# Patient Record
Sex: MALE | Race: WHITE | NOT HISPANIC OR LATINO
[De-identification: names, ages, dates, MRNs, and addresses within clinical notes are randomized per-mention and may not be internally consistent; named-entity substitution may affect disease eponyms.]

---

## 2016-12-16 RX ORDER — ISOSORBIDE DINITRATE 5 MG/1
2 TABLET ORAL
Qty: 90 | Refills: 0 | COMMUNITY
Start: 2016-12-16 | End: 2017-01-15

## 2017-01-09 ENCOUNTER — APPOINTMENT (OUTPATIENT)
Dept: CARDIOLOGY | Facility: CLINIC | Age: 82
End: 2017-01-09

## 2017-01-09 VITALS
BODY MASS INDEX: 25.22 KG/M2 | WEIGHT: 161 LBS | SYSTOLIC BLOOD PRESSURE: 126 MMHG | OXYGEN SATURATION: 100 % | DIASTOLIC BLOOD PRESSURE: 79 MMHG | HEART RATE: 72 BPM

## 2017-01-09 VITALS
BODY MASS INDEX: 25.22 KG/M2 | SYSTOLIC BLOOD PRESSURE: 121 MMHG | OXYGEN SATURATION: 100 % | DIASTOLIC BLOOD PRESSURE: 72 MMHG | WEIGHT: 161 LBS | HEART RATE: 79 BPM

## 2017-01-11 LAB
ANION GAP SERPL CALC-SCNC: 12 MMOL/L
BUN SERPL-MCNC: 81 MG/DL
CALCIUM SERPL-MCNC: 9.6 MG/DL
CHLORIDE SERPL-SCNC: 99 MMOL/L
CO2 SERPL-SCNC: 27 MMOL/L
CREAT SERPL-MCNC: 2.58 MG/DL
DIGOXIN SERPL-MCNC: 1 NG/ML
GLUCOSE SERPL-MCNC: 141 MG/DL
NT-PROBNP SERPL-MCNC: 2555 PG/ML
POTASSIUM SERPL-SCNC: 4.4 MMOL/L
SODIUM SERPL-SCNC: 138 MMOL/L

## 2017-01-18 ENCOUNTER — APPOINTMENT (OUTPATIENT)
Dept: PULMONOLOGY | Facility: CLINIC | Age: 82
End: 2017-01-18

## 2017-01-18 ENCOUNTER — MEDICATION RENEWAL (OUTPATIENT)
Age: 82
End: 2017-01-18

## 2017-01-18 VITALS
OXYGEN SATURATION: 98 % | SYSTOLIC BLOOD PRESSURE: 120 MMHG | HEART RATE: 78 BPM | BODY MASS INDEX: 23.19 KG/M2 | HEIGHT: 68 IN | DIASTOLIC BLOOD PRESSURE: 71 MMHG | WEIGHT: 153 LBS

## 2017-01-19 ENCOUNTER — APPOINTMENT (OUTPATIENT)
Dept: GERIATRICS | Facility: CLINIC | Age: 82
End: 2017-01-19

## 2017-01-19 ENCOUNTER — LABORATORY RESULT (OUTPATIENT)
Age: 82
End: 2017-01-19

## 2017-01-19 VITALS
BODY MASS INDEX: 24.76 KG/M2 | SYSTOLIC BLOOD PRESSURE: 110 MMHG | DIASTOLIC BLOOD PRESSURE: 66 MMHG | HEIGHT: 68 IN | TEMPERATURE: 97.9 F | WEIGHT: 163.38 LBS | HEART RATE: 62 BPM | OXYGEN SATURATION: 98 % | RESPIRATION RATE: 16 BRPM

## 2017-01-19 DIAGNOSIS — Z23 ENCOUNTER FOR IMMUNIZATION: ICD-10-CM

## 2017-01-22 ENCOUNTER — FORM ENCOUNTER (OUTPATIENT)
Age: 82
End: 2017-01-22

## 2017-01-23 ENCOUNTER — OUTPATIENT (OUTPATIENT)
Dept: OUTPATIENT SERVICES | Facility: HOSPITAL | Age: 82
LOS: 1 days | End: 2017-01-23
Payer: MEDICARE

## 2017-01-23 ENCOUNTER — APPOINTMENT (OUTPATIENT)
Dept: CT IMAGING | Facility: CLINIC | Age: 82
End: 2017-01-23

## 2017-01-23 ENCOUNTER — APPOINTMENT (OUTPATIENT)
Dept: NEPHROLOGY | Facility: CLINIC | Age: 82
End: 2017-01-23

## 2017-01-23 VITALS
SYSTOLIC BLOOD PRESSURE: 122 MMHG | HEIGHT: 68 IN | WEIGHT: 158 LBS | DIASTOLIC BLOOD PRESSURE: 65 MMHG | OXYGEN SATURATION: 95 % | HEART RATE: 77 BPM | BODY MASS INDEX: 23.95 KG/M2

## 2017-01-23 DIAGNOSIS — R93.8 ABNORMAL FINDINGS ON DIAGNOSTIC IMAGING OF OTHER SPECIFIED BODY STRUCTURES: ICD-10-CM

## 2017-01-23 DIAGNOSIS — Z95.810 PRESENCE OF AUTOMATIC (IMPLANTABLE) CARDIAC DEFIBRILLATOR: Chronic | ICD-10-CM

## 2017-01-23 DIAGNOSIS — Z95.1 PRESENCE OF AORTOCORONARY BYPASS GRAFT: Chronic | ICD-10-CM

## 2017-01-23 DIAGNOSIS — Z98.89 OTHER SPECIFIED POSTPROCEDURAL STATES: Chronic | ICD-10-CM

## 2017-01-23 DIAGNOSIS — Z98.49 CATARACT EXTRACTION STATUS, UNSPECIFIED EYE: Chronic | ICD-10-CM

## 2017-01-23 DIAGNOSIS — Z95.4 PRESENCE OF OTHER HEART-VALVE REPLACEMENT: Chronic | ICD-10-CM

## 2017-01-23 PROCEDURE — 71250 CT THORAX DX C-: CPT

## 2017-01-25 ENCOUNTER — RESULT REVIEW (OUTPATIENT)
Age: 82
End: 2017-01-25

## 2017-01-26 ENCOUNTER — OTHER (OUTPATIENT)
Age: 82
End: 2017-01-26

## 2017-02-01 LAB
25(OH)D3 SERPL-MCNC: 27.1 NG/ML
ALBUMIN SERPL ELPH-MCNC: 3.9 G/DL
ALP BLD-CCNC: 162 U/L
ALT SERPL-CCNC: 40 U/L
ANION GAP SERPL CALC-SCNC: 15 MMOL/L
AST SERPL-CCNC: 31 U/L
BASOPHILS # BLD AUTO: 0.03 K/UL
BASOPHILS NFR BLD AUTO: 0.4 %
BILIRUB SERPL-MCNC: 0.4 MG/DL
BUN SERPL-MCNC: 90 MG/DL
CALCIUM SERPL-MCNC: 8.8 MG/DL
CHLORIDE SERPL-SCNC: 94 MMOL/L
CHOLEST SERPL-MCNC: 113 MG/DL
CHOLEST/HDLC SERPL: 2.4 RATIO
CO2 SERPL-SCNC: 26 MMOL/L
CREAT SERPL-MCNC: 3.05 MG/DL
DIGOXIN SERPL-MCNC: 0.5 NG/ML
EOSINOPHIL # BLD AUTO: 0.57 K/UL
EOSINOPHIL NFR BLD AUTO: 7.6 %
FOLATE SERPL-MCNC: 16 NG/ML
GLUCOSE SERPL-MCNC: 230 MG/DL
HBA1C MFR BLD HPLC: 10.5 %
HCT VFR BLD CALC: 33.6 %
HDLC SERPL-MCNC: 47 MG/DL
HGB BLD-MCNC: 10 G/DL
IMM GRANULOCYTES NFR BLD AUTO: 0.3 %
INR PPP: 1.51 RATIO
INR PPP: 1.54 RATIO
LDLC SERPL CALC-MCNC: 39 MG/DL
LYMPHOCYTES # BLD AUTO: 1.52 K/UL
LYMPHOCYTES NFR BLD AUTO: 20.4 %
MAN DIFF?: NORMAL
MCHC RBC-ENTMCNC: 24.8 PG
MCHC RBC-ENTMCNC: 29.8 GM/DL
MCV RBC AUTO: 83.4 FL
MONOCYTES # BLD AUTO: 0.79 K/UL
MONOCYTES NFR BLD AUTO: 10.6 %
NEUTROPHILS # BLD AUTO: 4.53 K/UL
NEUTROPHILS NFR BLD AUTO: 60.7 %
PLATELET # BLD AUTO: NORMAL
POTASSIUM SERPL-SCNC: 4.6 MMOL/L
PROT SERPL-MCNC: 7.1 G/DL
PT BLD: 17.1 SEC
PT BLD: 17.5 SEC
RBC # BLD: 4.03 M/UL
RBC # FLD: 15.9 %
SODIUM SERPL-SCNC: 135 MMOL/L
TRIGL SERPL-MCNC: 133 MG/DL
TSH SERPL-ACNC: 1.68 UIU/ML
VIT B12 SERPL-MCNC: 1218 PG/ML
WBC # FLD AUTO: 7.46 K/UL

## 2017-02-06 ENCOUNTER — APPOINTMENT (OUTPATIENT)
Dept: CARDIOLOGY | Facility: CLINIC | Age: 82
End: 2017-02-06

## 2017-02-06 VITALS
HEART RATE: 68 BPM | OXYGEN SATURATION: 98 % | DIASTOLIC BLOOD PRESSURE: 66 MMHG | BODY MASS INDEX: 24.48 KG/M2 | WEIGHT: 161 LBS | SYSTOLIC BLOOD PRESSURE: 125 MMHG

## 2017-02-06 VITALS
HEIGHT: 68 IN | SYSTOLIC BLOOD PRESSURE: 143 MMHG | HEART RATE: 81 BPM | RESPIRATION RATE: 16 BRPM | DIASTOLIC BLOOD PRESSURE: 77 MMHG

## 2017-02-07 LAB
ALBUMIN SERPL ELPH-MCNC: 3.8 G/DL
ALP BLD-CCNC: 268 U/L
ALT SERPL-CCNC: 40 U/L
ANION GAP SERPL CALC-SCNC: 17 MMOL/L
AST SERPL-CCNC: 30 U/L
BASOPHILS # BLD AUTO: 0.02 K/UL
BASOPHILS NFR BLD AUTO: 0.2 %
BILIRUB SERPL-MCNC: 0.3 MG/DL
BUN SERPL-MCNC: 93 MG/DL
CALCIUM SERPL-MCNC: 9.7 MG/DL
CHLORIDE SERPL-SCNC: 95 MMOL/L
CO2 SERPL-SCNC: 26 MMOL/L
CREAT SERPL-MCNC: 3.38 MG/DL
DIGOXIN SERPL-MCNC: 0.6 NG/ML
EOSINOPHIL # BLD AUTO: 0.17 K/UL
EOSINOPHIL NFR BLD AUTO: 2.1 %
GLUCOSE SERPL-MCNC: 118 MG/DL
HCT VFR BLD CALC: 36.4 %
HGB BLD-MCNC: 11 G/DL
IMM GRANULOCYTES NFR BLD AUTO: 0 %
INR PPP: 1.72 RATIO
LYMPHOCYTES # BLD AUTO: 1.73 K/UL
LYMPHOCYTES NFR BLD AUTO: 21 %
MAN DIFF?: NORMAL
MCHC RBC-ENTMCNC: 25.1 PG
MCHC RBC-ENTMCNC: 30.2 GM/DL
MCV RBC AUTO: 83.1 FL
MONOCYTES # BLD AUTO: 0.81 K/UL
MONOCYTES NFR BLD AUTO: 9.9 %
NEUTROPHILS # BLD AUTO: 5.49 K/UL
NEUTROPHILS NFR BLD AUTO: 66.8 %
PLATELET # BLD AUTO: 151 K/UL
POTASSIUM SERPL-SCNC: 4.8 MMOL/L
PROT SERPL-MCNC: 7.7 G/DL
PT BLD: 19.6 SEC
RBC # BLD: 4.38 M/UL
RBC # FLD: 16.9 %
SODIUM SERPL-SCNC: 138 MMOL/L
WBC # FLD AUTO: 8.22 K/UL

## 2017-02-13 ENCOUNTER — LABORATORY RESULT (OUTPATIENT)
Age: 82
End: 2017-02-13

## 2017-02-21 ENCOUNTER — LABORATORY RESULT (OUTPATIENT)
Age: 82
End: 2017-02-21

## 2017-02-27 ENCOUNTER — LABORATORY RESULT (OUTPATIENT)
Age: 82
End: 2017-02-27

## 2017-02-27 ENCOUNTER — APPOINTMENT (OUTPATIENT)
Dept: ENDOCRINOLOGY | Facility: CLINIC | Age: 82
End: 2017-02-27

## 2017-02-27 VITALS
BODY MASS INDEX: 25.01 KG/M2 | WEIGHT: 165 LBS | SYSTOLIC BLOOD PRESSURE: 120 MMHG | HEART RATE: 91 BPM | OXYGEN SATURATION: 96 % | DIASTOLIC BLOOD PRESSURE: 60 MMHG | HEIGHT: 68 IN

## 2017-03-07 ENCOUNTER — LABORATORY RESULT (OUTPATIENT)
Age: 82
End: 2017-03-07

## 2017-03-13 ENCOUNTER — APPOINTMENT (OUTPATIENT)
Dept: PULMONOLOGY | Facility: CLINIC | Age: 82
End: 2017-03-13

## 2017-03-13 ENCOUNTER — LABORATORY RESULT (OUTPATIENT)
Age: 82
End: 2017-03-13

## 2017-03-13 ENCOUNTER — APPOINTMENT (OUTPATIENT)
Dept: CARDIOLOGY | Facility: CLINIC | Age: 82
End: 2017-03-13

## 2017-03-13 VITALS — DIASTOLIC BLOOD PRESSURE: 75 MMHG | SYSTOLIC BLOOD PRESSURE: 124 MMHG | HEART RATE: 60 BPM | OXYGEN SATURATION: 99 %

## 2017-03-21 ENCOUNTER — APPOINTMENT (OUTPATIENT)
Dept: GERIATRICS | Facility: CLINIC | Age: 82
End: 2017-03-21

## 2017-03-21 VITALS
SYSTOLIC BLOOD PRESSURE: 130 MMHG | WEIGHT: 160 LBS | HEART RATE: 80 BPM | RESPIRATION RATE: 16 BRPM | BODY MASS INDEX: 24.25 KG/M2 | TEMPERATURE: 97.4 F | HEIGHT: 68 IN | DIASTOLIC BLOOD PRESSURE: 70 MMHG | OXYGEN SATURATION: 95 %

## 2017-03-21 DIAGNOSIS — Z87.898 PERSONAL HISTORY OF OTHER SPECIFIED CONDITIONS: ICD-10-CM

## 2017-03-21 DIAGNOSIS — J31.0 CHRONIC RHINITIS: ICD-10-CM

## 2017-03-21 DIAGNOSIS — R93.8 ABNORMAL FINDINGS ON DIAGNOSTIC IMAGING OF OTHER SPECIFIED BODY STRUCTURES: ICD-10-CM

## 2017-03-21 DIAGNOSIS — J44.9 CHRONIC OBSTRUCTIVE PULMONARY DISEASE, UNSPECIFIED: ICD-10-CM

## 2017-03-21 DIAGNOSIS — R06.83 SNORING: ICD-10-CM

## 2017-03-21 DIAGNOSIS — R06.89 OTHER ABNORMALITIES OF BREATHING: ICD-10-CM

## 2017-03-21 DIAGNOSIS — I25.5 ISCHEMIC CARDIOMYOPATHY: ICD-10-CM

## 2017-03-21 DIAGNOSIS — Z86.39 PERSONAL HISTORY OF OTHER ENDOCRINE, NUTRITIONAL AND METABOLIC DISEASE: ICD-10-CM

## 2017-03-21 DIAGNOSIS — I48.0 PAROXYSMAL ATRIAL FIBRILLATION: ICD-10-CM

## 2017-03-21 DIAGNOSIS — Z87.09 PERSONAL HISTORY OF OTHER DISEASES OF THE RESPIRATORY SYSTEM: ICD-10-CM

## 2017-03-21 DIAGNOSIS — D50.9 IRON DEFICIENCY ANEMIA, UNSPECIFIED: ICD-10-CM

## 2017-03-21 RX ORDER — IPRATROPIUM BROMIDE 21 UG/1
0.03 SPRAY NASAL
Qty: 1 | Refills: 2 | Status: DISCONTINUED | COMMUNITY
Start: 2017-01-18 | End: 2017-03-21

## 2017-03-27 ENCOUNTER — OTHER (OUTPATIENT)
Age: 82
End: 2017-03-27

## 2017-03-28 ENCOUNTER — TRANSCRIPTION ENCOUNTER (OUTPATIENT)
Age: 82
End: 2017-03-28

## 2017-04-03 ENCOUNTER — LABORATORY RESULT (OUTPATIENT)
Age: 82
End: 2017-04-03

## 2017-04-10 ENCOUNTER — APPOINTMENT (OUTPATIENT)
Dept: CARDIOLOGY | Facility: CLINIC | Age: 82
End: 2017-04-10

## 2017-04-10 VITALS
BODY MASS INDEX: 25.01 KG/M2 | OXYGEN SATURATION: 100 % | DIASTOLIC BLOOD PRESSURE: 74 MMHG | HEIGHT: 68 IN | HEART RATE: 68 BPM | RESPIRATION RATE: 16 BRPM | SYSTOLIC BLOOD PRESSURE: 154 MMHG | WEIGHT: 165 LBS

## 2017-04-10 LAB
25(OH)D3 SERPL-MCNC: 35.6 NG/ML
ALBUMIN SERPL ELPH-MCNC: 3.7 G/DL
ALP BLD-CCNC: 168 U/L
ALT SERPL-CCNC: 20 U/L
ANION GAP SERPL CALC-SCNC: 16 MMOL/L
AST SERPL-CCNC: 19 U/L
BASOPHILS # BLD AUTO: 0.02 K/UL
BASOPHILS NFR BLD AUTO: 0.2 %
BILIRUB SERPL-MCNC: 0.4 MG/DL
BUN SERPL-MCNC: 97 MG/DL
CALCIUM SERPL-MCNC: 8.9 MG/DL
CHLORIDE SERPL-SCNC: 98 MMOL/L
CO2 SERPL-SCNC: 25 MMOL/L
CREAT SERPL-MCNC: 3.36 MG/DL
DIGOXIN SERPL-MCNC: 0.4 NG/ML
EOSINOPHIL # BLD AUTO: 0.47 K/UL
EOSINOPHIL NFR BLD AUTO: 5.6 %
FERRITIN SERPL-MCNC: 37.4 NG/ML
FOLATE SERPL-MCNC: 12 NG/ML
GLUCOSE SERPL-MCNC: 111 MG/DL
HBA1C MFR BLD HPLC: 9.3 %
HCT VFR BLD CALC: 32.1 %
HGB BLD-MCNC: 9.4 G/DL
IMM GRANULOCYTES NFR BLD AUTO: 0.2 %
INR PPP: 3.02 RATIO
IRON SERPL-MCNC: 33 UG/DL
LYMPHOCYTES # BLD AUTO: 1.46 K/UL
LYMPHOCYTES NFR BLD AUTO: 17.4 %
MAGNESIUM SERPL-MCNC: 2.4 MG/DL
MAN DIFF?: NORMAL
MCHC RBC-ENTMCNC: 24.3 PG
MCHC RBC-ENTMCNC: 29.3 GM/DL
MCV RBC AUTO: 82.9 FL
MONOCYTES # BLD AUTO: 1.13 K/UL
MONOCYTES NFR BLD AUTO: 13.4 %
NEUTROPHILS # BLD AUTO: 5.31 K/UL
NEUTROPHILS NFR BLD AUTO: 63.2 %
PLATELET # BLD AUTO: 245 K/UL
POTASSIUM SERPL-SCNC: 4.4 MMOL/L
PROT SERPL-MCNC: 7.1 G/DL
PT BLD: 34.9 SEC
RBC # BLD: 3.87 M/UL
RBC # BLD: 3.87 M/UL
RBC # FLD: 18.1 %
RETICS # AUTO: 2.2 %
RETICS AGGREG/RBC NFR: 83.6 K/UL
SODIUM SERPL-SCNC: 139 MMOL/L
T4 FREE SERPL-MCNC: 1 NG/DL
TRANSFERRIN SERPL-MCNC: 249 MG/DL
TSH SERPL-ACNC: 2.32 UIU/ML
VIT B12 SERPL-MCNC: 1178 PG/ML
WBC # FLD AUTO: 8.41 K/UL

## 2017-04-16 ENCOUNTER — EMERGENCY (EMERGENCY)
Facility: HOSPITAL | Age: 82
LOS: 1 days | Discharge: HOME | End: 2017-04-16
Attending: EMERGENCY MEDICINE | Admitting: EMERGENCY MEDICINE
Payer: MEDICARE

## 2017-04-16 VITALS
TEMPERATURE: 97 F | HEART RATE: 84 BPM | SYSTOLIC BLOOD PRESSURE: 131 MMHG | RESPIRATION RATE: 18 BRPM | DIASTOLIC BLOOD PRESSURE: 72 MMHG | OXYGEN SATURATION: 97 %

## 2017-04-16 VITALS
TEMPERATURE: 98 F | HEART RATE: 59 BPM | RESPIRATION RATE: 18 BRPM | DIASTOLIC BLOOD PRESSURE: 70 MMHG | SYSTOLIC BLOOD PRESSURE: 148 MMHG | OXYGEN SATURATION: 99 %

## 2017-04-16 DIAGNOSIS — Z95.1 PRESENCE OF AORTOCORONARY BYPASS GRAFT: Chronic | ICD-10-CM

## 2017-04-16 DIAGNOSIS — Z98.89 OTHER SPECIFIED POSTPROCEDURAL STATES: Chronic | ICD-10-CM

## 2017-04-16 DIAGNOSIS — Z95.5 PRESENCE OF CORONARY ANGIOPLASTY IMPLANT AND GRAFT: ICD-10-CM

## 2017-04-16 DIAGNOSIS — R10.9 UNSPECIFIED ABDOMINAL PAIN: ICD-10-CM

## 2017-04-16 DIAGNOSIS — Z79.82 LONG TERM (CURRENT) USE OF ASPIRIN: ICD-10-CM

## 2017-04-16 DIAGNOSIS — N17.9 ACUTE KIDNEY FAILURE, UNSPECIFIED: ICD-10-CM

## 2017-04-16 DIAGNOSIS — I50.9 HEART FAILURE, UNSPECIFIED: ICD-10-CM

## 2017-04-16 DIAGNOSIS — E11.9 TYPE 2 DIABETES MELLITUS WITHOUT COMPLICATIONS: ICD-10-CM

## 2017-04-16 DIAGNOSIS — N12 TUBULO-INTERSTITIAL NEPHRITIS, NOT SPECIFIED AS ACUTE OR CHRONIC: ICD-10-CM

## 2017-04-16 DIAGNOSIS — Z79.01 LONG TERM (CURRENT) USE OF ANTICOAGULANTS: ICD-10-CM

## 2017-04-16 DIAGNOSIS — Z95.810 PRESENCE OF AUTOMATIC (IMPLANTABLE) CARDIAC DEFIBRILLATOR: Chronic | ICD-10-CM

## 2017-04-16 DIAGNOSIS — Z98.49 CATARACT EXTRACTION STATUS, UNSPECIFIED EYE: Chronic | ICD-10-CM

## 2017-04-16 DIAGNOSIS — Z95.0 PRESENCE OF CARDIAC PACEMAKER: ICD-10-CM

## 2017-04-16 DIAGNOSIS — N18.4 CHRONIC KIDNEY DISEASE, STAGE 4 (SEVERE): ICD-10-CM

## 2017-04-16 DIAGNOSIS — I25.10 ATHEROSCLEROTIC HEART DISEASE OF NATIVE CORONARY ARTERY WITHOUT ANGINA PECTORIS: ICD-10-CM

## 2017-04-16 DIAGNOSIS — I13.0 HYPERTENSIVE HEART AND CHRONIC KIDNEY DISEASE WITH HEART FAILURE AND STAGE 1 THROUGH STAGE 4 CHRONIC KIDNEY DISEASE, OR UNSPECIFIED CHRONIC KIDNEY DISEASE: ICD-10-CM

## 2017-04-16 DIAGNOSIS — Z95.4 PRESENCE OF OTHER HEART-VALVE REPLACEMENT: Chronic | ICD-10-CM

## 2017-04-16 DIAGNOSIS — Z98.49 CATARACT EXTRACTION STATUS, UNSPECIFIED EYE: ICD-10-CM

## 2017-04-16 LAB
ALBUMIN SERPL ELPH-MCNC: 3.8 G/DL — SIGNIFICANT CHANGE UP (ref 3.3–5)
ALBUMIN SERPL ELPH-MCNC: 4 G/DL — SIGNIFICANT CHANGE UP (ref 3.3–5)
ALP SERPL-CCNC: 146 U/L — HIGH (ref 40–120)
ALP SERPL-CCNC: 146 U/L — HIGH (ref 40–120)
ALT FLD-CCNC: 29 U/L RC — SIGNIFICANT CHANGE UP (ref 10–45)
ALT FLD-CCNC: 29 U/L RC — SIGNIFICANT CHANGE UP (ref 10–45)
ANION GAP SERPL CALC-SCNC: 15 MMOL/L — SIGNIFICANT CHANGE UP (ref 5–17)
ANION GAP SERPL CALC-SCNC: 9 MMOL/L — SIGNIFICANT CHANGE UP (ref 5–17)
APPEARANCE UR: ABNORMAL
APTT BLD: 31.7 SEC — SIGNIFICANT CHANGE UP (ref 27.5–37.4)
AST SERPL-CCNC: 30 U/L — SIGNIFICANT CHANGE UP (ref 10–40)
AST SERPL-CCNC: 33 U/L — SIGNIFICANT CHANGE UP (ref 10–40)
BASOPHILS # BLD AUTO: 0 K/UL — SIGNIFICANT CHANGE UP (ref 0–0.2)
BASOPHILS NFR BLD AUTO: 0.1 % — SIGNIFICANT CHANGE UP (ref 0–2)
BILIRUB SERPL-MCNC: 0.3 MG/DL — SIGNIFICANT CHANGE UP (ref 0.2–1.2)
BILIRUB SERPL-MCNC: 0.3 MG/DL — SIGNIFICANT CHANGE UP (ref 0.2–1.2)
BILIRUB UR-MCNC: NEGATIVE — SIGNIFICANT CHANGE UP
BUN SERPL-MCNC: 85 MG/DL — HIGH (ref 7–23)
BUN SERPL-MCNC: 88 MG/DL — HIGH (ref 7–23)
CALCIUM SERPL-MCNC: 9.3 MG/DL — SIGNIFICANT CHANGE UP (ref 8.4–10.5)
CALCIUM SERPL-MCNC: 9.5 MG/DL — SIGNIFICANT CHANGE UP (ref 8.4–10.5)
CHLORIDE SERPL-SCNC: 100 MMOL/L — SIGNIFICANT CHANGE UP (ref 96–108)
CHLORIDE SERPL-SCNC: 100 MMOL/L — SIGNIFICANT CHANGE UP (ref 96–108)
CO2 SERPL-SCNC: 25 MMOL/L — SIGNIFICANT CHANGE UP (ref 22–31)
CO2 SERPL-SCNC: 32 MMOL/L — HIGH (ref 22–31)
COLOR SPEC: SIGNIFICANT CHANGE UP
CREAT SERPL-MCNC: 3.36 MG/DL — HIGH (ref 0.5–1.3)
CREAT SERPL-MCNC: 3.55 MG/DL — HIGH (ref 0.5–1.3)
DIFF PNL FLD: ABNORMAL
EOSINOPHIL # BLD AUTO: 0.2 K/UL — SIGNIFICANT CHANGE UP (ref 0–0.5)
EOSINOPHIL NFR BLD AUTO: 3 % — SIGNIFICANT CHANGE UP (ref 0–6)
GAS PNL BLDV: SIGNIFICANT CHANGE UP
GLUCOSE SERPL-MCNC: 110 MG/DL — HIGH (ref 70–99)
GLUCOSE SERPL-MCNC: 131 MG/DL — HIGH (ref 70–99)
GLUCOSE UR QL: NEGATIVE — SIGNIFICANT CHANGE UP
HCT VFR BLD CALC: 29.8 % — LOW (ref 39–50)
HGB BLD-MCNC: 9.6 G/DL — LOW (ref 13–17)
INR BLD: 1.75 RATIO — HIGH (ref 0.88–1.16)
KETONES UR-MCNC: NEGATIVE — SIGNIFICANT CHANGE UP
LEUKOCYTE ESTERASE UR-ACNC: ABNORMAL
LYMPHOCYTES # BLD AUTO: 1.4 K/UL — SIGNIFICANT CHANGE UP (ref 1–3.3)
LYMPHOCYTES # BLD AUTO: 20 % — SIGNIFICANT CHANGE UP (ref 13–44)
MCHC RBC-ENTMCNC: 26.3 PG — LOW (ref 27–34)
MCHC RBC-ENTMCNC: 32.4 GM/DL — SIGNIFICANT CHANGE UP (ref 32–36)
MCV RBC AUTO: 81.2 FL — SIGNIFICANT CHANGE UP (ref 80–100)
MONOCYTES # BLD AUTO: 1 K/UL — HIGH (ref 0–0.9)
MONOCYTES NFR BLD AUTO: 11 % — SIGNIFICANT CHANGE UP (ref 2–14)
NEUTROPHILS # BLD AUTO: 4.6 K/UL — SIGNIFICANT CHANGE UP (ref 1.8–7.4)
NEUTROPHILS NFR BLD AUTO: 66 % — SIGNIFICANT CHANGE UP (ref 43–77)
NITRITE UR-MCNC: NEGATIVE — SIGNIFICANT CHANGE UP
PH UR: 6 — SIGNIFICANT CHANGE UP (ref 4.8–8)
PLATELET # BLD AUTO: 212 K/UL — SIGNIFICANT CHANGE UP (ref 150–400)
POTASSIUM SERPL-MCNC: 4.4 MMOL/L — SIGNIFICANT CHANGE UP (ref 3.5–5.3)
POTASSIUM SERPL-MCNC: 4.4 MMOL/L — SIGNIFICANT CHANGE UP (ref 3.5–5.3)
POTASSIUM SERPL-SCNC: 4.4 MMOL/L — SIGNIFICANT CHANGE UP (ref 3.5–5.3)
POTASSIUM SERPL-SCNC: 4.4 MMOL/L — SIGNIFICANT CHANGE UP (ref 3.5–5.3)
PROT SERPL-MCNC: 7.3 G/DL — SIGNIFICANT CHANGE UP (ref 6–8.3)
PROT SERPL-MCNC: 7.5 G/DL — SIGNIFICANT CHANGE UP (ref 6–8.3)
PROT UR-MCNC: 100 MG/DL
PROTHROM AB SERPL-ACNC: 19.3 SEC — HIGH (ref 9.8–12.7)
RBC # BLD: 3.66 M/UL — LOW (ref 4.2–5.8)
RBC # FLD: 15.7 % — HIGH (ref 10.3–14.5)
SODIUM SERPL-SCNC: 140 MMOL/L — SIGNIFICANT CHANGE UP (ref 135–145)
SODIUM SERPL-SCNC: 141 MMOL/L — SIGNIFICANT CHANGE UP (ref 135–145)
SP GR SPEC: 1.01 — SIGNIFICANT CHANGE UP (ref 1.01–1.02)
UROBILINOGEN FLD QL: NEGATIVE — SIGNIFICANT CHANGE UP
WBC # BLD: 7.3 K/UL — SIGNIFICANT CHANGE UP (ref 3.8–10.5)
WBC # FLD AUTO: 7.3 K/UL — SIGNIFICANT CHANGE UP (ref 3.8–10.5)

## 2017-04-16 PROCEDURE — 74176 CT ABD & PELVIS W/O CONTRAST: CPT | Mod: 26

## 2017-04-16 PROCEDURE — 93010 ELECTROCARDIOGRAM REPORT: CPT

## 2017-04-16 PROCEDURE — 99218: CPT | Mod: 25

## 2017-04-16 RX ORDER — ASPIRIN/CALCIUM CARB/MAGNESIUM 324 MG
81 TABLET ORAL DAILY
Qty: 0 | Refills: 0 | Status: ACTIVE | OUTPATIENT
Start: 2017-04-16 | End: 2018-03-15

## 2017-04-16 RX ORDER — METOPROLOL TARTRATE 50 MG
100 TABLET ORAL
Qty: 0 | Refills: 0 | Status: ACTIVE | OUTPATIENT
Start: 2017-04-16 | End: 2018-03-15

## 2017-04-16 RX ORDER — DONEPEZIL HYDROCHLORIDE 10 MG/1
10 TABLET, FILM COATED ORAL AT BEDTIME
Qty: 0 | Refills: 0 | Status: ACTIVE | OUTPATIENT
Start: 2017-04-16 | End: 2018-03-15

## 2017-04-16 RX ORDER — DEXTROSE 50 % IN WATER 50 %
25 SYRINGE (ML) INTRAVENOUS ONCE
Qty: 0 | Refills: 0 | Status: ACTIVE | OUTPATIENT
Start: 2017-04-16

## 2017-04-16 RX ORDER — ISOSORBIDE DINITRATE 5 MG/1
10 TABLET ORAL
Qty: 0 | Refills: 0 | Status: ACTIVE | OUTPATIENT
Start: 2017-04-16 | End: 2018-03-15

## 2017-04-16 RX ORDER — ATORVASTATIN CALCIUM 80 MG/1
80 TABLET, FILM COATED ORAL AT BEDTIME
Qty: 0 | Refills: 0 | Status: ACTIVE | OUTPATIENT
Start: 2017-04-16 | End: 2018-03-15

## 2017-04-16 RX ORDER — HYDRALAZINE HCL 50 MG
20 TABLET ORAL
Qty: 0 | Refills: 0 | Status: ACTIVE | OUTPATIENT
Start: 2017-04-16 | End: 2018-03-15

## 2017-04-16 RX ORDER — DIGOXIN 250 MCG
0.12 TABLET ORAL DAILY
Qty: 0 | Refills: 0 | Status: ACTIVE | OUTPATIENT
Start: 2017-04-16 | End: 2018-03-15

## 2017-04-16 RX ORDER — GLUCAGON INJECTION, SOLUTION 0.5 MG/.1ML
1 INJECTION, SOLUTION SUBCUTANEOUS ONCE
Qty: 0 | Refills: 0 | Status: ACTIVE | OUTPATIENT
Start: 2017-04-16 | End: 2018-03-15

## 2017-04-16 RX ORDER — INSULIN GLARGINE 100 [IU]/ML
27 INJECTION, SOLUTION SUBCUTANEOUS AT BEDTIME
Qty: 0 | Refills: 0 | Status: ACTIVE | OUTPATIENT
Start: 2017-04-16 | End: 2018-03-15

## 2017-04-16 RX ORDER — DEXTROSE 50 % IN WATER 50 %
12.5 SYRINGE (ML) INTRAVENOUS ONCE
Qty: 0 | Refills: 0 | Status: ACTIVE | OUTPATIENT
Start: 2017-04-16

## 2017-04-16 RX ORDER — TAMSULOSIN HYDROCHLORIDE 0.4 MG/1
0.4 CAPSULE ORAL AT BEDTIME
Qty: 0 | Refills: 0 | Status: ACTIVE | OUTPATIENT
Start: 2017-04-16 | End: 2018-03-15

## 2017-04-16 RX ORDER — DEXTROSE 50 % IN WATER 50 %
1 SYRINGE (ML) INTRAVENOUS ONCE
Qty: 0 | Refills: 0 | Status: ACTIVE | OUTPATIENT
Start: 2017-04-16 | End: 2018-03-15

## 2017-04-16 RX ORDER — SODIUM CHLORIDE 9 MG/ML
500 INJECTION INTRAMUSCULAR; INTRAVENOUS; SUBCUTANEOUS ONCE
Qty: 0 | Refills: 0 | Status: DISCONTINUED | OUTPATIENT
Start: 2017-04-16 | End: 2017-04-16

## 2017-04-16 RX ORDER — CEFTRIAXONE 500 MG/1
1 INJECTION, POWDER, FOR SOLUTION INTRAMUSCULAR; INTRAVENOUS ONCE
Qty: 0 | Refills: 0 | Status: COMPLETED | OUTPATIENT
Start: 2017-04-16 | End: 2017-04-16

## 2017-04-16 RX ORDER — SODIUM CHLORIDE 9 MG/ML
1000 INJECTION, SOLUTION INTRAVENOUS
Qty: 0 | Refills: 0 | Status: ACTIVE | OUTPATIENT
Start: 2017-04-16 | End: 2018-03-15

## 2017-04-16 RX ORDER — INSULIN LISPRO 100/ML
6 VIAL (ML) SUBCUTANEOUS
Qty: 0 | Refills: 0 | Status: DISCONTINUED | OUTPATIENT
Start: 2017-04-16 | End: 2017-04-20

## 2017-04-16 RX ORDER — SODIUM CHLORIDE 9 MG/ML
1000 INJECTION INTRAMUSCULAR; INTRAVENOUS; SUBCUTANEOUS ONCE
Qty: 0 | Refills: 0 | Status: COMPLETED | OUTPATIENT
Start: 2017-04-16 | End: 2017-04-16

## 2017-04-16 RX ORDER — WARFARIN SODIUM 2.5 MG/1
5 TABLET ORAL DAILY
Qty: 0 | Refills: 0 | Status: ACTIVE | OUTPATIENT
Start: 2017-04-16 | End: 2017-04-16

## 2017-04-16 RX ORDER — CEFUROXIME AXETIL 250 MG
1 TABLET ORAL
Qty: 20 | Refills: 0 | OUTPATIENT
Start: 2017-04-16 | End: 2017-04-26

## 2017-04-16 RX ORDER — SODIUM CHLORIDE 9 MG/ML
1000 INJECTION INTRAMUSCULAR; INTRAVENOUS; SUBCUTANEOUS
Qty: 0 | Refills: 0 | Status: ACTIVE | OUTPATIENT
Start: 2017-04-16 | End: 2018-03-15

## 2017-04-16 RX ORDER — MEMANTINE HYDROCHLORIDE 10 MG/1
10 TABLET ORAL
Qty: 0 | Refills: 0 | Status: ACTIVE | OUTPATIENT
Start: 2017-04-16 | End: 2018-03-15

## 2017-04-16 RX ORDER — HYDRALAZINE HCL 50 MG
10 TABLET ORAL
Qty: 0 | Refills: 0 | Status: DISCONTINUED | OUTPATIENT
Start: 2017-04-16 | End: 2017-04-16

## 2017-04-16 RX ORDER — MORPHINE SULFATE 50 MG/1
2 CAPSULE, EXTENDED RELEASE ORAL ONCE
Qty: 0 | Refills: 0 | Status: DISCONTINUED | OUTPATIENT
Start: 2017-04-16 | End: 2017-04-16

## 2017-04-16 RX ADMIN — MORPHINE SULFATE 2 MILLIGRAM(S): 50 CAPSULE, EXTENDED RELEASE ORAL at 14:07

## 2017-04-16 RX ADMIN — Medication 6 UNIT(S): at 18:35

## 2017-04-16 RX ADMIN — SODIUM CHLORIDE 500 MILLILITER(S): 9 INJECTION INTRAMUSCULAR; INTRAVENOUS; SUBCUTANEOUS at 16:00

## 2017-04-16 RX ADMIN — CEFTRIAXONE 100 GRAM(S): 500 INJECTION, POWDER, FOR SOLUTION INTRAMUSCULAR; INTRAVENOUS at 16:41

## 2017-04-16 RX ADMIN — MORPHINE SULFATE 2 MILLIGRAM(S): 50 CAPSULE, EXTENDED RELEASE ORAL at 14:37

## 2017-04-16 RX ADMIN — SODIUM CHLORIDE 75 MILLILITER(S): 9 INJECTION INTRAMUSCULAR; INTRAVENOUS; SUBCUTANEOUS at 18:21

## 2017-04-16 NOTE — ED PROVIDER NOTE - ATTENDING CONTRIBUTION TO CARE
ATTENDING MD:  Chris FOSTER, personally have seen and examined this patient.  I have discussed all aspects of care with the resident physician. Resident note reviewed and agree on plan of care and except where noted.  See HPI, PE, and MDM for details.     GEN: nontoxic, well appearing, AOx4; NCAT, EOMI, MM dry, neck veins flat; LUNGS CTAB, CV:RRR, ABD: soft, nondistended, R-flank and R-CVAT, mild suprapubic tenderness, no rebound or gaurding, EXT w/out edema    MDM: Pyelo vs. stone, well appearing normal vital signs, will get labs, urine, CTU

## 2017-04-16 NOTE — ED PROVIDER NOTE - GASTROINTESTINAL NEGATIVE STATEMENT, MLM
no abdominal pain, no bloating, no constipation, no diarrhea, no nausea and no vomiting. +abdominal pain, no bloating, no constipation, no diarrhea, no nausea and no vomiting.

## 2017-04-16 NOTE — ED CDU PROVIDER NOTE - ATTENDING CONTRIBUTION TO CARE
ATTENDING MD: I have personally performed a face to face diagnostic evaluation on this patient.  I have reviewed the ACP note and agree with the history, exam, and plan of care, except as noted here and in the corresponding sections of the HPI, PE, MDM, Progress notes, and ED provider note.

## 2017-04-16 NOTE — ED ADULT NURSE NOTE - PSH
H/O mitral valve replacement  bovine  History of cataract surgery    History of colon surgery    ICD (implantable cardioverter-defibrillator) in place    S/P CABG x 1

## 2017-04-16 NOTE — ED ADULT NURSE REASSESSMENT NOTE - NS ED NURSE REASSESS COMMENT FT1
Pt received from DEMARCUS Reina. Plan of care was discussed. Pt complains of R flank tenderness on palpitation. No other urinary symptoms noted. Safety & comfort measures maintained. Call bell in reach. Will continue to monitor.

## 2017-04-16 NOTE — ED CDU PROVIDER NOTE - PROGRESS NOTE DETAILS
Patient resting in chair comfortably with family at bedside. NAD. Reports R flank pain on palpation. Denies fever/chills, CP, SOB, abd pain, N/V. VSS. + R sided CVA tenderness. Repeat BMP at 2000. - Unique Hanson PA-C ------------ATTENDING NOTE------------   pt w/ family c/o improved R flank pain and dysuria, treating as pyelonephritis, complicated by CKD, nml VS at d/c, afebrile, tolerating PO, has outpt f/u, UCx in progress, pt is stable/appropriate for d/c, in depth d/w all about ddx, tx, trevizo, fu, continue to agree w/ PAs documentation and MDM.  - Noah West MD   -------------------------------------------------------------- Pt resting in bed comfortably. Reports pain is improving. Tolerating PO. NAD. Denies fever/chills, abd pain, n/v. VSS. Repeat creatinine slightly improved from 3.5 to 3.3. Pt and family wish to go home. Will d/c on abx. Paged pt's PMD Dr. Airam Miller, and discussed with patient and family the need for follow up of pt's kidney function and urine culture. D/W Dr. West. - CAMILO WuC Patient resting in chair comfortably with family at bedside. NAD. Reports mild R flank pain on palpation. Denies fever/chills, CP, SOB, abd pain, N/V. VSS. + R sided CVA tenderness. Repeat BMP at 2000. - Unique Hanson PA-C Spoke with covering MD Dr. Gutierrez. Will follow up with patient in clinic this week. - Unique Hanson PA-C

## 2017-04-16 NOTE — ED PROVIDER NOTE - PROGRESS NOTE DETAILS
Pt with no stone. Radiographic cystitis, but CVAT on exam. Likely uncomplicated pyelo. Will start ABx, palce in CDU for hydration, monitoring creatinine. ATTENDING MD Doris: Pt with no stone. Radiographic cystitis, but CVAT on exam. Likely uncomplicated pyelo. Has RIC, likely from home fluid restriction, Will start ABx, palce in CDU for hydration, monitoring creatinine. If improving, pt likely ok for DC home on PO ABx w/close PCP fu.

## 2017-04-16 NOTE — ED ADULT NURSE NOTE - OBJECTIVE STATEMENT
Pt with c/o rt flank pain. Pt denies chest pain or sob no nvd, no fever chills headache  or dizziness. Pt denies pain or burning on urination, denies blood in urine.

## 2017-04-16 NOTE — ED PROVIDER NOTE - OBJECTIVE STATEMENT
87 y/o man with h/o kidney stones, CAD s/p CABG with ICD, CHF, HLD, HTN, CKD stage IV, DMII, and CVA without deficits presents complaining of R flank pain.  Patient complained of R back pain 3 days ago which resolved with tylenol last night.  Today, patient has severe, 9/10 R flank pain.  Patient denies fevers, chills, nausea, vomiting, CP, SOB, dysuria, and changes in bowel habits.  Patient took Tylenol 1000 2 hours ago without relief.

## 2017-04-16 NOTE — ED ADULT NURSE NOTE - PMH
CAD (coronary artery disease)    Cardiomyopathy, ischemic    Cerebrovascular accident (CVA), unspecified mechanism    Congestive heart failure    Dyslipidemia    Endocarditis    Hypertension    Pacemaker    Paroxysmal atrial fibrillation    Type 2 diabetes mellitus    VT (ventricular tachycardia)

## 2017-04-16 NOTE — ED CDU PROVIDER NOTE - GASTROINTESTINAL NEGATIVE STATEMENT, MLM
no abdominal pain, no bloating, no constipation, no diarrhea, no nausea and no vomiting. Flank pain, +abdominal pain no bloating, no constipation, no diarrhea, no nausea and no vomiting.

## 2017-04-16 NOTE — ED CDU PROVIDER NOTE - PLAN OF CARE
STAY HYDRATED. Follow up with your Primary Care Physician within the next 2-3 days. Bring a copy of your test results with you to your appointment - you will need to follow up on your kidney function. Take Antibiotics as prescribed.   Continue your current medication regim en. Return to the Emergency Room if you experience new or worsening symptoms . STAY HYDRATED. Follow up with your Primary Care Physician Dr. Miller within the next 1-2 days. Bring a copy of your test results with you to your appointment - you will need to follow up on your kidney function and urine culture results. Take Antibiotics as prescribed.   Continue your current medication regimen. Return to the Emergency Room if you experience new or worsening symptoms.

## 2017-04-16 NOTE — ED CDU PROVIDER NOTE - MEDICAL DECISION MAKING DETAILS
ATTENDING MD Doris: Please see original ED provider note MDM and progress notes for full medical decision making leading to CDU stay.

## 2017-04-16 NOTE — ED CDU PROVIDER NOTE - DETAILS
Renal Colic  1. Frequent reevaluations  2. IV hydration  3. Pain management  4. IV antibiotics  Plan d/w Dr. Singh Pyelonephritis  1. Frequent reevaluations  2. IV hydration  3. Pain management  4. IV antibiotics  Plan d/w Dr. Singh

## 2017-04-16 NOTE — ED CDU PROVIDER NOTE - OBJECTIVE STATEMENT
85 y/o man with h/o kidney stones, CAD s/p CABG with ICD, CHF, HLD, HTN, CKD stage IV, DMII, and CVA without deficits presents complaining of R flank pain.  Patient complained of R back pain 3 days ago which resolved with tylenol last night.  Today, patient has severe, 9/10 R flank pain.  Patient denies fevers, chills, nausea, vomiting, CP, SOB, dysuria, and changes in bowel habits.  Patient took Tylenol 1000 2 hours ago without relief.

## 2017-04-16 NOTE — ED PROVIDER NOTE - CARE PLAN
Principal Discharge DX:	Pyelonephritis Principal Discharge DX:	Pyelonephritis  Secondary Diagnosis:	RIC (acute kidney injury)

## 2017-04-17 LAB
CULTURE RESULTS: SIGNIFICANT CHANGE UP
SPECIMEN SOURCE: SIGNIFICANT CHANGE UP

## 2017-04-18 ENCOUNTER — INPATIENT (INPATIENT)
Facility: HOSPITAL | Age: 82
LOS: 4 days | Discharge: HOME | DRG: 690 | End: 2017-04-23
Attending: HOSPITALIST | Admitting: INTERNAL MEDICINE
Payer: MEDICARE

## 2017-04-18 VITALS
OXYGEN SATURATION: 99 % | TEMPERATURE: 98 F | RESPIRATION RATE: 20 BRPM | SYSTOLIC BLOOD PRESSURE: 156 MMHG | DIASTOLIC BLOOD PRESSURE: 60 MMHG | HEART RATE: 68 BPM

## 2017-04-18 DIAGNOSIS — Z98.49 CATARACT EXTRACTION STATUS, UNSPECIFIED EYE: Chronic | ICD-10-CM

## 2017-04-18 DIAGNOSIS — R10.9 UNSPECIFIED ABDOMINAL PAIN: ICD-10-CM

## 2017-04-18 DIAGNOSIS — Z95.1 PRESENCE OF AORTOCORONARY BYPASS GRAFT: Chronic | ICD-10-CM

## 2017-04-18 DIAGNOSIS — Z98.89 OTHER SPECIFIED POSTPROCEDURAL STATES: Chronic | ICD-10-CM

## 2017-04-18 DIAGNOSIS — I25.10 ATHEROSCLEROTIC HEART DISEASE OF NATIVE CORONARY ARTERY WITHOUT ANGINA PECTORIS: ICD-10-CM

## 2017-04-18 DIAGNOSIS — N18.4 CHRONIC KIDNEY DISEASE, STAGE 4 (SEVERE): ICD-10-CM

## 2017-04-18 DIAGNOSIS — I48.0 PAROXYSMAL ATRIAL FIBRILLATION: ICD-10-CM

## 2017-04-18 DIAGNOSIS — F03.90 UNSPECIFIED DEMENTIA, UNSPECIFIED SEVERITY, WITHOUT BEHAVIORAL DISTURBANCE, PSYCHOTIC DISTURBANCE, MOOD DISTURBANCE, AND ANXIETY: ICD-10-CM

## 2017-04-18 DIAGNOSIS — R63.8 OTHER SYMPTOMS AND SIGNS CONCERNING FOOD AND FLUID INTAKE: ICD-10-CM

## 2017-04-18 DIAGNOSIS — Z95.810 PRESENCE OF AUTOMATIC (IMPLANTABLE) CARDIAC DEFIBRILLATOR: Chronic | ICD-10-CM

## 2017-04-18 DIAGNOSIS — I50.22 CHRONIC SYSTOLIC (CONGESTIVE) HEART FAILURE: ICD-10-CM

## 2017-04-18 DIAGNOSIS — Z41.8 ENCOUNTER FOR OTHER PROCEDURES FOR PURPOSES OTHER THAN REMEDYING HEALTH STATE: ICD-10-CM

## 2017-04-18 DIAGNOSIS — E11.9 TYPE 2 DIABETES MELLITUS WITHOUT COMPLICATIONS: ICD-10-CM

## 2017-04-18 DIAGNOSIS — Z95.4 PRESENCE OF OTHER HEART-VALVE REPLACEMENT: Chronic | ICD-10-CM

## 2017-04-18 DIAGNOSIS — I10 ESSENTIAL (PRIMARY) HYPERTENSION: ICD-10-CM

## 2017-04-18 LAB
ALBUMIN SERPL ELPH-MCNC: 3.7 G/DL — SIGNIFICANT CHANGE UP (ref 3.3–5)
ALP SERPL-CCNC: 126 U/L — HIGH (ref 40–120)
ALT FLD-CCNC: 21 U/L RC — SIGNIFICANT CHANGE UP (ref 10–45)
ANION GAP SERPL CALC-SCNC: 19 MMOL/L — HIGH (ref 5–17)
APPEARANCE UR: ABNORMAL
APTT BLD: 33.3 SEC — SIGNIFICANT CHANGE UP (ref 27.5–37.4)
AST SERPL-CCNC: 21 U/L — SIGNIFICANT CHANGE UP (ref 10–40)
BASE EXCESS BLDV CALC-SCNC: -2 MMOL/L — SIGNIFICANT CHANGE UP (ref -2–2)
BASOPHILS # BLD AUTO: 0 K/UL — SIGNIFICANT CHANGE UP (ref 0–0.2)
BASOPHILS NFR BLD AUTO: 0.3 % — SIGNIFICANT CHANGE UP (ref 0–2)
BILIRUB SERPL-MCNC: 0.3 MG/DL — SIGNIFICANT CHANGE UP (ref 0.2–1.2)
BILIRUB UR-MCNC: NEGATIVE — SIGNIFICANT CHANGE UP
BUN SERPL-MCNC: 99 MG/DL — HIGH (ref 7–23)
CA-I SERPL-SCNC: 1.21 MMOL/L — SIGNIFICANT CHANGE UP (ref 1.12–1.3)
CALCIUM SERPL-MCNC: 9.1 MG/DL — SIGNIFICANT CHANGE UP (ref 8.4–10.5)
CHLORIDE BLDV-SCNC: 104 MMOL/L — SIGNIFICANT CHANGE UP (ref 96–108)
CHLORIDE SERPL-SCNC: 99 MMOL/L — SIGNIFICANT CHANGE UP (ref 96–108)
CO2 BLDV-SCNC: 25 MMOL/L — SIGNIFICANT CHANGE UP (ref 22–30)
CO2 SERPL-SCNC: 20 MMOL/L — LOW (ref 22–31)
COLOR SPEC: SIGNIFICANT CHANGE UP
CREAT SERPL-MCNC: 3.42 MG/DL — HIGH (ref 0.5–1.3)
DIFF PNL FLD: NEGATIVE — SIGNIFICANT CHANGE UP
EOSINOPHIL # BLD AUTO: 0.1 K/UL — SIGNIFICANT CHANGE UP (ref 0–0.5)
EOSINOPHIL NFR BLD AUTO: 1.5 % — SIGNIFICANT CHANGE UP (ref 0–6)
GAS PNL BLDV: 135 MMOL/L — LOW (ref 136–145)
GAS PNL BLDV: SIGNIFICANT CHANGE UP
GAS PNL BLDV: SIGNIFICANT CHANGE UP
GLUCOSE BLDV-MCNC: 179 MG/DL — HIGH (ref 70–99)
GLUCOSE SERPL-MCNC: 181 MG/DL — HIGH (ref 70–99)
GLUCOSE UR QL: NEGATIVE — SIGNIFICANT CHANGE UP
HCO3 BLDV-SCNC: 23 MMOL/L — SIGNIFICANT CHANGE UP (ref 21–29)
HCT VFR BLD CALC: 30.6 % — LOW (ref 39–50)
HCT VFR BLDA CALC: 29 % — LOW (ref 39–50)
HGB BLD CALC-MCNC: 9.2 G/DL — LOW (ref 13–17)
HGB BLD-MCNC: 9.3 G/DL — LOW (ref 13–17)
INR BLD: 1.7 RATIO — HIGH (ref 0.88–1.16)
KETONES UR-MCNC: NEGATIVE — SIGNIFICANT CHANGE UP
LACTATE BLDV-MCNC: 1.2 MMOL/L — SIGNIFICANT CHANGE UP (ref 0.7–2)
LEUKOCYTE ESTERASE UR-ACNC: ABNORMAL
LYMPHOCYTES # BLD AUTO: 1 K/UL — SIGNIFICANT CHANGE UP (ref 1–3.3)
LYMPHOCYTES # BLD AUTO: 14.1 % — SIGNIFICANT CHANGE UP (ref 13–44)
MCHC RBC-ENTMCNC: 24.5 PG — LOW (ref 27–34)
MCHC RBC-ENTMCNC: 30.4 GM/DL — LOW (ref 32–36)
MCV RBC AUTO: 80.7 FL — SIGNIFICANT CHANGE UP (ref 80–100)
MONOCYTES # BLD AUTO: 0.8 K/UL — SIGNIFICANT CHANGE UP (ref 0–0.9)
MONOCYTES NFR BLD AUTO: 11 % — SIGNIFICANT CHANGE UP (ref 2–14)
NEUTROPHILS # BLD AUTO: 5.1 K/UL — SIGNIFICANT CHANGE UP (ref 1.8–7.4)
NEUTROPHILS NFR BLD AUTO: 73.1 % — SIGNIFICANT CHANGE UP (ref 43–77)
NITRITE UR-MCNC: NEGATIVE — SIGNIFICANT CHANGE UP
PCO2 BLDV: 46 MMHG — SIGNIFICANT CHANGE UP (ref 35–50)
PH BLDV: 7.32 — LOW (ref 7.35–7.45)
PH UR: 6 — SIGNIFICANT CHANGE UP (ref 5–8)
PLATELET # BLD AUTO: 203 K/UL — SIGNIFICANT CHANGE UP (ref 150–400)
PO2 BLDV: 29 MMHG — SIGNIFICANT CHANGE UP (ref 25–45)
POTASSIUM BLDV-SCNC: 4.1 MMOL/L — SIGNIFICANT CHANGE UP (ref 3.5–5)
POTASSIUM SERPL-MCNC: 4.4 MMOL/L — SIGNIFICANT CHANGE UP (ref 3.5–5.3)
POTASSIUM SERPL-SCNC: 4.4 MMOL/L — SIGNIFICANT CHANGE UP (ref 3.5–5.3)
PROT SERPL-MCNC: 7.1 G/DL — SIGNIFICANT CHANGE UP (ref 6–8.3)
PROT UR-MCNC: SIGNIFICANT CHANGE UP
PROTHROM AB SERPL-ACNC: 18.7 SEC — HIGH (ref 9.8–12.7)
RBC # BLD: 3.79 M/UL — LOW (ref 4.2–5.8)
RBC # FLD: 15.9 % — HIGH (ref 10.3–14.5)
SAO2 % BLDV: 41 % — LOW (ref 67–88)
SODIUM SERPL-SCNC: 138 MMOL/L — SIGNIFICANT CHANGE UP (ref 135–145)
SP GR SPEC: 1.01 — SIGNIFICANT CHANGE UP (ref 1.01–1.02)
UROBILINOGEN FLD QL: NEGATIVE — SIGNIFICANT CHANGE UP
WBC # BLD: 6.9 K/UL — SIGNIFICANT CHANGE UP (ref 3.8–10.5)
WBC # FLD AUTO: 6.9 K/UL — SIGNIFICANT CHANGE UP (ref 3.8–10.5)
WBC UR QL: >50 /HPF (ref 0–5)

## 2017-04-18 PROCEDURE — 99223 1ST HOSP IP/OBS HIGH 75: CPT | Mod: AI,GC

## 2017-04-18 PROCEDURE — 99285 EMERGENCY DEPT VISIT HI MDM: CPT

## 2017-04-18 RX ORDER — LIDOCAINE 4 G/100G
1 CREAM TOPICAL DAILY
Qty: 0 | Refills: 0 | Status: DISCONTINUED | OUTPATIENT
Start: 2017-04-18 | End: 2017-04-23

## 2017-04-18 RX ORDER — HYDRALAZINE HCL 50 MG
20 TABLET ORAL
Qty: 0 | Refills: 0 | Status: DISCONTINUED | OUTPATIENT
Start: 2017-04-18 | End: 2017-04-23

## 2017-04-18 RX ORDER — INSULIN LISPRO 100/ML
6 VIAL (ML) SUBCUTANEOUS
Qty: 0 | Refills: 0 | Status: DISCONTINUED | OUTPATIENT
Start: 2017-04-18 | End: 2017-04-22

## 2017-04-18 RX ORDER — ACETAMINOPHEN 500 MG
650 TABLET ORAL EVERY 6 HOURS
Qty: 0 | Refills: 0 | Status: DISCONTINUED | OUTPATIENT
Start: 2017-04-18 | End: 2017-04-23

## 2017-04-18 RX ORDER — WARFARIN SODIUM 2.5 MG/1
5 TABLET ORAL ONCE
Qty: 0 | Refills: 0 | Status: COMPLETED | OUTPATIENT
Start: 2017-04-18 | End: 2017-04-18

## 2017-04-18 RX ORDER — TAMSULOSIN HYDROCHLORIDE 0.4 MG/1
0.4 CAPSULE ORAL AT BEDTIME
Qty: 0 | Refills: 0 | Status: DISCONTINUED | OUTPATIENT
Start: 2017-04-18 | End: 2017-04-23

## 2017-04-18 RX ORDER — DONEPEZIL HYDROCHLORIDE 10 MG/1
10 TABLET, FILM COATED ORAL AT BEDTIME
Qty: 0 | Refills: 0 | Status: DISCONTINUED | OUTPATIENT
Start: 2017-04-18 | End: 2017-04-23

## 2017-04-18 RX ORDER — MORPHINE SULFATE 50 MG/1
4 CAPSULE, EXTENDED RELEASE ORAL ONCE
Qty: 0 | Refills: 0 | Status: DISCONTINUED | OUTPATIENT
Start: 2017-04-18 | End: 2017-04-18

## 2017-04-18 RX ORDER — CIPROFLOXACIN LACTATE 400MG/40ML
VIAL (ML) INTRAVENOUS
Qty: 0 | Refills: 0 | Status: DISCONTINUED | OUTPATIENT
Start: 2017-04-18 | End: 2017-04-19

## 2017-04-18 RX ORDER — INSULIN LISPRO 100/ML
VIAL (ML) SUBCUTANEOUS
Qty: 0 | Refills: 0 | Status: DISCONTINUED | OUTPATIENT
Start: 2017-04-18 | End: 2017-04-23

## 2017-04-18 RX ORDER — INSULIN GLARGINE 100 [IU]/ML
27 INJECTION, SOLUTION SUBCUTANEOUS AT BEDTIME
Qty: 0 | Refills: 0 | Status: DISCONTINUED | OUTPATIENT
Start: 2017-04-18 | End: 2017-04-23

## 2017-04-18 RX ORDER — DEXTROSE 50 % IN WATER 50 %
12.5 SYRINGE (ML) INTRAVENOUS ONCE
Qty: 0 | Refills: 0 | Status: DISCONTINUED | OUTPATIENT
Start: 2017-04-18 | End: 2017-04-23

## 2017-04-18 RX ORDER — DEXTROSE 50 % IN WATER 50 %
25 SYRINGE (ML) INTRAVENOUS ONCE
Qty: 0 | Refills: 0 | Status: DISCONTINUED | OUTPATIENT
Start: 2017-04-18 | End: 2017-04-23

## 2017-04-18 RX ORDER — ATORVASTATIN CALCIUM 80 MG/1
80 TABLET, FILM COATED ORAL AT BEDTIME
Qty: 0 | Refills: 0 | Status: DISCONTINUED | OUTPATIENT
Start: 2017-04-18 | End: 2017-04-23

## 2017-04-18 RX ORDER — DIGOXIN 250 MCG
0.12 TABLET ORAL
Qty: 0 | Refills: 0 | Status: DISCONTINUED | OUTPATIENT
Start: 2017-04-18 | End: 2017-04-23

## 2017-04-18 RX ORDER — GLUCAGON INJECTION, SOLUTION 0.5 MG/.1ML
1 INJECTION, SOLUTION SUBCUTANEOUS ONCE
Qty: 0 | Refills: 0 | Status: DISCONTINUED | OUTPATIENT
Start: 2017-04-18 | End: 2017-04-23

## 2017-04-18 RX ORDER — ISOSORBIDE DINITRATE 5 MG/1
10 TABLET ORAL
Qty: 0 | Refills: 0 | Status: DISCONTINUED | OUTPATIENT
Start: 2017-04-18 | End: 2017-04-23

## 2017-04-18 RX ORDER — SODIUM CHLORIDE 9 MG/ML
3 INJECTION INTRAMUSCULAR; INTRAVENOUS; SUBCUTANEOUS ONCE
Qty: 0 | Refills: 0 | Status: COMPLETED | OUTPATIENT
Start: 2017-04-18 | End: 2017-04-18

## 2017-04-18 RX ORDER — INSULIN LISPRO 100/ML
VIAL (ML) SUBCUTANEOUS AT BEDTIME
Qty: 0 | Refills: 0 | Status: DISCONTINUED | OUTPATIENT
Start: 2017-04-18 | End: 2017-04-23

## 2017-04-18 RX ORDER — DEXTROSE 50 % IN WATER 50 %
1 SYRINGE (ML) INTRAVENOUS ONCE
Qty: 0 | Refills: 0 | Status: DISCONTINUED | OUTPATIENT
Start: 2017-04-18 | End: 2017-04-23

## 2017-04-18 RX ORDER — ASPIRIN/CALCIUM CARB/MAGNESIUM 324 MG
81 TABLET ORAL DAILY
Qty: 0 | Refills: 0 | Status: DISCONTINUED | OUTPATIENT
Start: 2017-04-18 | End: 2017-04-23

## 2017-04-18 RX ORDER — CIPROFLOXACIN LACTATE 400MG/40ML
200 VIAL (ML) INTRAVENOUS ONCE
Qty: 0 | Refills: 0 | Status: COMPLETED | OUTPATIENT
Start: 2017-04-18 | End: 2017-04-18

## 2017-04-18 RX ORDER — CIPROFLOXACIN LACTATE 400MG/40ML
250 VIAL (ML) INTRAVENOUS DAILY
Qty: 0 | Refills: 0 | Status: DISCONTINUED | OUTPATIENT
Start: 2017-04-18 | End: 2017-04-18

## 2017-04-18 RX ORDER — CIPROFLOXACIN LACTATE 400MG/40ML
200 VIAL (ML) INTRAVENOUS EVERY 24 HOURS
Qty: 0 | Refills: 0 | Status: DISCONTINUED | OUTPATIENT
Start: 2017-04-19 | End: 2017-04-19

## 2017-04-18 RX ORDER — SODIUM CHLORIDE 9 MG/ML
1000 INJECTION, SOLUTION INTRAVENOUS
Qty: 0 | Refills: 0 | Status: DISCONTINUED | OUTPATIENT
Start: 2017-04-18 | End: 2017-04-23

## 2017-04-18 RX ORDER — MEMANTINE HYDROCHLORIDE 10 MG/1
10 TABLET ORAL
Qty: 0 | Refills: 0 | Status: DISCONTINUED | OUTPATIENT
Start: 2017-04-18 | End: 2017-04-23

## 2017-04-18 RX ORDER — METOPROLOL TARTRATE 50 MG
100 TABLET ORAL DAILY
Qty: 0 | Refills: 0 | Status: DISCONTINUED | OUTPATIENT
Start: 2017-04-18 | End: 2017-04-23

## 2017-04-18 RX ORDER — CIPROFLOXACIN LACTATE 400MG/40ML
VIAL (ML) INTRAVENOUS
Qty: 0 | Refills: 0 | Status: DISCONTINUED | OUTPATIENT
Start: 2017-04-18 | End: 2017-04-18

## 2017-04-18 RX ADMIN — INSULIN GLARGINE 27 UNIT(S): 100 INJECTION, SOLUTION SUBCUTANEOUS at 23:29

## 2017-04-18 RX ADMIN — WARFARIN SODIUM 5 MILLIGRAM(S): 2.5 TABLET ORAL at 22:23

## 2017-04-18 RX ADMIN — Medication 20 MILLIGRAM(S): at 22:23

## 2017-04-18 RX ADMIN — MORPHINE SULFATE 4 MILLIGRAM(S): 50 CAPSULE, EXTENDED RELEASE ORAL at 15:47

## 2017-04-18 RX ADMIN — TAMSULOSIN HYDROCHLORIDE 0.4 MILLIGRAM(S): 0.4 CAPSULE ORAL at 22:23

## 2017-04-18 RX ADMIN — MORPHINE SULFATE 4 MILLIGRAM(S): 50 CAPSULE, EXTENDED RELEASE ORAL at 16:10

## 2017-04-18 RX ADMIN — Medication 100 MILLIGRAM(S): at 22:23

## 2017-04-18 RX ADMIN — ATORVASTATIN CALCIUM 80 MILLIGRAM(S): 80 TABLET, FILM COATED ORAL at 22:23

## 2017-04-18 RX ADMIN — SODIUM CHLORIDE 3 MILLILITER(S): 9 INJECTION INTRAMUSCULAR; INTRAVENOUS; SUBCUTANEOUS at 15:49

## 2017-04-18 RX ADMIN — DONEPEZIL HYDROCHLORIDE 10 MILLIGRAM(S): 10 TABLET, FILM COATED ORAL at 22:23

## 2017-04-18 NOTE — H&P ADULT. - HISTORY OF PRESENT ILLNESS
87 y/o M hx of CAD s/p CABG, HTN, HLD, T2DM, CHFrEF 20%, s/p MVR, Afib on coumadin, CKD4, early dementia presents with chief complaints of right sided back pain.    Per patient, for the last 4-5 days he has had intermittent right sided back pain without radiation. Patient's pain is rated 10/10 at its worst. No radiation of the pain. No specific exacerbating factors reported. No relief with Tylenol. Alleviated by intravenous morphine. Denies hematuria, dysuria, suprapubic, abdominal pain. Denies history of frequent urinary tract infections.    Denies fevers, chills, or sweats. Patient was hospitalized in 11/2016 for PCI. According to son at bedside, he had Soto catheter during that admission, however it was removed successfully and he has been on Flomax since that time. No complaints of urinary retention, incomplete voiding, oliguria. He urinated 4-5 times per day. No recent changes in medications. He reports remote history of kidney stones 15 years ago. No recent heavy lifting or exertion.    Patient was evaluated in CDU on 4/16 with similar pain and diagnosed with urinary tract infection. He was discharged on cefuroxime and advised to follow up with PMD.    In the ED:  Vitals 98.2, HR 77, /78, RR 18, 98 on RA

## 2017-04-18 NOTE — H&P ADULT. - PROBLEM SELECTOR PLAN 2
No acute complaints of chest pain or palpitations. EKG nondiagnostic.  continue with ASA, BB, high dose statin

## 2017-04-18 NOTE — ED PROVIDER NOTE - ENMT, MLM
Airway patent, Nasal mucosa clear. Mouth with normal mucosa. Throat has no vesicles, no oropharyngeal exudates and uvula is midline. Airway patent, MMM.

## 2017-04-18 NOTE — ED PROVIDER NOTE - NS ED MD SCRIBE ATTENDING SCRIBE SECTIONS
PHYSICAL EXAM/VITAL SIGNS( Pullset)/DISPOSITION/REVIEW OF SYSTEMS/HIV/RESULTS/PAST MEDICAL/SURGICAL/SOCIAL HISTORY/HISTORY OF PRESENT ILLNESS

## 2017-04-18 NOTE — H&P ADULT. - ATTENDING COMMENTS
pt seen and examined by me, discussed with Dr. Eddy and agree with above.  Briefly, 87 y/o M hx of CAD s/p CABG, CHF, T2DM, HTN, HLD, CKDIV, Afib on AC, s/p MVR presents with right sided flank and back pain initially 4/16 and began treatment with PO cefuroxime for UTI.  Pain recurred today and brought to ED.  Meds and history reviewed.  On my exam pt is well appearing with benign exam - nontender throughout spine/paraspinal muscles, no CVAT, full ROM UEs olive, 5/5 UE with intact sensation.  Labs/studies reviewed.  Will treat with IV cipro for possible UTI failing outpt abx given previous exams c/w CVAT.    -+ UA, f/u urine culture, cont IV cipro for now  -monitor for further episodes of pain and avoid morphine with CKD, favor low dose dilaudid for now  -cont home medications, can ck CK given high dose statin though atypical presentation for rhabdo

## 2017-04-18 NOTE — ED ADULT NURSE NOTE - OBJECTIVE STATEMENT
86 year old male c/o worsening right flank pain x4 days. Patient was seen here in ED on Sunday and was found to have a UTI and discharged with antibiotics. Pain continued to worsen since discharge so pt's PMD sent him back here. Pt denies heavy lifting,  fever, nausea, vomiting, diarrhea.  Respiration easy and non labored.  Skin warm and dry.

## 2017-04-18 NOTE — H&P ADULT. - PROBLEM SELECTOR PLAN 5
rate controlled.   continue with home dose metoprolol and digoxin  continue to dose coumadin- no signs or sx of bleeding  H&H near baseline

## 2017-04-18 NOTE — ED PROVIDER NOTE - OBJECTIVE STATEMENT
86 year old male patient with pacemaker and defibrillator and pmhx of stage 4 CKD, kidney stones, CAD s/p CABG, mitral valve replacement, CHF presents to the ED c/o worsening right flank pain x4 days. Patient was here on Sunday and was found to have a UTI and discharged with antibiotics. There was no kidney stone found at that time. Pain continued to worsen since discharge so his PMD sent him back here. Denies heavy lifting. Patient took Tylenol with very limited relief.  Denies fever, nausea, vomiting, diarrhea. 86 year old male patient with pacemaker and defibrillator and pmhx of stage 4 CKD, kidney stones, CAD s/p CABG, mitral valve replacement, CHF presents to the ED c/o worsening right flank pain x4 days. Patient was here on Sunday and was found to have a UTI and discharged with antibiotics. There was no kidney stone found at that time. Pain continued to worsen since discharge so his PMD sent him back here. Denies heavy lifting. Patient took Tylenol with very limited relief.  Compliant with antibiotics (Ceftin).  Denies fever, nausea, vomiting, diarrhea.

## 2017-04-18 NOTE — H&P ADULT. - RADIOLOGY RESULTS AND INTERPRETATION
Imaging personally reviewed by me:  IMPRESSION: Mural thickening and fat stranding of the bladder, correlate   for cystitis. Unable to evaluate for pyelonephritis secondary lack of   intravenous contrast administration. No renal abscess.

## 2017-04-18 NOTE — ED PROVIDER NOTE - PROGRESS NOTE DETAILS
Discussed with patient's PMD. Recommends admission for pain control and urology evaluation. Discussed with patient's PMD - had extensive discussion about inpatient versus outpatient workup with pain control, PMD explicitly requesting admission for pain control and urology evaluation.

## 2017-04-18 NOTE — ED PROVIDER NOTE - SKIN, MLM
Skin normal color for race, warm, dry and intact. No evidence of rash. Skin normal color for race, warm, dry and intact.

## 2017-04-18 NOTE — H&P ADULT. - LAB RESULTS AND INTERPRETATION
Labs personally reviewed by me:  WBC 6.9, H&H 9.3/30.6  INR 1.70  BUN/creatinine 99/3.42  UA: large LE, >50 WBC  UCx: <10 K urogenital destiney

## 2017-04-18 NOTE — H&P ADULT. - PROBLEM SELECTOR PLAN 3
Patient clinically euvolemic  continue with home regimen torsemide 60 once daily   monitor Is/Os, volume status

## 2017-04-18 NOTE — H&P ADULT. - PROBLEM SELECTOR PLAN 1
Patient w/acute right sided flank pain, UA positive, culture negative w/o other infectious symptoms such as dyuria/fevers. Patient non-toxic appearing, significant pain relief after IV morphine. Tenderness to palpation over right latissimus dorsi concern for musculoskeletal etiology of pain, though no preceding history of injury, trauma, or fall. Differential also includes passed renal stone.  -Would favor to continue treatment with oral antibiotic  -Patient CKD precludes better evaluation ie CT w/contrast  -Tylenol PRN mild to moderate pain, low dose oxycodone for severe pain  -consider addition of lidocaine patch for local relief  - Patient w/acute right sided flank pain, UA positive, culture negative w/o other infectious symptoms such as dyuria/fevers. Patient non-toxic appearing, significant pain relief after IV morphine. Tenderness to palpation over right latissimus dorsi concern for musculoskeletal etiology of pain, though no preceding history of injury, trauma, or fall. Differential also includes passed renal stone.  -Would favor to continue treatment with oral antibiotic: typically use flouroquinolone vs Bactrim  -Patient CKD precludes better evaluation ie CT w/contrast  -Tylenol PRN mild to moderate pain, low dose oxycodone for severe pain  -consider addition of lidocaine patch for local relief  -kidney function at baseline

## 2017-04-18 NOTE — ED PROVIDER NOTE - MEDICAL DECISION MAKING DETAILS
Patient presenting with persistent right flank pain. Already on Ceftin for possible cystitis. At outpatient, had labs for possible renal colic. When it first began, it was negative, but patient has continued pain. No new symptoms. No intermittent fevers. Exam other than point tenderness is noncontributory. Will repeat labs and urine analysis. Discuss with PMD regarding disposition. Patient presenting with persistent right flank pain. Already on Ceftin for possible cystitis, no missed doses, no fevers to suggest treatment failure. On prior visit had labs/CT for possible renal colic - CT was negative for stone, ? cystits based off labs/imaging. No new symptoms but having continued pain. Exam other than point tenderness is noncontributory, no evidence of acute surgical abdominal process by history or exam requiring repeat imaging, and further diagnostic imaging (MRI/contrast CT) impossible due to patients medical issues. Will repeat labs and urine analysis. Discuss with PMD regarding disposition.

## 2017-04-18 NOTE — H&P ADULT. - ASSESSMENT
85 y/o M hx of CAD s/p CABG, CHF, T2DM, HTN, HLD, CKDIV, Afib on AC, s/p MVR presents with right sided flank and back pain, concern for underlying UTI vs MSK pain.

## 2017-04-18 NOTE — H&P ADULT. - PROBLEM SELECTOR PLAN 10
early dementia, IADLs, AO to person, place, month, year  continue with home medications namenda and donepezil

## 2017-04-18 NOTE — H&P ADULT. - NEGATIVE GENERAL GENITOURINARY SYMPTOMS
no hematuria/no incontinence/no urine discoloration/no urinary hesitancy/normal urinary frequency/no dysuria

## 2017-04-18 NOTE — ED PROVIDER NOTE - MUSCULOSKELETAL, MLM
Tenderness to palpation at right CVA area along perispinal musculature. Tenderness to palpation at right CVA area along paraspinal musculature.

## 2017-04-18 NOTE — H&P ADULT. - PROBLEM SELECTOR PLAN 4
continue with home regimen Lantus 27 units at bedtime, Humalog 6 units TID before meals with sliding scale insulin for coverage

## 2017-04-19 LAB
ALBUMIN SERPL ELPH-MCNC: 3.4 G/DL — SIGNIFICANT CHANGE UP (ref 3.3–5)
ALP SERPL-CCNC: 108 U/L — SIGNIFICANT CHANGE UP (ref 40–120)
ALT FLD-CCNC: 18 U/L — SIGNIFICANT CHANGE UP (ref 10–45)
ANION GAP SERPL CALC-SCNC: 18 MMOL/L — HIGH (ref 5–17)
APTT BLD: 32.4 SEC — SIGNIFICANT CHANGE UP (ref 27.5–37.4)
AST SERPL-CCNC: 24 U/L — SIGNIFICANT CHANGE UP (ref 10–40)
BASOPHILS # BLD AUTO: 0.02 K/UL — SIGNIFICANT CHANGE UP (ref 0–0.2)
BASOPHILS NFR BLD AUTO: 0.3 % — SIGNIFICANT CHANGE UP (ref 0–2)
BILIRUB SERPL-MCNC: 0.3 MG/DL — SIGNIFICANT CHANGE UP (ref 0.2–1.2)
BUN SERPL-MCNC: 89 MG/DL — HIGH (ref 7–23)
CALCIUM SERPL-MCNC: 8.6 MG/DL — SIGNIFICANT CHANGE UP (ref 8.4–10.5)
CHLORIDE SERPL-SCNC: 101 MMOL/L — SIGNIFICANT CHANGE UP (ref 96–108)
CK SERPL-CCNC: 140 U/L — SIGNIFICANT CHANGE UP (ref 30–200)
CO2 SERPL-SCNC: 19 MMOL/L — LOW (ref 22–31)
CREAT SERPL-MCNC: 3.04 MG/DL — HIGH (ref 0.5–1.3)
EOSINOPHIL # BLD AUTO: 0.25 K/UL — SIGNIFICANT CHANGE UP (ref 0–0.5)
EOSINOPHIL NFR BLD AUTO: 4 % — SIGNIFICANT CHANGE UP (ref 0–6)
GLUCOSE SERPL-MCNC: 138 MG/DL — HIGH (ref 70–99)
HCT VFR BLD CALC: 31.4 % — LOW (ref 39–50)
HGB BLD-MCNC: 9.4 G/DL — LOW (ref 13–17)
IMM GRANULOCYTES NFR BLD AUTO: 0.2 % — SIGNIFICANT CHANGE UP (ref 0–1.5)
INR BLD: 1.67 RATIO — HIGH (ref 0.88–1.16)
LYMPHOCYTES # BLD AUTO: 1.16 K/UL — SIGNIFICANT CHANGE UP (ref 1–3.3)
LYMPHOCYTES # BLD AUTO: 18.7 % — SIGNIFICANT CHANGE UP (ref 13–44)
MCHC RBC-ENTMCNC: 23.9 PG — LOW (ref 27–34)
MCHC RBC-ENTMCNC: 29.9 GM/DL — LOW (ref 32–36)
MCV RBC AUTO: 79.7 FL — LOW (ref 80–100)
MONOCYTES # BLD AUTO: 1.07 K/UL — HIGH (ref 0–0.9)
MONOCYTES NFR BLD AUTO: 17.3 % — HIGH (ref 2–14)
NEUTROPHILS # BLD AUTO: 3.68 K/UL — SIGNIFICANT CHANGE UP (ref 1.8–7.4)
NEUTROPHILS NFR BLD AUTO: 59.5 % — SIGNIFICANT CHANGE UP (ref 43–77)
PLATELET # BLD AUTO: 205 K/UL — SIGNIFICANT CHANGE UP (ref 150–400)
POTASSIUM SERPL-MCNC: 4 MMOL/L — SIGNIFICANT CHANGE UP (ref 3.5–5.3)
POTASSIUM SERPL-SCNC: 4 MMOL/L — SIGNIFICANT CHANGE UP (ref 3.5–5.3)
PROT SERPL-MCNC: 6.7 G/DL — SIGNIFICANT CHANGE UP (ref 6–8.3)
PROTHROM AB SERPL-ACNC: 19.1 SEC — HIGH (ref 10–13.1)
RBC # BLD: 3.94 M/UL — LOW (ref 4.2–5.8)
RBC # FLD: 16.6 % — HIGH (ref 10.3–14.5)
SODIUM SERPL-SCNC: 138 MMOL/L — SIGNIFICANT CHANGE UP (ref 135–145)
WBC # BLD: 6.19 K/UL — SIGNIFICANT CHANGE UP (ref 3.8–10.5)
WBC # FLD AUTO: 6.19 K/UL — SIGNIFICANT CHANGE UP (ref 3.8–10.5)

## 2017-04-19 PROCEDURE — 99233 SBSQ HOSP IP/OBS HIGH 50: CPT

## 2017-04-19 RX ORDER — CEFTRIAXONE 500 MG/1
INJECTION, POWDER, FOR SOLUTION INTRAMUSCULAR; INTRAVENOUS
Qty: 0 | Refills: 0 | Status: DISCONTINUED | OUTPATIENT
Start: 2017-04-19 | End: 2017-04-21

## 2017-04-19 RX ORDER — LACTOBACILLUS ACIDOPHILUS 100MM CELL
1 CAPSULE ORAL
Qty: 0 | Refills: 0 | Status: DISCONTINUED | OUTPATIENT
Start: 2017-04-19 | End: 2017-04-23

## 2017-04-19 RX ORDER — CEFTRIAXONE 500 MG/1
1 INJECTION, POWDER, FOR SOLUTION INTRAMUSCULAR; INTRAVENOUS EVERY 24 HOURS
Qty: 0 | Refills: 0 | Status: DISCONTINUED | OUTPATIENT
Start: 2017-04-20 | End: 2017-04-21

## 2017-04-19 RX ORDER — WARFARIN SODIUM 2.5 MG/1
7.5 TABLET ORAL ONCE
Qty: 0 | Refills: 0 | Status: COMPLETED | OUTPATIENT
Start: 2017-04-19 | End: 2017-04-19

## 2017-04-19 RX ORDER — CEFTRIAXONE 500 MG/1
1 INJECTION, POWDER, FOR SOLUTION INTRAMUSCULAR; INTRAVENOUS ONCE
Qty: 0 | Refills: 0 | Status: COMPLETED | OUTPATIENT
Start: 2017-04-19 | End: 2017-04-19

## 2017-04-19 RX ADMIN — MEMANTINE HYDROCHLORIDE 10 MILLIGRAM(S): 10 TABLET ORAL at 06:31

## 2017-04-19 RX ADMIN — Medication 81 MILLIGRAM(S): at 11:38

## 2017-04-19 RX ADMIN — Medication 1 TABLET(S): at 18:18

## 2017-04-19 RX ADMIN — INSULIN GLARGINE 27 UNIT(S): 100 INJECTION, SOLUTION SUBCUTANEOUS at 22:17

## 2017-04-19 RX ADMIN — ISOSORBIDE DINITRATE 10 MILLIGRAM(S): 5 TABLET ORAL at 06:31

## 2017-04-19 RX ADMIN — ATORVASTATIN CALCIUM 80 MILLIGRAM(S): 80 TABLET, FILM COATED ORAL at 22:17

## 2017-04-19 RX ADMIN — Medication 100 MILLIGRAM(S): at 06:31

## 2017-04-19 RX ADMIN — WARFARIN SODIUM 7.5 MILLIGRAM(S): 2.5 TABLET ORAL at 22:17

## 2017-04-19 RX ADMIN — TAMSULOSIN HYDROCHLORIDE 0.4 MILLIGRAM(S): 0.4 CAPSULE ORAL at 22:17

## 2017-04-19 RX ADMIN — Medication 20 MILLIGRAM(S): at 06:36

## 2017-04-19 RX ADMIN — Medication 20 MILLIGRAM(S): at 17:50

## 2017-04-19 RX ADMIN — Medication 6 UNIT(S): at 17:50

## 2017-04-19 RX ADMIN — Medication 6 UNIT(S): at 08:35

## 2017-04-19 RX ADMIN — Medication 0.12 MILLIGRAM(S): at 06:31

## 2017-04-19 RX ADMIN — Medication 1 TABLET(S): at 11:37

## 2017-04-19 RX ADMIN — Medication 1: at 08:35

## 2017-04-19 RX ADMIN — Medication 1: at 12:43

## 2017-04-19 RX ADMIN — LIDOCAINE 1 PATCH: 4 CREAM TOPICAL at 11:37

## 2017-04-19 RX ADMIN — DONEPEZIL HYDROCHLORIDE 10 MILLIGRAM(S): 10 TABLET, FILM COATED ORAL at 22:17

## 2017-04-19 RX ADMIN — Medication 6 UNIT(S): at 12:43

## 2017-04-19 RX ADMIN — CEFTRIAXONE 100 GRAM(S): 500 INJECTION, POWDER, FOR SOLUTION INTRAMUSCULAR; INTRAVENOUS at 11:28

## 2017-04-19 RX ADMIN — Medication 1: at 17:49

## 2017-04-19 RX ADMIN — MEMANTINE HYDROCHLORIDE 10 MILLIGRAM(S): 10 TABLET ORAL at 17:50

## 2017-04-19 RX ADMIN — LIDOCAINE 1 PATCH: 4 CREAM TOPICAL at 23:00

## 2017-04-19 RX ADMIN — ISOSORBIDE DINITRATE 10 MILLIGRAM(S): 5 TABLET ORAL at 17:50

## 2017-04-19 RX ADMIN — Medication 60 MILLIGRAM(S): at 06:31

## 2017-04-20 LAB
ALBUMIN SERPL ELPH-MCNC: 3.4 G/DL — SIGNIFICANT CHANGE UP (ref 3.3–5)
ALP SERPL-CCNC: 108 U/L — SIGNIFICANT CHANGE UP (ref 40–120)
ALT FLD-CCNC: 17 U/L — SIGNIFICANT CHANGE UP (ref 10–45)
AMYLASE P1 CFR SERPL: 143 U/L — HIGH (ref 25–125)
ANION GAP SERPL CALC-SCNC: 19 MMOL/L — HIGH (ref 5–17)
AST SERPL-CCNC: 27 U/L — SIGNIFICANT CHANGE UP (ref 10–40)
BASOPHILS # BLD AUTO: 0.03 K/UL — SIGNIFICANT CHANGE UP (ref 0–0.2)
BASOPHILS NFR BLD AUTO: 0.5 % — SIGNIFICANT CHANGE UP (ref 0–2)
BILIRUB SERPL-MCNC: 0.2 MG/DL — SIGNIFICANT CHANGE UP (ref 0.2–1.2)
BUN SERPL-MCNC: 94 MG/DL — HIGH (ref 7–23)
CALCIUM SERPL-MCNC: 8.7 MG/DL — SIGNIFICANT CHANGE UP (ref 8.4–10.5)
CHLORIDE SERPL-SCNC: 102 MMOL/L — SIGNIFICANT CHANGE UP (ref 96–108)
CO2 SERPL-SCNC: 18 MMOL/L — LOW (ref 22–31)
CREAT SERPL-MCNC: 3.15 MG/DL — HIGH (ref 0.5–1.3)
CULTURE RESULTS: NO GROWTH — SIGNIFICANT CHANGE UP
EOSINOPHIL # BLD AUTO: 0.27 K/UL — SIGNIFICANT CHANGE UP (ref 0–0.5)
EOSINOPHIL NFR BLD AUTO: 4.1 % — SIGNIFICANT CHANGE UP (ref 0–6)
GLUCOSE SERPL-MCNC: 164 MG/DL — HIGH (ref 70–99)
HBA1C BLD-MCNC: 9.2 % — HIGH (ref 4–5.6)
HCT VFR BLD CALC: 31.4 % — LOW (ref 39–50)
HGB BLD-MCNC: 9.7 G/DL — LOW (ref 13–17)
IMM GRANULOCYTES NFR BLD AUTO: 0.3 % — SIGNIFICANT CHANGE UP (ref 0–1.5)
INR BLD: 1.74 RATIO — HIGH (ref 0.88–1.16)
LIDOCAIN IGE QN: 117 U/L — HIGH (ref 7–60)
LYMPHOCYTES # BLD AUTO: 1.61 K/UL — SIGNIFICANT CHANGE UP (ref 1–3.3)
LYMPHOCYTES # BLD AUTO: 24.2 % — SIGNIFICANT CHANGE UP (ref 13–44)
MAGNESIUM SERPL-MCNC: 2.3 MG/DL — SIGNIFICANT CHANGE UP (ref 1.6–2.6)
MCHC RBC-ENTMCNC: 24.9 PG — LOW (ref 27–34)
MCHC RBC-ENTMCNC: 30.9 GM/DL — LOW (ref 32–36)
MCV RBC AUTO: 80.5 FL — SIGNIFICANT CHANGE UP (ref 80–100)
MONOCYTES # BLD AUTO: 0.92 K/UL — HIGH (ref 0–0.9)
MONOCYTES NFR BLD AUTO: 13.9 % — SIGNIFICANT CHANGE UP (ref 2–14)
NEUTROPHILS # BLD AUTO: 3.79 K/UL — SIGNIFICANT CHANGE UP (ref 1.8–7.4)
NEUTROPHILS NFR BLD AUTO: 57 % — SIGNIFICANT CHANGE UP (ref 43–77)
PHOSPHATE SERPL-MCNC: 5.5 MG/DL — HIGH (ref 2.5–4.5)
PLATELET # BLD AUTO: 218 K/UL — SIGNIFICANT CHANGE UP (ref 150–400)
POTASSIUM SERPL-MCNC: 4.4 MMOL/L — SIGNIFICANT CHANGE UP (ref 3.5–5.3)
POTASSIUM SERPL-SCNC: 4.4 MMOL/L — SIGNIFICANT CHANGE UP (ref 3.5–5.3)
PROT SERPL-MCNC: 6.9 G/DL — SIGNIFICANT CHANGE UP (ref 6–8.3)
PROTHROM AB SERPL-ACNC: 19.9 SEC — HIGH (ref 10–13.1)
RBC # BLD: 3.9 M/UL — LOW (ref 4.2–5.8)
RBC # FLD: 16.7 % — HIGH (ref 10.3–14.5)
SODIUM SERPL-SCNC: 139 MMOL/L — SIGNIFICANT CHANGE UP (ref 135–145)
SPECIMEN SOURCE: SIGNIFICANT CHANGE UP
TSH SERPL-MCNC: 2.03 UIU/ML — SIGNIFICANT CHANGE UP (ref 0.27–4.2)
WBC # BLD: 6.64 K/UL — SIGNIFICANT CHANGE UP (ref 3.8–10.5)
WBC # FLD AUTO: 6.64 K/UL — SIGNIFICANT CHANGE UP (ref 3.8–10.5)

## 2017-04-20 PROCEDURE — 72100 X-RAY EXAM L-S SPINE 2/3 VWS: CPT | Mod: 26

## 2017-04-20 PROCEDURE — 99233 SBSQ HOSP IP/OBS HIGH 50: CPT | Mod: GC

## 2017-04-20 PROCEDURE — 72070 X-RAY EXAM THORAC SPINE 2VWS: CPT | Mod: 26

## 2017-04-20 PROCEDURE — 71010: CPT | Mod: 26

## 2017-04-20 RX ORDER — OXYCODONE HYDROCHLORIDE 5 MG/1
5 TABLET ORAL ONCE
Qty: 0 | Refills: 0 | Status: DISCONTINUED | OUTPATIENT
Start: 2017-04-20 | End: 2017-04-20

## 2017-04-20 RX ORDER — HYDROMORPHONE HYDROCHLORIDE 2 MG/ML
0.5 INJECTION INTRAMUSCULAR; INTRAVENOUS; SUBCUTANEOUS EVERY 4 HOURS
Qty: 0 | Refills: 0 | Status: DISCONTINUED | OUTPATIENT
Start: 2017-04-20 | End: 2017-04-22

## 2017-04-20 RX ORDER — WARFARIN SODIUM 2.5 MG/1
7.5 TABLET ORAL ONCE
Qty: 0 | Refills: 0 | Status: COMPLETED | OUTPATIENT
Start: 2017-04-20 | End: 2017-04-20

## 2017-04-20 RX ADMIN — Medication 3: at 12:38

## 2017-04-20 RX ADMIN — Medication 1 TABLET(S): at 09:17

## 2017-04-20 RX ADMIN — OXYCODONE HYDROCHLORIDE 5 MILLIGRAM(S): 5 TABLET ORAL at 11:51

## 2017-04-20 RX ADMIN — WARFARIN SODIUM 7.5 MILLIGRAM(S): 2.5 TABLET ORAL at 23:00

## 2017-04-20 RX ADMIN — LIDOCAINE 1 PATCH: 4 CREAM TOPICAL at 12:30

## 2017-04-20 RX ADMIN — ISOSORBIDE DINITRATE 10 MILLIGRAM(S): 5 TABLET ORAL at 05:46

## 2017-04-20 RX ADMIN — CEFTRIAXONE 100 GRAM(S): 500 INJECTION, POWDER, FOR SOLUTION INTRAMUSCULAR; INTRAVENOUS at 09:48

## 2017-04-20 RX ADMIN — Medication 100 MILLIGRAM(S): at 05:46

## 2017-04-20 RX ADMIN — Medication 650 MILLIGRAM(S): at 10:18

## 2017-04-20 RX ADMIN — OXYCODONE HYDROCHLORIDE 5 MILLIGRAM(S): 5 TABLET ORAL at 10:51

## 2017-04-20 RX ADMIN — Medication 650 MILLIGRAM(S): at 09:18

## 2017-04-20 RX ADMIN — Medication 6 UNIT(S): at 17:46

## 2017-04-20 RX ADMIN — Medication 60 MILLIGRAM(S): at 05:46

## 2017-04-20 RX ADMIN — TAMSULOSIN HYDROCHLORIDE 0.4 MILLIGRAM(S): 0.4 CAPSULE ORAL at 23:00

## 2017-04-20 RX ADMIN — Medication 1: at 08:50

## 2017-04-20 RX ADMIN — ATORVASTATIN CALCIUM 80 MILLIGRAM(S): 80 TABLET, FILM COATED ORAL at 23:00

## 2017-04-20 RX ADMIN — Medication 20 MILLIGRAM(S): at 05:46

## 2017-04-20 RX ADMIN — Medication 1 TABLET(S): at 17:47

## 2017-04-20 RX ADMIN — MEMANTINE HYDROCHLORIDE 10 MILLIGRAM(S): 10 TABLET ORAL at 17:47

## 2017-04-20 RX ADMIN — DONEPEZIL HYDROCHLORIDE 10 MILLIGRAM(S): 10 TABLET, FILM COATED ORAL at 23:00

## 2017-04-20 RX ADMIN — Medication 20 MILLIGRAM(S): at 17:47

## 2017-04-20 RX ADMIN — Medication 1 TABLET(S): at 12:28

## 2017-04-20 RX ADMIN — MEMANTINE HYDROCHLORIDE 10 MILLIGRAM(S): 10 TABLET ORAL at 05:45

## 2017-04-20 RX ADMIN — Medication 6 UNIT(S): at 12:38

## 2017-04-20 RX ADMIN — Medication 6 UNIT(S): at 08:51

## 2017-04-20 RX ADMIN — ISOSORBIDE DINITRATE 10 MILLIGRAM(S): 5 TABLET ORAL at 17:47

## 2017-04-20 RX ADMIN — Medication 81 MILLIGRAM(S): at 12:28

## 2017-04-20 RX ADMIN — INSULIN GLARGINE 27 UNIT(S): 100 INJECTION, SOLUTION SUBCUTANEOUS at 23:00

## 2017-04-20 NOTE — PROVIDER CONTACT NOTE (OTHER) - SITUATION
RN asking for daily wt order as pt has hx of CHF. RN also asking for activity order.
pt c/o right flank pain 5/10. pt has tylenol 650mg po ,prn ordered.  RN offered tylenol. pt requesting " something stronger', verbalized "tylenol wont help".
pts son and daughter in law at bedside, questioning plan of care and  urology consult.
Pt c/o rt flank pain, not new symptom, tylenol given see emar with not much relief. Pt dx with abd pain/UTI 4/18 on IVABX

## 2017-04-21 ENCOUNTER — TRANSCRIPTION ENCOUNTER (OUTPATIENT)
Age: 82
End: 2017-04-21

## 2017-04-21 LAB
ANION GAP SERPL CALC-SCNC: 18 MMOL/L — HIGH (ref 5–17)
BUN SERPL-MCNC: 101 MG/DL — HIGH (ref 7–23)
CALCIUM SERPL-MCNC: 8.9 MG/DL — SIGNIFICANT CHANGE UP (ref 8.4–10.5)
CHLORIDE SERPL-SCNC: 101 MMOL/L — SIGNIFICANT CHANGE UP (ref 96–108)
CO2 SERPL-SCNC: 19 MMOL/L — LOW (ref 22–31)
CREAT SERPL-MCNC: 3.28 MG/DL — HIGH (ref 0.5–1.3)
CULTURE RESULTS: SIGNIFICANT CHANGE UP
GLUCOSE SERPL-MCNC: 166 MG/DL — HIGH (ref 70–99)
HCT VFR BLD CALC: 32.5 % — LOW (ref 39–50)
HGB BLD-MCNC: 9.8 G/DL — LOW (ref 13–17)
INR BLD: 2.04 RATIO — HIGH (ref 0.88–1.16)
MCHC RBC-ENTMCNC: 24.4 PG — LOW (ref 27–34)
MCHC RBC-ENTMCNC: 30.3 GM/DL — LOW (ref 32–36)
MCV RBC AUTO: 80.8 FL — SIGNIFICANT CHANGE UP (ref 80–100)
PHOSPHATE SERPL-MCNC: 5 MG/DL — HIGH (ref 2.5–4.5)
PLATELET # BLD AUTO: 176 K/UL — SIGNIFICANT CHANGE UP (ref 150–400)
POTASSIUM SERPL-MCNC: 4.2 MMOL/L — SIGNIFICANT CHANGE UP (ref 3.5–5.3)
POTASSIUM SERPL-SCNC: 4.2 MMOL/L — SIGNIFICANT CHANGE UP (ref 3.5–5.3)
PROTHROM AB SERPL-ACNC: 23.4 SEC — HIGH (ref 10–13.1)
RBC # BLD: 4.03 M/UL — LOW (ref 4.2–5.8)
RBC # FLD: 15.8 % — HIGH (ref 10.3–14.5)
SODIUM SERPL-SCNC: 138 MMOL/L — SIGNIFICANT CHANGE UP (ref 135–145)
SPECIMEN SOURCE: SIGNIFICANT CHANGE UP
WBC # BLD: 6.7 K/UL — SIGNIFICANT CHANGE UP (ref 3.8–10.5)
WBC # FLD AUTO: 6.7 K/UL — SIGNIFICANT CHANGE UP (ref 3.8–10.5)

## 2017-04-21 PROCEDURE — 99233 SBSQ HOSP IP/OBS HIGH 50: CPT | Mod: GC

## 2017-04-21 PROCEDURE — 71250 CT THORAX DX C-: CPT | Mod: 26

## 2017-04-21 RX ORDER — SODIUM CHLORIDE 9 MG/ML
1000 INJECTION INTRAMUSCULAR; INTRAVENOUS; SUBCUTANEOUS
Qty: 0 | Refills: 0 | Status: DISCONTINUED | OUTPATIENT
Start: 2017-04-21 | End: 2017-04-23

## 2017-04-21 RX ORDER — CIPROFLOXACIN LACTATE 400MG/40ML
250 VIAL (ML) INTRAVENOUS DAILY
Qty: 0 | Refills: 0 | Status: DISCONTINUED | OUTPATIENT
Start: 2017-04-21 | End: 2017-04-23

## 2017-04-21 RX ORDER — WARFARIN SODIUM 2.5 MG/1
5 TABLET ORAL ONCE
Qty: 0 | Refills: 0 | Status: COMPLETED | OUTPATIENT
Start: 2017-04-21 | End: 2017-04-21

## 2017-04-21 RX ADMIN — Medication 60 MILLIGRAM(S): at 05:57

## 2017-04-21 RX ADMIN — WARFARIN SODIUM 5 MILLIGRAM(S): 2.5 TABLET ORAL at 22:51

## 2017-04-21 RX ADMIN — MEMANTINE HYDROCHLORIDE 10 MILLIGRAM(S): 10 TABLET ORAL at 17:56

## 2017-04-21 RX ADMIN — Medication 6 UNIT(S): at 17:56

## 2017-04-21 RX ADMIN — Medication 20 MILLIGRAM(S): at 17:57

## 2017-04-21 RX ADMIN — INSULIN GLARGINE 27 UNIT(S): 100 INJECTION, SOLUTION SUBCUTANEOUS at 22:53

## 2017-04-21 RX ADMIN — Medication 100 MILLIGRAM(S): at 05:57

## 2017-04-21 RX ADMIN — Medication 6 UNIT(S): at 12:43

## 2017-04-21 RX ADMIN — SODIUM CHLORIDE 65 MILLILITER(S): 9 INJECTION INTRAMUSCULAR; INTRAVENOUS; SUBCUTANEOUS at 10:05

## 2017-04-21 RX ADMIN — LIDOCAINE 1 PATCH: 4 CREAM TOPICAL at 00:00

## 2017-04-21 RX ADMIN — ISOSORBIDE DINITRATE 10 MILLIGRAM(S): 5 TABLET ORAL at 17:56

## 2017-04-21 RX ADMIN — Medication 650 MILLIGRAM(S): at 23:50

## 2017-04-21 RX ADMIN — Medication 0.12 MILLIGRAM(S): at 05:57

## 2017-04-21 RX ADMIN — TAMSULOSIN HYDROCHLORIDE 0.4 MILLIGRAM(S): 0.4 CAPSULE ORAL at 22:52

## 2017-04-21 RX ADMIN — Medication 81 MILLIGRAM(S): at 12:43

## 2017-04-21 RX ADMIN — HYDROMORPHONE HYDROCHLORIDE 0.5 MILLIGRAM(S): 2 INJECTION INTRAMUSCULAR; INTRAVENOUS; SUBCUTANEOUS at 13:33

## 2017-04-21 RX ADMIN — ISOSORBIDE DINITRATE 10 MILLIGRAM(S): 5 TABLET ORAL at 05:57

## 2017-04-21 RX ADMIN — Medication 2: at 12:44

## 2017-04-21 RX ADMIN — ATORVASTATIN CALCIUM 80 MILLIGRAM(S): 80 TABLET, FILM COATED ORAL at 22:52

## 2017-04-21 RX ADMIN — LIDOCAINE 1 PATCH: 4 CREAM TOPICAL at 12:43

## 2017-04-21 RX ADMIN — Medication 250 MILLIGRAM(S): at 13:33

## 2017-04-21 RX ADMIN — Medication 1 TABLET(S): at 17:56

## 2017-04-21 RX ADMIN — Medication 1 TABLET(S): at 08:38

## 2017-04-21 RX ADMIN — Medication 2: at 17:56

## 2017-04-21 RX ADMIN — Medication 6 UNIT(S): at 08:38

## 2017-04-21 RX ADMIN — Medication 20 MILLIGRAM(S): at 05:57

## 2017-04-21 RX ADMIN — DONEPEZIL HYDROCHLORIDE 10 MILLIGRAM(S): 10 TABLET, FILM COATED ORAL at 22:52

## 2017-04-21 RX ADMIN — Medication 1: at 08:38

## 2017-04-21 RX ADMIN — HYDROMORPHONE HYDROCHLORIDE 0.5 MILLIGRAM(S): 2 INJECTION INTRAMUSCULAR; INTRAVENOUS; SUBCUTANEOUS at 14:03

## 2017-04-21 RX ADMIN — MEMANTINE HYDROCHLORIDE 10 MILLIGRAM(S): 10 TABLET ORAL at 05:57

## 2017-04-21 RX ADMIN — Medication 650 MILLIGRAM(S): at 22:52

## 2017-04-21 RX ADMIN — Medication 1 TABLET(S): at 12:43

## 2017-04-21 RX ADMIN — Medication 1: at 22:55

## 2017-04-21 NOTE — DISCHARGE NOTE ADULT - MEDICATION SUMMARY - MEDICATIONS TO CHANGE
I will SWITCH the dose or number of times a day I take the medications listed below when I get home from the hospital:    HumaLOG  -- 6 unit(s) subcutaneous 3 times a day (before meals)

## 2017-04-21 NOTE — DIETITIAN INITIAL EVALUATION ADULT. - ENERGY NEEDS
Height:  5' 5"       Weight: 160 lbs     BMI: 26.6 kg/m2    IBW: 136 lbs  (+/- 10%)    % IBW:  118 %          Edema:   none        Skin:  no pressure ulcers    BM:  last BM 4/19/17    Other pertinent information: Patient admitted with flank, back pain.  Admitted with flank pain, CAD, systolic CHF.  Noted PMH includes CAD, CABG, HTN, HLD, T2DM, CHF (ef 20%), AFib, CKD IV

## 2017-04-21 NOTE — DIETITIAN INITIAL EVALUATION ADULT. - ORAL INTAKE PTA
good/Reported patient has a good appetite and maintains good meal intakes at home.  Typically consumes 3 meals daily that are prepared by his wife.

## 2017-04-21 NOTE — DISCHARGE NOTE ADULT - MEDICATION SUMMARY - MEDICATIONS TO STOP TAKING
I will STOP taking the medications listed below when I get home from the hospital:    cefuroxime 500 mg oral tablet  -- 1 tab(s) by mouth 2 times a day  -- Finish all this medication unless otherwise directed by prescriber.  Medication should be taken with plenty of water.  Take with food or milk.

## 2017-04-21 NOTE — DIETITIAN INITIAL EVALUATION ADULT. - OTHER INFO
Nutrition consult for elevated HbA1c level received and appreciated.  Patient reports his appetite is good at the present time and he is eating nearly 100% of most meals.  He denies any difficulty chewing or swallowing and has no nausea/vomiting or GI discomfort.  No micronutrient supplementation PTA.  Spouse reports patient was adived by endocrinologist to maintain blood glucose levels between 100 - 200, and he has been able to do so.  Confirmed NKFA.  Patient and his wife were receptive to diet education for T2DM, CKD IV and a brief review of Vitamin K and coumadin interaction.

## 2017-04-21 NOTE — DISCHARGE NOTE ADULT - ADDITIONAL INSTRUCTIONS
Follow-up with your primary care physician, and endocrinologist in 1 to 2 weeks. Follow-up with your primary care physician, and endocrinologist in 1 to 2 weeks.   Skip tonight's dose of coumadin, and take tomorrow.   Please have your INR checked on Tuesday, April 25. Follow your doctor's instructions for further Coumadin dosing.

## 2017-04-21 NOTE — PHYSICAL THERAPY INITIAL EVALUATION ADULT - PRECAUTIONS/LIMITATIONS, REHAB EVAL
+CT chest unchanged nodule lingula, emphysema. +CT abd mural thickening of bladder, correlate for cystitis. Pending xray T/S, L/S, chest. +CT chest unchanged nodule lingula, emphysema. +CT abd mural thickening of bladder, correlate for cystitis. Xray T/S, L/S retrolisthesis L3 on L4, anterolisthesis L4 on L5. Pending cxray.

## 2017-04-21 NOTE — DISCHARGE NOTE ADULT - MEDICATION SUMMARY - MEDICATIONS TO TAKE
I will START or STAY ON the medications listed below when I get home from the hospital:    aspirin 81 mg oral delayed release tablet  -- 1 tab(s) by mouth once a day  -- Indication: For CAD (coronary artery disease)    acetaminophen 325 mg oral tablet  -- 2 tab(s) by mouth every 6 hours, As needed, Mild to Moderate Pain  -- Indication: For Pain    tamsulosin 0.4 mg oral capsule  -- 1 cap(s) by mouth once a day (at bedtime)  -- Indication: For Urine flow     isosorbide dinitrate 10 mg oral tablet  -- 1 tab(s) by mouth 2 times a day  -- Indication: For Hypertension    digoxin 125 mcg (0.125 mg) oral tablet  -- 1 tab(s) by mouth Monday, Wednesday, and Friday  -- Indication: For Paroxysmal atrial fibrillation    Coumadin 5 mg oral tablet  -- 1 tab(s) by mouth once a day starting tomorrow. Skip tonight's dose. Have INR checked Tuesday, April 25. Adjust Coumadin dosing per your physician's  (Dr. Airam Miller) instructions.    -- Indication: For Paroxysmal atrial fibrillation, cerebral vascular accident    insulin lispro 100 units/mL subcutaneous solution  -- 8 unit(s) subcutaneous 3 times a day (before meals)  -- Indication: For Type 2 diabetes mellitus    Lantus 100 units/mL subcutaneous solution  -- 27 unit(s) subcutaneous once a day (at bedtime)  -- Indication: For Type 2 diabetes mellitus    atorvastatin 80 mg oral tablet  -- 1 tab(s) by mouth once a day (at bedtime)  -- Indication: For High cholesterol    metoprolol succinate 100 mg oral tablet, extended release  -- 1 tab(s) by mouth once a day  -- Indication: For Hypertension    donepezil 10 mg oral tablet  -- 1 tab(s) by mouth once a day (at bedtime)  -- Indication: For Dementia    lidocaine 5% topical film  -- Apply on skin to affected area once a day  -- Indication: For muscular pain    torsemide 20 mg oral tablet  -- 3 tab(s) by mouth once a day  -- Indication: For Chronic systolic congestive heart failure    Namenda 10 mg oral tablet  -- 1 tab(s) by mouth 2 times a day  -- Indication: For Dementia    lactobacillus acidophilus oral capsule  -- 1 cap(s) by mouth 3 times a day with meals.  -- Indication: For Probiotic    ciprofloxacin 250 mg oral tablet  -- 1 tab(s) by mouth once a day  -- Indication: For UTI (urinary tract infection)    hydrALAZINE 10 mg oral tablet  -- 2 tab(s) by mouth 2 times a day  -- Indication: For Hypertension I will START or STAY ON the medications listed below when I get home from the hospital:    aspirin 81 mg oral delayed release tablet  -- 1 tab(s) by mouth once a day  -- Indication: For CAD (coronary artery disease)    acetaminophen 325 mg oral tablet  -- 2 tab(s) by mouth every 6 hours, As needed, Mild to Moderate Pain  -- Indication: For Pain    traMADol 50 mg oral tablet  -- 1 tab(s) by mouth every 8 hours as needed for severe pain. MDD:3  -- Caution federal law prohibits the transfer of this drug to any person other  than the person for whom it was prescribed.  May cause drowsiness.  Alcohol may intensify this effect.  Use care when operating dangerous machinery.  Obtain medical advice before taking any non-prescription drugs as some may affect the action of this medication.    -- Indication: For severe pain    tamsulosin 0.4 mg oral capsule  -- 1 cap(s) by mouth once a day (at bedtime)  -- Indication: For Urine flow     isosorbide dinitrate 10 mg oral tablet  -- 1 tab(s) by mouth 2 times a day  -- Indication: For Hypertension    digoxin 125 mcg (0.125 mg) oral tablet  -- 1 tab(s) by mouth Monday, Wednesday, and Friday  -- Indication: For Paroxysmal atrial fibrillation    Coumadin 5 mg oral tablet  -- 1 tab(s) by mouth once a day starting tomorrow. Skip tonight's dose. Have INR checked Tuesday, April 25. Adjust Coumadin dosing per your physician's  (Dr. Airam Miller) instructions.    -- Indication: For Paroxysmal atrial fibrillation, cerebral vascular accident    insulin lispro 100 units/mL subcutaneous solution  -- 8 unit(s) subcutaneous 3 times a day (before meals)  -- Indication: For Type 2 diabetes mellitus    Lantus 100 units/mL subcutaneous solution  -- 27 unit(s) subcutaneous once a day (at bedtime)  -- Indication: For Type 2 diabetes mellitus    atorvastatin 80 mg oral tablet  -- 1 tab(s) by mouth once a day (at bedtime)  -- Indication: For High cholesterol    metoprolol succinate 100 mg oral tablet, extended release  -- 1 tab(s) by mouth once a day  -- Indication: For Hypertension    donepezil 10 mg oral tablet  -- 1 tab(s) by mouth once a day (at bedtime)  -- Indication: For Dementia    lidocaine 5% topical film  -- Apply on skin to affected area once a day MDD:1  -- Indication: For Muscular pain    torsemide 20 mg oral tablet  -- 3 tab(s) by mouth once a day  -- Indication: For Chronic systolic congestive heart failure    Namenda 10 mg oral tablet  -- 1 tab(s) by mouth 2 times a day  -- Indication: For Dementia    lactobacillus acidophilus oral capsule  -- 1 cap(s) by mouth 3 times a day with meals. MDD:3  -- Indication: For Probiotic    ciprofloxacin 250 mg oral tablet  -- 1 tab(s) by mouth once a day thru April 26.MDD:1  -- Indication: For UTI (urinary tract infection)    hydrALAZINE 10 mg oral tablet  -- 2 tab(s) by mouth 2 times a day  -- Indication: For Hypertension

## 2017-04-21 NOTE — DIETITIAN INITIAL EVALUATION ADULT. - PERTINENT MEDS FT
lantus - 27 units hs, humalog - 6 units ac meals and corrective regimen, coumadin, lactobacillus acidophilus, demedex

## 2017-04-21 NOTE — DIETITIAN INITIAL EVALUATION ADULT. - ADHERENCE
fair/No added salt in cooking or at the table and salty, processed foods are avoided.  Patient  avoids sugar and concentrated sweets and tries to limit portions of carbohydrates.   Patient's wife reports he has had high potassium levels in the past, however, potassium-rich foods had not been restricted carefully at home.

## 2017-04-21 NOTE — DISCHARGE NOTE ADULT - HOSPITAL COURSE
85 y/o M hx of CAD s/p CABG, HTN, HLD, T2DM, CHFrEF 20%, s/p MVR, Afib on coumadin, CKD4, early dementia presents with chief complaints of right sided back pain.Per patient, for the last 4-5 days he has had intermittent right sided back pain without radiation. Patient's pain is rated 10/10 at its worst. No radiation of the pain. No specific exacerbating factors reported. No relief with Tylenol. Alleviated by intravenous morphine. Denies hematuria, dysuria, suprapubic, abdominal pain. Denies history of frequent urinary tract infections.Denies fevers, chills, or sweats. Patient was hospitalized in 11/2016 for PCI. According to son at bedside, he had Soto catheter during that admission, however it was removed successfully and he has been on Flomax since that time. No complaints of urinary retention, incomplete voiding, oliguria. He urinated 4-5 times per day. No recent changes in medications. He reports remote history of kidney stones 15 years ago. No recent heavy lifting or exertion.Patient was evaluated in CDU on 4/16 with similar pain and diagnosed with urinary tract infection. He was discharged on cefuroxime and advised to follow up with PMD.    Admitted for UTI, urine culture negative, positive ua CT A/P with inflammatory changes in bladder, to complete course of ciprofloxacin. Found to have severe right sided costochondral pain, limited rom, s/p xray and CT T/L spine with degenerative joint disease, no fractures, mass compression. Pain controlled during hospitalization, evaluated by PT no skilled needs. Pain likely mmusculoskeletal in the setting of recent visit to chiropractor prior to admission. Hx CKD Cr stable through hospitalization, FS controlled for DM2. Stable for d/c home with PMD follow up

## 2017-04-21 NOTE — DISCHARGE NOTE ADULT - PLAN OF CARE
infection resolves Take all antibiotics as ordered.  Call you Health care provider upon arrival home to make a one week follow up appointment.  If you develop fever, chills, malaise, or change in mental status call your Health Care Provider or go to the Emergency Department.  Nutrition is important, eat small frequent meals to help ensure you get adequate calories.  Do not stay in bed all day!  Increase your activity daily as tolerated. Continue Coumadin as prescribed  Please have your INR check by your doctor outpatient Avoid taking (NSAIDs) - (ex: Ibuprofen, Advil, Celebrex, Naprosyn)  Avoid taking any nephrotoxic agents (can harm kidneys) - Intravenous contrast for diagnostic testing, combination cold medications. Continue pain medication  Out patient physical therapy Continue Coumadin as prescribed. Skip tonight's dose of coumadin, and take tomorrow.   Please have your INR checked on Tuesday, April 25. Follow your doctor's instructions for further Coumadin dosing.

## 2017-04-21 NOTE — DISCHARGE NOTE ADULT - CARE PROVIDERS DIRECT ADDRESSES
,[email or web address],DirectAddress_Unknown [email or web address],DirectAddress_Unknown

## 2017-04-21 NOTE — DISCHARGE NOTE ADULT - PATIENT PORTAL LINK FT
“You can access the Follow[facility] Patient Portal, offered by [facility], by registering with the following website: [email or web address]”

## 2017-04-21 NOTE — PHYSICAL THERAPY INITIAL EVALUATION ADULT - PERTINENT HX OF CURRENT PROBLEM, REHAB EVAL
Pt is a 86 y.o male hx of CAD s/p CABG, CHF, T2DM, HTN, HLD, CKDIV, Afib on AC, s/p MVR presents with right sided flank and back pain, concern for underlying UTI vs MSK pain.

## 2017-04-21 NOTE — DISCHARGE NOTE ADULT - CARE PLAN
Principal Discharge DX:	UTI (urinary tract infection)  Goal:	infection resolves  Instructions for follow-up, activity and diet:	Take all antibiotics as ordered.  Call you Health care provider upon arrival home to make a one week follow up appointment.  If you develop fever, chills, malaise, or change in mental status call your Health Care Provider or go to the Emergency Department.  Nutrition is important, eat small frequent meals to help ensure you get adequate calories.  Do not stay in bed all day!  Increase your activity daily as tolerated.  Secondary Diagnosis:	Paroxysmal atrial fibrillation  Instructions for follow-up, activity and diet:	Continue Coumadin as prescribed  Please have your INR check by your doctor outpatient  Secondary Diagnosis:	CKD (chronic kidney disease), stage 4 (severe)  Instructions for follow-up, activity and diet:	Avoid taking (NSAIDs) - (ex: Ibuprofen, Advil, Celebrex, Naprosyn)  Avoid taking any nephrotoxic agents (can harm kidneys) - Intravenous contrast for diagnostic testing, combination cold medications. Principal Discharge DX:	UTI (urinary tract infection)  Goal:	infection resolves  Instructions for follow-up, activity and diet:	Take all antibiotics as ordered.  Call you Health care provider upon arrival home to make a one week follow up appointment.  If you develop fever, chills, malaise, or change in mental status call your Health Care Provider or go to the Emergency Department.  Nutrition is important, eat small frequent meals to help ensure you get adequate calories.  Do not stay in bed all day!  Increase your activity daily as tolerated.  Secondary Diagnosis:	Paroxysmal atrial fibrillation  Instructions for follow-up, activity and diet:	Continue Coumadin as prescribed  Please have your INR check by your doctor outpatient  Secondary Diagnosis:	CKD (chronic kidney disease), stage 4 (severe)  Instructions for follow-up, activity and diet:	Avoid taking (NSAIDs) - (ex: Ibuprofen, Advil, Celebrex, Naprosyn)  Avoid taking any nephrotoxic agents (can harm kidneys) - Intravenous contrast for diagnostic testing, combination cold medications.  Secondary Diagnosis:	Back pain  Instructions for follow-up, activity and diet:	Continue pain medication  Out patient physical therapy Principal Discharge DX:	UTI (urinary tract infection)  Goal:	infection resolves  Instructions for follow-up, activity and diet:	Take all antibiotics as ordered.  Call you Health care provider upon arrival home to make a one week follow up appointment.  If you develop fever, chills, malaise, or change in mental status call your Health Care Provider or go to the Emergency Department.  Nutrition is important, eat small frequent meals to help ensure you get adequate calories.  Do not stay in bed all day!  Increase your activity daily as tolerated.  Secondary Diagnosis:	Paroxysmal atrial fibrillation  Instructions for follow-up, activity and diet:	Continue Coumadin as prescribed. Skip tonight's dose of coumadin, and take tomorrow.   Please have your INR checked on Tuesday, April 25. Follow your doctor's instructions for further Coumadin dosing.  Secondary Diagnosis:	CKD (chronic kidney disease), stage 4 (severe)  Instructions for follow-up, activity and diet:	Avoid taking (NSAIDs) - (ex: Ibuprofen, Advil, Celebrex, Naprosyn)  Avoid taking any nephrotoxic agents (can harm kidneys) - Intravenous contrast for diagnostic testing, combination cold medications.  Secondary Diagnosis:	Back pain  Instructions for follow-up, activity and diet:	Continue pain medication  Out patient physical therapy

## 2017-04-21 NOTE — DIETITIAN INITIAL EVALUATION ADULT. - PERTINENT LABORATORY DATA
4/20/17: K+ - 4.4, phos - 5.5, BUN/Cr - 94/3.15, glucose - 164.   F/S: 4/21/17: 158, 4/20/17: 115-263;  4/19/17: 152-179;  4/18/17:

## 2017-04-21 NOTE — DIETITIAN INITIAL EVALUATION ADULT. - PROBLEM SELECTOR PLAN 1
Patient w/acute right sided flank pain, UA positive, culture negative w/o other infectious symptoms such as dyuria/fevers. Patient non-toxic appearing, significant pain relief after IV morphine. Tenderness to palpation over right latissimus dorsi concern for musculoskeletal etiology of pain, though no preceding history of injury, trauma, or fall. Differential also includes passed renal stone.  -Would favor to continue treatment with oral antibiotic: typically use flouroquinolone vs Bactrim  -Patient CKD precludes better evaluation ie CT w/contrast  -Tylenol PRN mild to moderate pain, low dose oxycodone for severe pain  -consider addition of lidocaine patch for local relief  -kidney function at baseline

## 2017-04-21 NOTE — DIETITIAN INITIAL EVALUATION ADULT. - DIET TYPE
consistent carbohydrate (evening snack)/DASH/TLC (sodium and cholesterol restricted diet)/renal replacement pts:no protein restr,no conc K & phos, low sodium

## 2017-04-21 NOTE — DISCHARGE NOTE ADULT - CARE PROVIDER_API CALL
Airam Miller), Geriatric Medicine; HospicePalliative Medicine; Internal Medicine  [address] [place] [address]  Phone: [phone]  Fax: [phone] Airam Miller), Geriatric Medicine; HospicePalliative Medicine; Internal Medicine  [address] [place] [address]  Phone: [phone]  Fax: [phone]    Jordyn Kruse), EndocrinologyMetabDiabetes  [address] [place] [address]  Phone: [phone]  Fax: [phone]    Serafin Rivas), Neurology  [address] [place] [address]  Phone: [phone]  Fax: [phone]

## 2017-04-21 NOTE — DIETITIAN INITIAL EVALUATION ADULT. - NS AS NUTRI INTERV ED CONTENT
Purpose of the nutrition education/Discussed T2 DM diet education with  emphasis on which foods contain carbohydrates, portion sizes, portion control especially of carbohydrate rich foods, combining protein and carbohydrates at meal and snack times, and need for consistency in carbohydrate intake.  Reviewed alternate menu options and menu ordering procedure guidelines for controlling intakes of potassium and phosphorus in daily diet.   Avoidance of potassium-rich foods and choosing alternated with lower potassium content.  Discussed how to modify previous diet to better control blood glucose levels./Nutrition relationship to health/disease

## 2017-04-21 NOTE — PHYSICAL THERAPY INITIAL EVALUATION ADULT - DISCHARGE DISPOSITION, PT EVAL
home/outpatient PT/for pain management, correct postural dysfunction and impaired breathing mechanics

## 2017-04-21 NOTE — DISCHARGE NOTE ADULT - PROVIDER TOKENS
TOKNICKOLAS:'[id number]' TOKEN:'[id number]',TOKEN:'[id number]',TOKEN:'[id number]'

## 2017-04-22 LAB
ANION GAP SERPL CALC-SCNC: 20 MMOL/L — HIGH (ref 5–17)
BUN SERPL-MCNC: 99 MG/DL — HIGH (ref 7–23)
CALCIUM SERPL-MCNC: 8.9 MG/DL — SIGNIFICANT CHANGE UP (ref 8.4–10.5)
CHLORIDE SERPL-SCNC: 101 MMOL/L — SIGNIFICANT CHANGE UP (ref 96–108)
CO2 SERPL-SCNC: 16 MMOL/L — LOW (ref 22–31)
CREAT SERPL-MCNC: 3.05 MG/DL — HIGH (ref 0.5–1.3)
GLUCOSE SERPL-MCNC: 205 MG/DL — HIGH (ref 70–99)
HCT VFR BLD CALC: 30.7 % — LOW (ref 39–50)
HGB BLD-MCNC: 9.1 G/DL — LOW (ref 13–17)
INR BLD: 2.56 RATIO — HIGH (ref 0.88–1.16)
MCHC RBC-ENTMCNC: 24.1 PG — LOW (ref 27–34)
MCHC RBC-ENTMCNC: 29.6 GM/DL — LOW (ref 32–36)
MCV RBC AUTO: 81.2 FL — SIGNIFICANT CHANGE UP (ref 80–100)
PLATELET # BLD AUTO: 194 K/UL — SIGNIFICANT CHANGE UP (ref 150–400)
POTASSIUM SERPL-MCNC: 4.2 MMOL/L — SIGNIFICANT CHANGE UP (ref 3.5–5.3)
POTASSIUM SERPL-SCNC: 4.2 MMOL/L — SIGNIFICANT CHANGE UP (ref 3.5–5.3)
PROTHROM AB SERPL-ACNC: 29.5 SEC — HIGH (ref 10–13.1)
RBC # BLD: 3.78 M/UL — LOW (ref 4.2–5.8)
RBC # FLD: 16.5 % — HIGH (ref 10.3–14.5)
SODIUM SERPL-SCNC: 137 MMOL/L — SIGNIFICANT CHANGE UP (ref 135–145)
WBC # BLD: 6.12 K/UL — SIGNIFICANT CHANGE UP (ref 3.8–10.5)
WBC # FLD AUTO: 6.12 K/UL — SIGNIFICANT CHANGE UP (ref 3.8–10.5)

## 2017-04-22 PROCEDURE — 72131 CT LUMBAR SPINE W/O DYE: CPT | Mod: 26

## 2017-04-22 PROCEDURE — 72128 CT CHEST SPINE W/O DYE: CPT | Mod: 26

## 2017-04-22 PROCEDURE — 99239 HOSP IP/OBS DSCHRG MGMT >30: CPT

## 2017-04-22 RX ORDER — OXYCODONE HYDROCHLORIDE 5 MG/1
5 TABLET ORAL EVERY 6 HOURS
Qty: 0 | Refills: 0 | Status: DISCONTINUED | OUTPATIENT
Start: 2017-04-22 | End: 2017-04-23

## 2017-04-22 RX ORDER — INSULIN LISPRO 100/ML
8 VIAL (ML) SUBCUTANEOUS
Qty: 0 | Refills: 0 | Status: DISCONTINUED | OUTPATIENT
Start: 2017-04-22 | End: 2017-04-23

## 2017-04-22 RX ORDER — WARFARIN SODIUM 2.5 MG/1
5 TABLET ORAL ONCE
Qty: 0 | Refills: 0 | Status: COMPLETED | OUTPATIENT
Start: 2017-04-22 | End: 2017-04-22

## 2017-04-22 RX ADMIN — Medication 1 TABLET(S): at 12:23

## 2017-04-22 RX ADMIN — WARFARIN SODIUM 5 MILLIGRAM(S): 2.5 TABLET ORAL at 22:21

## 2017-04-22 RX ADMIN — MEMANTINE HYDROCHLORIDE 10 MILLIGRAM(S): 10 TABLET ORAL at 17:55

## 2017-04-22 RX ADMIN — Medication 20 MILLIGRAM(S): at 06:21

## 2017-04-22 RX ADMIN — TAMSULOSIN HYDROCHLORIDE 0.4 MILLIGRAM(S): 0.4 CAPSULE ORAL at 22:21

## 2017-04-22 RX ADMIN — Medication 100 MILLIGRAM(S): at 06:21

## 2017-04-22 RX ADMIN — ATORVASTATIN CALCIUM 80 MILLIGRAM(S): 80 TABLET, FILM COATED ORAL at 22:22

## 2017-04-22 RX ADMIN — Medication 81 MILLIGRAM(S): at 12:23

## 2017-04-22 RX ADMIN — Medication 20 MILLIGRAM(S): at 17:55

## 2017-04-22 RX ADMIN — HYDROMORPHONE HYDROCHLORIDE 0.5 MILLIGRAM(S): 2 INJECTION INTRAMUSCULAR; INTRAVENOUS; SUBCUTANEOUS at 14:17

## 2017-04-22 RX ADMIN — Medication 1 TABLET(S): at 18:18

## 2017-04-22 RX ADMIN — Medication 1: at 08:54

## 2017-04-22 RX ADMIN — Medication 60 MILLIGRAM(S): at 06:21

## 2017-04-22 RX ADMIN — LIDOCAINE 1 PATCH: 4 CREAM TOPICAL at 12:24

## 2017-04-22 RX ADMIN — Medication 250 MILLIGRAM(S): at 12:23

## 2017-04-22 RX ADMIN — DONEPEZIL HYDROCHLORIDE 10 MILLIGRAM(S): 10 TABLET, FILM COATED ORAL at 22:22

## 2017-04-22 RX ADMIN — Medication 8 UNIT(S): at 13:42

## 2017-04-22 RX ADMIN — Medication 1 TABLET(S): at 08:54

## 2017-04-22 RX ADMIN — Medication 2: at 17:54

## 2017-04-22 RX ADMIN — Medication 6 UNIT(S): at 08:54

## 2017-04-22 RX ADMIN — ISOSORBIDE DINITRATE 10 MILLIGRAM(S): 5 TABLET ORAL at 06:21

## 2017-04-22 RX ADMIN — INSULIN GLARGINE 27 UNIT(S): 100 INJECTION, SOLUTION SUBCUTANEOUS at 22:21

## 2017-04-22 RX ADMIN — Medication 2: at 22:21

## 2017-04-22 RX ADMIN — Medication 8 UNIT(S): at 17:54

## 2017-04-22 RX ADMIN — ISOSORBIDE DINITRATE 10 MILLIGRAM(S): 5 TABLET ORAL at 18:23

## 2017-04-22 RX ADMIN — MEMANTINE HYDROCHLORIDE 10 MILLIGRAM(S): 10 TABLET ORAL at 06:21

## 2017-04-22 RX ADMIN — HYDROMORPHONE HYDROCHLORIDE 0.5 MILLIGRAM(S): 2 INJECTION INTRAMUSCULAR; INTRAVENOUS; SUBCUTANEOUS at 14:45

## 2017-04-23 VITALS
RESPIRATION RATE: 18 BRPM | SYSTOLIC BLOOD PRESSURE: 154 MMHG | DIASTOLIC BLOOD PRESSURE: 61 MMHG | OXYGEN SATURATION: 99 % | HEART RATE: 59 BPM | TEMPERATURE: 98 F

## 2017-04-23 LAB
ANION GAP SERPL CALC-SCNC: 20 MMOL/L — HIGH (ref 5–17)
BUN SERPL-MCNC: 102 MG/DL — HIGH (ref 7–23)
CALCIUM SERPL-MCNC: 9.3 MG/DL — SIGNIFICANT CHANGE UP (ref 8.4–10.5)
CHLORIDE SERPL-SCNC: 102 MMOL/L — SIGNIFICANT CHANGE UP (ref 96–108)
CO2 SERPL-SCNC: 16 MMOL/L — LOW (ref 22–31)
CREAT SERPL-MCNC: 2.97 MG/DL — HIGH (ref 0.5–1.3)
GLUCOSE SERPL-MCNC: 252 MG/DL — HIGH (ref 70–99)
INR BLD: 2.91 RATIO — HIGH (ref 0.88–1.16)
POTASSIUM SERPL-MCNC: 4 MMOL/L — SIGNIFICANT CHANGE UP (ref 3.5–5.3)
POTASSIUM SERPL-SCNC: 4 MMOL/L — SIGNIFICANT CHANGE UP (ref 3.5–5.3)
PROTHROM AB SERPL-ACNC: 33.6 SEC — HIGH (ref 10–13.1)
SODIUM SERPL-SCNC: 138 MMOL/L — SIGNIFICANT CHANGE UP (ref 135–145)

## 2017-04-23 PROCEDURE — 82962 GLUCOSE BLOOD TEST: CPT

## 2017-04-23 PROCEDURE — 97162 PT EVAL MOD COMPLEX 30 MIN: CPT

## 2017-04-23 PROCEDURE — 80053 COMPREHEN METABOLIC PANEL: CPT

## 2017-04-23 PROCEDURE — 83735 ASSAY OF MAGNESIUM: CPT

## 2017-04-23 PROCEDURE — 72131 CT LUMBAR SPINE W/O DYE: CPT

## 2017-04-23 PROCEDURE — 99239 HOSP IP/OBS DSCHRG MGMT >30: CPT

## 2017-04-23 PROCEDURE — 85014 HEMATOCRIT: CPT

## 2017-04-23 PROCEDURE — 82550 ASSAY OF CK (CPK): CPT

## 2017-04-23 PROCEDURE — 83605 ASSAY OF LACTIC ACID: CPT

## 2017-04-23 PROCEDURE — G0378: CPT

## 2017-04-23 PROCEDURE — 87086 URINE CULTURE/COLONY COUNT: CPT

## 2017-04-23 PROCEDURE — 84295 ASSAY OF SERUM SODIUM: CPT

## 2017-04-23 PROCEDURE — 71045 X-RAY EXAM CHEST 1 VIEW: CPT

## 2017-04-23 PROCEDURE — 85730 THROMBOPLASTIN TIME PARTIAL: CPT

## 2017-04-23 PROCEDURE — 84443 ASSAY THYROID STIM HORMONE: CPT

## 2017-04-23 PROCEDURE — 87040 BLOOD CULTURE FOR BACTERIA: CPT

## 2017-04-23 PROCEDURE — 74176 CT ABD & PELVIS W/O CONTRAST: CPT

## 2017-04-23 PROCEDURE — 99284 EMERGENCY DEPT VISIT MOD MDM: CPT | Mod: 25

## 2017-04-23 PROCEDURE — 99285 EMERGENCY DEPT VISIT HI MDM: CPT | Mod: 25

## 2017-04-23 PROCEDURE — 85027 COMPLETE CBC AUTOMATED: CPT

## 2017-04-23 PROCEDURE — 71250 CT THORAX DX C-: CPT

## 2017-04-23 PROCEDURE — 72128 CT CHEST SPINE W/O DYE: CPT

## 2017-04-23 PROCEDURE — 72100 X-RAY EXAM L-S SPINE 2/3 VWS: CPT

## 2017-04-23 PROCEDURE — 83036 HEMOGLOBIN GLYCOSYLATED A1C: CPT

## 2017-04-23 PROCEDURE — 85610 PROTHROMBIN TIME: CPT

## 2017-04-23 PROCEDURE — 72070 X-RAY EXAM THORAC SPINE 2VWS: CPT

## 2017-04-23 PROCEDURE — 93005 ELECTROCARDIOGRAM TRACING: CPT

## 2017-04-23 PROCEDURE — 84132 ASSAY OF SERUM POTASSIUM: CPT

## 2017-04-23 PROCEDURE — 82803 BLOOD GASES ANY COMBINATION: CPT

## 2017-04-23 PROCEDURE — 96372 THER/PROPH/DIAG INJ SC/IM: CPT | Mod: XU

## 2017-04-23 PROCEDURE — 83690 ASSAY OF LIPASE: CPT

## 2017-04-23 PROCEDURE — 81001 URINALYSIS AUTO W/SCOPE: CPT

## 2017-04-23 PROCEDURE — 82947 ASSAY GLUCOSE BLOOD QUANT: CPT

## 2017-04-23 PROCEDURE — 84100 ASSAY OF PHOSPHORUS: CPT

## 2017-04-23 PROCEDURE — 82330 ASSAY OF CALCIUM: CPT

## 2017-04-23 PROCEDURE — 80048 BASIC METABOLIC PNL TOTAL CA: CPT

## 2017-04-23 PROCEDURE — 82150 ASSAY OF AMYLASE: CPT

## 2017-04-23 PROCEDURE — 80299 QUANTITATIVE ASSAY DRUG: CPT

## 2017-04-23 PROCEDURE — 96374 THER/PROPH/DIAG INJ IV PUSH: CPT

## 2017-04-23 PROCEDURE — 82435 ASSAY OF BLOOD CHLORIDE: CPT

## 2017-04-23 RX ORDER — INSULIN LISPRO 100/ML
8 VIAL (ML) SUBCUTANEOUS
Qty: 0 | Refills: 0 | COMMUNITY
Start: 2017-04-23

## 2017-04-23 RX ORDER — DIGOXIN 250 MCG
1 TABLET ORAL
Qty: 0 | Refills: 0 | COMMUNITY

## 2017-04-23 RX ORDER — INSULIN LISPRO 100/ML
8 VIAL (ML) SUBCUTANEOUS
Qty: 0 | Refills: 0 | DISCHARGE
Start: 2017-04-23

## 2017-04-23 RX ORDER — ACETAMINOPHEN 500 MG
2 TABLET ORAL
Qty: 0 | Refills: 0 | COMMUNITY
Start: 2017-04-23

## 2017-04-23 RX ORDER — ISOSORBIDE DINITRATE 5 MG/1
1 TABLET ORAL
Qty: 0 | Refills: 0 | DISCHARGE
Start: 2017-04-23

## 2017-04-23 RX ORDER — TRAMADOL HYDROCHLORIDE 50 MG/1
1 TABLET ORAL
Qty: 15 | Refills: 0 | OUTPATIENT
Start: 2017-04-23 | End: 2017-04-28

## 2017-04-23 RX ORDER — LACTOBACILLUS ACIDOPHILUS 100MM CELL
1 CAPSULE ORAL
Qty: 42 | Refills: 0 | OUTPATIENT
Start: 2017-04-23 | End: 2017-05-07

## 2017-04-23 RX ORDER — LACTOBACILLUS ACIDOPHILUS 100MM CELL
1 CAPSULE ORAL
Qty: 0 | Refills: 0 | COMMUNITY
Start: 2017-04-23

## 2017-04-23 RX ORDER — LIDOCAINE 4 G/100G
1 CREAM TOPICAL
Qty: 14 | Refills: 0 | OUTPATIENT
Start: 2017-04-23 | End: 2017-05-07

## 2017-04-23 RX ORDER — CIPROFLOXACIN LACTATE 400MG/40ML
1 VIAL (ML) INTRAVENOUS
Qty: 0 | Refills: 0 | COMMUNITY
Start: 2017-04-23

## 2017-04-23 RX ORDER — LIDOCAINE 4 G/100G
1 CREAM TOPICAL
Qty: 0 | Refills: 0 | COMMUNITY
Start: 2017-04-23

## 2017-04-23 RX ORDER — INSULIN LISPRO 100/ML
6 VIAL (ML) SUBCUTANEOUS
Qty: 0 | Refills: 0 | COMMUNITY

## 2017-04-23 RX ORDER — CIPROFLOXACIN LACTATE 400MG/40ML
1 VIAL (ML) INTRAVENOUS
Qty: 4 | Refills: 0 | OUTPATIENT
Start: 2017-04-23 | End: 2017-04-27

## 2017-04-23 RX ORDER — INSULIN LISPRO 100/ML
5 VIAL (ML) SUBCUTANEOUS
Qty: 0 | Refills: 0 | DISCHARGE
Start: 2017-04-23

## 2017-04-23 RX ADMIN — Medication 2: at 08:59

## 2017-04-23 RX ADMIN — Medication 81 MILLIGRAM(S): at 11:26

## 2017-04-23 RX ADMIN — Medication 60 MILLIGRAM(S): at 06:24

## 2017-04-23 RX ADMIN — ISOSORBIDE DINITRATE 10 MILLIGRAM(S): 5 TABLET ORAL at 06:24

## 2017-04-23 RX ADMIN — Medication 20 MILLIGRAM(S): at 06:24

## 2017-04-23 RX ADMIN — Medication 1 TABLET(S): at 11:24

## 2017-04-23 RX ADMIN — Medication 8 UNIT(S): at 08:59

## 2017-04-23 RX ADMIN — Medication 1 TABLET(S): at 13:02

## 2017-04-23 RX ADMIN — Medication 8 UNIT(S): at 13:02

## 2017-04-23 RX ADMIN — MEMANTINE HYDROCHLORIDE 10 MILLIGRAM(S): 10 TABLET ORAL at 06:24

## 2017-04-23 RX ADMIN — Medication 4: at 13:01

## 2017-04-23 RX ADMIN — Medication 100 MILLIGRAM(S): at 06:24

## 2017-04-23 RX ADMIN — Medication 250 MILLIGRAM(S): at 11:26

## 2017-04-23 NOTE — PROVIDER CONTACT NOTE (OTHER) - ASSESSMENT
pt requires 1:1 constant observation order to be renewed.
Pt resting in bed, abx running, family at bedside
fall risk bracelet and red socks on pt at this time.

## 2017-04-23 NOTE — PROVIDER CONTACT NOTE (OTHER) - BACKGROUND
pt is risk  for falls, bed alarm and chair alarm used for safety. pts son and daughter in law (DEMARCUS Morrow)   refuse bed alarm and chair alarm. fall risk explained-- family continues to refuse .
Assisting nurse as of moment, pt admitted for abd. pain. pt has had CVA in past.

## 2017-04-23 NOTE — PROVIDER CONTACT NOTE (OTHER) - ACTION/TREATMENT ORDERED:
NP Marcellusiah aware of above, to order percocet for pain.
WAGNER Woo aware of above, to place orders as requested.
NP Cristy Leonard aware, Will look into Pt chart and see if something else can be given, will cont to monitor Pt
NP Karley Woo aware of above, to speak with family re: plan of care and fall risk and use of bed/chair alarms for pt safety.
1:1 observation renewed

## 2017-04-25 ENCOUNTER — LABORATORY RESULT (OUTPATIENT)
Age: 82
End: 2017-04-25

## 2017-04-27 ENCOUNTER — APPOINTMENT (OUTPATIENT)
Dept: GERIATRICS | Facility: CLINIC | Age: 82
End: 2017-04-27

## 2017-04-27 ENCOUNTER — APPOINTMENT (OUTPATIENT)
Dept: ENDOCRINOLOGY | Facility: CLINIC | Age: 82
End: 2017-04-27

## 2017-04-27 VITALS
BODY MASS INDEX: 24.63 KG/M2 | OXYGEN SATURATION: 96 % | HEIGHT: 68 IN | SYSTOLIC BLOOD PRESSURE: 100 MMHG | WEIGHT: 162.5 LBS | RESPIRATION RATE: 17 BRPM | TEMPERATURE: 97.4 F | HEART RATE: 87 BPM | DIASTOLIC BLOOD PRESSURE: 60 MMHG

## 2017-04-27 DIAGNOSIS — H92.09 OTALGIA, UNSPECIFIED EAR: ICD-10-CM

## 2017-05-14 ENCOUNTER — INPATIENT (INPATIENT)
Facility: HOSPITAL | Age: 82
LOS: 1 days | Discharge: HOME | DRG: 312 | End: 2017-05-16
Attending: INTERNAL MEDICINE | Admitting: HOSPITALIST
Payer: COMMERCIAL

## 2017-05-14 VITALS
OXYGEN SATURATION: 96 % | HEART RATE: 78 BPM | SYSTOLIC BLOOD PRESSURE: 134 MMHG | RESPIRATION RATE: 18 BRPM | DIASTOLIC BLOOD PRESSURE: 71 MMHG | TEMPERATURE: 98 F

## 2017-05-14 DIAGNOSIS — Z98.49 CATARACT EXTRACTION STATUS, UNSPECIFIED EYE: Chronic | ICD-10-CM

## 2017-05-14 DIAGNOSIS — Z98.89 OTHER SPECIFIED POSTPROCEDURAL STATES: Chronic | ICD-10-CM

## 2017-05-14 DIAGNOSIS — Z95.810 PRESENCE OF AUTOMATIC (IMPLANTABLE) CARDIAC DEFIBRILLATOR: Chronic | ICD-10-CM

## 2017-05-14 DIAGNOSIS — Z95.1 PRESENCE OF AORTOCORONARY BYPASS GRAFT: Chronic | ICD-10-CM

## 2017-05-14 DIAGNOSIS — Z95.4 PRESENCE OF OTHER HEART-VALVE REPLACEMENT: Chronic | ICD-10-CM

## 2017-05-14 LAB
ALBUMIN SERPL ELPH-MCNC: 4.1 G/DL — SIGNIFICANT CHANGE UP (ref 3.3–5)
ALP SERPL-CCNC: 163 U/L — HIGH (ref 40–120)
ALT FLD-CCNC: 40 U/L RC — SIGNIFICANT CHANGE UP (ref 10–45)
ANION GAP SERPL CALC-SCNC: 19 MMOL/L — HIGH (ref 5–17)
APPEARANCE UR: ABNORMAL
APTT BLD: 38.8 SEC — HIGH (ref 27.5–37.4)
AST SERPL-CCNC: 32 U/L — SIGNIFICANT CHANGE UP (ref 10–40)
BACTERIA # UR AUTO: ABNORMAL /HPF
BASOPHILS # BLD AUTO: 0 K/UL — SIGNIFICANT CHANGE UP (ref 0–0.2)
BASOPHILS NFR BLD AUTO: 0.1 % — SIGNIFICANT CHANGE UP (ref 0–2)
BILIRUB SERPL-MCNC: 0.3 MG/DL — SIGNIFICANT CHANGE UP (ref 0.2–1.2)
BILIRUB UR-MCNC: NEGATIVE — SIGNIFICANT CHANGE UP
BUN SERPL-MCNC: 91 MG/DL — HIGH (ref 7–23)
CALCIUM SERPL-MCNC: 9 MG/DL — SIGNIFICANT CHANGE UP (ref 8.4–10.5)
CHLORIDE SERPL-SCNC: 96 MMOL/L — SIGNIFICANT CHANGE UP (ref 96–108)
CK MB BLD-MCNC: 3.9 % — HIGH (ref 0–3.5)
CK MB CFR SERPL CALC: 6.1 NG/ML — SIGNIFICANT CHANGE UP (ref 0–6.7)
CK SERPL-CCNC: 156 U/L — SIGNIFICANT CHANGE UP (ref 30–200)
CO2 SERPL-SCNC: 20 MMOL/L — LOW (ref 22–31)
COLOR SPEC: YELLOW — SIGNIFICANT CHANGE UP
CREAT SERPL-MCNC: 3.08 MG/DL — HIGH (ref 0.5–1.3)
DIFF PNL FLD: ABNORMAL
DIGOXIN SERPL-MCNC: 0.4 NG/ML — LOW (ref 0.8–2)
EOSINOPHIL # BLD AUTO: 0.2 K/UL — SIGNIFICANT CHANGE UP (ref 0–0.5)
EOSINOPHIL NFR BLD AUTO: 2 % — SIGNIFICANT CHANGE UP (ref 0–6)
GAS PNL BLDV: SIGNIFICANT CHANGE UP
GLUCOSE SERPL-MCNC: 398 MG/DL — HIGH (ref 70–99)
GLUCOSE UR QL: 300
HCT VFR BLD CALC: 32.5 % — LOW (ref 39–50)
HGB BLD-MCNC: 10.6 G/DL — LOW (ref 13–17)
INR BLD: 3.83 RATIO — HIGH (ref 0.88–1.16)
KETONES UR-MCNC: NEGATIVE — SIGNIFICANT CHANGE UP
LEUKOCYTE ESTERASE UR-ACNC: ABNORMAL
LYMPHOCYTES # BLD AUTO: 1.7 K/UL — SIGNIFICANT CHANGE UP (ref 1–3.3)
LYMPHOCYTES # BLD AUTO: 21.3 % — SIGNIFICANT CHANGE UP (ref 13–44)
MAGNESIUM SERPL-MCNC: 2.2 MG/DL — SIGNIFICANT CHANGE UP (ref 1.6–2.6)
MCHC RBC-ENTMCNC: 26.3 PG — LOW (ref 27–34)
MCHC RBC-ENTMCNC: 32.5 GM/DL — SIGNIFICANT CHANGE UP (ref 32–36)
MCV RBC AUTO: 80.7 FL — SIGNIFICANT CHANGE UP (ref 80–100)
MONOCYTES # BLD AUTO: 1 K/UL — HIGH (ref 0–0.9)
MONOCYTES NFR BLD AUTO: 12.9 % — SIGNIFICANT CHANGE UP (ref 2–14)
NEUTROPHILS # BLD AUTO: 5.1 K/UL — SIGNIFICANT CHANGE UP (ref 1.8–7.4)
NEUTROPHILS NFR BLD AUTO: 63.7 % — SIGNIFICANT CHANGE UP (ref 43–77)
NITRITE UR-MCNC: NEGATIVE — SIGNIFICANT CHANGE UP
NT-PROBNP SERPL-SCNC: 6913 PG/ML — HIGH (ref 0–300)
PH UR: 6 — SIGNIFICANT CHANGE UP (ref 5–8)
PHOSPHATE SERPL-MCNC: 4.3 MG/DL — SIGNIFICANT CHANGE UP (ref 2.5–4.5)
PLATELET # BLD AUTO: 181 K/UL — SIGNIFICANT CHANGE UP (ref 150–400)
POTASSIUM SERPL-MCNC: 4.4 MMOL/L — SIGNIFICANT CHANGE UP (ref 3.5–5.3)
POTASSIUM SERPL-SCNC: 4.4 MMOL/L — SIGNIFICANT CHANGE UP (ref 3.5–5.3)
PROT SERPL-MCNC: 7.7 G/DL — SIGNIFICANT CHANGE UP (ref 6–8.3)
PROT UR-MCNC: 300 MG/DL
PROTHROM AB SERPL-ACNC: 42.9 SEC — HIGH (ref 9.8–12.7)
RBC # BLD: 4.02 M/UL — LOW (ref 4.2–5.8)
RBC # FLD: 15.3 % — HIGH (ref 10.3–14.5)
SODIUM SERPL-SCNC: 135 MMOL/L — SIGNIFICANT CHANGE UP (ref 135–145)
SP GR SPEC: SIGNIFICANT CHANGE UP (ref 1.01–1.02)
TROPONIN T SERPL-MCNC: 0.03 NG/ML — SIGNIFICANT CHANGE UP (ref 0–0.06)
TROPONIN T SERPL-MCNC: 0.04 NG/ML — SIGNIFICANT CHANGE UP (ref 0–0.06)
UROBILINOGEN FLD QL: NEGATIVE — SIGNIFICANT CHANGE UP
WBC # BLD: 8 K/UL — SIGNIFICANT CHANGE UP (ref 3.8–10.5)
WBC # FLD AUTO: 8 K/UL — SIGNIFICANT CHANGE UP (ref 3.8–10.5)
WBC UR QL: >50 /HPF (ref 0–5)

## 2017-05-14 PROCEDURE — 93010 ELECTROCARDIOGRAM REPORT: CPT

## 2017-05-14 PROCEDURE — 99285 EMERGENCY DEPT VISIT HI MDM: CPT | Mod: 25,GC

## 2017-05-14 PROCEDURE — 71010: CPT | Mod: 26

## 2017-05-14 RX ORDER — CEFTRIAXONE 500 MG/1
1 INJECTION, POWDER, FOR SOLUTION INTRAMUSCULAR; INTRAVENOUS ONCE
Qty: 0 | Refills: 0 | Status: COMPLETED | OUTPATIENT
Start: 2017-05-14 | End: 2017-05-14

## 2017-05-14 RX ORDER — INSULIN GLARGINE 100 [IU]/ML
27 INJECTION, SOLUTION SUBCUTANEOUS ONCE
Qty: 0 | Refills: 0 | Status: COMPLETED | OUTPATIENT
Start: 2017-05-14 | End: 2017-05-14

## 2017-05-14 RX ORDER — CEPHALEXIN 500 MG
1 CAPSULE ORAL
Qty: 40 | Refills: 0 | OUTPATIENT
Start: 2017-05-14 | End: 2017-05-24

## 2017-05-14 RX ADMIN — CEFTRIAXONE 100 GRAM(S): 500 INJECTION, POWDER, FOR SOLUTION INTRAMUSCULAR; INTRAVENOUS at 23:20

## 2017-05-14 RX ADMIN — INSULIN GLARGINE 27 UNIT(S): 100 INJECTION, SOLUTION SUBCUTANEOUS at 23:47

## 2017-05-14 NOTE — ED PROVIDER NOTE - OBJECTIVE STATEMENT
86yM h/o CAD, CHF s/p AICD presents after episode of near syncope this evening. No fall or LOC. Now back to baseline. Denies BRBPR or melena. No chest pain or palpitations. AICD device placed in 2009, device last interrogated 3 months ago. 86yM h/o CAD, CHF s/p AICD presents after episode of near syncope this evening. No fall or LOC. Now back to baseline. Denies BRBPR or melena. No chest pain or palpitations. AICD device placed in 2009, device last interrogated 3 months ago.  PMD Airam Miller (hospitalist)

## 2017-05-14 NOTE — ED PROVIDER NOTE - ATTENDING CONTRIBUTION TO CARE
87 y/o m with pmhx presents for episode of near syncope this evening. did not fall. was with his wife at the time. feels weak and tired. no headache. no weakness no numbness. no chest pain no heart palp.   Gen. no acute distress, Non toxic   HEENT: EOMI, mmm, nc/at  Lungs: CTAB/L no C/ W /R   CVS: S1S2   Abd; Soft non tender, non distended   Ext: no edema   Neuro AAOx3 non focal clear speech  muscle strength 5/5 x 4 ext.

## 2017-05-14 NOTE — ED ADULT NURSE NOTE - OBJECTIVE STATEMENT
87 y/o male BIBA for a near syncopal event this evening. Pts wife states she witnessed the event and pt did not have syncopal event. Pt states he felt weak and tired. Denies loc, chest pain, sob, n/v/d, urinary frequency or burning, fever or chills. Pt presents A&Ox4, vss, paced, placed on cardiac monitor, ekg obtained, maex4, skin warm dry and intact, lungs cta, abd soft nondistended, speaks in full sentences. Will continue to monitor pt.

## 2017-05-14 NOTE — ED ADULT NURSE REASSESSMENT NOTE - NS ED NURSE REASSESS COMMENT FT1
2318: Report received from DEMARCUS HOLLOWAY in CC.  IV ceftriaxone in progress as ordered.  Patient sating 90-91% on RA, placed on O2, now sating 96%.  In no acute distress.  Family at the bedside, safety maintained.  Continuous monitoring in progress.

## 2017-05-14 NOTE — ED PROVIDER NOTE - PROGRESS NOTE DETAILS
AICD interrogated by cardiology, no events on interrogation. UA pending. First troponin negative.  Kiki PGY3

## 2017-05-14 NOTE — ED ADULT NURSE NOTE - CHPI ED SYMPTOMS NEG
no nausea/no syncope/no cough/no vomiting/no fever/no diaphoresis/no back pain/no shortness of breath/no chills/no dizziness

## 2017-05-14 NOTE — ED ADULT NURSE REASSESSMENT NOTE - NS ED NURSE REASSESS COMMENT FT1
2355: Bladder scan performed by RN at the bedside.  Patient tolerated procedure well.  Roughly 20cc of urine, ED MD Gibbons aware of this.  Patient and family aware of this as well.  Safety maintained.

## 2017-05-15 DIAGNOSIS — R07.89 OTHER CHEST PAIN: ICD-10-CM

## 2017-05-15 DIAGNOSIS — E11.9 TYPE 2 DIABETES MELLITUS WITHOUT COMPLICATIONS: ICD-10-CM

## 2017-05-15 DIAGNOSIS — F03.90 UNSPECIFIED DEMENTIA, UNSPECIFIED SEVERITY, WITHOUT BEHAVIORAL DISTURBANCE, PSYCHOTIC DISTURBANCE, MOOD DISTURBANCE, AND ANXIETY: ICD-10-CM

## 2017-05-15 DIAGNOSIS — N39.0 URINARY TRACT INFECTION, SITE NOT SPECIFIED: ICD-10-CM

## 2017-05-15 DIAGNOSIS — N18.9 CHRONIC KIDNEY DISEASE, UNSPECIFIED: ICD-10-CM

## 2017-05-15 DIAGNOSIS — I10 ESSENTIAL (PRIMARY) HYPERTENSION: ICD-10-CM

## 2017-05-15 DIAGNOSIS — I48.0 PAROXYSMAL ATRIAL FIBRILLATION: ICD-10-CM

## 2017-05-15 DIAGNOSIS — I50.9 HEART FAILURE, UNSPECIFIED: ICD-10-CM

## 2017-05-15 DIAGNOSIS — Z29.9 ENCOUNTER FOR PROPHYLACTIC MEASURES, UNSPECIFIED: ICD-10-CM

## 2017-05-15 LAB
ANION GAP SERPL CALC-SCNC: 16 MMOL/L — SIGNIFICANT CHANGE UP (ref 5–17)
BUN SERPL-MCNC: 94 MG/DL — HIGH (ref 7–23)
CALCIUM SERPL-MCNC: 8.9 MG/DL — SIGNIFICANT CHANGE UP (ref 8.4–10.5)
CHLORIDE SERPL-SCNC: 101 MMOL/L — SIGNIFICANT CHANGE UP (ref 96–108)
CK MB BLD-MCNC: 4.3 % — HIGH (ref 0–3.5)
CK MB CFR SERPL CALC: 5 NG/ML — SIGNIFICANT CHANGE UP (ref 0–6.7)
CK SERPL-CCNC: 116 U/L — SIGNIFICANT CHANGE UP (ref 30–200)
CO2 SERPL-SCNC: 18 MMOL/L — LOW (ref 22–31)
CREAT SERPL-MCNC: 2.89 MG/DL — HIGH (ref 0.5–1.3)
GLUCOSE SERPL-MCNC: 386 MG/DL — HIGH (ref 70–99)
HCT VFR BLD CALC: 29.8 % — LOW (ref 39–50)
HGB BLD-MCNC: 9.8 G/DL — LOW (ref 13–17)
INR BLD: 3.51 RATIO — HIGH (ref 0.88–1.16)
MAGNESIUM SERPL-MCNC: 2.2 MG/DL — SIGNIFICANT CHANGE UP (ref 1.6–2.6)
MCHC RBC-ENTMCNC: 26.3 PG — LOW (ref 27–34)
MCHC RBC-ENTMCNC: 32.9 GM/DL — SIGNIFICANT CHANGE UP (ref 32–36)
MCV RBC AUTO: 79.8 FL — LOW (ref 80–100)
PHOSPHATE SERPL-MCNC: 4.2 MG/DL — SIGNIFICANT CHANGE UP (ref 2.5–4.5)
PLATELET # BLD AUTO: 156 K/UL — SIGNIFICANT CHANGE UP (ref 150–400)
POTASSIUM SERPL-MCNC: 4.6 MMOL/L — SIGNIFICANT CHANGE UP (ref 3.5–5.3)
POTASSIUM SERPL-SCNC: 4.6 MMOL/L — SIGNIFICANT CHANGE UP (ref 3.5–5.3)
PROTHROM AB SERPL-ACNC: 39.2 SEC — HIGH (ref 9.8–12.7)
RBC # BLD: 3.74 M/UL — LOW (ref 4.2–5.8)
RBC # FLD: 15.2 % — HIGH (ref 10.3–14.5)
SODIUM SERPL-SCNC: 135 MMOL/L — SIGNIFICANT CHANGE UP (ref 135–145)
TROPONIN T SERPL-MCNC: 0.06 NG/ML — SIGNIFICANT CHANGE UP (ref 0–0.06)
WBC # BLD: 8.4 K/UL — SIGNIFICANT CHANGE UP (ref 3.8–10.5)
WBC # FLD AUTO: 8.4 K/UL — SIGNIFICANT CHANGE UP (ref 3.8–10.5)

## 2017-05-15 PROCEDURE — 99223 1ST HOSP IP/OBS HIGH 75: CPT | Mod: AI,GC

## 2017-05-15 RX ORDER — INSULIN LISPRO 100/ML
VIAL (ML) SUBCUTANEOUS
Qty: 0 | Refills: 0 | Status: DISCONTINUED | OUTPATIENT
Start: 2017-05-15 | End: 2017-05-15

## 2017-05-15 RX ORDER — DIGOXIN 250 MCG
0.12 TABLET ORAL
Qty: 0 | Refills: 0 | Status: DISCONTINUED | OUTPATIENT
Start: 2017-05-15 | End: 2017-05-16

## 2017-05-15 RX ORDER — ASPIRIN/CALCIUM CARB/MAGNESIUM 324 MG
81 TABLET ORAL DAILY
Qty: 0 | Refills: 0 | Status: DISCONTINUED | OUTPATIENT
Start: 2017-05-15 | End: 2017-05-16

## 2017-05-15 RX ORDER — CEFTRIAXONE 500 MG/1
1 INJECTION, POWDER, FOR SOLUTION INTRAMUSCULAR; INTRAVENOUS EVERY 24 HOURS
Qty: 0 | Refills: 0 | Status: DISCONTINUED | OUTPATIENT
Start: 2017-05-15 | End: 2017-05-16

## 2017-05-15 RX ORDER — INSULIN LISPRO 100/ML
VIAL (ML) SUBCUTANEOUS AT BEDTIME
Qty: 0 | Refills: 0 | Status: DISCONTINUED | OUTPATIENT
Start: 2017-05-15 | End: 2017-05-15

## 2017-05-15 RX ORDER — MEMANTINE HYDROCHLORIDE 10 MG/1
10 TABLET ORAL
Qty: 0 | Refills: 0 | Status: DISCONTINUED | OUTPATIENT
Start: 2017-05-15 | End: 2017-05-16

## 2017-05-15 RX ORDER — HEPARIN SODIUM 5000 [USP'U]/ML
5000 INJECTION INTRAVENOUS; SUBCUTANEOUS EVERY 8 HOURS
Qty: 0 | Refills: 0 | Status: DISCONTINUED | OUTPATIENT
Start: 2017-05-15 | End: 2017-05-15

## 2017-05-15 RX ORDER — INSULIN LISPRO 100/ML
VIAL (ML) SUBCUTANEOUS AT BEDTIME
Qty: 0 | Refills: 0 | Status: DISCONTINUED | OUTPATIENT
Start: 2017-05-15 | End: 2017-05-16

## 2017-05-15 RX ORDER — DEXTROSE 50 % IN WATER 50 %
1 SYRINGE (ML) INTRAVENOUS ONCE
Qty: 0 | Refills: 0 | Status: DISCONTINUED | OUTPATIENT
Start: 2017-05-15 | End: 2017-05-16

## 2017-05-15 RX ORDER — INSULIN LISPRO 100/ML
VIAL (ML) SUBCUTANEOUS
Qty: 0 | Refills: 0 | Status: DISCONTINUED | OUTPATIENT
Start: 2017-05-15 | End: 2017-05-16

## 2017-05-15 RX ORDER — SODIUM CHLORIDE 9 MG/ML
1000 INJECTION, SOLUTION INTRAVENOUS
Qty: 0 | Refills: 0 | Status: DISCONTINUED | OUTPATIENT
Start: 2017-05-15 | End: 2017-05-16

## 2017-05-15 RX ORDER — METOPROLOL TARTRATE 50 MG
100 TABLET ORAL DAILY
Qty: 0 | Refills: 0 | Status: DISCONTINUED | OUTPATIENT
Start: 2017-05-15 | End: 2017-05-16

## 2017-05-15 RX ORDER — GLUCAGON INJECTION, SOLUTION 0.5 MG/.1ML
1 INJECTION, SOLUTION SUBCUTANEOUS ONCE
Qty: 0 | Refills: 0 | Status: DISCONTINUED | OUTPATIENT
Start: 2017-05-15 | End: 2017-05-16

## 2017-05-15 RX ORDER — ISOSORBIDE DINITRATE 5 MG/1
10 TABLET ORAL
Qty: 0 | Refills: 0 | Status: DISCONTINUED | OUTPATIENT
Start: 2017-05-15 | End: 2017-05-16

## 2017-05-15 RX ORDER — INSULIN GLARGINE 100 [IU]/ML
27 INJECTION, SOLUTION SUBCUTANEOUS AT BEDTIME
Qty: 0 | Refills: 0 | Status: DISCONTINUED | OUTPATIENT
Start: 2017-05-15 | End: 2017-05-16

## 2017-05-15 RX ORDER — DONEPEZIL HYDROCHLORIDE 10 MG/1
10 TABLET, FILM COATED ORAL AT BEDTIME
Qty: 0 | Refills: 0 | Status: DISCONTINUED | OUTPATIENT
Start: 2017-05-15 | End: 2017-05-16

## 2017-05-15 RX ORDER — DEXTROSE 50 % IN WATER 50 %
12.5 SYRINGE (ML) INTRAVENOUS ONCE
Qty: 0 | Refills: 0 | Status: DISCONTINUED | OUTPATIENT
Start: 2017-05-15 | End: 2017-05-16

## 2017-05-15 RX ORDER — HYDRALAZINE HCL 50 MG
20 TABLET ORAL
Qty: 0 | Refills: 0 | Status: DISCONTINUED | OUTPATIENT
Start: 2017-05-15 | End: 2017-05-16

## 2017-05-15 RX ORDER — TAMSULOSIN HYDROCHLORIDE 0.4 MG/1
0.4 CAPSULE ORAL AT BEDTIME
Qty: 0 | Refills: 0 | Status: DISCONTINUED | OUTPATIENT
Start: 2017-05-15 | End: 2017-05-16

## 2017-05-15 RX ORDER — WARFARIN SODIUM 2.5 MG/1
5 TABLET ORAL ONCE
Qty: 0 | Refills: 0 | Status: DISCONTINUED | OUTPATIENT
Start: 2017-05-15 | End: 2017-05-15

## 2017-05-15 RX ORDER — INSULIN LISPRO 100/ML
8 VIAL (ML) SUBCUTANEOUS
Qty: 0 | Refills: 0 | Status: DISCONTINUED | OUTPATIENT
Start: 2017-05-15 | End: 2017-05-16

## 2017-05-15 RX ORDER — HYDRALAZINE HCL 50 MG
20 TABLET ORAL ONCE
Qty: 0 | Refills: 0 | Status: COMPLETED | OUTPATIENT
Start: 2017-05-15 | End: 2017-05-15

## 2017-05-15 RX ORDER — MEMANTINE HYDROCHLORIDE 10 MG/1
10 TABLET ORAL ONCE
Qty: 0 | Refills: 0 | Status: COMPLETED | OUTPATIENT
Start: 2017-05-15 | End: 2017-05-15

## 2017-05-15 RX ORDER — DEXTROSE 50 % IN WATER 50 %
25 SYRINGE (ML) INTRAVENOUS ONCE
Qty: 0 | Refills: 0 | Status: DISCONTINUED | OUTPATIENT
Start: 2017-05-15 | End: 2017-05-16

## 2017-05-15 RX ORDER — ATORVASTATIN CALCIUM 80 MG/1
80 TABLET, FILM COATED ORAL AT BEDTIME
Qty: 0 | Refills: 0 | Status: DISCONTINUED | OUTPATIENT
Start: 2017-05-15 | End: 2017-05-16

## 2017-05-15 RX ADMIN — Medication 20 MILLIGRAM(S): at 17:25

## 2017-05-15 RX ADMIN — TAMSULOSIN HYDROCHLORIDE 0.4 MILLIGRAM(S): 0.4 CAPSULE ORAL at 22:24

## 2017-05-15 RX ADMIN — ISOSORBIDE DINITRATE 10 MILLIGRAM(S): 5 TABLET ORAL at 08:58

## 2017-05-15 RX ADMIN — Medication 20 MILLIGRAM(S): at 05:03

## 2017-05-15 RX ADMIN — Medication 1: at 22:22

## 2017-05-15 RX ADMIN — Medication 20 MILLIGRAM(S): at 01:19

## 2017-05-15 RX ADMIN — INSULIN GLARGINE 27 UNIT(S): 100 INJECTION, SOLUTION SUBCUTANEOUS at 22:25

## 2017-05-15 RX ADMIN — Medication 100 MILLIGRAM(S): at 05:03

## 2017-05-15 RX ADMIN — MEMANTINE HYDROCHLORIDE 10 MILLIGRAM(S): 10 TABLET ORAL at 01:19

## 2017-05-15 RX ADMIN — MEMANTINE HYDROCHLORIDE 10 MILLIGRAM(S): 10 TABLET ORAL at 05:03

## 2017-05-15 RX ADMIN — Medication 0.12 MILLIGRAM(S): at 08:58

## 2017-05-15 RX ADMIN — Medication 81 MILLIGRAM(S): at 13:24

## 2017-05-15 RX ADMIN — DONEPEZIL HYDROCHLORIDE 10 MILLIGRAM(S): 10 TABLET, FILM COATED ORAL at 22:24

## 2017-05-15 RX ADMIN — Medication 8: at 09:30

## 2017-05-15 RX ADMIN — Medication 60 MILLIGRAM(S): at 05:03

## 2017-05-15 RX ADMIN — Medication 10: at 13:22

## 2017-05-15 RX ADMIN — Medication 8 UNIT(S): at 13:21

## 2017-05-15 RX ADMIN — HEPARIN SODIUM 5000 UNIT(S): 5000 INJECTION INTRAVENOUS; SUBCUTANEOUS at 05:02

## 2017-05-15 RX ADMIN — ISOSORBIDE DINITRATE 10 MILLIGRAM(S): 5 TABLET ORAL at 17:25

## 2017-05-15 RX ADMIN — Medication 8 UNIT(S): at 17:26

## 2017-05-15 RX ADMIN — ATORVASTATIN CALCIUM 80 MILLIGRAM(S): 80 TABLET, FILM COATED ORAL at 22:24

## 2017-05-15 RX ADMIN — MEMANTINE HYDROCHLORIDE 10 MILLIGRAM(S): 10 TABLET ORAL at 17:25

## 2017-05-15 RX ADMIN — Medication 2: at 17:25

## 2017-05-15 NOTE — ED ADULT NURSE REASSESSMENT NOTE - NS ED NURSE REASSESS COMMENT FT1
0119: Report given to DEMARCUS Vila in ED Holding.  Patient is awake and alert, orientedx3.  Denies CP, SOB, lightheadedness, dizziness or HA.  Safety maintained.  Continuous monitoring in progress.

## 2017-05-15 NOTE — H&P ADULT. - PROBLEM SELECTOR PLAN 1
not typical anginal pain will trend CE  PPM interrogated by cards fellow, no arrythmia or events that correlate with discomfort  slight elevation of troponin in setting of ckd  f/u cardiology recs in am  cont asa, lipitor, toprol 100

## 2017-05-15 NOTE — H&P ADULT. - ATTENDING COMMENTS
86M PMHx CAD s/p CABG , s/p MVR 2004, dementia, biV systolic HF (EF 45%) s/p AICD 2009, AF on coumadin, CKD IV, htn, DM2, p/w intermittent chest pain radiating to back similar to prior MI, exam without focal findings, cardiac enzymes negative x 2 , EKG paced similar to prior , CXR without significant findings, AICD s/p interrogation by cardiology no events triggered, patient with significant cardiac history  high risk will continue to monitor on telemetry and discuss further w/u with cardiology.

## 2017-05-15 NOTE — H&P ADULT. - GASTROINTESTINAL DETAILS
no distention/no rebound tenderness/no rigidity/nontender/soft/no bruit/no masses palpable/no guarding

## 2017-05-15 NOTE — H&P ADULT. - PROBLEM SELECTOR PLAN 2
pt with mild dementia, cannot fully ascertain if pt symptomatic therefore would treat empirically with ceftriaxone 1g q24  f/u UCx

## 2017-05-15 NOTE — ED ADULT NURSE REASSESSMENT NOTE - NS ED NURSE REASSESS COMMENT FT1
Per family, patient takes Lantus 27 units at night.  They also state his fingersticks at home have been between 300-400 today.  ED MD Gibbons aware of this.  Fingerstick performed at the bedside and recorded in the flow sheet.  Lantus ordered, administered and co-signed by 2 RNs at the bedside.  Continuous monitoring in progress.  Safety maintained.

## 2017-05-15 NOTE — H&P ADULT. - PROBLEM SELECTOR PLAN 4
euvolemic on exam, NYHA class III, ongoing discussion with HF specialist re: options  toprol  torsemide 60, dig 0.125 qod  imdur 10 bid

## 2017-05-15 NOTE — H&P ADULT. - ASSESSMENT
86M PMHx CAD s/p CABG 2004, s/p MVR 2004, dementia, biV systolic HF (EF 45%) s/p AICD 2009, AF on coumadin, CKD IV, htn, DM2 here with chest discomfort.

## 2017-05-15 NOTE — H&P ADULT. - RS GEN PE MLT RESP DETAILS PC
no intercostal retractions/good air movement/breath sounds equal/diminished breath sounds, R/clear to auscultation bilaterally/no chest wall tenderness/no rhonchi/airway patent/no wheezes

## 2017-05-16 ENCOUNTER — TRANSCRIPTION ENCOUNTER (OUTPATIENT)
Age: 82
End: 2017-05-16

## 2017-05-16 ENCOUNTER — MEDICATION RENEWAL (OUTPATIENT)
Age: 82
End: 2017-05-16

## 2017-05-16 VITALS
DIASTOLIC BLOOD PRESSURE: 69 MMHG | HEART RATE: 70 BPM | TEMPERATURE: 98 F | RESPIRATION RATE: 18 BRPM | OXYGEN SATURATION: 95 % | SYSTOLIC BLOOD PRESSURE: 147 MMHG

## 2017-05-16 LAB
ANION GAP SERPL CALC-SCNC: 17 MMOL/L — SIGNIFICANT CHANGE UP (ref 5–17)
BASOPHILS # BLD AUTO: 0 K/UL — SIGNIFICANT CHANGE UP (ref 0–0.2)
BASOPHILS NFR BLD AUTO: 0.4 % — SIGNIFICANT CHANGE UP (ref 0–2)
BUN SERPL-MCNC: 100 MG/DL — HIGH (ref 7–23)
CALCIUM SERPL-MCNC: 8.8 MG/DL — SIGNIFICANT CHANGE UP (ref 8.4–10.5)
CHLORIDE SERPL-SCNC: 107 MMOL/L — SIGNIFICANT CHANGE UP (ref 96–108)
CK MB BLD-MCNC: 3.6 % — HIGH (ref 0–3.5)
CK MB CFR SERPL CALC: 3.1 NG/ML — SIGNIFICANT CHANGE UP (ref 0–6.7)
CK SERPL-CCNC: 87 U/L — SIGNIFICANT CHANGE UP (ref 30–200)
CO2 SERPL-SCNC: 18 MMOL/L — LOW (ref 22–31)
CREAT SERPL-MCNC: 3.06 MG/DL — HIGH (ref 0.5–1.3)
CULTURE RESULTS: NO GROWTH — SIGNIFICANT CHANGE UP
EOSINOPHIL # BLD AUTO: 0.2 K/UL — SIGNIFICANT CHANGE UP (ref 0–0.5)
EOSINOPHIL NFR BLD AUTO: 3.2 % — SIGNIFICANT CHANGE UP (ref 0–6)
GLUCOSE SERPL-MCNC: 136 MG/DL — HIGH (ref 70–99)
HBA1C BLD-MCNC: 10.1 % — HIGH (ref 4–5.6)
HCT VFR BLD CALC: 29.5 % — LOW (ref 39–50)
HGB BLD-MCNC: 8.9 G/DL — LOW (ref 13–17)
INR BLD: 2.21 RATIO — HIGH (ref 0.88–1.16)
LYMPHOCYTES # BLD AUTO: 1.3 K/UL — SIGNIFICANT CHANGE UP (ref 1–3.3)
LYMPHOCYTES # BLD AUTO: 19.1 % — SIGNIFICANT CHANGE UP (ref 13–44)
MCHC RBC-ENTMCNC: 24 PG — LOW (ref 27–34)
MCHC RBC-ENTMCNC: 30.1 GM/DL — LOW (ref 32–36)
MCV RBC AUTO: 79.8 FL — LOW (ref 80–100)
MONOCYTES # BLD AUTO: 1.1 K/UL — HIGH (ref 0–0.9)
MONOCYTES NFR BLD AUTO: 15.1 % — HIGH (ref 2–14)
NEUTROPHILS # BLD AUTO: 4.4 K/UL — SIGNIFICANT CHANGE UP (ref 1.8–7.4)
NEUTROPHILS NFR BLD AUTO: 62.2 % — SIGNIFICANT CHANGE UP (ref 43–77)
PLATELET # BLD AUTO: 152 K/UL — SIGNIFICANT CHANGE UP (ref 150–400)
POTASSIUM SERPL-MCNC: 4.1 MMOL/L — SIGNIFICANT CHANGE UP (ref 3.5–5.3)
POTASSIUM SERPL-SCNC: 4.1 MMOL/L — SIGNIFICANT CHANGE UP (ref 3.5–5.3)
PROTHROM AB SERPL-ACNC: 24.3 SEC — HIGH (ref 9.8–12.7)
RBC # BLD: 3.69 M/UL — LOW (ref 4.2–5.8)
RBC # FLD: 15.2 % — HIGH (ref 10.3–14.5)
SODIUM SERPL-SCNC: 142 MMOL/L — SIGNIFICANT CHANGE UP (ref 135–145)
SPECIMEN SOURCE: SIGNIFICANT CHANGE UP
TROPONIN T SERPL-MCNC: 0.05 NG/ML — SIGNIFICANT CHANGE UP (ref 0–0.06)
WBC # BLD: 7 K/UL — SIGNIFICANT CHANGE UP (ref 3.8–10.5)
WBC # FLD AUTO: 7 K/UL — SIGNIFICANT CHANGE UP (ref 3.8–10.5)

## 2017-05-16 PROCEDURE — 80053 COMPREHEN METABOLIC PANEL: CPT

## 2017-05-16 PROCEDURE — 82330 ASSAY OF CALCIUM: CPT

## 2017-05-16 PROCEDURE — 82550 ASSAY OF CK (CPK): CPT

## 2017-05-16 PROCEDURE — 83735 ASSAY OF MAGNESIUM: CPT

## 2017-05-16 PROCEDURE — 87086 URINE CULTURE/COLONY COUNT: CPT

## 2017-05-16 PROCEDURE — 84100 ASSAY OF PHOSPHORUS: CPT

## 2017-05-16 PROCEDURE — 96372 THER/PROPH/DIAG INJ SC/IM: CPT | Mod: XU

## 2017-05-16 PROCEDURE — 82947 ASSAY GLUCOSE BLOOD QUANT: CPT

## 2017-05-16 PROCEDURE — 80048 BASIC METABOLIC PNL TOTAL CA: CPT

## 2017-05-16 PROCEDURE — 84484 ASSAY OF TROPONIN QUANT: CPT

## 2017-05-16 PROCEDURE — 85610 PROTHROMBIN TIME: CPT

## 2017-05-16 PROCEDURE — 96374 THER/PROPH/DIAG INJ IV PUSH: CPT

## 2017-05-16 PROCEDURE — 84132 ASSAY OF SERUM POTASSIUM: CPT

## 2017-05-16 PROCEDURE — 85027 COMPLETE CBC AUTOMATED: CPT

## 2017-05-16 PROCEDURE — 99223 1ST HOSP IP/OBS HIGH 75: CPT

## 2017-05-16 PROCEDURE — 99239 HOSP IP/OBS DSCHRG MGMT >30: CPT

## 2017-05-16 PROCEDURE — 85014 HEMATOCRIT: CPT

## 2017-05-16 PROCEDURE — 83880 ASSAY OF NATRIURETIC PEPTIDE: CPT

## 2017-05-16 PROCEDURE — 81001 URINALYSIS AUTO W/SCOPE: CPT

## 2017-05-16 PROCEDURE — 99285 EMERGENCY DEPT VISIT HI MDM: CPT | Mod: 25

## 2017-05-16 PROCEDURE — 82962 GLUCOSE BLOOD TEST: CPT

## 2017-05-16 PROCEDURE — 82553 CREATINE MB FRACTION: CPT

## 2017-05-16 PROCEDURE — 80162 ASSAY OF DIGOXIN TOTAL: CPT

## 2017-05-16 PROCEDURE — 82435 ASSAY OF BLOOD CHLORIDE: CPT

## 2017-05-16 PROCEDURE — 83036 HEMOGLOBIN GLYCOSYLATED A1C: CPT

## 2017-05-16 PROCEDURE — 84295 ASSAY OF SERUM SODIUM: CPT

## 2017-05-16 PROCEDURE — 71045 X-RAY EXAM CHEST 1 VIEW: CPT

## 2017-05-16 PROCEDURE — 93005 ELECTROCARDIOGRAM TRACING: CPT

## 2017-05-16 PROCEDURE — 87040 BLOOD CULTURE FOR BACTERIA: CPT

## 2017-05-16 PROCEDURE — 93010 ELECTROCARDIOGRAM REPORT: CPT

## 2017-05-16 PROCEDURE — 83605 ASSAY OF LACTIC ACID: CPT

## 2017-05-16 PROCEDURE — 82803 BLOOD GASES ANY COMBINATION: CPT

## 2017-05-16 PROCEDURE — 85730 THROMBOPLASTIN TIME PARTIAL: CPT

## 2017-05-16 RX ORDER — MOXIFLOXACIN HYDROCHLORIDE TABLETS, 400 MG 400 MG/1
500 TABLET, FILM COATED ORAL
Qty: 12000 | Refills: 0 | OUTPATIENT
Start: 2017-05-16 | End: 2017-05-28

## 2017-05-16 RX ORDER — MOXIFLOXACIN HYDROCHLORIDE TABLETS, 400 MG 400 MG/1
1 TABLET, FILM COATED ORAL
Qty: 24 | Refills: 0 | OUTPATIENT
Start: 2017-05-16 | End: 2017-05-28

## 2017-05-16 RX ORDER — WARFARIN SODIUM 2.5 MG/1
5 TABLET ORAL ONCE
Qty: 0 | Refills: 0 | Status: COMPLETED | OUTPATIENT
Start: 2017-05-16 | End: 2017-05-16

## 2017-05-16 RX ADMIN — WARFARIN SODIUM 5 MILLIGRAM(S): 2.5 TABLET ORAL at 11:55

## 2017-05-16 RX ADMIN — ISOSORBIDE DINITRATE 10 MILLIGRAM(S): 5 TABLET ORAL at 06:26

## 2017-05-16 RX ADMIN — Medication 81 MILLIGRAM(S): at 11:55

## 2017-05-16 RX ADMIN — Medication 100 MILLIGRAM(S): at 06:27

## 2017-05-16 RX ADMIN — Medication 2: at 12:40

## 2017-05-16 RX ADMIN — Medication 8 UNIT(S): at 08:30

## 2017-05-16 RX ADMIN — CEFTRIAXONE 100 GRAM(S): 500 INJECTION, POWDER, FOR SOLUTION INTRAMUSCULAR; INTRAVENOUS at 00:02

## 2017-05-16 RX ADMIN — Medication 60 MILLIGRAM(S): at 06:27

## 2017-05-16 RX ADMIN — MEMANTINE HYDROCHLORIDE 10 MILLIGRAM(S): 10 TABLET ORAL at 06:27

## 2017-05-16 RX ADMIN — Medication 20 MILLIGRAM(S): at 06:28

## 2017-05-16 NOTE — DISCHARGE NOTE ADULT - HOSPITAL COURSE
86M PMHx CAD s/p CABG 2004, s/p MVR 2004, dementia, biV systolic HF (EF 45%) s/p AICD 2009, AF on coumadin, CKD IV, htn, DM2 here with chest discomfort. States discomfort started one week ago and has been progressing. Has multiple sporadic episodes, not triggered by exertion, position, or inspiration. Episodes last from a few minutes to the longest being one hour. Describes slight chest discomfort but predominately upper back pain. No radiation. States he felt similar symptoms prior to his CABG. He has 4 pack year smoking history. Otherwise denies fever, chills, SOB, diarrhea, dysuria. Lives with his wife, no sick contacts, recent travel.    In the ED vitals were T 97.8, HR 78, Bp 134/71, RR 18, O2 96RA. Given hydralazine 20 po, ceftriaxone 1g. 86 M PMHx CAD s/p CABG 2004, s/p MVR 2004, dementia, biV systolic HF (EF 45%) s/p AICD 2009, AF on coumadin, CKD IV, htn, DM2 here with chest discomfort. Patient was recently admitted for UTI and discharged with PO antibiotics, patient was doing well, until Sunday evening when he was returning from dinner with his family and had a near syncopal episodes which caused him to slump to the floor. Patient denied any chest pain, or previous symptoms. Patients AICD was interrogated in the ED and following day, the AICD did not go off.   Patients family also stated that patients urine was very "turbid" on arrival to the ED, patient was empirically started on Rocephin he completed two days of IV antibiotics, patient has a followup appointment with his urologist in less than a month, however patient's wife states she will move it up, given he has had two recent UTI's. Urine culture both times have been negative .   Patient will be discharged home today with a ten day course of ciprofloxacin and urology followup. Patient to also follow with PCP in 1 week.

## 2017-05-16 NOTE — DISCHARGE NOTE ADULT - CARE PROVIDERS DIRECT ADDRESSES
[email or web address]

## 2017-05-16 NOTE — DISCHARGE NOTE ADULT - PATIENT PORTAL LINK FT
“You can access the FollowHealth Patient Portal, offered by [facility], by registering with the following website: [email or web address]”

## 2017-05-16 NOTE — DISCHARGE NOTE ADULT - CARE PROVIDER_API CALL
Airam Miller), Geriatric Medicine; HospicePalliative Medicine; Internal Medicine  [address] [place] [address]  Phone: [phone]  Fax: [phone]    Prasad Lagos), Cardiovascular Disease; Internal Medicine; Interventional Cardiology  [address] [place] [address]  Phone: [phone]  Fax: [phone]    Jordyn Kruse), EndocrinologyMetabDiabetes  [address] [place] [address]  Phone: [phone]  Fax: [phone]    Jeffery Gilliland), Urology  [address] [place] [address]  Phone: [phone]  Fax: [phone]

## 2017-05-16 NOTE — DISCHARGE NOTE ADULT - PLAN OF CARE
no angina Continue medication and follow up with cardiologist Dr Prasad Lagos  Return to emergency for chest pain , difficulty breathing dizziness no further infection Cipro as ordered  Follow up With Urologist Dr Gilliland, [facility] [phone] no fluid over load Take weight daily if increase by 3 pounds then call Dr Lagos and Heart Failure team within 3 days of discharge Noah Rivera [phone]  Continue meds as ordered  Low salt, low cholesterol, diabetic diet no worsening of renal functions [facility] for follow up appointment  Creat was 3.0  anemia to be followed by renal, Dr Juliana Gan [phone], Keep Appointment HA1C<7 HA1C is pending, have Dr Kruse, endocrinologist follow up  Lantus and Humalog as ordered rate control To continue meds and follow up with cardiologist  INR was 2.21  Coumadin 5 mg at night  Call cardiology and primary care to have INR followed Cipro as ordered  Follow up With Urologist Dr Gilliland, [facility] [phone] on June 9th, 2017 at 8:50 am

## 2017-05-16 NOTE — DISCHARGE NOTE ADULT - SECONDARY DIAGNOSIS.
Acute cystitis without hematuria Cardiomyopathy, ischemic CKD (chronic kidney disease) stage 4, GFR 15-29 ml/min Type 2 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 disease, with long-term current use of insulin Chronic atrial fibrillation

## 2017-05-16 NOTE — PROVIDER CONTACT NOTE (OTHER) - ACTION/TREATMENT ORDERED:
WAGNER Reyes aware and assessed. Ordered EKG, Duane. administer PO tylenol. Will continue to monitor.

## 2017-05-16 NOTE — DISCHARGE NOTE ADULT - MEDICATION SUMMARY - MEDICATIONS TO TAKE
I will START or STAY ON the medications listed below when I get home from the hospital:    aspirin 81 mg oral delayed release tablet  -- 1 tab(s) by mouth once a day  -- Indication: For Hypertension    tamsulosin 0.4 mg oral capsule  -- 1 cap(s) by mouth once a day (at bedtime)  -- Indication: For bph    isosorbide dinitrate 10 mg oral tablet  -- 1 tab(s) by mouth 2 times a day  -- Indication: For Congestive heart failure    digoxin 125 mcg (0.125 mg) oral tablet  -- 1 tab(s) by mouth Monday, Wednesday, and Friday  -- Indication: For Congestive heart failure    Coumadin 5 mg oral tablet  -- 1 tab(s) by mouth once a day    -- Indication: For Paroxysmal atrial fibrillation    insulin lispro 100 units/mL subcutaneous solution  -- 8 unit(s) subcutaneous 3 times a day (before meals)  -- Indication: For Type 2 diabetes mellitus    Lantus 100 units/mL subcutaneous solution  -- 27 unit(s) subcutaneous once a day (at bedtime)  -- Indication: For Type 2 diabetes mellitus    atorvastatin 80 mg oral tablet  -- 1 tab(s) by mouth once a day (at bedtime)  -- Indication: For Congestive heart failure    metoprolol succinate 100 mg oral tablet, extended release  -- 1 tab(s) by mouth once a day  -- Indication: For Paroxysmal atrial fibrillation    donepezil 10 mg oral tablet  -- 1 tab(s) by mouth once a day (at bedtime)  -- Indication: For Dementia    torsemide 20 mg oral tablet  -- 3 tab(s) by mouth once a day  -- Indication: For Congestive heart failure    Namenda 10 mg oral tablet  -- 1 tab(s) by mouth 2 times a day  -- Indication: For Dementia    hydrALAZINE 10 mg oral tablet  -- 2 tab(s) by mouth 2 times a day  -- Indication: For Hypertension I will START or STAY ON the medications listed below when I get home from the hospital:    aspirin 81 mg oral delayed release tablet  -- 1 tab(s) by mouth once a day  -- Indication: For Hypertension    tamsulosin 0.4 mg oral capsule  -- 1 cap(s) by mouth once a day (at bedtime)  -- Indication: For bph    isosorbide dinitrate 10 mg oral tablet  -- 1 tab(s) by mouth 2 times a day  -- Indication: For Congestive heart failure    digoxin 125 mcg (0.125 mg) oral tablet  -- 1 tab(s) by mouth Monday, Wednesday, and Friday  -- Indication: For Congestive heart failure    Coumadin 5 mg oral tablet  -- 1 tab(s) by mouth once a day    -- Indication: For Paroxysmal atrial fibrillation    insulin lispro 100 units/mL subcutaneous solution  -- 8 unit(s) subcutaneous 3 times a day (before meals)  -- Indication: For Type 2 diabetes mellitus    Lantus 100 units/mL subcutaneous solution  -- 27 unit(s) subcutaneous once a day (at bedtime)  -- Indication: For Type 2 diabetes mellitus    atorvastatin 80 mg oral tablet  -- 1 tab(s) by mouth once a day (at bedtime)  -- Indication: For Congestive heart failure    metoprolol succinate 100 mg oral tablet, extended release  -- 1 tab(s) by mouth once a day  -- Indication: For Paroxysmal atrial fibrillation    donepezil 10 mg oral tablet  -- 1 tab(s) by mouth once a day (at bedtime)  -- Indication: For Dementia    torsemide 20 mg oral tablet  -- 3 tab(s) by mouth once a day  -- Indication: For Congestive heart failure    Namenda 10 mg oral tablet  -- 1 tab(s) by mouth 2 times a day  -- Indication: For Dementia    Cipro 500 mg oral tablet  -- 500 tab(s) by mouth every 12 hours  -- Avoid prolonged or excessive exposure to direct and/or artificial sunlight while taking this medication.  Check with your doctor before becoming pregnant.  Do not take dairy products, antacids, or iron preparations within one hour of this medication.  Finish all this medication unless otherwise directed by prescriber.  Medication should be taken with plenty of water.    -- Indication: For UTI (urinary tract infection)    hydrALAZINE 10 mg oral tablet  -- 2 tab(s) by mouth 2 times a day  -- Indication: For Hypertension I will START or STAY ON the medications listed below when I get home from the hospital:    aspirin 81 mg oral delayed release tablet  -- 1 tab(s) by mouth once a day  -- Indication: For Hypertension    tamsulosin 0.4 mg oral capsule  -- 1 cap(s) by mouth once a day (at bedtime)  -- Indication: For bph    isosorbide dinitrate 10 mg oral tablet  -- 1 tab(s) by mouth 2 times a day  -- Indication: For Congestive heart failure    digoxin 125 mcg (0.125 mg) oral tablet  -- 1 tab(s) by mouth Monday, Wednesday, and Friday  -- Indication: For Congestive heart failure    Coumadin 5 mg oral tablet  -- 1 tab(s) by mouth once a day    -- Indication: For Paroxysmal atrial fibrillation    insulin lispro 100 units/mL subcutaneous solution  -- 8 unit(s) subcutaneous 3 times a day (before meals)  -- Indication: For Type 2 diabetes mellitus    Lantus 100 units/mL subcutaneous solution  -- 27 unit(s) subcutaneous once a day (at bedtime)  -- Indication: For Type 2 diabetes mellitus    atorvastatin 80 mg oral tablet  -- 1 tab(s) by mouth once a day (at bedtime)  -- Indication: For Congestive heart failure    metoprolol succinate 100 mg oral tablet, extended release  -- 1 tab(s) by mouth once a day  -- Indication: For Paroxysmal atrial fibrillation    donepezil 10 mg oral tablet  -- 1 tab(s) by mouth once a day (at bedtime)  -- Indication: For Dementia    torsemide 20 mg oral tablet  -- 3 tab(s) by mouth once a day  -- Indication: For Congestive heart failure    Namenda 10 mg oral tablet  -- 1 tab(s) by mouth 2 times a day  -- Indication: For Dementia    Cipro 500 mg oral tablet  -- 1 tab(s) by mouth 2 times a day  -- Avoid prolonged or excessive exposure to direct and/or artificial sunlight while taking this medication.  Check with your doctor before becoming pregnant.  Do not take dairy products, antacids, or iron preparations within one hour of this medication.  Finish all this medication unless otherwise directed by prescriber.  Medication should be taken with plenty of water.    -- Indication: For UTI (urinary tract infection)    hydrALAZINE 10 mg oral tablet  -- 2 tab(s) by mouth 2 times a day  -- Indication: For Hypertension I will START or STAY ON the medications listed below when I get home from the hospital:    aspirin 81 mg oral delayed release tablet  -- 1 tab(s) by mouth once a day  -- Indication: For Hypertension    tamsulosin 0.4 mg oral capsule  -- 1 cap(s) by mouth once a day (at bedtime)  -- Indication: For Prostate hypertrophy    isosorbide dinitrate 10 mg oral tablet  -- 1 tab(s) by mouth 2 times a day  -- Indication: For Congestive heart failure    digoxin 125 mcg (0.125 mg) oral tablet  -- 1 tab(s) by mouth Monday, Wednesday, and Friday  -- Indication: For Congestive heart failure    Coumadin 5 mg oral tablet  -- 1 tab(s) by mouth once a day    -- Indication: For Paroxysmal atrial fibrillation    insulin lispro 100 units/mL subcutaneous solution  -- 8 unit(s) subcutaneous 3 times a day (before meals)  -- Indication: For Type 2 diabetes mellitus    Lantus 100 units/mL subcutaneous solution  -- 27 unit(s) subcutaneous once a day (at bedtime)  -- Indication: For Type 2 diabetes mellitus    atorvastatin 80 mg oral tablet  -- 1 tab(s) by mouth once a day (at bedtime)  -- Indication: For Congestive heart failure    metoprolol succinate 100 mg oral tablet, extended release  -- 1 tab(s) by mouth once a day  -- Indication: For Paroxysmal atrial fibrillation    donepezil 10 mg oral tablet  -- 1 tab(s) by mouth once a day (at bedtime)  -- Indication: For Dementia    torsemide 20 mg oral tablet  -- 3 tab(s) by mouth once a day  -- Indication: For Congestive heart failure    Namenda 10 mg oral tablet  -- 1 tab(s) by mouth 2 times a day  -- Indication: For Dementia    Cipro 500 mg oral tablet  -- 1 tab(s) by mouth 2 times a day  -- Avoid prolonged or excessive exposure to direct and/or artificial sunlight while taking this medication.  Check with your doctor before becoming pregnant.  Do not take dairy products, antacids, or iron preparations within one hour of this medication.  Finish all this medication unless otherwise directed by prescriber.  Medication should be taken with plenty of water.    -- Indication: For UTI (urinary tract infection)    hydrALAZINE 10 mg oral tablet  -- 2 tab(s) by mouth 2 times a day  -- Indication: For Hypertension I will START or STAY ON the medications listed below when I get home from the hospital:    aspirin 81 mg oral delayed release tablet  -- 1 tab(s) by mouth once a day  -- Indication: For Hypertension    tamsulosin 0.4 mg oral capsule  -- 1 cap(s) by mouth once a day (at bedtime)  -- Indication: For bph    isosorbide dinitrate 10 mg oral tablet  -- 1 tab(s) by mouth 2 times a day  -- Indication: For Congestive heart failure    digoxin 125 mcg (0.125 mg) oral tablet  -- 1 tab(s) by mouth Monday, Wednesday, and Friday  -- Indication: For Congestive heart failure    Coumadin 5 mg oral tablet  -- 1 tab(s) by mouth once a day    -- Indication: For Paroxysmal atrial fibrillation    Lantus 100 units/mL subcutaneous solution  -- 27 unit(s) subcutaneous once a day (at bedtime)  -- Indication: For Type 2 diabetes mellitus    insulin lispro 100 units/mL subcutaneous solution  -- 8 unit(s) subcutaneous 3 times a day (before meals) for blood sugar (), 10 units (201-300) 12 units >301     -- Indication: For Type 2dm     atorvastatin 80 mg oral tablet  -- 1 tab(s) by mouth once a day (at bedtime)  -- Indication: For Congestive heart failure    metoprolol succinate 100 mg oral tablet, extended release  -- 1 tab(s) by mouth once a day  -- Indication: For Paroxysmal atrial fibrillation    donepezil 10 mg oral tablet  -- 1 tab(s) by mouth once a day (at bedtime)  -- Indication: For Dementia    torsemide 20 mg oral tablet  -- 3 tab(s) by mouth once a day  -- Indication: For Congestive heart failure    Namenda 10 mg oral tablet  -- 1 tab(s) by mouth 2 times a day  -- Indication: For Dementia    hydrALAZINE 10 mg oral tablet  -- 2 tab(s) by mouth 2 times a day  -- Indication: For Hypertension

## 2017-05-16 NOTE — DISCHARGE NOTE ADULT - CARE PLAN
Principal Discharge DX:	Chest discomfort  Goal:	no angina  Instructions for follow-up, activity and diet:	Continue medication and follow up with cardiologist Dr Prasad Lagos  Return to emergency for chest pain , difficulty breathing dizziness  Secondary Diagnosis:	Acute cystitis without hematuria  Goal:	no further infection  Instructions for follow-up, activity and diet:	Cipro as ordered  Follow up With Urologist [facility] [phone]  Secondary Diagnosis:	Cardiomyopathy, ischemic  Goal:	no fluid over load  Instructions for follow-up, activity and diet:	Take weight daily if increase by 3 pounds then call Dr Lagos and Heart Failure team within 3 days of discharge Dr Varela Noah [phone]  Continue meds as ordered  Low salt, low cholesterol, diabetic diet  Secondary Diagnosis:	CKD (chronic kidney disease) stage 4, GFR 15-29 ml/min  Goal:	no worsening of renal functions  Instructions for follow-up, activity and diet:	[facility] for follow up appointment  Creat was 3.0  anemia to be followed by renal, Dr Juliana Gan [phone], Keep Appointment  Secondary Diagnosis:	Type 2 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 disease, with long-term current use of insulin  Goal:	HA1C<7  Instructions for follow-up, activity and diet:	HA1C is pending, have Dr Kruse, endocrinologist follow up  Lantus and Humalog as ordered  Secondary Diagnosis:	Chronic atrial fibrillation  Goal:	rate control  Instructions for follow-up, activity and diet:	To continue meds and follow up with cardiologist  INR was 2.21  Coumadin 5 mg at night  Call cardiology and primary care to have INR followed Principal Discharge DX:	Chest discomfort  Goal:	no angina  Instructions for follow-up, activity and diet:	Continue medication and follow up with cardiologist Dr Prasad Lagos  Return to emergency for chest pain , difficulty breathing dizziness  Secondary Diagnosis:	Acute cystitis without hematuria  Goal:	no further infection  Instructions for follow-up, activity and diet:	Cipro as ordered  Follow up With Urologist [facility] [phone] on June 9th, 2017 at 8:50 am  Secondary Diagnosis:	Cardiomyopathy, ischemic  Goal:	no fluid over load  Instructions for follow-up, activity and diet:	Take weight daily if increase by 3 pounds then call Dr Lagos and Heart Failure team within 3 days of discharge Noah Rivera [phone]  Continue meds as ordered  Low salt, low cholesterol, diabetic diet  Secondary Diagnosis:	CKD (chronic kidney disease) stage 4, GFR 15-29 ml/min  Goal:	no worsening of renal functions  Instructions for follow-up, activity and diet:	[facility] for follow up appointment  Creat was 3.0  anemia to be followed by renal, Dr Juliana Gan [phone], Keep Appointment  Secondary Diagnosis:	Type 2 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 disease, with long-term current use of insulin  Goal:	HA1C<7  Instructions for follow-up, activity and diet:	HA1C is pending, have Dr Kruse, endocrinologist follow up  Lantus and Humalog as ordered  Secondary Diagnosis:	Chronic atrial fibrillation  Goal:	rate control  Instructions for follow-up, activity and diet:	To continue meds and follow up with cardiologist  INR was 2.21  Coumadin 5 mg at night  Call cardiology and primary care to have INR followed Principal Discharge DX:	Chest discomfort  Goal:	no angina  Instructions for follow-up, activity and diet:	Continue medication and follow up with cardiologist Dr Prasad Lagos  Return to emergency for chest pain , difficulty breathing dizziness  Secondary Diagnosis:	Acute cystitis without hematuria  Goal:	no further infection  Instructions for follow-up, activity and diet:	Cipro as ordered  Follow up With Urologist [facility] [phone] on June 9th, 2017 at 8:50 am  Secondary Diagnosis:	Cardiomyopathy, ischemic  Goal:	no fluid over load  Instructions for follow-up, activity and diet:	Take weight daily if increase by 3 pounds then call Dr Lagos and Heart Failure team within 3 days of discharge Noah Rivera [phone]  Continue meds as ordered  Low salt, low cholesterol, diabetic diet  Secondary Diagnosis:	CKD (chronic kidney disease) stage 4, GFR 15-29 ml/min  Goal:	no worsening of renal functions  Instructions for follow-up, activity and diet:	[facility] for follow up appointment  Creat was 3.0  anemia to be followed by renal, Dr Juliana Gan [phone], Keep Appointment  Secondary Diagnosis:	Type 2 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 disease, with long-term current use of insulin  Goal:	HA1C<7  Instructions for follow-up, activity and diet:	HA1C is pending, have Dr Kruse, endocrinologist follow up  Lantus and Humalog as ordered  Secondary Diagnosis:	Chronic atrial fibrillation  Goal:	rate control  Instructions for follow-up, activity and diet:	To continue meds and follow up with cardiologist  INR was 2.21  Coumadin 5 mg at night  Call cardiology and primary care to have INR followed Principal Discharge DX:	Chest discomfort  Goal:	no angina  Instructions for follow-up, activity and diet:	Continue medication and follow up with cardiologist Dr Prasad Lagos  Return to emergency for chest pain , difficulty breathing dizziness  Secondary Diagnosis:	Acute cystitis without hematuria  Goal:	no further infection  Instructions for follow-up, activity and diet:	Cipro as ordered  Follow up With Urologist [facility] [phone] on June 9th, 2017 at 8:50 am  Secondary Diagnosis:	Cardiomyopathy, ischemic  Goal:	no fluid over load  Instructions for follow-up, activity and diet:	Take weight daily if increase by 3 pounds then call Dr Lagos and Heart Failure team within 3 days of discharge Noah Rivera [phone]  Continue meds as ordered  Low salt, low cholesterol, diabetic diet  Secondary Diagnosis:	CKD (chronic kidney disease) stage 4, GFR 15-29 ml/min  Goal:	no worsening of renal functions  Instructions for follow-up, activity and diet:	[facility] for follow up appointment  Creat was 3.0  anemia to be followed by renal, Dr Juliana Gan [phone], Keep Appointment  Secondary Diagnosis:	Type 2 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 disease, with long-term current use of insulin  Goal:	HA1C<7  Instructions for follow-up, activity and diet:	HA1C is pending, have Dr Kruse, endocrinologist follow up  Lantus and Humalog as ordered  Secondary Diagnosis:	Chronic atrial fibrillation  Goal:	rate control  Instructions for follow-up, activity and diet:	To continue meds and follow up with cardiologist  INR was 2.21  Coumadin 5 mg at night  Call cardiology and primary care to have INR followed Principal Discharge DX:	Chest discomfort  Goal:	no angina  Instructions for follow-up, activity and diet:	Continue medication and follow up with cardiologist Dr Prasad Lagos  Return to emergency for chest pain , difficulty breathing dizziness  Secondary Diagnosis:	Acute cystitis without hematuria  Goal:	no further infection  Instructions for follow-up, activity and diet:	Cipro as ordered  Follow up With Urologist [facility] [phone] on June 9th, 2017 at 8:50 am  Secondary Diagnosis:	Cardiomyopathy, ischemic  Goal:	no fluid over load  Instructions for follow-up, activity and diet:	Take weight daily if increase by 3 pounds then call Dr Lagos and Heart Failure team within 3 days of discharge Noah Rivera [phone]  Continue meds as ordered  Low salt, low cholesterol, diabetic diet  Secondary Diagnosis:	CKD (chronic kidney disease) stage 4, GFR 15-29 ml/min  Goal:	no worsening of renal functions  Instructions for follow-up, activity and diet:	[facility] for follow up appointment  Creat was 3.0  anemia to be followed by renal, Dr Juliana Gan [phone], Keep Appointment  Secondary Diagnosis:	Type 2 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 disease, with long-term current use of insulin  Goal:	HA1C<7  Instructions for follow-up, activity and diet:	HA1C is pending, have Dr Kruse, endocrinologist follow up  Lantus and Humalog as ordered  Secondary Diagnosis:	Chronic atrial fibrillation  Goal:	rate control  Instructions for follow-up, activity and diet:	To continue meds and follow up with cardiologist  INR was 2.21  Coumadin 5 mg at night  Call cardiology and primary care to have INR followed

## 2017-05-18 ENCOUNTER — LABORATORY RESULT (OUTPATIENT)
Age: 82
End: 2017-05-18

## 2017-05-19 LAB
CULTURE RESULTS: SIGNIFICANT CHANGE UP
CULTURE RESULTS: SIGNIFICANT CHANGE UP
SPECIMEN SOURCE: SIGNIFICANT CHANGE UP
SPECIMEN SOURCE: SIGNIFICANT CHANGE UP

## 2017-05-23 ENCOUNTER — NON-APPOINTMENT (OUTPATIENT)
Age: 82
End: 2017-05-23

## 2017-05-23 ENCOUNTER — APPOINTMENT (OUTPATIENT)
Dept: GERIATRICS | Facility: CLINIC | Age: 82
End: 2017-05-23

## 2017-05-23 VITALS
RESPIRATION RATE: 15 BRPM | BODY MASS INDEX: 24.25 KG/M2 | SYSTOLIC BLOOD PRESSURE: 130 MMHG | OXYGEN SATURATION: 98 % | TEMPERATURE: 97.5 F | WEIGHT: 160 LBS | DIASTOLIC BLOOD PRESSURE: 60 MMHG | HEART RATE: 66 BPM | HEIGHT: 68 IN

## 2017-05-23 RX ORDER — FLUTICASONE PROPIONATE 50 UG/1
50 SPRAY, METERED NASAL TWICE DAILY
Qty: 1 | Refills: 0 | Status: COMPLETED | COMMUNITY
Start: 2017-03-21 | End: 2017-05-23

## 2017-05-25 ENCOUNTER — LABORATORY RESULT (OUTPATIENT)
Age: 82
End: 2017-05-25

## 2017-05-25 ENCOUNTER — APPOINTMENT (OUTPATIENT)
Dept: ENDOCRINOLOGY | Facility: CLINIC | Age: 82
End: 2017-05-25

## 2017-05-25 VITALS
WEIGHT: 156 LBS | BODY MASS INDEX: 23.64 KG/M2 | HEART RATE: 68 BPM | HEIGHT: 68 IN | DIASTOLIC BLOOD PRESSURE: 70 MMHG | SYSTOLIC BLOOD PRESSURE: 126 MMHG | OXYGEN SATURATION: 97 %

## 2017-05-31 ENCOUNTER — APPOINTMENT (OUTPATIENT)
Dept: CARDIOLOGY | Facility: CLINIC | Age: 82
End: 2017-05-31

## 2017-05-31 ENCOUNTER — APPOINTMENT (OUTPATIENT)
Dept: ELECTROPHYSIOLOGY | Facility: CLINIC | Age: 82
End: 2017-05-31

## 2017-05-31 ENCOUNTER — LABORATORY RESULT (OUTPATIENT)
Age: 82
End: 2017-05-31

## 2017-05-31 DIAGNOSIS — Z87.440 PERSONAL HISTORY OF URINARY (TRACT) INFECTIONS: ICD-10-CM

## 2017-06-02 ENCOUNTER — OTHER (OUTPATIENT)
Age: 82
End: 2017-06-02

## 2017-06-02 LAB
APPEARANCE: ABNORMAL
BILIRUBIN URINE: NEGATIVE
BLOOD URINE: ABNORMAL
COLOR: YELLOW
GLUCOSE QUALITATIVE U: 250 MG/DL
KETONES URINE: NEGATIVE
LEUKOCYTE ESTERASE URINE: ABNORMAL
NITRITE URINE: NEGATIVE
PH URINE: 6
PROTEIN URINE: 300 MG/DL
SPECIFIC GRAVITY URINE: 1.01
UROBILINOGEN URINE: NORMAL MG/DL

## 2017-06-09 ENCOUNTER — APPOINTMENT (OUTPATIENT)
Dept: UROLOGY | Facility: CLINIC | Age: 82
End: 2017-06-09

## 2017-06-09 DIAGNOSIS — N32.0 BLADDER-NECK OBSTRUCTION: ICD-10-CM

## 2017-06-12 ENCOUNTER — APPOINTMENT (OUTPATIENT)
Dept: NEPHROLOGY | Facility: CLINIC | Age: 82
End: 2017-06-12

## 2017-06-12 ENCOUNTER — APPOINTMENT (OUTPATIENT)
Dept: ELECTROPHYSIOLOGY | Facility: CLINIC | Age: 82
End: 2017-06-12

## 2017-06-12 ENCOUNTER — APPOINTMENT (OUTPATIENT)
Dept: CARDIOLOGY | Facility: CLINIC | Age: 82
End: 2017-06-12

## 2017-06-12 VITALS
OXYGEN SATURATION: 95 % | HEART RATE: 95 BPM | WEIGHT: 169 LBS | HEIGHT: 68 IN | DIASTOLIC BLOOD PRESSURE: 71 MMHG | BODY MASS INDEX: 25.61 KG/M2 | SYSTOLIC BLOOD PRESSURE: 115 MMHG

## 2017-06-12 VITALS
WEIGHT: 169 LBS | DIASTOLIC BLOOD PRESSURE: 66 MMHG | HEART RATE: 66 BPM | OXYGEN SATURATION: 97 % | SYSTOLIC BLOOD PRESSURE: 113 MMHG | BODY MASS INDEX: 25.7 KG/M2

## 2017-06-13 LAB
ALBUMIN SERPL ELPH-MCNC: 3.8 G/DL
ANION GAP SERPL CALC-SCNC: 19 MMOL/L
BASOPHILS # BLD AUTO: 0.02 K/UL
BASOPHILS NFR BLD AUTO: 0.3 %
BUN SERPL-MCNC: 96 MG/DL
CALCIUM SERPL-MCNC: 8.6 MG/DL
CALCIUM SERPL-MCNC: 8.6 MG/DL
CHLORIDE SERPL-SCNC: 101 MMOL/L
CO2 SERPL-SCNC: 19 MMOL/L
CREAT SERPL-MCNC: 3.57 MG/DL
EOSINOPHIL # BLD AUTO: 0.26 K/UL
EOSINOPHIL NFR BLD AUTO: 3.4 %
GLUCOSE SERPL-MCNC: 191 MG/DL
HCT VFR BLD CALC: 30.7 %
HGB BLD-MCNC: 9.1 G/DL
IMM GRANULOCYTES NFR BLD AUTO: 0.4 %
LYMPHOCYTES # BLD AUTO: 1.46 K/UL
LYMPHOCYTES NFR BLD AUTO: 19.1 %
MAN DIFF?: NORMAL
MCHC RBC-ENTMCNC: 23.9 PG
MCHC RBC-ENTMCNC: 29.6 GM/DL
MCV RBC AUTO: 80.8 FL
MONOCYTES # BLD AUTO: 0.68 K/UL
MONOCYTES NFR BLD AUTO: 8.9 %
NEUTROPHILS # BLD AUTO: 5.19 K/UL
NEUTROPHILS NFR BLD AUTO: 67.9 %
PARATHYROID HORMONE INTACT: 262 PG/ML
PHOSPHATE SERPL-MCNC: 5.3 MG/DL
PLATELET # BLD AUTO: 182 K/UL
POTASSIUM SERPL-SCNC: 4.6 MMOL/L
RBC # BLD: 3.8 M/UL
RBC # FLD: 17.3 %
SODIUM SERPL-SCNC: 139 MMOL/L
WBC # FLD AUTO: 7.64 K/UL

## 2017-06-14 LAB
APPEARANCE: ABNORMAL
BACTERIA UR CULT: NORMAL
BACTERIA: NEGATIVE
BILIRUBIN URINE: NEGATIVE
BLOOD URINE: ABNORMAL
COLOR: YELLOW
GLUCOSE QUALITATIVE U: 100 MG/DL
HYALINE CASTS: 3 /LPF
KETONES URINE: NEGATIVE
LEUKOCYTE ESTERASE URINE: ABNORMAL
MICROSCOPIC-UA: NORMAL
NITRITE URINE: NEGATIVE
PH URINE: 6
PROTEIN URINE: 100 MG/DL
RED BLOOD CELLS URINE: 25 /HPF
SPECIFIC GRAVITY URINE: 1.01
SQUAMOUS EPITHELIAL CELLS: 1 /HPF
UROBILINOGEN URINE: NORMAL MG/DL
WHITE BLOOD CELLS URINE: >720 /HPF

## 2017-06-15 ENCOUNTER — MEDICATION RENEWAL (OUTPATIENT)
Age: 82
End: 2017-06-15

## 2017-06-19 ENCOUNTER — MEDICATION RENEWAL (OUTPATIENT)
Age: 82
End: 2017-06-19

## 2017-06-19 ENCOUNTER — APPOINTMENT (OUTPATIENT)
Dept: UROLOGY | Facility: CLINIC | Age: 82
End: 2017-06-19

## 2017-06-19 ENCOUNTER — OUTPATIENT (OUTPATIENT)
Dept: OUTPATIENT SERVICES | Facility: HOSPITAL | Age: 82
LOS: 1 days | End: 2017-06-19
Payer: MEDICARE

## 2017-06-19 VITALS
RESPIRATION RATE: 16 BRPM | SYSTOLIC BLOOD PRESSURE: 144 MMHG | DIASTOLIC BLOOD PRESSURE: 70 MMHG | TEMPERATURE: 97.6 F | HEART RATE: 74 BPM

## 2017-06-19 DIAGNOSIS — Z98.89 OTHER SPECIFIED POSTPROCEDURAL STATES: Chronic | ICD-10-CM

## 2017-06-19 DIAGNOSIS — Z95.810 PRESENCE OF AUTOMATIC (IMPLANTABLE) CARDIAC DEFIBRILLATOR: Chronic | ICD-10-CM

## 2017-06-19 DIAGNOSIS — R35.0 FREQUENCY OF MICTURITION: ICD-10-CM

## 2017-06-19 DIAGNOSIS — Z95.4 PRESENCE OF OTHER HEART-VALVE REPLACEMENT: Chronic | ICD-10-CM

## 2017-06-19 DIAGNOSIS — Z95.1 PRESENCE OF AORTOCORONARY BYPASS GRAFT: Chronic | ICD-10-CM

## 2017-06-19 DIAGNOSIS — Z98.49 CATARACT EXTRACTION STATUS, UNSPECIFIED EYE: Chronic | ICD-10-CM

## 2017-06-19 PROCEDURE — 51797 INTRAABDOMINAL PRESSURE TEST: CPT

## 2017-06-19 PROCEDURE — 51741 ELECTRO-UROFLOWMETRY FIRST: CPT

## 2017-06-19 PROCEDURE — 51784 ANAL/URINARY MUSCLE STUDY: CPT

## 2017-06-19 PROCEDURE — 51728 CYSTOMETROGRAM W/VP: CPT

## 2017-06-20 DIAGNOSIS — N32.0 BLADDER-NECK OBSTRUCTION: ICD-10-CM

## 2017-06-21 LAB
BILIRUB UR QL STRIP: NORMAL
CLARITY UR: CLEAR
COLLECTION METHOD: NORMAL
GLUCOSE UR-MCNC: NORMAL
HCG UR QL: 0.2 EU/DL
HGB UR QL STRIP.AUTO: NORMAL
KETONES UR-MCNC: NORMAL
LEUKOCYTE ESTERASE UR QL STRIP: NORMAL
NITRITE UR QL STRIP: NORMAL
PH UR STRIP: 5.5
PROT UR STRIP-MCNC: 100
SP GR UR STRIP: 1.01

## 2017-06-27 ENCOUNTER — LABORATORY RESULT (OUTPATIENT)
Age: 82
End: 2017-06-27

## 2017-07-06 ENCOUNTER — OTHER (OUTPATIENT)
Age: 82
End: 2017-07-06

## 2017-07-11 ENCOUNTER — LABORATORY RESULT (OUTPATIENT)
Age: 82
End: 2017-07-11

## 2017-07-11 ENCOUNTER — APPOINTMENT (OUTPATIENT)
Dept: CARDIOLOGY | Facility: CLINIC | Age: 82
End: 2017-07-11

## 2017-07-11 ENCOUNTER — OTHER (OUTPATIENT)
Age: 82
End: 2017-07-11

## 2017-07-11 VITALS
WEIGHT: 167 LBS | BODY MASS INDEX: 25.31 KG/M2 | HEART RATE: 81 BPM | DIASTOLIC BLOOD PRESSURE: 79 MMHG | SYSTOLIC BLOOD PRESSURE: 159 MMHG | RESPIRATION RATE: 16 BRPM | OXYGEN SATURATION: 97 % | HEIGHT: 68 IN

## 2017-07-11 DIAGNOSIS — Z87.891 PERSONAL HISTORY OF NICOTINE DEPENDENCE: ICD-10-CM

## 2017-07-11 DIAGNOSIS — T82.7XXA INFECTION AND INFLAMMATORY REACTION DUE TO OTHER CARDIAC AND VASCULAR DEVICES, IMPLANTS AND GRAFTS, INITIAL ENCOUNTER: ICD-10-CM

## 2017-07-11 DIAGNOSIS — J06.9 ACUTE UPPER RESPIRATORY INFECTION, UNSPECIFIED: ICD-10-CM

## 2017-07-11 RX ORDER — SULFAMETHOXAZOLE AND TRIMETHOPRIM 400; 80 MG/1; MG/1
400-80 TABLET ORAL TWICE DAILY
Qty: 14 | Refills: 0 | Status: DISCONTINUED | COMMUNITY
Start: 2017-06-01 | End: 2017-07-11

## 2017-07-11 RX ORDER — NEOMYCIN SULFATE, POLYMYXIN B SULFATE, HYDROCORTISONE 3.5; 10000; 1 MG/ML; [USP'U]/ML; MG/ML
1 SOLUTION/ DROPS AURICULAR (OTIC)
Qty: 1 | Refills: 0 | Status: DISCONTINUED | COMMUNITY
Start: 2017-04-27 | End: 2017-07-11

## 2017-07-18 ENCOUNTER — APPOINTMENT (OUTPATIENT)
Dept: ENDOCRINOLOGY | Facility: CLINIC | Age: 82
End: 2017-07-18

## 2017-07-18 ENCOUNTER — APPOINTMENT (OUTPATIENT)
Dept: GERIATRICS | Facility: CLINIC | Age: 82
End: 2017-07-18

## 2017-07-18 VITALS
WEIGHT: 169 LBS | OXYGEN SATURATION: 98 % | SYSTOLIC BLOOD PRESSURE: 120 MMHG | TEMPERATURE: 97.8 F | RESPIRATION RATE: 15 BRPM | HEART RATE: 72 BPM | DIASTOLIC BLOOD PRESSURE: 70 MMHG | HEIGHT: 68 IN | BODY MASS INDEX: 25.61 KG/M2

## 2017-07-18 DIAGNOSIS — N18.4 TYPE 2 DIABETES MELLITUS WITH DIABETIC CHRONIC KIDNEY DISEASE: ICD-10-CM

## 2017-07-18 DIAGNOSIS — E11.22 TYPE 2 DIABETES MELLITUS WITH DIABETIC CHRONIC KIDNEY DISEASE: ICD-10-CM

## 2017-07-18 DIAGNOSIS — E11.65 TYPE 2 DIABETES MELLITUS WITH DIABETIC CHRONIC KIDNEY DISEASE: ICD-10-CM

## 2017-07-18 DIAGNOSIS — Z79.4 TYPE 2 DIABETES MELLITUS WITH DIABETIC CHRONIC KIDNEY DISEASE: ICD-10-CM

## 2017-07-18 LAB — HBA1C MFR BLD HPLC: 8.3

## 2017-07-19 NOTE — DISCHARGE NOTE ADULT - SIZE THE SAME. CHANGES IN THE AMOUNT YOU EAT CAN AFFECT YOUR PT/INR BLOOD TEST. CONTACT YOUR DOCTOR BEFORE MAKING ANY MAJOR CHANGES IN YOUR DIET. LIMIT YOUR ALCOHOL INTAKE.
7/19/2017  Lita Curtis    DIABETES HOME INSTRUCTIONS    Meal Planning: Eat regularly during the day, every 4-5 hours, first thing in the morning and a bedtime snack. Do not skip meals. Eat carbohydrates and protein at each meal.    Physical Activity: Get at least 30 minutes of exercise a day.     Diabetes Medication: Continue Lantus and Metformin as directed    Blood Sugar Monitoring:  Check 1 time a day, alternating times before meals and two hours after meals. Check if you are not feeling well. Record all blood sugar results and bring to follow-up appointments.    Your Blood Sugar Target is:   before meals; Less than 180 two hours after the start of a meal.    Call Your Doctor Or The Educator If Blood Sugar Is:  Higher than 300 mg/dL or lower than 70 mg/dL twice in a week.    We Suggest Making An Appointment With Your  Doctor's Office (at least every 3-6 months, Eye Doctor (at least yearly), Dentist (at least every 6 months), Foot Doctor (as needed)    Thank you.  Please call me with any questions or concerns.   Che Couch RN, CDE  Diabetes Nurse Educator   
Statement Selected

## 2017-07-25 ENCOUNTER — MEDICATION RENEWAL (OUTPATIENT)
Age: 82
End: 2017-07-25

## 2017-07-27 ENCOUNTER — RX RENEWAL (OUTPATIENT)
Age: 82
End: 2017-07-27

## 2017-07-27 ENCOUNTER — LABORATORY RESULT (OUTPATIENT)
Age: 82
End: 2017-07-27

## 2017-07-27 LAB
ALBUMIN SERPL ELPH-MCNC: 3.8 G/DL
ANION GAP SERPL CALC-SCNC: 17 MMOL/L
BUN SERPL-MCNC: 77 MG/DL
CALCIUM SERPL-MCNC: 9.1 MG/DL
CHLORIDE SERPL-SCNC: 104 MMOL/L
CO2 SERPL-SCNC: 21 MMOL/L
CREAT SERPL-MCNC: 2.87 MG/DL
FERRITIN SERPL-MCNC: 21 NG/ML
FOLATE SERPL-MCNC: 11.9 NG/ML
GLUCOSE SERPL-MCNC: 90 MG/DL
IRON SATN MFR SERPL: 21 %
IRON SERPL-MCNC: 64 UG/DL
PHOSPHATE SERPL-MCNC: 4.5 MG/DL
POTASSIUM SERPL-SCNC: 3.9 MMOL/L
SODIUM SERPL-SCNC: 142 MMOL/L
TIBC SERPL-MCNC: 306 UG/DL
UIBC SERPL-MCNC: 242 UG/DL
VIT B12 SERPL-MCNC: 798 PG/ML

## 2017-07-28 LAB
BASOPHILS # BLD AUTO: 0.02 K/UL
BASOPHILS NFR BLD AUTO: 0.3 %
EOSINOPHIL # BLD AUTO: 0.23 K/UL
EOSINOPHIL NFR BLD AUTO: 3.5 %
HCT VFR BLD CALC: 33.8 %
HGB BLD-MCNC: 9.6 G/DL
IMM GRANULOCYTES NFR BLD AUTO: 0.2 %
LYMPHOCYTES # BLD AUTO: 1.52 K/UL
LYMPHOCYTES NFR BLD AUTO: 22.9 %
MAN DIFF?: NORMAL
MCHC RBC-ENTMCNC: 22.8 PG
MCHC RBC-ENTMCNC: 28.4 GM/DL
MCV RBC AUTO: 80.3 FL
MONOCYTES # BLD AUTO: 0.77 K/UL
MONOCYTES NFR BLD AUTO: 11.6 %
NEUTROPHILS # BLD AUTO: 4.08 K/UL
NEUTROPHILS NFR BLD AUTO: 61.5 %
PLATELET # BLD AUTO: 214 K/UL
RBC # BLD: 4.21 M/UL
RBC # FLD: 18.2 %
WBC # FLD AUTO: 6.63 K/UL

## 2017-08-03 ENCOUNTER — OTHER (OUTPATIENT)
Age: 82
End: 2017-08-03

## 2017-08-07 ENCOUNTER — LABORATORY RESULT (OUTPATIENT)
Age: 82
End: 2017-08-07

## 2017-08-07 ENCOUNTER — APPOINTMENT (OUTPATIENT)
Dept: NEPHROLOGY | Facility: CLINIC | Age: 82
End: 2017-08-07
Payer: MEDICARE

## 2017-08-07 VITALS
OXYGEN SATURATION: 95 % | HEART RATE: 83 BPM | WEIGHT: 162.8 LBS | SYSTOLIC BLOOD PRESSURE: 101 MMHG | HEIGHT: 68 IN | DIASTOLIC BLOOD PRESSURE: 63 MMHG | BODY MASS INDEX: 24.67 KG/M2

## 2017-08-07 PROCEDURE — 99214 OFFICE O/P EST MOD 30 MIN: CPT | Mod: 25

## 2017-08-07 PROCEDURE — 96372 THER/PROPH/DIAG INJ SC/IM: CPT

## 2017-08-07 RX ORDER — DARBEPOETIN ALFA 25 UG/ML
25 SOLUTION INTRAVENOUS; SUBCUTANEOUS
Refills: 0 | Status: COMPLETED | OUTPATIENT
Start: 2017-08-07

## 2017-08-07 RX ADMIN — DARBEPOETIN ALFA 0 MCG/ML: 25 SOLUTION INTRAVENOUS; SUBCUTANEOUS at 00:00

## 2017-08-14 ENCOUNTER — APPOINTMENT (OUTPATIENT)
Dept: CARDIOLOGY | Facility: CLINIC | Age: 82
End: 2017-08-14
Payer: MEDICARE

## 2017-08-14 VITALS
SYSTOLIC BLOOD PRESSURE: 116 MMHG | BODY MASS INDEX: 24.86 KG/M2 | HEIGHT: 68 IN | HEART RATE: 76 BPM | WEIGHT: 164 LBS | OXYGEN SATURATION: 93 % | RESPIRATION RATE: 16 BRPM | DIASTOLIC BLOOD PRESSURE: 67 MMHG

## 2017-08-14 PROCEDURE — 99215 OFFICE O/P EST HI 40 MIN: CPT

## 2017-08-22 ENCOUNTER — APPOINTMENT (OUTPATIENT)
Dept: GERIATRICS | Facility: CLINIC | Age: 82
End: 2017-08-22
Payer: MEDICARE

## 2017-08-22 VITALS
DIASTOLIC BLOOD PRESSURE: 70 MMHG | RESPIRATION RATE: 15 BRPM | HEIGHT: 68 IN | BODY MASS INDEX: 24.72 KG/M2 | SYSTOLIC BLOOD PRESSURE: 120 MMHG | OXYGEN SATURATION: 98 % | TEMPERATURE: 98.7 F | WEIGHT: 163.13 LBS | HEART RATE: 79 BPM

## 2017-08-22 PROCEDURE — 99214 OFFICE O/P EST MOD 30 MIN: CPT | Mod: GC

## 2017-08-24 LAB
INR PPP: 2.73 RATIO
PT BLD: 31.5 SEC

## 2017-08-28 ENCOUNTER — LABORATORY RESULT (OUTPATIENT)
Age: 82
End: 2017-08-28

## 2017-08-31 LAB
ALBUMIN SERPL ELPH-MCNC: 3.9 G/DL
ANION GAP SERPL CALC-SCNC: 15 MMOL/L
BASOPHILS # BLD AUTO: 0.13 K/UL
BASOPHILS NFR BLD AUTO: 1.8 %
BUN SERPL-MCNC: 63 MG/DL
CALCIUM SERPL-MCNC: 8.8 MG/DL
CHLORIDE SERPL-SCNC: 104 MMOL/L
CO2 SERPL-SCNC: 23 MMOL/L
CREAT SERPL-MCNC: 2.59 MG/DL
EOSINOPHIL # BLD AUTO: 0.13 K/UL
EOSINOPHIL NFR BLD AUTO: 1.8 %
GLUCOSE SERPL-MCNC: 59 MG/DL
HCT VFR BLD CALC: 39.5 %
HGB BLD-MCNC: 11.3 G/DL
LYMPHOCYTES # BLD AUTO: 0.89 K/UL
LYMPHOCYTES NFR BLD AUTO: 12.3 %
MAN DIFF?: NORMAL
MCHC RBC-ENTMCNC: 24.7 PG
MCHC RBC-ENTMCNC: 28.6 GM/DL
MCV RBC AUTO: 86.4 FL
MONOCYTES # BLD AUTO: 1.02 K/UL
MONOCYTES NFR BLD AUTO: 14 %
NEUTROPHILS # BLD AUTO: 5.09 K/UL
NEUTROPHILS NFR BLD AUTO: 70.1 %
PHOSPHATE SERPL-MCNC: 3.7 MG/DL
PLATELET # BLD AUTO: 147 K/UL
POTASSIUM SERPL-SCNC: 4.5 MMOL/L
RBC # BLD: 4.57 M/UL
RBC # FLD: 21.5 %
SODIUM SERPL-SCNC: 142 MMOL/L
WBC # FLD AUTO: 7.26 K/UL

## 2017-09-15 ENCOUNTER — APPOINTMENT (OUTPATIENT)
Dept: NEUROLOGY | Facility: CLINIC | Age: 82
End: 2017-09-15
Payer: MEDICARE

## 2017-09-15 VITALS
HEIGHT: 68 IN | HEART RATE: 90 BPM | SYSTOLIC BLOOD PRESSURE: 132 MMHG | DIASTOLIC BLOOD PRESSURE: 76 MMHG | WEIGHT: 163 LBS | BODY MASS INDEX: 24.71 KG/M2

## 2017-09-15 PROCEDURE — 99204 OFFICE O/P NEW MOD 45 MIN: CPT

## 2017-09-15 RX ORDER — CEFUROXIME AXETIL 500 MG/1
500 TABLET ORAL
Qty: 20 | Refills: 0 | Status: DISCONTINUED | COMMUNITY
Start: 2017-04-16

## 2017-09-15 RX ORDER — MEMANTINE HYDROCHLORIDE 10 MG/1
10 TABLET, FILM COATED ORAL
Qty: 180 | Refills: 0 | Status: DISCONTINUED | COMMUNITY
Start: 2017-04-08

## 2017-09-15 RX ORDER — CIPROFLOXACIN HYDROCHLORIDE 250 MG/1
250 TABLET, FILM COATED ORAL
Qty: 4 | Refills: 0 | Status: DISCONTINUED | COMMUNITY
Start: 2017-04-23

## 2017-09-15 RX ORDER — TRAMADOL HYDROCHLORIDE 50 MG/1
50 TABLET, COATED ORAL
Qty: 15 | Refills: 0 | Status: DISCONTINUED | COMMUNITY
Start: 2017-04-23

## 2017-09-15 RX ORDER — CIPROFLOXACIN HYDROCHLORIDE 500 MG/1
500 TABLET, FILM COATED ORAL
Qty: 24 | Refills: 0 | Status: DISCONTINUED | COMMUNITY
Start: 2017-05-16

## 2017-09-18 ENCOUNTER — APPOINTMENT (OUTPATIENT)
Dept: ENDOCRINOLOGY | Facility: CLINIC | Age: 82
End: 2017-09-18
Payer: MEDICARE

## 2017-09-18 VITALS — SYSTOLIC BLOOD PRESSURE: 128 MMHG | OXYGEN SATURATION: 97 % | HEART RATE: 91 BPM | DIASTOLIC BLOOD PRESSURE: 78 MMHG

## 2017-09-18 VITALS — BODY MASS INDEX: 25.46 KG/M2 | WEIGHT: 168 LBS | HEIGHT: 68 IN

## 2017-09-18 PROCEDURE — G0008: CPT

## 2017-09-18 PROCEDURE — 99214 OFFICE O/P EST MOD 30 MIN: CPT | Mod: 25

## 2017-09-18 PROCEDURE — 90686 IIV4 VACC NO PRSV 0.5 ML IM: CPT

## 2017-09-20 LAB — FRUCTOSAMINE SERPL-MCNC: 302 UMOL/L

## 2017-09-25 ENCOUNTER — APPOINTMENT (OUTPATIENT)
Dept: UROLOGY | Facility: CLINIC | Age: 82
End: 2017-09-25
Payer: MEDICARE

## 2017-09-25 PROCEDURE — 99213 OFFICE O/P EST LOW 20 MIN: CPT

## 2017-09-28 ENCOUNTER — CLINICAL ADVICE (OUTPATIENT)
Age: 82
End: 2017-09-28

## 2017-10-03 LAB
INR PPP: 2.49 RATIO
PT BLD: 28.7 SEC

## 2017-10-23 ENCOUNTER — APPOINTMENT (OUTPATIENT)
Dept: ELECTROPHYSIOLOGY | Facility: CLINIC | Age: 82
End: 2017-10-23
Payer: MEDICARE

## 2017-10-23 VITALS
SYSTOLIC BLOOD PRESSURE: 149 MMHG | DIASTOLIC BLOOD PRESSURE: 77 MMHG | OXYGEN SATURATION: 97 % | HEART RATE: 80 BPM | HEIGHT: 68 IN | BODY MASS INDEX: 24.55 KG/M2 | WEIGHT: 162 LBS

## 2017-10-23 DIAGNOSIS — Z79.01 LONG TERM (CURRENT) USE OF ANTICOAGULANTS: ICD-10-CM

## 2017-10-23 PROCEDURE — 93000 ELECTROCARDIOGRAM COMPLETE: CPT

## 2017-10-23 PROCEDURE — 99215 OFFICE O/P EST HI 40 MIN: CPT

## 2017-10-23 RX ORDER — METOPROLOL SUCCINATE 100 MG/1
100 TABLET, EXTENDED RELEASE ORAL
Qty: 90 | Refills: 0 | Status: DISCONTINUED | COMMUNITY
Start: 2017-04-09 | End: 2017-10-23

## 2017-10-24 ENCOUNTER — APPOINTMENT (OUTPATIENT)
Dept: NEPHROLOGY | Facility: CLINIC | Age: 82
End: 2017-10-24
Payer: MEDICARE

## 2017-10-24 ENCOUNTER — APPOINTMENT (OUTPATIENT)
Dept: GERIATRICS | Facility: CLINIC | Age: 82
End: 2017-10-24
Payer: MEDICARE

## 2017-10-24 VITALS
WEIGHT: 168 LBS | OXYGEN SATURATION: 92 % | HEIGHT: 68 IN | DIASTOLIC BLOOD PRESSURE: 68 MMHG | HEART RATE: 89 BPM | BODY MASS INDEX: 25.46 KG/M2 | SYSTOLIC BLOOD PRESSURE: 114 MMHG

## 2017-10-24 VITALS
HEIGHT: 68 IN | TEMPERATURE: 98 F | OXYGEN SATURATION: 98 % | RESPIRATION RATE: 15 BRPM | SYSTOLIC BLOOD PRESSURE: 110 MMHG | DIASTOLIC BLOOD PRESSURE: 60 MMHG | HEART RATE: 80 BPM | BODY MASS INDEX: 25.48 KG/M2 | WEIGHT: 168.13 LBS

## 2017-10-24 DIAGNOSIS — M54.2 CERVICALGIA: ICD-10-CM

## 2017-10-24 LAB
DIGOXIN SERPL-MCNC: 0.4 NG/ML
HBA1C MFR BLD HPLC: 7.7 %

## 2017-10-24 PROCEDURE — 99214 OFFICE O/P EST MOD 30 MIN: CPT

## 2017-10-26 ENCOUNTER — FORM ENCOUNTER (OUTPATIENT)
Age: 82
End: 2017-10-26

## 2017-10-26 LAB
25(OH)D3 SERPL-MCNC: 29.4 NG/ML
ALBUMIN SERPL ELPH-MCNC: 3.8 G/DL
ALP BLD-CCNC: 158 U/L
ALT SERPL-CCNC: 20 U/L
ANION GAP SERPL CALC-SCNC: 18 MMOL/L
AST SERPL-CCNC: 19 U/L
BASOPHILS # BLD AUTO: 0.03 K/UL
BASOPHILS NFR BLD AUTO: 0.4 %
BILIRUB SERPL-MCNC: 0.4 MG/DL
BUN SERPL-MCNC: 76 MG/DL
CALCIUM SERPL-MCNC: 9.2 MG/DL
CALCIUM SERPL-MCNC: 9.2 MG/DL
CHLORIDE SERPL-SCNC: 102 MMOL/L
CHOLEST SERPL-MCNC: 94 MG/DL
CHOLEST/HDLC SERPL: 3 RATIO
CO2 SERPL-SCNC: 20 MMOL/L
CREAT SERPL-MCNC: 3.15 MG/DL
EOSINOPHIL # BLD AUTO: 0.6 K/UL
EOSINOPHIL NFR BLD AUTO: 8.4 %
FERRITIN SERPL-MCNC: 59 NG/ML
GLUCOSE SERPL-MCNC: 271 MG/DL
HCT VFR BLD CALC: 37.6 %
HDLC SERPL-MCNC: 31 MG/DL
HGB BLD-MCNC: 11.5 G/DL
IMM GRANULOCYTES NFR BLD AUTO: 0.1 %
IRON SATN MFR SERPL: 9 %
IRON SERPL-MCNC: 24 UG/DL
LDLC SERPL CALC-MCNC: 17 MG/DL
LYMPHOCYTES # BLD AUTO: 1.16 K/UL
LYMPHOCYTES NFR BLD AUTO: 16.2 %
MAN DIFF?: NORMAL
MCHC RBC-ENTMCNC: 26.9 PG
MCHC RBC-ENTMCNC: 30.6 GM/DL
MCV RBC AUTO: 87.9 FL
MONOCYTES # BLD AUTO: 0.78 K/UL
MONOCYTES NFR BLD AUTO: 10.9 %
NEUTROPHILS # BLD AUTO: 4.6 K/UL
NEUTROPHILS NFR BLD AUTO: 64 %
PARATHYROID HORMONE INTACT: 333 PG/ML
PHOSPHATE SERPL-MCNC: 5.7 MG/DL
PLATELET # BLD AUTO: 214 K/UL
POTASSIUM SERPL-SCNC: 4.5 MMOL/L
PROT SERPL-MCNC: 7.7 G/DL
RBC # BLD: 4.28 M/UL
RBC # FLD: 18.3 %
SODIUM SERPL-SCNC: 140 MMOL/L
TIBC SERPL-MCNC: 269 UG/DL
TRIGL SERPL-MCNC: 230 MG/DL
UIBC SERPL-MCNC: 245 UG/DL
URATE SERPL-MCNC: 9.8 MG/DL
WBC # FLD AUTO: 7.18 K/UL

## 2017-10-27 ENCOUNTER — OUTPATIENT (OUTPATIENT)
Dept: OUTPATIENT SERVICES | Facility: HOSPITAL | Age: 82
LOS: 1 days | End: 2017-10-27
Payer: MEDICARE

## 2017-10-27 ENCOUNTER — APPOINTMENT (OUTPATIENT)
Dept: CT IMAGING | Facility: CLINIC | Age: 82
End: 2017-10-27
Payer: MEDICARE

## 2017-10-27 ENCOUNTER — APPOINTMENT (OUTPATIENT)
Dept: NEUROLOGY | Facility: CLINIC | Age: 82
End: 2017-10-27
Payer: MEDICARE

## 2017-10-27 VITALS
WEIGHT: 168 LBS | HEART RATE: 89 BPM | DIASTOLIC BLOOD PRESSURE: 72 MMHG | BODY MASS INDEX: 25.46 KG/M2 | SYSTOLIC BLOOD PRESSURE: 130 MMHG | HEIGHT: 68 IN

## 2017-10-27 VITALS — BODY MASS INDEX: 24.63 KG/M2 | WEIGHT: 162 LBS

## 2017-10-27 DIAGNOSIS — Z95.4 PRESENCE OF OTHER HEART-VALVE REPLACEMENT: Chronic | ICD-10-CM

## 2017-10-27 DIAGNOSIS — Z95.810 PRESENCE OF AUTOMATIC (IMPLANTABLE) CARDIAC DEFIBRILLATOR: Chronic | ICD-10-CM

## 2017-10-27 DIAGNOSIS — Z98.49 CATARACT EXTRACTION STATUS, UNSPECIFIED EYE: Chronic | ICD-10-CM

## 2017-10-27 DIAGNOSIS — Z98.89 OTHER SPECIFIED POSTPROCEDURAL STATES: Chronic | ICD-10-CM

## 2017-10-27 DIAGNOSIS — R51 HEADACHE: ICD-10-CM

## 2017-10-27 DIAGNOSIS — Z95.1 PRESENCE OF AORTOCORONARY BYPASS GRAFT: Chronic | ICD-10-CM

## 2017-10-27 PROCEDURE — 70450 CT HEAD/BRAIN W/O DYE: CPT | Mod: 26

## 2017-10-27 PROCEDURE — 70450 CT HEAD/BRAIN W/O DYE: CPT

## 2017-10-27 PROCEDURE — 99214 OFFICE O/P EST MOD 30 MIN: CPT

## 2017-11-09 LAB
INR PPP: 6.01 RATIO
PT BLD: 70.5 SEC

## 2017-11-13 ENCOUNTER — LABORATORY RESULT (OUTPATIENT)
Age: 82
End: 2017-11-13

## 2017-11-16 ENCOUNTER — MOBILE ON CALL (OUTPATIENT)
Age: 82
End: 2017-11-16

## 2017-11-16 RX ORDER — SERTRALINE 25 MG/1
25 TABLET, FILM COATED ORAL DAILY
Qty: 30 | Refills: 3 | Status: COMPLETED | COMMUNITY
Start: 2017-07-18 | End: 2017-11-16

## 2017-11-19 PROBLEM — Z79.01 LONG-TERM (CURRENT) USE OF ANTICOAGULANTS, INR GOAL 2.0-3.0: Status: ACTIVE | Noted: 2017-04-12

## 2017-11-24 ENCOUNTER — LABORATORY RESULT (OUTPATIENT)
Age: 82
End: 2017-11-24

## 2017-11-30 ENCOUNTER — LABORATORY RESULT (OUTPATIENT)
Age: 82
End: 2017-11-30

## 2017-12-19 ENCOUNTER — APPOINTMENT (OUTPATIENT)
Dept: ENDOCRINOLOGY | Facility: CLINIC | Age: 82
End: 2017-12-19
Payer: MEDICARE

## 2017-12-19 ENCOUNTER — NON-APPOINTMENT (OUTPATIENT)
Age: 82
End: 2017-12-19

## 2017-12-19 ENCOUNTER — APPOINTMENT (OUTPATIENT)
Dept: CARDIOLOGY | Facility: CLINIC | Age: 82
End: 2017-12-19
Payer: MEDICARE

## 2017-12-19 VITALS
HEART RATE: 82 BPM | BODY MASS INDEX: 24.18 KG/M2 | DIASTOLIC BLOOD PRESSURE: 65 MMHG | WEIGHT: 159 LBS | OXYGEN SATURATION: 96 % | SYSTOLIC BLOOD PRESSURE: 108 MMHG

## 2017-12-19 VITALS
BODY MASS INDEX: 24.71 KG/M2 | OXYGEN SATURATION: 97 % | DIASTOLIC BLOOD PRESSURE: 74 MMHG | WEIGHT: 163 LBS | SYSTOLIC BLOOD PRESSURE: 120 MMHG | HEIGHT: 68 IN | HEART RATE: 88 BPM

## 2017-12-19 PROCEDURE — 99214 OFFICE O/P EST MOD 30 MIN: CPT

## 2017-12-19 PROCEDURE — 99215 OFFICE O/P EST HI 40 MIN: CPT

## 2018-01-02 ENCOUNTER — LABORATORY RESULT (OUTPATIENT)
Age: 83
End: 2018-01-02

## 2018-01-15 ENCOUNTER — OUTPATIENT (OUTPATIENT)
Dept: OUTPATIENT SERVICES | Facility: HOSPITAL | Age: 83
LOS: 1 days | End: 2018-01-15
Payer: MEDICARE

## 2018-01-15 DIAGNOSIS — Z95.1 PRESENCE OF AORTOCORONARY BYPASS GRAFT: Chronic | ICD-10-CM

## 2018-01-15 DIAGNOSIS — Z98.49 CATARACT EXTRACTION STATUS, UNSPECIFIED EYE: Chronic | ICD-10-CM

## 2018-01-15 DIAGNOSIS — Z98.89 OTHER SPECIFIED POSTPROCEDURAL STATES: Chronic | ICD-10-CM

## 2018-01-15 DIAGNOSIS — Z95.810 PRESENCE OF AUTOMATIC (IMPLANTABLE) CARDIAC DEFIBRILLATOR: Chronic | ICD-10-CM

## 2018-01-15 DIAGNOSIS — Z95.4 PRESENCE OF OTHER HEART-VALVE REPLACEMENT: Chronic | ICD-10-CM

## 2018-01-15 PROCEDURE — 84153 ASSAY OF PSA TOTAL: CPT

## 2018-01-23 ENCOUNTER — APPOINTMENT (OUTPATIENT)
Dept: NEPHROLOGY | Facility: CLINIC | Age: 83
End: 2018-01-23

## 2018-02-05 ENCOUNTER — LABORATORY RESULT (OUTPATIENT)
Age: 83
End: 2018-02-05

## 2018-02-06 ENCOUNTER — APPOINTMENT (OUTPATIENT)
Dept: NEPHROLOGY | Facility: CLINIC | Age: 83
End: 2018-02-06
Payer: MEDICARE

## 2018-02-06 ENCOUNTER — APPOINTMENT (OUTPATIENT)
Dept: GERIATRICS | Facility: CLINIC | Age: 83
End: 2018-02-06
Payer: MEDICARE

## 2018-02-06 VITALS
BODY MASS INDEX: 25.01 KG/M2 | HEIGHT: 68 IN | SYSTOLIC BLOOD PRESSURE: 138 MMHG | OXYGEN SATURATION: 98 % | TEMPERATURE: 97.5 F | WEIGHT: 165.04 LBS | HEART RATE: 76 BPM | RESPIRATION RATE: 15 BRPM | DIASTOLIC BLOOD PRESSURE: 66 MMHG

## 2018-02-06 VITALS
SYSTOLIC BLOOD PRESSURE: 114 MMHG | OXYGEN SATURATION: 96 % | WEIGHT: 166 LBS | BODY MASS INDEX: 25.16 KG/M2 | HEIGHT: 68 IN | HEART RATE: 84 BPM | DIASTOLIC BLOOD PRESSURE: 68 MMHG

## 2018-02-06 PROCEDURE — 99215 OFFICE O/P EST HI 40 MIN: CPT | Mod: GC

## 2018-02-06 PROCEDURE — 99214 OFFICE O/P EST MOD 30 MIN: CPT

## 2018-02-06 RX ORDER — SYRINGE AND NEEDLE,INSULIN,1ML 29 G X1/2"
31G X 5/16" SYRINGE, EMPTY DISPOSABLE MISCELLANEOUS
Qty: 100 | Refills: 0 | Status: DISCONTINUED | COMMUNITY
Start: 2018-01-07

## 2018-02-06 RX ORDER — AMOXICILLIN 500 MG/1
500 CAPSULE ORAL
Qty: 28 | Refills: 0 | Status: DISCONTINUED | COMMUNITY
Start: 2018-01-06

## 2018-02-06 RX ORDER — WARFARIN 4 MG/1
4 TABLET ORAL
Qty: 7 | Refills: 0 | Status: DISCONTINUED | COMMUNITY
Start: 2017-11-24

## 2018-02-06 RX ORDER — ATORVASTATIN CALCIUM 80 MG/1
80 TABLET, FILM COATED ORAL
Qty: 90 | Refills: 0 | Status: DISCONTINUED | COMMUNITY
Start: 2017-08-07

## 2018-02-07 RX ORDER — NITROFURANTOIN MACROCRYSTALS 50 MG/1
50 CAPSULE ORAL
Qty: 180 | Refills: 3 | Status: COMPLETED | COMMUNITY
Start: 2017-06-19 | End: 2018-02-06

## 2018-02-08 LAB
25(OH)D3 SERPL-MCNC: 25.1 NG/ML
ALBUMIN SERPL ELPH-MCNC: 4 G/DL
ALP BLD-CCNC: 150 U/L
ALT SERPL-CCNC: 30 U/L
ANION GAP SERPL CALC-SCNC: 19 MMOL/L
AST SERPL-CCNC: 30 U/L
BASOPHILS # BLD AUTO: 0.02 K/UL
BASOPHILS NFR BLD AUTO: 0.3 %
BILIRUB SERPL-MCNC: 0.2 MG/DL
BUN SERPL-MCNC: 94 MG/DL
CALCIUM SERPL-MCNC: 9 MG/DL
CALCIUM SERPL-MCNC: 9 MG/DL
CHLORIDE SERPL-SCNC: 99 MMOL/L
CHOLEST SERPL-MCNC: 117 MG/DL
CHOLEST/HDLC SERPL: 2.7 RATIO
CO2 SERPL-SCNC: 21 MMOL/L
CREAT SERPL-MCNC: 2.95 MG/DL
EOSINOPHIL # BLD AUTO: 0.31 K/UL
EOSINOPHIL NFR BLD AUTO: 3.9 %
FERRITIN SERPL-MCNC: 38 NG/ML
GLUCOSE SERPL-MCNC: 167 MG/DL
HBA1C MFR BLD HPLC: 7.9 %
HCT VFR BLD CALC: 42.2 %
HDLC SERPL-MCNC: 44 MG/DL
HGB BLD-MCNC: 12.6 G/DL
IMM GRANULOCYTES NFR BLD AUTO: 0.3 %
IRON SATN MFR SERPL: 15 %
IRON SERPL-MCNC: 46 UG/DL
LDLC SERPL CALC-MCNC: 28 MG/DL
LYMPHOCYTES # BLD AUTO: 1.44 K/UL
LYMPHOCYTES NFR BLD AUTO: 18.2 %
MAN DIFF?: NORMAL
MCHC RBC-ENTMCNC: 27.8 PG
MCHC RBC-ENTMCNC: 29.9 GM/DL
MCV RBC AUTO: 93.2 FL
MONOCYTES # BLD AUTO: 0.75 K/UL
MONOCYTES NFR BLD AUTO: 9.5 %
NEUTROPHILS # BLD AUTO: 5.36 K/UL
NEUTROPHILS NFR BLD AUTO: 67.8 %
PARATHYROID HORMONE INTACT: 296 PG/ML
PHOSPHATE SERPL-MCNC: 3.9 MG/DL
PLATELET # BLD AUTO: 171 K/UL
POTASSIUM SERPL-SCNC: 3.8 MMOL/L
PROT SERPL-MCNC: 7.5 G/DL
RBC # BLD: 4.53 M/UL
RBC # FLD: 14.4 %
SODIUM SERPL-SCNC: 139 MMOL/L
TIBC SERPL-MCNC: 300 UG/DL
TRIGL SERPL-MCNC: 227 MG/DL
UIBC SERPL-MCNC: 254 UG/DL
URATE SERPL-MCNC: 10 MG/DL
WBC # FLD AUTO: 7.9 K/UL

## 2018-02-26 ENCOUNTER — LABORATORY RESULT (OUTPATIENT)
Age: 83
End: 2018-02-26

## 2018-02-27 ENCOUNTER — LABORATORY RESULT (OUTPATIENT)
Age: 83
End: 2018-02-27

## 2018-03-07 ENCOUNTER — APPOINTMENT (OUTPATIENT)
Dept: ELECTROPHYSIOLOGY | Facility: CLINIC | Age: 83
End: 2018-03-07
Payer: MEDICARE

## 2018-03-13 DIAGNOSIS — N39.0 URINARY TRACT INFECTION, SITE NOT SPECIFIED: ICD-10-CM

## 2018-03-14 ENCOUNTER — APPOINTMENT (OUTPATIENT)
Dept: NEUROLOGY | Facility: CLINIC | Age: 83
End: 2018-03-14

## 2018-03-26 ENCOUNTER — LABORATORY RESULT (OUTPATIENT)
Age: 83
End: 2018-03-26

## 2018-03-30 ENCOUNTER — MOBILE ON CALL (OUTPATIENT)
Age: 83
End: 2018-03-30

## 2018-04-17 ENCOUNTER — APPOINTMENT (OUTPATIENT)
Dept: CARDIOLOGY | Facility: CLINIC | Age: 83
End: 2018-04-17
Payer: MEDICARE

## 2018-04-17 VITALS
HEIGHT: 68 IN | WEIGHT: 162 LBS | DIASTOLIC BLOOD PRESSURE: 61 MMHG | SYSTOLIC BLOOD PRESSURE: 134 MMHG | HEART RATE: 71 BPM | BODY MASS INDEX: 24.55 KG/M2 | OXYGEN SATURATION: 93 %

## 2018-04-17 PROCEDURE — 99215 OFFICE O/P EST HI 40 MIN: CPT

## 2018-04-18 ENCOUNTER — APPOINTMENT (OUTPATIENT)
Dept: ELECTROPHYSIOLOGY | Facility: CLINIC | Age: 83
End: 2018-04-18
Payer: MEDICARE

## 2018-04-18 VITALS
WEIGHT: 160 LBS | HEART RATE: 86 BPM | HEIGHT: 68 IN | SYSTOLIC BLOOD PRESSURE: 137 MMHG | OXYGEN SATURATION: 96 % | BODY MASS INDEX: 24.25 KG/M2 | DIASTOLIC BLOOD PRESSURE: 82 MMHG

## 2018-04-18 PROCEDURE — 93000 ELECTROCARDIOGRAM COMPLETE: CPT

## 2018-04-18 PROCEDURE — 99215 OFFICE O/P EST HI 40 MIN: CPT

## 2018-04-23 LAB
DIGITOXIN REPORTING LIMIT: 5 NG/ML — SIGNIFICANT CHANGE UP
DIGITOXIN SERPL-MCNC: SIGNIFICANT CHANGE UP NG/ML (ref 10–30)

## 2018-05-01 ENCOUNTER — MOBILE ON CALL (OUTPATIENT)
Age: 83
End: 2018-05-01

## 2018-05-05 ENCOUNTER — OTHER (OUTPATIENT)
Age: 83
End: 2018-05-05

## 2018-05-07 ENCOUNTER — LABORATORY RESULT (OUTPATIENT)
Age: 83
End: 2018-05-07

## 2018-05-07 ENCOUNTER — NON-APPOINTMENT (OUTPATIENT)
Age: 83
End: 2018-05-07

## 2018-05-08 ENCOUNTER — APPOINTMENT (OUTPATIENT)
Dept: ENDOCRINOLOGY | Facility: CLINIC | Age: 83
End: 2018-05-08
Payer: MEDICARE

## 2018-05-08 ENCOUNTER — APPOINTMENT (OUTPATIENT)
Dept: NEPHROLOGY | Facility: CLINIC | Age: 83
End: 2018-05-08
Payer: MEDICARE

## 2018-05-08 ENCOUNTER — APPOINTMENT (OUTPATIENT)
Dept: GERIATRICS | Facility: CLINIC | Age: 83
End: 2018-05-08
Payer: MEDICARE

## 2018-05-08 VITALS
DIASTOLIC BLOOD PRESSURE: 66 MMHG | HEART RATE: 89 BPM | OXYGEN SATURATION: 98 % | SYSTOLIC BLOOD PRESSURE: 120 MMHG | HEIGHT: 68 IN | BODY MASS INDEX: 25.31 KG/M2 | WEIGHT: 167 LBS

## 2018-05-08 VITALS
WEIGHT: 167.55 LBS | OXYGEN SATURATION: 95 % | BODY MASS INDEX: 25.39 KG/M2 | DIASTOLIC BLOOD PRESSURE: 64 MMHG | SYSTOLIC BLOOD PRESSURE: 119 MMHG | HEART RATE: 86 BPM | HEIGHT: 68 IN

## 2018-05-08 VITALS — TEMPERATURE: 97.4 F

## 2018-05-08 DIAGNOSIS — Z87.891 PERSONAL HISTORY OF NICOTINE DEPENDENCE: ICD-10-CM

## 2018-05-08 LAB
25(OH)D3 SERPL-MCNC: 27.2 NG/ML
ALBUMIN SERPL ELPH-MCNC: 3.8 G/DL
ALP BLD-CCNC: 110 U/L
ALT SERPL-CCNC: 19 U/L
ANION GAP SERPL CALC-SCNC: 15 MMOL/L
AST SERPL-CCNC: 19 U/L
BASOPHILS # BLD AUTO: 0.03 K/UL
BASOPHILS NFR BLD AUTO: 0.5 %
BILIRUB SERPL-MCNC: 0.3 MG/DL
BUN SERPL-MCNC: 66 MG/DL
CALCIUM SERPL-MCNC: 8.5 MG/DL
CALCIUM SERPL-MCNC: 8.5 MG/DL
CHLORIDE SERPL-SCNC: 107 MMOL/L
CO2 SERPL-SCNC: 24 MMOL/L
CREAT SERPL-MCNC: 2.42 MG/DL
EOSINOPHIL # BLD AUTO: 0.24 K/UL
EOSINOPHIL NFR BLD AUTO: 3.6 %
FERRITIN SERPL-MCNC: 38 NG/ML
GLUCOSE BLDC GLUCOMTR-MCNC: 80
GLUCOSE SERPL-MCNC: 70 MG/DL
HBA1C MFR BLD HPLC: 7.7
HCT VFR BLD CALC: 38.7 %
HGB BLD-MCNC: 11.3 G/DL
IMM GRANULOCYTES NFR BLD AUTO: 0.2 %
IRON SATN MFR SERPL: 40 %
IRON SERPL-MCNC: 108 UG/DL
LYMPHOCYTES # BLD AUTO: 1.11 K/UL
LYMPHOCYTES NFR BLD AUTO: 16.8 %
MAN DIFF?: NORMAL
MCHC RBC-ENTMCNC: 27.2 PG
MCHC RBC-ENTMCNC: 29.2 GM/DL
MCV RBC AUTO: 93 FL
MONOCYTES # BLD AUTO: 0.82 K/UL
MONOCYTES NFR BLD AUTO: 12.4 %
NEUTROPHILS # BLD AUTO: 4.38 K/UL
NEUTROPHILS NFR BLD AUTO: 66.5 %
PARATHYROID HORMONE INTACT: 275 PG/ML
PHOSPHATE SERPL-MCNC: 3.4 MG/DL
PLATELET # BLD AUTO: 172 K/UL
POTASSIUM SERPL-SCNC: 4.2 MMOL/L
PROT SERPL-MCNC: 6.9 G/DL
RBC # BLD: 4.16 M/UL
RBC # FLD: 16.6 %
SODIUM SERPL-SCNC: 146 MMOL/L
TIBC SERPL-MCNC: 270 UG/DL
UIBC SERPL-MCNC: 162 UG/DL
URATE SERPL-MCNC: 8.9 MG/DL
WBC # FLD AUTO: 6.59 K/UL

## 2018-05-08 PROCEDURE — 99214 OFFICE O/P EST MOD 30 MIN: CPT

## 2018-05-08 PROCEDURE — 82962 GLUCOSE BLOOD TEST: CPT

## 2018-05-08 PROCEDURE — 83036 HEMOGLOBIN GLYCOSYLATED A1C: CPT | Mod: QW

## 2018-05-08 PROCEDURE — 99214 OFFICE O/P EST MOD 30 MIN: CPT | Mod: 25

## 2018-05-08 PROCEDURE — 99214 OFFICE O/P EST MOD 30 MIN: CPT | Mod: GC

## 2018-05-11 LAB — GLUCOSE BLDC GLUCOMTR-MCNC: 66

## 2018-06-04 ENCOUNTER — LABORATORY RESULT (OUTPATIENT)
Age: 83
End: 2018-06-04

## 2018-06-06 ENCOUNTER — RX RENEWAL (OUTPATIENT)
Age: 83
End: 2018-06-06

## 2018-06-15 ENCOUNTER — TRANSCRIPTION ENCOUNTER (OUTPATIENT)
Age: 83
End: 2018-06-15

## 2018-06-21 ENCOUNTER — APPOINTMENT (OUTPATIENT)
Dept: NEUROLOGY | Facility: CLINIC | Age: 83
End: 2018-06-21
Payer: MEDICARE

## 2018-06-21 VITALS
DIASTOLIC BLOOD PRESSURE: 68 MMHG | HEIGHT: 68 IN | BODY MASS INDEX: 24.25 KG/M2 | HEART RATE: 90 BPM | WEIGHT: 160 LBS | SYSTOLIC BLOOD PRESSURE: 117 MMHG

## 2018-06-21 DIAGNOSIS — I63.9 CEREBRAL INFARCTION, UNSPECIFIED: ICD-10-CM

## 2018-06-21 PROCEDURE — 96116 NUBHVL XM PHYS/QHP 1ST HR: CPT | Mod: 59

## 2018-06-21 PROCEDURE — 99215 OFFICE O/P EST HI 40 MIN: CPT | Mod: 25

## 2018-06-21 RX ORDER — SERTRALINE HYDROCHLORIDE 50 MG/1
50 TABLET, FILM COATED ORAL DAILY
Qty: 90 | Refills: 1 | Status: DISCONTINUED | COMMUNITY
Start: 2017-11-16 | End: 2018-06-04

## 2018-07-01 ENCOUNTER — APPOINTMENT (OUTPATIENT)
Dept: NUCLEAR MEDICINE | Facility: IMAGING CENTER | Age: 83
End: 2018-07-01
Payer: MEDICARE

## 2018-07-01 ENCOUNTER — OUTPATIENT (OUTPATIENT)
Dept: OUTPATIENT SERVICES | Facility: HOSPITAL | Age: 83
LOS: 1 days | End: 2018-07-01
Payer: MEDICARE

## 2018-07-01 DIAGNOSIS — Z95.1 PRESENCE OF AORTOCORONARY BYPASS GRAFT: Chronic | ICD-10-CM

## 2018-07-01 DIAGNOSIS — Z98.49 CATARACT EXTRACTION STATUS, UNSPECIFIED EYE: Chronic | ICD-10-CM

## 2018-07-01 DIAGNOSIS — Z95.4 PRESENCE OF OTHER HEART-VALVE REPLACEMENT: Chronic | ICD-10-CM

## 2018-07-01 DIAGNOSIS — Z98.89 OTHER SPECIFIED POSTPROCEDURAL STATES: Chronic | ICD-10-CM

## 2018-07-01 DIAGNOSIS — F03.90 UNSPECIFIED DEMENTIA WITHOUT BEHAVIORAL DISTURBANCE: ICD-10-CM

## 2018-07-01 DIAGNOSIS — Z95.810 PRESENCE OF AUTOMATIC (IMPLANTABLE) CARDIAC DEFIBRILLATOR: Chronic | ICD-10-CM

## 2018-07-01 PROCEDURE — 78608 BRAIN IMAGING (PET): CPT | Mod: 26

## 2018-07-01 PROCEDURE — A9552: CPT

## 2018-07-01 PROCEDURE — 78608 BRAIN IMAGING (PET): CPT

## 2018-07-02 ENCOUNTER — LABORATORY RESULT (OUTPATIENT)
Age: 83
End: 2018-07-02

## 2018-07-03 ENCOUNTER — RX RENEWAL (OUTPATIENT)
Age: 83
End: 2018-07-03

## 2018-07-17 ENCOUNTER — RX RENEWAL (OUTPATIENT)
Age: 83
End: 2018-07-17

## 2018-07-19 ENCOUNTER — RX RENEWAL (OUTPATIENT)
Age: 83
End: 2018-07-19

## 2018-07-22 ENCOUNTER — OUTPATIENT (OUTPATIENT)
Dept: OUTPATIENT SERVICES | Facility: HOSPITAL | Age: 83
LOS: 1 days | End: 2018-07-22
Payer: MEDICARE

## 2018-07-22 ENCOUNTER — APPOINTMENT (OUTPATIENT)
Dept: NUCLEAR MEDICINE | Facility: IMAGING CENTER | Age: 83
End: 2018-07-22
Payer: SUBSIDIZED

## 2018-07-22 DIAGNOSIS — Z95.810 PRESENCE OF AUTOMATIC (IMPLANTABLE) CARDIAC DEFIBRILLATOR: Chronic | ICD-10-CM

## 2018-07-22 DIAGNOSIS — Z98.49 CATARACT EXTRACTION STATUS, UNSPECIFIED EYE: Chronic | ICD-10-CM

## 2018-07-22 DIAGNOSIS — F03.90 UNSPECIFIED DEMENTIA WITHOUT BEHAVIORAL DISTURBANCE: ICD-10-CM

## 2018-07-22 DIAGNOSIS — Z98.89 OTHER SPECIFIED POSTPROCEDURAL STATES: Chronic | ICD-10-CM

## 2018-07-22 DIAGNOSIS — Z95.4 PRESENCE OF OTHER HEART-VALVE REPLACEMENT: Chronic | ICD-10-CM

## 2018-07-22 DIAGNOSIS — Z95.1 PRESENCE OF AORTOCORONARY BYPASS GRAFT: Chronic | ICD-10-CM

## 2018-07-22 PROCEDURE — 78608 BRAIN IMAGING (PET): CPT | Mod: 26

## 2018-07-22 PROCEDURE — 78608 BRAIN IMAGING (PET): CPT

## 2018-07-22 PROCEDURE — A9552: CPT

## 2018-07-30 ENCOUNTER — LABORATORY RESULT (OUTPATIENT)
Age: 83
End: 2018-07-30

## 2018-07-30 ENCOUNTER — MEDICATION RENEWAL (OUTPATIENT)
Age: 83
End: 2018-07-30

## 2018-08-01 ENCOUNTER — RX RENEWAL (OUTPATIENT)
Age: 83
End: 2018-08-01

## 2018-08-14 ENCOUNTER — APPOINTMENT (OUTPATIENT)
Dept: CARDIOLOGY | Facility: CLINIC | Age: 83
End: 2018-08-14
Payer: MEDICARE

## 2018-08-14 ENCOUNTER — NON-APPOINTMENT (OUTPATIENT)
Age: 83
End: 2018-08-14

## 2018-08-14 ENCOUNTER — APPOINTMENT (OUTPATIENT)
Dept: ELECTROPHYSIOLOGY | Facility: CLINIC | Age: 83
End: 2018-08-14
Payer: MEDICARE

## 2018-08-14 ENCOUNTER — RX RENEWAL (OUTPATIENT)
Age: 83
End: 2018-08-14

## 2018-08-14 VITALS
BODY MASS INDEX: 24.25 KG/M2 | SYSTOLIC BLOOD PRESSURE: 134 MMHG | WEIGHT: 160 LBS | DIASTOLIC BLOOD PRESSURE: 55 MMHG | HEIGHT: 68 IN | HEART RATE: 64 BPM | OXYGEN SATURATION: 99 %

## 2018-08-14 PROCEDURE — 93283 PRGRMG EVAL IMPLANTABLE DFB: CPT

## 2018-08-14 PROCEDURE — 99215 OFFICE O/P EST HI 40 MIN: CPT

## 2018-08-28 ENCOUNTER — OTHER (OUTPATIENT)
Age: 83
End: 2018-08-28

## 2018-08-29 ENCOUNTER — LABORATORY RESULT (OUTPATIENT)
Age: 83
End: 2018-08-29

## 2018-09-04 ENCOUNTER — APPOINTMENT (OUTPATIENT)
Dept: GERIATRICS | Facility: CLINIC | Age: 83
End: 2018-09-04
Payer: MEDICARE

## 2018-09-04 ENCOUNTER — APPOINTMENT (OUTPATIENT)
Dept: ENDOCRINOLOGY | Facility: CLINIC | Age: 83
End: 2018-09-04
Payer: MEDICARE

## 2018-09-04 ENCOUNTER — APPOINTMENT (OUTPATIENT)
Dept: NEPHROLOGY | Facility: CLINIC | Age: 83
End: 2018-09-04
Payer: MEDICARE

## 2018-09-04 ENCOUNTER — RESULT CHARGE (OUTPATIENT)
Age: 83
End: 2018-09-04

## 2018-09-04 VITALS
WEIGHT: 163.2 LBS | OXYGEN SATURATION: 96 % | SYSTOLIC BLOOD PRESSURE: 122 MMHG | DIASTOLIC BLOOD PRESSURE: 70 MMHG | BODY MASS INDEX: 24.81 KG/M2 | TEMPERATURE: 97.5 F | HEART RATE: 88 BPM

## 2018-09-04 VITALS
WEIGHT: 160 LBS | OXYGEN SATURATION: 86 % | BODY MASS INDEX: 24.25 KG/M2 | HEART RATE: 84 BPM | DIASTOLIC BLOOD PRESSURE: 69 MMHG | HEIGHT: 68 IN | SYSTOLIC BLOOD PRESSURE: 128 MMHG

## 2018-09-04 VITALS
DIASTOLIC BLOOD PRESSURE: 60 MMHG | HEIGHT: 68 IN | HEART RATE: 90 BPM | OXYGEN SATURATION: 98 % | BODY MASS INDEX: 24.25 KG/M2 | WEIGHT: 160 LBS | SYSTOLIC BLOOD PRESSURE: 122 MMHG

## 2018-09-04 DIAGNOSIS — F32.9 MAJOR DEPRESSIVE DISORDER, SINGLE EPISODE, UNSPECIFIED: ICD-10-CM

## 2018-09-04 DIAGNOSIS — N18.4 TYPE 2 DIABETES MELLITUS WITH DIABETIC CHRONIC KIDNEY DISEASE: ICD-10-CM

## 2018-09-04 DIAGNOSIS — E11.22 TYPE 2 DIABETES MELLITUS WITH DIABETIC CHRONIC KIDNEY DISEASE: ICD-10-CM

## 2018-09-04 PROCEDURE — 99215 OFFICE O/P EST HI 40 MIN: CPT

## 2018-09-04 PROCEDURE — 83036 HEMOGLOBIN GLYCOSYLATED A1C: CPT | Mod: QW

## 2018-09-04 PROCEDURE — 82962 GLUCOSE BLOOD TEST: CPT

## 2018-09-04 PROCEDURE — 99214 OFFICE O/P EST MOD 30 MIN: CPT | Mod: 25

## 2018-09-04 PROCEDURE — 36415 COLL VENOUS BLD VENIPUNCTURE: CPT

## 2018-09-05 PROBLEM — F32.9 DEPRESSION: Status: ACTIVE | Noted: 2017-07-18

## 2018-09-05 LAB
25(OH)D3 SERPL-MCNC: 32.2 NG/ML
ALBUMIN SERPL ELPH-MCNC: 4.2 G/DL
ANION GAP SERPL CALC-SCNC: 16 MMOL/L
BUN SERPL-MCNC: 82 MG/DL
CALCIUM SERPL-MCNC: 9.2 MG/DL
CALCIUM SERPL-MCNC: 9.2 MG/DL
CHLORIDE SERPL-SCNC: 103 MMOL/L
CO2 SERPL-SCNC: 21 MMOL/L
CREAT SERPL-MCNC: 2.86 MG/DL
CREAT SPEC-SCNC: 42 MG/DL
FERRITIN SERPL-MCNC: 60 NG/ML
GLUCOSE BLDC GLUCOMTR-MCNC: 109
GLUCOSE SERPL-MCNC: 123 MG/DL
HBA1C MFR BLD HPLC: 7.5
HBA1C MFR BLD HPLC: 7.7 %
HCT VFR BLD CALC: 42.1 %
HGB BLD-MCNC: 13.2 G/DL
IRON SATN MFR SERPL: 22 %
IRON SERPL-MCNC: 66 UG/DL
MICROALBUMIN 24H UR DL<=1MG/L-MCNC: 5.5 MG/DL
MICROALBUMIN/CREAT 24H UR-RTO: 129 MG/G
PARATHYROID HORMONE INTACT: 255 PG/ML
PHOSPHATE SERPL-MCNC: 3.8 MG/DL
POTASSIUM SERPL-SCNC: 4.5 MMOL/L
SODIUM SERPL-SCNC: 140 MMOL/L
TIBC SERPL-MCNC: 295 UG/DL
UIBC SERPL-MCNC: 229 UG/DL
URATE SERPL-MCNC: 9.5 MG/DL

## 2018-09-10 ENCOUNTER — RX RENEWAL (OUTPATIENT)
Age: 83
End: 2018-09-10

## 2018-09-18 ENCOUNTER — APPOINTMENT (OUTPATIENT)
Dept: NEUROLOGY | Facility: CLINIC | Age: 83
End: 2018-09-18
Payer: MEDICARE

## 2018-09-18 VITALS
WEIGHT: 161 LBS | BODY MASS INDEX: 24.4 KG/M2 | HEART RATE: 85 BPM | SYSTOLIC BLOOD PRESSURE: 144 MMHG | HEIGHT: 68 IN | DIASTOLIC BLOOD PRESSURE: 74 MMHG

## 2018-09-18 PROCEDURE — 99214 OFFICE O/P EST MOD 30 MIN: CPT

## 2018-10-22 ENCOUNTER — LABORATORY RESULT (OUTPATIENT)
Age: 83
End: 2018-10-22

## 2018-11-13 ENCOUNTER — APPOINTMENT (OUTPATIENT)
Dept: CARDIOLOGY | Facility: CLINIC | Age: 83
End: 2018-11-13
Payer: MEDICARE

## 2018-11-13 ENCOUNTER — APPOINTMENT (OUTPATIENT)
Dept: ELECTROPHYSIOLOGY | Facility: CLINIC | Age: 83
End: 2018-11-13
Payer: MEDICARE

## 2018-11-13 ENCOUNTER — NON-APPOINTMENT (OUTPATIENT)
Age: 83
End: 2018-11-13

## 2018-11-13 VITALS
OXYGEN SATURATION: 99 % | WEIGHT: 160 LBS | HEIGHT: 68 IN | DIASTOLIC BLOOD PRESSURE: 70 MMHG | BODY MASS INDEX: 24.25 KG/M2 | HEART RATE: 61 BPM | SYSTOLIC BLOOD PRESSURE: 144 MMHG

## 2018-11-13 PROCEDURE — 93284 PRGRMG EVAL IMPLANTABLE DFB: CPT

## 2018-11-13 PROCEDURE — 99215 OFFICE O/P EST HI 40 MIN: CPT

## 2018-11-13 NOTE — PHYSICAL EXAM
[General Appearance - Well Developed] : well developed [Normal Appearance] : normal appearance [Well Groomed] : well groomed [General Appearance - Well Nourished] : well nourished [No Deformities] : no deformities [General Appearance - In No Acute Distress] : no acute distress [Normal Conjunctiva] : the conjunctiva exhibited no abnormalities [Eyelids - No Xanthelasma] : the eyelids demonstrated no xanthelasmas [Normal Oral Mucosa] : normal oral mucosa [No Oral Pallor] : no oral pallor [No Oral Cyanosis] : no oral cyanosis [Normal Jugular Venous A Waves Present] : normal jugular venous A waves present [Normal Jugular Venous V Waves Present] : normal jugular venous V waves present [No Jugular Venous Villanueva A Waves] : no jugular venous villanueva A waves [Respiration, Rhythm And Depth] : normal respiratory rhythm and effort [Exaggerated Use Of Accessory Muscles For Inspiration] : no accessory muscle use [Auscultation Breath Sounds / Voice Sounds] : lungs were clear to auscultation bilaterally [Heart Sounds] : normal S1 and S2 [Murmurs] : no murmurs present [Irregularly Irregular] : the rhythm was irregularly irregular [Abdomen Soft] : soft [Abdomen Tenderness] : non-tender [Abdomen Mass (___ Cm)] : no abdominal mass palpated [Abnormal Walk] : normal gait [Gait - Sufficient For Exercise Testing] : the gait was sufficient for exercise testing [Nail Clubbing] : no clubbing of the fingernails [Cyanosis, Localized] : no localized cyanosis [Petechial Hemorrhages (___cm)] : no petechial hemorrhages [Skin Color & Pigmentation] : normal skin color and pigmentation [] : no rash [No Venous Stasis] : no venous stasis [Skin Lesions] : no skin lesions [No Skin Ulcers] : no skin ulcer [No Xanthoma] : no  xanthoma was observed [Oriented To Time, Place, And Person] : oriented to person, place, and time [Affect] : the affect was normal [Mood] : the mood was normal [No Anxiety] : not feeling anxious

## 2018-11-13 NOTE — REASON FOR VISIT
[Follow-Up - Clinic] : a clinic follow-up of [Cardiomyopathy] : cardiomyopathy [Hypertension] : hypertension

## 2018-11-13 NOTE — DISCUSSION/SUMMARY
[___ Month(s)] : [unfilled] month(s) [FreeTextEntry1] : The patient is an 88-year-old gentleman mild dementia, diabetes mellitus, CKD stage IV, hypertension, hyperlipidemia, afib, CAD, cardiomyopathy, ICD who is asymptomatic. \par #1 CAD - no angina, continue imdur 30mg\par #2 Htn - stable on hydralazine and toprol\par #3 Cardiomyopathy - euvolemic on exam, continue torsemide  2 tab daily except when go out to eat twice a week, daily weights. ICD interrogation no sustained events\par #4 Afib - rate controlled with low dose toprol,no bleeding on warfarin,therapeutic\par #5 DM - under control\par #6 Renal - CKD stage IV f/u Dr. Miller\par #7 Lipids-on lipitor 40mg.\par #8 Neuro- on aricept, encourage ambulation with assistance, increase hydration

## 2018-11-13 NOTE — HISTORY OF PRESENT ILLNESS
[FreeTextEntry1] : Klaus is an 88-year-old gentleman mild dementia, CAD, htn, hyperlipidemia,afib,  systolic heart failure who  has been feeling well with no chest pain, palpitations or shortness of breath. NO walking or exercise.

## 2018-11-19 ENCOUNTER — LABORATORY RESULT (OUTPATIENT)
Age: 83
End: 2018-11-19

## 2018-11-20 ENCOUNTER — APPOINTMENT (OUTPATIENT)
Dept: GERIATRICS | Facility: CLINIC | Age: 83
End: 2018-11-20
Payer: MEDICARE

## 2018-11-20 ENCOUNTER — APPOINTMENT (OUTPATIENT)
Dept: NEUROLOGY | Facility: CLINIC | Age: 83
End: 2018-11-20
Payer: MEDICARE

## 2018-11-20 VITALS
WEIGHT: 166 LBS | SYSTOLIC BLOOD PRESSURE: 160 MMHG | HEART RATE: 90 BPM | DIASTOLIC BLOOD PRESSURE: 69 MMHG | BODY MASS INDEX: 25.16 KG/M2 | HEIGHT: 68 IN

## 2018-11-20 VITALS
WEIGHT: 166 LBS | RESPIRATION RATE: 15 BRPM | SYSTOLIC BLOOD PRESSURE: 120 MMHG | HEIGHT: 68 IN | DIASTOLIC BLOOD PRESSURE: 70 MMHG | BODY MASS INDEX: 25.16 KG/M2 | OXYGEN SATURATION: 98 % | TEMPERATURE: 97.7 F | HEART RATE: 70 BPM

## 2018-11-20 DIAGNOSIS — R35.0 FREQUENCY OF MICTURITION: ICD-10-CM

## 2018-11-20 DIAGNOSIS — R68.89 OTHER GENERAL SYMPTOMS AND SIGNS: ICD-10-CM

## 2018-11-20 PROCEDURE — 99215 OFFICE O/P EST HI 40 MIN: CPT | Mod: 25

## 2018-11-20 PROCEDURE — G0008: CPT

## 2018-11-20 PROCEDURE — 90686 IIV4 VACC NO PRSV 0.5 ML IM: CPT

## 2018-11-20 PROCEDURE — 99215 OFFICE O/P EST HI 40 MIN: CPT

## 2018-11-21 PROBLEM — R68.89 FORGETFULNESS: Status: ACTIVE | Noted: 2018-11-21

## 2018-11-21 PROBLEM — R35.0 URINARY FREQUENCY: Status: ACTIVE | Noted: 2017-09-25

## 2018-12-18 ENCOUNTER — LABORATORY RESULT (OUTPATIENT)
Age: 83
End: 2018-12-18

## 2018-12-31 ENCOUNTER — LABORATORY RESULT (OUTPATIENT)
Age: 83
End: 2018-12-31

## 2019-01-02 ENCOUNTER — RX RENEWAL (OUTPATIENT)
Age: 84
End: 2019-01-02

## 2019-01-29 ENCOUNTER — APPOINTMENT (OUTPATIENT)
Dept: NEPHROLOGY | Facility: CLINIC | Age: 84
End: 2019-01-29

## 2019-01-29 ENCOUNTER — APPOINTMENT (OUTPATIENT)
Dept: GERIATRICS | Facility: CLINIC | Age: 84
End: 2019-01-29
Payer: MEDICARE

## 2019-01-29 ENCOUNTER — LABORATORY RESULT (OUTPATIENT)
Age: 84
End: 2019-01-29

## 2019-01-29 ENCOUNTER — APPOINTMENT (OUTPATIENT)
Dept: ENDOCRINOLOGY | Facility: CLINIC | Age: 84
End: 2019-01-29
Payer: MEDICARE

## 2019-01-29 VITALS
SYSTOLIC BLOOD PRESSURE: 130 MMHG | RESPIRATION RATE: 15 BRPM | HEART RATE: 88 BPM | TEMPERATURE: 96 F | HEIGHT: 68 IN | DIASTOLIC BLOOD PRESSURE: 80 MMHG | OXYGEN SATURATION: 98 %

## 2019-01-29 VITALS
HEART RATE: 84 BPM | OXYGEN SATURATION: 96 % | HEIGHT: 68 IN | BODY MASS INDEX: 25.31 KG/M2 | WEIGHT: 167 LBS | SYSTOLIC BLOOD PRESSURE: 128 MMHG | DIASTOLIC BLOOD PRESSURE: 80 MMHG

## 2019-01-29 VITALS — WEIGHT: 167 LBS | BODY MASS INDEX: 25.39 KG/M2

## 2019-01-29 DIAGNOSIS — Z23 ENCOUNTER FOR IMMUNIZATION: ICD-10-CM

## 2019-01-29 DIAGNOSIS — N13.8 BENIGN PROSTATIC HYPERPLASIA WITH LOWER URINARY TRACT SYMPMS: ICD-10-CM

## 2019-01-29 DIAGNOSIS — Z00.00 ENCOUNTER FOR GENERAL ADULT MEDICAL EXAMINATION W/OUT ABNORMAL FINDINGS: ICD-10-CM

## 2019-01-29 DIAGNOSIS — N40.1 BENIGN PROSTATIC HYPERPLASIA WITH LOWER URINARY TRACT SYMPMS: ICD-10-CM

## 2019-01-29 LAB
GLUCOSE BLDC GLUCOMTR-MCNC: 208
HBA1C MFR BLD HPLC: 7.6

## 2019-01-29 PROCEDURE — 99214 OFFICE O/P EST MOD 30 MIN: CPT | Mod: GC

## 2019-01-29 PROCEDURE — 99214 OFFICE O/P EST MOD 30 MIN: CPT | Mod: 25

## 2019-01-29 PROCEDURE — 83036 HEMOGLOBIN GLYCOSYLATED A1C: CPT | Mod: QW

## 2019-01-29 PROCEDURE — 82962 GLUCOSE BLOOD TEST: CPT

## 2019-01-30 LAB
ANION GAP SERPL CALC-SCNC: 13 MMOL/L
BASOPHILS # BLD AUTO: 0.01 K/UL
BASOPHILS NFR BLD AUTO: 0.2 %
BUN SERPL-MCNC: 90 MG/DL
CALCIUM SERPL-MCNC: 9.2 MG/DL
CHLORIDE SERPL-SCNC: 103 MMOL/L
CHOLEST SERPL-MCNC: 121 MG/DL
CHOLEST/HDLC SERPL: 3 RATIO
CO2 SERPL-SCNC: 23 MMOL/L
CREAT SERPL-MCNC: 2.9 MG/DL
EOSINOPHIL # BLD AUTO: 0.34 K/UL
EOSINOPHIL NFR BLD AUTO: 6.2 %
GLUCOSE SERPL-MCNC: 185 MG/DL
HBA1C MFR BLD HPLC: 7.9 %
HCT VFR BLD CALC: 39.2 %
HDLC SERPL-MCNC: 40 MG/DL
HGB BLD-MCNC: 11.9 G/DL
IMM GRANULOCYTES NFR BLD AUTO: 0.2 %
LDLC SERPL CALC-MCNC: 39 MG/DL
LYMPHOCYTES # BLD AUTO: 1.24 K/UL
LYMPHOCYTES NFR BLD AUTO: 22.5 %
MAN DIFF?: NORMAL
MCHC RBC-ENTMCNC: 27.6 PG
MCHC RBC-ENTMCNC: 30.4 GM/DL
MCV RBC AUTO: 91 FL
MONOCYTES # BLD AUTO: 0.61 K/UL
MONOCYTES NFR BLD AUTO: 11.1 %
NEUTROPHILS # BLD AUTO: 3.3 K/UL
NEUTROPHILS NFR BLD AUTO: 59.8 %
PLATELET # BLD AUTO: 193 K/UL
POTASSIUM SERPL-SCNC: 4.2 MMOL/L
RBC # BLD: 4.31 M/UL
RBC # FLD: 14.7 %
SODIUM SERPL-SCNC: 139 MMOL/L
TRIGL SERPL-MCNC: 208 MG/DL
TSH SERPL-ACNC: 2.07 UIU/ML
WBC # FLD AUTO: 5.51 K/UL

## 2019-01-30 NOTE — PHYSICAL EXAM
[General Appearance - Well Nourished] : well nourished [General Appearance - Well Developed] : well developed [Sclera] : the sclera and conjunctiva were normal [Extraocular Movements] : extraocular movements were intact [Oropharynx] : The oropharynx was normal [] : the neck was supple [Exaggerated Use Of Accessory Muscles For Inspiration] : no accessory muscle use [Auscultation Breath Sounds / Voice Sounds] : lungs were clear to auscultation bilaterally [Heart Sounds] : normal S1 and S2 [Abdomen Soft] : soft [Abdomen Tenderness] : non-tender [Abnormal Walk] : normal gait [Musculoskeletal - Swelling] : no joint swelling seen [Skin Color & Pigmentation] : normal skin color and pigmentation [No Focal Deficits] : no focal deficits [Affect] : the affect was normal [Mood] : the mood was normal [FreeTextEntry1] : (+) 1 b/l LE edema

## 2019-01-30 NOTE — END OF VISIT
[] : Resident [FreeTextEntry3] : Pt seen with resident. Pt stable. No new issues.  Wife with neurodegenerative disorder newly in process of being dx and he is unaware. No info shared as pt does not follow. Issue is wife is his caregiver.  His son is taking on more responsibilities. Pt very stable. Feels well, driving locally and no issues identifed.  Son is becoming stressed with care but handling well. Has supportive wife acc to pt and pt wife who help them.

## 2019-01-30 NOTE — REVIEW OF SYSTEMS
[Nocturia] : nocturia [Joint Pain] : joint pain [Fever] : no fever [Eye Pain] : no eye pain [Sore Throat] : no sore throat [Chest Pain] : no chest pain [Palpitations] : no palpitations [Shortness Of Breath] : no shortness of breath [Cough] : no cough [Abdominal Pain] : no abdominal pain [Dysuria] : no dysuria [Skin Lesions] : no skin lesions [Confused] : no confusion [Depression] : no depression [Muscle Weakness] : no muscle weakness

## 2019-01-30 NOTE — HISTORY OF PRESENT ILLNESS
[FreeTextEntry1] : 87 M with PMH HTN, HLD, DMT2, CKD5, ischemic cardiomyopathy with EF of 20% with AICD, MV replacement, atrial fibrillation on coumadin presents for follow up. Patient follows up closely with cardiology, endocrinology, and nephrology. No recent hospitalizations or falls. \par \par HFrEF: Reports persistent fatigue. However minimal LE swelling. No orthopnea. Ambulation unchanged. \par \par Hypertension: BP stable. Compliant with his medications. \par \par A-fib/ICD: INR normally therapeutic. Needs repeat today. No recent bleeding. \par \par Chronic neck pain: stable.\par \par Diabetes: Well controlled. Managed by endocrine. Compliant with medications. Continues take lantus and humalog. \par \par Nocturia: patient continues to urinate at night 4-5 times and not sleeping well. Pt on tamsulosin. He drinks tea several times a day especially at bedtime. He sleeps with a large water bottle of water or soda by bedside at night and drinks it throughout the night stating he gets thirsty. Advised to limit fluid intake before bedtime and during the night. \par \par Mild Dementia: Short term memory is a problem. Long term memory is okay. . Has been on namenda and donepezil x 3 years. sees neurologist. Asks the same questions again. No wandering, agitation or aggression. STABLE per wife\par

## 2019-01-30 NOTE — SOCIAL HISTORY
[FreeTextEntry1] : son and dtr in law. was wife but she is unable to do care [FreeTextEntry2] : son and dtr in law are good [FreeTextEntry4] : good [No falls in past year] : Patient reported no falls in the past year [Fully functional (bathing, dressing, toileting, transferring, walking, feeding)] : Fully functional (bathing, dressing, toileting, transferring, walking, feeding) [Independent] : using transportation [Some assistance needed] : managing finances [de-identified] : No safety concerns identified. [de-identified] : No safety concerns identified.

## 2019-02-11 ENCOUNTER — LABORATORY RESULT (OUTPATIENT)
Age: 84
End: 2019-02-11

## 2019-02-12 ENCOUNTER — APPOINTMENT (OUTPATIENT)
Dept: CARDIOLOGY | Facility: CLINIC | Age: 84
End: 2019-02-12
Payer: MEDICARE

## 2019-02-12 VITALS
SYSTOLIC BLOOD PRESSURE: 130 MMHG | BODY MASS INDEX: 24.1 KG/M2 | HEIGHT: 68 IN | WEIGHT: 159 LBS | DIASTOLIC BLOOD PRESSURE: 63 MMHG | OXYGEN SATURATION: 96 % | HEART RATE: 80 BPM

## 2019-02-12 PROCEDURE — 99215 OFFICE O/P EST HI 40 MIN: CPT

## 2019-02-12 PROCEDURE — 93000 ELECTROCARDIOGRAM COMPLETE: CPT

## 2019-02-12 NOTE — DISCUSSION/SUMMARY
[___ Month(s)] : [unfilled] month(s) [FreeTextEntry1] : The patient is an 88-year-old gentleman mild dementia, diabetes mellitus, CKD stage IV, hypertension, hyperlipidemia, afib, CAD, cardiomyopathy, ICD who is asymptomatic. \par #1 CAD - no angina, continue imdur 30mg\par #2 Htn - stable on hydralazine and toprol\par #3 Cardiomyopathy - euvolemic on exam, continue torsemide  2 tab daily except when go out to eat twice a week, daily weights. ICD interrogation no sustained events\par #4 Afib - rate controlled with low dose toprol,no bleeding on warfarin,INR 4.5, held today instructions given\par #5 DM - under control\par #6 Renal - CKD stage IV f/u Dr. Miller\par #7 Lipids-on lipitor 40mg.\par #8 Neuro- on aricept, encourage ambulation with assistance, increase hydration

## 2019-02-13 ENCOUNTER — LABORATORY RESULT (OUTPATIENT)
Age: 84
End: 2019-02-13

## 2019-02-15 ENCOUNTER — APPOINTMENT (OUTPATIENT)
Dept: ELECTROPHYSIOLOGY | Facility: CLINIC | Age: 84
End: 2019-02-15
Payer: MEDICARE

## 2019-02-15 ENCOUNTER — NON-APPOINTMENT (OUTPATIENT)
Age: 84
End: 2019-02-15

## 2019-02-15 VITALS
BODY MASS INDEX: 24.25 KG/M2 | OXYGEN SATURATION: 99 % | HEIGHT: 68 IN | SYSTOLIC BLOOD PRESSURE: 163 MMHG | HEART RATE: 81 BPM | WEIGHT: 160 LBS | DIASTOLIC BLOOD PRESSURE: 68 MMHG

## 2019-02-15 PROCEDURE — 99214 OFFICE O/P EST MOD 30 MIN: CPT | Mod: 25

## 2019-02-15 PROCEDURE — 93000 ELECTROCARDIOGRAM COMPLETE: CPT

## 2019-02-15 RX ORDER — WARFARIN 2.5 MG/1
2.5 TABLET ORAL DAILY
Qty: 156 | Refills: 3 | Status: DISCONTINUED | COMMUNITY
Start: 2018-02-28 | End: 2019-02-15

## 2019-02-15 NOTE — PHYSICAL EXAM
[General Appearance - Well Developed] : well developed [No Deformities] : no deformities [General Appearance - In No Acute Distress] : no acute distress [Normal Conjunctiva] : the conjunctiva exhibited no abnormalities [No Oral Pallor] : no oral pallor [Normal Jugular Venous V Waves Present] : normal jugular venous V waves present [Respiration, Rhythm And Depth] : normal respiratory rhythm and effort [Exaggerated Use Of Accessory Muscles For Inspiration] : no accessory muscle use [Auscultation Breath Sounds / Voice Sounds] : lungs were clear to auscultation bilaterally [Abdomen Soft] : soft [Abdomen Tenderness] : non-tender [Abdomen Mass (___ Cm)] : no abdominal mass palpated [Abnormal Walk] : normal gait [Nail Clubbing] : no clubbing of the fingernails [Cyanosis, Localized] : no localized cyanosis [Petechial Hemorrhages (___cm)] : no petechial hemorrhages [] : no ischemic changes [No Venous Stasis] : no venous stasis [Oriented To Time, Place, And Person] : oriented to person, place, and time [Not Palpable] : not palpable [Normal Rate] : normal [Normal S1] : normal S1 [Normal S2] : normal S2 [Prosthetic Mitral Valve] : prosthetic mitral valve heard [II] : a grade 2 [2+] : left 2+ [1+] : left 1+ [No Pitting Edema] : no pitting edema present

## 2019-02-20 ENCOUNTER — LABORATORY RESULT (OUTPATIENT)
Age: 84
End: 2019-02-20

## 2019-03-06 ENCOUNTER — LABORATORY RESULT (OUTPATIENT)
Age: 84
End: 2019-03-06

## 2019-03-09 NOTE — REVIEW OF SYSTEMS
[Feeling Fatigued] : feeling fatigued [Dyspnea on exertion] : dyspnea during exertion [see HPI] : see HPI [Memory Lapses Or Loss] : memory lapses or loss [Negative] : Heme/Lymph [Recent Weight Gain (___ Lbs)] : no recent weight gain [Recent Weight Loss (___ Lbs)] : no recent weight loss [Blurry Vision] : no blurred vision [Seeing Double (Diplopia)] : no diplopia [Eye Pain] : no eye pain [Chest Pain] : no chest pain [Palpitations] : no palpitations [Cough] : no cough [Dizziness] : no dizziness

## 2019-03-09 NOTE — HISTORY OF PRESENT ILLNESS
[FreeTextEntry1] : \par 87 yo man persistent AF, cardiomyopathy, CABG, MVR,  s/p ICD. No recent ICD discharges. \par He has no fever, dizziness, lightheadedness,  cough, chest pain, SOB at rest, palpitations,  abdominal pain His main issues continue to be fatigue and decrease memory.\par \par Device: Medtronic A/RV/LV. Programmed VVIR

## 2019-03-09 NOTE — REASON FOR VISIT
[Follow-Up - From Hospitalization] : follow-up of a recent hospitalization for [AICD Check] : implantable cardioverter-defibrillator [Atrial Fibrillation] : atrial fibrillation [Cardiomyopathy] : cardiomyopathy [Ventricular Tachycardia] : ventricular tachycardia [Spouse] : spouse [FreeTextEntry2] : follow up

## 2019-03-09 NOTE — DISCUSSION/SUMMARY
[FreeTextEntry1] :  ICD was interrogated and there was no  VT episode. Normal Device function with battery longevity 2.0 yrs. Ventricular paced 85%\par  \par 1. Cardiomyopathy: Optivol fluid index  was below threshold .  Well compensated. \par 2. AF: rate controlled. Anticoagulated.  The  AF is  persistent and ventricular rate is controlled. Continue on Beta blocker and Coumadin\par 4. ? Prior endocarditis: No symptoms of endocarditis or device infection off antibiotics\par Follow up ICD evaluation in 4 months

## 2019-03-19 ENCOUNTER — RX RENEWAL (OUTPATIENT)
Age: 84
End: 2019-03-19

## 2019-03-19 ENCOUNTER — APPOINTMENT (OUTPATIENT)
Dept: NEUROLOGY | Facility: CLINIC | Age: 84
End: 2019-03-19
Payer: MEDICARE

## 2019-03-19 VITALS
SYSTOLIC BLOOD PRESSURE: 147 MMHG | HEART RATE: 88 BPM | BODY MASS INDEX: 24.25 KG/M2 | DIASTOLIC BLOOD PRESSURE: 77 MMHG | WEIGHT: 160 LBS | HEIGHT: 68 IN

## 2019-03-19 PROCEDURE — 99215 OFFICE O/P EST HI 40 MIN: CPT

## 2019-03-19 NOTE — ASSESSMENT
[FreeTextEntry1] : The patient is an 88 year old right handed man with progressive cognitive decline since 2010 primarily affecting memory and naming, associated with mild decline in IADLs. On neurobehavioral status testing he had impaired memory that didn’t respond well to cues though conversationally he had excellent recall for current events. He had anomia and made repetition errors, but phonemic and semantic fluency were similar. No visuospatial dysfunction or apraxia.\par \par Counseled on how he has dementia of mild stage. He was given diagnosis of AD since approx 2010 but I would have expected him to have declined more since then. His memory impairment in conversation seems more of a retrieval deficit, but on screening testing it is more encoding deficit, so it isnt fully clear. The language abnormality would fit with AD but would have expected parietal dysfunction that I'm not seeing, or a phonemic/semantic fluency discrepancy that i'm not seeing. FDG PET showed mod-severe L worse than R hypometabolism in frontal, parietal, and temporal lobes as well as moderately increased activity in the right basal ganglia and r thalamus, which radiologist interpreted as being c/w FTD but cannot rule out superimposed vascular process. I disagree with interpretation that this is probably FTD since AD can affect frontal lobes in addition to temporoparietal and his age of onset is atypical for FTD and his symptoms don't fit into usual clinical syndrome. the other findings could be seen in patients with parkinsons disease but he doesn't have that.  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 \par \par \par He is on FDA approved treatments for AD with aricept and namenda. The aspirin and coumadin he is on for atrial fibrillation and secondary stroke prophylaxis in combination with aricept puts him at risk for GIB and I explained how i would not have prescribed aricept in the setting of coumadin, however, he is at risk of worsening if we discontinue so after discussing risks/benefits/expectations they prefer to keep. Namenda is renally dosed.\par \par Counseled on strategies for maintaining cognitive health. \par \par

## 2019-03-19 NOTE — DATA REVIEWED
[de-identified] : hct 10/27/17 vs 5/20/16 age approp volume loss, microvascular ischemic disease\par i personally reviewed and see generalized volume loss, old left cerebellar infart, microvascular ischemic disease similar to 2016\par \par hct 5/30/16 mild microvascular ischemic disease, small chornic left superior cerebellar infarct [de-identified] : \par FDG PET 7/22/18 no change from 7/1/18 (repeated because took insulin prior to the prior pet). 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 asymmetric moderate increase in activity right basal ganglia and mildly increased right thalamus of uncertain significance.\par \par fdg pet 7/1/18 showed asymmetric moderate-severe hypometabolism in L>R parietal, frontal, and temporal lobes c/w neurodegeneration. also asymmetric moderately increased activity R>L basal ganglia and thalamus of uncertain significance. however, he took insulin 2 hrs prior to the test so results are not clearly able to be interpreted, I spoke with radiologist and she said probably can trust that there was hypometabolism, but cannot be sure\par  [de-identified] : eeg 2/8/10 normal [de-identified] : \par Neurobehavioral status testing 6/21/18:\par Cognitive domains:\par 	Attention: within normal limits \par 	Working memory: errors\par 	Executive function: impaired abstraction\par 	Language: anomia more to names than objects; similar phonemic and semantic fluency; \par 	Memory: impaired recall with only mild response to cues; reptition with fluency task; conversationally had good memory for recent events\par 	Visuospatial function: within normal limits \par 	Praxis: within normal limits \par 	Behavior/Mood: appropriate\par 	Other comments: registration was difficult; processing speed seemed appropriate for some tasks, other times felt like i had to repeat an instruction for him to fully grasp\par                moca 18/30

## 2019-03-19 NOTE — HISTORY OF PRESENT ILLNESS
[FreeTextEntry1] : Interval hx:\par 3/19/19 visit: \par they report symptoms are stable. \par he hasn’t been exercising but is willing to try. he wanted to ice skate but he hasn’t in 19 yrs so we discussed doing something else with less of a risk to hurting himself, especially with him being on coumadin. \par no medication changes this visit.\par \par \par \par ----------------------\par Background information (initial visit 6/21/18):\par \par  Patient accompanied by wife Fay and daughter marielena Bush ([facility] nurse in apheresis unit). \par \par The patient is a 87 year old right handed man referred by Dr. Rivas.\par \par Symptoms since approx 2013 they thought but osh records note as early as 2010. Started with forgetfulness. Repeats questions. Progressive symptoms. They say his memory was never very good at baseline. Especially worse symptoms when finger infection occurred a few yrs ago and never got back to how good he was prior. Also transiently worse during prolonged hospital stays in 2016, has been about stable in the last year.\par \par May 2016  had a dysconjugate gaze eyes outward they tell me, imbalance, diagnosed with R ANNIKA. Eye patch for a few weeks for diplopia but it resolved. Dr. Trotter saw him once and didn’t recommend any further f/u. 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  On aspirin 81mg since then presumably for what they thought was new brainstem stroke that couldn’t be seen on the hct.\par \par Sept 2016 hospitalized for endocarditis and CHF, also PNA, CKD worse then, considering HD but didn’t need it.  He was in/out of hospital monthly for a while. Heart failure doctors improved medication regimen that helped.  Cognitive symptoms worse then but did improve afterwards.\par \par First visit with Dr. Rivas was 9/15/17, he had followed with prior neurologists before him and was diagnosed with Alzheimer's disease, treated with aricept and namenda prior to seeing Dr. Rivas.  MMSe 27 (-3 recall). Driving evaluation was recommended but they never pursued. Oct 2017 the patient had headaches, was on coumadin, so HCT done and was neg for SDH. He had MMSe 27 (-3 recall), normal intersecting figure copy. OSH records note that neurotrax was done 2/25/10 and had global score of 100.7- was >1SD below average for verbal function, above avg in memory, exec fxn, attn, proc speed, vsp, motor skills, above avg for global score.\par \par Some days better than others. On a bad day will get grandkids confused. No issues faces. No getting lost. Still driving, 90% of time with his wife in car who isnt worried about his driving skills. No one in family is worried about driving. His wife sets out the medications and prepares pill box with him, but he takes them himself. This started after in/out of hospital. \par \par Has a computer, trouble recalling his password per family but he denies. No trouble with email. No issues with remote cotnrol for tv. Plays piano, cant read music as quickly as before, but still can. No decline in piano playing ability.\par \par sleep: says wakes up in night to urinate. snores. sleep study approx 1 yr ago neg for LUNA. \par \par mood: denies hx anxiety and depression. post hospitalization was depressed for some time,  related to his not being able to drive, put on zoloft, off completely for 2 weeks now since they wanted to taper off since not depressed.\par \par Having right back and shoulder pain, saw orthopedist today, got xrays. \par \par They think he has been more stable since aricept and namenda but those were started around time he was also struggling with the in/out of hospital. No side effects.\par \par 11/20/2018 visit: Patient accompanied by wife Fay and son Reinaldo ([employer] at [place])\par \par fdg pet 7/1/18 showed asymmetric moderate-severe hypometabolism in L>R parietal, frontal, and temporal lobes c/w neurodegeneration. also asymmetric moderately increased activity R>L basal ganglia and thalamus of uncertain significance. however, he took insulin 2 hrs prior to the test so results are not clearly able to be interpreted, I spoke with radiologist and she said probably can trust that there was hypometabolism, but cannot be sure\par \par we repeated pet 7/22/18 off insulin\par \par FDG PET 7/22/18 no change from 7/1/18.  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 asymmetric moderate increase in activity right basal ganglia and mildly increased right thalamus of uncertain significance.\par \par Called daughter marielena Bush in june and re-reviewed symptoms, which are not consistent with bvFTD so we discussed how the results would at least support idea this is neurodegenerative, despite such slow course over last 8 yrs, and they wanted to keep the Aricept so although I wouldn't have started it in context of being on AC there is at least support for idea of keeping it since results could be c/w AD. I disagree with interpretation that this is probably FTD since AD can affect frontal lobes in addition to temporoparietal and his age of onset is atypical for FTD and his symptoms don't fit into usual clinical syndrome. the other findings could be seen in patients with parkinsons disease but he doesn't have that\par \par Saw Dr. Koroma 9/18 for neck pain, thought he had cervical radiculopathy, normal exam, recommended PT and acupuncture and new mattress.\par \par His wife reports 2 years ago infection on his finger and his cognition was worse after that. Has been stable. \par \par Doing well on lower dose of namenda dosed for renal impairment.\par \par today no medication changes.

## 2019-03-19 NOTE — PHYSICAL EXAM
[FreeTextEntry1] : Awake and alert, in no acute distress\par Attends to both sides. Conjugate gaze without directional limitation. No dysarthria. Face symmetric\par Moves extremities symmetrically\par No dysmetria with reaching for objects\par Gait steady\par  \par The patient was alert, well groomed, NAD. He was fluent without paraphasic errors. Anomia in conversation with regard to person from a news story, otherwise none. Comprehension was intact. He was active in the discussion. He appeared euthymic, reactive, made jokes, no inappropriate behavior. He was able to tell me [facility] but he required hints to recall. He recalled news story from this morning (an accident at [employer]) but he referenced it as being the news story he heard about yesterday morning, and his wife was trying to jog his memory about a different story they had discussed but he didn’t recall that one. he recalled information about [employer] scandal but was anomic to the people involved and didn’t recall the colleges involved. he had a tendency to blame his imperfect replies on his disinterest in the subject.\par \par they showed me a recording of him playing the piano from the other week, without sheet music, and it was a song very well played.\par

## 2019-03-26 ENCOUNTER — APPOINTMENT (OUTPATIENT)
Dept: NEPHROLOGY | Facility: CLINIC | Age: 84
End: 2019-03-26
Payer: MEDICARE

## 2019-03-26 VITALS
OXYGEN SATURATION: 96 % | HEART RATE: 84 BPM | HEIGHT: 68 IN | DIASTOLIC BLOOD PRESSURE: 83 MMHG | WEIGHT: 162 LBS | BODY MASS INDEX: 24.55 KG/M2 | SYSTOLIC BLOOD PRESSURE: 151 MMHG

## 2019-03-26 VITALS — SYSTOLIC BLOOD PRESSURE: 128 MMHG | DIASTOLIC BLOOD PRESSURE: 70 MMHG

## 2019-03-26 PROCEDURE — 36415 COLL VENOUS BLD VENIPUNCTURE: CPT

## 2019-03-26 PROCEDURE — 99214 OFFICE O/P EST MOD 30 MIN: CPT | Mod: 25

## 2019-03-26 NOTE — HISTORY OF PRESENT ILLNESS
[FreeTextEntry1] : 88 year old M with history of CKD since 2012, ischemic cardiomyopathy with EF of 20% with AICD, MV replacement, atrial fibrillation who presents for follow up of CKD IV/V. Wife and son accompany patient to today's visit. \par He follows with Dr. Miller from Geriatrics, Dr. Varela/ [facility], Dr. Gilliland urology and Dr. Kruse from endocrine. \par His appetite is very good.  Denies any new complaints.

## 2019-03-26 NOTE — ASSESSMENT
[FreeTextEntry1] : 88 year old M with history of CKD, ischemic cardiomyopathy with decreased EF, AICD, DM for follow up of CKD stage V.\par CKD stage V from longstanding history of DM, HTN, chronic cardiorenal is stable\par No signs of fluid overload\par Patient and family will continue conservative management of his renal disease\par Hypertension: BP stable and controlled\par DM: low carb diet and acceptable hemoglobin A1c\par Anemia: check Cbc today\par Labs today\par Followup in 6 months\par D/w wife and son

## 2019-03-26 NOTE — PHYSICAL EXAM
[General Appearance - Alert] : alert [General Appearance - In No Acute Distress] : in no acute distress [Sclera] : the sclera and conjunctiva were normal [Neck Appearance] : the appearance of the neck was normal [Auscultation Breath Sounds / Voice Sounds] : lungs were clear to auscultation bilaterally [Heart Rate And Rhythm] : heart rate was normal and rhythm regular [Heart Sounds] : normal S1 and S2 [Heart Sounds Gallop] : no gallops [Heart Sounds Pericardial Friction Rub] : no pericardial rub [Edema] : there was no peripheral edema [Bowel Sounds] : normal bowel sounds [Abdomen Soft] : soft [No CVA Tenderness] : no ~M costovertebral angle tenderness [Involuntary Movements] : no involuntary movements were seen [] : no rash [No Focal Deficits] : no focal deficits [Affect] : the affect was normal [Mood] : the mood was normal

## 2019-03-26 NOTE — REASON FOR VISIT
[Follow-Up] : a follow-up visit [Spouse] : spouse [Family Member] : family member [FreeTextEntry1] : CKD stage V

## 2019-03-28 LAB
25(OH)D3 SERPL-MCNC: 29.3 NG/ML
ALBUMIN SERPL ELPH-MCNC: 4.5 G/DL
ALP BLD-CCNC: 106 U/L
ALT SERPL-CCNC: 19 U/L
ANION GAP SERPL CALC-SCNC: 16 MMOL/L
AST SERPL-CCNC: 24 U/L
BASOPHILS # BLD AUTO: 0.05 K/UL
BASOPHILS NFR BLD AUTO: 0.7 %
BILIRUB SERPL-MCNC: 0.3 MG/DL
BUN SERPL-MCNC: 79 MG/DL
CALCIUM SERPL-MCNC: 9.4 MG/DL
CALCIUM SERPL-MCNC: 9.4 MG/DL
CHLORIDE SERPL-SCNC: 105 MMOL/L
CO2 SERPL-SCNC: 22 MMOL/L
CREAT SERPL-MCNC: 2.93 MG/DL
EOSINOPHIL # BLD AUTO: 0.42 K/UL
EOSINOPHIL NFR BLD AUTO: 6.2 %
ESTIMATED AVERAGE GLUCOSE: 177 MG/DL
GLUCOSE SERPL-MCNC: 118 MG/DL
HBA1C MFR BLD HPLC: 7.8 %
HCT VFR BLD CALC: 42.5 %
HGB BLD-MCNC: 12.7 G/DL
IMM GRANULOCYTES NFR BLD AUTO: 0.7 %
INR PPP: 1.85 RATIO
LYMPHOCYTES # BLD AUTO: 1.37 K/UL
LYMPHOCYTES NFR BLD AUTO: 20.3 %
MAGNESIUM SERPL-MCNC: 2.3 MG/DL
MAN DIFF?: NORMAL
MCHC RBC-ENTMCNC: 28.4 PG
MCHC RBC-ENTMCNC: 29.9 GM/DL
MCV RBC AUTO: 95.1 FL
MONOCYTES # BLD AUTO: 0.9 K/UL
MONOCYTES NFR BLD AUTO: 13.4 %
NEUTROPHILS # BLD AUTO: 3.95 K/UL
NEUTROPHILS NFR BLD AUTO: 58.7 %
PARATHYROID HORMONE INTACT: 228 PG/ML
PHOSPHATE SERPL-MCNC: 4.9 MG/DL
PLATELET # BLD AUTO: 176 K/UL
POTASSIUM SERPL-SCNC: 4.7 MMOL/L
PROT SERPL-MCNC: 7.6 G/DL
PT BLD: 21.3 SEC
RBC # BLD: 4.47 M/UL
RBC # FLD: 14.5 %
SODIUM SERPL-SCNC: 143 MMOL/L
URATE SERPL-MCNC: 9.5 MG/DL
WBC # FLD AUTO: 6.74 K/UL

## 2019-04-02 ENCOUNTER — TRANSCRIPTION ENCOUNTER (OUTPATIENT)
Age: 84
End: 2019-04-02

## 2019-04-19 NOTE — ED ADULT NURSE NOTE - NS PRO PASSIVE SMOKE EXP
Patient is a 77y old  Male who presents with a chief complaint of SOB with chest pain..    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 Hx of CABG and chf in past as per family. Pt seen by cardiology earlier.    PAST MEDICAL & SURGICAL HISTORY:  Dialysis patient: Tues, Thurs, Saturday  Chronic anemia  ESRD (end stage renal disease): right Upper arm fistula  CAD (coronary artery disease)  Lung cancer: had  radiation in 2006.  Bipolar disorder  High cholesterol  Hypertension  S/P CABG (coronary artery bypass graft): pt&#x27;s son in [facility] states h/o some stents near neck area ? carotid ? he is not sure.      FAMILY HISTORY:  Family history of essential hypertension  [place]    Social History:Non smoker    MEDICATIONS  (STANDING):  aspirin  chewable 324 milliGRAM(s) Oral daily  buMETAnide Injectable 2 milliGRAM(s) IV Push Once  isosorbide   mononitrate ER Tablet (IMDUR) 120 milliGRAM(s) Oral daily  losartan 50 milliGRAM(s) Oral daily  NIFEdipine XL 60 milliGRAM(s) Oral daily    MEDICATIONS  (PRN):   Meds reviewed    Allergies    No Known Allergies      REVIEW OF SYSTEMS:    CONSTITUTIONAL:  sob, feels weak  EYES: No eye pain, visual disturbances, or discharge  ENMT:  No difficulty hearing, tinnitus, vertigo; No sinus or throat pain  NECK: No pain or stiffness  BREASTS: No pain, masses, or nipple discharge  RESPIRATORY: sob  CARDIOVASCULAR: + chest pain.  GASTROINTESTINAL: No abdominal or epigastric pain. No nausea, vomiting, or hematemesis;   GENITOURINARY: No dysuria, frequency, hematuria, or incontinence  NEUROLOGICAL: No headaches, memory loss, loss of strength, numbness, or tremors  SKIN: Neg  LYMPH NODES: No enlarged glands  ENDOCRINE: No heat or cold intolerance; No hair loss  MUSCULOSKELETAL: neg  PSYCHIATRIC: No depression, anxiety, mood swings, or difficulty sleeping  HEME/LYMPH: No easy bruising, or bleeding gums  ALLERY AND IMMUNOLOGIC: No hives or eczema      Vital Signs Last 24 Hrs  T(C): 36.6 (19 Apr 2019 10:42), Max: 36.6 (19 Apr 2019 10:42)  T(F): 97.8 (19 Apr 2019 10:42), Max: 97.8 (19 Apr 2019 10:42)  HR: 55 (19 Apr 2019 12:50) (52 - 55)  BP: 177/74 (19 Apr 2019 12:50) (158/69 - 177/74)  BP(mean): --  RR: 20 (19 Apr 2019 12:50) (20 - 20)  SpO2: 96% (19 Apr 2019 12:50) (96% - 96%)  Daily Height in cm: 167.64 (19 Apr 2019 10:42)    Daily     PHYSICAL EXAM:    GENERAL: appears chronically ill, Family at bedside  HEAD:  Atraumatic, Normocephalic  EYES: EOMI, PERRLA,  ENMT: No tonsillar erythema, exudates, or enlargement  NECK: Supple, neck  veins full  NERVOUS SYSTEM:  Alert , Good concentration; Motor Strength wnl upper and lower extremities;  CHEST/LUNG: Bilateral rales  HEART: Regular rate and rhythm; No  rubs, Sternal scar noted  ABDOMEN: Soft, Nontender, Nondistended; Bowel sounds present,   EXTREMITIES: Diffuse muscle wasting, left upper arm access with bruit  LYMPH: No lymphadenopathy noted  SKIN: No rashes or lesions, pale      LABS:                        10.5   9.0   )-----------( 242      ( 19 Apr 2019 11:32 )             34.1     04-19    132<L>  |  90<L>  |  14.0  ----------------------------<  101  4.2   |  29.0  |  3.40<H>    Ca    9.0      19 Apr 2019 11:32    TPro  7.5  /  Alb  3.4  /  TBili  0.3<L>  /  DBili  x   /  AST  100<H>  /  ALT  41<H>  /  AlkPhos  55  04-19    PT/INR - ( 19 Apr 2019 11:32 )   PT: 11.0 sec;   INR: 0.96 ratio         PTT - ( 19 Apr 2019 11:32 )  PTT:30.7 sec            RADIOLOGY & ADDITIONAL TESTS: No

## 2019-04-22 ENCOUNTER — LABORATORY RESULT (OUTPATIENT)
Age: 84
End: 2019-04-22

## 2019-04-24 ENCOUNTER — APPOINTMENT (OUTPATIENT)
Dept: GERIATRICS | Facility: CLINIC | Age: 84
End: 2019-04-24
Payer: MEDICARE

## 2019-04-24 VITALS
HEART RATE: 2 BPM | BODY MASS INDEX: 25.73 KG/M2 | RESPIRATION RATE: 6 BRPM | OXYGEN SATURATION: 90 % | SYSTOLIC BLOOD PRESSURE: 130 MMHG | WEIGHT: 169.8 LBS | DIASTOLIC BLOOD PRESSURE: 70 MMHG | HEIGHT: 68 IN

## 2019-04-24 DIAGNOSIS — H54.7 UNSPECIFIED VISUAL LOSS: ICD-10-CM

## 2019-04-24 PROCEDURE — 99213 OFFICE O/P EST LOW 20 MIN: CPT

## 2019-04-27 PROBLEM — H54.7 VISION PROBLEMS: Status: ACTIVE | Noted: 2019-04-27

## 2019-04-27 NOTE — PHYSICAL EXAM
[Sclera] : the sclera and conjunctiva were normal [PERRL With Normal Accommodation] : pupils were equal in size, round, and reactive to light [Extraocular Movements] : extraocular movements were intact [Strabismus] : no strabismus was seen [Optic Disc Abnormality] : the optic disc were normal in size and color [Retina] : no retinal hemorrhages, vessel changes or exudates seen on fundoscopic exam [Outer Ear] : the ears and nose were normal in appearance [Oropharynx] : the oropharynx was normal [Neck Appearance] : the appearance of the neck was normal [Neck Cervical Mass (___cm)] : no neck mass was observed [Jugular Venous Distention Increased] : there was no jugular-venous distention [Thyroid Diffuse Enlargement] : the thyroid was not enlarged [Thyroid Nodule] : there were no palpable thyroid nodules [Auscultation Breath Sounds / Voice Sounds] : lungs were clear to auscultation bilaterally [Heart Rate And Rhythm] : heart rate was normal and rhythm regular [Heart Sounds Gallop] : no gallops [Murmurs] : no murmurs [Heart Sounds] : normal S1 and S2 [Full Pulse] : the pedal pulses are present [Heart Sounds Pericardial Friction Rub] : no pericardial rub [Bowel Sounds] : normal bowel sounds [Abdomen Soft] : soft [Edema] : there was no peripheral edema [Abdomen Tenderness] : non-tender [Abdomen Mass (___ Cm)] : no abdominal mass palpated [Abnormal Walk] : normal gait [Nail Clubbing] : no clubbing  or cyanosis of the fingernails [Musculoskeletal - Swelling] : no joint swelling seen [Motor Tone] : muscle strength and tone were normal [Skin Turgor] : normal skin turgor [Skin Color & Pigmentation] : normal skin color and pigmentation [] : no rash [FreeTextEntry1] : no bruising except around right eye

## 2019-04-27 NOTE — HISTORY OF PRESENT ILLNESS
[FreeTextEntry1] : 87 yo man who woke up two days ago with a significant bruise on right eyelid and moving down to below eye. Pt denies pain.  He says he feels his vision is slighty worse in that eye than the other eye.  No pain.  He did not fall. He did not injure his eye.  He is on coumadin for AFIB. Pt is here with wife and dtrcaleb.\par \par He is not in any distress. No other bruising noted. His INR from 4/22 is 1.93 essentially therapeutic.\par \par Pt takes coumadin for afib.

## 2019-04-27 NOTE — ASSESSMENT
[FreeTextEntry1] : 87 yo a male with CHF, Debrillaotr, Afib on coumadin, dm, and now with new bruise on eye and stating he feels his vision has changed but able to read and see far on my exam. \par \par 1) Eye and vision change - bruising -  hold coumadin\par see ophthalmologist - has family phthalmologist and will be seen asap. I stated to call me/office if unable to be seen in 24 hours. Suspect pt coughed or sneezed that led to bleed.  Vision seems functionally good on my exam but subjectively pt notices a change. So need to get evaluated and stop coumadin for now.\par 2) restart when ok from ophthalmologist perspecitive \par \par Family to call if worse or not seen by ophthalmologist. Son works with [employer] and knows how to contact me if needed.

## 2019-04-27 NOTE — REVIEW OF SYSTEMS
[As Noted in HPI] : as noted in HPI [Eye Pain] : no eye pain [Red Eyes] : eyes not red [Eyesight Problems] : eyesight problems [Discharge From Eyes] : no purulent discharge from the eyes [Dry Eyes] : no dryness of the eyes [Eyes Itch] : no itching of the eyes [Negative] : Heme/Lymph

## 2019-05-14 ENCOUNTER — NON-APPOINTMENT (OUTPATIENT)
Age: 84
End: 2019-05-14

## 2019-05-14 ENCOUNTER — APPOINTMENT (OUTPATIENT)
Dept: CARDIOLOGY | Facility: CLINIC | Age: 84
End: 2019-05-14
Payer: MEDICARE

## 2019-05-14 VITALS
WEIGHT: 161.8 LBS | DIASTOLIC BLOOD PRESSURE: 72 MMHG | OXYGEN SATURATION: 95 % | HEART RATE: 61 BPM | SYSTOLIC BLOOD PRESSURE: 144 MMHG | BODY MASS INDEX: 24.6 KG/M2

## 2019-05-14 PROCEDURE — 93000 ELECTROCARDIOGRAM COMPLETE: CPT

## 2019-05-14 PROCEDURE — 99215 OFFICE O/P EST HI 40 MIN: CPT

## 2019-05-14 NOTE — PHYSICAL EXAM
[Well Groomed] : well groomed [Normal Appearance] : normal appearance [General Appearance - Well Developed] : well developed [General Appearance - In No Acute Distress] : no acute distress [No Deformities] : no deformities [General Appearance - Well Nourished] : well nourished [Normal Conjunctiva] : the conjunctiva exhibited no abnormalities [Eyelids - No Xanthelasma] : the eyelids demonstrated no xanthelasmas [No Oral Pallor] : no oral pallor [No Oral Cyanosis] : no oral cyanosis [Normal Oral Mucosa] : normal oral mucosa [Normal Jugular Venous V Waves Present] : normal jugular venous V waves present [No Jugular Venous Villan[place]va A Waves] : no jugular venous villanueva A waves [Normal Jugular Venous A Waves Present] : normal jugular venous A waves present [Respiration, Rhythm And Depth] : normal respiratory rhythm and effort [Exaggerated Use Of Accessory Muscles For Inspiration] : no accessory muscle use [Auscultation Breath Sounds / Voice Sounds] : lungs were clear to auscultation bilaterally [Murmurs] : no murmurs present [Heart Sounds] : normal S1 and S2 [Irregularly Irregular] : the rhythm was irregularly irregular [Abdomen Soft] : soft [Abdomen Tenderness] : non-tender [Abdomen Mass (___ Cm)] : no abdominal mass palpated [Abnormal Walk] : normal gait [Nail Clubbing] : no clubbing of the fingernails [Gait - Sufficient For Exercise Testing] : the gait was sufficient for exercise testing [Cyanosis, Localized] : no localized cyanosis [Petechial Hemorrhages (___cm)] : no petechial hemorrhages [Skin Color & Pigmentation] : normal skin color and pigmentation [No Venous Stasis] : no venous stasis [] : no rash [No Skin Ulcers] : no skin ulcer [Skin Lesions] : no skin lesions [No Xanthoma] : no  xanthoma was observed [Oriented To Time, Place, And Person] : oriented to person, place, and time [Affect] : the affect was normal [Mood] : the mood was normal [No Anxiety] : not feeling anxious

## 2019-05-14 NOTE — DISCUSSION/SUMMARY
[___ Month(s)] : [unfilled] month(s) [FreeTextEntry1] : The patient is an 88-year-old gentleman mild dementia, diabetes mellitus, CKD stage IV, hypertension, hyperlipidemia, afib, CAD, cardiomyopathy, ICD who is asymptomatic. \par #1 CAD - no angina, continue imdur 30mg\par #2 Htn - stable on hydralazine and toprol\par #3 Cardiomyopathy - euvolemic on exam, continue torsemide  2 tab daily except when go out to eat twice a week, daily weights. ICD interrogation next week, echo next visit\par #4 Afib - rate controlled with low dose toprol,no bleeding on warfarin,INR 4.5, held today instructions given\par #5 DM - under control\par #6 Renal - CKD stage IV f/u Dr. Miller\par #7 Lipids-on lipitor 40mg.\par #8 Neuro- on aricept, encourage ambulation with assistance, increase hydration

## 2019-05-14 NOTE — HISTORY OF PRESENT ILLNESS
[FreeTextEntry1] : Klaus is an 88-year-old gentleman mild dementia, CAD, htn, hyperlipidemia,afib,  systolic heart failure who  has been feeling well with no chest pain, palpitations or shortness of breath. He does go around the block occasionally. One day his weight went up five pounds but came down the following day.

## 2019-05-21 ENCOUNTER — APPOINTMENT (OUTPATIENT)
Dept: ELECTROPHYSIOLOGY | Facility: CLINIC | Age: 84
End: 2019-05-21
Payer: MEDICARE

## 2019-05-21 ENCOUNTER — APPOINTMENT (OUTPATIENT)
Dept: GERIATRICS | Facility: CLINIC | Age: 84
End: 2019-05-21
Payer: MEDICARE

## 2019-05-21 ENCOUNTER — APPOINTMENT (OUTPATIENT)
Dept: ENDOCRINOLOGY | Facility: CLINIC | Age: 84
End: 2019-05-21
Payer: MEDICARE

## 2019-05-21 VITALS
BODY MASS INDEX: 26.18 KG/M2 | WEIGHT: 172.2 LBS | OXYGEN SATURATION: 98 % | DIASTOLIC BLOOD PRESSURE: 62 MMHG | HEART RATE: 77 BPM | TEMPERATURE: 97.6 F | SYSTOLIC BLOOD PRESSURE: 136 MMHG

## 2019-05-21 VITALS
OXYGEN SATURATION: 98 % | DIASTOLIC BLOOD PRESSURE: 68 MMHG | HEIGHT: 68 IN | HEART RATE: 92 BPM | SYSTOLIC BLOOD PRESSURE: 120 MMHG | WEIGHT: 171 LBS | BODY MASS INDEX: 25.91 KG/M2

## 2019-05-21 VITALS
SYSTOLIC BLOOD PRESSURE: 134 MMHG | DIASTOLIC BLOOD PRESSURE: 70 MMHG | HEART RATE: 92 BPM | BODY MASS INDEX: 24.52 KG/M2 | WEIGHT: 161.8 LBS | HEIGHT: 68 IN | OXYGEN SATURATION: 93 %

## 2019-05-21 PROCEDURE — 99214 OFFICE O/P EST MOD 30 MIN: CPT

## 2019-05-21 PROCEDURE — 93284 PRGRMG EVAL IMPLANTABLE DFB: CPT

## 2019-05-21 NOTE — HISTORY OF PRESENT ILLNESS
[FreeTextEntry1] : 87 M with PMH HTN, HLD, DMT2, CKD5, ischemic cardiomyopathy with EF of 20% with AICD, MV replacement, atrial fibrillation on coumadin presents for follow up. Patient follows up closely with cardiology, endocrinology, and nephrology. No recent hospitalizations or falls. INR yesterday = 1.69\par \par No hospitalizations in past 2 years.  Pt complaining of headaches daily all over head. Relived with tylenol but feels it has affected his QOL. Has tried meds, accupuncture, heat. Takes tylenol with some relief.\par \par HFrEF: Reports persistent fatigue. However minimal LE swelling. No orthopnea. Ambulation unchanged. \par \par Hypertension: BP stable. Compliant with his medications. \par \par A-fib/ICD: INR normally therapeutic. Needs repeat today. No recent bleeding. \par \par Chronic neck pain: stable.\par \par Diabetes: Controlled. Managed by endocrine. Compliant with medications. Continues take lantus and humalog. Saw endocrinologist prior to visit with me\par \par Nocturia: No change. patient continues to urinate at night 4-5 times and not sleeping well. Pt on tamsulosin. He drinks tea several times a day especially at bedtime. He sleeps with a large water bottle of water or soda by bedside at night and drinks it throughout the night stating he gets thirsty. Advised to limit fluid intake before bedtime and during the night. Suggeted stoppping tea for headache and urination purpose. Pt refuses.\par \par Mild Dementia: Short term memory is a problem. Long term memory is okay. . Has been on namenda and donepezil x 3 years. sees neurologist. Asks the same questions again. No wandering, agitation or aggression. STABLE per wife\par  [Mild] : Stage: Mild [None] : The patient is currently asymptomatic

## 2019-05-21 NOTE — ASSESSMENT
[FreeTextEntry1] : 89 yo male with multiple medical problems Headache , chf, diabetes, debriillator, afib\par \par 1)CHF - satble\par 2) Afib on coumadin - INR low. Take coumadi 10mg tonight and increase coumadin to 5 mg 6 days aweek and 2.5 mg one day a wek\par 3) DM - controlled check hgb a1c as per endocrinologist\par 4) CKD - labs in Mar ch crn lelvated. No dialysis wanted\par 5) HM - immunizations up to date, Dtr in law HCP in office today.

## 2019-05-21 NOTE — PHYSICAL EXAM
[General Appearance - Well Nourished] : well nourished [General Appearance - Well Developed] : well developed [Sclera] : the sclera and conjunctiva were normal [Extraocular Movements] : extraocular movements were intact [Oropharynx] : The oropharynx was normal [] : the neck was supple [Exaggerated Use Of Accessory Muscles For Inspiration] : no accessory muscle use [Auscultation Breath Sounds / Voice Sounds] : lungs were clear to auscultation bilaterally [Heart Sounds] : normal S1 and S2 [FreeTextEntry1] : (+) 1 b/l LE edema  [Abdomen Soft] : soft [Abdomen Tenderness] : non-tender [Abnormal Walk] : normal gait [Musculoskeletal - Swelling] : no joint swelling seen [Skin Color & Pigmentation] : normal skin color and pigmentation [No Focal Deficits] : no focal deficits [Affect] : the affect was normal [Mood] : the mood was normal

## 2019-05-21 NOTE — REVIEW OF SYSTEMS
[Fever] : no fever [Eye Pain] : no eye pain [Sore Throat] : no sore throat [Chest Pain] : no chest pain [Palpitations] : no palpitations [Shortness Of Breath] : no shortness of breath [Cough] : no cough [Abdominal Pain] : no abdominal pain [Dysuria] : no dysuria [Nocturia] : nocturia [Joint Pain] : joint pain [Skin Lesions] : no skin lesions [Confused] : no confusion [Depression] : no depression [Muscle Weakness] : no muscle weakness

## 2019-05-22 NOTE — REVIEW OF SYSTEMS
[Joint Pain] : joint pain [Headache] : headaches [Chest Pain] : no chest pain [Shortness Of Breath] : no shortness of breath [Nausea] : no nausea

## 2019-05-22 NOTE — ASSESSMENT
[FreeTextEntry1] : 88 year old man with T2DM, with reasonable control, \par  - continue current insulin regimen, no further hypoglycemia pre-lunch\par   - Retinopathy screening up to date\par  -  had flu shot\par \par HTN: Blood pressure at goal, + CKD, followed by nephrology, + microalbumin, conitnue current management\par \par Hyperlipidemia - continue atorvastatin\par \par Vit d def'y - continue vitamin 2000 units daily\par \par f/u in 3 months

## 2019-05-22 NOTE — HISTORY OF PRESENT ILLNESS
[FreeTextEntry1] : cc: diabetes\par \par 88 year old man with T2DM, with CKD, on basal bolus insulin with reasonable control.  He checks glucose 3 times daily and values have been 81 - 221 mg/dl, no hypoglycemia.  \par Taking Lantus 22 units daily and Novolog 8-12 units before meals depending on glucose and carbohydrate intake\par He has cut down on  carbohydrate intake,  minimal exercise\par  Retinopathy screening up to date, last optho visit was last month, no retinopathy\par had flu shot\par \par HTN: blood pressure has been controlled, no recent change in medication\par \par Hyperlipidemia - he takes a statin, reports no side effects\par \par

## 2019-05-22 NOTE — PHYSICAL EXAM
[Alert] : alert [No Acute Distress] : no acute distress [Well Nourished] : well nourished [Well Developed] : well developed [Normal Sclera/Conjunctiva] : normal sclera/conjunctiva [No Proptosis] : no proptosis [No LAD] : no lymphadenopathy [Thyroid Not Enlarged] : the thyroid was not enlarged [No Respiratory Distress] : no respiratory distress [Clear to Auscultation] : lungs were clear to auscultation bilaterally [Normal S1, S2] : normal S1 and S2 [Regular Rhythm] : with a regular rhythm [Pedal Pulses Normal] : the pedal pulses are present [Normal Bowel Sounds] : normal bowel sounds [Not Tender] : non-tender [Soft] : abdomen soft [No Spinal Tenderness] : no spinal tenderness [Normal Strength/Tone] : muscle strength and tone were normal [Right Foot Was Examined] : right foot ~C was examined [Left Foot Was Examined] : left foot ~C was examined [Normal Reflexes] : deep tendon reflexes were 2+ and symmetric [No Tremors] : no tremors [Normal Affect] : the affect was normal [Normal Mood] : the mood was normal [Foot Ulcers] : no foot ulcers [de-identified] : b/l le edema [de-identified] : decreased sensation on monofilament testing b/l

## 2019-07-09 ENCOUNTER — RX RENEWAL (OUTPATIENT)
Age: 84
End: 2019-07-09

## 2019-07-15 ENCOUNTER — APPOINTMENT (OUTPATIENT)
Dept: CV DIAGNOSITCS | Facility: HOSPITAL | Age: 84
End: 2019-07-15

## 2019-07-15 ENCOUNTER — OUTPATIENT (OUTPATIENT)
Dept: OUTPATIENT SERVICES | Facility: HOSPITAL | Age: 84
LOS: 1 days | End: 2019-07-15
Payer: MEDICARE

## 2019-07-15 DIAGNOSIS — I48.91 UNSPECIFIED ATRIAL FIBRILLATION: ICD-10-CM

## 2019-07-15 DIAGNOSIS — Z95.4 PRESENCE OF OTHER HEART-VALVE REPLACEMENT: Chronic | ICD-10-CM

## 2019-07-15 DIAGNOSIS — Z95.810 PRESENCE OF AUTOMATIC (IMPLANTABLE) CARDIAC DEFIBRILLATOR: Chronic | ICD-10-CM

## 2019-07-15 DIAGNOSIS — Z98.89 OTHER SPECIFIED POSTPROCEDURAL STATES: Chronic | ICD-10-CM

## 2019-07-15 DIAGNOSIS — Z98.49 CATARACT EXTRACTION STATUS, UNSPECIFIED EYE: Chronic | ICD-10-CM

## 2019-07-15 DIAGNOSIS — Z95.1 PRESENCE OF AORTOCORONARY BYPASS GRAFT: Chronic | ICD-10-CM

## 2019-07-15 PROCEDURE — 93306 TTE W/DOPPLER COMPLETE: CPT | Mod: 26

## 2019-07-15 PROCEDURE — 93306 TTE W/DOPPLER COMPLETE: CPT

## 2019-07-16 ENCOUNTER — LABORATORY RESULT (OUTPATIENT)
Age: 84
End: 2019-07-16

## 2019-07-16 DIAGNOSIS — I51.9 HEART DISEASE, UNSPECIFIED: ICD-10-CM

## 2019-07-17 NOTE — ED ADULT NURSE NOTE - NS ED NURSE LEVEL OF CONSCIOUSNESS AFFECT
Assistance with medical decision making in a patient with  ? treatment resistant disease/progression Appropriate

## 2019-08-13 ENCOUNTER — APPOINTMENT (OUTPATIENT)
Dept: ENDOCRINOLOGY | Facility: CLINIC | Age: 84
End: 2019-08-13
Payer: MEDICARE

## 2019-08-13 VITALS
DIASTOLIC BLOOD PRESSURE: 70 MMHG | HEART RATE: 95 BPM | HEIGHT: 68 IN | BODY MASS INDEX: 24.4 KG/M2 | SYSTOLIC BLOOD PRESSURE: 110 MMHG | OXYGEN SATURATION: 95 % | WEIGHT: 161.03 LBS

## 2019-08-13 PROCEDURE — 83036 HEMOGLOBIN GLYCOSYLATED A1C: CPT | Mod: QW

## 2019-08-13 PROCEDURE — 82962 GLUCOSE BLOOD TEST: CPT

## 2019-08-13 PROCEDURE — 99214 OFFICE O/P EST MOD 30 MIN: CPT | Mod: 25

## 2019-08-13 NOTE — PHYSICAL EXAM
[Alert] : alert [No Acute Distress] : no acute distress [Well Nourished] : well nourished [Well Developed] : well developed [No LAD] : no lymphadenopathy [No Proptosis] : no proptosis [Normal Sclera/Conjunctiva] : normal sclera/conjunctiva [Thyroid Not Enlarged] : the thyroid was not enlarged [No Respiratory Distress] : no respiratory distress [Normal S1, S2] : normal S1 and S2 [Clear to Auscultation] : lungs were clear to auscultation bilaterally [Regular Rhythm] : with a regular rhythm [Normal Bowel Sounds] : normal bowel sounds [No Edema] : there was no peripheral edema [Not Tender] : non-tender [Soft] : abdomen soft [Normal Strength/Tone] : muscle strength and tone were normal [No Spinal Tenderness] : no spinal tenderness [Normal Reflexes] : deep tendon reflexes were 2+ and symmetric [No Tremors] : no tremors [Normal Affect] : the affect was normal [Normal Mood] : the mood was normal

## 2019-08-14 LAB
GLUCOSE BLDC GLUCOMTR-MCNC: 139
HBA1C MFR BLD HPLC: 7.1

## 2019-08-14 NOTE — ASSESSMENT
[FreeTextEntry1] : 88 year old man with T2DM, well controlled\par  - encouraged pt to continue with diet, increase exercise\par  - continue current insulin regimen, no  recent hypoglycemia \par   - Retinopathy screening up to date\par \par HTN: Blood pressure at goal, + CKD, followed by nephrology, + microalbumin, continue current management\par \par Hyperlipidemia - continue atorvastatin\par \par Vit d def'y - continue vitamin 2000 units daily\par \par f/u wth pmd regarding pain\par \par f/u in 3 months

## 2019-08-14 NOTE — HISTORY OF PRESENT ILLNESS
[FreeTextEntry1] : cc: diabetes\par \par 88 year old man with T2DM, with CKD, on basal bolus insulin with reasonable control.  He checks glucose 3 times daily and values have been 100 - 200 mg/dl, rare values below 100 or over 200, no hypoglycemia.  Forgot log today\par Taking Lantus 22 units daily (sometimes 20) and Novolog 6-12 units before meals depending on glucose and carbohydrate intake\par He been limiting  carbohydrate intake,  reports minimal exercise\par  Retinopathy screening up to date, last optho visit was about 4 months ago, no retinopathy\par \par HTN: blood pressure has been controlled, no recent change in regimen\par \par Hyperlipidemia - he takes a statin, reports no muscle pain\par \par Has had headaches has been taking tylenol\par

## 2019-08-14 NOTE — REVIEW OF SYSTEMS
[Chest Pain] : no chest pain [Shortness Of Breath] : no shortness of breath [FreeTextEntry9] : has had back and neck pain [Nausea] : no nausea

## 2019-08-20 ENCOUNTER — LABORATORY RESULT (OUTPATIENT)
Age: 84
End: 2019-08-20

## 2019-08-22 ENCOUNTER — RX RENEWAL (OUTPATIENT)
Age: 84
End: 2019-08-22

## 2019-08-26 ENCOUNTER — OTHER (OUTPATIENT)
Age: 84
End: 2019-08-26

## 2019-08-28 ENCOUNTER — NON-APPOINTMENT (OUTPATIENT)
Age: 84
End: 2019-08-28

## 2019-08-28 ENCOUNTER — APPOINTMENT (OUTPATIENT)
Dept: ELECTROPHYSIOLOGY | Facility: CLINIC | Age: 84
End: 2019-08-28
Payer: MEDICARE

## 2019-08-28 VITALS
OXYGEN SATURATION: 97 % | HEART RATE: 74 BPM | DIASTOLIC BLOOD PRESSURE: 74 MMHG | HEIGHT: 68 IN | SYSTOLIC BLOOD PRESSURE: 148 MMHG

## 2019-08-28 PROCEDURE — 99214 OFFICE O/P EST MOD 30 MIN: CPT

## 2019-08-28 PROCEDURE — 93000 ELECTROCARDIOGRAM COMPLETE: CPT

## 2019-08-28 NOTE — PHYSICAL EXAM
[General Appearance - Well Developed] : well developed [No Deformities] : no deformities [General Appearance - In No Acute Distress] : no acute distress [Normal Conjunctiva] : the conjunctiva exhibited no abnormalities [No Oral Pallor] : no oral pallor [Normal Jugular Venous V Waves Present] : normal jugular venous V waves present [Respiration, Rhythm And Depth] : normal respiratory rhythm and effort [Auscultation Breath Sounds / Voice Sounds] : lungs were clear to auscultation bilaterally [Exaggerated Use Of Accessory Muscles For Inspiration] : no accessory muscle use [Abdomen Tenderness] : non-tender [Abdomen Soft] : soft [Abdomen Mass (___ Cm)] : no abdominal mass palpated [Abnormal Walk] : normal gait [Nail Clubbing] : no clubbing of the fingernails [Cyanosis, Localized] : no localized cyanosis [Petechial Hemorrhages (___cm)] : no petechial hemorrhages [] : no ischemic changes [No Venous Stasis] : no venous stasis [Oriented To Time, Place, And Person] : oriented to person, place, and time [Not Palpable] : not palpable [Normal Rate] : normal [Normal S2] : normal S2 [Normal S1] : normal S1 [Prosthetic Mitral Valve] : prosthetic mitral valve heard [II] : a grade 2 [2+] : left 2+ [1+] : left 1+ [No Pitting Edema] : no pitting edema present

## 2019-08-30 RX ORDER — WARFARIN SODIUM 5 MG/1
5 TABLET ORAL
Refills: 0 | Status: COMPLETED | COMMUNITY
End: 2019-08-30

## 2019-09-03 NOTE — HISTORY OF PRESENT ILLNESS
[FreeTextEntry1] : \par 89 yo man persistent AF, cardiomyopathy, CABG, MVR,  s/p ICD. No recent ICD discharges. \par He has no fever, dizziness, lightheadedness,  cough, chest pain, SOB at rest, palpitations,  abdominal pain His main issues continue to be fatigue and decrease memory.\par \par Device: Medtronic A/RV/LV. Programmed VVIR

## 2019-09-03 NOTE — REVIEW OF SYSTEMS
[Feeling Fatigued] : feeling fatigued [Dyspnea on exertion] : dyspnea during exertion [Memory Lapses Or Loss] : memory lapses or loss [see HPI] : see HPI [Negative] : Heme/Lymph [Recent Weight Gain (___ Lbs)] : no recent weight gain [Recent Weight Loss (___ Lbs)] : no recent weight loss [Blurry Vision] : no blurred vision [Seeing Double (Diplopia)] : no diplopia [Eye Pain] : no eye pain [Chest Pain] : no chest pain [Palpitations] : no palpitations [Cough] : no cough [Dizziness] : no dizziness

## 2019-09-06 ENCOUNTER — RX RENEWAL (OUTPATIENT)
Age: 84
End: 2019-09-06

## 2019-09-12 RX ORDER — WARFARIN 2.5 MG/1
2.5 TABLET ORAL DAILY
Qty: 180 | Refills: 0 | Status: DISCONTINUED | COMMUNITY
End: 2019-09-12

## 2019-09-17 ENCOUNTER — APPOINTMENT (OUTPATIENT)
Dept: CARDIOLOGY | Facility: CLINIC | Age: 84
End: 2019-09-17
Payer: MEDICARE

## 2019-09-17 ENCOUNTER — APPOINTMENT (OUTPATIENT)
Dept: GERIATRICS | Facility: CLINIC | Age: 84
End: 2019-09-17
Payer: MEDICARE

## 2019-09-17 VITALS
WEIGHT: 166 LBS | HEART RATE: 80 BPM | OXYGEN SATURATION: 98 % | DIASTOLIC BLOOD PRESSURE: 77 MMHG | BODY MASS INDEX: 25.16 KG/M2 | SYSTOLIC BLOOD PRESSURE: 150 MMHG | HEIGHT: 68 IN

## 2019-09-17 VITALS
TEMPERATURE: 97.6 F | HEART RATE: 87 BPM | WEIGHT: 166 LBS | OXYGEN SATURATION: 97 % | RESPIRATION RATE: 16 BRPM | HEIGHT: 78 IN | DIASTOLIC BLOOD PRESSURE: 70 MMHG | SYSTOLIC BLOOD PRESSURE: 136 MMHG | BODY MASS INDEX: 19.21 KG/M2

## 2019-09-17 PROCEDURE — 99215 OFFICE O/P EST HI 40 MIN: CPT

## 2019-09-17 PROCEDURE — 93000 ELECTROCARDIOGRAM COMPLETE: CPT

## 2019-09-17 PROCEDURE — 99214 OFFICE O/P EST MOD 30 MIN: CPT

## 2019-09-17 NOTE — PHYSICAL EXAM
[General Appearance - Well Developed] : well developed [Normal Appearance] : normal appearance [Well Groomed] : well groomed [General Appearance - Well Nourished] : well nourished [No Deformities] : no deformities [General Appearance - In No Acute Distress] : no acute distress [Normal Conjunctiva] : the conjunctiva exhibited no abnormalities [Eyelids - No Xanthelasma] : the eyelids demonstrated no xanthelasmas [Normal Oral Mucosa] : normal oral mucosa [No Oral Pallor] : no oral pallor [No Oral Cyanosis] : no oral cyanosis [Normal Jugular Venous A Waves Present] : normal jugular venous A waves present [Normal Jugular Venous V Waves Present] : normal jugular venous V waves present [No Jugular Venous Villanueva A Waves] : no jugular venous villanueva A waves [Respiration, Rhythm And Depth] : normal respiratory rhythm and effort [Exaggerated Use Of Accessory Muscles For Inspiration] : no accessory muscle use [Auscultation Breath Sounds / Voice Sounds] : lungs were clear to auscultation bilaterally [Murmurs] : no murmurs present [Heart Sounds] : normal S1 and S2 [Abdomen Soft] : soft [Irregularly Irregular] : the rhythm was irregularly irregular [Abdomen Tenderness] : non-tender [Abdomen Mass (___ Cm)] : no abdominal mass palpated [Abnormal Walk] : normal gait [Gait - Sufficient For Exercise Testing] : the gait was sufficient for exercise testing [Nail Clubbing] : no clubbing of the fingernails [Cyanosis, Localized] : no localized cyanosis [Petechial Hemorrhages (___cm)] : no petechial hemorrhages [] : no rash [Skin Color & Pigmentation] : normal skin color and pigmentation [No Venous Stasis] : no venous stasis [Skin Lesions] : no skin lesions [No Skin Ulcers] : no skin ulcer [No Xanthoma] : no  xanthoma was observed [Affect] : the affect was normal [Oriented To Time, Place, And Person] : oriented to person, place, and time [Mood] : the mood was normal [No Anxiety] : not feeling anxious

## 2019-09-18 ENCOUNTER — NON-APPOINTMENT (OUTPATIENT)
Age: 84
End: 2019-09-18

## 2019-09-18 ENCOUNTER — MEDICATION RENEWAL (OUTPATIENT)
Age: 84
End: 2019-09-18

## 2019-09-18 RX ORDER — SYRINGE-NEEDLE,INSULIN,0.5 ML 31 GX5/16"
31G X 5/16" SYRINGE, EMPTY DISPOSABLE MISCELLANEOUS
Qty: 90 | Refills: 3 | Status: ACTIVE | COMMUNITY
Start: 2017-06-19 | End: 1900-01-01

## 2019-09-18 NOTE — DISCUSSION/SUMMARY
[___ Month(s)] : [unfilled] month(s) [FreeTextEntry1] : The patient is an 88-year-old gentleman mild dementia, diabetes mellitus, CKD stage IV, hypertension, hyperlipidemia, afib, CAD, cardiomyopathy, ICD who is asymptomatic. \par #1 CAD - no angina, continue imdur 30mg\par #2 Htn - stable on hydralazine and toprol\par #3 Cardiomyopathy - euvolemic on exam, continue torsemide  2 tab daily except when go out to eat twice a week, daily weights. ICD interrogation negative, echo severe LV dysfunction\par #4 Afib - rate controlled with low dose toprol, no bleeding on warfarin,INR better\par #5 DM - under control\par #6 Renal - CKD stage IV f/u Dr. Miller\par #7 Lipids-on lipitor 40mg.\par #8 Neuro- on aricept, encourage ambulation with assistance, increase hydration

## 2019-09-19 NOTE — ASSESSMENT
[FreeTextEntry1] : 88 M with PMH HTN, HLD, DMT2, CKD5, ischemic cardiomyopathy with EF of 20% with AICD, MV replacement, atrial fibrillation on Coumadin presents for follow up of chronic headaches. \par \par Flu vaccination administered \par \par Case discussed w Dr. Miller\par \par RTC in 2 months \par \par consider gabapentin 100 mg q hs.\par consider medical marijuana\par

## 2019-09-19 NOTE — REVIEW OF SYSTEMS
[Joint Pain] : joint pain [As Noted in HPI] : as noted in HPI [Fever] : no fever [Feeling Poorly] : not feeling poorly [Eye Pain] : no eye pain [Eyesight Problems] : no eyesight problems [Loss Of Hearing] : no hearing loss [Chest Pain] : no chest pain [Shortness Of Breath] : no shortness of breath [Orthopnea] : no orthopnea [Abdominal Pain] : no abdominal pain [Dysuria] : no dysuria [Incontinence] : no incontinence [Suicidal] : not suicidal

## 2019-09-19 NOTE — HISTORY OF PRESENT ILLNESS
[FreeTextEntry1] : 88 M with PMH HTN, HLD, DMT2, CKD5, ischemic cardiomyopathy with EF of 20% with AICD, MV replacement, atrial fibrillation on Coumadin presents for follow up. Patient follows up closely with cardiology, endocrinology, and nephrology.\par \par Pt has complaints of headache that is chronic but has recently worsened with regards to frequency. Pt notes global headache, more prominent in occipital region. Wife and son who are present during the encounter states that he takes approximately 4 g Tylenol throughout the day with some relief of headache. Denies history of migraines. Denies aura or prodrome. No changes in senses or focal neuro deficit. Son notes chronic back and cervical pain previously treated w opiates that have since been discontinued. Pt has appt w pain management in January regarding headaches.

## 2019-09-19 NOTE — PHYSICAL EXAM
[General Appearance - Alert] : alert [General Appearance - Well Nourished] : well nourished [General Appearance - Well-Appearing] : healthy appearing [Sclera] : the sclera and conjunctiva were normal [Extraocular Movements] : extraocular movements were intact [Outer Ear] : the ears and nose were normal in appearance [Both Tympanic Membranes Were Examined] : both tympanic membranes were normal [Neck Appearance] : the appearance of the neck was normal [] : no respiratory distress [Respiration, Rhythm And Depth] : normal respiratory rhythm and effort [Murmurs] : no murmurs [No Spinal Tenderness] : no spinal tenderness [Abdomen Soft] : soft [Cranial Nerves] : cranial nerves 2-12 were intact [Sensation] : the sensory exam was normal to light touch and pinprick [Motor Exam] : the motor exam was normal [No Focal Deficits] : no focal deficits [Oriented To Time, Place, And Person] : oriented to person, place, and time [Impaired Insight] : insight and judgment were intact [Affect] : the affect was normal [FreeTextEntry1] : ecchymosis and excoriations of forearm

## 2019-09-21 ENCOUNTER — INPATIENT (INPATIENT)
Facility: HOSPITAL | Age: 84
LOS: 11 days | Discharge: HOME | DRG: 871 | End: 2019-10-03
Attending: INTERNAL MEDICINE | Admitting: HOSPITALIST
Payer: MEDICARE

## 2019-09-21 VITALS
DIASTOLIC BLOOD PRESSURE: 69 MMHG | HEART RATE: 87 BPM | OXYGEN SATURATION: 92 % | HEIGHT: 68.5 IN | SYSTOLIC BLOOD PRESSURE: 115 MMHG | WEIGHT: 160.06 LBS | RESPIRATION RATE: 20 BRPM | TEMPERATURE: 101 F

## 2019-09-21 DIAGNOSIS — Z98.89 OTHER SPECIFIED POSTPROCEDURAL STATES: Chronic | ICD-10-CM

## 2019-09-21 DIAGNOSIS — Z98.49 CATARACT EXTRACTION STATUS, UNSPECIFIED EYE: Chronic | ICD-10-CM

## 2019-09-21 DIAGNOSIS — Z95.4 PRESENCE OF OTHER HEART-VALVE REPLACEMENT: Chronic | ICD-10-CM

## 2019-09-21 DIAGNOSIS — Z95.1 PRESENCE OF AORTOCORONARY BYPASS GRAFT: Chronic | ICD-10-CM

## 2019-09-21 DIAGNOSIS — Z95.810 PRESENCE OF AUTOMATIC (IMPLANTABLE) CARDIAC DEFIBRILLATOR: Chronic | ICD-10-CM

## 2019-09-21 LAB
ALBUMIN SERPL ELPH-MCNC: 4.3 G/DL — SIGNIFICANT CHANGE UP (ref 3.3–5)
ALP SERPL-CCNC: 99 U/L — SIGNIFICANT CHANGE UP (ref 40–120)
ALT FLD-CCNC: 24 U/L — SIGNIFICANT CHANGE UP (ref 10–45)
ANION GAP SERPL CALC-SCNC: 16 MMOL/L — SIGNIFICANT CHANGE UP (ref 5–17)
APPEARANCE UR: ABNORMAL
APTT BLD: 39.3 SEC — HIGH (ref 27.5–36.3)
AST SERPL-CCNC: 28 U/L — SIGNIFICANT CHANGE UP (ref 10–40)
BACTERIA # UR AUTO: NEGATIVE — SIGNIFICANT CHANGE UP
BASOPHILS # BLD AUTO: 0 K/UL — SIGNIFICANT CHANGE UP (ref 0–0.2)
BILIRUB SERPL-MCNC: 0.5 MG/DL — SIGNIFICANT CHANGE UP (ref 0.2–1.2)
BILIRUB UR-MCNC: NEGATIVE — SIGNIFICANT CHANGE UP
BUN SERPL-MCNC: 83 MG/DL — HIGH (ref 7–23)
CALCIUM SERPL-MCNC: 9.7 MG/DL — SIGNIFICANT CHANGE UP (ref 8.4–10.5)
CHLORIDE SERPL-SCNC: 104 MMOL/L — SIGNIFICANT CHANGE UP (ref 96–108)
CO2 SERPL-SCNC: 23 MMOL/L — SIGNIFICANT CHANGE UP (ref 22–31)
COLOR SPEC: YELLOW — SIGNIFICANT CHANGE UP
CREAT SERPL-MCNC: 3.32 MG/DL — HIGH (ref 0.5–1.3)
DIFF PNL FLD: NEGATIVE — SIGNIFICANT CHANGE UP
DIGOXIN SERPL-MCNC: 0.5 NG/ML — LOW (ref 0.8–2)
EOSINOPHIL # BLD AUTO: 0 K/UL — SIGNIFICANT CHANGE UP (ref 0–0.5)
EPI CELLS # UR: 1 /HPF — SIGNIFICANT CHANGE UP
ERYTHROCYTE [SEDIMENTATION RATE] IN BLOOD: 21 MM/HR — HIGH (ref 0–20)
GLUCOSE SERPL-MCNC: 116 MG/DL — HIGH (ref 70–99)
GLUCOSE UR QL: NEGATIVE — SIGNIFICANT CHANGE UP
HCT VFR BLD CALC: 41.4 % — SIGNIFICANT CHANGE UP (ref 39–50)
HGB BLD-MCNC: 12.9 G/DL — LOW (ref 13–17)
HYALINE CASTS # UR AUTO: 6 /LPF — HIGH (ref 0–2)
INR BLD: 3.58 RATIO — HIGH (ref 0.88–1.16)
KETONES UR-MCNC: NEGATIVE — SIGNIFICANT CHANGE UP
LEUKOCYTE ESTERASE UR-ACNC: ABNORMAL
LYMPHOCYTES # BLD AUTO: 0.4 K/UL — LOW (ref 1–3.3)
LYMPHOCYTES # BLD AUTO: 2 % — LOW (ref 13–44)
MCHC RBC-ENTMCNC: 28.9 PG — SIGNIFICANT CHANGE UP (ref 27–34)
MCHC RBC-ENTMCNC: 31.2 GM/DL — LOW (ref 32–36)
MCV RBC AUTO: 92.7 FL — SIGNIFICANT CHANGE UP (ref 80–100)
METAMYELOCYTES # FLD: 2 % — HIGH (ref 0–0)
MONOCYTES # BLD AUTO: 1.2 K/UL — HIGH (ref 0–0.9)
MONOCYTES NFR BLD AUTO: 8 % — SIGNIFICANT CHANGE UP (ref 2–14)
MYELOCYTES NFR BLD: 1 % — HIGH (ref 0–0)
NEUTROPHILS # BLD AUTO: 19 K/UL — HIGH (ref 1.8–7.4)
NEUTROPHILS NFR BLD AUTO: 78 % — HIGH (ref 43–77)
NEUTS BAND # BLD: 9 % — HIGH (ref 0–8)
NITRITE UR-MCNC: NEGATIVE — SIGNIFICANT CHANGE UP
NT-PROBNP SERPL-SCNC: 4675 PG/ML — HIGH (ref 0–300)
PH UR: 6 — SIGNIFICANT CHANGE UP (ref 5–8)
PLAT MORPH BLD: NORMAL — SIGNIFICANT CHANGE UP
PLATELET # BLD AUTO: 172 K/UL — SIGNIFICANT CHANGE UP (ref 150–400)
POTASSIUM SERPL-MCNC: 4 MMOL/L — SIGNIFICANT CHANGE UP (ref 3.5–5.3)
POTASSIUM SERPL-SCNC: 4 MMOL/L — SIGNIFICANT CHANGE UP (ref 3.5–5.3)
PROCALCITONIN SERPL-MCNC: 19.62 NG/ML — HIGH (ref 0.02–0.1)
PROT SERPL-MCNC: 7.5 G/DL — SIGNIFICANT CHANGE UP (ref 6–8.3)
PROT UR-MCNC: ABNORMAL
PROTHROM AB SERPL-ACNC: 42.4 SEC — HIGH (ref 10–12.9)
RBC # BLD: 4.46 M/UL — SIGNIFICANT CHANGE UP (ref 4.2–5.8)
RBC # FLD: 12.8 % — SIGNIFICANT CHANGE UP (ref 10.3–14.5)
RBC BLD AUTO: SIGNIFICANT CHANGE UP
RBC CASTS # UR COMP ASSIST: 5 /HPF — HIGH (ref 0–4)
SODIUM SERPL-SCNC: 143 MMOL/L — SIGNIFICANT CHANGE UP (ref 135–145)
SP GR SPEC: 1.02 — SIGNIFICANT CHANGE UP (ref 1.01–1.02)
UROBILINOGEN FLD QL: NEGATIVE — SIGNIFICANT CHANGE UP
WBC # BLD: 20.6 K/UL — HIGH (ref 3.8–10.5)
WBC # FLD AUTO: 20.6 K/UL — HIGH (ref 3.8–10.5)
WBC UR QL: 49 /HPF — HIGH (ref 0–5)

## 2019-09-21 PROCEDURE — 99285 EMERGENCY DEPT VISIT HI MDM: CPT | Mod: GC

## 2019-09-21 PROCEDURE — 71045 X-RAY EXAM CHEST 1 VIEW: CPT | Mod: 26

## 2019-09-21 RX ORDER — CEFTRIAXONE 500 MG/1
1000 INJECTION, POWDER, FOR SOLUTION INTRAMUSCULAR; INTRAVENOUS ONCE
Refills: 0 | Status: COMPLETED | OUTPATIENT
Start: 2019-09-21 | End: 2019-09-21

## 2019-09-21 RX ORDER — VANCOMYCIN HCL 1 G
1500 VIAL (EA) INTRAVENOUS ONCE
Refills: 0 | Status: COMPLETED | OUTPATIENT
Start: 2019-09-21 | End: 2019-09-22

## 2019-09-21 RX ADMIN — CEFTRIAXONE 100 MILLIGRAM(S): 500 INJECTION, POWDER, FOR SOLUTION INTRAMUSCULAR; INTRAVENOUS at 22:50

## 2019-09-21 NOTE — ED ADULT NURSE NOTE - NSIMPLEMENTINTERV_GEN_ALL_ED
Implemented All Fall with Harm Risk Interventions:  [place] to call system. Call bell, personal items and telephone within reach. Instruct patient to call for assistance. Room bathroom lighting operational. Non-slip footwear when patient is off stretcher. Physically safe environment: no spills, clutter or unnecessary equipment. Stretcher in lowest position, wheels locked, appropriate side rails in place. Provide visual cue, wrist band, yellow gown, etc. Monitor gait and stability. Monitor for mental status changes and reorient to person, place, and time. Review medications for side effects contributing to fall risk. Reinforce activity limits and safety measures with patient and family. Provide visual clues: red socks.

## 2019-09-21 NOTE — ED PROVIDER NOTE - CLINICAL SUMMARY MEDICAL DECISION MAKING FREE TEXT BOX
Attending Karen Gibbons: 89 y/o male with multiple medical issues including endocarditis, s/p valve repair, CHF presentingn with fevers and back pain. upon arrival pt febrile requiring supplemental oxygen. pt does have a murmur on exam but did have a valve replacement. 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  pt will need admission, ivf slowly as sig chf and BP stable at sthis time. will send 3 sets of cultures, give IV abx, cxr, and admit. will likely need ID eval

## 2019-09-21 NOTE — ED PROVIDER NOTE - PHYSICAL EXAMINATION
Attending Karen Gibbons: Gen: NAD, heent: atrauamtic, eomi, perrla, mmm, op pink, uvula midline, neck; nttp, no nuchal rigidity, chest: nttp, opacemaker site to left chestno crepitus, cv: rrr, +murmur, lungs: decreased at baseas, abd: soft, nontender, nondistended, no peritoneal signs, +BS, no guarding, ext: wwp, neg homans, skin: no rash, neuro: awake and alert, following commands, speech clear, sensation and strength intact, no focal deficits

## 2019-09-21 NOTE — ED ADULT NURSE NOTE - OBJECTIVE STATEMENT
as per pt family, pt "was feeling fine today and went to take a nap and after that wasn't feeling like himself and was lethargic. he took his temperature and he had a fever of 101.7. we called his doctor and told us to bring him to the ED" pt AOx3 but lethargic at present. pt denies any lightheadedness, dizziness, chest pain, SOB, abd. pain, n/v/d, numbness/tingling at present. redness noted to RLE, baseline for months as per pt family.

## 2019-09-21 NOTE — ED PROVIDER NOTE - PROGRESS NOTE DETAILS
Attending Karen Gibbons: labs showed evidence of leukocytosis and elevated procalcitonin. pt does report back pain, which reportedly is chronic however with fevers consider epidural abscess. will obtain ct of spiine. pt with pacemaker. cannot get MRI Attending Karen Gibbons: UA positive for infection. pt given broad spectrum abx. pt with sig cardiac history. given abx. hold off on fluids at this time as BP stable will continue to monitor Dr. Jason Maloney, PGY-2: lactate normal. Pt is not septic.

## 2019-09-21 NOTE — ED PROVIDER NOTE - OBJECTIVE STATEMENT
Attending Karen Gibbons: 87 y/o male with complex pmh including endocarditis with prior mitral valve repair, ckd, htn, h/o frequent UTI, kidney stones in the past presenting with fevers. per famiy pt was fine this am. went for a nap in the late afternoon and when he woke up family noticed he was increasingly more fatigued and had a low temperature of 100. no recent uri. no cough. no abdominal pain. pt has chronic back pain which is unchanged. no reports of rash. no le swelling. h/o heart failure as well on torsemide

## 2019-09-21 NOTE — ED PROVIDER NOTE - ATTENDING CONTRIBUTION TO CARE
Attending MD Karen Gibbons:  I personally have seen and examined this patient.  Resident note reviewed and agree on plan of care and except where noted.  See HPI, PE, and MDM for details.

## 2019-09-22 DIAGNOSIS — N39.0 URINARY TRACT INFECTION, SITE NOT SPECIFIED: ICD-10-CM

## 2019-09-22 DIAGNOSIS — A41.9 SEPSIS, UNSPECIFIED ORGANISM: ICD-10-CM

## 2019-09-22 DIAGNOSIS — Z29.9 ENCOUNTER FOR PROPHYLACTIC MEASURES, UNSPECIFIED: ICD-10-CM

## 2019-09-22 DIAGNOSIS — N40.0 BENIGN PROSTATIC HYPERPLASIA WITHOUT LOWER URINARY TRACT SYMPTOMS: ICD-10-CM

## 2019-09-22 DIAGNOSIS — E11.9 TYPE 2 DIABETES MELLITUS WITHOUT COMPLICATIONS: ICD-10-CM

## 2019-09-22 DIAGNOSIS — I25.10 ATHEROSCLEROTIC HEART DISEASE OF NATIVE CORONARY ARTERY WITHOUT ANGINA PECTORIS: ICD-10-CM

## 2019-09-22 DIAGNOSIS — I48.0 PAROXYSMAL ATRIAL FIBRILLATION: ICD-10-CM

## 2019-09-22 DIAGNOSIS — R79.1 ABNORMAL COAGULATION PROFILE: ICD-10-CM

## 2019-09-22 DIAGNOSIS — N18.4 CHRONIC KIDNEY DISEASE, STAGE 4 (SEVERE): ICD-10-CM

## 2019-09-22 DIAGNOSIS — F03.90 UNSPECIFIED DEMENTIA WITHOUT BEHAVIORAL DISTURBANCE: ICD-10-CM

## 2019-09-22 DIAGNOSIS — I50.9 HEART FAILURE, UNSPECIFIED: ICD-10-CM

## 2019-09-22 LAB
ALBUMIN SERPL ELPH-MCNC: 3.7 G/DL — SIGNIFICANT CHANGE UP (ref 3.3–5)
ALP SERPL-CCNC: 80 U/L — SIGNIFICANT CHANGE UP (ref 40–120)
ALT FLD-CCNC: 21 U/L — SIGNIFICANT CHANGE UP (ref 10–45)
ANION GAP SERPL CALC-SCNC: 16 MMOL/L — SIGNIFICANT CHANGE UP (ref 5–17)
AST SERPL-CCNC: 32 U/L — SIGNIFICANT CHANGE UP (ref 10–40)
BASE EXCESS BLDV CALC-SCNC: -3.4 MMOL/L — LOW (ref -2–2)
BASOPHILS # BLD AUTO: 0 K/UL — SIGNIFICANT CHANGE UP (ref 0–0.2)
BASOPHILS NFR BLD AUTO: 0 % — SIGNIFICANT CHANGE UP (ref 0–2)
BILIRUB SERPL-MCNC: 0.5 MG/DL — SIGNIFICANT CHANGE UP (ref 0.2–1.2)
BUN SERPL-MCNC: 80 MG/DL — HIGH (ref 7–23)
CA-I SERPL-SCNC: 1.17 MMOL/L — SIGNIFICANT CHANGE UP (ref 1.12–1.3)
CALCIUM SERPL-MCNC: 9.2 MG/DL — SIGNIFICANT CHANGE UP (ref 8.4–10.5)
CHLORIDE BLDV-SCNC: 106 MMOL/L — SIGNIFICANT CHANGE UP (ref 96–108)
CHLORIDE SERPL-SCNC: 101 MMOL/L — SIGNIFICANT CHANGE UP (ref 96–108)
CO2 BLDV-SCNC: 24 MMOL/L — SIGNIFICANT CHANGE UP (ref 22–30)
CO2 SERPL-SCNC: 19 MMOL/L — LOW (ref 22–31)
CREAT SERPL-MCNC: 3.4 MG/DL — HIGH (ref 0.5–1.3)
CULTURE RESULTS: SIGNIFICANT CHANGE UP
EOSINOPHIL # BLD AUTO: 0 K/UL — SIGNIFICANT CHANGE UP (ref 0–0.5)
EOSINOPHIL NFR BLD AUTO: 0 % — SIGNIFICANT CHANGE UP (ref 0–6)
GAS PNL BLDV: 137 MMOL/L — SIGNIFICANT CHANGE UP (ref 135–145)
GAS PNL BLDV: SIGNIFICANT CHANGE UP
GAS PNL BLDV: SIGNIFICANT CHANGE UP
GLUCOSE BLDV-MCNC: 174 MG/DL — HIGH (ref 70–99)
GLUCOSE SERPL-MCNC: 193 MG/DL — HIGH (ref 70–99)
GP B STREP DNA BLD POS QL NAA+NON-PROBE: SIGNIFICANT CHANGE UP
GRAM STN FLD: SIGNIFICANT CHANGE UP
GRAM STN FLD: SIGNIFICANT CHANGE UP
HBA1C BLD-MCNC: 7.2 % — HIGH (ref 4–5.6)
HCO3 BLDV-SCNC: 22 MMOL/L — SIGNIFICANT CHANGE UP (ref 21–29)
HCT VFR BLD CALC: 35.4 % — LOW (ref 39–50)
HCT VFR BLDA CALC: 33 % — LOW (ref 39–50)
HGB BLD CALC-MCNC: 10.8 G/DL — LOW (ref 13–17)
HGB BLD-MCNC: 11.1 G/DL — LOW (ref 13–17)
HOROWITZ INDEX BLDV+IHG-RTO: SIGNIFICANT CHANGE UP
INR BLD: 3.6 RATIO — HIGH (ref 0.88–1.16)
LACTATE BLDV-MCNC: 2 MMOL/L — SIGNIFICANT CHANGE UP (ref 0.7–2)
LYMPHOCYTES # BLD AUTO: 0.83 K/UL — LOW (ref 1–3.3)
LYMPHOCYTES # BLD AUTO: 3.3 % — LOW (ref 13–44)
MAGNESIUM SERPL-MCNC: 1.8 MG/DL — SIGNIFICANT CHANGE UP (ref 1.6–2.6)
MCHC RBC-ENTMCNC: 29.4 PG — SIGNIFICANT CHANGE UP (ref 27–34)
MCHC RBC-ENTMCNC: 31.4 GM/DL — LOW (ref 32–36)
MCV RBC AUTO: 93.7 FL — SIGNIFICANT CHANGE UP (ref 80–100)
METHOD TYPE: SIGNIFICANT CHANGE UP
MONOCYTES # BLD AUTO: 1.46 K/UL — HIGH (ref 0–0.9)
MONOCYTES NFR BLD AUTO: 5.8 % — SIGNIFICANT CHANGE UP (ref 2–14)
NEUTROPHILS # BLD AUTO: 22.01 K/UL — HIGH (ref 1.8–7.4)
NEUTROPHILS NFR BLD AUTO: 67.8 % — SIGNIFICANT CHANGE UP (ref 43–77)
PCO2 BLDV: 44 MMHG — SIGNIFICANT CHANGE UP (ref 35–50)
PH BLDV: 7.32 — LOW (ref 7.35–7.45)
PHOSPHATE SERPL-MCNC: 4.7 MG/DL — HIGH (ref 2.5–4.5)
PLATELET # BLD AUTO: 124 K/UL — LOW (ref 150–400)
PO2 BLDV: 32 MMHG — SIGNIFICANT CHANGE UP (ref 25–45)
POTASSIUM BLDV-SCNC: 4.1 MMOL/L — SIGNIFICANT CHANGE UP (ref 3.5–5.3)
POTASSIUM SERPL-MCNC: 4.9 MMOL/L — SIGNIFICANT CHANGE UP (ref 3.5–5.3)
POTASSIUM SERPL-SCNC: 4.9 MMOL/L — SIGNIFICANT CHANGE UP (ref 3.5–5.3)
PROT SERPL-MCNC: 6.9 G/DL — SIGNIFICANT CHANGE UP (ref 6–8.3)
PROTHROM AB SERPL-ACNC: 43.1 SEC — HIGH (ref 10–13.1)
RAPID RVP RESULT: SIGNIFICANT CHANGE UP
RBC # BLD: 3.78 M/UL — LOW (ref 4.2–5.8)
RBC # FLD: 14.5 % — SIGNIFICANT CHANGE UP (ref 10.3–14.5)
SAO2 % BLDV: 53 % — LOW (ref 67–88)
SODIUM SERPL-SCNC: 136 MMOL/L — SIGNIFICANT CHANGE UP (ref 135–145)
SPECIMEN SOURCE: SIGNIFICANT CHANGE UP
WBC # BLD: 25.13 K/UL — HIGH (ref 3.8–10.5)
WBC # FLD AUTO: 25.13 K/UL — HIGH (ref 3.8–10.5)

## 2019-09-22 PROCEDURE — 74176 CT ABD & PELVIS W/O CONTRAST: CPT | Mod: 26

## 2019-09-22 PROCEDURE — 12345: CPT | Mod: NC,GC

## 2019-09-22 PROCEDURE — 99223 1ST HOSP IP/OBS HIGH 75: CPT | Mod: AI,GC

## 2019-09-22 PROCEDURE — 72128 CT CHEST SPINE W/O DYE: CPT | Mod: 26

## 2019-09-22 PROCEDURE — 93308 TTE F-UP OR LMTD: CPT | Mod: 26

## 2019-09-22 PROCEDURE — 72131 CT LUMBAR SPINE W/O DYE: CPT | Mod: 26

## 2019-09-22 RX ORDER — DIGOXIN 250 MCG
0.12 TABLET ORAL
Refills: 0 | Status: DISCONTINUED | OUTPATIENT
Start: 2019-09-22 | End: 2019-10-03

## 2019-09-22 RX ORDER — SODIUM CHLORIDE 9 MG/ML
1000 INJECTION, SOLUTION INTRAVENOUS
Refills: 0 | Status: DISCONTINUED | OUTPATIENT
Start: 2019-09-22 | End: 2019-10-03

## 2019-09-22 RX ORDER — ATORVASTATIN CALCIUM 80 MG/1
40 TABLET, FILM COATED ORAL AT BEDTIME
Refills: 0 | Status: DISCONTINUED | OUTPATIENT
Start: 2019-09-22 | End: 2019-10-03

## 2019-09-22 RX ORDER — DEXTROSE 50 % IN WATER 50 %
25 SYRINGE (ML) INTRAVENOUS ONCE
Refills: 0 | Status: DISCONTINUED | OUTPATIENT
Start: 2019-09-22 | End: 2019-10-03

## 2019-09-22 RX ORDER — ACETAMINOPHEN 500 MG
650 TABLET ORAL ONCE
Refills: 0 | Status: COMPLETED | OUTPATIENT
Start: 2019-09-22 | End: 2019-09-22

## 2019-09-22 RX ORDER — ASPIRIN/CALCIUM CARB/MAGNESIUM 324 MG
81 TABLET ORAL DAILY
Refills: 0 | Status: DISCONTINUED | OUTPATIENT
Start: 2019-09-22 | End: 2019-10-03

## 2019-09-22 RX ORDER — INSULIN LISPRO 100/ML
VIAL (ML) SUBCUTANEOUS AT BEDTIME
Refills: 0 | Status: DISCONTINUED | OUTPATIENT
Start: 2019-09-22 | End: 2019-10-03

## 2019-09-22 RX ORDER — INSULIN GLARGINE 100 [IU]/ML
15 INJECTION, SOLUTION SUBCUTANEOUS AT BEDTIME
Refills: 0 | Status: DISCONTINUED | OUTPATIENT
Start: 2019-09-22 | End: 2019-10-03

## 2019-09-22 RX ORDER — DONEPEZIL HYDROCHLORIDE 10 MG/1
10 TABLET, FILM COATED ORAL AT BEDTIME
Refills: 0 | Status: DISCONTINUED | OUTPATIENT
Start: 2019-09-22 | End: 2019-10-03

## 2019-09-22 RX ORDER — DEXTROSE 50 % IN WATER 50 %
12.5 SYRINGE (ML) INTRAVENOUS ONCE
Refills: 0 | Status: DISCONTINUED | OUTPATIENT
Start: 2019-09-22 | End: 2019-10-03

## 2019-09-22 RX ORDER — MEMANTINE HYDROCHLORIDE 10 MG/1
1 TABLET ORAL
Qty: 0 | Refills: 0 | DISCHARGE

## 2019-09-22 RX ORDER — CEFTRIAXONE 500 MG/1
1000 INJECTION, POWDER, FOR SOLUTION INTRAMUSCULAR; INTRAVENOUS EVERY 24 HOURS
Refills: 0 | Status: DISCONTINUED | OUTPATIENT
Start: 2019-09-22 | End: 2019-09-23

## 2019-09-22 RX ORDER — TAMSULOSIN HYDROCHLORIDE 0.4 MG/1
0.4 CAPSULE ORAL AT BEDTIME
Refills: 0 | Status: DISCONTINUED | OUTPATIENT
Start: 2019-09-22 | End: 2019-10-03

## 2019-09-22 RX ORDER — HYDRALAZINE HCL 50 MG
10 TABLET ORAL
Refills: 0 | Status: DISCONTINUED | OUTPATIENT
Start: 2019-09-22 | End: 2019-10-03

## 2019-09-22 RX ORDER — WARFARIN SODIUM 2.5 MG/1
1 TABLET ORAL
Qty: 0 | Refills: 0 | DISCHARGE

## 2019-09-22 RX ORDER — INSULIN LISPRO 100/ML
VIAL (ML) SUBCUTANEOUS
Refills: 0 | Status: DISCONTINUED | OUTPATIENT
Start: 2019-09-22 | End: 2019-10-03

## 2019-09-22 RX ORDER — HEPARIN SODIUM 5000 [USP'U]/ML
5000 INJECTION INTRAVENOUS; SUBCUTANEOUS EVERY 8 HOURS
Refills: 0 | Status: DISCONTINUED | OUTPATIENT
Start: 2019-09-22 | End: 2019-09-29

## 2019-09-22 RX ORDER — METOPROLOL TARTRATE 50 MG
75 TABLET ORAL DAILY
Refills: 0 | Status: DISCONTINUED | OUTPATIENT
Start: 2019-09-22 | End: 2019-10-03

## 2019-09-22 RX ORDER — ISOSORBIDE DINITRATE 5 MG/1
30 TABLET ORAL DAILY
Refills: 0 | Status: DISCONTINUED | OUTPATIENT
Start: 2019-09-22 | End: 2019-09-23

## 2019-09-22 RX ORDER — INSULIN GLARGINE 100 [IU]/ML
27 INJECTION, SOLUTION SUBCUTANEOUS
Qty: 0 | Refills: 0 | DISCHARGE

## 2019-09-22 RX ORDER — SODIUM CHLORIDE 9 MG/ML
250 INJECTION, SOLUTION INTRAVENOUS ONCE
Refills: 0 | Status: COMPLETED | OUTPATIENT
Start: 2019-09-22 | End: 2019-09-22

## 2019-09-22 RX ORDER — MEMANTINE HYDROCHLORIDE 10 MG/1
5 TABLET ORAL
Refills: 0 | Status: DISCONTINUED | OUTPATIENT
Start: 2019-09-22 | End: 2019-10-03

## 2019-09-22 RX ORDER — DEXTROSE 50 % IN WATER 50 %
15 SYRINGE (ML) INTRAVENOUS ONCE
Refills: 0 | Status: DISCONTINUED | OUTPATIENT
Start: 2019-09-22 | End: 2019-10-03

## 2019-09-22 RX ORDER — GLUCAGON INJECTION, SOLUTION 0.5 MG/.1ML
1 INJECTION, SOLUTION SUBCUTANEOUS ONCE
Refills: 0 | Status: DISCONTINUED | OUTPATIENT
Start: 2019-09-22 | End: 2019-10-03

## 2019-09-22 RX ORDER — INSULIN LISPRO 100/ML
5 VIAL (ML) SUBCUTANEOUS
Refills: 0 | Status: DISCONTINUED | OUTPATIENT
Start: 2019-09-22 | End: 2019-10-03

## 2019-09-22 RX ADMIN — Medication 60 MILLIGRAM(S): at 14:58

## 2019-09-22 RX ADMIN — MEMANTINE HYDROCHLORIDE 5 MILLIGRAM(S): 10 TABLET ORAL at 14:58

## 2019-09-22 RX ADMIN — Medication 5 UNIT(S): at 13:59

## 2019-09-22 RX ADMIN — Medication 2: at 13:59

## 2019-09-22 RX ADMIN — SODIUM CHLORIDE 250 MILLILITER(S): 9 INJECTION, SOLUTION INTRAVENOUS at 03:17

## 2019-09-22 RX ADMIN — SODIUM CHLORIDE 250 MILLILITER(S): 9 INJECTION, SOLUTION INTRAVENOUS at 04:17

## 2019-09-22 RX ADMIN — INSULIN GLARGINE 15 UNIT(S): 100 INJECTION, SOLUTION SUBCUTANEOUS at 22:44

## 2019-09-22 RX ADMIN — Medication 5 UNIT(S): at 09:12

## 2019-09-22 RX ADMIN — MEMANTINE HYDROCHLORIDE 5 MILLIGRAM(S): 10 TABLET ORAL at 22:43

## 2019-09-22 RX ADMIN — Medication 1: at 18:18

## 2019-09-22 RX ADMIN — Medication 75 MILLIGRAM(S): at 09:14

## 2019-09-22 RX ADMIN — CEFTRIAXONE 100 MILLIGRAM(S): 500 INJECTION, POWDER, FOR SOLUTION INTRAMUSCULAR; INTRAVENOUS at 22:43

## 2019-09-22 RX ADMIN — DONEPEZIL HYDROCHLORIDE 10 MILLIGRAM(S): 10 TABLET, FILM COATED ORAL at 22:43

## 2019-09-22 RX ADMIN — Medication 10 MILLIGRAM(S): at 09:13

## 2019-09-22 RX ADMIN — HEPARIN SODIUM 5000 UNIT(S): 5000 INJECTION INTRAVENOUS; SUBCUTANEOUS at 14:58

## 2019-09-22 RX ADMIN — Medication 300 MILLIGRAM(S): at 00:59

## 2019-09-22 RX ADMIN — ISOSORBIDE DINITRATE 30 MILLIGRAM(S): 5 TABLET ORAL at 14:58

## 2019-09-22 RX ADMIN — Medication 81 MILLIGRAM(S): at 14:00

## 2019-09-22 RX ADMIN — HEPARIN SODIUM 5000 UNIT(S): 5000 INJECTION INTRAVENOUS; SUBCUTANEOUS at 22:44

## 2019-09-22 RX ADMIN — ATORVASTATIN CALCIUM 40 MILLIGRAM(S): 80 TABLET, FILM COATED ORAL at 22:43

## 2019-09-22 RX ADMIN — Medication 10 MILLIGRAM(S): at 22:44

## 2019-09-22 RX ADMIN — Medication 1: at 09:13

## 2019-09-22 RX ADMIN — TAMSULOSIN HYDROCHLORIDE 0.4 MILLIGRAM(S): 0.4 CAPSULE ORAL at 22:43

## 2019-09-22 RX ADMIN — Medication 5 UNIT(S): at 18:18

## 2019-09-22 RX ADMIN — Medication 650 MILLIGRAM(S): at 09:15

## 2019-09-22 NOTE — PROGRESS NOTE ADULT - PROBLEM SELECTOR PLAN 4
Pt w/ HFrEF: (TTE on 7/15/19 w/ EF: 25% severe global LV dysfxn)  - C/w hydralazine 10mg BID  - C/w Isosorbide 30mg qd  - C/w metoprolol 75mg qd  - C/w torsemide 60mg qd

## 2019-09-22 NOTE — ED ADULT NURSE REASSESSMENT NOTE - NS ED NURSE REASSESS COMMENT FT1
Received report from Delma TRACY. Patient resting comfortably in stretcher. A&Ox4. Vital signs are stable. Patient in no acute distress. Patient pending inpatient bed assignment. Plan of care discussed. Safety and comfort measures maintained. Will continue to monitor.

## 2019-09-22 NOTE — PROGRESS NOTE ADULT - ASSESSMENT
Pt is a 89 yo M w/ BPH, dementia, HTN, CKD IV, recurrent UTIs, MVR 2/2 endocarditis, HFrEF (TTE on 7/15/19 w/ EF: 25% severe global LV dysfxn), Afib on coumadin, CAD s/p CABG, HLD, IDT2DM p/w fever and lethargy x1 day a/w sepsis 2/2 likely UTI

## 2019-09-22 NOTE — H&P ADULT - PROBLEM SELECTOR PLAN 3
Pt w/ HFrEF: (TTE on 7/15/19 w/ EF: 25% severe global LV dysfxn)  - C/w hydralazine 10mg BID  - C/w Isosorbide 30mg qd  - C/w metoprolol 75mg qd  - C/w torsemide 60mg qd Pt INR found to be elevated in setting of sepsis.   - Hold coumadin for now; re-dose once Pt INR is therapeutic.

## 2019-09-22 NOTE — H&P ADULT - PROBLEM SELECTOR PLAN 5
Pt is an IDT2DM. Pt on Lantus 20U qhs and Humalog 8U TID at home  - A1c in AM  - Lantus 15U  - HUmalog 5U TID  - ISS CHADSVasc: 6; On coumadin  - C/w digoxin 0.125mg MWF  - Metoprolol as above  - Holding coumadin in setting of supratherapeutic INR  - Restart home dose once therapeutic

## 2019-09-22 NOTE — ED ADULT NURSE REASSESSMENT NOTE - NS ED NURSE REASSESS COMMENT FT1
Family reports pt. did not take coumadin dose. MD made aware and primary RN paul informed. Copy of medication list made and placed in pt. paper chart.

## 2019-09-22 NOTE — H&P ADULT - NSICDXPASTSURGICALHX_GEN_ALL_CORE_FT
PAST SURGICAL HISTORY:  H/O mitral valve replacement bovine    History of cataract surgery     History of colon surgery     ICD (implantable cardioverter-defibrillator) in place     S/P CABG x 1

## 2019-09-22 NOTE — H&P ADULT - PROBLEM SELECTOR PLAN 6
- C/w daily ASA and home atorvastatin 40mg qhs Pt is an IDT2DM. Pt on Lantus 20U qhs and Humalog 8U TID at home  - A1c in AM  - Lantus 15U  - HUmalog 5U TID  - ISS Pt CKD IV. Baseline Cr: 3.0-3.2.   - trend Cr  - Avoid nephrotoxins   - Renally dose all meds.

## 2019-09-22 NOTE — PROGRESS NOTE ADULT - PROBLEM SELECTOR PLAN 3
Pt INR found to be elevated in setting of sepsis.   - Hold coumadin for now; re-dose once Pt INR is therapeutic.

## 2019-09-22 NOTE — ED ADULT NURSE REASSESSMENT NOTE - NS ED NURSE REASSESS COMMENT FT1
Received report from paul TRACY. Pt. A&Ox3, resting comfortably at this time. Pt. does not appear to be in any acute distress at this time - nonlabored breathing noted and pt. is speaking in full coherent sentences. Pt. TBA. Safety and comfort provided.

## 2019-09-22 NOTE — H&P ADULT - PROBLEM SELECTOR PLAN 10
DVT: HSQ  Diet: CC diet  Code: Full  Dispo: Likely home Problem 11: Dementia: C/w home Aricept and Namenda     DVT: HSQ  Diet: CC diet  Code: Full  Dispo: Likely home

## 2019-09-22 NOTE — PROGRESS NOTE ADULT - SUBJECTIVE AND OBJECTIVE BOX
PELON DUNCAN  88y  Male      Patient is a 88y old  Male who presents with a chief complaint of fever and lethargy (22 Sep 2019 06:00)      INTERVAL HPI/OVERNIGHT EVENTS:      REVIEW OF SYSTEMS:  CONSTITUTIONAL: No fever, weight loss, or fatigue  EYES: No eye pain, visual disturbances, or discharge  ENMT:  No difficulty hearing, tinnitus, vertigo; No sinus or throat pain  NECK: No pain or stiffness  BREASTS: No pain, masses, or nipple discharge  RESPIRATORY: No cough, wheezing, chills or hemoptysis; No shortness of breath  CARDIOVASCULAR: No chest pain, palpitations, dizziness, or leg swelling  GASTROINTESTINAL: No abdominal or epigastric pain. No nausea, vomiting, or hematemesis; No diarrhea or constipation. No melena or hematochezia.  GENITOURINARY: No dysuria, frequency, hematuria, or incontinence  NEUROLOGICAL: No headaches, memory loss, loss of strength, numbness, or tremors  SKIN: No itching, burning, rashes, or lesions   LYMPH NODES: No enlarged glands  ENDOCRINE: No heat or cold intolerance; No hair loss  MUSCULOSKELETAL: No joint pain or swelling; No muscle, back, or extremity pain  PSYCHIATRIC: No depression, anxiety, mood swings, or difficulty sleeping  HEME/LYMPH: No easy bruising, or bleeding gums  ALLERY AND IMMUNOLOGIC: No hives or eczema  FAMILY HISTORY:  No pertinent family history in first degree relatives    T(C): 36.3 (09-22-19 @ 06:21), Max: 38.2 (09-21-19 @ 21:01)  HR: 70 (09-22-19 @ 06:21) (70 - 87)  BP: 131/62 (09-22-19 @ 06:21) (114/55 - 131/62)  RR: 18 (09-22-19 @ 06:21) (18 - 22)  SpO2: 98% (09-22-19 @ 06:21) (92% - 99%)  Wt(kg): --Vital Signs Last 24 Hrs  T(C): 36.3 (22 Sep 2019 06:21), Max: 38.2 (21 Sep 2019 21:01)  T(F): 97.4 (22 Sep 2019 06:21), Max: 100.7 (21 Sep 2019 21:01)  HR: 70 (22 Sep 2019 06:21) (70 - 87)  BP: 131/62 (22 Sep 2019 06:21) (114/55 - 131/62)  BP(mean): 74 (22 Sep 2019 03:17) (74 - 74)  RR: 18 (22 Sep 2019 06:21) (18 - 22)  SpO2: 98% (22 Sep 2019 06:21) (92% - 99%)  No Known Allergies      PHYSICAL EXAM:  GENERAL: NAD, well-groomed, well-developed  HEAD:  Atraumatic, Normocephalic  EYES: EOMI, PERRLA, conjunctiva and sclera clear  ENMT: No tonsillar erythema, exudates, or enlargement; Moist mucous membranes, Good dentition, No lesions  NECK: Supple, No JVD, Normal thyroid  NERVOUS SYSTEM:  Alert & Oriented X3, Good concentration; Motor Strength 5/5 B/L upper and lower extremities; DTRs 2+ intact and symmetric  CHEST/LUNG: Clear to percussion bilaterally; No rales, rhonchi, wheezing, or rubs  HEART: Regular rate and rhythm; No murmurs, rubs, or gallops  ABDOMEN: Soft, Nontender, Nondistended; Bowel sounds present  EXTREMITIES:  2+ Peripheral Pulses, No clubbing, cyanosis, or edema  LYMPH: No lymphadenopathy noted  SKIN: No rashes or lesions    Consultant(s) Notes Reviewed:  [x ] YES  [ ] NO  Care Discussed with Consultants/Other Providers [ x] YES  [ ] NO    LABS:      RADIOLOGY & ADDITIONAL TESTS:    Imaging Personally Reviewed:  [ ] YES  [ ] NO  aspirin enteric coated 81 milliGRAM(s) Oral daily  atorvastatin 40 milliGRAM(s) Oral at bedtime  cefTRIAXone   IVPB 1000 milliGRAM(s) IV Intermittent every 24 hours  dextrose 40% Gel 15 Gram(s) Oral once PRN  dextrose 5%. 1000 milliLiter(s) IV Continuous <Continuous>  dextrose 50% Injectable 12.5 Gram(s) IV Push once  dextrose 50% Injectable 25 Gram(s) IV Push once  dextrose 50% Injectable 25 Gram(s) IV Push once  digoxin     Tablet 0.125 milliGRAM(s) Oral <User Schedule>  donepezil 10 milliGRAM(s) Oral at bedtime  glucagon  Injectable 1 milliGRAM(s) IntraMuscular once PRN  heparin  Injectable 5000 Unit(s) SubCutaneous every 8 hours  hydrALAZINE 10 milliGRAM(s) Oral two times a day  insulin glargine Injectable (LANTUS) 15 Unit(s) SubCutaneous at bedtime  insulin lispro (HumaLOG) corrective regimen sliding scale   SubCutaneous three times a day before meals  insulin lispro (HumaLOG) corrective regimen sliding scale   SubCutaneous at bedtime  insulin lispro Injectable (HumaLOG) 5 Unit(s) SubCutaneous three times a day before meals  isosorbide   dinitrate Tablet (ISORDIL) 30 milliGRAM(s) Oral daily  memantine 5 milliGRAM(s) Oral two times a day  metoprolol succinate ER 75 milliGRAM(s) Oral daily  tamsulosin 0.4 milliGRAM(s) Oral at bedtime  torsemide 60 milliGRAM(s) Oral daily      HEALTH ISSUES - PROBLEM Dx:  CKD (chronic kidney disease), stage IV: CKD (chronic kidney disease), stage IV  Supratherapeutic INR: Supratherapeutic INR  Dementia: Dementia  Prophylactic measure: Prophylactic measure  BPH (benign prostatic hyperplasia): BPH (benign prostatic hyperplasia)  CAD (coronary artery disease): CAD (coronary artery disease)  Type 2 diabetes mellitus: Type 2 diabetes mellitus  Paroxysmal atrial fibrillation: Paroxysmal atrial fibrillation  Congestive heart failure: Congestive heart failure  Sepsis: Sepsis  UTI (urinary tract infection): UTI (urinary tract infection) PELON DUNCAN  88y  Male      Patient is a 88y old  Male who presents with a chief complaint of fever and lethargy (22 Sep 2019 06:00)      INTERVAL HPI/OVERNIGHT EVENTS: No events overnight. c/o HA      REVIEW OF SYSTEMS:  CONSTITUTIONAL: No fever, weight loss, or fatigue  EYES: No eye pain, visual disturbances, or discharge  ENMT:  No difficulty hearing, tinnitus, vertigo; No sinus or throat pain  NECK: No pain or stiffness  BREASTS: No pain, masses, or nipple discharge  RESPIRATORY: No cough, wheezing, chills or hemoptysis; No shortness of breath  CARDIOVASCULAR: No chest pain, palpitations, dizziness, or leg swelling  GASTROINTESTINAL: No abdominal or epigastric pain. No nausea, vomiting, or hematemesis; No diarrhea or constipation. No melena or hematochezia.  GENITOURINARY: No dysuria, frequency, hematuria, or incontinence  NEUROLOGICAL: + headaches, no memory loss, loss of strength, numbness, or tremors  SKIN: No itching, burning, rashes, or lesions   LYMPH NODES: No enlarged glands  ENDOCRINE: No heat or cold intolerance; No hair loss  MUSCULOSKELETAL: No joint pain or swelling; No muscle, back, or extremity pain    FAMILY HISTORY:  No pertinent family history in first degree relatives    T(C): 36.3 (09-22-19 @ 06:21), Max: 38.2 (09-21-19 @ 21:01)  HR: 70 (09-22-19 @ 06:21) (70 - 87)  BP: 131/62 (09-22-19 @ 06:21) (114/55 - 131/62)  RR: 18 (09-22-19 @ 06:21) (18 - 22)  SpO2: 98% (09-22-19 @ 06:21) (92% - 99%)  Wt(kg): --Vital Signs Last 24 Hrs  T(C): 36.3 (22 Sep 2019 06:21), Max: 38.2 (21 Sep 2019 21:01)  T(F): 97.4 (22 Sep 2019 06:21), Max: 100.7 (21 Sep 2019 21:01)  HR: 70 (22 Sep 2019 06:21) (70 - 87)  BP: 131/62 (22 Sep 2019 06:21) (114/55 - 131/62)  BP(mean): 74 (22 Sep 2019 03:17) (74 - 74)  RR: 18 (22 Sep 2019 06:21) (18 - 22)  SpO2: 98% (22 Sep 2019 06:21) (92% - 99%)  No Known Allergies      PHYSICAL EXAM:  GENERAL: NAD, well-groomed, well-developed  HEAD:  Atraumatic, Normocephalic  EYES: EOMI, PERRLA, conjunctiva and sclera clear  ENMT: No tonsillar erythema, exudates, or enlargement; Moist mucous membranes, Good dentition, No lesions  NECK: Supple, No JVD, Normal thyroid  NERVOUS SYSTEM:  Alert & Oriented X3, Good concentration; Motor Strength 5/5 B/L upper and lower extremities; DTRs 2+ intact and symmetric  CHEST/LUNG: Clear to percussion bilaterally; No rales, rhonchi, wheezing, or rubs  HEART: Regular rate and rhythm; No murmurs, rubs, or gallops  ABDOMEN: Soft, Nontender, Nondistended; Bowel sounds present  EXTREMITIES:  2+ Peripheral Pulses, No clubbing, cyanosis, or edema  LYMPH: No lymphadenopathy noted  SKIN: No rashes or lesions    Consultant(s) Notes Reviewed:  [x ] YES  [ ] NO  Care Discussed with Consultants/Other Providers [ x] YES  [ ] NO    LABS:      RADIOLOGY & ADDITIONAL TESTS:    Imaging Personally Reviewed:  [ ] YES  [ ] NO  aspirin enteric coated 81 milliGRAM(s) Oral daily  atorvastatin 40 milliGRAM(s) Oral at bedtime  cefTRIAXone   IVPB 1000 milliGRAM(s) IV Intermittent every 24 hours  dextrose 40% Gel 15 Gram(s) Oral once PRN  dextrose 5%. 1000 milliLiter(s) IV Continuous <Continuous>  dextrose 50% Injectable 12.5 Gram(s) IV Push once  dextrose 50% Injectable 25 Gram(s) IV Push once  dextrose 50% Injectable 25 Gram(s) IV Push once  digoxin     Tablet 0.125 milliGRAM(s) Oral <User Schedule>  donepezil 10 milliGRAM(s) Oral at bedtime  glucagon  Injectable 1 milliGRAM(s) IntraMuscular once PRN  heparin  Injectable 5000 Unit(s) SubCutaneous every 8 hours  hydrALAZINE 10 milliGRAM(s) Oral two times a day  insulin glargine Injectable (LANTUS) 15 Unit(s) SubCutaneous at bedtime  insulin lispro (HumaLOG) corrective regimen sliding scale   SubCutaneous three times a day before meals  insulin lispro (HumaLOG) corrective regimen sliding scale   SubCutaneous at bedtime  insulin lispro Injectable (HumaLOG) 5 Unit(s) SubCutaneous three times a day before meals  isosorbide   dinitrate Tablet (ISORDIL) 30 milliGRAM(s) Oral daily  memantine 5 milliGRAM(s) Oral two times a day  metoprolol succinate ER 75 milliGRAM(s) Oral daily  tamsulosin 0.4 milliGRAM(s) Oral at bedtime  torsemide 60 milliGRAM(s) Oral daily      HEALTH ISSUES - PROBLEM Dx:  CKD (chronic kidney disease), stage IV: CKD (chronic kidney disease), stage IV  Supratherapeutic INR: Supratherapeutic INR  Dementia: Dementia  Prophylactic measure: Prophylactic measure  BPH (benign prostatic hyperplasia): BPH (benign prostatic hyperplasia)  CAD (coronary artery disease): CAD (coronary artery disease)  Type 2 diabetes mellitus: Type 2 diabetes mellitus  Paroxysmal atrial fibrillation: Paroxysmal atrial fibrillation  Congestive heart failure: Congestive heart failure  Sepsis: Sepsis  UTI (urinary tract infection): UTI (urinary tract infection)

## 2019-09-22 NOTE — H&P ADULT - NSHPPHYSICALEXAM_GEN_ALL_CORE
T(C): 36.8 (09-22-19 @ 03:17), Max: 38.2 (09-21-19 @ 21:01)  HR: 71 (09-22-19 @ 03:17) (70 - 87)  BP: 119/55 (09-22-19 @ 03:17) (114/55 - 119/55)  RR: 20 (09-22-19 @ 03:17) (20 - 22)  SpO2: 99% (09-22-19 @ 03:17) (92% - 99%)    GENERAL APPEARANCE: Well developed, well nourished, alert and cooperative. NAD.   HEENT:  PERRL, EOMI. External auditory canals normal, hearing grossly intact.  NECK: Neck supple, non-tender without lymphadenopathy, masses or thyromegaly.  CARDIAC: 2/6 sys murmur at LUSB. Regular rhythm  LUNGS: Clear to auscultation and percussion without rales, rhonchi, wheezing or diminished breath sounds.  ABDOMEN: Positive bowel sounds. Soft, nondistended, nontender. No guarding or rebound. Lower abdominal/ periumbilical nodularities.   EXTREMITIES: No significant deformity or joint abnormality. 1+ pitting edema over ankles b/l. Peripheral pulses intact.   NEUROLOGICAL: CN II-XII intact. Strength and sensation symmetric and intact throughout.   SKIN: Erythema over RLE. Ecchymotic lesions of b/l LEs  PSYCHIATRIC: AOx2 (name and time).Normal affect and behavior.

## 2019-09-22 NOTE — PROGRESS NOTE ADULT - PROBLEM SELECTOR PLAN 2
Pt p/w fever, tachypnea, and leukocytosis w/ evidence of UTI. S/p Vanc/CTX in the ED. Pt additionally presented w/ back pain concerning for spinal abscess, however CT imaging showed no evidence of spinal abscess.   - C/w abx as above  - F/u blood and urine cxs and narrow abx per sensitivities

## 2019-09-22 NOTE — H&P ADULT - NSHPLABSRESULTS_GEN_ALL_CORE
12.9   20.6  )-----------( 172      ( 21 Sep 2019 22:12 )             41.4     Auto Eosinophil # 0.0   / Auto Eosinophil % x     / Auto Neutrophil # 19.0  / Auto Neutrophil % 78.0  / BANDS % 9            143  |  104  |  83<H>  ----------------------------<  116<H>  4.0   |  23  |  3.32<H>    Ca    9.7      21 Sep 2019 22:12  TPro  7.5  /  Alb  4.3  /  TBili  0.5  /  DBili  x   /  AST  28  /  ALT  24  /  AlkPhos  99      PT/INR - ( 21 Sep 2019 22:12 )   PT: 42.4 sec;   INR: 3.58 ratio         PTT - ( 21 Sep 2019 22:12 )  PTT:39.3 sec      Urinalysis Basic - ( 21 Sep 2019 22:55 )    Color: Yellow / Appearance: Slightly Turbid / S.017 / pH: x  Gluc: x / Ketone: Negative  / Bili: Negative / Urobili: Negative   Blood: x / Protein: 300 mg/dL / Nitrite: Negative   Leuk Esterase: Large / RBC: 5 /hpf / WBC 49 /HPF   Sq Epi: x / Non Sq Epi: 1 /hpf / Bacteria: Negative      < from: CT Lumbar Spine No Cont (19 @ 00:53) >    FINDINGS:      Evaluation for epidural abscess is limited on noncontrast CT examination.    There is preservation of the thoracic kyphosis and lumbar lordosis.   Vertebral body heights are relatively well-maintained. Multilevel   degenerative disc disease identified characterized by disc height loss   and vacuum disc phenomenon. There is mild degenerative type   anterolisthesis of L4 on L5 secondary to facet joint arthrosis.    Status post L3-L4 laminectomies.    Multilevel degenerative changes of the spine contributing to varying   degrees of neuroforaminal and spinal canal stenoses, which are not well   evaluated on this examination.. Mild to moderate bilateral neuroforaminal   narrowing identified at the L3-L4 and L4-L5. Multilevel spinal canal   stenoses are identified, severe and most prominent at the L2-L3 level.    No discrete abnormal soft tissue identified within the spinal canal at   the surgical level, however examination is limited.    No significant paraspinal or paravertebral inflammatory change.    Postsurgical change within the midline soft tissues of the lower lumbar   spine. Bilateral lower multifidus muscular fatty atrophy.    Centrilobular emphysema within the visualized lungs. Atherosclerosis of   the thoracic aorta. Partially imaged cardiac leads and mitral valve   replacement.    Bilateral renal cysts. Atherosclerosis of the thoracic aorta.    IMPRESSION:      Limited evaluation for epidural abscess on a noncontrast CT of the total   spine. No evidence of discrete soft tissue within the spinal canal at the   surgical level.    Status post L3-L4 laminectomies.    Multilevel degenerative changes spine contributing to varying degrees of   neuroforaminal and spinal canal stenosis. Spinal canal stenosis is severe   at the L2-L3 level, relatively stable in appearance since 2017.    < end of copied text > Labs, imaging and EKG tracing personally reviewed       20.6  )-----------( 172      ( 21 Sep 2019 22:12 )             41.4     Auto Eosinophil # 0.0   / Auto Eosinophil % x     / Auto Neutrophil # 19.0  / Auto Neutrophil % 78.0  / BANDS % 9            143  |  104  |  83<H>  ----------------------------<  116<H>  4.0   |  23  |  3.32<H>    Ca    9.7      21 Sep 2019 22:12  TPro  7.5  /  Alb  4.3  /  TBili  0.5  /  DBili  x   /  AST  28  /  ALT  24  /  AlkPhos  99      PT/INR - ( 21 Sep 2019 22:12 )   PT: 42.4 sec;   INR: 3.58 ratio         PTT - ( 21 Sep 2019 22:12 )  PTT:39.3 sec      Urinalysis Basic - ( 21 Sep 2019 22:55 )    Color: Yellow / Appearance: Slightly Turbid / S.017 / pH: x  Gluc: x / Ketone: Negative  / Bili: Negative / Urobili: Negative   Blood: x / Protein: 300 mg/dL / Nitrite: Negative   Leuk Esterase: Large / RBC: 5 /hpf / WBC 49 /HPF   Sq Epi: x / Non Sq Epi: 1 /hpf / Bacteria: Negative      < from: CT Lumbar Spine No Cont (19 @ 00:53) >    FINDINGS:      Evaluation for epidural abscess is limited on noncontrast CT examination.    There is preservation of the thoracic kyphosis and lumbar lordosis.   Vertebral body heights are relatively well-maintained. Multilevel   degenerative disc disease identified characterized by disc height loss   and vacuum disc phenomenon. There is mild degenerative type   anterolisthesis of L4 on L5 secondary to facet joint arthrosis.    Status post L3-L4 laminectomies.    Multilevel degenerative changes of the spine contributing to varying   degrees of neuroforaminal and spinal canal stenoses, which are not well   evaluated on this examination.. Mild to moderate bilateral neuroforaminal   narrowing identified at the L3-L4 and L4-L5. Multilevel spinal canal   stenoses are identified, severe and most prominent at the L2-L3 level.    No discrete abnormal soft tissue identified within the spinal canal at   the surgical level, however examination is limited.    No significant paraspinal or paravertebral inflammatory change.    Postsurgical change within the midline soft tissues of the lower lumbar   spine. Bilateral lower multifidus muscular fatty atrophy.    Centrilobular emphysema within the visualized lungs. Atherosclerosis of   the thoracic aorta. Partially imaged cardiac leads and mitral valve   replacement.    Bilateral renal cysts. Atherosclerosis of the thoracic aorta.    IMPRESSION:      Limited evaluation for epidural abscess on a noncontrast CT of the total   spine. No evidence of discrete soft tissue within the spinal canal at the   surgical level.    Status post L3-L4 laminectomies.    Multilevel degenerative changes spine contributing to varying degrees of   neuroforaminal and spinal canal stenosis. Spinal canal stenosis is severe   at the L2-L3 level, relatively stable in appearance since 2017.    < end of copied text >

## 2019-09-22 NOTE — H&P ADULT - NSICDXPASTMEDICALHX_GEN_ALL_CORE_FT
PAST MEDICAL HISTORY:  CAD (coronary artery disease)     Cardiomyopathy, ischemic     Cerebrovascular accident (CVA), unspecified mechanism     Congestive heart failure     Dyslipidemia     Endocarditis     Hypertension     Pacemaker     Paroxysmal atrial fibrillation     Type 2 diabetes mellitus

## 2019-09-22 NOTE — H&P ADULT - PROBLEM SELECTOR PLAN 8
- C/w home Namenda and Aricept. - C/w home tamsulosin - C/w daily ASA and home atorvastatin 40mg qhs

## 2019-09-22 NOTE — H&P ADULT - PROBLEM SELECTOR PLAN 9
DVT: HSQ  Diet: CC diet  Code: Full  Dispo: Likely home - C/w home Namenda and Aricept. - C/w home tamsulosin

## 2019-09-22 NOTE — H&P ADULT - PROBLEM SELECTOR PLAN 4
CHADSVasc: 6; On coumadin  - C/w digoxin 0.125mg MWF  - Metoprolol as above  - Holding coumadin in setting of supratherapeutic INR  - Restart home dose once therapeutic Pt w/ HFrEF: (TTE on 7/15/19 w/ EF: 25% severe global LV dysfxn)  - C/w hydralazine 10mg BID  - C/w Isosorbide 30mg qd  - C/w metoprolol 75mg qd  - C/w torsemide 60mg qd

## 2019-09-22 NOTE — ED ADULT NURSE REASSESSMENT NOTE - NS ED NURSE REASSESS COMMENT FT1
Patient currently resting comfortably in room. Patient in no acute distress. Vital signs are stable at this time. Plan of care discussed with patient. Patient denies chest pain, SOB, fever/chills, N/V/D. Pt currently awaiting inpatient bed assignment. Safety and comfort maintained. Will continue to monitor.

## 2019-09-22 NOTE — H&P ADULT - HISTORY OF PRESENT ILLNESS
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 Hx obtained from Pt's son over the phone as patient does not remember why he was brought into hospital. Pt's son reported that on the day of admission, Pt took a nap around 2:00pm and was difficult to arouse. Pt;s son reported that the Pt's wife could not wake the patient up. Pt's son reported that the patient's temperature was taken which measure 101.7 (axillary). Pt son reported that the patient was very sleepy and kept requesting to go back to bed. Otherwise, son reported that the patient did not c/o CP, N/V/D, abd pain, HA, dysuria, or hematuria.     In the ED,     ESR: 21 WBC: 20.6 INR: 3.58 Procalcitonin: 19.62 Cr: 3.32 Pro-BNP 4675; UA: Large LE WBC; 49.    CT Lumbar/Thoracic spine: Limited evaluation for epidural abscess on a noncontrast CT of the total spine. No evidence of discrete soft tissue within the spinal canal at the   surgical level. Status post L3-L4 laminectomies. Multilevel degenerative changes spine contributing to varying degrees of neuroforaminal and spinal canal stenosis. Spinal canal stenosis is severe at the L2-L3 level, relatively stable in appearance since 4/22/2017.     Pt was treated w. Vanc/Ceftriazone and 250ml LR bolus x1.

## 2019-09-22 NOTE — PROVIDER CONTACT NOTE (CRITICAL VALUE NOTIFICATION) - NS PROVIDER READ BACK TO LAB
Karly Mcgrath is a 75 y.o. female.      Assessment/Plan   Problem List Items Addressed This Visit        Respiratory    Pneumonia - Primary    Relevant Orders    XR Chest PA & Lateral           Follow-up results of chest x-ray continue present treatment plan of chronic medical problems otherwise as needed or  Return in about 3 months (around 12/9/2019), or if symptoms worsen or fail to improve, for Recheck, Next scheduled follow up.      Chief Complaint   Patient presents with   • [facility] follow up to pneumonia     Social History     Tobacco Use   • Smoking status: Never Smoker   • Smokeless tobacco: Never Used   Substance Use Topics   • Alcohol use: No   • Drug use: No       History of Present Illness   Patient follow-up appointment for acute illness of pneumonia after being seen in the emergency department August 28 she feels she is doing much better breathing is easier less productive sputum she was treated with doxycycline records were reviewed she has no chest pain or shortness of breath pressure is well controlled  The following portions of the patient's history were reviewed and updated as appropriate:PMHroutine: Social history , Allergies, Current Medications, Active Problem List and Health Maintenance    Review of Systems   Constitutional: Negative.    HENT: Negative.    Respiratory: Negative.    Cardiovascular: Negative.        Objective   Vitals:    09/09/19 1108   BP: 110/66   BP Location: Right arm   Patient Position: Sitting   Cuff Size: Adult   Pulse: 63   Temp: 98.1 °F (36.7 °C)   TempSrc: Oral   SpO2: 98%   Weight: 62 kg (136 lb 11.2 oz)     Body mass index is 22.06 kg/m².  Physical Exam   Constitutional: She appears well-developed and well-nourished.   HENT:   Head: Normocephalic and atraumatic.   Cardiovascular: Normal rate and regular rhythm.   Pulmonary/Chest: Effort normal and breath sounds normal.   Psychiatric: She has a normal mood and affect. Her behavior is normal. Judgment and  thought content normal.   Nursing note and vitals reviewed.    Reviewed Data:  Admission on 08/28/2019, Discharged on 08/29/2019   Component Date Value Ref Range Status   • Color, UA 08/28/2019 Yellow  Yellow, Straw Final   • Appearance, UA 08/28/2019 Clear  Clear Final   • pH, UA 08/28/2019 <=5.0  5.0 - 8.0 Final   • Specific Gravity, UA 08/28/2019 1.018  1.005 - 1.030 Final   • Glucose, UA 08/28/2019 Negative  Negative Final   • Ketones, UA 08/28/2019 Negative  Negative Final   • Bilirubin, UA 08/28/2019 Negative  Negative Final   • Blood, UA 08/28/2019 Small (1+)* Negative Final   • Protein, UA 08/28/2019 Negative  Negative Final   • Leuk Esterase, UA 08/28/2019 Trace* Negative Final   • Nitrite, UA 08/28/2019 Negative  Negative Final   • Urobilinogen, UA 08/28/2019 0.2 E.U./dL  0.2 - 1.0 E.U./dL Final   • RBC, UA 08/28/2019 0-2  None Seen, 0-2 /HPF Final   • WBC, UA 08/28/2019 6-12* None Seen, 0-2 /HPF Final   • Bacteria, UA 08/28/2019 None Seen  None Seen /HPF Final   • Squamous Epithelial Cells, UA 08/28/2019 3-6* None Seen, 0-2 /HPF Final   • Hyaline Casts, UA 08/28/2019 0-2  None Seen /LPF Final   • Methodology 08/28/2019 Automated Microscopy   Final   • Glucose 08/28/2019 129* 65 - 99 mg/dL Final   • BUN 08/28/2019 23  8 - 23 mg/dL Final   • Creatinine 08/28/2019 0.84  0.57 - 1.00 mg/dL Final   • Sodium 08/28/2019 130* 136 - 145 mmol/L Final   • Potassium 08/28/2019 4.1  3.5 - 5.2 mmol/L Final   • Chloride 08/28/2019 94* 98 - 107 mmol/L Final   • CO2 08/28/2019 23.1  22.0 - 29.0 mmol/L Final   • Calcium 08/28/2019 9.5  8.6 - 10.5 mg/dL Final   • Total Protein 08/28/2019 6.8  6.0 - 8.5 g/dL Final   • Albumin 08/28/2019 4.10  3.50 - 5.20 g/dL Final   • ALT (SGPT) 08/28/2019 10  1 - 33 U/L Final   • AST (SGOT) 08/28/2019 19  1 - 32 U/L Final   • Alkaline Phosphatase 08/28/2019 72  39 - 117 U/L Final   • Total Bilirubin 08/28/2019 0.6  0.2 - 1.2 mg/dL Final   • eGFR Non  Amer 08/28/2019 66  >60  mL/min/1.73 Final   • Globulin 08/28/2019 2.7  gm/dL Final   • A/G Ratio 08/28/2019 1.5  g/dL Final   • BUN/Creatinine Ratio 08/28/2019 27.4* 7.0 - 25.0 Final   • Anion Gap 08/28/2019 12.9  5.0 - 15.0 mmol/L Final   • Blood Culture 08/28/2019 No growth at 5 days   Final   • Blood Culture 08/28/2019 No growth at 5 days   Final   • Procalcitonin 08/28/2019 0.06* 0.10 - 0.25 ng/mL Final   • Lactate 08/28/2019 0.9  0.5 - 2.0 mmol/L Final   • Lipase 08/28/2019 35  13 - 60 U/L Final   • WBC 08/28/2019 10.29  3.40 - 10.80 10*3/mm3 Final   • RBC 08/28/2019 4.16  3.77 - 5.28 10*6/mm3 Final   • Hemoglobin 08/28/2019 11.3* 12.0 - 15.9 g/dL Final   • Hematocrit 08/28/2019 34.6  34.0 - 46.6 % Final   • MCV 08/28/2019 83.2  79.0 - 97.0 fL Final   • MCH 08/28/2019 27.2  26.6 - 33.0 pg Final   • MCHC 08/28/2019 32.7  31.5 - 35.7 g/dL Final   • RDW 08/28/2019 14.4  12.3 - 15.4 % Final   • RDW-SD 08/28/2019 43.6  37.0 - 54.0 fl Final   • MPV 08/28/2019 12.7* 6.0 - 12.0 fL Final   • Platelets 08/28/2019 119* 140 - 450 10*3/mm3 Final   • Neutrophil % 08/28/2019 80.3* 42.7 - 76.0 % Final   • Lymphocyte % 08/28/2019 8.0* 19.6 - 45.3 % Final   • Monocyte % 08/28/2019 10.7  5.0 - 12.0 % Final   • Eosinophil % 08/28/2019 0.2* 0.3 - 6.2 % Final   • Basophil % 08/28/2019 0.2  0.0 - 1.5 % Final   • Immature Grans % 08/28/2019 0.6* 0.0 - 0.5 % Final   • Neutrophils, Absolute 08/28/2019 8.27* 1.70 - 7.00 10*3/mm3 Final   • Lymphocytes, Absolute 08/28/2019 0.82  0.70 - 3.10 10*3/mm3 Final   • Monocytes, Absolute 08/28/2019 1.10* 0.10 - 0.90 10*3/mm3 Final   • Eosinophils, Absolute 08/28/2019 0.02  0.00 - 0.40 10*3/mm3 Final   • Basophils, Absolute 08/28/2019 0.02  0.00 - 0.20 10*3/mm3 Final   • Immature Grans, Absolute 08/28/2019 0.06* 0.00 - 0.05 10*3/mm3 Final   • nRBC 08/28/2019 0.0  0.0 - 0.2 /100 WBC Final      yes

## 2019-09-22 NOTE — H&P ADULT - NSHPSOCIALHISTORY_GEN_ALL_CORE
Pt lives w/ wife at home; perform all ADLs w/o assistance, and currently drives. No hx of smoking. No current ETOH, or illicit drug use.

## 2019-09-22 NOTE — H&P ADULT - ASSESSMENT
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

## 2019-09-22 NOTE — PROGRESS NOTE ADULT - PROBLEM SELECTOR PLAN 7
Pt is an IDT2DM. Pt on Lantus 20U qhs and Humalog 8U TID at home  - A1c in AM  - Lantus 15U  - HUmalog 5U TID  - ISS

## 2019-09-22 NOTE — H&P ADULT - PROBLEM SELECTOR PLAN 7
- C/w home tamsulosin - C/w daily ASA and home atorvastatin 40mg qhs Pt is an IDT2DM. Pt on Lantus 20U qhs and Humalog 8U TID at home  - A1c in AM  - Lantus 15U  - HUmalog 5U TID  - ISS

## 2019-09-22 NOTE — PROGRESS NOTE ADULT - PROBLEM SELECTOR PLAN 5
CHADSVasc: 6; On coumadin  - C/w digoxin 0.125mg MWF  - Metoprolol as above  - Holding coumadin in setting of supratherapeutic INR  - Restart home dose once therapeutic

## 2019-09-23 LAB
ALBUMIN SERPL ELPH-MCNC: 3.4 G/DL — SIGNIFICANT CHANGE UP (ref 3.3–5)
ALP SERPL-CCNC: 81 U/L — SIGNIFICANT CHANGE UP (ref 40–120)
ALT FLD-CCNC: 21 U/L — SIGNIFICANT CHANGE UP (ref 10–45)
ANION GAP SERPL CALC-SCNC: 17 MMOL/L — SIGNIFICANT CHANGE UP (ref 5–17)
AST SERPL-CCNC: 41 U/L — HIGH (ref 10–40)
BILIRUB SERPL-MCNC: 0.4 MG/DL — SIGNIFICANT CHANGE UP (ref 0.2–1.2)
BUN SERPL-MCNC: 95 MG/DL — HIGH (ref 7–23)
CALCIUM SERPL-MCNC: 9.1 MG/DL — SIGNIFICANT CHANGE UP (ref 8.4–10.5)
CHLORIDE SERPL-SCNC: 101 MMOL/L — SIGNIFICANT CHANGE UP (ref 96–108)
CO2 SERPL-SCNC: 18 MMOL/L — LOW (ref 22–31)
CREAT SERPL-MCNC: 3.36 MG/DL — HIGH (ref 0.5–1.3)
GLUCOSE BLDC GLUCOMTR-MCNC: 133 MG/DL — HIGH (ref 70–99)
GLUCOSE BLDC GLUCOMTR-MCNC: 167 MG/DL — HIGH (ref 70–99)
GLUCOSE BLDC GLUCOMTR-MCNC: 185 MG/DL — HIGH (ref 70–99)
GLUCOSE BLDC GLUCOMTR-MCNC: 197 MG/DL — HIGH (ref 70–99)
GLUCOSE SERPL-MCNC: 143 MG/DL — HIGH (ref 70–99)
HCT VFR BLD CALC: 33.8 % — LOW (ref 39–50)
HGB BLD-MCNC: 10.7 G/DL — LOW (ref 13–17)
INR BLD: 2.4 RATIO — HIGH (ref 0.88–1.16)
MCHC RBC-ENTMCNC: 29.4 PG — SIGNIFICANT CHANGE UP (ref 27–34)
MCHC RBC-ENTMCNC: 31.6 GM/DL — LOW (ref 32–36)
MCV RBC AUTO: 93.1 FL — SIGNIFICANT CHANGE UP (ref 80–100)
PLATELET # BLD AUTO: 114 K/UL — LOW (ref 150–400)
POTASSIUM SERPL-MCNC: 4.2 MMOL/L — SIGNIFICANT CHANGE UP (ref 3.5–5.3)
POTASSIUM SERPL-SCNC: 4.2 MMOL/L — SIGNIFICANT CHANGE UP (ref 3.5–5.3)
PROT SERPL-MCNC: 6.6 G/DL — SIGNIFICANT CHANGE UP (ref 6–8.3)
PROTHROM AB SERPL-ACNC: 28.4 SEC — HIGH (ref 10–12.9)
RBC # BLD: 3.63 M/UL — LOW (ref 4.2–5.8)
RBC # FLD: 13.1 % — SIGNIFICANT CHANGE UP (ref 10.3–14.5)
SODIUM SERPL-SCNC: 136 MMOL/L — SIGNIFICANT CHANGE UP (ref 135–145)
WBC # BLD: 15.1 K/UL — HIGH (ref 3.8–10.5)
WBC # FLD AUTO: 15.1 K/UL — HIGH (ref 3.8–10.5)

## 2019-09-23 PROCEDURE — 99223 1ST HOSP IP/OBS HIGH 75: CPT | Mod: GC

## 2019-09-23 PROCEDURE — 99233 SBSQ HOSP IP/OBS HIGH 50: CPT | Mod: GC

## 2019-09-23 RX ORDER — CEFTRIAXONE 500 MG/1
2000 INJECTION, POWDER, FOR SOLUTION INTRAMUSCULAR; INTRAVENOUS EVERY 24 HOURS
Refills: 0 | Status: DISCONTINUED | OUTPATIENT
Start: 2019-09-23 | End: 2019-09-29

## 2019-09-23 RX ORDER — WARFARIN SODIUM 2.5 MG/1
5 TABLET ORAL AT BEDTIME
Refills: 0 | Status: DISCONTINUED | OUTPATIENT
Start: 2019-09-23 | End: 2019-09-23

## 2019-09-23 RX ORDER — WARFARIN SODIUM 2.5 MG/1
5 TABLET ORAL DAILY
Refills: 0 | Status: DISCONTINUED | OUTPATIENT
Start: 2019-09-23 | End: 2019-09-23

## 2019-09-23 RX ORDER — CEFTRIAXONE 500 MG/1
1000 INJECTION, POWDER, FOR SOLUTION INTRAMUSCULAR; INTRAVENOUS ONCE
Refills: 0 | Status: COMPLETED | OUTPATIENT
Start: 2019-09-23 | End: 2019-09-23

## 2019-09-23 RX ORDER — WARFARIN SODIUM 2.5 MG/1
5 TABLET ORAL AT BEDTIME
Refills: 0 | Status: DISCONTINUED | OUTPATIENT
Start: 2019-09-23 | End: 2019-09-25

## 2019-09-23 RX ORDER — ISOSORBIDE DINITRATE 5 MG/1
10 TABLET ORAL THREE TIMES A DAY
Refills: 0 | Status: DISCONTINUED | OUTPATIENT
Start: 2019-09-23 | End: 2019-10-03

## 2019-09-23 RX ADMIN — ISOSORBIDE DINITRATE 10 MILLIGRAM(S): 5 TABLET ORAL at 22:28

## 2019-09-23 RX ADMIN — CEFTRIAXONE 100 MILLIGRAM(S): 500 INJECTION, POWDER, FOR SOLUTION INTRAMUSCULAR; INTRAVENOUS at 22:29

## 2019-09-23 RX ADMIN — CEFTRIAXONE 100 MILLIGRAM(S): 500 INJECTION, POWDER, FOR SOLUTION INTRAMUSCULAR; INTRAVENOUS at 11:50

## 2019-09-23 RX ADMIN — MEMANTINE HYDROCHLORIDE 5 MILLIGRAM(S): 10 TABLET ORAL at 22:29

## 2019-09-23 RX ADMIN — MEMANTINE HYDROCHLORIDE 5 MILLIGRAM(S): 10 TABLET ORAL at 09:00

## 2019-09-23 RX ADMIN — Medication 5 UNIT(S): at 09:01

## 2019-09-23 RX ADMIN — Medication 1: at 13:40

## 2019-09-23 RX ADMIN — DONEPEZIL HYDROCHLORIDE 10 MILLIGRAM(S): 10 TABLET, FILM COATED ORAL at 22:28

## 2019-09-23 RX ADMIN — Medication 5 UNIT(S): at 13:40

## 2019-09-23 RX ADMIN — WARFARIN SODIUM 5 MILLIGRAM(S): 2.5 TABLET ORAL at 22:28

## 2019-09-23 RX ADMIN — TAMSULOSIN HYDROCHLORIDE 0.4 MILLIGRAM(S): 0.4 CAPSULE ORAL at 22:28

## 2019-09-23 RX ADMIN — HEPARIN SODIUM 5000 UNIT(S): 5000 INJECTION INTRAVENOUS; SUBCUTANEOUS at 13:44

## 2019-09-23 RX ADMIN — Medication 10 MILLIGRAM(S): at 22:28

## 2019-09-23 RX ADMIN — INSULIN GLARGINE 15 UNIT(S): 100 INJECTION, SOLUTION SUBCUTANEOUS at 22:29

## 2019-09-23 RX ADMIN — Medication 75 MILLIGRAM(S): at 05:17

## 2019-09-23 RX ADMIN — Medication 60 MILLIGRAM(S): at 05:17

## 2019-09-23 RX ADMIN — HEPARIN SODIUM 5000 UNIT(S): 5000 INJECTION INTRAVENOUS; SUBCUTANEOUS at 05:17

## 2019-09-23 RX ADMIN — HEPARIN SODIUM 5000 UNIT(S): 5000 INJECTION INTRAVENOUS; SUBCUTANEOUS at 22:29

## 2019-09-23 RX ADMIN — ATORVASTATIN CALCIUM 40 MILLIGRAM(S): 80 TABLET, FILM COATED ORAL at 22:28

## 2019-09-23 RX ADMIN — Medication 10 MILLIGRAM(S): at 08:48

## 2019-09-23 RX ADMIN — Medication 0.12 MILLIGRAM(S): at 08:48

## 2019-09-23 RX ADMIN — Medication 5 UNIT(S): at 17:55

## 2019-09-23 RX ADMIN — Medication 81 MILLIGRAM(S): at 11:53

## 2019-09-23 NOTE — PROGRESS NOTE ADULT - PROBLEM SELECTOR PLAN 2
Pt p/w fever, tachypnea, and leukocytosis w/ evidence of UTI. S/p Vanc/CTX in the ED. Pt additionally presented w/ back pain concerning for spinal abscess, however CT imaging showed no evidence of spinal abscess.   - C/w abx as above  - F/u blood and urine cxs and narrow abx per sensitivities  - ID consult placed Pt p/w fever, tachypnea, and leukocytosis w/ evidence of UTI. S/p Vanc/CTX in the ED. Pt additionally presented w/ back pain concerning for spinal abscess, however CT imaging showed no evidence of spinal abscess.   - C/w abx as above  - F/u blood cx GBS +  - ID consult placed

## 2019-09-23 NOTE — PROGRESS NOTE ADULT - SUBJECTIVE AND OBJECTIVE BOX
PELON DUNCAN  88y  Male      Patient is a 88y old  Male who presents with a chief complaint of fever and lethargy (22 Sep 2019 06:00)      INTERVAL HPI/OVERNIGHT EVENTS: No events overnight.      REVIEW OF SYSTEMS:  CONSTITUTIONAL: No fever, weight loss, or fatigue  EYES: No eye pain, visual disturbances, or discharge  ENMT:  No difficulty hearing, tinnitus, vertigo; No sinus or throat pain  NECK: No pain or stiffness  BREASTS: No pain, masses, or nipple discharge  RESPIRATORY: No cough, wheezing, chills or hemoptysis; No shortness of breath  CARDIOVASCULAR: No chest pain, palpitations, dizziness, or leg swelling  GASTROINTESTINAL: No abdominal or epigastric pain. No nausea, vomiting, or hematemesis; No diarrhea or constipation. No melena or hematochezia.  GENITOURINARY: No dysuria, frequency, hematuria, or incontinence  NEUROLOGICAL: + headaches, no memory loss, loss of strength, numbness, or tremors  SKIN: No itching, burning, rashes, or lesions   LYMPH NODES: No enlarged glands  ENDOCRINE: No heat or cold intolerance; No hair loss  MUSCULOSKELETAL: No joint pain or swelling; No muscle, back, or extremity pain    FAMILY HISTORY:  No pertinent family history in first degree relatives    T(C): 36.3 (09-22-19 @ 06:21), Max: 38.2 (09-21-19 @ 21:01)  HR: 70 (09-22-19 @ 06:21) (70 - 87)  BP: 131/62 (09-22-19 @ 06:21) (114/55 - 131/62)  RR: 18 (09-22-19 @ 06:21) (18 - 22)  SpO2: 98% (09-22-19 @ 06:21) (92% - 99%)  Wt(kg): --Vital Signs Last 24 Hrs  T(C): 36.3 (22 Sep 2019 06:21), Max: 38.2 (21 Sep 2019 21:01)  T(F): 97.4 (22 Sep 2019 06:21), Max: 100.7 (21 Sep 2019 21:01)  HR: 70 (22 Sep 2019 06:21) (70 - 87)  BP: 131/62 (22 Sep 2019 06:21) (114/55 - 131/62)  BP(mean): 74 (22 Sep 2019 03:17) (74 - 74)  RR: 18 (22 Sep 2019 06:21) (18 - 22)  SpO2: 98% (22 Sep 2019 06:21) (92% - 99%)  No Known Allergies      PHYSICAL EXAM:  GENERAL: NAD, well-groomed, well-developed  HEAD:  Atraumatic, Normocephalic  EYES: EOMI, PERRLA, conjunctiva and sclera clear  ENMT: No tonsillar erythema, exudates, or enlargement; Moist mucous membranes, Good dentition, No lesions  NECK: Supple, No JVD, Normal thyroid  NERVOUS SYSTEM:  Alert & Oriented X3, Good concentration; Motor Strength 5/5 B/L upper and lower extremities; DTRs 2+ intact and symmetric  CHEST/LUNG: Clear to percussion bilaterally; No rales, rhonchi, wheezing, or rubs  HEART: Regular rate and rhythm; No murmurs, rubs, or gallops  ABDOMEN: Soft, Nontender, Nondistended; Bowel sounds present  EXTREMITIES:  2+ Peripheral Pulses, No clubbing, cyanosis, or edema  LYMPH: No lymphadenopathy noted  SKIN: No rashes or lesions    Consultant(s) Notes Reviewed:  [x ] YES  [ ] NO  Care Discussed with Consultants/Other Providers [ x] YES  [ ] NO    LABS:      RADIOLOGY & ADDITIONAL TESTS:    Imaging Personally Reviewed:  [ ] YES  [ ] NO  aspirin enteric coated 81 milliGRAM(s) Oral daily  atorvastatin 40 milliGRAM(s) Oral at bedtime  cefTRIAXone   IVPB 1000 milliGRAM(s) IV Intermittent every 24 hours  dextrose 40% Gel 15 Gram(s) Oral once PRN  dextrose 5%. 1000 milliLiter(s) IV Continuous <Continuous>  dextrose 50% Injectable 12.5 Gram(s) IV Push once  dextrose 50% Injectable 25 Gram(s) IV Push once  dextrose 50% Injectable 25 Gram(s) IV Push once  digoxin     Tablet 0.125 milliGRAM(s) Oral <User Schedule>  donepezil 10 milliGRAM(s) Oral at bedtime  glucagon  Injectable 1 milliGRAM(s) IntraMuscular once PRN  heparin  Injectable 5000 Unit(s) SubCutaneous every 8 hours  hydrALAZINE 10 milliGRAM(s) Oral two times a day  insulin glargine Injectable (LANTUS) 15 Unit(s) SubCutaneous at bedtime  insulin lispro (HumaLOG) corrective regimen sliding scale   SubCutaneous three times a day before meals  insulin lispro (HumaLOG) corrective regimen sliding scale   SubCutaneous at bedtime  insulin lispro Injectable (HumaLOG) 5 Unit(s) SubCutaneous three times a day before meals  isosorbide   dinitrate Tablet (ISORDIL) 30 milliGRAM(s) Oral daily  memantine 5 milliGRAM(s) Oral two times a day  metoprolol succinate ER 75 milliGRAM(s) Oral daily  tamsulosin 0.4 milliGRAM(s) Oral at bedtime  torsemide 60 milliGRAM(s) Oral daily      HEALTH ISSUES - PROBLEM Dx:  CKD (chronic kidney disease), stage IV: CKD (chronic kidney disease), stage IV  Supratherapeutic INR: Supratherapeutic INR  Dementia: Dementia  Prophylactic measure: Prophylactic measure  BPH (benign prostatic hyperplasia): BPH (benign prostatic hyperplasia)  CAD (coronary artery disease): CAD (coronary artery disease)  Type 2 diabetes mellitus: Type 2 diabetes mellitus  Paroxysmal atrial fibrillation: Paroxysmal atrial fibrillation  Congestive heart failure: Congestive heart failure  Sepsis: Sepsis  UTI (urinary tract infection): UTI (urinary tract infection) PELON DUNCAN  88y  Male      Patient is a 88y old  Male who presents with a chief complaint of fever and lethargy (22 Sep 2019 06:00)      INTERVAL HPI/OVERNIGHT EVENTS: Desaturated to 88% RA, placed on 2L O2.       REVIEW OF SYSTEMS:  CONSTITUTIONAL: No fever, weight loss, or fatigue  EYES: No eye pain, visual disturbances, or discharge  ENMT:  No difficulty hearing, tinnitus, vertigo; No sinus or throat pain  NECK: No pain or stiffness  BREASTS: No pain, masses, or nipple discharge  RESPIRATORY: No cough, wheezing, chills or hemoptysis; No shortness of breath  CARDIOVASCULAR: No chest pain, palpitations, dizziness, or leg swelling  GASTROINTESTINAL: No abdominal or epigastric pain. No nausea, vomiting, or hematemesis; No diarrhea or constipation. No melena or hematochezia.  GENITOURINARY: No dysuria, frequency, hematuria, or incontinence  NEUROLOGICAL: + headaches, no memory loss, loss of strength, numbness, or tremors  SKIN: No itching, burning, rashes, or lesions   LYMPH NODES: No enlarged glands  ENDOCRINE: No heat or cold intolerance; No hair loss  MUSCULOSKELETAL: No joint pain or swelling; No muscle, back, or extremity pain    FAMILY HISTORY:  No pertinent family history in first degree relatives    T(C): 36.3 (09-22-19 @ 06:21), Max: 38.2 (09-21-19 @ 21:01)  HR: 70 (09-22-19 @ 06:21) (70 - 87)  BP: 131/62 (09-22-19 @ 06:21) (114/55 - 131/62)  RR: 18 (09-22-19 @ 06:21) (18 - 22)  SpO2: 98% (09-22-19 @ 06:21) (92% - 99%)  Wt(kg): --Vital Signs Last 24 Hrs  T(C): 36.3 (22 Sep 2019 06:21), Max: 38.2 (21 Sep 2019 21:01)  T(F): 97.4 (22 Sep 2019 06:21), Max: 100.7 (21 Sep 2019 21:01)  HR: 70 (22 Sep 2019 06:21) (70 - 87)  BP: 131/62 (22 Sep 2019 06:21) (114/55 - 131/62)  BP(mean): 74 (22 Sep 2019 03:17) (74 - 74)  RR: 18 (22 Sep 2019 06:21) (18 - 22)  SpO2: 98% (22 Sep 2019 06:21) (92% - 99%)  No Known Allergies      PHYSICAL EXAM:  GENERAL: NAD, well-groomed, well-developed  HEAD:  Atraumatic, Normocephalic  EYES: EOMI, PERRLA, conjunctiva and sclera clear  ENMT: No tonsillar erythema, exudates, or enlargement; Moist mucous membranes, Good dentition, No lesions  NECK: Supple, No JVD, Normal thyroid  NERVOUS SYSTEM:  Alert & Oriented X3, Good concentration; Motor Strength 5/5 B/L upper and lower extremities; DTRs 2+ intact and symmetric  CHEST/LUNG: Clear to percussion bilaterally; No rales, rhonchi, wheezing, or rubs  HEART: Regular rate and rhythm; No murmurs, rubs, or gallops  ABDOMEN: Soft, Nontender, Nondistended; Bowel sounds present  EXTREMITIES:  2+ Peripheral Pulses, No clubbing, cyanosis, or edema  LYMPH: No lymphadenopathy noted  SKIN: No rashes or lesions    Consultant(s) Notes Reviewed:  [x ] YES  [ ] NO  Care Discussed with Consultants/Other Providers [ x] YES  [ ] NO    LABS:      RADIOLOGY & ADDITIONAL TESTS:    Imaging Personally Reviewed:  [ ] YES  [ ] NO  aspirin enteric coated 81 milliGRAM(s) Oral daily  atorvastatin 40 milliGRAM(s) Oral at bedtime  cefTRIAXone   IVPB 1000 milliGRAM(s) IV Intermittent every 24 hours  dextrose 40% Gel 15 Gram(s) Oral once PRN  dextrose 5%. 1000 milliLiter(s) IV Continuous <Continuous>  dextrose 50% Injectable 12.5 Gram(s) IV Push once  dextrose 50% Injectable 25 Gram(s) IV Push once  dextrose 50% Injectable 25 Gram(s) IV Push once  digoxin     Tablet 0.125 milliGRAM(s) Oral <User Schedule>  donepezil 10 milliGRAM(s) Oral at bedtime  glucagon  Injectable 1 milliGRAM(s) IntraMuscular once PRN  heparin  Injectable 5000 Unit(s) SubCutaneous every 8 hours  hydrALAZINE 10 milliGRAM(s) Oral two times a day  insulin glargine Injectable (LANTUS) 15 Unit(s) SubCutaneous at bedtime  insulin lispro (HumaLOG) corrective regimen sliding scale   SubCutaneous three times a day before meals  insulin lispro (HumaLOG) corrective regimen sliding scale   SubCutaneous at bedtime  insulin lispro Injectable (HumaLOG) 5 Unit(s) SubCutaneous three times a day before meals  isosorbide   dinitrate Tablet (ISORDIL) 30 milliGRAM(s) Oral daily  memantine 5 milliGRAM(s) Oral two times a day  metoprolol succinate ER 75 milliGRAM(s) Oral daily  tamsulosin 0.4 milliGRAM(s) Oral at bedtime  torsemide 60 milliGRAM(s) Oral daily      HEALTH ISSUES - PROBLEM Dx:  CKD (chronic kidney disease), stage IV: CKD (chronic kidney disease), stage IV  Supratherapeutic INR: Supratherapeutic INR  Dementia: Dementia  Prophylactic measure: Prophylactic measure  BPH (benign prostatic hyperplasia): BPH (benign prostatic hyperplasia)  CAD (coronary artery disease): CAD (coronary artery disease)  Type 2 diabetes mellitus: Type 2 diabetes mellitus  Paroxysmal atrial fibrillation: Paroxysmal atrial fibrillation  Congestive heart failure: Congestive heart failure  Sepsis: Sepsis  UTI (urinary tract infection): UTI (urinary tract infection) PELON DUNCAN  88y  Male      Patient is a 88y old  Male who presents with a chief complaint of fever and lethargy (22 Sep 2019 06:00)      INTERVAL HPI/OVERNIGHT EVENTS: Desaturated to 88% RA, placed on 2L O2.       REVIEW OF SYSTEMS:  CONSTITUTIONAL: No fever, weight loss, or fatigue  EYES: No eye pain, visual disturbances, or discharge  ENMT:  No difficulty hearing, tinnitus, vertigo; No sinus or throat pain  NECK: No pain or stiffness  BREASTS: No pain, masses, or nipple discharge  RESPIRATORY: No cough, wheezing, chills or hemoptysis; No shortness of breath  CARDIOVASCULAR: No chest pain, palpitations, dizziness, or leg swelling  GASTROINTESTINAL: No abdominal or epigastric pain. No nausea, vomiting, or hematemesis; No diarrhea or constipation. No melena or hematochezia.  GENITOURINARY: No dysuria, frequency, hematuria, or incontinence  NEUROLOGICAL: + headaches, no memory loss, loss of strength, numbness, or tremors  SKIN: No itching, burning, rashes, or lesions   LYMPH NODES: No enlarged glands  ENDOCRINE: No heat or cold intolerance; No hair loss  MUSCULOSKELETAL: No joint pain or swelling; No muscle, back, or extremity pain    FAMILY HISTORY:  No pertinent family history in first degree relatives    T(C): 36.3 (09-22-19 @ 06:21), Max: 38.2 (09-21-19 @ 21:01)  HR: 70 (09-22-19 @ 06:21) (70 - 87)  BP: 131/62 (09-22-19 @ 06:21) (114/55 - 131/62)  RR: 18 (09-22-19 @ 06:21) (18 - 22)  SpO2: 98% (09-22-19 @ 06:21) (92% - 99%)  Wt(kg): --Vital Signs Last 24 Hrs  T(C): 36.3 (22 Sep 2019 06:21), Max: 38.2 (21 Sep 2019 21:01)  T(F): 97.4 (22 Sep 2019 06:21), Max: 100.7 (21 Sep 2019 21:01)  HR: 70 (22 Sep 2019 06:21) (70 - 87)  BP: 131/62 (22 Sep 2019 06:21) (114/55 - 131/62)  BP(mean): 74 (22 Sep 2019 03:17) (74 - 74)  RR: 18 (22 Sep 2019 06:21) (18 - 22)  SpO2: 98% (22 Sep 2019 06:21) (92% - 99%)  No Known Allergies      PHYSICAL EXAM:  GENERAL: NAD, well-groomed, well-developed  HEAD:  Atraumatic, Normocephalic  EYES: EOMI, PERRLA, conjunctiva and sclera clear  ENMT: No tonsillar erythema, exudates, or enlargement; Moist mucous membranes, Good dentition, No lesions  NECK: Supple, No JVD, Normal thyroid  NERVOUS SYSTEM:  Alert & Oriented X3, Good concentration; Motor Strength 5/5 B/L upper and lower extremities; DTRs 2+ intact and symmetric  CHEST/LUNG: Clear to percussion bilaterally; No rales, rhonchi, wheezing, or rubs  HEART: Regular rate and rhythm; No murmurs, rubs, or gallops  ABDOMEN: Soft, Nontender, Nondistended; Bowel sounds present  EXTREMITIES:  2+ Peripheral Pulses, No clubbing, cyanosis, or edema  LYMPH: No lymphadenopathy noted  SKIN: No rashes or lesions    Consultant(s) Notes Reviewed:  [x ] YES  [ ] NO  Care Discussed with Consultants/Other Providers [ x] YES  [ ] NO    LABS:    RADIOLOGY & ADDITIONAL TESTS:    Imaging Personally Reviewed:  [ ] YES  [ ] NO  aspirin enteric coated 81 milliGRAM(s) Oral daily  atorvastatin 40 milliGRAM(s) Oral at bedtime  cefTRIAXone   IVPB 1000 milliGRAM(s) IV Intermittent every 24 hours  dextrose 40% Gel 15 Gram(s) Oral once PRN  dextrose 5%. 1000 milliLiter(s) IV Continuous <Continuous>  dextrose 50% Injectable 12.5 Gram(s) IV Push once  dextrose 50% Injectable 25 Gram(s) IV Push once  dextrose 50% Injectable 25 Gram(s) IV Push once  digoxin     Tablet 0.125 milliGRAM(s) Oral <User Schedule>  donepezil 10 milliGRAM(s) Oral at bedtime  glucagon  Injectable 1 milliGRAM(s) IntraMuscular once PRN  heparin  Injectable 5000 Unit(s) SubCutaneous every 8 hours  hydrALAZINE 10 milliGRAM(s) Oral two times a day  insulin glargine Injectable (LANTUS) 15 Unit(s) SubCutaneous at bedtime  insulin lispro (HumaLOG) corrective regimen sliding scale   SubCutaneous three times a day before meals  insulin lispro (HumaLOG) corrective regimen sliding scale   SubCutaneous at bedtime  insulin lispro Injectable (HumaLOG) 5 Unit(s) SubCutaneous three times a day before meals  isosorbide   dinitrate Tablet (ISORDIL) 30 milliGRAM(s) Oral daily  memantine 5 milliGRAM(s) Oral two times a day  metoprolol succinate ER 75 milliGRAM(s) Oral daily  tamsulosin 0.4 milliGRAM(s) Oral at bedtime  torsemide 60 milliGRAM(s) Oral daily      HEALTH ISSUES - PROBLEM Dx:  CKD (chronic kidney disease), stage IV: CKD (chronic kidney disease), stage IV  Supratherapeutic INR: Supratherapeutic INR  Dementia: Dementia  Prophylactic measure: Prophylactic measure  BPH (benign prostatic hyperplasia): BPH (benign prostatic hyperplasia)  CAD (coronary artery disease): CAD (coronary artery disease)  Type 2 diabetes mellitus: Type 2 diabetes mellitus  Paroxysmal atrial fibrillation: Paroxysmal atrial fibrillation  Congestive heart failure: Congestive heart failure  Sepsis: Sepsis  UTI (urinary tract infection): UTI (urinary tract infection)

## 2019-09-23 NOTE — PROVIDER CONTACT NOTE (OTHER) - ACTION/TREATMENT ORDERED:
2L NC applied. Patient O2 sat 94%. Patient on continuous pulse ox. Will continue to monitor patient.

## 2019-09-23 NOTE — PROGRESS NOTE ADULT - ASSESSMENT
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

## 2019-09-23 NOTE — PROGRESS NOTE ADULT - PROBLEM SELECTOR PLAN 1
Pt w/ + UA. S/p Vanc/CTX in the ED.  - C/w CTX (Day 1/5)  - F/u w/ urine culture. Pt w/ + UA. S/p Vanc/CTX in the ED.  - C/w CTX (Day 2/5)  - urine culture neg

## 2019-09-23 NOTE — CONSULT NOTE ADULT - SUBJECTIVE AND OBJECTIVE BOX
Patient is a 88y old  Male who presents with a chief complaint of fever and lethargy (23 Sep 2019 06:55)    HPI:  Pt is a 87 yo M w/ BPH, dementia, HTN, CKD IV, recurrent UTIs, MVR 2/2 endocarditis, HFrEF (TTE on 7/15/19 w/ EF: 25% severe global LV dysfxn), Afib on coumadin, CAD s/p CABG, HLD, IDT2DM p/w fever and lethargy x1 day. Pt's son reported that on the day of admission, Pt took a nap around 2:00pm and was difficult to arouse. Pt's son reported that the Pt's wife could not wake the patient up. Pt's son reported that the patient's temperature was taken which measure 101.7 (axillary). Pt son reported that the patient was very sleepy and kept requesting to go back to bed. Otherwise, son reported that the patient did not c/o CP, N/V/D, abd pain, HA, dysuria, or hematuria.     In the ED, pt was afebrile, normal HR and BP however tachypneic to 22, placed on 3L NC. Labs were remarkable for leukocytosis to 20.6. ESR: 21 Procalcitonin: 19.62. INR: 3.58. Cr: 3.32 Pro-BNP 4675. UA positive with Large LE WBC; 49. Pt had complained of back pain and so CT Lumbar/Thoracic spine was ordered which was grossly unremarkable. CXR negative for focal consolidations. CT A/P also grossly unremarkable.      Pt was treated w. Vanc/Ceftriazone and 250ml LR bolus x1. (22 Sep 2019 06:00)     prior hospital charts reviewed [  ]  primary team notes reviewed [  ]  other consultant notes reviewed [  ]  PAST MEDICAL & SURGICAL HISTORY:  Cerebrovascular accident (CVA), unspecified mechanism  Pacemaker  Endocarditis  CAD (coronary artery disease)  Cardiomyopathy, ischemic  Type 2 diabetes mellitus  Hypertension  Congestive heart failure  Paroxysmal atrial fibrillation  Dyslipidemia  S/P CABG x 1  History of cataract surgery  History of colon surgery  ICD (implantable cardioverter-defibrillator) in place  H/O mitral valve replacement: bovine    Allergies  No Known Allergies    ANTIMICROBIALS (past 90 days)  MEDICATIONS  (STANDING):  cefTRIAXone   IVPB   100 mL/Hr IV Intermittent (19 @ 22:50)    cefTRIAXone   IVPB   100 mL/Hr IV Intermittent (19 @ 11:50)    cefTRIAXone   IVPB   100 mL/Hr IV Intermittent (19 @ 22:43)    vancomycin  IVPB   300 mL/Hr IV Intermittent (19 @ 00:59)      ANTIMICROBIALS:    cefTRIAXone   IVPB 2000 every 24 hours    OTHER MEDS: MEDICATIONS  (STANDING):  aspirin enteric coated 81 daily  atorvastatin 40 at bedtime  dextrose 40% Gel 15 once PRN  dextrose 50% Injectable 12.5 once  dextrose 50% Injectable 25 once  dextrose 50% Injectable 25 once  digoxin     Tablet 0.125 <User Schedule>  donepezil 10 at bedtime  glucagon  Injectable 1 once PRN  heparin  Injectable 5000 every 8 hours  hydrALAZINE 10 two times a day  insulin glargine Injectable (LANTUS) 15 at bedtime  insulin lispro (HumaLOG) corrective regimen sliding scale  three times a day before meals  insulin lispro (HumaLOG) corrective regimen sliding scale  at bedtime  insulin lispro Injectable (HumaLOG) 5 three times a day before meals  isosorbide   dinitrate Tablet (ISORDIL) 10 three times a day  memantine 5 two times a day  metoprolol succinate ER 75 daily  tamsulosin 0.4 at bedtime  torsemide 60 daily  warfarin 5 at bedtime    SOCIAL HISTORY:   hx smoking  non-smoker    FAMILY HISTORY:  No pertinent family history in first degree relatives    REVIEW OF SYSTEMS  [  ] ROS unobtainable because:    [  ] All other systems negative except as noted below:	    Constitutional:  [ ] fever [ ] chills  [ ] weight loss  [ ] weakness  Skin:  [ ] rash [ ] phlebitis	  Eyes: [ ] icterus [ ] pain  [ ] discharge	  ENMT: [ ] sore throat  [ ] thrush [ ] ulcers [ ] exudates  Respiratory: [ ] dyspnea [ ] hemoptysis [ ] cough [ ] sputum	  Cardiovascular:  [ ] chest pain [ ] palpitations [ ] edema	  Gastrointestinal:  [ ] nausea [ ] vomiting [ ] diarrhea [ ] constipation [ ] pain	  Genitourinary:  [ ] dysuria [ ] frequency [ ] hematuria [ ] discharge [ ] flank pain  [ ] incontinence  Musculoskeletal:  [ ] myalgias [ ] arthralgias [ ] arthritis  [ ] back pain  Neurological:  [ ] headache [ ] seizures  [ ] confusion/altered mental status  Psychiatric:  [ ] anxiety [ ] depression	  Hematology/Lymphatics:  [ ] lymphadenopathy  Endocrine:  [ ] adrenal [ ] thyroid  Allergic/Immunologic:	 [ ] transplant [ ] seasonal    Vital Signs Last 24 Hrs  T(F): 97.6 (19 @ 12:36), Max: 100.7 (19 @ 21:01)  Vital Signs Last 24 Hrs  HR: 70 (19 @ 12:36) (68 - 73)  BP: 138/72 (19 @ 12:36) (106/62 - 161/64)  RR: 18 (19 @ 12:36)  SpO2: 99% (19 @ 12:36) (88% - 99%)  Wt(kg): --    EXAM:  Constitutional: Not in acute distress  Eyes: pupils bilaterally reactive to light. No icterus.  Oral cavity: Clear, no lesions  Neck: No neck vein distension noted  RS: Chest clear to auscultation bilaterally. No wheeze/rhonchi/crepitations.  CVS: S1, S2 heard. Regular rate and rhythm. No murmurs/rubs/gallops.  Abdomen: Soft. No guarding/rigidity/tenderness.  : No acute abnormalities  Extremities: anterior RLE with purpura and surrounding erythema and swelling. mildly TTP. Not warm to touch.   Neuro: Alert, oriented to time/place/person  Cranial nerves 2-12 grossly normal. No focal abnormalities  Back: no step-off, no paraspinal tenderness                        10.7   15.1  )-----------( 114      ( 23 Sep 2019 07:01 )             33.8         136  |  101  |  95<H>  ----------------------------<  143<H>  4.2   |  18<L>  |  3.36<H>    Ca    9.1      23 Sep 2019 07:01  Phos  4.7       Mg     1.8         TPro  6.6  /  Alb  3.4  /  TBili  0.4  /  DBili  x   /  AST  41<H>  /  ALT  21  /  AlkPhos  81      Urinalysis Basic - ( 21 Sep 2019 22:55 )    Color: Yellow / Appearance: Slightly Turbid / S.017 / pH: x  Gluc: x / Ketone: Negative  / Bili: Negative / Urobili: Negative   Blood: x / Protein: 300 mg/dL / Nitrite: Negative   Leuk Esterase: Large / RBC: 5 /hpf / WBC 49 /HPF   Sq Epi: x / Non Sq Epi: 1 /hpf / Bacteria: Negative    MICROBIOLOGY:  Culture - Blood (collected 22 Sep 2019 02:22)  Source: .Blood  Preliminary Report (23 Sep 2019 03:00):    No growth to date.    Culture - Urine (collected 22 Sep 2019 02:19)  Source: .Urine  Final Report (22 Sep 2019 21:50):    <10,000 CFU/mL Normal Urogenital Arielle    Culture - Blood (collected 22 Sep 2019 01:33)  Source: .Blood  Gram Stain (22 Sep 2019 12:12):    Growth in aerobic and anaerobic bottles: Gram Positive Cocci in Pairs and    Chains  Preliminary Report (23 Sep 2019 09:50):    Growth in aerobic and anaerobic bottles: Streptococcus agalactiae (Group    B) See previous culture [id number]    Culture - Blood (collected 22 Sep 2019 01:33)  Source: .Blood  Gram Stain (22 Sep 2019 12:11):    Growth in aerobic and anaerobic bottles: Gram Positive Cocci in Pairs and    Chains  Preliminary Report (23 Sep 2019 09:47):    Growth in aerobic and anaerobic bottles: Streptococcus agalactiae (Group    B)    "Due to technical problems, Proteus sp. will Not be reported as part of    the BCID panel until further notice"    ***Blood Panel PCR results on this specimen are available    approximately 3 hours after the Gram stain result.***    Gram stain, PCR, and/or culture results may not always    correspond due to difference in methodologies.    ************************************************************    This PCR assay was performed using [email or web address].    The following targets are tested for: Enterococcus,    vancomycin resistant enterococci, Listeria monocytogenes,    coagulase negative staphylococci, S. aureus,    methicillin resistant S. aureus, Streptococcus agalactiae    (Group B), S. pneumoniae, S. pyogenes (Group A),    Acinetobacter baumannii, Enterobacter cloacae, E. coli,    Klebsiella oxytoca, K. pneumoniae, Proteus sp.,    Serratia marcescens, Haemophilus influenzae,    Neisseria meningitidis, Pseudomonas aeruginosa, Candida    albicans, C. glabrata, C krusei, C parapsilosis,    C. tropicalis and the KPC resistance gene.  Organism: Blood Culture PCR (22 Sep 2019 14:17)  Organism: Blood Culture PCR (22 Sep 2019 14:17)      -  Streptococcus agalactiae (Group B): Detec      Method Type: PCR              Rapid RVP Result: NotDetec ( @ 22:12)        RADIOLOGY:  < from: US TTE 2D F/U, Limited w/o Contrast (ED) (19 @ 08:20) >    INTERPRETATION:  A focused transthoracic cardiac ultrasound examination   was performed.   No pericardial effusion was present.  No global wall motion abnormality was identified  Severe global hypokinesis of the left ventricular contractility  No anterior B lines seen      IMPRESSION:   No Pericardial Effusion.  .     < end of copied text >      < from: Xray Chest 1 View AP/PA (19 @ 23:38) >    IMPRESSION:  Left basilar subsegmental atelectasis. No focal opacity. No pleural   effusion or pneumothorax. The heart is enlarged. Left-sided biventricular   AICD. Sternotomy, mitral valve replacement, and CABG. No acute osseous   findings.      < end of copied text >    < from: CT Abdomen and Pelvis No Cont (19 @ 15:33) >  CT of the Abdomen andPelvis was performed without intravenous contrast.   Intravenous contrast: None.  Oral contrast: None.  Sagittal and coronal reformats were performed.    FINDINGS:    LOWER CHEST: Status post median sternotomy. Cardiomegaly. Partially   imaged pacingwires. Status post mitral valve replacement.    LIVER: Within normal limits.  BILE DUCTS: Normal caliber.  GALLBLADDER: Cholelithiasis.  SPLEEN: Within normal limits.  PANCREAS: Within normal limits.  ADRENALS: Within normal limits.  KIDNEYS/URETERS:Bilateral renal cysts. No hydroureteronephrosis or   obstructing renal calculi.     BLADDER: Within normal limits.  REPRODUCTIVE ORGANS: Prostate is enlarged.    BOWEL: Small hiatal hernia. No bowel obstruction. Status post subtotal   colectomy.  PERITONEUM: No ascites.  VESSELS: Calcified aortic atherosclerosis.  RETROPERITONEUM/LYMPH NODES: No lymphadenopathy.    ABDOMINAL WALL: Within normal limits.  BONES: Status post L3-L4 laminectomy. Multilevel degenerative changes.     IMPRESSION:     No acute abnormality.    < end of copied text >    OTHER TESTS: Patient is a 88y old  Male who presents with a chief complaint of fever and lethargy (23 Sep 2019 06:55)    HPI:  Pt is a 89 yo M w/ BPH, dementia, HTN, CKD IV, recurrent UTIs, MVR 2/2 endocarditis, HFrEF (TTE on 7/15/19 w/ EF: 25% severe global LV dysfxn), Afib on coumadin, CAD s/p CABG, HLD, IDT2DM p/w fever and lethargy x1 day. Pt's son reported that on the day of admission, Pt took a nap around 2:00pm and was difficult to arouse. Pt's son reported that the Pt's wife could not wake the patient up. Pt's son reported that the patient's temperature was taken which measure 101.7 (axillary). Pt son reported that the patient was very sleepy and kept requesting to go back to bed. Otherwise, son reported that the patient did not c/o CP, N/V/D, abd pain, HA, dysuria, or hematuria.     In the ED, pt was afebrile, normal HR and BP however tachypneic to 22, placed on 3L NC. Labs were remarkable for leukocytosis to 20.6. ESR: 21 Procalcitonin: 19.62. INR: 3.58. Cr: 3.32 Pro-BNP 4675. UA positive with Large LE WBC; 49. Pt had complained of back pain and so CT Lumbar/Thoracic spine was ordered which was grossly unremarkable. CXR negative for focal consolidations. CT A/P without contrast, showing enlarged prostate, no acute findings.      Pt was treated w. Vanc/Ceftriazone and 250ml LR bolus x1. (22 Sep 2019 06:00)    Pt seen and examined at bedside. No complaints however per wife, wound over RLE started on Saturday and has been progressively spreading over shin. Pt denies trauma to the area.      prior hospital charts reviewed [  ]  primary team notes reviewed [  ]  other consultant notes reviewed [  ]  PAST MEDICAL & SURGICAL HISTORY:  Cerebrovascular accident (CVA), unspecified mechanism  Pacemaker  Endocarditis  CAD (coronary artery disease)  Cardiomyopathy, ischemic  Type 2 diabetes mellitus  Hypertension  Congestive heart failure  Paroxysmal atrial fibrillation  Dyslipidemia  S/P CABG x 1  History of cataract surgery  History of colon surgery  ICD (implantable cardioverter-defibrillator) in place  H/O mitral valve replacement: bovine    Allergies  No Known Allergies    ANTIMICROBIALS (past 90 days)  MEDICATIONS  (STANDING):  cefTRIAXone   IVPB   100 mL/Hr IV Intermittent (19 @ 22:50)    cefTRIAXone   IVPB   100 mL/Hr IV Intermittent (19 @ 11:50)    cefTRIAXone   IVPB   100 mL/Hr IV Intermittent (19 @ 22:43)    vancomycin  IVPB   300 mL/Hr IV Intermittent (19 @ 00:59)      ANTIMICROBIALS:    cefTRIAXone   IVPB 2000 every 24 hours    OTHER MEDS: MEDICATIONS  (STANDING):  aspirin enteric coated 81 daily  atorvastatin 40 at bedtime  dextrose 40% Gel 15 once PRN  dextrose 50% Injectable 12.5 once  dextrose 50% Injectable 25 once  dextrose 50% Injectable 25 once  digoxin     Tablet 0.125 <User Schedule>  donepezil 10 at bedtime  glucagon  Injectable 1 once PRN  heparin  Injectable 5000 every 8 hours  hydrALAZINE 10 two times a day  insulin glargine Injectable (LANTUS) 15 at bedtime  insulin lispro (HumaLOG) corrective regimen sliding scale  three times a day before meals  insulin lispro (HumaLOG) corrective regimen sliding scale  at bedtime  insulin lispro Injectable (HumaLOG) 5 three times a day before meals  isosorbide   dinitrate Tablet (ISORDIL) 10 three times a day  memantine 5 two times a day  metoprolol succinate ER 75 daily  tamsulosin 0.4 at bedtime  torsemide 60 daily  warfarin 5 at bedtime    SOCIAL HISTORY:   hx smoking  non-smoker    FAMILY HISTORY:  No pertinent family history in first degree relatives    REVIEW OF SYSTEMS  [  ] ROS unobtainable because:    [  ] All other systems negative except as noted below:	    Constitutional:  [ ] fever [ ] chills  [ ] weight loss  [ ] weakness  Skin:  [ ] rash [ ] phlebitis	  Eyes: [ ] icterus [ ] pain  [ ] discharge	  ENMT: [ ] sore throat  [ ] thrush [ ] ulcers [ ] exudates  Respiratory: [ ] dyspnea [ ] hemoptysis [ ] cough [ ] sputum	  Cardiovascular:  [ ] chest pain [ ] palpitations [ ] edema	  Gastrointestinal:  [ ] nausea [ ] vomiting [ ] diarrhea [ ] constipation [ ] pain	  Genitourinary:  [ ] dysuria [ ] frequency [ ] hematuria [ ] discharge [ ] flank pain  [ ] incontinence  Musculoskeletal:  [ ] myalgias [ ] arthralgias [ ] arthritis  [ ] back pain  Neurological:  [ ] headache [ ] seizures  [ ] confusion/altered mental status  Psychiatric:  [ ] anxiety [ ] depression	  Hematology/Lymphatics:  [ ] lymphadenopathy  Endocrine:  [ ] adrenal [ ] thyroid  Allergic/Immunologic:	 [ ] transplant [ ] seasonal    Vital Signs Last 24 Hrs  T(F): 97.6 (19 @ 12:36), Max: 100.7 (19 @ 21:01)  Vital Signs Last 24 Hrs  HR: 70 (19 @ 12:36) (68 - 73)  BP: 138/72 (19 @ 12:36) (106/62 - 161/64)  RR: 18 (19 @ 12:36)  SpO2: 99% (19 @ 12:36) (88% - 99%)  Wt(kg): --    EXAM:  Constitutional: Not in acute distress  Eyes: pupils bilaterally reactive to light. No icterus.  Oral cavity: Clear, no lesions  Neck: No neck vein distension noted  RS: Chest clear to auscultation bilaterally. No wheeze/rhonchi/crepitations.  CVS: S1, S2 heard. Regular rate and rhythm. No murmurs/rubs/gallops.  Abdomen: Soft. No guarding/rigidity/tenderness.  : No acute abnormalities  Extremities: anterior RLE with purpura and surrounding erythema and swelling. mildly TTP. warm to touch.   Neuro: Alert, oriented to time/place/person  Cranial nerves 2-12 grossly normal. No focal abnormalities  Back: no step-off, no paraspinal tenderness                        10.7   15.1  )-----------( 114      ( 23 Sep 2019 07:01 )             33.8         136  |  101  |  95<H>  ----------------------------<  143<H>  4.2   |  18<L>  |  3.36<H>    Ca    9.1      23 Sep 2019 07:01  Phos  4.7       Mg     1.8         TPro  6.6  /  Alb  3.4  /  TBili  0.4  /  DBili  x   /  AST  41<H>  /  ALT  21  /  AlkPhos  81      Urinalysis Basic - ( 21 Sep 2019 22:55 )    Color: Yellow / Appearance: Slightly Turbid / S.017 / pH: x  Gluc: x / Ketone: Negative  / Bili: Negative / Urobili: Negative   Blood: x / Protein: 300 mg/dL / Nitrite: Negative   Leuk Esterase: Large / RBC: 5 /hpf / WBC 49 /HPF   Sq Epi: x / Non Sq Epi: 1 /hpf / Bacteria: Negative    MICROBIOLOGY:  Culture - Blood (collected 22 Sep 2019 02:22)  Source: .Blood  Preliminary Report (23 Sep 2019 03:00):    No growth to date.    Culture - Urine (collected 22 Sep 2019 02:19)  Source: .Urine  Final Report (22 Sep 2019 21:50):    <10,000 CFU/mL Normal Urogenital Arielle    Culture - Blood (collected 22 Sep 2019 01:33)  Source: .Blood  Gram Stain (22 Sep 2019 12:12):    Growth in aerobic and anaerobic bottles: Gram Positive Cocci in Pairs and    Chains  Preliminary Report (23 Sep 2019 09:50):    Growth in aerobic and anaerobic bottles: Streptococcus agalactiae (Group    B) See previous culture [id number]    Culture - Blood (collected 22 Sep 2019 01:33)  Source: .Blood  Gram Stain (22 Sep 2019 12:11):    Growth in aerobic and anaerobic bottles: Gram Positive Cocci in Pairs and    Chains  Preliminary Report (23 Sep 2019 09:47):    Growth in aerobic and anaerobic bottles: Streptococcus agalactiae (Group    B)    "Due to technical problems, Proteus sp. will Not be reported as part of    the BCID panel until further notice"    ***Blood Panel PCR results on this specimen are available    approximately 3 hours after the Gram stain result.***    Gram stain, PCR, and/or culture results may not always    correspond due to difference in methodologies.    ************************************************************    This PCR assay was performed using [employer].    The following targets are tested for: Enterococcus,    vancomycin resistant enterococci, Listeria monocytogenes,    coagulase negative staphylococci, S. aureus,    methicillin resistant S. aureus, Streptococcus agalactiae    (Group B), S. pneumoniae, S. pyogenes (Group A),    Acinetobacter baumannii, Enterobacter cloacae, E. coli,    Klebsiella oxytoca, K. pneumoniae, Proteus sp.,    Serratia marcescens, Haemophilus influenzae,    Neisseria meningitidis, Pseudomonas aeruginosa, Candida    albicans, C. glabrata, C krusei, C parapsilosis,    C. tropicalis and the KPC resistance gene.  Organism: Blood Culture PCR (22 Sep 2019 14:17)  Organism: Blood Culture PCR (22 Sep 2019 14:17)      -  Streptococcus agalactiae (Group B): Detec      Method Type: PCR              Rapid RVP Result: NotDetec ( @ 22:12)        RADIOLOGY:  < from: US TTE 2D F/U, Limited w/o Contrast (ED) (19 @ 08:20) >    INTERPRETATION:  A focused transthoracic cardiac ultrasound examination   was performed.   No pericardial effusion was present.  No global wall motion abnormality was identified  Severe global hypokinesis of the left ventricular contractility  No anterior B lines seen      IMPRESSION:   No Pericardial Effusion.  .     < end of copied text >      < from: Xray Chest 1 View AP/PA (19 @ 23:38) >    IMPRESSION:  Left basilar subsegmental atelectasis. No focal opacity. No pleural   effusion or pneumothorax. The heart is enlarged. Left-sided biventricular   AICD. Sternotomy, mitral valve replacement, and CABG. No acute osseous   findings.      < end of copied text >    < from: CT Abdomen and Pelvis No Cont (19 @ 15:33) >  CT of the Abdomen andPelvis was performed without intravenous contrast.   Intravenous contrast: None.  Oral contrast: None.  Sagittal and coronal reformats were performed.    FINDINGS:    LOWER CHEST: Status post median sternotomy. Cardiomegaly. Partially   imaged pacingwires. Status post mitral valve replacement.    LIVER: Within normal limits.  BILE DUCTS: Normal caliber.  GALLBLADDER: Cholelithiasis.  SPLEEN: Within normal limits.  PANCREAS: Within normal limits.  ADRENALS: Within normal limits.  KIDNEYS/URETERS:Bilateral renal cysts. No hydroureteronephrosis or   obstructing renal calculi.     BLADDER: Within normal limits.  REPRODUCTIVE ORGANS: Prostate is enlarged.    BOWEL: Small hiatal hernia. No bowel obstruction. Status post subtotal   colectomy.  PERITONEUM: No ascites.  VESSELS: Calcified aortic atherosclerosis.  RETROPERITONEUM/LYMPH NODES: No lymphadenopathy.    ABDOMINAL WALL: Within normal limits.  BONES: Status post L3-L4 laminectomy. Multilevel degenerative changes.     IMPRESSION:     No acute abnormality.    < end of copied text >    OTHER TESTS:

## 2019-09-23 NOTE — PROVIDER CONTACT NOTE (OTHER) - ASSESSMENT
Patient AOx3-4 forgetful. Patient appears COYNE. Lungs clear to auscultation. Denies any distress. As per patient, he doesn't use O2 at home.

## 2019-09-23 NOTE — CONSULT NOTE ADULT - ASSESSMENT
88M w/ BPH, dementia, HTN, CKD IV, recurrent UTIs, MVR 2/2 endocarditis, HFrEF (TTE on 7/15/19 w/ EF: 25% severe global LV dysfxn), Afib on coumadin, CAD s/p CABG, HLD, IDT2DM p/w fever to 101.7 at home and lethargy x1 day, admitted for sepsis 2/ to suspected urinary source. Since admission pt has been afebrile however blood cultures positive for Strep agalactiae. Pt has bioprosthetic mitral valve, TTE in ED showing severe global  hypokinesis. No complaints of dysuria, UCx showing normal destiney. On 2L NC and  unable to wean, also now with RLE swelling c/f cellulitis.     RECOMMENDATIONS:  - 88M w/ BPH, dementia, HTN, CKD IV, recurrent UTIs, MVR 2/2 endocarditis, HFrEF (TTE on 7/15/19 w/ EF: 25% severe global LV dysfxn), Afib on coumadin, CAD s/p CABG, HLD, IDT2DM p/w fever to 101.7 at home and lethargy x1 day, admitted for sepsis 2/ to suspected urinary source given positive UA. Since admission pt has been afebrile however blood cultures positive for Strep agalactiae. Pt has bioprosthetic mitral valve, PM and ICD. TTE in ED showing severe global hypokinesis. No complaints of dysuria, UCx showing normal destiney. On 2L NC and unable to wean, however CXR not showing any focal opacities. Now with RLE swelling c/f cellulitis, like source of Strep agalactiae bacteremia.    RECOMMENDATIONS:  -2g CTX daily  -formal TTE or MARY on this admission  -will follow

## 2019-09-23 NOTE — PROGRESS NOTE ADULT - PROBLEM SELECTOR PLAN 5
CHADSVasc: 6; On coumadin  - C/w digoxin 0.125mg MWF  - Metoprolol as above  - Holding coumadin in setting of supratherapeutic INR  - Restart home dose once therapeutic CHADSVasc: 6; On coumadin  - C/w digoxin 0.125mg MWF  - Metoprolol as above  - Held coumadin in setting of supratherapeutic INR  - INR 2.4  - will restart coumadin 5 mg everyday except sunday, 2.5

## 2019-09-23 NOTE — PROGRESS NOTE ADULT - PROBLEM SELECTOR PLAN 3
Pt INR found to be elevated in setting of sepsis.   - Hold coumadin for now; re-dose once Pt INR is therapeutic. Pt INR found to be elevated in setting of sepsis.   - Hold coumadin for now; re-dose once Pt INR is therapeutic

## 2019-09-24 ENCOUNTER — TRANSCRIPTION ENCOUNTER (OUTPATIENT)
Age: 84
End: 2019-09-24

## 2019-09-24 ENCOUNTER — APPOINTMENT (OUTPATIENT)
Dept: NEPHROLOGY | Facility: CLINIC | Age: 84
End: 2019-09-24

## 2019-09-24 ENCOUNTER — APPOINTMENT (OUTPATIENT)
Dept: NEUROLOGY | Facility: CLINIC | Age: 84
End: 2019-09-24

## 2019-09-24 DIAGNOSIS — M54.9 DORSALGIA, UNSPECIFIED: ICD-10-CM

## 2019-09-24 DIAGNOSIS — D72.829 ELEVATED WHITE BLOOD CELL COUNT, UNSPECIFIED: ICD-10-CM

## 2019-09-24 DIAGNOSIS — L03.115 CELLULITIS OF RIGHT LOWER LIMB: ICD-10-CM

## 2019-09-24 DIAGNOSIS — R78.81 BACTEREMIA: ICD-10-CM

## 2019-09-24 DIAGNOSIS — R50.81 FEVER PRESENTING WITH CONDITIONS CLASSIFIED ELSEWHERE: ICD-10-CM

## 2019-09-24 LAB
-  CEFTRIAXONE: SIGNIFICANT CHANGE UP
-  CLINDAMYCIN: SIGNIFICANT CHANGE UP
-  LEVOFLOXACIN: SIGNIFICANT CHANGE UP
-  PENICILLIN: SIGNIFICANT CHANGE UP
-  VANCOMYCIN: SIGNIFICANT CHANGE UP
ANION GAP SERPL CALC-SCNC: 17 MMOL/L — SIGNIFICANT CHANGE UP (ref 5–17)
BUN SERPL-MCNC: 95 MG/DL — HIGH (ref 7–23)
CALCIUM SERPL-MCNC: 9 MG/DL — SIGNIFICANT CHANGE UP (ref 8.4–10.5)
CHLORIDE SERPL-SCNC: 102 MMOL/L — SIGNIFICANT CHANGE UP (ref 96–108)
CO2 SERPL-SCNC: 19 MMOL/L — LOW (ref 22–31)
CREAT SERPL-MCNC: 3.13 MG/DL — HIGH (ref 0.5–1.3)
CULTURE RESULTS: SIGNIFICANT CHANGE UP
CULTURE RESULTS: SIGNIFICANT CHANGE UP
GLUCOSE BLDC GLUCOMTR-MCNC: 135 MG/DL — HIGH (ref 70–99)
GLUCOSE BLDC GLUCOMTR-MCNC: 188 MG/DL — HIGH (ref 70–99)
GLUCOSE BLDC GLUCOMTR-MCNC: 189 MG/DL — HIGH (ref 70–99)
GLUCOSE BLDC GLUCOMTR-MCNC: 191 MG/DL — HIGH (ref 70–99)
GLUCOSE SERPL-MCNC: 155 MG/DL — HIGH (ref 70–99)
HCT VFR BLD CALC: 35.9 % — LOW (ref 39–50)
HGB BLD-MCNC: 11.1 G/DL — LOW (ref 13–17)
INR BLD: 1.65 RATIO — HIGH (ref 0.88–1.16)
MCHC RBC-ENTMCNC: 28.5 PG — SIGNIFICANT CHANGE UP (ref 27–34)
MCHC RBC-ENTMCNC: 30.8 GM/DL — LOW (ref 32–36)
MCV RBC AUTO: 92.5 FL — SIGNIFICANT CHANGE UP (ref 80–100)
METHOD TYPE: SIGNIFICANT CHANGE UP
METHOD TYPE: SIGNIFICANT CHANGE UP
ORGANISM # SPEC MICROSCOPIC CNT: SIGNIFICANT CHANGE UP
PLATELET # BLD AUTO: 130 K/UL — LOW (ref 150–400)
POTASSIUM SERPL-MCNC: 3.6 MMOL/L — SIGNIFICANT CHANGE UP (ref 3.5–5.3)
POTASSIUM SERPL-SCNC: 3.6 MMOL/L — SIGNIFICANT CHANGE UP (ref 3.5–5.3)
PROTHROM AB SERPL-ACNC: 19.1 SEC — HIGH (ref 10–12.9)
RBC # BLD: 3.88 M/UL — LOW (ref 4.2–5.8)
RBC # FLD: 12.7 % — SIGNIFICANT CHANGE UP (ref 10.3–14.5)
SODIUM SERPL-SCNC: 138 MMOL/L — SIGNIFICANT CHANGE UP (ref 135–145)
SPECIMEN SOURCE: SIGNIFICANT CHANGE UP
SPECIMEN SOURCE: SIGNIFICANT CHANGE UP
WBC # BLD: 9.9 K/UL — SIGNIFICANT CHANGE UP (ref 3.8–10.5)
WBC # FLD AUTO: 9.9 K/UL — SIGNIFICANT CHANGE UP (ref 3.8–10.5)

## 2019-09-24 PROCEDURE — 99233 SBSQ HOSP IP/OBS HIGH 50: CPT | Mod: GC

## 2019-09-24 PROCEDURE — 93306 TTE W/DOPPLER COMPLETE: CPT | Mod: 26

## 2019-09-24 PROCEDURE — 99233 SBSQ HOSP IP/OBS HIGH 50: CPT

## 2019-09-24 RX ADMIN — Medication 10 MILLIGRAM(S): at 21:22

## 2019-09-24 RX ADMIN — TAMSULOSIN HYDROCHLORIDE 0.4 MILLIGRAM(S): 0.4 CAPSULE ORAL at 21:22

## 2019-09-24 RX ADMIN — MEMANTINE HYDROCHLORIDE 5 MILLIGRAM(S): 10 TABLET ORAL at 12:29

## 2019-09-24 RX ADMIN — Medication 60 MILLIGRAM(S): at 06:20

## 2019-09-24 RX ADMIN — Medication 75 MILLIGRAM(S): at 06:20

## 2019-09-24 RX ADMIN — ISOSORBIDE DINITRATE 10 MILLIGRAM(S): 5 TABLET ORAL at 21:22

## 2019-09-24 RX ADMIN — INSULIN GLARGINE 15 UNIT(S): 100 INJECTION, SOLUTION SUBCUTANEOUS at 21:23

## 2019-09-24 RX ADMIN — MEMANTINE HYDROCHLORIDE 5 MILLIGRAM(S): 10 TABLET ORAL at 21:22

## 2019-09-24 RX ADMIN — Medication 1: at 18:09

## 2019-09-24 RX ADMIN — ATORVASTATIN CALCIUM 40 MILLIGRAM(S): 80 TABLET, FILM COATED ORAL at 21:22

## 2019-09-24 RX ADMIN — CEFTRIAXONE 100 MILLIGRAM(S): 500 INJECTION, POWDER, FOR SOLUTION INTRAMUSCULAR; INTRAVENOUS at 21:27

## 2019-09-24 RX ADMIN — Medication 81 MILLIGRAM(S): at 09:12

## 2019-09-24 RX ADMIN — Medication 10 MILLIGRAM(S): at 09:12

## 2019-09-24 RX ADMIN — Medication 1: at 13:19

## 2019-09-24 RX ADMIN — Medication 5 UNIT(S): at 18:09

## 2019-09-24 RX ADMIN — Medication 5 UNIT(S): at 13:19

## 2019-09-24 RX ADMIN — HEPARIN SODIUM 5000 UNIT(S): 5000 INJECTION INTRAVENOUS; SUBCUTANEOUS at 06:20

## 2019-09-24 RX ADMIN — HEPARIN SODIUM 5000 UNIT(S): 5000 INJECTION INTRAVENOUS; SUBCUTANEOUS at 21:22

## 2019-09-24 RX ADMIN — WARFARIN SODIUM 5 MILLIGRAM(S): 2.5 TABLET ORAL at 21:22

## 2019-09-24 RX ADMIN — HEPARIN SODIUM 5000 UNIT(S): 5000 INJECTION INTRAVENOUS; SUBCUTANEOUS at 13:17

## 2019-09-24 RX ADMIN — ISOSORBIDE DINITRATE 10 MILLIGRAM(S): 5 TABLET ORAL at 06:20

## 2019-09-24 RX ADMIN — ISOSORBIDE DINITRATE 10 MILLIGRAM(S): 5 TABLET ORAL at 13:19

## 2019-09-24 RX ADMIN — Medication 5 UNIT(S): at 09:12

## 2019-09-24 RX ADMIN — DONEPEZIL HYDROCHLORIDE 10 MILLIGRAM(S): 10 TABLET, FILM COATED ORAL at 21:22

## 2019-09-24 NOTE — PHYSICAL THERAPY INITIAL EVALUATION ADULT - ADDITIONAL COMMENTS
Pt lives in a split-level house , 6 steps to enter & 6 steps to negotiate once inside. PTA pt (I) w/ all functional mobility & ADL w/o AD.

## 2019-09-24 NOTE — PROGRESS NOTE ADULT - ASSESSMENT
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 Blood cultures positive for Strep agalactiae, with RLE swelling c/f cellulitis as likely source. On CTX 2 g since 9/23, had been on 1 g 9/21-9/23. WBC downtrending, remains afebrile.      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 Blood cultures positive for Strep agalactiae, with RLE swelling c/f cellulitis as likely source. On CTX 2 g since 9/23, s/p CTX 1 g 9/21-9/23. WBC downtrending, remains afebrile.      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 Blood cultures positive for Strep agalactiae, with RLE swelling c/f cellulitis as likely source. On CTX 2 g since 9/23, s/p CTX 1 g 9/21-9/23. WBC downtrending, remains afebrile.      RECOMMENDATIONS:  -continue with 2g CTX daily (9/23-)  -formal TTE or MARY pending

## 2019-09-24 NOTE — PROGRESS NOTE ADULT - PROBLEM SELECTOR PLAN 2
Pt p/w fever, tachypnea, and leukocytosis w/ evidence of UTI. S/p Vanc/CTX in the ED. Pt additionally presented w/ back pain concerning for spinal abscess, however CT imaging showed no evidence of spinal abscess.   - C/w abx as above  - blood cx GBS +, source likely R leg  - ID recs: cefriaxone 2g, echo pending Pt INR found to be elevated in setting of sepsis.   - INR now 1.96, restart warfarin 5 mg

## 2019-09-24 NOTE — DISCHARGE NOTE PROVIDER - HOSPITAL COURSE
Pt is a 89 yo M w/ BPH, dementia, HTN, CKD IV, recurrent UTIs, MVR 2/2 endocarditis, HFrEF (TTE on 7/15/19 w/ EF: 25% severe global LV dysfxn), Afib on coumadin, CAD s/p CABG, HLD, IDT2DM p/w fever and lethargy x1 day. Hx obtained from Pt's son over the phone as patient does not remember why he was brought into hospital. Pt's son reported that on the day of admission, Pt took a nap around 2:00pm and was difficult to arouse. Pt;s son reported that the Pt's wife could not wake the patient up. Pt's son reported that the patient's temperature was taken which measure 101.7 (axillary). Pt son reported that the patient was very sleepy and kept requesting to go back to bed. Otherwise, son reported that the patient did not c/o CP, N/V/D, abd pain, HA, dysuria, or hematuria.         In the ED,         ESR: 21 WBC: 20.6 INR: 3.58 Procalcitonin: 19.62 Cr: 3.32 Pro-BNP 4675; UA: Large LE WBC; 49.        CT Lumbar/Thoracic spine: Limited evaluation for epidural abscess on a noncontrast CT of the total spine. No evidence of discrete soft tissue within the spinal canal at the     surgical level. Status post L3-L4 laminectomies. Multilevel degenerative changes spine contributing to varying degrees of neuroforaminal and spinal canal stenosis. Spinal canal stenosis is severe at the L2-L3 level, relatively stable in appearance since 4/22/2017.         Pt was treated w. Vanc/Ceftriazone and 250ml LR bolus x1.         Pt admitted for UTI and Group B Strep bacteremia due to RLE cellulitis. ID recommended IV ceftriaxone          echo Pt is a 89 yo M w/ BPH, dementia, HTN, CKD IV, recurrent UTIs, MVR 2/2 endocarditis, HFrEF (TTE on 7/15/19 w/ EF: 25% severe global LV dysfxn), Afib on coumadin, CAD s/p CABG, HLD, IDT2DM p/w fever and lethargy x1 day. Hx obtained from Pt's son over the phone as patient does not remember why he was brought into hospital. Pt's son reported that on the day of admission, Pt took a nap around 2:00pm and was difficult to arouse. Pt;s son reported that the Pt's wife could not wake the patient up. Pt's son reported that the patient's temperature was taken which measure 101.7 (axillary). Pt son reported that the patient was very sleepy and kept requesting to go back to bed. Otherwise, son reported that the patient did not c/o CP, N/V/D, abd pain, HA, dysuria, or hematuria.         In the ED:         ESR: 21 WBC: 20.6 INR: 3.58 Procalcitonin: 19.62 Cr: 3.32 Pro-BNP 4675; UA: Large LE WBC; 49.        CT Lumbar/Thoracic spine: Limited evaluation for epidural abscess on a noncontrast CT of the total spine. No evidence of discrete soft tissue within the spinal canal at the     surgical level. Status post L3-L4 laminectomies. Multilevel degenerative changes spine contributing to varying degrees of neuroforaminal and spinal canal stenosis. Spinal canal stenosis is severe at the L2-L3 level, relatively stable in appearance since 4/22/2017.         Pt was treated w. Vanc/Ceftriazone and 250ml LR bolus x1.         Pt admitted for UTI and Group B Strep bacteremia due to RLE cellulitis. ID recommended IV ceftriaxone. Sensitivity testing came back sensitive for ceftriaxone. Blood cultures repeated on 9/25 negative.         TTE showed:        Conclusions:    1. Bioprosthetic mitral valve replacement. Minimal mitral    regurgitation. Peak mitral valve gradient equals 12 mm Hg,    mean transmitral valve gradient equals 5 mm Hg, which is    probably normal in the setting of a bioprosthetic mitral    valve replacement.    2. Aortic valve not well visualized; appears calcified.    Mild aortic regurgitation.    3. Eccentric left ventricular hypertrophy (dilated left    ventricle with normal relative wall thickness).    4. Moderate global left ventricular systolic dysfunction.    5. Normal right ventricular size with decreased right    ventricular systolic function. A device wire is noted in    the right heart. TV s = 8 cm/sec.    6. Estimated right ventricular systolic pressure equals 57    mm Hg, assuming right atrial pressure equals 8 mm Hg,    consistent with moderate pulmonary hypertension.    7. A small mobile echodensity is seen on the ventricular    side of the mitral valve. Differential includes vegetation    vs torn chord. The aortic, pulmonic, and tricuspid valves    are not well visualized. Can not exclude endocarditis.    Consider MARY if clinically indicated.    *** Compared with echocardiogram of 7/15/2019, left    ventricular function has improved. Pulmonary hypertension    has decreased. A small mobile echodensity is seen on the    ventricular side of the mitral valve.    EF 35-40%        MARY recommended, but patient refused. ID recommended 6 week course of abx, starting from 9/25. PICC line placed. Pt will need repeat echo and blood culture and conclusion of treatment. Pt will also need weekly CBC, BMP to monitor electrolytes and renal function. 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 Hx obtained from Pt's son over the phone as patient does not remember why he was brought into hospital. Pt's son reported that on the day of admission, Pt took a nap around 2:00pm and was difficult to arouse. Pt;s son reported that the Pt's wife could not wake the patient up. Pt's son reported that the patient's temperature was taken which measure 101.7 (axillary). Pt son reported that the patient was very sleepy and kept requesting to go back to bed. Otherwise, son reported that the patient did not c/o CP, N/V/D, abd pain, HA, dysuria, or hematuria.         In the ED:         ESR: 21 WBC: 20.6 INR: 3.58 Procalcitonin: 19.62 Cr: 3.32 Pro-BNP 4675; UA: Large LE WBC; 49.        CT Lumbar/Thoracic spine: Limited evaluation for epidural abscess on a noncontrast CT of the total spine. No evidence of discrete soft tissue within the spinal canal at the     surgical level. Status post L3-L4 laminectomies. Multilevel degenerative changes spine contributing to varying degrees of neuroforaminal and spinal canal stenosis. Spinal canal stenosis is severe at the L2-L3 level, relatively stable in appearance since 4/22/2017.         Pt was treated w. Vanc/Ceftriazone and 250ml LR bolus x1.         Pt admitted for UTI and Group B Strep bacteremia due to RLE cellulitis. ID recommended IV ceftriaxone. Sensitivity testing came back sensitive for ceftriaxone. Blood cultures repeated on 9/25 negative.         TTE showed:        Conclusions:    1. Bioprosthetic mitral valve replacement. Minimal mitral    regurgitation. Peak mitral valve gradient equals 12 mm Hg,    mean transmitral valve gradient equals 5 mm Hg, which is    probably normal in the setting of a bioprosthetic mitral    valve replacement.    2. Aortic valve not well visualized; appears calcified.    Mild aortic regurgitation.    3. Eccentric left ventricular hypertrophy (dilated left    ventricle with normal relative wall thickness).    4. Moderate global left ventricular systolic dysfunction.    5. Normal right ventricular size with decreased right    ventricular systolic function. A device wire is noted in    the right heart. TV s = 8 cm/sec.    6. Estimated right ventricular systolic pressure equals 57    mm Hg, assuming right atrial pressure equals 8 mm Hg,    consistent with moderate pulmonary hypertension.    7. A small mobile echodensity is seen on the ventricular    side of the mitral valve. Differential includes vegetation    vs torn chord. The aortic, pulmonic, and tricuspid valves    are not well visualized. Can not exclude endocarditis.    Consider MARY if clinically indicated.    *** Compared with echocardiogram of 7/15/2019, left    ventricular function has improved. Pulmonary hypertension    has decreased. A small mobile echodensity is seen on the    ventricular side of the mitral valve.    EF 35-40%        MARY recommended, but patient refused. ID recommended 6 week course of abx, starting from 9/25. Guan line placed. Pt will need repeat echo and blood culture and conclusion of treatment. Pt will also need weekly CBC, BMP to monitor electrolytes and renal function.

## 2019-09-24 NOTE — PROGRESS NOTE ADULT - PROBLEM SELECTOR PLAN 6
Pt CKD IV. Baseline Cr: 3.0-3.2.   - trend Cr  - Avoid nephrotoxins   - Renally dose all meds. Pt is an IDT2DM. Pt on Lantus 20U qhs and Humalog 8U TID at home  - A1c 7.2  - Lantus 15U  - HUmalog 5U TID  - ISS

## 2019-09-24 NOTE — PROGRESS NOTE ADULT - PROBLEM SELECTOR PLAN 9
- C/w home tamsulosin Problem 11: Dementia: C/w home Aricept and Namenda     DVT: HSQ  Diet: CC diet  Code: Full  Dispo: Likely home

## 2019-09-24 NOTE — PROGRESS NOTE ADULT - SUBJECTIVE AND OBJECTIVE BOX
PELON DUNCAN 88y [id number]    Patient is a 88y old  Male who presents with a chief complaint of fever and lethargy (24 Sep 2019 07:00)      Follow Up/CC:  ID following for bacteremia    Interval History/ROS: no fever, feels better    Allergies    No Known Allergies    Intolerances        ANTIMICROBIALS:  cefTRIAXone   IVPB 2000 every 24 hours      MEDICATIONS  (STANDING):  aspirin enteric coated 81 milliGRAM(s) Oral daily  atorvastatin 40 milliGRAM(s) Oral at bedtime  cefTRIAXone   IVPB 2000 milliGRAM(s) IV Intermittent every 24 hours  dextrose 5%. 1000 milliLiter(s) (50 mL/Hr) IV Continuous <Continuous>  dextrose 50% Injectable 12.5 Gram(s) IV Push once  dextrose 50% Injectable 25 Gram(s) IV Push once  dextrose 50% Injectable 25 Gram(s) IV Push once  digoxin     Tablet 0.125 milliGRAM(s) Oral <User Schedule>  donepezil 10 milliGRAM(s) Oral at bedtime  heparin  Injectable 5000 Unit(s) SubCutaneous every 8 hours  hydrALAZINE 10 milliGRAM(s) Oral two times a day  insulin glargine Injectable (LANTUS) 15 Unit(s) SubCutaneous at bedtime  insulin lispro (HumaLOG) corrective regimen sliding scale   SubCutaneous three times a day before meals  insulin lispro (HumaLOG) corrective regimen sliding scale   SubCutaneous at bedtime  insulin lispro Injectable (HumaLOG) 5 Unit(s) SubCutaneous three times a day before meals  isosorbide   dinitrate Tablet (ISORDIL) 10 milliGRAM(s) Oral three times a day  memantine 5 milliGRAM(s) Oral two times a day  metoprolol succinate ER 75 milliGRAM(s) Oral daily  tamsulosin 0.4 milliGRAM(s) Oral at bedtime  torsemide 60 milliGRAM(s) Oral daily  warfarin 5 milliGRAM(s) Oral at bedtime    MEDICATIONS  (PRN):  dextrose 40% Gel 15 Gram(s) Oral once PRN Blood Glucose LESS THAN 70 milliGRAM(s)/deciliter  glucagon  Injectable 1 milliGRAM(s) IntraMuscular once PRN Glucose LESS THAN 70 milligrams/deciliter        Vital Signs Last 24 Hrs  T(C): 36.7 (24 Sep 2019 08:10), Max: 36.7 (23 Sep 2019 16:30)  T(F): 98.1 (24 Sep 2019 08:10), Max: 98.1 (24 Sep 2019 08:10)  HR: 70 (24 Sep 2019 08:10) (68 - 70)  BP: 126/58 (24 Sep 2019 08:10) (126/58 - 139/66)  BP(mean): --  RR: 18 (24 Sep 2019 08:10) (18 - 18)  SpO2: 95% (24 Sep 2019 08:10) (95% - 99%)    CBC Full  -  ( 24 Sep 2019 06:24 )  WBC Count : 9.9 K/uL  RBC Count : 3.88 M/uL  Hemoglobin : 11.1 g/dL  Hematocrit : 35.9 %  Platelet Count - Automated : 130 K/uL  Mean Cell Volume : 92.5 fl  Mean Cell Hemoglobin : 28.5 pg  Mean Cell Hemoglobin Concentration : 30.8 gm/dL  Auto Neutrophil # : x  Auto Lymphocyte # : x  Auto Monocyte # : x  Auto Eosinophil # : x  Auto Basophil # : x  Auto Neutrophil % : x  Auto Lymphocyte % : x  Auto Monocyte % : x  Auto Eosinophil % : x  Auto Basophil % : x    09-24    138  |  102  |  95<H>  ----------------------------<  155<H>  3.6   |  19<L>  |  3.13<H>    Ca    9.0      24 Sep 2019 06:24    TPro  6.6  /  Alb  3.4  /  TBili  0.4  /  DBili  x   /  AST  41<H>  /  ALT  21  /  AlkPhos  81  09-23    LIVER FUNCTIONS - ( 23 Sep 2019 07:01 )  Alb: 3.4 g/dL / Pro: 6.6 g/dL / ALK PHOS: 81 U/L / ALT: 21 U/L / AST: 41 U/L / GGT: x               MICROBIOLOGY:  .Blood  09-22-19   No growth to date.  --  --      .Urine  09-22-19   <10,000 CFU/mL Normal Urogenital Arielle  --  --      .Blood  09-22-19   Growth in aerobic and anaerobic bottles: Streptococcus agalactiae (Group  B)  "Due to technical problems, Proteus sp. will Not be reported as part of  the BCID panel until further notice"  ***Blood Panel PCR results on this specimen are available  approximately 3 hours after the Gram stain result.***  Gram stain, PCR, and/or culture results may not always  correspond due to difference in methodologies.  ************************************************************  This PCR assay was performed using [employer].  The following targets are tested for: Enterococcus,  vancomycin resistant enterococci, Listeria monocytogenes,  coagulase negative staphylococci, S. aureus,  methicillin resistant S. aureus, Streptococcus agalactiae  (Group B), S. pneumoniae, S. pyogenes (Group A),  Acinetobacter baumannii, Enterobacter cloacae, E. coli,  Klebsiella oxytoca, K. pneumoniae, Proteus sp.,  Serratia marcescens, Haemophilus influenzae,  Neisseria meningitidis, Pseudomonas aeruginosa, Candida  albicans, C. glabrata, C krusei, C parapsilosis,  C. tropicalis and the KPC resistance gene.  --  Blood Culture PCR        Rapid RVP Result: NotDetec (09-21 @ 22:12)          RADIOLOGY    < from: CT Abdomen and Pelvis No Cont (09.22.19 @ 15:33) >  No acute abnormality.    < end of copied text >  < from: CT Lumbar Spine No Cont (09.22.19 @ 00:53) >  Limited evaluation for epidural abscess on a noncontrast CT of the total   spine. No evidence of discrete soft tissue within the spinal canal at the   surgical level.    Status post L3-L4 laminectomies.    Multilevel degenerative changes spine contributing to varying degrees of   neuroforaminal and spinal canal stenosis. Spinal canal stenosis is severe   at the L2-L3 level, relatively stable in appearance since 4/22/2017.    < end of copied text >

## 2019-09-24 NOTE — DISCHARGE NOTE PROVIDER - NSDCCPCAREPLAN_GEN_ALL_CORE_FT
PRINCIPAL DISCHARGE DIAGNOSIS  Diagnosis: Bacteremia  Assessment and Plan of Treatment: You were diagnosed with bacteremia, which means that bacteria was found growing in your blood. This was likely due to the infection present on your leg. You were given antibiotics to treat this. You will need to continue taking it for 6 weeks. You will also need a repeat ultrasound of your heart, as this may have been another cause of your infection. You will need to get blood tests done weekly in the meantime. Please call your doctor if you develop fever, chills, nasuea, vomiting or otherwise feel sick.      SECONDARY DISCHARGE DIAGNOSES  Diagnosis: Cellulitis  Assessment and Plan of Treatment: Take all of your antibiotics as ordered.  If the affected cellulitic area increases in redness, warmth, pain or swelling call your Health Care Provider.  If you develop fever, chills, and/or malaise, call your Health Care Provider.

## 2019-09-24 NOTE — PROGRESS NOTE ADULT - ASSESSMENT
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 Since admission pt has been afebrile however blood cultures positive for Strep agalactiae. Pt has bioprosthetic mitral valve, PM and ICD. TTE in ED showing severe global hypokinesis. No complaints of dysuria, UCx showing normal destiney. On 2L NC and unable to wean, however CXR not showing any focal opacities. Now with RLE swelling c/f cellulitis, like source of Strep agalactiae bacteremia. 13-Jul-2019 23:22

## 2019-09-24 NOTE — DISCHARGE NOTE PROVIDER - NSDCFUADDINST_GEN_ALL_CORE_FT
Please see your primary doctor in 1-3 days. Hold your torsemide medication when you get home for 1 day. Resume on 10/5/2019.    You will need your INR to be checked by 10/7/2019; continue with your current home regime until INR is checked.   Please see your primary doctor in 1-3 days.

## 2019-09-24 NOTE — PROGRESS NOTE ADULT - PROBLEM SELECTOR PLAN 1
-repeat bcx negative  -cont CTX  -likely from right LE cellulitis   -check TTE  -favor eventual MARY also given PPM, prosthetic valve and h/o PPM endocarditis in past

## 2019-09-24 NOTE — PROGRESS NOTE ADULT - PROBLEM SELECTOR PLAN 4
Pt w/ HFrEF: (TTE on 7/15/19 w/ EF: 25% severe global LV dysfxn)  - C/w hydralazine 10mg BID  - C/w Isosorbide 30mg qd  - C/w metoprolol 75mg qd  - C/w torsemide 60mg qd CHADSVasc: 6; On coumadin  - C/w digoxin 0.125mg MWF  - Metoprolol as above  - will restart coumadin 5 mg

## 2019-09-24 NOTE — PROGRESS NOTE ADULT - ASSESSMENT
Pt is a 89 yo M w/ BPH, dementia, HTN, CKD IV, recurrent UTIs, MVR 2/2 endocarditis, HFrEF (TTE on 7/15/19 w/ EF: 25% severe global LV dysfxn), Afib on coumadin, CAD s/p CABG, HLD, IDT2DM p/w fever and lethargy x1 day a/w sepsis 2/2 likely UTI Pt is a 87 yo M w/ BPH, dementia, HTN, CKD IV, recurrent UTIs, MVR 2/2 endocarditis, HFrEF (TTE on 7/15/19 w/ EF: 25% severe global LV dysfxn), Afib on coumadin, CAD s/p CABG, HLD, IDT2DM p/w fever and lethargy x1 day a/w sepsis and strep bacteremia secondary to RLE cellulitis

## 2019-09-24 NOTE — PHYSICAL THERAPY INITIAL EVALUATION ADULT - PERTINENT HX OF CURRENT PROBLEM, REHAB EVAL
88yoM w/ BPH, dementia, HTN, CKD IV, recurrent UTIs, MVR 2/2 endocarditis, HFrEF (TTE on 7/15/19 w/ EF: 25% severe global LV dysfxn), Afib on coumadin, CAD s/p CABG, HLD, IDT2DM p/w fever & lethargy x1 day a/w acute encephalopathy, sepsis 2/2 likely UTI

## 2019-09-24 NOTE — PROGRESS NOTE ADULT - PROBLEM SELECTOR PLAN 5
CHADSVasc: 6; On coumadin  - C/w digoxin 0.125mg MWF  - Metoprolol as above  - will restart coumadin 5 mg Pt CKD IV. Baseline Cr: 3.0-3.2.   - trend Cr  - Avoid nephrotoxins   - Renally dose all meds.

## 2019-09-24 NOTE — PROGRESS NOTE ADULT - PROBLEM SELECTOR PLAN 7
Pt is an IDT2DM. Pt on Lantus 20U qhs and Humalog 8U TID at home  - A1c 7.2  - Lantus 15U  - HUmalog 5U TID  - ISS - C/w daily ASA and home atorvastatin 40mg qhs

## 2019-09-24 NOTE — DISCHARGE NOTE PROVIDER - NSDCFUSCHEDAPPT_GEN_ALL_CORE_FT
PELON DUNCAN ; 11/26/2019 ; NPP Cardio Electro [address] PELON Pulido ; 11/26/2019 ; NPP Med Endocr [address] [facility] PELON DUNCAN ; 11/26/2019 ; NPP Cardio Electro [address] [facility] ; 11/26/2019 ; NPP Med Endocr [address] [facility] PELON DUNCAN ; 11/26/2019 ; NPP Cardio Electro [address] [facility] ; 11/26/2019 ; NPP Med Endocr [address] [facility] PELON DUNCAN ; 11/26/2019 ; NPP Cardio Electro [address] [facility] ; 11/26/2019 ; NPP Med Endocr 8[address] [facility] PELON DUNCAN ; 11/26/2019 ; NPP Cardio Electro [address] [facility] ; 11/26/2019 ; NPP Med Endocr 8[address] [facility] PELON DUNCAN ; 11/26/2019 ; NPP Cardio Electro [address] [facility] ; 11/26/2019 ; NPP Med Endocr 8[address] [facility] PELON DUNCAN ; 10/22/2019 ; NPP Neuro [address] [facility]  PELON DUNCAN ; 10/29/2019 ; NPP Med Nephro [address] [facility]ido ; 11/26/2019 ; NPP Cardio Electro [address] [facility] ; 11/26/2019 ; NPP Med Endocr 8[address] [facility] PELON DUNCAN ; 10/22/2019 ; NPP Neuro [address] [facility]  PELON DUNCAN ; 10/29/2019 ; NPP Med Nephro [address] [facility]ido ; 11/26/2019 ; NPP Cardio Electro [address] [facility] ; 11/26/2019 ; NPP Med Endocr 8[address] [facility] PELON DUNCAN ; 10/22/2019 ; NPP Neuro [address] [facility]  PELON DUNCAN ; 10/29/2019 ; NPP Med Nephro [address] [facility]ido ; 11/26/2019 ; NPP Cardio Electro [address] [facility]ON Pulido ; 11/26/2019 ; NPP Med Endocr 8[address] [facility] PELON DUNCAN ; 10/22/2019 ; NPP Neuro 611 [facility]  PELON DUNCAN ; 10/29/2019 ; NPP Med Nephro [address] [facility]ido ; 11/26/2019 ; NPP Cardio Electro [address] [facility]ido ; 11/26/2019 ; NPP Med Endocr 8[address] [facility] PELON DUNCAN ; 10/22/2019 ; NPP Neuro [address] [facility]  PELON DUNCAN ; 10/29/2019 ; NPP Med Nephro [address] [facility]ELON Pulido ; 11/26/2019 ; NPP Cardio Electro [address] [facility]ON Pulido ; 11/26/2019 ; NPP Med Endocr 8[address] [facility] PELON DUNCAN ; 10/22/2019 ; NPP Neuro 611 [facility]  PELON DUNCAN ; 10/29/2019 ; NPP Med Nephro [address] [facility]ELON Pulido ; 11/26/2019 ; NPP Cardio Electro [address] [facility]ELON Pulido ; 11/26/2019 ; NPP Med Endocr 865 [facility] PELON DUNCAN ; 10/22/2019 ; NPP Neuro 611 [facility]  PELON DUNCAN ; 10/29/2019 ; NPP Med Nephro [address] [facility]ELON Pulido ; 11/26/2019 ; NPP Cardio Electro [address] [facility]ELON Pulido ; 11/26/2019 ; NPP Med Endocr 8[address] [facility] PELON DUNCAN ; 10/22/2019 ; NPP Neuro 611 [facility]  PELON DUNCAN ; 10/29/2019 ; NPP Med Nephro [address] [facility]ELON Pulido ; 11/26/2019 ; NPP Cardio Electro [address] PELON Pulido ; 11/26/2019 ; NPP Med Endocr 8[address] [facility] PELON DUNCAN ; 10/22/2019 ; NPP Neuro 611 [facility]  PELON DUNCAN ; 10/29/2019 ; NPP Med Nephro [address] PELON Pulido ; 11/26/2019 ; NPP Cardio Electro [address] PELON Pulido ; 11/26/2019 ; NPP Med Endocr 865 [facility]

## 2019-09-24 NOTE — PROGRESS NOTE ADULT - PROBLEM SELECTOR PLAN 10
No-Patient/Caregiver offered and refused free interpretation services.
Problem 11: Dementia: C/w home Aricept and Namenda     DVT: HSQ  Diet: CC diet  Code: Full  Dispo: Likely home

## 2019-09-24 NOTE — PROGRESS NOTE ADULT - PROBLEM SELECTOR PLAN 1
Pt w/ + UA. S/p Vanc/CTX in the ED.  - C/w CTX (Day 3/5)  - urine culture neg Pt p/w fever, tachypnea, and leukocytosis w/ evidence of UTI. S/p Vanc/CTX in the ED. Pt additionally presented w/ back pain concerning for spinal abscess, however CT imaging showed no evidence of spinal abscess.   - C/w abx as above  - blood cx GBS +, source likely R leg  - ID recs: cefriaxone 2g, echo pending

## 2019-09-24 NOTE — PROGRESS NOTE ADULT - PROBLEM SELECTOR PLAN 3
Pt INR found to be elevated in setting of sepsis.   - INR now 1.96, restart warfarin 5 mg Pt w/ HFrEF: (TTE on 7/15/19 w/ EF: 25% severe global LV dysfxn)  - C/w hydralazine 10mg BID  - C/w Isosorbide 30mg qd  - C/w metoprolol 75mg qd  - C/w torsemide 60mg qd

## 2019-09-24 NOTE — DISCHARGE NOTE PROVIDER - CARE PROVIDER_API CALL
Airam Milelr)  Geriatric Medicine; [facility]; Internal Medicine  [address]  [place] [address]  Phone: [phone]  Fax: [phone]  Follow Up Time:

## 2019-09-24 NOTE — PROGRESS NOTE ADULT - SUBJECTIVE AND OBJECTIVE BOX
EPLON DUNCAN  88y  Male      Patient is a 88y old  Male who presents with a chief complaint of fever and lethargy (22 Sep 2019 06:00)      INTERVAL HPI/OVERNIGHT EVENTS: No events overnight.       REVIEW OF SYSTEMS:  CONSTITUTIONAL: No fever, weight loss, or fatigue  EYES: No eye pain, visual disturbances, or discharge  ENMT:  No difficulty hearing, tinnitus, vertigo; No sinus or throat pain  NECK: No pain or stiffness  BREASTS: No pain, masses, or nipple discharge  RESPIRATORY: No cough, wheezing, chills or hemoptysis; No shortness of breath  CARDIOVASCULAR: No chest pain, palpitations, dizziness, or leg swelling  GASTROINTESTINAL: No abdominal or epigastric pain. No nausea, vomiting, or hematemesis; No diarrhea or constipation. No melena or hematochezia.  GENITOURINARY: No dysuria, frequency, hematuria, or incontinence  NEUROLOGICAL: + headaches, no memory loss, loss of strength, numbness, or tremors  SKIN: No itching, burning, rashes, or lesions   LYMPH NODES: No enlarged glands  ENDOCRINE: No heat or cold intolerance; No hair loss  MUSCULOSKELETAL: No joint pain or swelling; + back pain, No muscle, or extremity pain    FAMILY HISTORY:  No pertinent family history in first degree relatives    T(C): 36.3 (09-22-19 @ 06:21), Max: 38.2 (09-21-19 @ 21:01)  HR: 70 (09-22-19 @ 06:21) (70 - 87)  BP: 131/62 (09-22-19 @ 06:21) (114/55 - 131/62)  RR: 18 (09-22-19 @ 06:21) (18 - 22)  SpO2: 98% (09-22-19 @ 06:21) (92% - 99%)  Wt(kg): --Vital Signs Last 24 Hrs  T(C): 36.3 (22 Sep 2019 06:21), Max: 38.2 (21 Sep 2019 21:01)  T(F): 97.4 (22 Sep 2019 06:21), Max: 100.7 (21 Sep 2019 21:01)  HR: 70 (22 Sep 2019 06:21) (70 - 87)  BP: 131/62 (22 Sep 2019 06:21) (114/55 - 131/62)  BP(mean): 74 (22 Sep 2019 03:17) (74 - 74)  RR: 18 (22 Sep 2019 06:21) (18 - 22)  SpO2: 98% (22 Sep 2019 06:21) (92% - 99%)  No Known Allergies      PHYSICAL EXAM:  GENERAL: NAD, well-groomed, well-developed  HEAD:  Atraumatic, Normocephalic  EYES: EOMI, PERRLA, conjunctiva and sclera clear  ENMT: No tonsillar erythema, exudates, or enlargement; Moist mucous membranes, Good dentition, No lesions  NECK: Supple, No JVD, Normal thyroid  NERVOUS SYSTEM:  Alert & Oriented X3, Good concentration; Motor Strength 5/5 B/L upper and lower extremities; DTRs 2+ intact and symmetric  CHEST/LUNG: Decreased breath sounds L base, Clear to percussion bilaterally; No rales, rhonchi, wheezing, or rubs  HEART: Regular rate and rhythm; No murmurs, rubs, or gallops  ABDOMEN: Soft, Nontender, Nondistended; Bowel sounds present  EXTREMITIES:  2+ Peripheral Pulses, No clubbing, cyanosis, or edema. R leg warmer then L, area of erythema present on RLE  LYMPH: No lymphadenopathy noted  SKIN: No rashes or lesions    Consultant(s) Notes Reviewed:  [x ] YES  [ ] NO  Care Discussed with Consultants/Other Providers [ x] YES  [ ] NO    LABS:    RADIOLOGY & ADDITIONAL TESTS:    Imaging Personally Reviewed:  [ ] YES  [ ] NO  aspirin enteric coated 81 milliGRAM(s) Oral daily  atorvastatin 40 milliGRAM(s) Oral at bedtime  cefTRIAXone   IVPB 1000 milliGRAM(s) IV Intermittent every 24 hours  dextrose 40% Gel 15 Gram(s) Oral once PRN  dextrose 5%. 1000 milliLiter(s) IV Continuous <Continuous>  dextrose 50% Injectable 12.5 Gram(s) IV Push once  dextrose 50% Injectable 25 Gram(s) IV Push once  dextrose 50% Injectable 25 Gram(s) IV Push once  digoxin     Tablet 0.125 milliGRAM(s) Oral <User Schedule>  donepezil 10 milliGRAM(s) Oral at bedtime  glucagon  Injectable 1 milliGRAM(s) IntraMuscular once PRN  heparin  Injectable 5000 Unit(s) SubCutaneous every 8 hours  hydrALAZINE 10 milliGRAM(s) Oral two times a day  insulin glargine Injectable (LANTUS) 15 Unit(s) SubCutaneous at bedtime  insulin lispro (HumaLOG) corrective regimen sliding scale   SubCutaneous three times a day before meals  insulin lispro (HumaLOG) corrective regimen sliding scale   SubCutaneous at bedtime  insulin lispro Injectable (HumaLOG) 5 Unit(s) SubCutaneous three times a day before meals  isosorbide   dinitrate Tablet (ISORDIL) 30 milliGRAM(s) Oral daily  memantine 5 milliGRAM(s) Oral two times a day  metoprolol succinate ER 75 milliGRAM(s) Oral daily  tamsulosin 0.4 milliGRAM(s) Oral at bedtime  torsemide 60 milliGRAM(s) Oral daily      HEALTH ISSUES - PROBLEM Dx:  CKD (chronic kidney disease), stage IV: CKD (chronic kidney disease), stage IV  Supratherapeutic INR: Supratherapeutic INR  Dementia: Dementia  Prophylactic measure: Prophylactic measure  BPH (benign prostatic hyperplasia): BPH (benign prostatic hyperplasia)  CAD (coronary artery disease): CAD (coronary artery disease)  Type 2 diabetes mellitus: Type 2 diabetes mellitus  Paroxysmal atrial fibrillation: Paroxysmal atrial fibrillation  Congestive heart failure: Congestive heart failure  Sepsis: Sepsis  UTI (urinary tract infection): UTI (urinary tract infection)

## 2019-09-24 NOTE — PROGRESS NOTE ADULT - SUBJECTIVE AND OBJECTIVE BOX
Patient is a 88y old  Male who presents with a chief complaint of fever and lethargy (23 Sep 2019 06:55)    HPI:  Pt is a 87 yo M w/ BPH, dementia, HTN, CKD IV, recurrent UTIs, MVR 2/2 endocarditis, HFrEF (TTE on 7/15/19 w/ EF: 25% severe global LV dysfxn), Afib on coumadin, CAD s/p CABG, HLD, IDT2DM p/w fever and lethargy x1 day. Pt's son reported that on the day of admission, Pt took a nap around 2:00pm and was difficult to arouse. Pt's son reported that the Pt's wife could not wake the patient up. Pt's son reported that the patient's temperature was taken which measure 101.7 (axillary). Pt son reported that the patient was very sleepy and kept requesting to go back to bed. Otherwise, son reported that the patient did not c/o CP, N/V/D, abd pain, HA, dysuria, or hematuria.     In the ED, pt was afebrile, normal HR and BP however tachypneic to 22, placed on 3L NC. Labs were remarkable for leukocytosis to 20.6. ESR: 21 Procalcitonin: 19.62. INR: 3.58. Cr: 3.32 Pro-BNP 4675. UA positive with Large LE WBC; 49. Pt had complained of back pain and so CT Lumbar/Thoracic spine was ordered which was grossly unremarkable. CXR negative for focal consolidations. CT A/P without contrast, showing enlarged prostate, no acute findings.      Pt was treated w. Vanc/Ceftriazone and 250ml LR bolus x1. (22 Sep 2019 06:00)    Pt seen and examined at bedside. No complaints however per wife, wound over RLE started on Saturday and has been progressively spreading over shin. Pt denies trauma to the area.      prior hospital charts reviewed [  ]  primary team notes reviewed [  ]  other consultant notes reviewed [  ]  PAST MEDICAL & SURGICAL HISTORY:  Cerebrovascular accident (CVA), unspecified mechanism  Pacemaker  Endocarditis  CAD (coronary artery disease)  Cardiomyopathy, ischemic  Type 2 diabetes mellitus  Hypertension  Congestive heart failure  Paroxysmal atrial fibrillation  Dyslipidemia  S/P CABG x 1  History of cataract surgery  History of colon surgery  ICD (implantable cardioverter-defibrillator) in place  H/O mitral valve replacement: bovine    Allergies  No Known Allergies    ANTIMICROBIALS (past 90 days)  MEDICATIONS  (STANDING):  cefTRIAXone   IVPB   100 mL/Hr IV Intermittent (19 @ 22:50)    cefTRIAXone   IVPB   100 mL/Hr IV Intermittent (19 @ 11:50)    cefTRIAXone   IVPB   100 mL/Hr IV Intermittent (19 @ 22:43)    vancomycin  IVPB   300 mL/Hr IV Intermittent (19 @ 00:59)      ANTIMICROBIALS:    cefTRIAXone   IVPB 2000 every 24 hours    OTHER MEDS: MEDICATIONS  (STANDING):  aspirin enteric coated 81 daily  atorvastatin 40 at bedtime  dextrose 40% Gel 15 once PRN  dextrose 50% Injectable 12.5 once  dextrose 50% Injectable 25 once  dextrose 50% Injectable 25 once  digoxin     Tablet 0.125 <User Schedule>  donepezil 10 at bedtime  glucagon  Injectable 1 once PRN  heparin  Injectable 5000 every 8 hours  hydrALAZINE 10 two times a day  insulin glargine Injectable (LANTUS) 15 at bedtime  insulin lispro (HumaLOG) corrective regimen sliding scale  three times a day before meals  insulin lispro (HumaLOG) corrective regimen sliding scale  at bedtime  insulin lispro Injectable (HumaLOG) 5 three times a day before meals  isosorbide   dinitrate Tablet (ISORDIL) 10 three times a day  memantine 5 two times a day  metoprolol succinate ER 75 daily  tamsulosin 0.4 at bedtime  torsemide 60 daily  warfarin 5 at bedtime    SOCIAL HISTORY:   hx smoking  non-smoker    FAMILY HISTORY:  No pertinent family history in first degree relatives    REVIEW OF SYSTEMS  [  ] ROS unobtainable because:    [  ] All other systems negative except as noted below:	    Constitutional:  [ ] fever [ ] chills  [ ] weight loss  [ ] weakness  Skin:  [ ] rash [ ] phlebitis	  Eyes: [ ] icterus [ ] pain  [ ] discharge	  ENMT: [ ] sore throat  [ ] thrush [ ] ulcers [ ] exudates  Respiratory: [ ] dyspnea [ ] hemoptysis [ ] cough [ ] sputum	  Cardiovascular:  [ ] chest pain [ ] palpitations [ ] edema	  Gastrointestinal:  [ ] nausea [ ] vomiting [ ] diarrhea [ ] constipation [ ] pain	  Genitourinary:  [ ] dysuria [ ] frequency [ ] hematuria [ ] discharge [ ] flank pain  [ ] incontinence  Musculoskeletal:  [ ] myalgias [ ] arthralgias [ ] arthritis  [ ] back pain  Neurological:  [ ] headache [ ] seizures  [ ] confusion/altered mental status  Psychiatric:  [ ] anxiety [ ] depression	  Hematology/Lymphatics:  [ ] lymphadenopathy  Endocrine:  [ ] adrenal [ ] thyroid  Allergic/Immunologic:	 [ ] transplant [ ] seasonal    Vital Signs Last 24 Hrs  T(F): 97.6 (19 @ 12:36), Max: 100.7 (19 @ 21:01)  Vital Signs Last 24 Hrs  HR: 70 (19 @ 12:36) (68 - 73)  BP: 138/72 (19 @ 12:36) (106/62 - 161/64)  RR: 18 (19 @ 12:36)  SpO2: 99% (19 @ 12:36) (88% - 99%)  Wt(kg): --    EXAM:  Constitutional: Not in acute distress  Eyes: pupils bilaterally reactive to light. No icterus.  Oral cavity: Clear, no lesions  Neck: No neck vein distension noted  RS: Chest clear to auscultation bilaterally. No wheeze/rhonchi/crepitations.  CVS: S1, S2 heard. Regular rate and rhythm. No murmurs/rubs/gallops.  Abdomen: Soft. No guarding/rigidity/tenderness.  : No acute abnormalities  Extremities: anterior RLE with purpura, surrounding erythema, swelling. remains within original boundaries. remians mildly TTP and warm to touch.   Neuro: Alert, oriented to time/place/person  Cranial nerves 2-12 grossly normal. No focal abnormalities  Back: no step-off, no paraspinal tenderness                        10.7   15.1  )-----------( 114      ( 23 Sep 2019 07:01 )             33.8         136  |  101  |  95<H>  ----------------------------<  143<H>  4.2   |  18<L>  |  3.36<H>    Ca    9.1      23 Sep 2019 07:01  Phos  4.7       Mg     1.8         TPro  6.6  /  Alb  3.4  /  TBili  0.4  /  DBili  x   /  AST  41<H>  /  ALT  21  /  AlkPhos  81      Urinalysis Basic - ( 21 Sep 2019 22:55 )    Color: Yellow / Appearance: Slightly Turbid / S.017 / pH: x  Gluc: x / Ketone: Negative  / Bili: Negative / Urobili: Negative   Blood: x / Protein: 300 mg/dL / Nitrite: Negative   Leuk Esterase: Large / RBC: 5 /hpf / WBC 49 /HPF   Sq Epi: x / Non Sq Epi: 1 /hpf / Bacteria: Negative    MICROBIOLOGY:  Culture - Blood (collected 22 Sep 2019 02:22)  Source: .Blood  Preliminary Report (23 Sep 2019 03:00):    No growth to date.    Culture - Urine (collected 22 Sep 2019 02:19)  Source: .Urine  Final Report (22 Sep 2019 21:50):    <10,000 CFU/mL Normal Urogenital Arielle    Culture - Blood (collected 22 Sep 2019 01:33)  Source: .Blood  Gram Stain (22 Sep 2019 12:12):    Growth in aerobic and anaerobic bottles: Gram Positive Cocci in Pairs and    Chains  Preliminary Report (23 Sep 2019 09:50):    Growth in aerobic and anaerobic bottles: Streptococcus agalactiae (Group    B) See previous culture [id number]    Culture - Blood (collected 22 Sep 2019 01:33)  Source: .Blood  Gram Stain (22 Sep 2019 12:11):    Growth in aerobic and anaerobic bottles: Gram Positive Cocci in Pairs and    Chains  Preliminary Report (23 Sep 2019 09:47):    Growth in aerobic and anaerobic bottles: Streptococcus agalactiae (Group    B)    "Due to technical problems, Proteus sp. will Not be reported as part of    the BCID panel until further notice"    ***Blood Panel PCR results on this specimen are available    approximately 3 hours after the Gram stain result.***    Gram stain, PCR, and/or culture results may not always    correspond due to difference in methodologies.    ************************************************************    This PCR assay was performed using [employer].    The following targets are tested for: Enterococcus,    vancomycin resistant enterococci, Listeria monocytogenes,    coagulase negative staphylococci, S. aureus,    methicillin resistant S. aureus, Streptococcus agalactiae    (Group B), S. pneumoniae, S. pyogenes (Group A),    Acinetobacter baumannii, Enterobacter cloacae, E. coli,    Klebsiella oxytoca, K. pneumoniae, Proteus sp.,    Serratia marcescens, Haemophilus influenzae,    Neisseria meningitidis, Pseudomonas aeruginosa, Candida    albicans, C. glabrata, C krusei, C parapsilosis,    C. tropicalis and the KPC resistance gene.  Organism: Blood Culture PCR (22 Sep 2019 14:17)  Organism: Blood Culture PCR (22 Sep 2019 14:17)      -  Streptococcus agalactiae (Group B): Detec      Method Type: PCR              Rapid RVP Result: NotDetec ( @ 22:12)        RADIOLOGY:  < from: US TTE 2D F/U, Limited w/o Contrast (ED) (19 @ 08:20) >    INTERPRETATION:  A focused transthoracic cardiac ultrasound examination   was performed.   No pericardial effusion was present.  No global wall motion abnormality was identified  Severe global hypokinesis of the left ventricular contractility  No anterior B lines seen      IMPRESSION:   No Pericardial Effusion.  .     < end of copied text >      < from: Xray Chest 1 View AP/PA (19 @ 23:38) >    IMPRESSION:  Left basilar subsegmental atelectasis. No focal opacity. No pleural   effusion or pneumothorax. The heart is enlarged. Left-sided biventricular   AICD. Sternotomy, mitral valve replacement, and CABG. No acute osseous   findings.      < end of copied text >    < from: CT Abdomen and Pelvis No Cont (19 @ 15:33) >  CT of the Abdomen andPelvis was performed without intravenous contrast.   Intravenous contrast: None.  Oral contrast: None.  Sagittal and coronal reformats were performed.    FINDINGS:    LOWER CHEST: Status post median sternotomy. Cardiomegaly. Partially   imaged pacingwires. Status post mitral valve replacement.    LIVER: Within normal limits.  BILE DUCTS: Normal caliber.  GALLBLADDER: Cholelithiasis.  SPLEEN: Within normal limits.  PANCREAS: Within normal limits.  ADRENALS: Within normal limits.  KIDNEYS/URETERS:Bilateral renal cysts. No hydroureteronephrosis or   obstructing renal calculi.     BLADDER: Within normal limits.  REPRODUCTIVE ORGANS: Prostate is enlarged.    BOWEL: Small hiatal hernia. No bowel obstruction. Status post subtotal   colectomy.  PERITONEUM: No ascites.  VESSELS: Calcified aortic atherosclerosis.  RETROPERITONEUM/LYMPH NODES: No lymphadenopathy.    ABDOMINAL WALL: Within normal limits.  BONES: Status post L3-L4 laminectomy. Multilevel degenerative changes.     IMPRESSION:     No acute abnormality.    < end of copied text >    OTHER TESTS: Patient is a 88y old  Male who presents with a chief complaint of fever and lethargy (23 Sep 2019 06:55)    HPI:  Pt is a 87 yo M w/ BPH, dementia, HTN, CKD IV, recurrent UTIs, MVR 2/2 endocarditis, HFrEF (TTE on 7/15/19 w/ EF: 25% severe global LV dysfxn), Afib on coumadin, CAD s/p CABG, HLD, IDT2DM p/w fever and lethargy x1 day. Pt's son reported that on the day of admission, Pt took a nap around 2:00pm and was difficult to arouse. Pt's son reported that the Pt's wife could not wake the patient up. Pt's son reported that the patient's temperature was taken which measure 101.7 (axillary). Pt son reported that the patient was very sleepy and kept requesting to go back to bed. Otherwise, son reported that the patient did not c/o CP, N/V/D, abd pain, HA, dysuria, or hematuria.     In the ED, pt was afebrile, normal HR and BP however tachypneic to 22, placed on 3L NC. Labs were remarkable for leukocytosis to 20.6. ESR: 21 Procalcitonin: 19.62. INR: 3.58. Cr: 3.32 Pro-BNP 4675. UA positive with Large LE WBC; 49. Pt had complained of back pain and so CT Lumbar/Thoracic spine was ordered which was grossly unremarkable. CXR negative for focal consolidations. CT A/P without contrast, showing enlarged prostate, no acute findings.      Pt was treated w. Vanc/Ceftriazone and 250ml LR bolus x1. (22 Sep 2019 06:00)    Pt seen and examined at bedside. Denies fever, chills, nausea, vomiting. States he feels fine, has good appetite. Denies worsening pain in RLE.      prior hospital charts reviewed [  ]  primary team notes reviewed [  ]  other consultant notes reviewed [  ]  PAST MEDICAL & SURGICAL HISTORY:  Cerebrovascular accident (CVA), unspecified mechanism  Pacemaker  Endocarditis  CAD (coronary artery disease)  Cardiomyopathy, ischemic  Type 2 diabetes mellitus  Hypertension  Congestive heart failure  Paroxysmal atrial fibrillation  Dyslipidemia  S/P CABG x 1  History of cataract surgery  History of colon surgery  ICD (implantable cardioverter-defibrillator) in place  H/O mitral valve replacement: bovine    Allergies  No Known Allergies    ANTIMICROBIALS (past 90 days)  MEDICATIONS  (STANDING):  cefTRIAXone   IVPB   100 mL/Hr IV Intermittent (19 @ 22:50)    cefTRIAXone   IVPB   100 mL/Hr IV Intermittent (19 @ 11:50)    cefTRIAXone   IVPB   100 mL/Hr IV Intermittent (19 @ 22:43)    vancomycin  IVPB   300 mL/Hr IV Intermittent (19 @ 00:59)      ANTIMICROBIALS:    cefTRIAXone   IVPB 2000 every 24 hours    OTHER MEDS: MEDICATIONS  (STANDING):  aspirin enteric coated 81 daily  atorvastatin 40 at bedtime  dextrose 40% Gel 15 once PRN  dextrose 50% Injectable 12.5 once  dextrose 50% Injectable 25 once  dextrose 50% Injectable 25 once  digoxin     Tablet 0.125 <User Schedule>  donepezil 10 at bedtime  glucagon  Injectable 1 once PRN  heparin  Injectable 5000 every 8 hours  hydrALAZINE 10 two times a day  insulin glargine Injectable (LANTUS) 15 at bedtime  insulin lispro (HumaLOG) corrective regimen sliding scale  three times a day before meals  insulin lispro (HumaLOG) corrective regimen sliding scale  at bedtime  insulin lispro Injectable (HumaLOG) 5 three times a day before meals  isosorbide   dinitrate Tablet (ISORDIL) 10 three times a day  memantine 5 two times a day  metoprolol succinate ER 75 daily  tamsulosin 0.4 at bedtime  torsemide 60 daily  warfarin 5 at bedtime    SOCIAL HISTORY:   hx smoking  non-smoker    FAMILY HISTORY:  No pertinent family history in first degree relatives    REVIEW OF SYSTEMS  [  ] ROS unobtainable because:    [  ] All other systems negative except as noted below:	    Constitutional:  [ ] fever [ ] chills  [ ] weight loss  [ ] weakness  Skin:  [ ] rash [ ] phlebitis	  Eyes: [ ] icterus [ ] pain  [ ] discharge	  ENMT: [ ] sore throat  [ ] thrush [ ] ulcers [ ] exudates  Respiratory: [ ] dyspnea [ ] hemoptysis [ ] cough [ ] sputum	  Cardiovascular:  [ ] chest pain [ ] palpitations [ ] edema	  Gastrointestinal:  [ ] nausea [ ] vomiting [ ] diarrhea [ ] constipation [ ] pain	  Genitourinary:  [ ] dysuria [ ] frequency [ ] hematuria [ ] discharge [ ] flank pain  [ ] incontinence  Musculoskeletal:  [ ] myalgias [ ] arthralgias [ ] arthritis  [ ] back pain  Neurological:  [ ] headache [ ] seizures  [ ] confusion/altered mental status  Psychiatric:  [ ] anxiety [ ] depression	  Hematology/Lymphatics:  [ ] lymphadenopathy  Endocrine:  [ ] adrenal [ ] thyroid  Allergic/Immunologic:	 [ ] transplant [ ] seasonal    Vital Signs Last 24 Hrs  T(F): 97.6 (19 @ 12:36), Max: 100.7 (19 @ 21:01)  Vital Signs Last 24 Hrs  HR: 70 (19 @ 12:36) (68 - 73)  BP: 138/72 (19 @ 12:36) (106/62 - 161/64)  RR: 18 (19 @ 12:36)  SpO2: 99% (19 @ 12:36) (88% - 99%)  Wt(kg): --    EXAM:  Constitutional: Not in acute distress  Eyes: pupils bilaterally reactive to light. No icterus.  Oral cavity: Clear, no lesions  Neck: No neck vein distension noted  RS: Chest clear to auscultation bilaterally. No wheeze/rhonchi/crepitations.  CVS: S1, S2 heard. Regular rate and rhythm. No murmurs/rubs/gallops.  Abdomen: Soft. No guarding/rigidity/tenderness.  : No acute abnormalities  Extremities: anterior RLE with purpura, surrounding erythema, swelling. remains within original boundaries. remains mildly TTP and warm to touch.   Neuro: Alert, oriented to time/place/person  Cranial nerves 2-12 grossly normal. No focal abnormalities  Back: no step-off, no paraspinal tenderness                        10.7   15.1  )-----------( 114      ( 23 Sep 2019 07:01 )             33.8         136  |  101  |  95<H>  ----------------------------<  143<H>  4.2   |  18<L>  |  3.36<H>    Ca    9.1      23 Sep 2019 07:01  Phos  4.7       Mg     1.8         TPro  6.6  /  Alb  3.4  /  TBili  0.4  /  DBili  x   /  AST  41<H>  /  ALT  21  /  AlkPhos  81      Urinalysis Basic - ( 21 Sep 2019 22:55 )    Color: Yellow / Appearance: Slightly Turbid / S.017 / pH: x  Gluc: x / Ketone: Negative  / Bili: Negative / Urobili: Negative   Blood: x / Protein: 300 mg/dL / Nitrite: Negative   Leuk Esterase: Large / RBC: 5 /hpf / WBC 49 /HPF   Sq Epi: x / Non Sq Epi: 1 /hpf / Bacteria: Negative    MICROBIOLOGY:  Culture - Blood (collected 22 Sep 2019 02:22)  Source: .Blood  Preliminary Report (23 Sep 2019 03:00):    No growth to date.    Culture - Urine (collected 22 Sep 2019 02:19)  Source: .Urine  Final Report (22 Sep 2019 21:50):    <10,000 CFU/mL Normal Urogenital Arielle    Culture - Blood (collected 22 Sep 2019 01:33)  Source: .Blood  Gram Stain (22 Sep 2019 12:12):    Growth in aerobic and anaerobic bottles: Gram Positive Cocci in Pairs and    Chains  Preliminary Report (23 Sep 2019 09:50):    Growth in aerobic and anaerobic bottles: Streptococcus agalactiae (Group    B) See previous culture [id number]    Culture - Blood (collected 22 Sep 2019 01:33)  Source: .Blood  Gram Stain (22 Sep 2019 12:11):    Growth in aerobic and anaerobic bottles: Gram Positive Cocci in Pairs and    Chains  Preliminary Report (23 Sep 2019 09:47):    Growth in aerobic and anaerobic bottles: Streptococcus agalactiae (Group    B)    "Due to technical problems, Proteus sp. will Not be reported as part of    the BCID panel until further notice"    ***Blood Panel PCR results on this specimen are available    approximately 3 hours after the Gram stain result.***    Gram stain, PCR, and/or culture results may not always    correspond due to difference in methodologies.    ************************************************************    This PCR assay was performed using [employer].    The following targets are tested for: Enterococcus,    vancomycin resistant enterococci, Listeria monocytogenes,    coagulase negative staphylococci, S. aureus,    methicillin resistant S. aureus, Streptococcus agalactiae    (Group B), S. pneumoniae, S. pyogenes (Group A),    Acinetobacter baumannii, Enterobacter cloacae, E. coli,    Klebsiella oxytoca, K. pneumoniae, Proteus sp.,    Serratia marcescens, Haemophilus influenzae,    Neisseria meningitidis, Pseudomonas aeruginosa, Candida    albicans, C. glabrata, C krusei, C parapsilosis,    C. tropicalis and the KPC resistance gene.  Organism: Blood Culture PCR (22 Sep 2019 14:17)  Organism: Blood Culture PCR (22 Sep 2019 14:17)      -  Streptococcus agalactiae (Group B): Detec      Method Type: PCR              Rapid RVP Result: NotDetec ( @ 22:12)        RADIOLOGY:  < from: US TTE 2D F/U, Limited w/o Contrast (ED) (19 @ 08:20) >    INTERPRETATION:  A focused transthoracic cardiac ultrasound examination   was performed.   No pericardial effusion was present.  No global wall motion abnormality was identified  Severe global hypokinesis of the left ventricular contractility  No anterior B lines seen      IMPRESSION:   No Pericardial Effusion.  .     < end of copied text >      < from: Xray Chest 1 View AP/PA (19 @ 23:38) >    IMPRESSION:  Left basilar subsegmental atelectasis. No focal opacity. No pleural   effusion or pneumothorax. The heart is enlarged. Left-sided biventricular   AICD. Sternotomy, mitral valve replacement, and CABG. No acute osseous   findings.      < end of copied text >    < from: CT Abdomen and Pelvis No Cont (19 @ 15:33) >  CT of the Abdomen andPelvis was performed without intravenous contrast.   Intravenous contrast: None.  Oral contrast: None.  Sagittal and coronal reformats were performed.    FINDINGS:    LOWER CHEST: Status post median sternotomy. Cardiomegaly. Partially   imaged pacingwires. Status post mitral valve replacement.    LIVER: Within normal limits.  BILE DUCTS: Normal caliber.  GALLBLADDER: Cholelithiasis.  SPLEEN: Within normal limits.  PANCREAS: Within normal limits.  ADRENALS: Within normal limits.  KIDNEYS/URETERS:Bilateral renal cysts. No hydroureteronephrosis or   obstructing renal calculi.     BLADDER: Within normal limits.  REPRODUCTIVE ORGANS: Prostate is enlarged.    BOWEL: Small hiatal hernia. No bowel obstruction. Status post subtotal   colectomy.  PERITONEUM: No ascites.  VESSELS: Calcified aortic atherosclerosis.  RETROPERITONEUM/LYMPH NODES: No lymphadenopathy.    ABDOMINAL WALL: Within normal limits.  BONES: Status post L3-L4 laminectomy. Multilevel degenerative changes.     IMPRESSION:     No acute abnormality.    < end of copied text >    OTHER TESTS: Patient is a 88y old  Male who presents with a chief complaint of fever and lethargy (23 Sep 2019 06:55)    HPI:  Pt is a 89 yo M w/ BPH, dementia, HTN, CKD IV, recurrent UTIs, MVR 2/2 endocarditis, HFrEF (TTE on 7/15/19 w/ EF: 25% severe global LV dysfxn), Afib on coumadin, CAD s/p CABG, HLD, IDT2DM p/w fever and lethargy x1 day. Pt's son reported that on the day of admission, Pt took a nap around 2:00pm and was difficult to arouse. Pt's son reported that the Pt's wife could not wake the patient up. Pt's son reported that the patient's temperature was taken which measure 101.7 (axillary). Pt son reported that the patient was very sleepy and kept requesting to go back to bed. Otherwise, son reported that the patient did not c/o CP, N/V/D, abd pain, HA, dysuria, or hematuria.     In the ED, pt was afebrile, normal HR and BP however tachypneic to 22, placed on 3L NC. Labs were remarkable for leukocytosis to 20.6. ESR: 21 Procalcitonin: 19.62. INR: 3.58. Cr: 3.32 Pro-BNP 4675. UA positive with Large LE WBC; 49. Pt had complained of back pain and so CT Lumbar/Thoracic spine was ordered which was grossly unremarkable. CXR negative for focal consolidations. CT A/P without contrast, showing enlarged prostate, no acute findings.      Pt was treated w. Vanc/Ceftriaxone and 250ml LR bolus x1. (22 Sep 2019 06:00)    Pt seen and examined at bedside. Denies fever, chills, nausea, vomiting. States he feels fine, has good appetite. Denies worsening pain in RLE.      prior hospital charts reviewed [  ]  primary team notes reviewed [  ]  other consultant notes reviewed [  ]  PAST MEDICAL & SURGICAL HISTORY:  Cerebrovascular accident (CVA), unspecified mechanism  Pacemaker  Endocarditis  CAD (coronary artery disease)  Cardiomyopathy, ischemic  Type 2 diabetes mellitus  Hypertension  Congestive heart failure  Paroxysmal atrial fibrillation  Dyslipidemia  S/P CABG x 1  History of cataract surgery  History of colon surgery  ICD (implantable cardioverter-defibrillator) in place  H/O mitral valve replacement: bovine    Allergies  No Known Allergies    ANTIMICROBIALS (past 90 days)  MEDICATIONS  (STANDING):  cefTRIAXone   IVPB   100 mL/Hr IV Intermittent (19 @ 22:50)    cefTRIAXone   IVPB   100 mL/Hr IV Intermittent (19 @ 11:50)    cefTRIAXone   IVPB   100 mL/Hr IV Intermittent (19 @ 22:43)    vancomycin  IVPB   300 mL/Hr IV Intermittent (19 @ 00:59)      ANTIMICROBIALS:    cefTRIAXone   IVPB 2000 every 24 hours    OTHER MEDS: MEDICATIONS  (STANDING):  aspirin enteric coated 81 daily  atorvastatin 40 at bedtime  dextrose 40% Gel 15 once PRN  dextrose 50% Injectable 12.5 once  dextrose 50% Injectable 25 once  dextrose 50% Injectable 25 once  digoxin     Tablet 0.125 <User Schedule>  donepezil 10 at bedtime  glucagon  Injectable 1 once PRN  heparin  Injectable 5000 every 8 hours  hydrALAZINE 10 two times a day  insulin glargine Injectable (LANTUS) 15 at bedtime  insulin lispro (HumaLOG) corrective regimen sliding scale  three times a day before meals  insulin lispro (HumaLOG) corrective regimen sliding scale  at bedtime  insulin lispro Injectable (HumaLOG) 5 three times a day before meals  isosorbide   dinitrate Tablet (ISORDIL) 10 three times a day  memantine 5 two times a day  metoprolol succinate ER 75 daily  tamsulosin 0.4 at bedtime  torsemide 60 daily  warfarin 5 at bedtime    SOCIAL HISTORY:   hx smoking  non-smoker    FAMILY HISTORY:  No pertinent family history in first degree relatives    REVIEW OF SYSTEMS  [  ] ROS unobtainable because:    [  ] All other systems negative except as noted below:	    Constitutional:  [ ] fever [ ] chills  [ ] weight loss  [ ] weakness  Skin:  [ ] rash [ ] phlebitis	  Eyes: [ ] icterus [ ] pain  [ ] discharge	  ENMT: [ ] sore throat  [ ] thrush [ ] ulcers [ ] exudates  Respiratory: [ ] dyspnea [ ] hemoptysis [ ] cough [ ] sputum	  Cardiovascular:  [ ] chest pain [ ] palpitations [ ] edema	  Gastrointestinal:  [ ] nausea [ ] vomiting [ ] diarrhea [ ] constipation [ ] pain	  Genitourinary:  [ ] dysuria [ ] frequency [ ] hematuria [ ] discharge [ ] flank pain  [ ] incontinence  Musculoskeletal:  [ ] myalgias [ ] arthralgias [ ] arthritis  [ ] back pain  Neurological:  [ ] headache [ ] seizures  [ ] confusion/altered mental status  Psychiatric:  [ ] anxiety [ ] depression	  Hematology/Lymphatics:  [ ] lymphadenopathy  Endocrine:  [ ] adrenal [ ] thyroid  Allergic/Immunologic:	 [ ] transplant [ ] seasonal    Vital Signs Last 24 Hrs  T(F): 97.6 (19 @ 12:36), Max: 100.7 (19 @ 21:01)  Vital Signs Last 24 Hrs  HR: 70 (19 @ 12:36) (68 - 73)  BP: 138/72 (19 @ 12:36) (106/62 - 161/64)  RR: 18 (19 @ 12:36)  SpO2: 99% (19 @ 12:36) (88% - 99%)  Wt(kg): --    EXAM:  Constitutional: Not in acute distress  Eyes: pupils bilaterally reactive to light. No icterus.  Oral cavity: Clear, no lesions  Neck: No neck vein distension noted  RS: Chest clear to auscultation bilaterally. No wheeze/rhonchi/crepitations.  CVS: S1, S2 heard. Regular rate and rhythm. No murmurs/rubs/gallops.  Abdomen: Soft. No guarding/rigidity/tenderness.  : No acute abnormalities  Extremities: anterior RLE with purpura, surrounding erythema, swelling. remains within original boundaries. remains mildly TTP and warm to touch.   Neuro: Alert, oriented to time/place/person  Cranial nerves 2-12 grossly normal. No focal abnormalities  Back: no step-off, no paraspinal tenderness                        10.7   15.1  )-----------( 114      ( 23 Sep 2019 07:01 )             33.8         136  |  101  |  95<H>  ----------------------------<  143<H>  4.2   |  18<L>  |  3.36<H>    Ca    9.1      23 Sep 2019 07:01  Phos  4.7       Mg     1.8         TPro  6.6  /  Alb  3.4  /  TBili  0.4  /  DBili  x   /  AST  41<H>  /  ALT  21  /  AlkPhos  81      Urinalysis Basic - ( 21 Sep 2019 22:55 )    Color: Yellow / Appearance: Slightly Turbid / S.017 / pH: x  Gluc: x / Ketone: Negative  / Bili: Negative / Urobili: Negative   Blood: x / Protein: 300 mg/dL / Nitrite: Negative   Leuk Esterase: Large / RBC: 5 /hpf / WBC 49 /HPF   Sq Epi: x / Non Sq Epi: 1 /hpf / Bacteria: Negative    MICROBIOLOGY:  Culture - Blood (collected 22 Sep 2019 02:22)  Source: .Blood  Preliminary Report (23 Sep 2019 03:00):    No growth to date.    Culture - Urine (collected 22 Sep 2019 02:19)  Source: .Urine  Final Report (22 Sep 2019 21:50):    <10,000 CFU/mL Normal Urogenital Arielle    Culture - Blood (collected 22 Sep 2019 01:33)  Source: .Blood  Gram Stain (22 Sep 2019 12:12):    Growth in aerobic and anaerobic bottles: Gram Positive Cocci in Pairs and    Chains  Preliminary Report (23 Sep 2019 09:50):    Growth in aerobic and anaerobic bottles: Streptococcus agalactiae (Group    B) See previous culture [id number]    Culture - Blood (collected 22 Sep 2019 01:33)  Source: .Blood  Gram Stain (22 Sep 2019 12:11):    Growth in aerobic and anaerobic bottles: Gram Positive Cocci in Pairs and    Chains  Preliminary Report (23 Sep 2019 09:47):    Growth in aerobic and anaerobic bottles: Streptococcus agalactiae (Group    B)    "Due to technical problems, Proteus sp. will Not be reported as part of    the BCID panel until further notice"    ***Blood Panel PCR results on this specimen are available    approximately 3 hours after the Gram stain result.***    Gram stain, PCR, and/or culture results may not always    correspond due to difference in methodologies.    ************************************************************    This PCR assay was performed using [employer].    The following targets are tested for: Enterococcus,    vancomycin resistant enterococci, Listeria monocytogenes,    coagulase negative staphylococci, S. aureus,    methicillin resistant S. aureus, Streptococcus agalactiae    (Group B), S. pneumoniae, S. pyogenes (Group A),    Acinetobacter baumannii, Enterobacter cloacae, E. coli,    Klebsiella oxytoca, K. pneumoniae, Proteus sp.,    Serratia marcescens, Haemophilus influenzae,    Neisseria meningitidis, Pseudomonas aeruginosa, Candida    albicans, C. glabrata, C krusei, C parapsilosis,    C. tropicalis and the KPC resistance gene.  Organism: Blood Culture PCR (22 Sep 2019 14:17)  Organism: Blood Culture PCR (22 Sep 2019 14:17)      -  Streptococcus agalactiae (Group B): Detec      Method Type: PCR              Rapid RVP Result: NotDetec ( @ 22:12)        RADIOLOGY:  < from: US TTE 2D F/U, Limited w/o Contrast (ED) (19 @ 08:20) >    INTERPRETATION:  A focused transthoracic cardiac ultrasound examination   was performed.   No pericardial effusion was present.  No global wall motion abnormality was identified  Severe global hypokinesis of the left ventricular contractility  No anterior B lines seen      IMPRESSION:   No Pericardial Effusion.  .     < end of copied text >      < from: Xray Chest 1 View AP/PA (19 @ 23:38) >    IMPRESSION:  Left basilar subsegmental atelectasis. No focal opacity. No pleural   effusion or pneumothorax. The heart is enlarged. Left-sided biventricular   AICD. Sternotomy, mitral valve replacement, and CABG. No acute osseous   findings.      < end of copied text >    < from: CT Abdomen and Pelvis No Cont (19 @ 15:33) >  CT of the Abdomen andPelvis was performed without intravenous contrast.   Intravenous contrast: None.  Oral contrast: None.  Sagittal and coronal reformats were performed.    FINDINGS:    LOWER CHEST: Status post median sternotomy. Cardiomegaly. Partially   imaged pacingwires. Status post mitral valve replacement.    LIVER: Within normal limits.  BILE DUCTS: Normal caliber.  GALLBLADDER: Cholelithiasis.  SPLEEN: Within normal limits.  PANCREAS: Within normal limits.  ADRENALS: Within normal limits.  KIDNEYS/URETERS:Bilateral renal cysts. No hydroureteronephrosis or   obstructing renal calculi.     BLADDER: Within normal limits.  REPRODUCTIVE ORGANS: Prostate is enlarged.    BOWEL: Small hiatal hernia. No bowel obstruction. Status post subtotal   colectomy.  PERITONEUM: No ascites.  VESSELS: Calcified aortic atherosclerosis.  RETROPERITONEUM/LYMPH NODES: No lymphadenopathy.    ABDOMINAL WALL: Within normal limits.  BONES: Status post L3-L4 laminectomy. Multilevel degenerative changes.     IMPRESSION:     No acute abnormality.    < end of copied text >    OTHER TESTS:

## 2019-09-25 ENCOUNTER — RX RENEWAL (OUTPATIENT)
Age: 84
End: 2019-09-25

## 2019-09-25 DIAGNOSIS — I33.0 ACUTE AND SUBACUTE INFECTIVE ENDOCARDITIS: ICD-10-CM

## 2019-09-25 LAB
ANION GAP SERPL CALC-SCNC: 16 MMOL/L — SIGNIFICANT CHANGE UP (ref 5–17)
BUN SERPL-MCNC: 102 MG/DL — HIGH (ref 7–23)
CALCIUM SERPL-MCNC: 8.6 MG/DL — SIGNIFICANT CHANGE UP (ref 8.4–10.5)
CHLORIDE SERPL-SCNC: 103 MMOL/L — SIGNIFICANT CHANGE UP (ref 96–108)
CO2 SERPL-SCNC: 20 MMOL/L — LOW (ref 22–31)
CREAT SERPL-MCNC: 3.09 MG/DL — HIGH (ref 0.5–1.3)
GLUCOSE BLDC GLUCOMTR-MCNC: 126 MG/DL — HIGH (ref 70–99)
GLUCOSE BLDC GLUCOMTR-MCNC: 143 MG/DL — HIGH (ref 70–99)
GLUCOSE BLDC GLUCOMTR-MCNC: 198 MG/DL — HIGH (ref 70–99)
GLUCOSE BLDC GLUCOMTR-MCNC: 383 MG/DL — HIGH (ref 70–99)
GLUCOSE SERPL-MCNC: 153 MG/DL — HIGH (ref 70–99)
HCT VFR BLD CALC: 35.1 % — LOW (ref 39–50)
HGB BLD-MCNC: 11.5 G/DL — LOW (ref 13–17)
INR BLD: 1.69 RATIO — HIGH (ref 0.88–1.16)
MCHC RBC-ENTMCNC: 29.8 PG — SIGNIFICANT CHANGE UP (ref 27–34)
MCHC RBC-ENTMCNC: 32.6 GM/DL — SIGNIFICANT CHANGE UP (ref 32–36)
MCV RBC AUTO: 91.3 FL — SIGNIFICANT CHANGE UP (ref 80–100)
PLATELET # BLD AUTO: 128 K/UL — LOW (ref 150–400)
POTASSIUM SERPL-MCNC: 3.6 MMOL/L — SIGNIFICANT CHANGE UP (ref 3.5–5.3)
POTASSIUM SERPL-SCNC: 3.6 MMOL/L — SIGNIFICANT CHANGE UP (ref 3.5–5.3)
PROTHROM AB SERPL-ACNC: 19.7 SEC — HIGH (ref 10–12.9)
RBC # BLD: 3.85 M/UL — LOW (ref 4.2–5.8)
RBC # FLD: 12.7 % — SIGNIFICANT CHANGE UP (ref 10.3–14.5)
SODIUM SERPL-SCNC: 139 MMOL/L — SIGNIFICANT CHANGE UP (ref 135–145)
WBC # BLD: 8.3 K/UL — SIGNIFICANT CHANGE UP (ref 3.8–10.5)
WBC # FLD AUTO: 8.3 K/UL — SIGNIFICANT CHANGE UP (ref 3.8–10.5)

## 2019-09-25 PROCEDURE — 99232 SBSQ HOSP IP/OBS MODERATE 35: CPT

## 2019-09-25 PROCEDURE — 99233 SBSQ HOSP IP/OBS HIGH 50: CPT | Mod: GC

## 2019-09-25 RX ORDER — WARFARIN SODIUM 2.5 MG/1
6 TABLET ORAL AT BEDTIME
Refills: 0 | Status: DISCONTINUED | OUTPATIENT
Start: 2019-09-25 | End: 2019-09-28

## 2019-09-25 RX ADMIN — ISOSORBIDE DINITRATE 10 MILLIGRAM(S): 5 TABLET ORAL at 06:00

## 2019-09-25 RX ADMIN — DONEPEZIL HYDROCHLORIDE 10 MILLIGRAM(S): 10 TABLET, FILM COATED ORAL at 21:34

## 2019-09-25 RX ADMIN — Medication 0.12 MILLIGRAM(S): at 09:06

## 2019-09-25 RX ADMIN — Medication 5 UNIT(S): at 17:47

## 2019-09-25 RX ADMIN — ISOSORBIDE DINITRATE 10 MILLIGRAM(S): 5 TABLET ORAL at 13:31

## 2019-09-25 RX ADMIN — CEFTRIAXONE 100 MILLIGRAM(S): 500 INJECTION, POWDER, FOR SOLUTION INTRAMUSCULAR; INTRAVENOUS at 21:34

## 2019-09-25 RX ADMIN — ISOSORBIDE DINITRATE 10 MILLIGRAM(S): 5 TABLET ORAL at 21:34

## 2019-09-25 RX ADMIN — Medication 75 MILLIGRAM(S): at 06:00

## 2019-09-25 RX ADMIN — INSULIN GLARGINE 15 UNIT(S): 100 INJECTION, SOLUTION SUBCUTANEOUS at 21:40

## 2019-09-25 RX ADMIN — Medication 3: at 21:41

## 2019-09-25 RX ADMIN — HEPARIN SODIUM 5000 UNIT(S): 5000 INJECTION INTRAVENOUS; SUBCUTANEOUS at 13:32

## 2019-09-25 RX ADMIN — ATORVASTATIN CALCIUM 40 MILLIGRAM(S): 80 TABLET, FILM COATED ORAL at 21:34

## 2019-09-25 RX ADMIN — Medication 10 MILLIGRAM(S): at 21:34

## 2019-09-25 RX ADMIN — Medication 60 MILLIGRAM(S): at 06:01

## 2019-09-25 RX ADMIN — MEMANTINE HYDROCHLORIDE 5 MILLIGRAM(S): 10 TABLET ORAL at 21:34

## 2019-09-25 RX ADMIN — TAMSULOSIN HYDROCHLORIDE 0.4 MILLIGRAM(S): 0.4 CAPSULE ORAL at 21:34

## 2019-09-25 RX ADMIN — Medication 81 MILLIGRAM(S): at 11:40

## 2019-09-25 RX ADMIN — Medication 1: at 17:47

## 2019-09-25 RX ADMIN — WARFARIN SODIUM 6 MILLIGRAM(S): 2.5 TABLET ORAL at 21:34

## 2019-09-25 RX ADMIN — HEPARIN SODIUM 5000 UNIT(S): 5000 INJECTION INTRAVENOUS; SUBCUTANEOUS at 21:35

## 2019-09-25 RX ADMIN — MEMANTINE HYDROCHLORIDE 5 MILLIGRAM(S): 10 TABLET ORAL at 09:06

## 2019-09-25 RX ADMIN — Medication 10 MILLIGRAM(S): at 09:06

## 2019-09-25 RX ADMIN — HEPARIN SODIUM 5000 UNIT(S): 5000 INJECTION INTRAVENOUS; SUBCUTANEOUS at 06:01

## 2019-09-25 NOTE — PROGRESS NOTE ADULT - SUBJECTIVE AND OBJECTIVE BOX
PELON DUNCAN  88y  Male      Patient is a 88y old  Male who presents with a chief complaint of fever and lethargy (22 Sep 2019 06:00)      INTERVAL HPI/OVERNIGHT EVENTS: No events overnight.       REVIEW OF SYSTEMS:  CONSTITUTIONAL: No fever, weight loss, or fatigue  EYES: No eye pain, visual disturbances, or discharge  ENMT:  No difficulty hearing, tinnitus, vertigo; No sinus or throat pain  NECK: No pain or stiffness  BREASTS: No pain, masses, or nipple discharge  RESPIRATORY: No cough, wheezing, chills or hemoptysis; No shortness of breath  CARDIOVASCULAR: No chest pain, palpitations, dizziness, or leg swelling  GASTROINTESTINAL: No abdominal or epigastric pain. No nausea, vomiting, or hematemesis; No diarrhea or constipation. No melena or hematochezia.  GENITOURINARY: No dysuria, frequency, hematuria, or incontinence  NEUROLOGICAL: + headaches, no memory loss, loss of strength, numbness, or tremors  SKIN: No itching, burning, rashes, or lesions   LYMPH NODES: No enlarged glands  ENDOCRINE: No heat or cold intolerance; No hair loss  MUSCULOSKELETAL: No joint pain or swelling; + back pain, No muscle, or extremity pain    FAMILY HISTORY:  No pertinent family history in first degree relatives    T(C): 36.3 (09-22-19 @ 06:21), Max: 38.2 (09-21-19 @ 21:01)  HR: 70 (09-22-19 @ 06:21) (70 - 87)  BP: 131/62 (09-22-19 @ 06:21) (114/55 - 131/62)  RR: 18 (09-22-19 @ 06:21) (18 - 22)  SpO2: 98% (09-22-19 @ 06:21) (92% - 99%)  Wt(kg): --Vital Signs Last 24 Hrs  T(C): 36.3 (22 Sep 2019 06:21), Max: 38.2 (21 Sep 2019 21:01)  T(F): 97.4 (22 Sep 2019 06:21), Max: 100.7 (21 Sep 2019 21:01)  HR: 70 (22 Sep 2019 06:21) (70 - 87)  BP: 131/62 (22 Sep 2019 06:21) (114/55 - 131/62)  BP(mean): 74 (22 Sep 2019 03:17) (74 - 74)  RR: 18 (22 Sep 2019 06:21) (18 - 22)  SpO2: 98% (22 Sep 2019 06:21) (92% - 99%)  No Known Allergies      PHYSICAL EXAM:  GENERAL: NAD, well-groomed, well-developed  HEAD:  Atraumatic, Normocephalic  EYES: EOMI, PERRLA, conjunctiva and sclera clear  ENMT: No tonsillar erythema, exudates, or enlargement; Moist mucous membranes, Good dentition, No lesions  NECK: Supple, No JVD, Normal thyroid  NERVOUS SYSTEM:  Alert & Oriented X3, Good concentration; Motor Strength 5/5 B/L upper and lower extremities; DTRs 2+ intact and symmetric  CHEST/LUNG: Decreased breath sounds L base, Clear to percussion bilaterally; No rales, rhonchi, wheezing, or rubs  HEART: Regular rate and rhythm; No murmurs, rubs, or gallops  ABDOMEN: Soft, Nontender, Nondistended; Bowel sounds present  EXTREMITIES:  2+ Peripheral Pulses, No clubbing, cyanosis, or edema. R leg warmer then L, area of erythema present on RLE, smaller than yesterday (demarcated by pen)  LYMPH: No lymphadenopathy noted  SKIN: No rashes or lesions    Consultant(s) Notes Reviewed:  [x ] YES  [ ] NO  Care Discussed with Consultants/Other Providers [ x] YES  [ ] NO    LABS:    RADIOLOGY & ADDITIONAL TESTS:    Imaging Personally Reviewed:  [ ] YES  [ ] NO  aspirin enteric coated 81 milliGRAM(s) Oral daily  atorvastatin 40 milliGRAM(s) Oral at bedtime  cefTRIAXone   IVPB 1000 milliGRAM(s) IV Intermittent every 24 hours  dextrose 40% Gel 15 Gram(s) Oral once PRN  dextrose 5%. 1000 milliLiter(s) IV Continuous <Continuous>  dextrose 50% Injectable 12.5 Gram(s) IV Push once  dextrose 50% Injectable 25 Gram(s) IV Push once  dextrose 50% Injectable 25 Gram(s) IV Push once  digoxin     Tablet 0.125 milliGRAM(s) Oral <User Schedule>  donepezil 10 milliGRAM(s) Oral at bedtime  glucagon  Injectable 1 milliGRAM(s) IntraMuscular once PRN  heparin  Injectable 5000 Unit(s) SubCutaneous every 8 hours  hydrALAZINE 10 milliGRAM(s) Oral two times a day  insulin glargine Injectable (LANTUS) 15 Unit(s) SubCutaneous at bedtime  insulin lispro (HumaLOG) corrective regimen sliding scale   SubCutaneous three times a day before meals  insulin lispro (HumaLOG) corrective regimen sliding scale   SubCutaneous at bedtime  insulin lispro Injectable (HumaLOG) 5 Unit(s) SubCutaneous three times a day before meals  isosorbide   dinitrate Tablet (ISORDIL) 30 milliGRAM(s) Oral daily  memantine 5 milliGRAM(s) Oral two times a day  metoprolol succinate ER 75 milliGRAM(s) Oral daily  tamsulosin 0.4 milliGRAM(s) Oral at bedtime  torsemide 60 milliGRAM(s) Oral daily      HEALTH ISSUES - PROBLEM Dx:  CKD (chronic kidney disease), stage IV: CKD (chronic kidney disease), stage IV  Supratherapeutic INR: Supratherapeutic INR  Dementia: Dementia  Prophylactic measure: Prophylactic measure  BPH (benign prostatic hyperplasia): BPH (benign prostatic hyperplasia)  CAD (coronary artery disease): CAD (coronary artery disease)  Type 2 diabetes mellitus: Type 2 diabetes mellitus  Paroxysmal atrial fibrillation: Paroxysmal atrial fibrillation  Congestive heart failure: Congestive heart failure  Sepsis: Sepsis  UTI (urinary tract infection): UTI (urinary tract infection)

## 2019-09-25 NOTE — PROGRESS NOTE ADULT - ASSESSMENT
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

## 2019-09-25 NOTE — PROGRESS NOTE ADULT - PROBLEM SELECTOR PLAN 6
Pt is an IDT2DM. Pt on Lantus 20U qhs and Humalog 8U TID at home  - A1c 7.2  - Lantus 15U  - HUmalog 5U TID  - ISS

## 2019-09-25 NOTE — PROGRESS NOTE ADULT - ASSESSMENT
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 Since admission pt has been afebrile however blood cultures positive for Strep agalactiae. Pt has bioprosthetic mitral valve, PM and ICD. TTE in ED showing severe global hypokinesis. No complaints of dysuria, UCx showing normal destiney. On 2L NC and unable to wean, however CXR not showing any focal opacities. Now with RLE swelling c/f cellulitis, like source of Strep agalactiae bacteremia.

## 2019-09-25 NOTE — PROGRESS NOTE ADULT - PROBLEM SELECTOR PLAN 9
Problem 11: Dementia: C/w home Aricept and Namenda     DVT: HSQ  Diet: CC diet  Code: Full  Dispo: Likely home

## 2019-09-25 NOTE — PROGRESS NOTE ADULT - PROBLEM SELECTOR PLAN 2
Pt INR found to be elevated in setting of sepsis.   - INR  - restart warfarin 5 mg Pt INR found to be elevated in setting of sepsis.   - INR 1.69  - restart warfarin 5 mg Pt INR found to be elevated in setting of sepsis.   - INR 1.69  - warfarin 6 mg

## 2019-09-25 NOTE — PROGRESS NOTE ADULT - PROBLEM SELECTOR PLAN 1
-repeat bcx negative  -cont CTX  -likely from right LE cellulitis   -check TTE  -MARY planned for today

## 2019-09-25 NOTE — PROGRESS NOTE ADULT - SUBJECTIVE AND OBJECTIVE BOX
PELON DUNCAN 88y [id number]    Patient is a 88y old  Male who presents with a chief complaint of fever and lethargy (25 Sep 2019 07:23)      Follow Up/CC:  ID following for bacteremia    Interval History/ROS: no fever, feels better    Allergies    No Known Allergies    Intolerances        ANTIMICROBIALS:  cefTRIAXone   IVPB 2000 every 24 hours      MEDICATIONS  (STANDING):  aspirin enteric coated 81 milliGRAM(s) Oral daily  atorvastatin 40 milliGRAM(s) Oral at bedtime  cefTRIAXone   IVPB 2000 milliGRAM(s) IV Intermittent every 24 hours  dextrose 5%. 1000 milliLiter(s) (50 mL/Hr) IV Continuous <Continuous>  dextrose 50% Injectable 12.5 Gram(s) IV Push once  dextrose 50% Injectable 25 Gram(s) IV Push once  dextrose 50% Injectable 25 Gram(s) IV Push once  digoxin     Tablet 0.125 milliGRAM(s) Oral <User Schedule>  donepezil 10 milliGRAM(s) Oral at bedtime  heparin  Injectable 5000 Unit(s) SubCutaneous every 8 hours  hydrALAZINE 10 milliGRAM(s) Oral two times a day  insulin glargine Injectable (LANTUS) 15 Unit(s) SubCutaneous at bedtime  insulin lispro (HumaLOG) corrective regimen sliding scale   SubCutaneous three times a day before meals  insulin lispro (HumaLOG) corrective regimen sliding scale   SubCutaneous at bedtime  insulin lispro Injectable (HumaLOG) 5 Unit(s) SubCutaneous three times a day before meals  isosorbide   dinitrate Tablet (ISORDIL) 10 milliGRAM(s) Oral three times a day  memantine 5 milliGRAM(s) Oral two times a day  metoprolol succinate ER 75 milliGRAM(s) Oral daily  tamsulosin 0.4 milliGRAM(s) Oral at bedtime  torsemide 60 milliGRAM(s) Oral daily  warfarin 6 milliGRAM(s) Oral at bedtime    MEDICATIONS  (PRN):  dextrose 40% Gel 15 Gram(s) Oral once PRN Blood Glucose LESS THAN 70 milliGRAM(s)/deciliter  glucagon  Injectable 1 milliGRAM(s) IntraMuscular once PRN Glucose LESS THAN 70 milligrams/deciliter        Vital Signs Last 24 Hrs  T(C): 36.3 (25 Sep 2019 11:43), Max: 36.8 (24 Sep 2019 21:18)  T(F): 97.4 (25 Sep 2019 11:43), Max: 98.3 (24 Sep 2019 21:18)  HR: 71 (25 Sep 2019 11:43) (69 - 75)  BP: 130/74 (25 Sep 2019 11:43) (130/74 - 149/71)  BP(mean): --  RR: 18 (25 Sep 2019 11:43) (18 - 18)  SpO2: 95% (25 Sep 2019 11:43) (92% - 99%)    CBC Full  -  ( 25 Sep 2019 07:28 )  WBC Count : 8.3 K/uL  RBC Count : 3.85 M/uL  Hemoglobin : 11.5 g/dL  Hematocrit : 35.1 %  Platelet Count - Automated : 128 K/uL  Mean Cell Volume : 91.3 fl  Mean Cell Hemoglobin : 29.8 pg  Mean Cell Hemoglobin Concentration : 32.6 gm/dL  Auto Neutrophil # : x  Auto Lymphocyte # : x  Auto Monocyte # : x  Auto Eosinophil # : x  Auto Basophil # : x  Auto Neutrophil % : x  Auto Lymphocyte % : x  Auto Monocyte % : x  Auto Eosinophil % : x  Auto Basophil % : x    09-25    139  |  103  |  102<H>  ----------------------------<  153<H>  3.6   |  20<L>  |  3.09<H>    Ca    8.6      25 Sep 2019 07:28            MICROBIOLOGY:  .Blood  09-22-19   No growth to date.  --  --      .Urine  09-22-19   <10,000 CFU/mL Normal Urogenital Arielle  --  --      .Blood  09-22-19   Growth in aerobic and anaerobic bottles: Streptococcus agalactiae (Group  B) ***********Note************  This isolate demonstrates inducible  clindamycin resistance.  Clindamycin may still be effective in some patients.  "Due to technical problems, Proteus sp. will Not be reported as part of  the BCID panel until further notice"  ***Blood Panel PCR results on this specimen are available  approximately 3 hours after the Gram stain result.***  Gram stain, PCR, and/or culture results may not always  correspond due to difference in methodologies.  ************************************************************  This PCR assay was performed using [employer].  The following targets are tested for: Enterococcus,  vancomycin resistant enterococci, Listeria monocytogenes,  coagulase negative staphylococci, S. aureus,  methicillin resistant S. aureus, Streptococcus agalactiae  (Group B), S. pneumoniae, S. pyogenes (Group A),  Acinetobacter baumannii, Enterobacter cloacae, E. coli,  Klebsiella oxytoca, K. pneumoniae, Proteus sp.,  Serratia marcescens, Haemophilus influenzae,  Neisseria meningitidis, Pseudomonas aeruginosa, Candida  albicans, C. glabrata, C krusei, C parapsilosis,  C. tropicalis and the KPC resistance gene.  --  Blood Culture PCR  Streptococcus agalactiae (Group B)        Rapid RVP Result: NotDetec (09-21 @ 22:12)      RADIOLOGY    < from: CT Abdomen and Pelvis No Cont (09.22.19 @ 15:33) >  No acute abnormality.    < from: Transthoracic Echocardiogram (09.24.19 @ 19:48) >  1. Bioprosthetic mitral valve replacement. Minimal mitral  regurgitation. Peak mitral valve gradient equals 12 mm Hg,  mean transmitral valve gradient equals 5 mm Hg, which is  probably normal in the setting of a bioprosthetic mitral  valve replacement.  2. Aortic valve not well visualized; appears calcified.  Mild aortic regurgitation.  3. Eccentric left ventricular hypertrophy (dilated left  ventricle with normal relative wall thickness).  4. Moderate global left ventricular systolic dysfunction.  5. Normal right ventricular size with decreased right  ventricular systolic function. A device wire is noted in  the right heart. TV s = 8 cm/sec.  6. Estimated right ventricular systolic pressure equals 57  mm Hg, assuming right atrial pressure equals 8 mm Hg,  consistent with moderate pulmonary hypertension.  7. A small mobile echodensity is seen on the ventricular  side of the mitral valve. Differential includes vegetation  vs torn chord. The aortic, pulmonic, and tricuspid valves  are not well visualized. Can not exclude endocarditis.  Consider MARY if clinically indicated.  *** Compared with echocardiogram of 7/15/2019, left  ventricular function has improved. Pulmonary hypertension  has decreased. A small mobile echodensity is seen on the  ventricular side of the mitral valve.    < end of copied text >

## 2019-09-25 NOTE — PROGRESS NOTE ADULT - PROBLEM SELECTOR PLAN 1
Pt p/w fever, tachypnea, and leukocytosis w/ evidence of UTI. S/p Vanc/CTX in the ED. Pt additionally presented w/ back pain concerning for spinal abscess, however CT imaging showed no evidence of spinal abscess.   - C/w abx as above  - blood cx GBS +, source likely R leg cellulitis  - ID recs: cefriaxone 2g  -TTE showed MV echodensity (vegetation vs torn chord), couldn't r/o vegetation, will order MARY Pt p/w fever, tachypnea, and leukocytosis w/ evidence of UTI. S/p Vanc/CTX in the ED. Pt additionally presented w/ back pain concerning for spinal abscess, however CT imaging showed no evidence of spinal abscess.   - blood cx GBS +, source likely R leg cellulitis  - ID following, cont  ceftriaxone 2g IVSS daily  -TTE showed MV echodensity (vegetation vs torn chord), couldn't r/o vegetation, MARY later today

## 2019-09-25 NOTE — PROGRESS NOTE ADULT - PROBLEM SELECTOR PLAN 4
CHADSVasc: 6; On coumadin  - C/w digoxin 0.125mg MWF  - Metoprolol as above  - will restart coumadin 5 mg CHADSVasc: 6; On coumadin  - C/w digoxin 0.125mg MWF  - Metoprolol as above  - warfarin CHADSVasc: 6; On coumadin  - C/w digoxin 0.125mg MWF  - Metoprolol as above  - warfarin as above

## 2019-09-26 DIAGNOSIS — Z51.81 ENCOUNTER FOR THERAPEUTIC DRUG LEVEL MONITORING: ICD-10-CM

## 2019-09-26 DIAGNOSIS — Z79.2 LONG TERM (CURRENT) USE OF ANTIBIOTICS: ICD-10-CM

## 2019-09-26 LAB
ANION GAP SERPL CALC-SCNC: 17 MMOL/L — SIGNIFICANT CHANGE UP (ref 5–17)
BUN SERPL-MCNC: 101 MG/DL — HIGH (ref 7–23)
CALCIUM SERPL-MCNC: 8.7 MG/DL — SIGNIFICANT CHANGE UP (ref 8.4–10.5)
CHLORIDE SERPL-SCNC: 103 MMOL/L — SIGNIFICANT CHANGE UP (ref 96–108)
CO2 SERPL-SCNC: 17 MMOL/L — LOW (ref 22–31)
CREAT SERPL-MCNC: 3.13 MG/DL — HIGH (ref 0.5–1.3)
GLUCOSE BLDC GLUCOMTR-MCNC: 174 MG/DL — HIGH (ref 70–99)
GLUCOSE BLDC GLUCOMTR-MCNC: 184 MG/DL — HIGH (ref 70–99)
GLUCOSE BLDC GLUCOMTR-MCNC: 190 MG/DL — HIGH (ref 70–99)
GLUCOSE BLDC GLUCOMTR-MCNC: 208 MG/DL — HIGH (ref 70–99)
GLUCOSE SERPL-MCNC: 192 MG/DL — HIGH (ref 70–99)
HCT VFR BLD CALC: 38.5 % — LOW (ref 39–50)
HGB BLD-MCNC: 11.6 G/DL — LOW (ref 13–17)
INR BLD: 1.93 RATIO — HIGH (ref 0.88–1.16)
MCHC RBC-ENTMCNC: 27.4 PG — SIGNIFICANT CHANGE UP (ref 27–34)
MCHC RBC-ENTMCNC: 30.2 GM/DL — LOW (ref 32–36)
MCV RBC AUTO: 90.9 FL — SIGNIFICANT CHANGE UP (ref 80–100)
PLATELET # BLD AUTO: 153 K/UL — SIGNIFICANT CHANGE UP (ref 150–400)
POTASSIUM SERPL-MCNC: 3.7 MMOL/L — SIGNIFICANT CHANGE UP (ref 3.5–5.3)
POTASSIUM SERPL-SCNC: 3.7 MMOL/L — SIGNIFICANT CHANGE UP (ref 3.5–5.3)
PROTHROM AB SERPL-ACNC: 22.7 SEC — HIGH (ref 10–12.9)
RBC # BLD: 4.23 M/UL — SIGNIFICANT CHANGE UP (ref 4.2–5.8)
RBC # FLD: 12.6 % — SIGNIFICANT CHANGE UP (ref 10.3–14.5)
SODIUM SERPL-SCNC: 137 MMOL/L — SIGNIFICANT CHANGE UP (ref 135–145)
WBC # BLD: 7.8 K/UL — SIGNIFICANT CHANGE UP (ref 3.8–10.5)
WBC # FLD AUTO: 7.8 K/UL — SIGNIFICANT CHANGE UP (ref 3.8–10.5)

## 2019-09-26 PROCEDURE — 99233 SBSQ HOSP IP/OBS HIGH 50: CPT | Mod: GC

## 2019-09-26 PROCEDURE — 99233 SBSQ HOSP IP/OBS HIGH 50: CPT

## 2019-09-26 RX ADMIN — CEFTRIAXONE 100 MILLIGRAM(S): 500 INJECTION, POWDER, FOR SOLUTION INTRAMUSCULAR; INTRAVENOUS at 21:41

## 2019-09-26 RX ADMIN — ISOSORBIDE DINITRATE 10 MILLIGRAM(S): 5 TABLET ORAL at 05:46

## 2019-09-26 RX ADMIN — Medication 1: at 09:08

## 2019-09-26 RX ADMIN — MEMANTINE HYDROCHLORIDE 5 MILLIGRAM(S): 10 TABLET ORAL at 09:09

## 2019-09-26 RX ADMIN — Medication 5 UNIT(S): at 18:13

## 2019-09-26 RX ADMIN — HEPARIN SODIUM 5000 UNIT(S): 5000 INJECTION INTRAVENOUS; SUBCUTANEOUS at 05:46

## 2019-09-26 RX ADMIN — Medication 75 MILLIGRAM(S): at 05:46

## 2019-09-26 RX ADMIN — Medication 10 MILLIGRAM(S): at 09:09

## 2019-09-26 RX ADMIN — MEMANTINE HYDROCHLORIDE 5 MILLIGRAM(S): 10 TABLET ORAL at 21:40

## 2019-09-26 RX ADMIN — Medication 5 UNIT(S): at 13:02

## 2019-09-26 RX ADMIN — TAMSULOSIN HYDROCHLORIDE 0.4 MILLIGRAM(S): 0.4 CAPSULE ORAL at 21:40

## 2019-09-26 RX ADMIN — ISOSORBIDE DINITRATE 10 MILLIGRAM(S): 5 TABLET ORAL at 13:04

## 2019-09-26 RX ADMIN — Medication 10 MILLIGRAM(S): at 21:41

## 2019-09-26 RX ADMIN — ISOSORBIDE DINITRATE 10 MILLIGRAM(S): 5 TABLET ORAL at 21:40

## 2019-09-26 RX ADMIN — DONEPEZIL HYDROCHLORIDE 10 MILLIGRAM(S): 10 TABLET, FILM COATED ORAL at 21:40

## 2019-09-26 RX ADMIN — HEPARIN SODIUM 5000 UNIT(S): 5000 INJECTION INTRAVENOUS; SUBCUTANEOUS at 21:40

## 2019-09-26 RX ADMIN — ATORVASTATIN CALCIUM 40 MILLIGRAM(S): 80 TABLET, FILM COATED ORAL at 21:40

## 2019-09-26 RX ADMIN — INSULIN GLARGINE 15 UNIT(S): 100 INJECTION, SOLUTION SUBCUTANEOUS at 21:41

## 2019-09-26 RX ADMIN — WARFARIN SODIUM 6 MILLIGRAM(S): 2.5 TABLET ORAL at 21:40

## 2019-09-26 RX ADMIN — Medication 5 UNIT(S): at 09:08

## 2019-09-26 RX ADMIN — Medication 81 MILLIGRAM(S): at 12:00

## 2019-09-26 RX ADMIN — Medication 1: at 13:03

## 2019-09-26 RX ADMIN — HEPARIN SODIUM 5000 UNIT(S): 5000 INJECTION INTRAVENOUS; SUBCUTANEOUS at 13:03

## 2019-09-26 RX ADMIN — Medication 60 MILLIGRAM(S): at 05:45

## 2019-09-26 RX ADMIN — Medication 1: at 18:13

## 2019-09-26 NOTE — PROGRESS NOTE ADULT - PROBLEM SELECTOR PLAN 1
Pt p/w fever, tachypnea, and leukocytosis w/ evidence of UTI. S/p Vanc/CTX in the ED. Pt additionally presented w/ back pain concerning for spinal abscess, however CT imaging showed no evidence of spinal abscess.   - blood cx GBS +, source likely R leg cellulitis  - ID following, cont ceftriaxone 2g IVSS daily  -TTE showed MV echodensity (vegetation vs torn chord), couldn't r/o vegetation  -MARY refused yesterday  -sensitivities back: resistant to clinda, sensitive to levo, vanco, ceftriaxone, pcn Pt p/w fever, tachypnea, and leukocytosis w/ evidence of UTI. S/p Vanc/CTX in the ED. Pt additionally presented w/ back pain concerning for spinal abscess, however CT imaging showed no evidence of spinal abscess.   - blood cx GBS +, source likely R leg cellulitis  - ID following, cont ceftriaxone 2g IVSS daily  -TTE showed MV echodensity (vegetation vs torn chord), couldn't r/o vegetation  -MARY refused/deferred due to risk with anesthesia  -sensitivities back: resistant to clinda, sensitive to levo, vanco, ceftriaxone, pcn  -await repeat cultures prior to proceeding with PICC line for ling term antibiotics

## 2019-09-26 NOTE — PROGRESS NOTE ADULT - SUBJECTIVE AND OBJECTIVE BOX
PELON DUNCAN 88y [id number]    Patient is a 88y old  Male who presents with a chief complaint of fever and lethargy (26 Sep 2019 09:10)      Follow Up/CC:  ID following for bacteremia    Interval History/ROS: feels well, no fever, refused MARY yesterday     Allergies    No Known Allergies    Intolerances        ANTIMICROBIALS:  cefTRIAXone   IVPB 2000 every 24 hours      MEDICATIONS  (STANDING):  aspirin enteric coated 81 milliGRAM(s) Oral daily  atorvastatin 40 milliGRAM(s) Oral at bedtime  cefTRIAXone   IVPB 2000 milliGRAM(s) IV Intermittent every 24 hours  dextrose 5%. 1000 milliLiter(s) (50 mL/Hr) IV Continuous <Continuous>  dextrose 50% Injectable 12.5 Gram(s) IV Push once  dextrose 50% Injectable 25 Gram(s) IV Push once  dextrose 50% Injectable 25 Gram(s) IV Push once  digoxin     Tablet 0.125 milliGRAM(s) Oral <User Schedule>  donepezil 10 milliGRAM(s) Oral at bedtime  heparin  Injectable 5000 Unit(s) SubCutaneous every 8 hours  hydrALAZINE 10 milliGRAM(s) Oral two times a day  insulin glargine Injectable (LANTUS) 15 Unit(s) SubCutaneous at bedtime  insulin lispro (HumaLOG) corrective regimen sliding scale   SubCutaneous three times a day before meals  insulin lispro (HumaLOG) corrective regimen sliding scale   SubCutaneous at bedtime  insulin lispro Injectable (HumaLOG) 5 Unit(s) SubCutaneous three times a day before meals  isosorbide   dinitrate Tablet (ISORDIL) 10 milliGRAM(s) Oral three times a day  memantine 5 milliGRAM(s) Oral two times a day  metoprolol succinate ER 75 milliGRAM(s) Oral daily  tamsulosin 0.4 milliGRAM(s) Oral at bedtime  torsemide 60 milliGRAM(s) Oral daily  warfarin 6 milliGRAM(s) Oral at bedtime    MEDICATIONS  (PRN):  dextrose 40% Gel 15 Gram(s) Oral once PRN Blood Glucose LESS THAN 70 milliGRAM(s)/deciliter  glucagon  Injectable 1 milliGRAM(s) IntraMuscular once PRN Glucose LESS THAN 70 milligrams/deciliter        Vital Signs Last 24 Hrs  T(C): 36.5 (26 Sep 2019 12:42), Max: 36.7 (26 Sep 2019 04:46)  T(F): 97.7 (26 Sep 2019 12:42), Max: 98 (26 Sep 2019 04:46)  HR: 70 (26 Sep 2019 12:42) (70 - 73)  BP: 135/74 (26 Sep 2019 12:42) (126/64 - 146/69)  BP(mean): --  RR: 17 (26 Sep 2019 12:42) (17 - 18)  SpO2: 97% (26 Sep 2019 12:42) (96% - 97%)    CBC Full  -  ( 26 Sep 2019 06:11 )  WBC Count : 7.8 K/uL  RBC Count : 4.23 M/uL  Hemoglobin : 11.6 g/dL  Hematocrit : 38.5 %  Platelet Count - Automated : 153 K/uL  Mean Cell Volume : 90.9 fl  Mean Cell Hemoglobin : 27.4 pg  Mean Cell Hemoglobin Concentration : 30.2 gm/dL  Auto Neutrophil # : x  Auto Lymphocyte # : x  Auto Monocyte # : x  Auto Eosinophil # : x  Auto Basophil # : x  Auto Neutrophil % : x  Auto Lymphocyte % : x  Auto Monocyte % : x  Auto Eosinophil % : x  Auto Basophil % : x    09-26    137  |  103  |  101<H>  ----------------------------<  192<H>  3.7   |  17<L>  |  3.13<H>    Ca    8.7      26 Sep 2019 06:11            MICROBIOLOGY:  .Blood  09-25-19   No growth to date.  --  --      .Blood  09-22-19   No growth to date.  --  --      .Urine  09-22-19   <10,000 CFU/mL Normal Urogenital Arielle  --  --      .Blood  09-22-19   Growth in aerobic and anaerobic bottles: Streptococcus agalactiae (Group  B) ***********Note************  This isolate demonstrates inducible  clindamycin resistance.  Clindamycin may still be effective in some patients.  "Due to technical problems, Proteus sp. will Not be reported as part of  the BCID panel until further notice"  ***Blood Panel PCR results on this specimen are available  approximately 3 hours after the Gram stain result.***  Gram stain, PCR, and/or culture results may not always  correspond due to difference in methodologies.  ************************************************************  This PCR assay was performed using [employer].  The following targets are tested for: Enterococcus,  vancomycin resistant enterococci, Listeria monocytogenes,  coagulase negative staphylococci, S. aureus,  methicillin resistant S. aureus, Streptococcus agalactiae  (Group B), S. pneumoniae, S. pyogenes (Group A),  Acinetobacter baumannii, Enterobacter cloacae, E. coli,  Klebsiella oxytoca, K. pneumoniae, Proteus sp.,  Serratia marcescens, Haemophilus influenzae,  Neisseria meningitidis, Pseudomonas aeruginosa, Candida  albicans, C. glabrata, C krusei, C parapsilosis,  C. tropicalis and the KPC resistance gene.  --  Blood Culture PCR  Streptococcus agalactiae (Group B)      Rapid RVP Result: NotDetec (09-21 @ 22:12)          RADIOLOGY    < from: CT Abdomen and Pelvis No Cont (09.22.19 @ 15:33) >  No acute abnormality.      < from: Transthoracic Echocardiogram (09.24.19 @ 19:48) >  1. Bioprosthetic mitral valve replacement. Minimal mitral  regurgitation. Peak mitral valve gradient equals 12 mm Hg,  mean transmitral valve gradient equals 5 mm Hg, which is  probably normal in the setting of a bioprosthetic mitral  valve replacement.  2. Aortic valve not well visualized; appears calcified.  Mild aortic regurgitation.  3. Eccentric left ventricular hypertrophy (dilated left  ventricle with normal relative wall thickness).  4. Moderate global left ventricular systolic dysfunction.  5. Normal right ventricular size with decreased right  ventricular systolic function. A device wire is noted in  the right heart. TV s = 8 cm/sec.  6. Estimated right ventricular systolic pressure equals 57  mm Hg, assuming right atrial pressure equals 8 mm Hg,  consistent with moderate pulmonary hypertension.  7. A small mobile echodensity is seen on the ventricular  side of the mitral valve. Differential includes vegetation  vs torn chord. The aortic, pulmonic, and tricuspid valves  are not well visualized. Can not exclude endocarditis.  Consider MARY if clinically indicated.  *** Compared with echocardiogram of 7/15/2019, left  ventricular function has improved. Pulmonary hypertension  has decreased. A small mobile echodensity is seen on the  ventricular side of the mitral valve.    < end of copied text >

## 2019-09-26 NOTE — PROGRESS NOTE ADULT - PROBLEM SELECTOR PLAN 7
-plan 6 weeks of abx  -picc  -potential side effects of PICC explained including bleeding and infection  -potential side effects of abx explained including GI, Cdiff, allergy issues, development of resistance, etc.

## 2019-09-26 NOTE — PROGRESS NOTE ADULT - PROBLEM SELECTOR PLAN 6
Pt is an IDT2DM. Pt on Lantus 20U qhs and Humalog 8U TID at home  - A1c 7.2  - Lantus 15U  - HUmalog 5U TID  - ISS Pt is an IDT2DM. Pt on Lantus 20U qhs and Humalog 8U TID at home  - A1c 7.2  - Lantus 15U  - Humalog 5U TID  - ISS

## 2019-09-27 DIAGNOSIS — N18.4 CHRONIC KIDNEY DISEASE, STAGE 4 (SEVERE): ICD-10-CM

## 2019-09-27 DIAGNOSIS — N25.0 RENAL OSTEODYSTROPHY: ICD-10-CM

## 2019-09-27 LAB
ANION GAP SERPL CALC-SCNC: 14 MMOL/L — SIGNIFICANT CHANGE UP (ref 5–17)
BUN SERPL-MCNC: 104 MG/DL — HIGH (ref 7–23)
CALCIUM SERPL-MCNC: 9 MG/DL — SIGNIFICANT CHANGE UP (ref 8.4–10.5)
CHLORIDE SERPL-SCNC: 104 MMOL/L — SIGNIFICANT CHANGE UP (ref 96–108)
CO2 SERPL-SCNC: 17 MMOL/L — LOW (ref 22–31)
CREAT SERPL-MCNC: 3.41 MG/DL — HIGH (ref 0.5–1.3)
CULTURE RESULTS: SIGNIFICANT CHANGE UP
GLUCOSE BLDC GLUCOMTR-MCNC: 159 MG/DL — HIGH (ref 70–99)
GLUCOSE BLDC GLUCOMTR-MCNC: 179 MG/DL — HIGH (ref 70–99)
GLUCOSE BLDC GLUCOMTR-MCNC: 185 MG/DL — HIGH (ref 70–99)
GLUCOSE BLDC GLUCOMTR-MCNC: 187 MG/DL — HIGH (ref 70–99)
GLUCOSE SERPL-MCNC: 191 MG/DL — HIGH (ref 70–99)
HCT VFR BLD CALC: 37.4 % — LOW (ref 39–50)
HGB BLD-MCNC: 11.6 G/DL — LOW (ref 13–17)
INR BLD: 2.37 RATIO — HIGH (ref 0.88–1.16)
MCHC RBC-ENTMCNC: 28.4 PG — SIGNIFICANT CHANGE UP (ref 27–34)
MCHC RBC-ENTMCNC: 31.1 GM/DL — LOW (ref 32–36)
MCV RBC AUTO: 91.4 FL — SIGNIFICANT CHANGE UP (ref 80–100)
PLATELET # BLD AUTO: 141 K/UL — LOW (ref 150–400)
POTASSIUM SERPL-MCNC: 4.1 MMOL/L — SIGNIFICANT CHANGE UP (ref 3.5–5.3)
POTASSIUM SERPL-SCNC: 4.1 MMOL/L — SIGNIFICANT CHANGE UP (ref 3.5–5.3)
PROTHROM AB SERPL-ACNC: 27.8 SEC — HIGH (ref 10–12.9)
RBC # BLD: 4.1 M/UL — LOW (ref 4.2–5.8)
RBC # FLD: 12.7 % — SIGNIFICANT CHANGE UP (ref 10.3–14.5)
SODIUM SERPL-SCNC: 135 MMOL/L — SIGNIFICANT CHANGE UP (ref 135–145)
SPECIMEN SOURCE: SIGNIFICANT CHANGE UP
WBC # BLD: 8.8 K/UL — SIGNIFICANT CHANGE UP (ref 3.8–10.5)
WBC # FLD AUTO: 8.8 K/UL — SIGNIFICANT CHANGE UP (ref 3.8–10.5)

## 2019-09-27 PROCEDURE — 99233 SBSQ HOSP IP/OBS HIGH 50: CPT | Mod: GC

## 2019-09-27 PROCEDURE — 99222 1ST HOSP IP/OBS MODERATE 55: CPT | Mod: GC

## 2019-09-27 PROCEDURE — 99232 SBSQ HOSP IP/OBS MODERATE 35: CPT

## 2019-09-27 RX ORDER — CEFTRIAXONE 500 MG/1
2 INJECTION, POWDER, FOR SOLUTION INTRAMUSCULAR; INTRAVENOUS
Qty: 84 | Refills: 0
Start: 2019-09-27 | End: 2019-11-07

## 2019-09-27 RX ADMIN — TAMSULOSIN HYDROCHLORIDE 0.4 MILLIGRAM(S): 0.4 CAPSULE ORAL at 21:35

## 2019-09-27 RX ADMIN — HEPARIN SODIUM 5000 UNIT(S): 5000 INJECTION INTRAVENOUS; SUBCUTANEOUS at 21:37

## 2019-09-27 RX ADMIN — Medication 75 MILLIGRAM(S): at 05:46

## 2019-09-27 RX ADMIN — CEFTRIAXONE 100 MILLIGRAM(S): 500 INJECTION, POWDER, FOR SOLUTION INTRAMUSCULAR; INTRAVENOUS at 21:39

## 2019-09-27 RX ADMIN — Medication 5 UNIT(S): at 13:09

## 2019-09-27 RX ADMIN — ATORVASTATIN CALCIUM 40 MILLIGRAM(S): 80 TABLET, FILM COATED ORAL at 21:34

## 2019-09-27 RX ADMIN — HEPARIN SODIUM 5000 UNIT(S): 5000 INJECTION INTRAVENOUS; SUBCUTANEOUS at 15:58

## 2019-09-27 RX ADMIN — Medication 0.12 MILLIGRAM(S): at 09:24

## 2019-09-27 RX ADMIN — ISOSORBIDE DINITRATE 10 MILLIGRAM(S): 5 TABLET ORAL at 15:59

## 2019-09-27 RX ADMIN — Medication 10 MILLIGRAM(S): at 09:24

## 2019-09-27 RX ADMIN — INSULIN GLARGINE 15 UNIT(S): 100 INJECTION, SOLUTION SUBCUTANEOUS at 22:49

## 2019-09-27 RX ADMIN — WARFARIN SODIUM 6 MILLIGRAM(S): 2.5 TABLET ORAL at 21:34

## 2019-09-27 RX ADMIN — Medication 5 UNIT(S): at 18:04

## 2019-09-27 RX ADMIN — Medication 1: at 09:26

## 2019-09-27 RX ADMIN — DONEPEZIL HYDROCHLORIDE 10 MILLIGRAM(S): 10 TABLET, FILM COATED ORAL at 21:34

## 2019-09-27 RX ADMIN — Medication 1: at 18:04

## 2019-09-27 RX ADMIN — HEPARIN SODIUM 5000 UNIT(S): 5000 INJECTION INTRAVENOUS; SUBCUTANEOUS at 05:45

## 2019-09-27 RX ADMIN — MEMANTINE HYDROCHLORIDE 5 MILLIGRAM(S): 10 TABLET ORAL at 21:34

## 2019-09-27 RX ADMIN — Medication 10 MILLIGRAM(S): at 21:34

## 2019-09-27 RX ADMIN — Medication 1: at 13:10

## 2019-09-27 RX ADMIN — Medication 5 UNIT(S): at 09:25

## 2019-09-27 RX ADMIN — ISOSORBIDE DINITRATE 10 MILLIGRAM(S): 5 TABLET ORAL at 05:46

## 2019-09-27 RX ADMIN — Medication 60 MILLIGRAM(S): at 05:45

## 2019-09-27 RX ADMIN — ISOSORBIDE DINITRATE 10 MILLIGRAM(S): 5 TABLET ORAL at 21:34

## 2019-09-27 RX ADMIN — Medication 81 MILLIGRAM(S): at 13:07

## 2019-09-27 RX ADMIN — MEMANTINE HYDROCHLORIDE 5 MILLIGRAM(S): 10 TABLET ORAL at 09:27

## 2019-09-27 NOTE — PROGRESS NOTE ADULT - SUBJECTIVE AND OBJECTIVE BOX
PELON DUNCAN 88y [id number]    Patient is a 88y old  Male who presents with a chief complaint of fever and lethargy (27 Sep 2019 12:25)      Follow Up/CC:  ID following for bacteremia    Interval History/ROS: feels ok, no fever    Allergies    No Known Allergies    Intolerances        ANTIMICROBIALS:  cefTRIAXone   IVPB 2000 every 24 hours      MEDICATIONS  (STANDING):  aspirin enteric coated 81 milliGRAM(s) Oral daily  atorvastatin 40 milliGRAM(s) Oral at bedtime  cefTRIAXone   IVPB 2000 milliGRAM(s) IV Intermittent every 24 hours  dextrose 5%. 1000 milliLiter(s) (50 mL/Hr) IV Continuous <Continuous>  dextrose 50% Injectable 12.5 Gram(s) IV Push once  dextrose 50% Injectable 25 Gram(s) IV Push once  dextrose 50% Injectable 25 Gram(s) IV Push once  digoxin     Tablet 0.125 milliGRAM(s) Oral <User Schedule>  donepezil 10 milliGRAM(s) Oral at bedtime  heparin  Injectable 5000 Unit(s) SubCutaneous every 8 hours  hydrALAZINE 10 milliGRAM(s) Oral two times a day  insulin glargine Injectable (LANTUS) 15 Unit(s) SubCutaneous at bedtime  insulin lispro (HumaLOG) corrective regimen sliding scale   SubCutaneous three times a day before meals  insulin lispro (HumaLOG) corrective regimen sliding scale   SubCutaneous at bedtime  insulin lispro Injectable (HumaLOG) 5 Unit(s) SubCutaneous three times a day before meals  isosorbide   dinitrate Tablet (ISORDIL) 10 milliGRAM(s) Oral three times a day  memantine 5 milliGRAM(s) Oral two times a day  metoprolol succinate ER 75 milliGRAM(s) Oral daily  tamsulosin 0.4 milliGRAM(s) Oral at bedtime  torsemide 60 milliGRAM(s) Oral daily  warfarin 6 milliGRAM(s) Oral at bedtime    MEDICATIONS  (PRN):  dextrose 40% Gel 15 Gram(s) Oral once PRN Blood Glucose LESS THAN 70 milliGRAM(s)/deciliter  glucagon  Injectable 1 milliGRAM(s) IntraMuscular once PRN Glucose LESS THAN 70 milligrams/deciliter        Vital Signs Last 24 Hrs  T(C): 36.9 (27 Sep 2019 11:49), Max: 36.9 (27 Sep 2019 11:49)  T(F): 98.5 (27 Sep 2019 11:49), Max: 98.5 (27 Sep 2019 11:49)  HR: 82 (27 Sep 2019 11:49) (70 - 83)  BP: 139/69 (27 Sep 2019 11:49) (119/54 - 147/73)  BP(mean): --  RR: 18 (27 Sep 2019 11:49) (18 - 18)  SpO2: 97% (27 Sep 2019 11:49) (96% - 97%)    CBC Full  -  ( 27 Sep 2019 06:52 )  WBC Count : 8.8 K/uL  RBC Count : 4.10 M/uL  Hemoglobin : 11.6 g/dL  Hematocrit : 37.4 %  Platelet Count - Automated : 141 K/uL  Mean Cell Volume : 91.4 fl  Mean Cell Hemoglobin : 28.4 pg  Mean Cell Hemoglobin Concentration : 31.1 gm/dL  Auto Neutrophil # : x  Auto Lymphocyte # : x  Auto Monocyte # : x  Auto Eosinophil # : x  Auto Basophil # : x  Auto Neutrophil % : x  Auto Lymphocyte % : x  Auto Monocyte % : x  Auto Eosinophil % : x  Auto Basophil % : x    09-27    135  |  104  |  104<H>  ----------------------------<  191<H>  4.1   |  17<L>  |  3.41<H>    Ca    9.0      27 Sep 2019 06:52            MICROBIOLOGY:  .Blood  09-25-19   No growth to date.  --  --      .Blood  09-22-19   No growth at 5 days.  --  --      .Urine  09-22-19   <10,000 CFU/mL Normal Urogenital Arielle  --  --      .Blood  09-22-19   Growth in aerobic and anaerobic bottles: Streptococcus agalactiae (Group  B) ***********Note************  This isolate demonstrates inducible  clindamycin resistance.  Clindamycin may still be effective in some patients.  "Due to technical problems, Proteus sp. will Not be reported as part of  the BCID panel until further notice"  ***Blood Panel PCR results on this specimen are available  approximately 3 hours after the Gram stain result.***  Gram stain, PCR, and/or culture results may not always  correspond due to difference in methodologies.  ************************************************************  This PCR assay was performed using [employer].  The following targets are tested for: Enterococcus,  vancomycin resistant enterococci, Listeria monocytogenes,  coagulase negative staphylococci, S. aureus,  methicillin resistant S. aureus, Streptococcus agalactiae  (Group B), S. pneumoniae, S. pyogenes (Group A),  Acinetobacter baumannii, Enterobacter cloacae, E. coli,  Klebsiella oxytoca, K. pneumoniae, Proteus sp.,  Serratia marcescens, Haemophilus influenzae,  Neisseria meningitidis, Pseudomonas aeruginosa, Candida  albicans, C. glabrata, C krusei, C parapsilosis,  C. tropicalis and the KPC resistance gene.  --  Blood Culture PCR  Streptococcus agalactiae (Group B)      Rapid RVP Result: NotDetec (09-21 @ 22:12)          RADIOLOGY    < from: CT Abdomen and Pelvis No Cont (09.22.19 @ 15:33) >  No acute abnormality.    < end of copied text >

## 2019-09-27 NOTE — PROGRESS NOTE ADULT - PROBLEM SELECTOR PLAN 1
Pt p/w fever, tachypnea, and leukocytosis w/ evidence of UTI. S/p Vanc/CTX in the ED. Pt additionally presented w/ back pain concerning for spinal abscess, however CT imaging showed no evidence of spinal abscess.   - blood cx GBS +, source likely R leg cellulitis  - ID following, cont ceftriaxone 2g IVSS daily  -TTE showed MV echodensity (vegetation vs torn chord), couldn't r/o vegetation  -MARY refused/deferred due to risk with anesthesia  -sensitivities back: resistant to clinda, sensitive to levo, vanco, ceftriaxone, pcn  -await repeat cultures prior to proceeding with PICC line for ling term antibiotics Pt p/w fever, tachypnea, and leukocytosis w/ evidence of UTI. S/p Vanc/CTX in the ED. Pt additionally presented w/ back pain concerning for spinal abscess, however CT imaging showed no evidence of spinal abscess.   - blood cx GBS +, source likely R leg cellulitis  - ID following, cont ceftriaxone 2g IVSS daily  -TTE showed MV echodensity (vegetation vs torn chord), couldn't r/o vegetation  -MARY refused/deferred due to risk with anesthesia  -sensitivities back: resistant to clinda, sensitive to levo, vanco, ceftriaxone, pcn  -repeat bood cx neg 9/25  -will get picc line placed, will need 6 week course of abs from 9/25, per ID  -will need f/u echo, blood cx at conclusion of treatment with weekly cbc, cmp Pt p/w fever, tachypnea, and leukocytosis w/ evidence of UTI. S/p Vanc/CTX in the ED. Pt additionally presented w/ back pain concerning for spinal abscess, however CT imaging showed no evidence of spinal abscess.   - blood cx GBS +, source likely R leg cellulitis  - ID following, cont ceftriaxone 2g IVSS daily  -TTE showed MV echodensity (vegetation vs torn chord), couldn't r/o vegetation  -MARY refused/deferred due to risk with anesthesia  -sensitivities back: resistant to clinda, sensitive to levo, vanco, ceftriaxone, pcn  -repeat bood cx neg 9/25  -will get picc line placed, will need 6 week course of abs from 9/25, per ID  -will need f/u echo, blood cx at conclusion of treatment Pt p/w fever, tachypnea, and leukocytosis w/ evidence of UTI. S/p Vanc/CTX in the ED. Pt additionally presented w/ back pain concerning for spinal abscess, however CT imaging showed no evidence of spinal abscess.   - blood cx GBS +, source likely R leg cellulitis  - ID following, cont ceftriaxone 2g IVSS daily  -TTE showed MV echodensity (vegetation vs torn chord), couldn't r/o vegetation  -MARY refused/deferred due to risk with anesthesia  -sensitivities back: resistant to clinda, sensitive to levo, vanco, ceftriaxone, pcn  -repeat bood cx neg 9/25  -will get thomson line placed by IR on 9/30, will need 6 week course of abs from 9/25, per ID  -will need f/u echo, blood cx at conclusion of treatment along with weekly cbc, cmp

## 2019-09-27 NOTE — CONSULT NOTE ADULT - ASSESSMENT
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 In the ED, LABS showed ESR: 21 WBC: 20.6 INR: 3.58 Procalcitonin: 19.62 Cr: 3.32 Pro-BNP 4675; UA: Large LE WBC; 49.  Admitted for sepsis 2/ to suspected urinary source given positive UA. Nephrology consulted for CKD and assessment  before picc line placement.

## 2019-09-27 NOTE — PROGRESS NOTE ADULT - PROBLEM SELECTOR PLAN 6
-vegetation noted on TTE  -MARY refused  -cont abx  -surveillance blood cx post iv abx    Case D/W Dr. Hunt from EP

## 2019-09-27 NOTE — CONSULT NOTE ADULT - SUBJECTIVE AND OBJECTIVE BOX
[facility] DIVISION OF KIDNEY DISEASES AND HYPERTENSION -- INITIAL CONSULT NOTE  --------------------------------------------------------------------------------  HPI:        PAST HISTORY  --------------------------------------------------------------------------------  PAST MEDICAL & SURGICAL HISTORY:  Cerebrovascular accident (CVA), unspecified mechanism  Pacemaker  Endocarditis  CAD (coronary artery disease)  Cardiomyopathy, ischemic  Type 2 diabetes mellitus  Hypertension  Congestive heart failure  Paroxysmal atrial fibrillation  Dyslipidemia  S/P CABG x 1  History of cataract surgery  History of colon surgery  ICD (implantable cardioverter-defibrillator) in place  H/O mitral valve replacement: bovine    FAMILY HISTORY:  No pertinent family history in first degree relatives    PAST SOCIAL HISTORY:    ALLERGIES & MEDICATIONS  --------------------------------------------------------------------------------  Allergies    No Known Allergies    Intolerances      Standing Inpatient Medications  aspirin enteric coated 81 milliGRAM(s) Oral daily  atorvastatin 40 milliGRAM(s) Oral at bedtime  cefTRIAXone   IVPB 2000 milliGRAM(s) IV Intermittent every 24 hours  dextrose 5%. 1000 milliLiter(s) IV Continuous <Continuous>  dextrose 50% Injectable 12.5 Gram(s) IV Push once  dextrose 50% Injectable 25 Gram(s) IV Push once  dextrose 50% Injectable 25 Gram(s) IV Push once  digoxin     Tablet 0.125 milliGRAM(s) Oral <User Schedule>  donepezil 10 milliGRAM(s) Oral at bedtime  heparin  Injectable 5000 Unit(s) SubCutaneous every 8 hours  hydrALAZINE 10 milliGRAM(s) Oral two times a day  insulin glargine Injectable (LANTUS) 15 Unit(s) SubCutaneous at bedtime  insulin lispro (HumaLOG) corrective regimen sliding scale   SubCutaneous three times a day before meals  insulin lispro (HumaLOG) corrective regimen sliding scale   SubCutaneous at bedtime  insulin lispro Injectable (HumaLOG) 5 Unit(s) SubCutaneous three times a day before meals  isosorbide   dinitrate Tablet (ISORDIL) 10 milliGRAM(s) Oral three times a day  memantine 5 milliGRAM(s) Oral two times a day  metoprolol succinate ER 75 milliGRAM(s) Oral daily  tamsulosin 0.4 milliGRAM(s) Oral at bedtime  torsemide 60 milliGRAM(s) Oral daily  warfarin 6 milliGRAM(s) Oral at bedtime    PRN Inpatient Medications  dextrose 40% Gel 15 Gram(s) Oral once PRN  glucagon  Injectable 1 milliGRAM(s) IntraMuscular once PRN      REVIEW OF SYSTEMS  --------------------------------------------------------------------------------  Gen: No weight changes, fatigue, fevers/chills, weakness  Skin: No rashes  Head/Eyes/Ears/Mouth: No headache; Normal hearing; Normal vision w/o blurriness; No sinus pain/discomfort, sore throat  Respiratory: No dyspnea, cough, wheezing, hemoptysis  CV: No chest pain, PND, orthopnea  GI: No abdominal pain, diarrhea, constipation, nausea, vomiting, melena, hematochezia  : No increased frequency, dysuria, hematuria, nocturia  MSK: No joint pain/swelling; no back pain; no edema  Neuro: No dizziness/lightheadedness, weakness, seizures, numbness, tingling  Heme: No easy bruising or bleeding  Endo: No heat/cold intolerance  Psych: No significant nervousness, anxiety, stress, depression    All other systems were reviewed and are negative, except as noted.    VITALS/PHYSICAL EXAM  --------------------------------------------------------------------------------  T(C): 36.9 (09-27-19 @ 11:49), Max: 36.9 (09-27-19 @ 11:49)  HR: 82 (09-27-19 @ 11:49) (70 - 83)  BP: 139/69 (09-27-19 @ 11:49) (119/54 - 147/73)  RR: 18 (09-27-19 @ 11:49) (17 - 18)  SpO2: 97% (09-27-19 @ 11:49) (96% - 97%)  Wt(kg): --        09-26-19 @ 07:01  -  09-27-19 @ 07:00  --------------------------------------------------------  IN: 1357 mL / OUT: 0 mL / NET: 1357 mL      Physical Exam:  	Gen: NAD, well-appearing  	HEENT: PERRL, supple neck, clear oropharynx  	Pulm: CTA B/L  	CV: RRR, S1S2; no rub  	Back: No spinal or CVA tenderness; no sacral edema  	Abd: +BS, soft, nontender/nondistended  	: No suprapubic tenderness  	UE: Warm, FROM, no clubbing, intact strength; no edema; no asterixis  	LE: Warm, FROM, no clubbing, intact strength; no edema  	Neuro: No focal deficits, intact gait  	Psych: Normal affect and mood  	Skin: Warm, without rashes  	Vascular access:    LABS/STUDIES  --------------------------------------------------------------------------------              11.6   8.8   >-----------<  141      [09-27-19 @ 06:52]              37.4     135  |  104  |  104  ----------------------------<  191      [09-27-19 @ 06:52]  4.1   |  17  |  3.41        Ca     9.0     [09-27-19 @ 06:52]      PT/INR: PT 27.8 , INR 2.37       [09-27-19 @ 06:52]      Creatinine Trend:  SCr 3.41 [09-27 @ 06:52]  SCr 3.13 [09-26 @ 06:11]  SCr 3.09 [09-25 @ 07:28]  SCr 3.13 [09-24 @ 06:24]  SCr 3.36 [09-23 @ 07:01]    Urinalysis - [09-21-19 @ 22:55]      Color Yellow / Appearance Slightly Turbid / SG 1.017 / pH 6.0      Gluc Negative / Ketone Negative  / Bili Negative / Urobili Negative       Blood Negative / Protein 300 mg/dL / Leuk Est Large / Nitrite Negative      RBC 5 / WBC 49 / Hyaline 6 / Gran  / Sq Epi  / Non Sq Epi 1 / Bacteria Negative      HbA1c 7.2      [09-22-19 @ 08:55]      ROSALIND: titer Negative, pattern --      [09-30-16 @ 07:12]  C3 Complement 127      [09-30-16 @ 07:12]  C4 Complement 35      [09-30-16 @ 02:13] [facility] DIVISION OF KIDNEY DISEASES AND HYPERTENSION -- INITIAL CONSULT NOTE  --------------------------------------------------------------------------------  HPI:    89 yo M w/ BPH, dementia, HTN, CKD IV, recurrent UTIs, MVR 2/2 endocarditis, HFrEF (TTE on 7/15/19 w/ EF: 25% severe global LV dysfxn), Afib on coumadin, CAD s/p CABG, HLD, IDT2DM p/w fever of 101.7 and lethargy x1 day. In the ED, LABS showed ESR: 21 WBC: 20.6 INR: 3.58 Procalcitonin: 19.62 Cr: 3.32 Pro-BNP 4675; UA: Large LE WBC; 49.  Admitted for sepsis 2/ to suspected urinary source given positive UA. CT Lumbar/Thoracic spine: Limited evaluation for epidural abscess on a noncontrast CT of the total spine. No evidence of discrete soft tissue within the spinal canal. Pt was treated w. Vanc/Ceftriazone and 250ml LR bolus x1, during hospitalization  blood cultures positive for Strep agalactiae, and also noted to have RLE cellulitis. Now on ceftriaxone, would need abx for 5 weeks. Nephrology consulted for CKD and assessment before PICC line     On review of chart pt follows with Dr. Miller, CKD attributed to longstanding DM/HTN, pt has had scr at 2 range in 2015, but since 2016 has been within 2.9-3.4, pt has declined HD in the past would like conservative management.     During examination pt was awake, not in distress, eating with good appetite. No fever.       PAST HISTORY  --------------------------------------------------------------------------------  PAST MEDICAL & SURGICAL HISTORY:  Cerebrovascular accident (CVA), unspecified mechanism  Pacemaker  Endocarditis  CAD (coronary artery disease)  Cardiomyopathy, ischemic  Type 2 diabetes mellitus  Hypertension  Congestive heart failure  Paroxysmal atrial fibrillation  Dyslipidemia  S/P CABG x 1  History of cataract surgery  History of colon surgery  ICD (implantable cardioverter-defibrillator) in place  H/O mitral valve replacement: bovine    FAMILY HISTORY:  No pertinent family history in first degree relatives    PAST SOCIAL HISTORY:    ALLERGIES & MEDICATIONS  --------------------------------------------------------------------------------  Allergies    No Known Allergies    Intolerances      Standing Inpatient Medications  aspirin enteric coated 81 milliGRAM(s) Oral daily  atorvastatin 40 milliGRAM(s) Oral at bedtime  cefTRIAXone   IVPB 2000 milliGRAM(s) IV Intermittent every 24 hours  dextrose 5%. 1000 milliLiter(s) IV Continuous <Continuous>  dextrose 50% Injectable 12.5 Gram(s) IV Push once  dextrose 50% Injectable 25 Gram(s) IV Push once  dextrose 50% Injectable 25 Gram(s) IV Push once  digoxin     Tablet 0.125 milliGRAM(s) Oral <User Schedule>  donepezil 10 milliGRAM(s) Oral at bedtime  heparin  Injectable 5000 Unit(s) SubCutaneous every 8 hours  hydrALAZINE 10 milliGRAM(s) Oral two times a day  insulin glargine Injectable (LANTUS) 15 Unit(s) SubCutaneous at bedtime  insulin lispro (HumaLOG) corrective regimen sliding scale   SubCutaneous three times a day before meals  insulin lispro (HumaLOG) corrective regimen sliding scale   SubCutaneous at bedtime  insulin lispro Injectable (HumaLOG) 5 Unit(s) SubCutaneous three times a day before meals  isosorbide   dinitrate Tablet (ISORDIL) 10 milliGRAM(s) Oral three times a day  memantine 5 milliGRAM(s) Oral two times a day  metoprolol succinate ER 75 milliGRAM(s) Oral daily  tamsulosin 0.4 milliGRAM(s) Oral at bedtime  torsemide 60 milliGRAM(s) Oral daily  warfarin 6 milliGRAM(s) Oral at bedtime    PRN Inpatient Medications  dextrose 40% Gel 15 Gram(s) Oral once PRN  glucagon  Injectable 1 milliGRAM(s) IntraMuscular once PRN      REVIEW OF SYSTEMS  --------------------------------------------------------------------------------  Gen: No weight changes, fatigue, fevers/chills, weakness  Respiratory: No dyspnea, cough, wheezing, hemoptysis  CV: No chest pain, PND, orthopnea  GI: No abdominal pain, diarrhea, constipation, nausea, vomiting, melena, hematochezia  MSK: + edema  Neuro: No dizziness/lightheadedness,     All other systems were reviewed and are negative, except as noted.    VITALS/PHYSICAL EXAM  --------------------------------------------------------------------------------  T(C): 36.9 (09-27-19 @ 11:49), Max: 36.9 (09-27-19 @ 11:49)  HR: 82 (09-27-19 @ 11:49) (70 - 83)  BP: 139/69 (09-27-19 @ 11:49) (119/54 - 147/73)  RR: 18 (09-27-19 @ 11:49) (17 - 18)  SpO2: 97% (09-27-19 @ 11:49) (96% - 97%)  Wt(kg): --        09-26-19 @ 07:01  -  09-27-19 @ 07:00  --------------------------------------------------------  IN: 1357 mL / OUT: 0 mL / NET: 1357 mL      Physical Exam:    	Gen: NAD,  	HEENT: supple neck, clear oropharynx  	Pulm: CTA B/L  	CV: ICD noted, RRR, S1S2; no rub + murmur.   	Abd: +BS, soft, nontender/nondistended  	UE: no edema; no asterixis  	LE:no edema  	Neuro: No focal deficits,    LABS/STUDIES  --------------------------------------------------------------------------------              11.6   8.8   >-----------<  141      [09-27-19 @ 06:52]              37.4     135  |  104  |  104  ----------------------------<  191      [09-27-19 @ 06:52]  4.1   |  17  |  3.41        Ca     9.0     [09-27-19 @ 06:52]      PT/INR: PT 27.8 , INR 2.37       [09-27-19 @ 06:52]      Creatinine Trend:  SCr 3.41 [09-27 @ 06:52]  SCr 3.13 [09-26 @ 06:11]  SCr 3.09 [09-25 @ 07:28]  SCr 3.13 [09-24 @ 06:24]  SCr 3.36 [09-23 @ 07:01]    Urinalysis - [09-21-19 @ 22:55]      Color Yellow / Appearance Slightly Turbid / SG 1.017 / pH 6.0      Gluc Negative / Ketone Negative  / Bili Negative / Urobili Negative       Blood Negative / Protein 300 mg/dL / Leuk Est Large / Nitrite Negative      RBC 5 / WBC 49 / Hyaline 6 / Gran  / Sq Epi  / Non Sq Epi 1 / Bacteria Negative      HbA1c 7.2      [09-22-19 @ 08:55]      ROSALIND: titer Negative, pattern --      [09-30-16 @ 07:12]  C3 Complement 127      [09-30-16 @ 07:12]  C4 Complement 35      [09-30-16 @ 02:13]

## 2019-09-27 NOTE — CONSULT NOTE ADULT - PROBLEM SELECTOR RECOMMENDATION 2
If phos >5.5 recommend starting Renvela 800 mg TID with meals. Check intact PTH level. Low phosphorus diet advised. Monitor serum phosphorus

## 2019-09-27 NOTE — PROGRESS NOTE ADULT - ASSESSMENT
Pt is a 89 yo M w/ BPH, dementia, HTN, CKD IV, recurrent UTIs, MVR 2/2 endocarditis, HFrEF (TTE on 7/15/19 w/ EF: 25% severe global LV dysfxn), Afib on coumadin, CAD s/p CABG, HLD, IDT2DM p/w fever and lethargy x1 day a/w sepsis and strep bacteremia secondary to RLE cellulitis

## 2019-09-27 NOTE — PROGRESS NOTE ADULT - PROBLEM SELECTOR PLAN 2
Pt INR found to be elevated in setting of sepsis.   - INR 1.93  - warfarin 6 mg Pt INR found to be elevated in setting of sepsis.   - INR 2.37  - warfarin 6 mg Pt INR found to be elevated in setting of sepsis.   - INR 2.37  - warfarin 6 mg  - INR goal of 2 or less for thomson placement by IR on Monday, will need to hold warfarin sunday night

## 2019-09-27 NOTE — CONSULT NOTE ADULT - ATTENDING COMMENTS
GBS bacteremia, sepsis, Right LE cellulitis, leukocytosis  -source likely right leg  -check TTE and likely MARY given ppm, prosthetic valve  -increase ceftriaxone 2 gm iv q24  -check repeat bcx  -DM control    Willie Swenson  Attending Physician   Division of Infectious Disease  Pager #[phone]  After 5pm/weekend or no response, call #[phone]
Advanced renal failure, bacteremia requiring prolonged antibiotics  1.  CKDIV--not interested in HD at this time or future.  Non oliguric and no HD required.    2.  Bacteremia--if PICC required would place in dominant arm in event changes mind regarding HD

## 2019-09-27 NOTE — CONSULT NOTE ADULT - PROBLEM SELECTOR RECOMMENDATION 9
Pt with hx of CKD since 2012, follows with Dr. Miller, CKD attributed from DM/HTN and CHF. On review of labs in [facility] has had scr at 2 mg/dl range in 2015, but since 2016 has been within 2.9-3.4. On admission 09/21/19 3.3 mg/dl now 3.4 mg/dl, scr at baseline.   Pt declined HD in the past. IR would place Guan catheter, if not would place PICC line on dominant arm, in case pt changes his mind and would go for HD in the future to leave non dominant arm for HD access.   However given cardiac hx pt not good candidate for long term HD.    No dose adjustment for ceftriaxone, however would not increase more than 2 g a day.     On digoxin given CKD monitor levels closely.   Avoid nephrotoxic meds, minimized contrast studies. Daily weight, fluid restriction and renal diet.

## 2019-09-28 LAB
ALBUMIN SERPL ELPH-MCNC: 3.5 G/DL — SIGNIFICANT CHANGE UP (ref 3.3–5)
ALP SERPL-CCNC: 112 U/L — SIGNIFICANT CHANGE UP (ref 40–120)
ALT FLD-CCNC: 34 U/L — SIGNIFICANT CHANGE UP (ref 10–45)
ANION GAP SERPL CALC-SCNC: 13 MMOL/L — SIGNIFICANT CHANGE UP (ref 5–17)
AST SERPL-CCNC: 36 U/L — SIGNIFICANT CHANGE UP (ref 10–40)
BILIRUB SERPL-MCNC: 0.1 MG/DL — LOW (ref 0.2–1.2)
BUN SERPL-MCNC: 101 MG/DL — HIGH (ref 7–23)
CALCIUM SERPL-MCNC: 9.3 MG/DL — SIGNIFICANT CHANGE UP (ref 8.4–10.5)
CHLORIDE SERPL-SCNC: 106 MMOL/L — SIGNIFICANT CHANGE UP (ref 96–108)
CO2 SERPL-SCNC: 17 MMOL/L — LOW (ref 22–31)
CREAT SERPL-MCNC: 3.21 MG/DL — HIGH (ref 0.5–1.3)
GLUCOSE BLDC GLUCOMTR-MCNC: 108 MG/DL — HIGH (ref 70–99)
GLUCOSE BLDC GLUCOMTR-MCNC: 128 MG/DL — HIGH (ref 70–99)
GLUCOSE BLDC GLUCOMTR-MCNC: 136 MG/DL — HIGH (ref 70–99)
GLUCOSE BLDC GLUCOMTR-MCNC: 207 MG/DL — HIGH (ref 70–99)
GLUCOSE SERPL-MCNC: 257 MG/DL — HIGH (ref 70–99)
HCT VFR BLD CALC: 36.8 % — LOW (ref 39–50)
HGB BLD-MCNC: 11.7 G/DL — LOW (ref 13–17)
INR BLD: 2.89 RATIO — HIGH (ref 0.88–1.16)
MCHC RBC-ENTMCNC: 29.1 PG — SIGNIFICANT CHANGE UP (ref 27–34)
MCHC RBC-ENTMCNC: 31.8 GM/DL — LOW (ref 32–36)
MCV RBC AUTO: 91.5 FL — SIGNIFICANT CHANGE UP (ref 80–100)
PLATELET # BLD AUTO: 155 K/UL — SIGNIFICANT CHANGE UP (ref 150–400)
POTASSIUM SERPL-MCNC: 4 MMOL/L — SIGNIFICANT CHANGE UP (ref 3.5–5.3)
POTASSIUM SERPL-SCNC: 4 MMOL/L — SIGNIFICANT CHANGE UP (ref 3.5–5.3)
PROT SERPL-MCNC: 7.3 G/DL — SIGNIFICANT CHANGE UP (ref 6–8.3)
PROTHROM AB SERPL-ACNC: 34 SEC — HIGH (ref 10–12.9)
RBC # BLD: 4.02 M/UL — LOW (ref 4.2–5.8)
RBC # FLD: 12.6 % — SIGNIFICANT CHANGE UP (ref 10.3–14.5)
SODIUM SERPL-SCNC: 136 MMOL/L — SIGNIFICANT CHANGE UP (ref 135–145)
WBC # BLD: 8.8 K/UL — SIGNIFICANT CHANGE UP (ref 3.8–10.5)
WBC # FLD AUTO: 8.8 K/UL — SIGNIFICANT CHANGE UP (ref 3.8–10.5)

## 2019-09-28 PROCEDURE — 99233 SBSQ HOSP IP/OBS HIGH 50: CPT | Mod: GC

## 2019-09-28 RX ORDER — WARFARIN SODIUM 2.5 MG/1
3 TABLET ORAL AT BEDTIME
Refills: 0 | Status: DISCONTINUED | OUTPATIENT
Start: 2019-09-28 | End: 2019-09-28

## 2019-09-28 RX ORDER — WARFARIN SODIUM 2.5 MG/1
3 TABLET ORAL ONCE
Refills: 0 | Status: COMPLETED | OUTPATIENT
Start: 2019-09-28 | End: 2019-09-28

## 2019-09-28 RX ADMIN — Medication 5 UNIT(S): at 17:57

## 2019-09-28 RX ADMIN — HEPARIN SODIUM 5000 UNIT(S): 5000 INJECTION INTRAVENOUS; SUBCUTANEOUS at 17:57

## 2019-09-28 RX ADMIN — DONEPEZIL HYDROCHLORIDE 10 MILLIGRAM(S): 10 TABLET, FILM COATED ORAL at 21:56

## 2019-09-28 RX ADMIN — INSULIN GLARGINE 15 UNIT(S): 100 INJECTION, SOLUTION SUBCUTANEOUS at 22:12

## 2019-09-28 RX ADMIN — Medication 75 MILLIGRAM(S): at 05:35

## 2019-09-28 RX ADMIN — Medication 5 UNIT(S): at 12:46

## 2019-09-28 RX ADMIN — Medication 60 MILLIGRAM(S): at 05:36

## 2019-09-28 RX ADMIN — ISOSORBIDE DINITRATE 10 MILLIGRAM(S): 5 TABLET ORAL at 16:32

## 2019-09-28 RX ADMIN — Medication 5 UNIT(S): at 08:57

## 2019-09-28 RX ADMIN — CEFTRIAXONE 100 MILLIGRAM(S): 500 INJECTION, POWDER, FOR SOLUTION INTRAMUSCULAR; INTRAVENOUS at 21:54

## 2019-09-28 RX ADMIN — Medication 81 MILLIGRAM(S): at 08:59

## 2019-09-28 RX ADMIN — ISOSORBIDE DINITRATE 10 MILLIGRAM(S): 5 TABLET ORAL at 05:36

## 2019-09-28 RX ADMIN — ATORVASTATIN CALCIUM 40 MILLIGRAM(S): 80 TABLET, FILM COATED ORAL at 21:56

## 2019-09-28 RX ADMIN — MEMANTINE HYDROCHLORIDE 5 MILLIGRAM(S): 10 TABLET ORAL at 21:56

## 2019-09-28 RX ADMIN — HEPARIN SODIUM 5000 UNIT(S): 5000 INJECTION INTRAVENOUS; SUBCUTANEOUS at 05:38

## 2019-09-28 RX ADMIN — HEPARIN SODIUM 5000 UNIT(S): 5000 INJECTION INTRAVENOUS; SUBCUTANEOUS at 21:58

## 2019-09-28 RX ADMIN — Medication 10 MILLIGRAM(S): at 08:57

## 2019-09-28 RX ADMIN — TAMSULOSIN HYDROCHLORIDE 0.4 MILLIGRAM(S): 0.4 CAPSULE ORAL at 21:56

## 2019-09-28 RX ADMIN — ISOSORBIDE DINITRATE 10 MILLIGRAM(S): 5 TABLET ORAL at 21:56

## 2019-09-28 RX ADMIN — WARFARIN SODIUM 3 MILLIGRAM(S): 2.5 TABLET ORAL at 21:56

## 2019-09-28 RX ADMIN — Medication 10 MILLIGRAM(S): at 21:57

## 2019-09-28 RX ADMIN — Medication 2: at 08:57

## 2019-09-28 RX ADMIN — MEMANTINE HYDROCHLORIDE 5 MILLIGRAM(S): 10 TABLET ORAL at 08:59

## 2019-09-28 NOTE — PROGRESS NOTE ADULT - PROBLEM SELECTOR PLAN 1
Pt p/w fever, tachypnea, and leukocytosis w/ evidence of UTI. S/p Vanc/CTX in the ED. Pt additionally presented w/ back pain concerning for spinal abscess, however CT imaging showed no evidence of spinal abscess.   - blood cx GBS +, source likely R leg cellulitis  - ID following, cont ceftriaxone 2g IVSS daily  -TTE showed MV echodensity (vegetation vs torn chord), couldn't r/o vegetation  -MARY refused/deferred due to risk with anesthesia  -sensitivities back: resistant to clinda, sensitive to levo, vanco, ceftriaxone, pcn  -repeat bood cx neg 9/25  -will get thomson line placed by IR on 9/30, will need 6 week course of abs from 9/25, per ID  -will need f/u echo, blood cx at conclusion of treatment along with weekly cbc, cmp

## 2019-09-28 NOTE — PROGRESS NOTE ADULT - PROBLEM SELECTOR PLAN 2
Pt INR found to be elevated in setting of sepsis.   - INR 2.37  - warfarin 6 mg  - INR goal of 2 or less for thomson placement by IR on Monday, will need to hold warfarin sunday night Pt INR found to be elevated in setting of sepsis.   - INR 2.89  - warfarin 3mg  - INR goal of 2 or less for thomson placement by IR on Monday, will need to hold warfarin sunday night Pt INR found to be elevated in setting of sepsis.   - INR 2.89  - warfarin 3mg x 1 dose   - INR goal of 2 or less for thomson placement by IR on Monday, will need to hold warfarin sunday night  - obtain daily INR's and dose daily while in hospital

## 2019-09-28 NOTE — PROGRESS NOTE ADULT - SUBJECTIVE AND OBJECTIVE BOX
Regina Guerrero MD  PGY1 | Dept of Internal Medicine  [facility] [phone]        Patient is a 88y old  Male who presents with a chief complaint of fever and lethargy (27 Sep 2019 12:25)      SUBJECTIVE / OVERNIGHT EVENTS:  No acute events overnight.  Pt appears to think that his medical staff is refusing to perform a MARY however I explained that the notes indicate that his wife and him refused and encouraged him to follow up w/ his wife.  Denies CP, SOB, abdominal pain, N/V/D/C, dysuria, hematuria.         Review of Systems:  General: fatigue [ ], fever/chills [ ], weakness [ ], weight loss [ ]  HEENT: headache [ ], hearing changes [ ], vision changes [ ], hoarseness [ ], sore throat [ ], nasal discharge [ ]  Cardiovascular: chest pain [ ], palpitations [ ], dyspnea on exertion [ ], orthopnea [ ], PND [ ]  Respiratory: SOB [ ], cough [ ], wheeze [ ], exercise intolerance [ ]  Gastrointestinal: abdominal pain [ ], unintentional weight loss [ ], heartburn [ ], nausea [ ], vomiting [ ], hematochezia [ ], melena [ ]  Genitourinary: dysuria [ ], frequency [ ], urgency [ ], hematuria [ ], incontinence [ ]  Skin: lesions [ ], rashes [ ]  Neuro: headache [ ], changes in senses [ ], speech problems [ ], balance problems [ ], numbness/tingling [ ], weakness [ ]          Vital Signs Last 24 Hrs  T(C): 37 (28 Sep 2019 04:10), Max: 37 (28 Sep 2019 04:10)  T(F): 98.6 (28 Sep 2019 04:10), Max: 98.6 (28 Sep 2019 04:10)  HR: 69 (28 Sep 2019 09:02) (69 - 82)  BP: 139/75 (28 Sep 2019 09:02) (119/64 - 143/71)  BP(mean): --  RR: 18 (28 Sep 2019 04:10) (18 - 18)  SpO2: 97% (28 Sep 2019 04:10) (95% - 97%)    I&O's Summary    27 Sep 2019 07:01  -  28 Sep 2019 07:00  --------------------------------------------------------  IN: 750 mL / OUT: 0 mL / NET: 750 mL        PHYSICAL EXAM:  GENERAL: NAD, well-developed  HEAD:  Atraumatic, Normocephalic  EYES: EOMI, PERRLA, conjunctiva and sclera clear  NECK: Supple, No JVD  CHEST/LUNG: Clear to auscultation bilaterally; No wheeze  HEART: Regular rate and rhythm; No murmurs, rubs, or gallops  ABDOMEN: Soft, Nontender, Nondistended; Bowel sounds present  EXTREMITIES:  2+ Peripheral Pulses, No clubbing, cyanosis, or edema  PSYCH: slightly confused  NEUROLOGY: non-focal, AAO x 3  SKIN: RLE cellulitis improved; no erythema, drainage       MEDICATIONS  (STANDING):  aspirin enteric coated 81 milliGRAM(s) Oral daily  atorvastatin 40 milliGRAM(s) Oral at bedtime  cefTRIAXone   IVPB 2000 milliGRAM(s) IV Intermittent every 24 hours  dextrose 5%. 1000 milliLiter(s) (50 mL/Hr) IV Continuous <Continuous>  dextrose 50% Injectable 12.5 Gram(s) IV Push once  dextrose 50% Injectable 25 Gram(s) IV Push once  dextrose 50% Injectable 25 Gram(s) IV Push once  digoxin     Tablet 0.125 milliGRAM(s) Oral <User Schedule>  donepezil 10 milliGRAM(s) Oral at bedtime  heparin  Injectable 5000 Unit(s) SubCutaneous every 8 hours  hydrALAZINE 10 milliGRAM(s) Oral two times a day  insulin glargine Injectable (LANTUS) 15 Unit(s) SubCutaneous at bedtime  insulin lispro (HumaLOG) corrective regimen sliding scale   SubCutaneous three times a day before meals  insulin lispro (HumaLOG) corrective regimen sliding scale   SubCutaneous at bedtime  insulin lispro Injectable (HumaLOG) 5 Unit(s) SubCutaneous three times a day before meals  isosorbide   dinitrate Tablet (ISORDIL) 10 milliGRAM(s) Oral three times a day  memantine 5 milliGRAM(s) Oral two times a day  metoprolol succinate ER 75 milliGRAM(s) Oral daily  tamsulosin 0.4 milliGRAM(s) Oral at bedtime  torsemide 60 milliGRAM(s) Oral daily  warfarin 6 milliGRAM(s) Oral at bedtime    MEDICATIONS  (PRN):  dextrose 40% Gel 15 Gram(s) Oral once PRN Blood Glucose LESS THAN 70 milliGRAM(s)/deciliter  glucagon  Injectable 1 milliGRAM(s) IntraMuscular once PRN Glucose LESS THAN 70 milligrams/deciliter      LABS:  CAPILLARY BLOOD GLUCOSE      POCT Blood Glucose.: 207 mg/dL (28 Sep 2019 08:39)  POCT Blood Glucose.: 179 mg/dL (27 Sep 2019 22:32)  POCT Blood Glucose.: 159 mg/dL (27 Sep 2019 17:25)  POCT Blood Glucose.: 187 mg/dL (27 Sep 2019 12:37)                          11.7   8.8   )-----------( 155      ( 28 Sep 2019 06:50 )             36.8     Auto Eosinophil # x     / Auto Eosinophil % x     / Auto Neutrophil # x     / Auto Neutrophil % x     / BANDS % x                            11.6   8.8   )-----------( 141      ( 27 Sep 2019 06:52 )             37.4     Auto Eosinophil # x     / Auto Eosinophil % x     / Auto Neutrophil # x     / Auto Neutrophil % x     / BANDS % x        Hgb Trend: 11.7<--, 11.6<--, 11.6<--, 11.5<--, 11.1<--  09-28    136  |  106  |  101<H>  ----------------------------<  257<H>  4.0   |  17<L>  |  3.21<H>  09-27    135  |  104  |  104<H>  ----------------------------<  191<H>  4.1   |  17<L>  |  3.41<H>    Ca    9.3      28 Sep 2019 06:50  TPro  7.3  /  Alb  3.5  /  TBili  0.1<L>  /  DBili  x   /  AST  36  /  ALT  34  /  AlkPhos  112  09-28    Creatinine Trend: 3.21<--, 3.41<--, 3.13<--, 3.09<--, 3.13<--, 3.36<--  PT/INR - ( 28 Sep 2019 06:50 )   PT: 34.0 sec;   INR: 2.89 ratio                       ABG:   VBG:     MICROBIOLOGY:     Culture - Blood (collected 25 Sep 2019 15:45)  Source: .Blood  Preliminary Report (26 Sep 2019 16:01):    No growth to date.    Culture - Blood (collected 25 Sep 2019 15:45)  Source: .Blood  Preliminary Report (26 Sep 2019 16:01):    No growth to date.

## 2019-09-29 LAB
ALBUMIN SERPL ELPH-MCNC: 3.8 G/DL — SIGNIFICANT CHANGE UP (ref 3.3–5)
ALP SERPL-CCNC: 111 U/L — SIGNIFICANT CHANGE UP (ref 40–120)
ALT FLD-CCNC: 34 U/L — SIGNIFICANT CHANGE UP (ref 10–45)
ANION GAP SERPL CALC-SCNC: 14 MMOL/L — SIGNIFICANT CHANGE UP (ref 5–17)
APTT BLD: 40.9 SEC — HIGH (ref 27.5–36.3)
AST SERPL-CCNC: 24 U/L — SIGNIFICANT CHANGE UP (ref 10–40)
BILIRUB SERPL-MCNC: 0.2 MG/DL — SIGNIFICANT CHANGE UP (ref 0.2–1.2)
BUN SERPL-MCNC: 98 MG/DL — HIGH (ref 7–23)
CALCIUM SERPL-MCNC: 9.3 MG/DL — SIGNIFICANT CHANGE UP (ref 8.4–10.5)
CHLORIDE SERPL-SCNC: 102 MMOL/L — SIGNIFICANT CHANGE UP (ref 96–108)
CO2 SERPL-SCNC: 18 MMOL/L — LOW (ref 22–31)
CREAT SERPL-MCNC: 3.19 MG/DL — HIGH (ref 0.5–1.3)
GLUCOSE BLDC GLUCOMTR-MCNC: 134 MG/DL — HIGH (ref 70–99)
GLUCOSE BLDC GLUCOMTR-MCNC: 205 MG/DL — HIGH (ref 70–99)
GLUCOSE BLDC GLUCOMTR-MCNC: 218 MG/DL — HIGH (ref 70–99)
GLUCOSE BLDC GLUCOMTR-MCNC: 236 MG/DL — HIGH (ref 70–99)
GLUCOSE SERPL-MCNC: 146 MG/DL — HIGH (ref 70–99)
HCT VFR BLD CALC: 35.8 % — LOW (ref 39–50)
HGB BLD-MCNC: 11.8 G/DL — LOW (ref 13–17)
INR BLD: 3.51 RATIO — HIGH (ref 0.88–1.16)
MAGNESIUM SERPL-MCNC: 2.3 MG/DL — SIGNIFICANT CHANGE UP (ref 1.6–2.6)
MCHC RBC-ENTMCNC: 30 PG — SIGNIFICANT CHANGE UP (ref 27–34)
MCHC RBC-ENTMCNC: 33 GM/DL — SIGNIFICANT CHANGE UP (ref 32–36)
MCV RBC AUTO: 91 FL — SIGNIFICANT CHANGE UP (ref 80–100)
PHOSPHATE SERPL-MCNC: 4.2 MG/DL — SIGNIFICANT CHANGE UP (ref 2.5–4.5)
PLATELET # BLD AUTO: 147 K/UL — LOW (ref 150–400)
POTASSIUM SERPL-MCNC: 3.8 MMOL/L — SIGNIFICANT CHANGE UP (ref 3.5–5.3)
POTASSIUM SERPL-SCNC: 3.8 MMOL/L — SIGNIFICANT CHANGE UP (ref 3.5–5.3)
PROT SERPL-MCNC: 7.1 G/DL — SIGNIFICANT CHANGE UP (ref 6–8.3)
PROTHROM AB SERPL-ACNC: 41.6 SEC — HIGH (ref 10–12.9)
RBC # BLD: 3.94 M/UL — LOW (ref 4.2–5.8)
RBC # FLD: 12.9 % — SIGNIFICANT CHANGE UP (ref 10.3–14.5)
SODIUM SERPL-SCNC: 134 MMOL/L — LOW (ref 135–145)
WBC # BLD: 7.5 K/UL — SIGNIFICANT CHANGE UP (ref 3.8–10.5)
WBC # FLD AUTO: 7.5 K/UL — SIGNIFICANT CHANGE UP (ref 3.8–10.5)

## 2019-09-29 PROCEDURE — 99233 SBSQ HOSP IP/OBS HIGH 50: CPT | Mod: GC

## 2019-09-29 RX ORDER — HEPARIN SODIUM 5000 [USP'U]/ML
5000 INJECTION INTRAVENOUS; SUBCUTANEOUS EVERY 8 HOURS
Refills: 0 | Status: DISCONTINUED | OUTPATIENT
Start: 2019-09-29 | End: 2019-10-03

## 2019-09-29 RX ORDER — ACETAMINOPHEN 500 MG
650 TABLET ORAL EVERY 6 HOURS
Refills: 0 | Status: DISCONTINUED | OUTPATIENT
Start: 2019-09-29 | End: 2019-10-03

## 2019-09-29 RX ORDER — CEFTRIAXONE 500 MG/1
2000 INJECTION, POWDER, FOR SOLUTION INTRAMUSCULAR; INTRAVENOUS EVERY 24 HOURS
Refills: 0 | Status: DISCONTINUED | OUTPATIENT
Start: 2019-09-29 | End: 2019-10-03

## 2019-09-29 RX ADMIN — Medication 2: at 13:16

## 2019-09-29 RX ADMIN — INSULIN GLARGINE 15 UNIT(S): 100 INJECTION, SOLUTION SUBCUTANEOUS at 21:48

## 2019-09-29 RX ADMIN — HEPARIN SODIUM 5000 UNIT(S): 5000 INJECTION INTRAVENOUS; SUBCUTANEOUS at 05:32

## 2019-09-29 RX ADMIN — Medication 60 MILLIGRAM(S): at 05:30

## 2019-09-29 RX ADMIN — HEPARIN SODIUM 5000 UNIT(S): 5000 INJECTION INTRAVENOUS; SUBCUTANEOUS at 21:21

## 2019-09-29 RX ADMIN — TAMSULOSIN HYDROCHLORIDE 0.4 MILLIGRAM(S): 0.4 CAPSULE ORAL at 21:20

## 2019-09-29 RX ADMIN — CEFTRIAXONE 100 MILLIGRAM(S): 500 INJECTION, POWDER, FOR SOLUTION INTRAMUSCULAR; INTRAVENOUS at 21:21

## 2019-09-29 RX ADMIN — MEMANTINE HYDROCHLORIDE 5 MILLIGRAM(S): 10 TABLET ORAL at 08:49

## 2019-09-29 RX ADMIN — MEMANTINE HYDROCHLORIDE 5 MILLIGRAM(S): 10 TABLET ORAL at 21:20

## 2019-09-29 RX ADMIN — Medication 650 MILLIGRAM(S): at 09:44

## 2019-09-29 RX ADMIN — ISOSORBIDE DINITRATE 10 MILLIGRAM(S): 5 TABLET ORAL at 13:18

## 2019-09-29 RX ADMIN — Medication 10 MILLIGRAM(S): at 08:49

## 2019-09-29 RX ADMIN — ATORVASTATIN CALCIUM 40 MILLIGRAM(S): 80 TABLET, FILM COATED ORAL at 21:20

## 2019-09-29 RX ADMIN — Medication 5 UNIT(S): at 08:49

## 2019-09-29 RX ADMIN — Medication 10 MILLIGRAM(S): at 21:20

## 2019-09-29 RX ADMIN — Medication 5 UNIT(S): at 13:16

## 2019-09-29 RX ADMIN — ISOSORBIDE DINITRATE 10 MILLIGRAM(S): 5 TABLET ORAL at 21:20

## 2019-09-29 RX ADMIN — Medication 2: at 17:17

## 2019-09-29 RX ADMIN — DONEPEZIL HYDROCHLORIDE 10 MILLIGRAM(S): 10 TABLET, FILM COATED ORAL at 21:20

## 2019-09-29 RX ADMIN — Medication 75 MILLIGRAM(S): at 05:30

## 2019-09-29 RX ADMIN — Medication 5 UNIT(S): at 17:18

## 2019-09-29 RX ADMIN — Medication 81 MILLIGRAM(S): at 08:49

## 2019-09-29 RX ADMIN — Medication 650 MILLIGRAM(S): at 07:59

## 2019-09-29 RX ADMIN — HEPARIN SODIUM 5000 UNIT(S): 5000 INJECTION INTRAVENOUS; SUBCUTANEOUS at 13:18

## 2019-09-29 RX ADMIN — ISOSORBIDE DINITRATE 10 MILLIGRAM(S): 5 TABLET ORAL at 05:29

## 2019-09-29 NOTE — PROGRESS NOTE ADULT - ASSESSMENT
Pt is a 89 yo M w/ BPH, dementia, HTN, CKD IV, recurrent UTIs, MVR 2/2 endocarditis, HFrEF (TTE on 7/15/19 w/ EF: 25% severe global LV dysfxn), Afib on coumadin, CAD s/p CABG, HLD, IDT2DM p/w fever and lethargy x1 day a/w sepsis and strep bacteremia secondary to RLE cellulitis Pt is a 87 yo M w/ BPH, dementia, HTN, CKD IV, recurrent UTIs, MVR 2/2 endocarditis, HFrEF (TTE on 7/15/19 w/ EF: 25% severe global LV dysfxn), Afib on coumadin, CAD s/p CABG, HLD, IDT2DM p/w fever and lethargy x1 day a/w sepsis and strep bacteremia secondary to RLE cellulitis. To get Galvan placed by IR on 9/30.

## 2019-09-29 NOTE — PROGRESS NOTE ADULT - PROBLEM SELECTOR PLAN 1
Pt p/w fever, tachypnea, and leukocytosis w/ evidence of UTI. S/p Vanc/CTX in the ED. Pt additionally presented w/ back pain concerning for spinal abscess, however CT imaging showed no evidence of spinal abscess.   - blood cx GBS +, source likely R leg cellulitis  - ID following, cont ceftriaxone 2g IVSS daily  -TTE showed MV echodensity (vegetation vs torn chord), couldn't r/o vegetation  -MARY refused/deferred due to risk with anesthesia  -sensitivities back: resistant to clinda, sensitive to levo, vanco, ceftriaxone, pcn  -repeat blood cx neg 9/25  -will get thomson line placed by IR on 9/30, will need 6 week course of abx from 9/25, per ID  -will need f/u echo, blood cx at conclusion of treatment along with weekly cbc, cmp

## 2019-09-29 NOTE — PROGRESS NOTE ADULT - SUBJECTIVE AND OBJECTIVE BOX
Regina Guerrero MD  PGY1 | Dept of Internal Medicine  [facility] [phone]        Patient is a 88y old  Male who presents with a chief complaint of fever and lethargy (27 Sep 2019 12:25)      SUBJECTIVE / OVERNIGHT EVENTS:  No acute events overnight.  Pt appears to think that his medical staff is refusing to perform a MARY however I explained that the notes indicate that his wife and him refused and encouraged him to follow up w/ his wife.  Denies CP, SOB, abdominal pain, N/V/D/C, dysuria, hematuria.         Review of Systems:  General: fatigue [ ], fever/chills [ ], weakness [ ], weight loss [ ]  HEENT: headache [ ], hearing changes [ ], vision changes [ ], hoarseness [ ], sore throat [ ], nasal discharge [ ]  Cardiovascular: chest pain [ ], palpitations [ ], dyspnea on exertion [ ], orthopnea [ ], PND [ ]  Respiratory: SOB [ ], cough [ ], wheeze [ ], exercise intolerance [ ]  Gastrointestinal: abdominal pain [ ], unintentional weight loss [ ], heartburn [ ], nausea [ ], vomiting [ ], hematochezia [ ], melena [ ]  Genitourinary: dysuria [ ], frequency [ ], urgency [ ], hematuria [ ], incontinence [ ]  Skin: lesions [ ], rashes [ ]  Neuro: headache [ ], changes in senses [ ], speech problems [ ], balance problems [ ], numbness/tingling [ ], weakness [ ]          Vital Signs Last 24 Hrs  T(C): 37 (28 Sep 2019 04:10), Max: 37 (28 Sep 2019 04:10)  T(F): 98.6 (28 Sep 2019 04:10), Max: 98.6 (28 Sep 2019 04:10)  HR: 69 (28 Sep 2019 09:02) (69 - 82)  BP: 139/75 (28 Sep 2019 09:02) (119/64 - 143/71)  BP(mean): --  RR: 18 (28 Sep 2019 04:10) (18 - 18)  SpO2: 97% (28 Sep 2019 04:10) (95% - 97%)    I&O's Summary    27 Sep 2019 07:01  -  28 Sep 2019 07:00  --------------------------------------------------------  IN: 750 mL / OUT: 0 mL / NET: 750 mL        PHYSICAL EXAM:  GENERAL: NAD, well-developed  HEAD:  Atraumatic, Normocephalic  EYES: EOMI, PERRLA, conjunctiva and sclera clear  NECK: Supple, No JVD  CHEST/LUNG: Clear to auscultation bilaterally; No wheeze  HEART: Regular rate and rhythm; No murmurs, rubs, or gallops  ABDOMEN: Soft, Nontender, Nondistended; Bowel sounds present  EXTREMITIES:  2+ Peripheral Pulses, No clubbing, cyanosis, or edema  PSYCH: slightly confused  NEUROLOGY: non-focal, AAO x 3  SKIN: RLE cellulitis improved; no erythema, drainage       MEDICATIONS  (STANDING):  aspirin enteric coated 81 milliGRAM(s) Oral daily  atorvastatin 40 milliGRAM(s) Oral at bedtime  cefTRIAXone   IVPB 2000 milliGRAM(s) IV Intermittent every 24 hours  dextrose 5%. 1000 milliLiter(s) (50 mL/Hr) IV Continuous <Continuous>  dextrose 50% Injectable 12.5 Gram(s) IV Push once  dextrose 50% Injectable 25 Gram(s) IV Push once  dextrose 50% Injectable 25 Gram(s) IV Push once  digoxin     Tablet 0.125 milliGRAM(s) Oral <User Schedule>  donepezil 10 milliGRAM(s) Oral at bedtime  heparin  Injectable 5000 Unit(s) SubCutaneous every 8 hours  hydrALAZINE 10 milliGRAM(s) Oral two times a day  insulin glargine Injectable (LANTUS) 15 Unit(s) SubCutaneous at bedtime  insulin lispro (HumaLOG) corrective regimen sliding scale   SubCutaneous three times a day before meals  insulin lispro (HumaLOG) corrective regimen sliding scale   SubCutaneous at bedtime  insulin lispro Injectable (HumaLOG) 5 Unit(s) SubCutaneous three times a day before meals  isosorbide   dinitrate Tablet (ISORDIL) 10 milliGRAM(s) Oral three times a day  memantine 5 milliGRAM(s) Oral two times a day  metoprolol succinate ER 75 milliGRAM(s) Oral daily  tamsulosin 0.4 milliGRAM(s) Oral at bedtime  torsemide 60 milliGRAM(s) Oral daily  warfarin 6 milliGRAM(s) Oral at bedtime    MEDICATIONS  (PRN):  dextrose 40% Gel 15 Gram(s) Oral once PRN Blood Glucose LESS THAN 70 milliGRAM(s)/deciliter  glucagon  Injectable 1 milliGRAM(s) IntraMuscular once PRN Glucose LESS THAN 70 milligrams/deciliter      LABS:  CAPILLARY BLOOD GLUCOSE      POCT Blood Glucose.: 207 mg/dL (28 Sep 2019 08:39)  POCT Blood Glucose.: 179 mg/dL (27 Sep 2019 22:32)  POCT Blood Glucose.: 159 mg/dL (27 Sep 2019 17:25)  POCT Blood Glucose.: 187 mg/dL (27 Sep 2019 12:37)                          11.7   8.8   )-----------( 155      ( 28 Sep 2019 06:50 )             36.8     Auto Eosinophil # x     / Auto Eosinophil % x     / Auto Neutrophil # x     / Auto Neutrophil % x     / BANDS % x                            11.6   8.8   )-----------( 141      ( 27 Sep 2019 06:52 )             37.4     Auto Eosinophil # x     / Auto Eosinophil % x     / Auto Neutrophil # x     / Auto Neutrophil % x     / BANDS % x        Hgb Trend: 11.7<--, 11.6<--, 11.6<--, 11.5<--, 11.1<--  09-28    136  |  106  |  101<H>  ----------------------------<  257<H>  4.0   |  17<L>  |  3.21<H>  09-27    135  |  104  |  104<H>  ----------------------------<  191<H>  4.1   |  17<L>  |  3.41<H>    Ca    9.3      28 Sep 2019 06:50  TPro  7.3  /  Alb  3.5  /  TBili  0.1<L>  /  DBili  x   /  AST  36  /  ALT  34  /  AlkPhos  112  09-28    Creatinine Trend: 3.21<--, 3.41<--, 3.13<--, 3.09<--, 3.13<--, 3.36<--  PT/INR - ( 28 Sep 2019 06:50 )   PT: 34.0 sec;   INR: 2.89 ratio                       ABG:   VBG:     MICROBIOLOGY:     Culture - Blood (collected 25 Sep 2019 15:45)  Source: .Blood  Preliminary Report (26 Sep 2019 16:01):    No growth to date.    Culture - Blood (collected 25 Sep 2019 15:45)  Source: .Blood  Preliminary Report (26 Sep 2019 16:01):    No growth to date. Regina Guerrero MD  PGY1 | Dept of Internal Medicine  [facility] [phone]        Patient is a 88y old  Male who presents with a chief complaint of fever and lethargy (27 Sep 2019 12:25)      SUBJECTIVE / OVERNIGHT EVENTS:  No acute events overnight. Pt c/o back pain. Denies CP, SOB, abdominal pain, N/V/D/C, dysuria, hematuria.         REVIEW OF SYSTEMS:    CONSTITUTIONAL: No weakness, fevers or chills  EYES/ENT: No visual changes;  No vertigo or throat pain   NECK: No pain or stiffness  RESPIRATORY: No cough, wheezing, hemoptysis; No shortness of breath  CARDIOVASCULAR: No chest pain or palpitations  GASTROINTESTINAL: No abdominal or epigastric pain. No nausea, vomiting, or hematemesis; No diarrhea or constipation. No melena or hematochezia.  GENITOURINARY: No dysuria, frequency or hematuria  NEUROLOGICAL: No numbness or weakness  MSK: + back pain  All other review of systems is negative unless indicated above.          Vital Signs Last 24 Hrs  T(C): 37 (28 Sep 2019 04:10), Max: 37 (28 Sep 2019 04:10)  T(F): 98.6 (28 Sep 2019 04:10), Max: 98.6 (28 Sep 2019 04:10)  HR: 69 (28 Sep 2019 09:02) (69 - 82)  BP: 139/75 (28 Sep 2019 09:02) (119/64 - 143/71)  BP(mean): --  RR: 18 (28 Sep 2019 04:10) (18 - 18)  SpO2: 97% (28 Sep 2019 04:10) (95% - 97%)    I&O's Summary    27 Sep 2019 07:01  -  28 Sep 2019 07:00  --------------------------------------------------------  IN: 750 mL / OUT: 0 mL / NET: 750 mL        PHYSICAL EXAM:  GENERAL: NAD, well-developed  HEAD:  Atraumatic, Normocephalic  EYES: EOMI, PERRLA, conjunctiva and sclera clear  NECK: Supple, No JVD  CHEST/LUNG: Clear to auscultation bilaterally; No wheeze  HEART: Regular rate and rhythm; No murmurs, rubs, or gallops  ABDOMEN: Soft, Nontender, Nondistended; Bowel sounds present  EXTREMITIES:  2+ Peripheral Pulses, No clubbing, cyanosis, or edema  PSYCH: slightly confused  NEUROLOGY: non-focal, AAO x 3  SKIN: RLE cellulitis improved; no erythema, drainage       MEDICATIONS  (STANDING):  aspirin enteric coated 81 milliGRAM(s) Oral daily  atorvastatin 40 milliGRAM(s) Oral at bedtime  cefTRIAXone   IVPB 2000 milliGRAM(s) IV Intermittent every 24 hours  dextrose 5%. 1000 milliLiter(s) (50 mL/Hr) IV Continuous <Continuous>  dextrose 50% Injectable 12.5 Gram(s) IV Push once  dextrose 50% Injectable 25 Gram(s) IV Push once  dextrose 50% Injectable 25 Gram(s) IV Push once  digoxin     Tablet 0.125 milliGRAM(s) Oral <User Schedule>  donepezil 10 milliGRAM(s) Oral at bedtime  heparin  Injectable 5000 Unit(s) SubCutaneous every 8 hours  hydrALAZINE 10 milliGRAM(s) Oral two times a day  insulin glargine Injectable (LANTUS) 15 Unit(s) SubCutaneous at bedtime  insulin lispro (HumaLOG) corrective regimen sliding scale   SubCutaneous three times a day before meals  insulin lispro (HumaLOG) corrective regimen sliding scale   SubCutaneous at bedtime  insulin lispro Injectable (HumaLOG) 5 Unit(s) SubCutaneous three times a day before meals  isosorbide   dinitrate Tablet (ISORDIL) 10 milliGRAM(s) Oral three times a day  memantine 5 milliGRAM(s) Oral two times a day  metoprolol succinate ER 75 milliGRAM(s) Oral daily  tamsulosin 0.4 milliGRAM(s) Oral at bedtime  torsemide 60 milliGRAM(s) Oral daily  warfarin 6 milliGRAM(s) Oral at bedtime    MEDICATIONS  (PRN):  dextrose 40% Gel 15 Gram(s) Oral once PRN Blood Glucose LESS THAN 70 milliGRAM(s)/deciliter  glucagon  Injectable 1 milliGRAM(s) IntraMuscular once PRN Glucose LESS THAN 70 milligrams/deciliter      LABS:  CAPILLARY BLOOD GLUCOSE      POCT Blood Glucose.: 207 mg/dL (28 Sep 2019 08:39)  POCT Blood Glucose.: 179 mg/dL (27 Sep 2019 22:32)  POCT Blood Glucose.: 159 mg/dL (27 Sep 2019 17:25)  POCT Blood Glucose.: 187 mg/dL (27 Sep 2019 12:37)                          11.7   8.8   )-----------( 155      ( 28 Sep 2019 06:50 )             36.8     Auto Eosinophil # x     / Auto Eosinophil % x     / Auto Neutrophil # x     / Auto Neutrophil % x     / BANDS % x                            11.6   8.8   )-----------( 141      ( 27 Sep 2019 06:52 )             37.4     Auto Eosinophil # x     / Auto Eosinophil % x     / Auto Neutrophil # x     / Auto Neutrophil % x     / BANDS % x        Hgb Trend: 11.7<--, 11.6<--, 11.6<--, 11.5<--, 11.1<--  09-28    136  |  106  |  101<H>  ----------------------------<  257<H>  4.0   |  17<L>  |  3.21<H>  09-27    135  |  104  |  104<H>  ----------------------------<  191<H>  4.1   |  17<L>  |  3.41<H>    Ca    9.3      28 Sep 2019 06:50  TPro  7.3  /  Alb  3.5  /  TBili  0.1<L>  /  DBili  x   /  AST  36  /  ALT  34  /  AlkPhos  112  09-28    Creatinine Trend: 3.21<--, 3.41<--, 3.13<--, 3.09<--, 3.13<--, 3.36<--  PT/INR - ( 28 Sep 2019 06:50 )   PT: 34.0 sec;   INR: 2.89 ratio                       ABG:   VBG:     MICROBIOLOGY:     Culture - Blood (collected 25 Sep 2019 15:45)  Source: .Blood  Preliminary Report (26 Sep 2019 16:01):    No growth to date.    Culture - Blood (collected 25 Sep 2019 15:45)  Source: .Blood  Preliminary Report (26 Sep 2019 16:01):    No growth to date. Patient is a 88y old  Male who presents with a chief complaint of fever and lethargy (27 Sep 2019 12:25)      SUBJECTIVE / OVERNIGHT EVENTS:  No acute events overnight. Pt c/o back pain. Denies CP, SOB, abdominal pain, N/V/D/C, dysuria, hematuria.         REVIEW OF SYSTEMS:    CONSTITUTIONAL: No weakness, fevers or chills  EYES/ENT: No visual changes;  No vertigo or throat pain   NECK: No pain or stiffness  RESPIRATORY: No cough, wheezing, hemoptysis; No shortness of breath  CARDIOVASCULAR: No chest pain or palpitations  GASTROINTESTINAL: No abdominal or epigastric pain. No nausea, vomiting, or hematemesis; No diarrhea or constipation. No melena or hematochezia.  GENITOURINARY: No dysuria, frequency or hematuria  NEUROLOGICAL: No numbness or weakness  MSK: + back pain  All other review of systems is negative unless indicated above.          Vital Signs Last 24 Hrs  T(C): 37 (28 Sep 2019 04:10), Max: 37 (28 Sep 2019 04:10)  T(F): 98.6 (28 Sep 2019 04:10), Max: 98.6 (28 Sep 2019 04:10)  HR: 69 (28 Sep 2019 09:02) (69 - 82)  BP: 139/75 (28 Sep 2019 09:02) (119/64 - 143/71)  BP(mean): --  RR: 18 (28 Sep 2019 04:10) (18 - 18)  SpO2: 97% (28 Sep 2019 04:10) (95% - 97%)    I&O's Summary    27 Sep 2019 07:01  -  28 Sep 2019 07:00  --------------------------------------------------------  IN: 750 mL / OUT: 0 mL / NET: 750 mL        PHYSICAL EXAM:  GENERAL: NAD, well-developed  HEAD:  Atraumatic, Normocephalic  EYES: EOMI, PERRLA, conjunctiva and sclera clear  NECK: Supple, No JVD  CHEST/LUNG: Clear to auscultation bilaterally; No wheeze  HEART: Regular rate and rhythm; No murmurs, rubs, or gallops  ABDOMEN: Soft, Nontender, Nondistended; Bowel sounds present  EXTREMITIES:  2+ Peripheral Pulses, No clubbing, cyanosis, or edema  PSYCH: slightly confused  NEUROLOGY: non-focal, AAO x 3  SKIN: RLE cellulitis improved; no erythema, drainage       MEDICATIONS  (STANDING):  aspirin enteric coated 81 milliGRAM(s) Oral daily  atorvastatin 40 milliGRAM(s) Oral at bedtime  cefTRIAXone   IVPB 2000 milliGRAM(s) IV Intermittent every 24 hours  dextrose 5%. 1000 milliLiter(s) (50 mL/Hr) IV Continuous <Continuous>  dextrose 50% Injectable 12.5 Gram(s) IV Push once  dextrose 50% Injectable 25 Gram(s) IV Push once  dextrose 50% Injectable 25 Gram(s) IV Push once  digoxin     Tablet 0.125 milliGRAM(s) Oral <User Schedule>  donepezil 10 milliGRAM(s) Oral at bedtime  heparin  Injectable 5000 Unit(s) SubCutaneous every 8 hours  hydrALAZINE 10 milliGRAM(s) Oral two times a day  insulin glargine Injectable (LANTUS) 15 Unit(s) SubCutaneous at bedtime  insulin lispro (HumaLOG) corrective regimen sliding scale   SubCutaneous three times a day before meals  insulin lispro (HumaLOG) corrective regimen sliding scale   SubCutaneous at bedtime  insulin lispro Injectable (HumaLOG) 5 Unit(s) SubCutaneous three times a day before meals  isosorbide   dinitrate Tablet (ISORDIL) 10 milliGRAM(s) Oral three times a day  memantine 5 milliGRAM(s) Oral two times a day  metoprolol succinate ER 75 milliGRAM(s) Oral daily  tamsulosin 0.4 milliGRAM(s) Oral at bedtime  torsemide 60 milliGRAM(s) Oral daily  warfarin 6 milliGRAM(s) Oral at bedtime    MEDICATIONS  (PRN):  dextrose 40% Gel 15 Gram(s) Oral once PRN Blood Glucose LESS THAN 70 milliGRAM(s)/deciliter  glucagon  Injectable 1 milliGRAM(s) IntraMuscular once PRN Glucose LESS THAN 70 milligrams/deciliter      LABS:  CAPILLARY BLOOD GLUCOSE      POCT Blood Glucose.: 207 mg/dL (28 Sep 2019 08:39)  POCT Blood Glucose.: 179 mg/dL (27 Sep 2019 22:32)  POCT Blood Glucose.: 159 mg/dL (27 Sep 2019 17:25)  POCT Blood Glucose.: 187 mg/dL (27 Sep 2019 12:37)                          11.7   8.8   )-----------( 155      ( 28 Sep 2019 06:50 )             36.8     Auto Eosinophil # x     / Auto Eosinophil % x     / Auto Neutrophil # x     / Auto Neutrophil % x     / BANDS % x                            11.6   8.8   )-----------( 141      ( 27 Sep 2019 06:52 )             37.4     Auto Eosinophil # x     / Auto Eosinophil % x     / Auto Neutrophil # x     / Auto Neutrophil % x     / BANDS % x        Hgb Trend: 11.7<--, 11.6<--, 11.6<--, 11.5<--, 11.1<--  09-28    136  |  106  |  101<H>  ----------------------------<  257<H>  4.0   |  17<L>  |  3.21<H>  09-27    135  |  104  |  104<H>  ----------------------------<  191<H>  4.1   |  17<L>  |  3.41<H>    Ca    9.3      28 Sep 2019 06:50  TPro  7.3  /  Alb  3.5  /  TBili  0.1<L>  /  DBili  x   /  AST  36  /  ALT  34  /  AlkPhos  112  09-28    Creatinine Trend: 3.21<--, 3.41<--, 3.13<--, 3.09<--, 3.13<--, 3.36<--  PT/INR - ( 28 Sep 2019 06:50 )   PT: 34.0 sec;   INR: 2.89 ratio                       ABG:   VBG:     MICROBIOLOGY:     Culture - Blood (collected 25 Sep 2019 15:45)  Source: .Blood  Preliminary Report (26 Sep 2019 16:01):    No growth to date.    Culture - Blood (collected 25 Sep 2019 15:45)  Source: .Blood  Preliminary Report (26 Sep 2019 16:01):    No growth to date.

## 2019-09-29 NOTE — PROGRESS NOTE ADULT - PROBLEM SELECTOR PLAN 2
Pt INR found to be elevated in setting of sepsis.   - INR 2.89  - warfarin 3mg x 1 dose   - INR goal of 2 or less for thomson placement by IR on Monday, will need to hold warfarin sunday night  - obtain daily INR's and dose daily while in hospital Pt INR found to be elevated in setting of sepsis.   - INR 2.89  - warfarin 3mg x 1 dose   - INR goal of 2 or less for thomson placement by IR on 9/30, will need to hold warfarin 9/29  - obtain daily INR's and dose daily while in hospital Pt INR found to be elevated in setting of sepsis.   - INR 3.51  -hold coumadin tonight   - INR goal of 2 or less for thomson placement by IR on 9/30, will need to hold warfarin 9/29  - obtain daily INR's and dose daily while in hospital

## 2019-09-30 DIAGNOSIS — I50.22 CHRONIC SYSTOLIC (CONGESTIVE) HEART FAILURE: ICD-10-CM

## 2019-09-30 LAB
ALBUMIN SERPL ELPH-MCNC: 3.4 G/DL — SIGNIFICANT CHANGE UP (ref 3.3–5)
ALP SERPL-CCNC: 113 U/L — SIGNIFICANT CHANGE UP (ref 40–120)
ALT FLD-CCNC: 30 U/L — SIGNIFICANT CHANGE UP (ref 10–45)
ANION GAP SERPL CALC-SCNC: 14 MMOL/L — SIGNIFICANT CHANGE UP (ref 5–17)
AST SERPL-CCNC: 22 U/L — SIGNIFICANT CHANGE UP (ref 10–40)
BILIRUB SERPL-MCNC: 0.2 MG/DL — SIGNIFICANT CHANGE UP (ref 0.2–1.2)
BUN SERPL-MCNC: 101 MG/DL — HIGH (ref 7–23)
CALCIUM SERPL-MCNC: 9.2 MG/DL — SIGNIFICANT CHANGE UP (ref 8.4–10.5)
CHLORIDE SERPL-SCNC: 104 MMOL/L — SIGNIFICANT CHANGE UP (ref 96–108)
CO2 SERPL-SCNC: 16 MMOL/L — LOW (ref 22–31)
CREAT SERPL-MCNC: 3.02 MG/DL — HIGH (ref 0.5–1.3)
CULTURE RESULTS: SIGNIFICANT CHANGE UP
CULTURE RESULTS: SIGNIFICANT CHANGE UP
GLUCOSE BLDC GLUCOMTR-MCNC: 138 MG/DL — HIGH (ref 70–99)
GLUCOSE BLDC GLUCOMTR-MCNC: 166 MG/DL — HIGH (ref 70–99)
GLUCOSE BLDC GLUCOMTR-MCNC: 177 MG/DL — HIGH (ref 70–99)
GLUCOSE BLDC GLUCOMTR-MCNC: 187 MG/DL — HIGH (ref 70–99)
GLUCOSE SERPL-MCNC: 181 MG/DL — HIGH (ref 70–99)
HCT VFR BLD CALC: 35.9 % — LOW (ref 39–50)
HGB BLD-MCNC: 11.4 G/DL — LOW (ref 13–17)
INR BLD: 3.61 RATIO — HIGH (ref 0.88–1.16)
MCHC RBC-ENTMCNC: 28.9 PG — SIGNIFICANT CHANGE UP (ref 27–34)
MCHC RBC-ENTMCNC: 31.8 GM/DL — LOW (ref 32–36)
MCV RBC AUTO: 90.8 FL — SIGNIFICANT CHANGE UP (ref 80–100)
PLATELET # BLD AUTO: 169 K/UL — SIGNIFICANT CHANGE UP (ref 150–400)
POTASSIUM SERPL-MCNC: 4.2 MMOL/L — SIGNIFICANT CHANGE UP (ref 3.5–5.3)
POTASSIUM SERPL-SCNC: 4.2 MMOL/L — SIGNIFICANT CHANGE UP (ref 3.5–5.3)
PROT SERPL-MCNC: 7.2 G/DL — SIGNIFICANT CHANGE UP (ref 6–8.3)
PROTHROM AB SERPL-ACNC: 42.8 SEC — HIGH (ref 10–12.9)
RBC # BLD: 3.96 M/UL — LOW (ref 4.2–5.8)
RBC # FLD: 12.6 % — SIGNIFICANT CHANGE UP (ref 10.3–14.5)
SODIUM SERPL-SCNC: 134 MMOL/L — LOW (ref 135–145)
SPECIMEN SOURCE: SIGNIFICANT CHANGE UP
SPECIMEN SOURCE: SIGNIFICANT CHANGE UP
WBC # BLD: 7.8 K/UL — SIGNIFICANT CHANGE UP (ref 3.8–10.5)
WBC # FLD AUTO: 7.8 K/UL — SIGNIFICANT CHANGE UP (ref 3.8–10.5)

## 2019-09-30 PROCEDURE — 99233 SBSQ HOSP IP/OBS HIGH 50: CPT | Mod: GC

## 2019-09-30 PROCEDURE — 99232 SBSQ HOSP IP/OBS MODERATE 35: CPT | Mod: GC

## 2019-09-30 RX ADMIN — MEMANTINE HYDROCHLORIDE 5 MILLIGRAM(S): 10 TABLET ORAL at 21:05

## 2019-09-30 RX ADMIN — Medication 60 MILLIGRAM(S): at 05:46

## 2019-09-30 RX ADMIN — Medication 0.12 MILLIGRAM(S): at 08:38

## 2019-09-30 RX ADMIN — Medication 5 UNIT(S): at 08:39

## 2019-09-30 RX ADMIN — Medication 10 MILLIGRAM(S): at 21:05

## 2019-09-30 RX ADMIN — INSULIN GLARGINE 15 UNIT(S): 100 INJECTION, SOLUTION SUBCUTANEOUS at 22:06

## 2019-09-30 RX ADMIN — ISOSORBIDE DINITRATE 10 MILLIGRAM(S): 5 TABLET ORAL at 13:09

## 2019-09-30 RX ADMIN — MEMANTINE HYDROCHLORIDE 5 MILLIGRAM(S): 10 TABLET ORAL at 10:20

## 2019-09-30 RX ADMIN — Medication 1: at 13:08

## 2019-09-30 RX ADMIN — Medication 10 MILLIGRAM(S): at 08:38

## 2019-09-30 RX ADMIN — HEPARIN SODIUM 5000 UNIT(S): 5000 INJECTION INTRAVENOUS; SUBCUTANEOUS at 13:09

## 2019-09-30 RX ADMIN — CEFTRIAXONE 100 MILLIGRAM(S): 500 INJECTION, POWDER, FOR SOLUTION INTRAMUSCULAR; INTRAVENOUS at 21:45

## 2019-09-30 RX ADMIN — ISOSORBIDE DINITRATE 10 MILLIGRAM(S): 5 TABLET ORAL at 05:45

## 2019-09-30 RX ADMIN — Medication 5 UNIT(S): at 13:08

## 2019-09-30 RX ADMIN — HEPARIN SODIUM 5000 UNIT(S): 5000 INJECTION INTRAVENOUS; SUBCUTANEOUS at 21:05

## 2019-09-30 RX ADMIN — Medication 650 MILLIGRAM(S): at 12:50

## 2019-09-30 RX ADMIN — ISOSORBIDE DINITRATE 10 MILLIGRAM(S): 5 TABLET ORAL at 21:05

## 2019-09-30 RX ADMIN — DONEPEZIL HYDROCHLORIDE 10 MILLIGRAM(S): 10 TABLET, FILM COATED ORAL at 21:05

## 2019-09-30 RX ADMIN — Medication 1: at 08:39

## 2019-09-30 RX ADMIN — Medication 650 MILLIGRAM(S): at 11:44

## 2019-09-30 RX ADMIN — Medication 5 UNIT(S): at 18:19

## 2019-09-30 RX ADMIN — Medication 81 MILLIGRAM(S): at 11:44

## 2019-09-30 RX ADMIN — TAMSULOSIN HYDROCHLORIDE 0.4 MILLIGRAM(S): 0.4 CAPSULE ORAL at 21:05

## 2019-09-30 RX ADMIN — Medication 75 MILLIGRAM(S): at 05:44

## 2019-09-30 RX ADMIN — HEPARIN SODIUM 5000 UNIT(S): 5000 INJECTION INTRAVENOUS; SUBCUTANEOUS at 05:46

## 2019-09-30 RX ADMIN — ATORVASTATIN CALCIUM 40 MILLIGRAM(S): 80 TABLET, FILM COATED ORAL at 21:05

## 2019-09-30 NOTE — PROGRESS NOTE ADULT - PROBLEM SELECTOR PLAN 9
Problem 11: Dementia: C/w home Aricept and Namenda     DVT: HSQ  Diet: CC diet  Code: Full  Dispo: Likely home Problem 11: Dementia: C/w home Aricept and Namenda     DVT: HSQ  Diet: CC diet  Code: Full  Dispo: Home with home PT

## 2019-09-30 NOTE — PROGRESS NOTE ADULT - ASSESSMENT
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 To get Galvan placed by IR on 10/1 as INR is supratherapeutic.

## 2019-09-30 NOTE — PROGRESS NOTE ADULT - PROBLEM SELECTOR PLAN 1
Pt with hx of CKD since 2012, follows with Dr. Miller, CKD attributed from DM/HTN and CHF. On review of labs in [facility] has had scr at 2 mg/dl range in 2015, but since 2016 has been within 2.9-3.4. On admission 09/21/19 3.3 mg/dl now 3.4 mg/dl, scr at baseline.   Pt declined HD in the past. IR would place Guan catheter, if not would place PICC line on dominant arm, in case pt changes his mind and would go for HD in the future to leave non dominant arm for HD access.   However given cardiac hx pt not good candidate for long term HD.    No dose adjustment for ceftriaxone, however would not increase more than 2 g a day.     On digoxin given CKD monitor levels closely.     Avoid nephrotoxic meds, minimized contrast studies. Daily weight, fluid restriction and renal diet.

## 2019-09-30 NOTE — PROGRESS NOTE ADULT - SUBJECTIVE AND OBJECTIVE BOX
Patient is a 88y old  Male who presents with a chief complaint of fever and lethargy (27 Sep 2019 12:25)      SUBJECTIVE / OVERNIGHT EVENTS:  No acute events overnight. Denies CP, SOB, abdominal pain, N/V/D/C, dysuria, hematuria.         REVIEW OF SYSTEMS:    CONSTITUTIONAL: No weakness, fevers or chills  EYES/ENT: No visual changes;  No vertigo or throat pain   NECK: No pain or stiffness  RESPIRATORY: No cough, wheezing, hemoptysis; No shortness of breath  CARDIOVASCULAR: No chest pain or palpitations  GASTROINTESTINAL: No abdominal or epigastric pain. No nausea, vomiting, or hematemesis; No diarrhea or constipation. No melena or hematochezia.  GENITOURINARY: No dysuria, frequency or hematuria  NEUROLOGICAL: No numbness or weakness  MSK: + back pain  All other review of systems is negative unless indicated above.          Vital Signs Last 24 Hrs  T(C): 37 (28 Sep 2019 04:10), Max: 37 (28 Sep 2019 04:10)  T(F): 98.6 (28 Sep 2019 04:10), Max: 98.6 (28 Sep 2019 04:10)  HR: 69 (28 Sep 2019 09:02) (69 - 82)  BP: 139/75 (28 Sep 2019 09:02) (119/64 - 143/71)  BP(mean): --  RR: 18 (28 Sep 2019 04:10) (18 - 18)  SpO2: 97% (28 Sep 2019 04:10) (95% - 97%)    I&O's Summary    27 Sep 2019 07:01  -  28 Sep 2019 07:00  --------------------------------------------------------  IN: 750 mL / OUT: 0 mL / NET: 750 mL        PHYSICAL EXAM:  GENERAL: NAD, well-developed  HEAD:  Atraumatic, Normocephalic  EYES: EOMI, PERRLA, conjunctiva and sclera clear  NECK: Supple, No JVD  CHEST/LUNG: Clear to auscultation bilaterally; No wheeze  HEART: Regular rate and rhythm; No murmurs, rubs, or gallops  ABDOMEN: Soft, Nontender, Nondistended; Bowel sounds present  EXTREMITIES:  2+ Peripheral Pulses, No clubbing, cyanosis, or edema  PSYCH: slightly confused  NEUROLOGY: non-focal, AAO x 3  SKIN: RLE cellulitis improved; no erythema, drainage, RLE warmer than LLE      MEDICATIONS  (STANDING):  aspirin enteric coated 81 milliGRAM(s) Oral daily  atorvastatin 40 milliGRAM(s) Oral at bedtime  cefTRIAXone   IVPB 2000 milliGRAM(s) IV Intermittent every 24 hours  dextrose 5%. 1000 milliLiter(s) (50 mL/Hr) IV Continuous <Continuous>  dextrose 50% Injectable 12.5 Gram(s) IV Push once  dextrose 50% Injectable 25 Gram(s) IV Push once  dextrose 50% Injectable 25 Gram(s) IV Push once  digoxin     Tablet 0.125 milliGRAM(s) Oral <User Schedule>  donepezil 10 milliGRAM(s) Oral at bedtime  heparin  Injectable 5000 Unit(s) SubCutaneous every 8 hours  hydrALAZINE 10 milliGRAM(s) Oral two times a day  insulin glargine Injectable (LANTUS) 15 Unit(s) SubCutaneous at bedtime  insulin lispro (HumaLOG) corrective regimen sliding scale   SubCutaneous three times a day before meals  insulin lispro (HumaLOG) corrective regimen sliding scale   SubCutaneous at bedtime  insulin lispro Injectable (HumaLOG) 5 Unit(s) SubCutaneous three times a day before meals  isosorbide   dinitrate Tablet (ISORDIL) 10 milliGRAM(s) Oral three times a day  memantine 5 milliGRAM(s) Oral two times a day  metoprolol succinate ER 75 milliGRAM(s) Oral daily  tamsulosin 0.4 milliGRAM(s) Oral at bedtime  torsemide 60 milliGRAM(s) Oral daily  warfarin 6 milliGRAM(s) Oral at bedtime    MEDICATIONS  (PRN):  dextrose 40% Gel 15 Gram(s) Oral once PRN Blood Glucose LESS THAN 70 milliGRAM(s)/deciliter  glucagon  Injectable 1 milliGRAM(s) IntraMuscular once PRN Glucose LESS THAN 70 milligrams/deciliter      LABS:  CAPILLARY BLOOD GLUCOSE      POCT Blood Glucose.: 207 mg/dL (28 Sep 2019 08:39)  POCT Blood Glucose.: 179 mg/dL (27 Sep 2019 22:32)  POCT Blood Glucose.: 159 mg/dL (27 Sep 2019 17:25)  POCT Blood Glucose.: 187 mg/dL (27 Sep 2019 12:37)                          11.7   8.8   )-----------( 155      ( 28 Sep 2019 06:50 )             36.8     Auto Eosinophil # x     / Auto Eosinophil % x     / Auto Neutrophil # x     / Auto Neutrophil % x     / BANDS % x                            11.6   8.8   )-----------( 141      ( 27 Sep 2019 06:52 )             37.4     Auto Eosinophil # x     / Auto Eosinophil % x     / Auto Neutrophil # x     / Auto Neutrophil % x     / BANDS % x        Hgb Trend: 11.7<--, 11.6<--, 11.6<--, 11.5<--, 11.1<--  09-28    136  |  106  |  101<H>  ----------------------------<  257<H>  4.0   |  17<L>  |  3.21<H>  09-27    135  |  104  |  104<H>  ----------------------------<  191<H>  4.1   |  17<L>  |  3.41<H>    Ca    9.3      28 Sep 2019 06:50  TPro  7.3  /  Alb  3.5  /  TBili  0.1<L>  /  DBili  x   /  AST  36  /  ALT  34  /  AlkPhos  112  09-28    Creatinine Trend: 3.21<--, 3.41<--, 3.13<--, 3.09<--, 3.13<--, 3.36<--  PT/INR - ( 28 Sep 2019 06:50 )   PT: 34.0 sec;   INR: 2.89 ratio                       ABG:   VBG:     MICROBIOLOGY:     Culture - Blood (collected 25 Sep 2019 15:45)  Source: .Blood  Preliminary Report (26 Sep 2019 16:01):    No growth to date.    Culture - Blood (collected 25 Sep 2019 15:45)  Source: .Blood  Preliminary Report (26 Sep 2019 16:01):    No growth to date.

## 2019-09-30 NOTE — PROGRESS NOTE ADULT - SUBJECTIVE AND OBJECTIVE BOX
[facility] DIVISION OF KIDNEY DISEASES AND HYPERTENSION -- FOLLOW UP NOTE  --------------------------------------------------------------------------------  Chief Complaint:  blood cultures positive    24 hour events/subjective:  pt examined at bed side feeling well VS stable. No events       PAST HISTORY  --------------------------------------------------------------------------------  No significant changes to PMH, PSH, FHx, SHx, unless otherwise noted    ALLERGIES & MEDICATIONS  --------------------------------------------------------------------------------  Allergies    No Known Allergies    Intolerances      Standing Inpatient Medications  aspirin enteric coated 81 milliGRAM(s) Oral daily  atorvastatin 40 milliGRAM(s) Oral at bedtime  cefTRIAXone   IVPB 2000 milliGRAM(s) IV Intermittent every 24 hours  dextrose 5%. 1000 milliLiter(s) IV Continuous <Continuous>  dextrose 50% Injectable 12.5 Gram(s) IV Push once  dextrose 50% Injectable 25 Gram(s) IV Push once  dextrose 50% Injectable 25 Gram(s) IV Push once  digoxin     Tablet 0.125 milliGRAM(s) Oral <User Schedule>  donepezil 10 milliGRAM(s) Oral at bedtime  heparin  Injectable 5000 Unit(s) SubCutaneous every 8 hours  hydrALAZINE 10 milliGRAM(s) Oral two times a day  insulin glargine Injectable (LANTUS) 15 Unit(s) SubCutaneous at bedtime  insulin lispro (HumaLOG) corrective regimen sliding scale   SubCutaneous three times a day before meals  insulin lispro (HumaLOG) corrective regimen sliding scale   SubCutaneous at bedtime  insulin lispro Injectable (HumaLOG) 5 Unit(s) SubCutaneous three times a day before meals  isosorbide   dinitrate Tablet (ISORDIL) 10 milliGRAM(s) Oral three times a day  memantine 5 milliGRAM(s) Oral two times a day  metoprolol succinate ER 75 milliGRAM(s) Oral daily  tamsulosin 0.4 milliGRAM(s) Oral at bedtime  torsemide 60 milliGRAM(s) Oral daily    PRN Inpatient Medications  acetaminophen   Tablet .. 650 milliGRAM(s) Oral every 6 hours PRN  dextrose 40% Gel 15 Gram(s) Oral once PRN  glucagon  Injectable 1 milliGRAM(s) IntraMuscular once PRN      REVIEW OF SYSTEMS  --------------------------------------------------------------------------------  Gen: No weight changes, fatigue, fevers/chills, weakness  Respiratory: No dyspnea, cough, wheezing, hemoptysis  CV: No chest pain, PND, orthopnea  GI: No abdominal pain, diarrhea, constipation, nausea, vomiting, melena, hematochezia  MSK: + edema  Neuro: No dizziness/lightheadedness,     All other systems were reviewed and are negative, except as noted.    VITALS/PHYSICAL EXAM  --------------------------------------------------------------------------------  T(C): 37.2 (09-30-19 @ 11:53), Max: 37.2 (09-30-19 @ 11:53)  HR: 69 (09-30-19 @ 13:07) (69 - 71)  BP: 146/68 (09-30-19 @ 13:07) (133/76 - 146/68)  RR: 18 (09-30-19 @ 11:53) (18 - 18)  SpO2: 97% (09-30-19 @ 11:53) (96% - 97%)  Wt(kg): --        09-29-19 @ 07:01  -  09-30-19 @ 07:00  --------------------------------------------------------  IN: 410 mL / OUT: 0 mL / NET: 410 mL    09-30-19 @ 07:01  -  09-30-19 @ 14:43  --------------------------------------------------------  IN: 480 mL / OUT: 0 mL / NET: 480 mL      Physical Exam:    	Gen: NAD,  	HEENT: supple neck, clear oropharynx  	Pulm: CTA B/L  	CV: ICD noted, RRR, S1S2; no rub + murmur.   	Abd: +BS, soft, nontender/nondistended  	UE: no edema; no asterixis  	LE:no edema  	Neuro: No focal deficits,    LABS/STUDIES  --------------------------------------------------------------------------------              11.4   7.8   >-----------<  169      [09-30-19 @ 06:26]              35.9     134  |  104  |  101  ----------------------------<  181      [09-30-19 @ 06:26]  4.2   |  16  |  3.02        Ca     9.2     [09-30-19 @ 06:26]      Mg     2.3     [09-29-19 @ 06:36]      Phos  4.2     [09-29-19 @ 06:36]    TPro  7.2  /  Alb  3.4  /  TBili  0.2  /  DBili  x   /  AST  22  /  ALT  30  /  AlkPhos  113  [09-30-19 @ 06:26]    PT/INR: PT 42.8 , INR 3.61       [09-30-19 @ 06:26]  PTT: 40.9       [09-29-19 @ 06:36]      Creatinine Trend:  SCr 3.02 [09-30 @ 06:26]  SCr 3.19 [09-29 @ 06:36]  SCr 3.21 [09-28 @ 06:50]  SCr 3.41 [09-27 @ 06:52]  SCr 3.13 [09-26 @ 06:11]    Urinalysis - [09-21-19 @ 22:55]      Color Yellow / Appearance Slightly Turbid / SG 1.017 / pH 6.0      Gluc Negative / Ketone Negative  / Bili Negative / Urobili Negative       Blood Negative / Protein 300 mg/dL / Leuk Est Large / Nitrite Negative      RBC 5 / WBC 49 / Hyaline 6 / Gran  / Sq Epi  / Non Sq Epi 1 / Bacteria Negative      HbA1c 7.2      [09-22-19 @ 08:55]

## 2019-09-30 NOTE — PROGRESS NOTE ADULT - PROBLEM SELECTOR PLAN 2
Pt INR found to be elevated in setting of sepsis.   - INR 3.61  -hold coumadin tonight x2   - INR goal of 2 or less for thomson placement by IR on 9/30, will need to hold warfarin 9/29  - obtain daily INR's and dose daily while in hospital Pt INR found to be elevated in setting of sepsis.   - INR 3.61  -hold coumadin tonight x2   - INR goal of 2 or less for thomson placement by IR, will need to hold warfarin 9/29  - obtain daily INR's and dose daily while in hospital Pt INR found to be elevated in setting of sepsis.   - INR 3.61  -hold coumadin tonight x2   - INR goal of 2 or less for thomson placement by IR, will need to hold warfarin 9/29. Held again 9/30. Will restart after procedure.   - obtain daily INR Pt INR found to be elevated in setting of sepsis.   - INR 3.61  -hold coumadin tonight x2   - INR goal of 2 or less for Guan placement by IR, will need to hold warfarin 9/29. Held again 9/30. Will restart after procedure.   - obtain daily INR

## 2019-09-30 NOTE — PROGRESS NOTE ADULT - PROBLEM SELECTOR PLAN 1
Pt p/w fever, tachypnea, and leukocytosis w/ evidence of UTI. S/p Vanc/CTX in the ED. Pt additionally presented w/ back pain concerning for spinal abscess, however CT imaging showed no evidence of spinal abscess.   - blood cx GBS +, source likely R leg cellulitis  - ID following, cont ceftriaxone 2g IVSS daily  -TTE showed MV echodensity (vegetation vs torn chord), couldn't r/o vegetation  -MARY refused/deferred due to risk with anesthesia  -sensitivities back: resistant to clinda, sensitive to levo, vanco, ceftriaxone, pcn  -repeat blood cx neg 9/25  -will get thomson line placed by IR on 9/30, will need 6 week course of abx from 9/25, per ID  -will need f/u echo, blood cx at conclusion of treatment along with weekly cbc, cmp Pt p/w fever, tachypnea, and leukocytosis w/ evidence of UTI. S/p Vanc/CTX in the ED. Pt additionally presented w/ back pain concerning for spinal abscess, however CT imaging showed no evidence of spinal abscess.   - blood cx GBS +, source likely R leg cellulitis  - ID following, cont ceftriaxone 2g IVSS daily  -TTE showed MV echodensity (vegetation vs torn chord), couldn't r/o vegetation  -MARY refused/deferred due to risk with anesthesia  -sensitivities back: resistant to clinda, sensitive to levo, vanco, ceftriaxone, pcn  -repeat blood cx neg 9/25  -will get thomson line placed by IR, will need 6 week course of abx from 9/25, per ID  -will need f/u echo, blood cx at conclusion of treatment along with weekly cbc, cmp Pt p/w fever, tachypnea, and leukocytosis w/ evidence of UTI. S/p Vanc/CTX in the ED. Pt additionally presented w/ back pain concerning for spinal abscess, however CT imaging showed no evidence of spinal abscess.   - blood cx GBS +, source likely R leg cellulitis  - ID following, cont ceftriaxone 2g IVSS daily  -TTE showed MV echodensity (vegetation vs torn chord), couldn't r/o vegetation  -MARY refused/deferred due to risk with anesthesia  -sensitivities back: resistant to clinda, sensitive to levo, vanco, ceftriaxone, pcn  -repeat blood cx neg 9/25  -will get Guan line placed by IR, will need 6 week course of abx from 9/25, per ID  -will need f/u echo, blood cx at conclusion of treatment along with weekly cbc, cmp

## 2019-09-30 NOTE — PROGRESS NOTE ADULT - PROBLEM SELECTOR PLAN 5
Pt CKD IV. Baseline Cr: 3.0-3.2.   - trend Cr  - Avoid nephrotoxins   - Renally dose all meds. Pt CKD IV. Baseline Cr: 3.0-3.2.   - trend Cr  - Avoid nephrotoxins   - Renally dose all meds

## 2019-09-30 NOTE — PROGRESS NOTE ADULT - PROBLEM SELECTOR PLAN 4
CHADSVasc: 6; On coumadin  - C/w digoxin 0.125mg MWF  - Metoprolol as above  - warfarin as above CHADSVasc: 6; On coumadin  - C/w digoxin 0.125mg MWF  - Metoprolol as above  - Warfarin as above

## 2019-10-01 LAB
ANION GAP SERPL CALC-SCNC: 16 MMOL/L — SIGNIFICANT CHANGE UP (ref 5–17)
BUN SERPL-MCNC: 102 MG/DL — HIGH (ref 7–23)
CALCIUM SERPL-MCNC: 9.1 MG/DL — SIGNIFICANT CHANGE UP (ref 8.4–10.5)
CHLORIDE SERPL-SCNC: 100 MMOL/L — SIGNIFICANT CHANGE UP (ref 96–108)
CO2 SERPL-SCNC: 17 MMOL/L — LOW (ref 22–31)
CREAT SERPL-MCNC: 3.07 MG/DL — HIGH (ref 0.5–1.3)
GLUCOSE BLDC GLUCOMTR-MCNC: 164 MG/DL — HIGH (ref 70–99)
GLUCOSE BLDC GLUCOMTR-MCNC: 169 MG/DL — HIGH (ref 70–99)
GLUCOSE BLDC GLUCOMTR-MCNC: 180 MG/DL — HIGH (ref 70–99)
GLUCOSE BLDC GLUCOMTR-MCNC: 213 MG/DL — HIGH (ref 70–99)
GLUCOSE SERPL-MCNC: 159 MG/DL — HIGH (ref 70–99)
HCT VFR BLD CALC: 34.6 % — LOW (ref 39–50)
HGB BLD-MCNC: 11.3 G/DL — LOW (ref 13–17)
INR BLD: 2.52 RATIO — HIGH (ref 0.88–1.16)
MCHC RBC-ENTMCNC: 29.6 PG — SIGNIFICANT CHANGE UP (ref 27–34)
MCHC RBC-ENTMCNC: 32.7 GM/DL — SIGNIFICANT CHANGE UP (ref 32–36)
MCV RBC AUTO: 90.7 FL — SIGNIFICANT CHANGE UP (ref 80–100)
PLATELET # BLD AUTO: 180 K/UL — SIGNIFICANT CHANGE UP (ref 150–400)
POTASSIUM SERPL-MCNC: 4.1 MMOL/L — SIGNIFICANT CHANGE UP (ref 3.5–5.3)
POTASSIUM SERPL-SCNC: 4.1 MMOL/L — SIGNIFICANT CHANGE UP (ref 3.5–5.3)
PROTHROM AB SERPL-ACNC: 29.8 SEC — HIGH (ref 10–12.9)
RBC # BLD: 3.82 M/UL — LOW (ref 4.2–5.8)
RBC # FLD: 12.6 % — SIGNIFICANT CHANGE UP (ref 10.3–14.5)
SODIUM SERPL-SCNC: 133 MMOL/L — LOW (ref 135–145)
WBC # BLD: 8.3 K/UL — SIGNIFICANT CHANGE UP (ref 3.8–10.5)
WBC # FLD AUTO: 8.3 K/UL — SIGNIFICANT CHANGE UP (ref 3.8–10.5)

## 2019-10-01 PROCEDURE — 99233 SBSQ HOSP IP/OBS HIGH 50: CPT | Mod: GC

## 2019-10-01 PROCEDURE — 99232 SBSQ HOSP IP/OBS MODERATE 35: CPT

## 2019-10-01 RX ORDER — SODIUM BICARBONATE 1 MEQ/ML
325 SYRINGE (ML) INTRAVENOUS DAILY
Refills: 0 | Status: DISCONTINUED | OUTPATIENT
Start: 2019-10-01 | End: 2019-10-03

## 2019-10-01 RX ADMIN — ISOSORBIDE DINITRATE 10 MILLIGRAM(S): 5 TABLET ORAL at 21:42

## 2019-10-01 RX ADMIN — Medication 2: at 17:54

## 2019-10-01 RX ADMIN — INSULIN GLARGINE 15 UNIT(S): 100 INJECTION, SOLUTION SUBCUTANEOUS at 21:58

## 2019-10-01 RX ADMIN — TAMSULOSIN HYDROCHLORIDE 0.4 MILLIGRAM(S): 0.4 CAPSULE ORAL at 21:42

## 2019-10-01 RX ADMIN — Medication 10 MILLIGRAM(S): at 21:42

## 2019-10-01 RX ADMIN — DONEPEZIL HYDROCHLORIDE 10 MILLIGRAM(S): 10 TABLET, FILM COATED ORAL at 21:42

## 2019-10-01 RX ADMIN — HEPARIN SODIUM 5000 UNIT(S): 5000 INJECTION INTRAVENOUS; SUBCUTANEOUS at 21:41

## 2019-10-01 RX ADMIN — MEMANTINE HYDROCHLORIDE 5 MILLIGRAM(S): 10 TABLET ORAL at 09:08

## 2019-10-01 RX ADMIN — Medication 5 UNIT(S): at 08:54

## 2019-10-01 RX ADMIN — Medication 1: at 08:55

## 2019-10-01 RX ADMIN — ISOSORBIDE DINITRATE 10 MILLIGRAM(S): 5 TABLET ORAL at 05:55

## 2019-10-01 RX ADMIN — Medication 75 MILLIGRAM(S): at 05:55

## 2019-10-01 RX ADMIN — Medication 81 MILLIGRAM(S): at 12:51

## 2019-10-01 RX ADMIN — Medication 5 UNIT(S): at 12:52

## 2019-10-01 RX ADMIN — ISOSORBIDE DINITRATE 10 MILLIGRAM(S): 5 TABLET ORAL at 12:52

## 2019-10-01 RX ADMIN — CEFTRIAXONE 100 MILLIGRAM(S): 500 INJECTION, POWDER, FOR SOLUTION INTRAMUSCULAR; INTRAVENOUS at 21:40

## 2019-10-01 RX ADMIN — Medication 1: at 12:53

## 2019-10-01 RX ADMIN — Medication 10 MILLIGRAM(S): at 08:53

## 2019-10-01 RX ADMIN — HEPARIN SODIUM 5000 UNIT(S): 5000 INJECTION INTRAVENOUS; SUBCUTANEOUS at 05:55

## 2019-10-01 RX ADMIN — Medication 325 MILLIGRAM(S): at 18:06

## 2019-10-01 RX ADMIN — Medication 5 UNIT(S): at 17:54

## 2019-10-01 RX ADMIN — Medication 60 MILLIGRAM(S): at 05:55

## 2019-10-01 RX ADMIN — ATORVASTATIN CALCIUM 40 MILLIGRAM(S): 80 TABLET, FILM COATED ORAL at 21:42

## 2019-10-01 RX ADMIN — MEMANTINE HYDROCHLORIDE 5 MILLIGRAM(S): 10 TABLET ORAL at 21:42

## 2019-10-01 RX ADMIN — HEPARIN SODIUM 5000 UNIT(S): 5000 INJECTION INTRAVENOUS; SUBCUTANEOUS at 13:00

## 2019-10-01 NOTE — PROGRESS NOTE ADULT - SUBJECTIVE AND OBJECTIVE BOX
[facility] DIVISION OF KIDNEY DISEASES AND HYPERTENSION -- FOLLOW UP NOTE  --------------------------------------------------------------------------------  Chief Complaint:/subjective: feels well, no complaints, no ob, no change UO, no metallic taste in mouth, no nausea    24 hour events: as above        PAST HISTORY  --------------------------------------------------------------------------------  No significant changes to PMH, PSH, FHx, SHx, unless otherwise noted    ALLERGIES & MEDICATIONS  --------------------------------------------------------------------------------  Allergies    No Known Allergies    Intolerances      Standing Inpatient Medications  aspirin enteric coated 81 milliGRAM(s) Oral daily  atorvastatin 40 milliGRAM(s) Oral at bedtime  cefTRIAXone   IVPB 2000 milliGRAM(s) IV Intermittent every 24 hours  dextrose 5%. 1000 milliLiter(s) IV Continuous <Continuous>  dextrose 50% Injectable 12.5 Gram(s) IV Push once  dextrose 50% Injectable 25 Gram(s) IV Push once  dextrose 50% Injectable 25 Gram(s) IV Push once  digoxin     Tablet 0.125 milliGRAM(s) Oral <User Schedule>  donepezil 10 milliGRAM(s) Oral at bedtime  heparin  Injectable 5000 Unit(s) SubCutaneous every 8 hours  hydrALAZINE 10 milliGRAM(s) Oral two times a day  insulin glargine Injectable (LANTUS) 15 Unit(s) SubCutaneous at bedtime  insulin lispro (HumaLOG) corrective regimen sliding scale   SubCutaneous three times a day before meals  insulin lispro (HumaLOG) corrective regimen sliding scale   SubCutaneous at bedtime  insulin lispro Injectable (HumaLOG) 5 Unit(s) SubCutaneous three times a day before meals  isosorbide   dinitrate Tablet (ISORDIL) 10 milliGRAM(s) Oral three times a day  memantine 5 milliGRAM(s) Oral two times a day  metoprolol succinate ER 75 milliGRAM(s) Oral daily  sodium bicarbonate 325 milliGRAM(s) Oral daily  tamsulosin 0.4 milliGRAM(s) Oral at bedtime  torsemide 60 milliGRAM(s) Oral daily    PRN Inpatient Medications  acetaminophen   Tablet .. 650 milliGRAM(s) Oral every 6 hours PRN  dextrose 40% Gel 15 Gram(s) Oral once PRN  glucagon  Injectable 1 milliGRAM(s) IntraMuscular once PRN      REVIEW OF SYSTEMS  --------------------------------------------------------------------------------  Gen: No weight changes, fatigue, fevers/chills, weakness  Skin: No rashes  Head/Eyes/Ears/Mouth: No headache;   Respiratory: No dyspnea, cough  CV: No chest pain, PND, orthopnea  GI: No abdominal pain, diarrhea, constipation, nausea, vomiting  : No increased frequency, dysuria, hematuria, nocturia  MSK: No joint pain/swelling; no back pain; no edema  Neuro: No dizziness/lightheadedness, weakness  Heme: No easy bruising or bleeding  Psych: No significant nervousness, anxiety, stress, depression    All other systems were reviewed and are negative, except as noted.    VITALS/PHYSICAL EXAM  --------------------------------------------------------------------------------  T(C): 36.4 (10-01-19 @ 12:08), Max: 37.2 (09-30-19 @ 20:43)  HR: 70 (10-01-19 @ 12:08) (69 - 71)  BP: 160/72 (10-01-19 @ 12:08) (117/62 - 160/72)  RR: 18 (10-01-19 @ 12:08) (18 - 18)  SpO2: 99% (10-01-19 @ 12:08) (97% - 99%)  Wt(kg): --  Adult Advanced Hemodynamics Last 24 Hrs  ABP: --  ABP(mean): --  CVP(mm Hg): --  CO: --  CI: --  PA: --  PA(mean): --  PCWP: --  SVR: --  SVRI: --        09-30-19 @ 07:01  -  10-01-19 @ 07:00  --------------------------------------------------------  IN: 1200 mL / OUT: 0 mL / NET: 1200 mL      Physical Exam:  	Gen: NAD,    	HEENT:  no jvp  	Pulm: CTA B/L  	CV: RRR, S1S2; no rub  	Back:  no sacral edema  	Abd: +BS, soft,   	: No suprapubic tenderness  	Ext: no edema, no asterixis  	Neuro: awake  	Psych: alert  	Skin: Warm,    	Vascular access:    LABS/STUDIES  --------------------------------------------------------------------------------              11.3   8.3   >-----------<  180      [10-01-19 @ 06:43]              34.6     Hemoglobin: 11.3 g/dL (10-01-19 @ 06:43)  Hemoglobin: 11.4 g/dL (09-30-19 @ 06:26)    Platelet Count - Automated: 180 K/uL (10-01-19 @ 06:43)  Platelet Count - Automated: 169 K/uL (09-30-19 @ 06:26)    133  |  100  |  102  ----------------------------<  159      [10-01-19 @ 06:43]  4.1   |  17  |  3.07        Ca     9.1     [10-01-19 @ 06:43]    TPro  7.2  /  Alb  3.4  /  TBili  0.2  /  DBili  x   /  AST  22  /  ALT  30  /  AlkPhos  113  [09-30-19 @ 06:26]    PT/INR: PT 29.8 , INR 2.52       [10-01-19 @ 06:43]      Creatinine Trend:  SCr 3.07 [10-01 @ 06:43]  SCr 3.02 [09-30 @ 06:26]  SCr 3.19 [09-29 @ 06:36]  SCr 3.21 [09-28 @ 06:50]  SCr 3.41 [09-27 @ 06:52]    Urinalysis - [09-21-19 @ 22:55]      Color Yellow / Appearance Slightly Turbid / SG 1.017 / pH 6.0      Gluc Negative / Ketone Negative  / Bili Negative / Urobili Negative       Blood Negative / Protein 300 mg/dL / Leuk Est Large / Nitrite Negative      RBC 5 / WBC 49 / Hyaline 6 / Gran  / Sq Epi  / Non Sq Epi 1 / Bacteria Negative      HbA1c 7.2      [09-22-19 @ 08:55]

## 2019-10-01 NOTE — DIETITIAN INITIAL EVALUATION ADULT. - OTHER INFO
Reason for admission : fever and lethargy  Diet PTA : low sodium, tries to follow diabetic. cold cereal and ski, milk for breakfast, usually eats out a lot and orders tuna, egg salad or chicken plate, stuffed fish, hamburger or chicken for dinner. snacks on fruit.   Intake : >75%  Denies nausea/vomit :  Denies difficulty chewing /swallow :  Last BM : this morning  NKFA  IBW +/- 10%=   Ht: 68.5"  Weight PTA: fluctuates between 158-163 pounds  BMI: 23.4  Education Provided : 1500ml Fluid Restriction, Diabetes and Chronic Kidney Disease Stages 1 through 4: Nutrition Guidelines  skin: no pressure injury  edema: +1 right leg

## 2019-10-01 NOTE — PROGRESS NOTE ADULT - PROBLEM SELECTOR PLAN 1
Pt with hx of CKD since 2012, follows with Dr. Miller, CKD attributed from DM/HTN and CHF. On review of labs in [facility] has had scr at 2 mg/dl range in 2015, but since 2016 has been within 2.9-3.4. On admission 09/21/19 3.3 mg/dl now 3.4 mg/dl, scr at baseline.   Pt declined HD in the past. IR would place Guan catheter, if not would place PICC line on dominant arm, in case pt changes his mind and would go for HD in the future to leave non dominant arm for HD access.   However given cardiac hx pt not good candidate for long term HD.    No dose adjustment for ceftriaxone, however would not increase more than 2 g a day.     On digoxin given CKD monitor levels closely.     Avoid nephrotoxic meds, minimized contrast studies. Daily weight, fluid restriction and renal diet.

## 2019-10-01 NOTE — DIETITIAN INITIAL EVALUATION ADULT. - ADD RECOMMEND
change diet to consistent carbohydrate, restricted fluid 1500ml, 60gram protein. monitor need for diet ed reinforcement.

## 2019-10-01 NOTE — PROGRESS NOTE ADULT - PROBLEM SELECTOR PLAN 1
Pt p/w fever, tachypnea, and leukocytosis w/ evidence of UTI. S/p Vanc/CTX in the ED. Pt additionally presented w/ back pain concerning for spinal abscess, however CT imaging showed no evidence of spinal abscess.   - blood cx GBS +, source likely R leg cellulitis  - ID following, cont ceftriaxone 2g IVSS daily  -TTE showed MV echodensity (vegetation vs torn chord), couldn't r/o vegetation  -MARY refused/deferred due to risk with anesthesia  -sensitivities back: resistant to clinda, sensitive to levo, vanco, ceftriaxone, pcn  -repeat blood cx neg 9/25  -will get Guan line placed by IR, will need 6 week course of abx from 9/25, per ID  -will need f/u echo, blood cx at conclusion of treatment along with weekly cbc, cmp Pt p/w fever, tachypnea, and leukocytosis w/ evidence of UTI. S/p Vanc/CTX in the ED. Pt additionally presented w/ back pain concerning for spinal abscess, however CT imaging showed no evidence of spinal abscess.   - blood cx GBS +, source likely R leg cellulitis  - ID following, cont ceftriaxone 2g IVSS daily - will need 6 weeks from neg cx  -TTE showed MV echodensity (vegetation vs torn chord), couldn't r/o vegetation  -MARY refused/deferred due to risk with anesthesia  -sensitivities back: resistant to clinda, sensitive to levo, vanco, ceftriaxone, pcn  -repeat blood cx neg 9/25  -await Guan line placement by IR once INR < 2, will need 6 week course of abx from 9/25, per ID  -will need f/u echo, blood cx at conclusion of treatment along with weekly cbc, cmp

## 2019-10-01 NOTE — DIETITIAN INITIAL EVALUATION ADULT. - PERTINENT MEDS FT
acetaminophen   Tablet .. 650 PRN  aspirin enteric coated 81  atorvastatin 40  cefTRIAXone   IVPB 2000  dextrose 40% Gel 15 PRN  dextrose 5%. 1000  dextrose 50% Injectable 12.5  dextrose 50% Injectable 25  dextrose 50% Injectable 25  digoxin     Tablet 0.125  donepezil 10  glucagon  Injectable 1 PRN  heparin  Injectable 5000  hydrALAZINE 10  insulin glargine Injectable (LANTUS) 15  insulin lispro (HumaLOG) corrective regimen sliding scale   insulin lispro (HumaLOG) corrective regimen sliding scale   insulin lispro Injectable (HumaLOG) 5  isosorbide   dinitrate Tablet (ISORDIL) 10  memantine 5  metoprolol succinate ER 75  tamsulosin 0.4  torsemide 60

## 2019-10-01 NOTE — PROGRESS NOTE ADULT - SUBJECTIVE AND OBJECTIVE BOX
Patient is a 88y old  Male who presents with a chief complaint of fever and lethargy (27 Sep 2019 12:25)      SUBJECTIVE / OVERNIGHT EVENTS:  No acute events overnight. Denies CP, SOB, abdominal pain, N/V/D/C, dysuria, hematuria.         REVIEW OF SYSTEMS:    CONSTITUTIONAL: No weakness, fevers or chills  EYES/ENT: No visual changes;  No vertigo or throat pain   NECK: No pain or stiffness  RESPIRATORY: No cough, wheezing, hemoptysis; No shortness of breath  CARDIOVASCULAR: No chest pain or palpitations  GASTROINTESTINAL: No abdominal or epigastric pain. No nausea, vomiting, or hematemesis; No diarrhea or constipation. No melena or hematochezia.  GENITOURINARY: No dysuria, frequency or hematuria  NEUROLOGICAL: No numbness or weakness  MSK: + back pain  All other review of systems is negative unless indicated above.          Vital Signs Last 24 Hrs  T(C): 37 (28 Sep 2019 04:10), Max: 37 (28 Sep 2019 04:10)  T(F): 98.6 (28 Sep 2019 04:10), Max: 98.6 (28 Sep 2019 04:10)  HR: 69 (28 Sep 2019 09:02) (69 - 82)  BP: 139/75 (28 Sep 2019 09:02) (119/64 - 143/71)  BP(mean): --  RR: 18 (28 Sep 2019 04:10) (18 - 18)  SpO2: 97% (28 Sep 2019 04:10) (95% - 97%)    I&O's Summary    27 Sep 2019 07:01  -  28 Sep 2019 07:00  --------------------------------------------------------  IN: 750 mL / OUT: 0 mL / NET: 750 mL        PHYSICAL EXAM:  GENERAL: NAD, well-developed  HEAD:  Atraumatic, Normocephalic  EYES: EOMI, PERRLA, conjunctiva and sclera clear  NECK: Supple, No JVD  CHEST/LUNG: Clear to auscultation bilaterally; No wheeze  HEART: Regular rate and rhythm; No murmurs, rubs, or gallops  ABDOMEN: Soft, Nontender, Nondistended; Bowel sounds present  EXTREMITIES:  2+ Peripheral Pulses, No clubbing, cyanosis, or edema  NEUROLOGY: non-focal, AAO x 3  SKIN: RLE cellulitis improved; no erythema, drainage, RLE warmer than LLE      MEDICATIONS  (STANDING):  aspirin enteric coated 81 milliGRAM(s) Oral daily  atorvastatin 40 milliGRAM(s) Oral at bedtime  cefTRIAXone   IVPB 2000 milliGRAM(s) IV Intermittent every 24 hours  dextrose 5%. 1000 milliLiter(s) (50 mL/Hr) IV Continuous <Continuous>  dextrose 50% Injectable 12.5 Gram(s) IV Push once  dextrose 50% Injectable 25 Gram(s) IV Push once  dextrose 50% Injectable 25 Gram(s) IV Push once  digoxin     Tablet 0.125 milliGRAM(s) Oral <User Schedule>  donepezil 10 milliGRAM(s) Oral at bedtime  heparin  Injectable 5000 Unit(s) SubCutaneous every 8 hours  hydrALAZINE 10 milliGRAM(s) Oral two times a day  insulin glargine Injectable (LANTUS) 15 Unit(s) SubCutaneous at bedtime  insulin lispro (HumaLOG) corrective regimen sliding scale   SubCutaneous three times a day before meals  insulin lispro (HumaLOG) corrective regimen sliding scale   SubCutaneous at bedtime  insulin lispro Injectable (HumaLOG) 5 Unit(s) SubCutaneous three times a day before meals  isosorbide   dinitrate Tablet (ISORDIL) 10 milliGRAM(s) Oral three times a day  memantine 5 milliGRAM(s) Oral two times a day  metoprolol succinate ER 75 milliGRAM(s) Oral daily  tamsulosin 0.4 milliGRAM(s) Oral at bedtime  torsemide 60 milliGRAM(s) Oral daily  warfarin 6 milliGRAM(s) Oral at bedtime    MEDICATIONS  (PRN):  dextrose 40% Gel 15 Gram(s) Oral once PRN Blood Glucose LESS THAN 70 milliGRAM(s)/deciliter  glucagon  Injectable 1 milliGRAM(s) IntraMuscular once PRN Glucose LESS THAN 70 milligrams/deciliter      LABS:  CAPILLARY BLOOD GLUCOSE      POCT Blood Glucose.: 207 mg/dL (28 Sep 2019 08:39)  POCT Blood Glucose.: 179 mg/dL (27 Sep 2019 22:32)  POCT Blood Glucose.: 159 mg/dL (27 Sep 2019 17:25)  POCT Blood Glucose.: 187 mg/dL (27 Sep 2019 12:37)                          11.7   8.8   )-----------( 155      ( 28 Sep 2019 06:50 )             36.8     Auto Eosinophil # x     / Auto Eosinophil % x     / Auto Neutrophil # x     / Auto Neutrophil % x     / BANDS % x                            11.6   8.8   )-----------( 141      ( 27 Sep 2019 06:52 )             37.4     Auto Eosinophil # x     / Auto Eosinophil % x     / Auto Neutrophil # x     / Auto Neutrophil % x     / BANDS % x        Hgb Trend: 11.7<--, 11.6<--, 11.6<--, 11.5<--, 11.1<--  09-28    136  |  106  |  101<H>  ----------------------------<  257<H>  4.0   |  17<L>  |  3.21<H>  09-27    135  |  104  |  104<H>  ----------------------------<  191<H>  4.1   |  17<L>  |  3.41<H>    Ca    9.3      28 Sep 2019 06:50  TPro  7.3  /  Alb  3.5  /  TBili  0.1<L>  /  DBili  x   /  AST  36  /  ALT  34  /  AlkPhos  112  09-28    Creatinine Trend: 3.21<--, 3.41<--, 3.13<--, 3.09<--, 3.13<--, 3.36<--  PT/INR - ( 28 Sep 2019 06:50 )   PT: 34.0 sec;   INR: 2.89 ratio                       ABG:   VBG:     MICROBIOLOGY:     Culture - Blood (collected 25 Sep 2019 15:45)  Source: .Blood  Preliminary Report (26 Sep 2019 16:01):    No growth to date.    Culture - Blood (collected 25 Sep 2019 15:45)  Source: .Blood  Preliminary Report (26 Sep 2019 16:01):    No growth to date.

## 2019-10-01 NOTE — PROGRESS NOTE ADULT - PROBLEM SELECTOR PLAN 9
Problem 11: Dementia: C/w home Aricept and Namenda     DVT: HSQ  Diet: CC diet  Code: Full  Dispo: Home with home PT Problem 11: Dementia: C/w home Aricept and Namenda     DVT: HSQ   Diet: CC diet  Code: Full  Dispo: Home with home PT

## 2019-10-01 NOTE — PROGRESS NOTE ADULT - ASSESSMENT
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 To get Guan placed by IR on 10/2 as INR is supratherapeutic. 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 To get Guan placed by IR once INR > 2

## 2019-10-01 NOTE — DIETITIAN INITIAL EVALUATION ADULT. - PROBLEM SELECTOR PLAN 2
Pt p/w fever, tachypnea, and leukocytosis w/ evidence of UTI.  - S/p Vanc/CTX in the ED. Pt additionally presented w/ back pain concerning for spinal abscess, however CT imaging showed no evidence of spinal abscess.   - C/w abx as above  - F/u blood and urine cxs and narrow abx per sensitivities

## 2019-10-01 NOTE — PROGRESS NOTE ADULT - PROBLEM SELECTOR PLAN 2
Pt INR found to be elevated in setting of sepsis.   - INR 2.52  - hold coumadin tonight, last dose given 9/28  - INR goal of 2 or less for Guan placement by IR, will restart after procedure.   - obtain daily INR

## 2019-10-01 NOTE — DIETITIAN INITIAL EVALUATION ADULT. - PERTINENT LABORATORY DATA
Na 133, K+ 4.1, , Cr 3.07,   CAPILLARY BLOOD GLUCOSE  POCT Blood Glucose.: 164 mg/dL (01 Oct 2019 08:45)  POCT Blood Glucose.: 187 mg/dL (30 Sep 2019 22:04)  POCT Blood Glucose.: 138 mg/dL (30 Sep 2019 17:31)  POCT Blood Glucose.: 177 mg/dL (30 Sep 2019 12:49)  Hemoglobin A1C, Whole Blood: 7.2 % (09-22 @ 08:55)

## 2019-10-02 LAB
ALBUMIN SERPL ELPH-MCNC: 3.6 G/DL — SIGNIFICANT CHANGE UP (ref 3.3–5)
ALP SERPL-CCNC: 125 U/L — HIGH (ref 40–120)
ALT FLD-CCNC: 23 U/L — SIGNIFICANT CHANGE UP (ref 10–45)
ANION GAP SERPL CALC-SCNC: 16 MMOL/L — SIGNIFICANT CHANGE UP (ref 5–17)
AST SERPL-CCNC: 19 U/L — SIGNIFICANT CHANGE UP (ref 10–40)
BILIRUB SERPL-MCNC: 0.2 MG/DL — SIGNIFICANT CHANGE UP (ref 0.2–1.2)
BUN SERPL-MCNC: 109 MG/DL — HIGH (ref 7–23)
CALCIUM SERPL-MCNC: 9.2 MG/DL — SIGNIFICANT CHANGE UP (ref 8.4–10.5)
CHLORIDE SERPL-SCNC: 102 MMOL/L — SIGNIFICANT CHANGE UP (ref 96–108)
CO2 SERPL-SCNC: 16 MMOL/L — LOW (ref 22–31)
CREAT SERPL-MCNC: 3.25 MG/DL — HIGH (ref 0.5–1.3)
GLUCOSE BLDC GLUCOMTR-MCNC: 154 MG/DL — HIGH (ref 70–99)
GLUCOSE BLDC GLUCOMTR-MCNC: 188 MG/DL — HIGH (ref 70–99)
GLUCOSE BLDC GLUCOMTR-MCNC: 202 MG/DL — HIGH (ref 70–99)
GLUCOSE BLDC GLUCOMTR-MCNC: 273 MG/DL — HIGH (ref 70–99)
GLUCOSE SERPL-MCNC: 163 MG/DL — HIGH (ref 70–99)
HCT VFR BLD CALC: 34.2 % — LOW (ref 39–50)
HGB BLD-MCNC: 11.1 G/DL — LOW (ref 13–17)
INR BLD: 1.85 RATIO — HIGH (ref 0.88–1.16)
MCHC RBC-ENTMCNC: 29.1 PG — SIGNIFICANT CHANGE UP (ref 27–34)
MCHC RBC-ENTMCNC: 32.5 GM/DL — SIGNIFICANT CHANGE UP (ref 32–36)
MCV RBC AUTO: 89.8 FL — SIGNIFICANT CHANGE UP (ref 80–100)
NRBC # BLD: 0 /100 WBCS — SIGNIFICANT CHANGE UP (ref 0–0)
PLATELET # BLD AUTO: 214 K/UL — SIGNIFICANT CHANGE UP (ref 150–400)
POTASSIUM SERPL-MCNC: 4.3 MMOL/L — SIGNIFICANT CHANGE UP (ref 3.5–5.3)
POTASSIUM SERPL-SCNC: 4.3 MMOL/L — SIGNIFICANT CHANGE UP (ref 3.5–5.3)
PROT SERPL-MCNC: 7.2 G/DL — SIGNIFICANT CHANGE UP (ref 6–8.3)
PROTHROM AB SERPL-ACNC: 21.5 SEC — HIGH (ref 10–12.9)
RBC # BLD: 3.81 M/UL — LOW (ref 4.2–5.8)
RBC # FLD: 13.5 % — SIGNIFICANT CHANGE UP (ref 10.3–14.5)
SODIUM SERPL-SCNC: 134 MMOL/L — LOW (ref 135–145)
WBC # BLD: 8.73 K/UL — SIGNIFICANT CHANGE UP (ref 3.8–10.5)
WBC # FLD AUTO: 8.73 K/UL — SIGNIFICANT CHANGE UP (ref 3.8–10.5)

## 2019-10-02 PROCEDURE — 36558 INSERT TUNNELED CV CATH: CPT

## 2019-10-02 PROCEDURE — 99233 SBSQ HOSP IP/OBS HIGH 50: CPT | Mod: GC

## 2019-10-02 PROCEDURE — 77001 FLUOROGUIDE FOR VEIN DEVICE: CPT | Mod: 26

## 2019-10-02 PROCEDURE — 76937 US GUIDE VASCULAR ACCESS: CPT | Mod: 26

## 2019-10-02 RX ORDER — CHOLECALCIFEROL (VITAMIN D3) 125 MCG
1 CAPSULE ORAL
Qty: 30 | Refills: 0
Start: 2019-10-02 | End: 2019-10-31

## 2019-10-02 RX ORDER — WARFARIN SODIUM 2.5 MG/1
3 TABLET ORAL AT BEDTIME
Refills: 0 | Status: DISCONTINUED | OUTPATIENT
Start: 2019-10-02 | End: 2019-10-03

## 2019-10-02 RX ORDER — CHLORHEXIDINE GLUCONATE 213 G/1000ML
1 SOLUTION TOPICAL
Refills: 0 | Status: DISCONTINUED | OUTPATIENT
Start: 2019-10-02 | End: 2019-10-03

## 2019-10-02 RX ORDER — CHOLECALCIFEROL (VITAMIN D3) 125 MCG
1 CAPSULE ORAL
Qty: 0 | Refills: 0 | DISCHARGE

## 2019-10-02 RX ORDER — SODIUM CHLORIDE 9 MG/ML
10 INJECTION INTRAMUSCULAR; INTRAVENOUS; SUBCUTANEOUS
Refills: 0 | Status: DISCONTINUED | OUTPATIENT
Start: 2019-10-02 | End: 2019-10-03

## 2019-10-02 RX ORDER — SODIUM BICARBONATE 1 MEQ/ML
1 SYRINGE (ML) INTRAVENOUS
Qty: 30 | Refills: 0
Start: 2019-10-02 | End: 2019-10-31

## 2019-10-02 RX ADMIN — Medication 650 MILLIGRAM(S): at 13:10

## 2019-10-02 RX ADMIN — Medication 5 UNIT(S): at 18:01

## 2019-10-02 RX ADMIN — MEMANTINE HYDROCHLORIDE 5 MILLIGRAM(S): 10 TABLET ORAL at 08:51

## 2019-10-02 RX ADMIN — HEPARIN SODIUM 5000 UNIT(S): 5000 INJECTION INTRAVENOUS; SUBCUTANEOUS at 21:18

## 2019-10-02 RX ADMIN — Medication 325 MILLIGRAM(S): at 08:50

## 2019-10-02 RX ADMIN — HEPARIN SODIUM 5000 UNIT(S): 5000 INJECTION INTRAVENOUS; SUBCUTANEOUS at 06:11

## 2019-10-02 RX ADMIN — Medication 1: at 18:00

## 2019-10-02 RX ADMIN — Medication 10 MILLIGRAM(S): at 21:16

## 2019-10-02 RX ADMIN — Medication 81 MILLIGRAM(S): at 08:50

## 2019-10-02 RX ADMIN — ISOSORBIDE DINITRATE 10 MILLIGRAM(S): 5 TABLET ORAL at 13:07

## 2019-10-02 RX ADMIN — Medication 75 MILLIGRAM(S): at 06:11

## 2019-10-02 RX ADMIN — TAMSULOSIN HYDROCHLORIDE 0.4 MILLIGRAM(S): 0.4 CAPSULE ORAL at 21:19

## 2019-10-02 RX ADMIN — Medication 1: at 08:41

## 2019-10-02 RX ADMIN — Medication 5 UNIT(S): at 13:07

## 2019-10-02 RX ADMIN — Medication 3: at 13:07

## 2019-10-02 RX ADMIN — CEFTRIAXONE 100 MILLIGRAM(S): 500 INJECTION, POWDER, FOR SOLUTION INTRAMUSCULAR; INTRAVENOUS at 21:17

## 2019-10-02 RX ADMIN — ISOSORBIDE DINITRATE 10 MILLIGRAM(S): 5 TABLET ORAL at 21:18

## 2019-10-02 RX ADMIN — Medication 5 UNIT(S): at 08:41

## 2019-10-02 RX ADMIN — MEMANTINE HYDROCHLORIDE 5 MILLIGRAM(S): 10 TABLET ORAL at 21:19

## 2019-10-02 RX ADMIN — Medication 0.12 MILLIGRAM(S): at 08:50

## 2019-10-02 RX ADMIN — Medication 650 MILLIGRAM(S): at 12:40

## 2019-10-02 RX ADMIN — INSULIN GLARGINE 15 UNIT(S): 100 INJECTION, SOLUTION SUBCUTANEOUS at 21:26

## 2019-10-02 RX ADMIN — DONEPEZIL HYDROCHLORIDE 10 MILLIGRAM(S): 10 TABLET, FILM COATED ORAL at 21:17

## 2019-10-02 RX ADMIN — ATORVASTATIN CALCIUM 40 MILLIGRAM(S): 80 TABLET, FILM COATED ORAL at 21:17

## 2019-10-02 RX ADMIN — Medication 60 MILLIGRAM(S): at 06:11

## 2019-10-02 RX ADMIN — Medication 10 MILLIGRAM(S): at 08:50

## 2019-10-02 RX ADMIN — WARFARIN SODIUM 3 MILLIGRAM(S): 2.5 TABLET ORAL at 21:19

## 2019-10-02 RX ADMIN — ISOSORBIDE DINITRATE 10 MILLIGRAM(S): 5 TABLET ORAL at 06:11

## 2019-10-02 NOTE — PROGRESS NOTE ADULT - SUBJECTIVE AND OBJECTIVE BOX
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     IR consulted to place Guan catheter for long term antibiotic administration.     NPO status: No  Anticoagulation: Heparin Subq q8  Antibiotics: Rocephin IV     Allergies: No Known Allergies    PAST MEDICAL & SURGICAL HISTORY:  Cerebrovascular accident (CVA), unspecified mechanism  Pacemaker  Endocarditis  CAD (coronary artery disease)  Cardiomyopathy, ischemic  Type 2 diabetes mellitus  Hypertension  Congestive heart failure  Paroxysmal atrial fibrillation  Dyslipidemia  S/P CABG x 1  History of cataract surgery  History of colon surgery  ICD (implantable cardioverter-defibrillator) in place  H/O mitral valve replacement: bovine      Pertinent labs:                      11.1   8.73  )-----------( 214      ( 02 Oct 2019 06:58 )             34.2   10-02    134<L>  |  102  |  109<H>  ----------------------------<  163<H>  4.3   |  16<L>  |  3.25<H>    Ca    9.2      02 Oct 2019 06:57    TPro  7.2  /  Alb  3.6  /  TBili  0.2  /  DBili  x   /  AST  19  /  ALT  23  /  AlkPhos  125<H>  10-02  PT/INR - ( 02 Oct 2019 06:58 )   PT: 21.5 sec;   INR: 1.85 ratio      Consent: Procedure/risks/ Benefits explained. Informed consent obtained. Pt verbalizes understanding.

## 2019-10-02 NOTE — PROGRESS NOTE ADULT - PROBLEM SELECTOR PLAN 9
Problem 11: Dementia: C/w home Aricept and Namenda     DVT: HSQ   Diet: CC diet  Code: Full  Dispo: Home with home PT

## 2019-10-02 NOTE — PROGRESS NOTE ADULT - PROBLEM SELECTOR PLAN 2
Pt INR found to be elevated in setting of sepsis.   - INR   - last dose given 9/28  - INR goal of 2 or less for Guan placement by IR, will restart after procedure.   - obtain daily INR

## 2019-10-02 NOTE — PROGRESS NOTE ADULT - PROBLEM SELECTOR PLAN 1
Pt p/w fever, tachypnea, and leukocytosis w/ evidence of UTI. S/p Vanc/CTX in the ED. Pt additionally presented w/ back pain concerning for spinal abscess, however CT imaging showed no evidence of spinal abscess.   - blood cx GBS +, source likely R leg cellulitis  - ID following, cont ceftriaxone 2g IVSS daily - will need 6 weeks from neg cx 9/25  -TTE showed MV echodensity (vegetation vs torn chord), couldn't r/o vegetation  -MARY refused/deferred due to risk with anesthesia  -sensitivities back: resistant to clinda, sensitive to levo, vanco, ceftriaxone, pcn  -repeat blood cx neg 9/25  -await Guan line placement by IR once INR < 2, will need 6 week course of abx from 9/25, per ID  -will need f/u echo, blood cx at conclusion of treatment along with weekly cbc, cmp

## 2019-10-02 NOTE — PROGRESS NOTE ADULT - SUBJECTIVE AND OBJECTIVE BOX
[facility] DIVISION OF KIDNEY DISEASES AND HYPERTENSION -- FOLLOW UP NOTE  --------------------------------------------------------------------------------  Chief Complaint: Positive blood cultures    24 hour events/subjective: Patient evaluated at bedside, in no acute distress. Denies any new complaints. Planned for Guan line today.    PAST HISTORY  --------------------------------------------------------------------------------  No significant changes to PMH, PSH, FHx, SHx, unless otherwise noted    ALLERGIES & MEDICATIONS  --------------------------------------------------------------------------------  Allergies    No Known Allergies    Intolerances      Standing Inpatient Medications  aspirin enteric coated 81 milliGRAM(s) Oral daily  atorvastatin 40 milliGRAM(s) Oral at bedtime  cefTRIAXone   IVPB 2000 milliGRAM(s) IV Intermittent every 24 hours  dextrose 5%. 1000 milliLiter(s) IV Continuous <Continuous>  dextrose 50% Injectable 12.5 Gram(s) IV Push once  dextrose 50% Injectable 25 Gram(s) IV Push once  dextrose 50% Injectable 25 Gram(s) IV Push once  digoxin     Tablet 0.125 milliGRAM(s) Oral <User Schedule>  donepezil 10 milliGRAM(s) Oral at bedtime  heparin  Injectable 5000 Unit(s) SubCutaneous every 8 hours  hydrALAZINE 10 milliGRAM(s) Oral two times a day  insulin glargine Injectable (LANTUS) 15 Unit(s) SubCutaneous at bedtime  insulin lispro (HumaLOG) corrective regimen sliding scale   SubCutaneous three times a day before meals  insulin lispro (HumaLOG) corrective regimen sliding scale   SubCutaneous at bedtime  insulin lispro Injectable (HumaLOG) 5 Unit(s) SubCutaneous three times a day before meals  isosorbide   dinitrate Tablet (ISORDIL) 10 milliGRAM(s) Oral three times a day  memantine 5 milliGRAM(s) Oral two times a day  metoprolol succinate ER 75 milliGRAM(s) Oral daily  sodium bicarbonate 325 milliGRAM(s) Oral daily  tamsulosin 0.4 milliGRAM(s) Oral at bedtime  torsemide 60 milliGRAM(s) Oral daily    REVIEW OF SYSTEMS  --------------------------------------------------------------------------------  Gen: No fevers/chills  Skin: No rashes  Respiratory: No dyspnea, cough  CV: No chest pain  GI: No abdominal pain, diarrhea  : No dysuria, hematuria  MSK: No  edema  Heme: No easy bruising or bleeding    All other systems were reviewed and are negative, except as noted.    VITALS/PHYSICAL EXAM  --------------------------------------------------------------------------------  T(C): 36.7 (10-02-19 @ 05:07), Max: 36.8 (10-01-19 @ 20:54)  HR: 68 (10-02-19 @ 05:07) (68 - 70)  BP: 119/67 (10-02-19 @ 05:07) (113/60 - 160/72)  RR: 18 (10-02-19 @ 05:07) (18 - 18)  SpO2: 96% (10-02-19 @ 05:07) (96% - 99%)  Wt(kg): --    10-01-19 @ 07:01  -  10-02-19 @ 07:00  --------------------------------------------------------  IN: 360 mL / OUT: 0 mL / NET: 360 mL    Physical Exam:  	Gen: NAD,    	HEENT:  no jvp  	Pulm: CTA B/L  	CV: RRR, S1S2; no rub  	Back:  no sacral edema  	Abd: +BS, soft,   	: No suprapubic tenderness  	Ext: no edema, no asterixis  	Neuro: awake  	Psych: alert  	Skin: Warm    LABS/STUDIES  --------------------------------------------------------------------------------              11.1   8.73  >-----------<  214      [10-02-19 @ 06:58]              34.2     134  |  102  |  109  ----------------------------<  163      [10-02-19 @ 06:57]  4.3   |  16  |  3.25        Ca     9.2     [10-02-19 @ 06:57]    TPro  7.2  /  Alb  3.6  /  TBili  0.2  /  DBili  x   /  AST  19  /  ALT  23  /  AlkPhos  125  [10-02-19 @ 06:57]    PT/INR: PT 21.5 , INR 1.85       [10-02-19 @ 06:58]    Creatinine Trend:  SCr 3.25 [10-02 @ 06:57]  SCr 3.07 [10-01 @ 06:43]  SCr 3.02 [09-30 @ 06:26]  SCr 3.19 [09-29 @ 06:36]  SCr 3.21 [09-28 @ 06:50]

## 2019-10-02 NOTE — PROGRESS NOTE ADULT - ASSESSMENT
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 To get Guan placed by IR once INR < 2.

## 2019-10-02 NOTE — PROGRESS NOTE ADULT - PROBLEM SELECTOR PLAN 1
Pt with hx of CKD since 2012, follows with Dr. Miller, CKD attributed from DM/HTN and CHF. On review of labs in [facility] has had scr at 2 mg/dl range in 2015, but since 2016 has been within 2.9-3.4. On admission 09/21/19 3.3 mg/dl now 3.4 mg/dl, scr at baseline.   Pt declined HD in the past. IR would place Guan catheter, if not would place PICC line on dominant arm, in case pt changes his mind and would go for HD in the future to leave non dominant arm for HD access.   However given cardiac hx pt not good candidate for long term HD.    No dose adjustment for ceftriaxone, however would not increase more than 2 g a day.     On digoxin given CKD monitor levels closely.     Avoid nephrotoxic meds, minimized contrast studies. Daily weight, fluid restriction and renal diet.

## 2019-10-02 NOTE — PROGRESS NOTE ADULT - ASSESSMENT
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 In the ED, LABS showed ESR: 21 WBC: 20.6 INR: 3.58 Procalcitonin: 19.62 Cr: 3.32 Pro-BNP 4675; UA: Large LE WBC; 49.  Admitted for sepsis 2/ to suspected urinary source given positive UA. Nephrology consulted for CKD and assessment  before picc line placement.

## 2019-10-02 NOTE — PROGRESS NOTE ADULT - SUBJECTIVE AND OBJECTIVE BOX
Patient is a 88y old  Male who presents with a chief complaint of fever and lethargy (27 Sep 2019 12:25)      SUBJECTIVE / OVERNIGHT EVENTS:  No acute events overnight. Denies CP, SOB, abdominal pain, N/V/D/C, dysuria, hematuria.         REVIEW OF SYSTEMS:    CONSTITUTIONAL: No weakness, fevers or chills  EYES/ENT: No visual changes;  No vertigo or throat pain   NECK: No pain or stiffness  RESPIRATORY: No cough, wheezing, hemoptysis; No shortness of breath  CARDIOVASCULAR: No chest pain or palpitations  GASTROINTESTINAL: No abdominal or epigastric pain. No nausea, vomiting, or hematemesis; No diarrhea or constipation. No melena or hematochezia.  GENITOURINARY: No dysuria, frequency or hematuria  NEUROLOGICAL: No numbness or weakness  MSK: no back pain  All other review of systems is negative unless indicated above.          Vital Signs Last 24 Hrs  T(C): 37 (28 Sep 2019 04:10), Max: 37 (28 Sep 2019 04:10)  T(F): 98.6 (28 Sep 2019 04:10), Max: 98.6 (28 Sep 2019 04:10)  HR: 69 (28 Sep 2019 09:02) (69 - 82)  BP: 139/75 (28 Sep 2019 09:02) (119/64 - 143/71)  BP(mean): --  RR: 18 (28 Sep 2019 04:10) (18 - 18)  SpO2: 97% (28 Sep 2019 04:10) (95% - 97%)    I&O's Summary    27 Sep 2019 07:01  -  28 Sep 2019 07:00  --------------------------------------------------------  IN: 750 mL / OUT: 0 mL / NET: 750 mL        PHYSICAL EXAM:  GENERAL: NAD, well-developed  HEAD:  Atraumatic, Normocephalic  EYES: EOMI, PERRLA, conjunctiva and sclera clear  NECK: Supple, No JVD  CHEST/LUNG: Clear to auscultation bilaterally; No wheeze  HEART: Regular rate and rhythm; No murmurs, rubs, or gallops  ABDOMEN: Soft, Nontender, Nondistended; Bowel sounds present  EXTREMITIES:  2+ Peripheral Pulses, No clubbing, cyanosis, or edema  NEUROLOGY: non-focal, AAO x 3  SKIN: RLE cellulitis improved; no erythema, drainage, RLE no longer warmer than LLE      MEDICATIONS  (STANDING):  aspirin enteric coated 81 milliGRAM(s) Oral daily  atorvastatin 40 milliGRAM(s) Oral at bedtime  cefTRIAXone   IVPB 2000 milliGRAM(s) IV Intermittent every 24 hours  dextrose 5%. 1000 milliLiter(s) (50 mL/Hr) IV Continuous <Continuous>  dextrose 50% Injectable 12.5 Gram(s) IV Push once  dextrose 50% Injectable 25 Gram(s) IV Push once  dextrose 50% Injectable 25 Gram(s) IV Push once  digoxin     Tablet 0.125 milliGRAM(s) Oral <User Schedule>  donepezil 10 milliGRAM(s) Oral at bedtime  heparin  Injectable 5000 Unit(s) SubCutaneous every 8 hours  hydrALAZINE 10 milliGRAM(s) Oral two times a day  insulin glargine Injectable (LANTUS) 15 Unit(s) SubCutaneous at bedtime  insulin lispro (HumaLOG) corrective regimen sliding scale   SubCutaneous three times a day before meals  insulin lispro (HumaLOG) corrective regimen sliding scale   SubCutaneous at bedtime  insulin lispro Injectable (HumaLOG) 5 Unit(s) SubCutaneous three times a day before meals  isosorbide   dinitrate Tablet (ISORDIL) 10 milliGRAM(s) Oral three times a day  memantine 5 milliGRAM(s) Oral two times a day  metoprolol succinate ER 75 milliGRAM(s) Oral daily  tamsulosin 0.4 milliGRAM(s) Oral at bedtime  torsemide 60 milliGRAM(s) Oral daily  warfarin 6 milliGRAM(s) Oral at bedtime    MEDICATIONS  (PRN):  dextrose 40% Gel 15 Gram(s) Oral once PRN Blood Glucose LESS THAN 70 milliGRAM(s)/deciliter  glucagon  Injectable 1 milliGRAM(s) IntraMuscular once PRN Glucose LESS THAN 70 milligrams/deciliter      LABS:  CAPILLARY BLOOD GLUCOSE      POCT Blood Glucose.: 207 mg/dL (28 Sep 2019 08:39)  POCT Blood Glucose.: 179 mg/dL (27 Sep 2019 22:32)  POCT Blood Glucose.: 159 mg/dL (27 Sep 2019 17:25)  POCT Blood Glucose.: 187 mg/dL (27 Sep 2019 12:37)                          11.7   8.8   )-----------( 155      ( 28 Sep 2019 06:50 )             36.8     Auto Eosinophil # x     / Auto Eosinophil % x     / Auto Neutrophil # x     / Auto Neutrophil % x     / BANDS % x                            11.6   8.8   )-----------( 141      ( 27 Sep 2019 06:52 )             37.4     Auto Eosinophil # x     / Auto Eosinophil % x     / Auto Neutrophil # x     / Auto Neutrophil % x     / BANDS % x        Hgb Trend: 11.7<--, 11.6<--, 11.6<--, 11.5<--, 11.1<--  09-28    136  |  106  |  101<H>  ----------------------------<  257<H>  4.0   |  17<L>  |  3.21<H>  09-27    135  |  104  |  104<H>  ----------------------------<  191<H>  4.1   |  17<L>  |  3.41<H>    Ca    9.3      28 Sep 2019 06:50  TPro  7.3  /  Alb  3.5  /  TBili  0.1<L>  /  DBili  x   /  AST  36  /  ALT  34  /  AlkPhos  112  09-28    Creatinine Trend: 3.21<--, 3.41<--, 3.13<--, 3.09<--, 3.13<--, 3.36<--  PT/INR - ( 28 Sep 2019 06:50 )   PT: 34.0 sec;   INR: 2.89 ratio                       ABG:   VBG:     MICROBIOLOGY:     Culture - Blood (collected 25 Sep 2019 15:45)  Source: .Blood  Preliminary Report (26 Sep 2019 16:01):    No growth to date.    Culture - Blood (collected 25 Sep 2019 15:45)  Source: .Blood  Preliminary Report (26 Sep 2019 16:01):    No growth to date.

## 2019-10-02 NOTE — PROGRESS NOTE ADULT - SUBJECTIVE AND OBJECTIVE BOX
Interventional Radiology Procedure Note    Procedure: Right IJ 5french Tunneled central venous catheter placement (Guan)      Indication: Long term antibiotic administration     Operators: Dr. Dimas, Dr. Howard     Anesthesia (type): Subcutaneous lidocaine     Contrast: None     EBL: Minimal     Findings/Follow up Plan of Care: Line may be accessed and used immediately     Specimens Removed: None     Implants: Right IJ 5french Tunneled central venous catheter placement (Guan)     Complications: None     Condition/Disposition: recovery, floors     Please call Interventional Radiology x [phone] with any questions, concerns, or issues.

## 2019-10-03 ENCOUNTER — TRANSCRIPTION ENCOUNTER (OUTPATIENT)
Age: 84
End: 2019-10-03

## 2019-10-03 VITALS
SYSTOLIC BLOOD PRESSURE: 162 MMHG | HEART RATE: 71 BPM | OXYGEN SATURATION: 99 % | RESPIRATION RATE: 18 BRPM | DIASTOLIC BLOOD PRESSURE: 70 MMHG | TEMPERATURE: 98 F

## 2019-10-03 LAB
ANION GAP SERPL CALC-SCNC: 14 MMOL/L — SIGNIFICANT CHANGE UP (ref 5–17)
BUN SERPL-MCNC: 104 MG/DL — HIGH (ref 7–23)
CALCIUM SERPL-MCNC: 9.2 MG/DL — SIGNIFICANT CHANGE UP (ref 8.4–10.5)
CHLORIDE SERPL-SCNC: 103 MMOL/L — SIGNIFICANT CHANGE UP (ref 96–108)
CO2 SERPL-SCNC: 16 MMOL/L — LOW (ref 22–31)
CREAT SERPL-MCNC: 3.4 MG/DL — HIGH (ref 0.5–1.3)
GLUCOSE BLDC GLUCOMTR-MCNC: 179 MG/DL — HIGH (ref 70–99)
GLUCOSE BLDC GLUCOMTR-MCNC: 254 MG/DL — HIGH (ref 70–99)
GLUCOSE BLDC GLUCOMTR-MCNC: 302 MG/DL — HIGH (ref 70–99)
GLUCOSE SERPL-MCNC: 188 MG/DL — HIGH (ref 70–99)
HCT VFR BLD CALC: 35.4 % — LOW (ref 39–50)
HGB BLD-MCNC: 11.6 G/DL — LOW (ref 13–17)
INR BLD: 1.44 RATIO — HIGH (ref 0.88–1.16)
MCHC RBC-ENTMCNC: 29.2 PG — SIGNIFICANT CHANGE UP (ref 27–34)
MCHC RBC-ENTMCNC: 32.8 GM/DL — SIGNIFICANT CHANGE UP (ref 32–36)
MCV RBC AUTO: 89.2 FL — SIGNIFICANT CHANGE UP (ref 80–100)
NRBC # BLD: 0 /100 WBCS — SIGNIFICANT CHANGE UP (ref 0–0)
PLATELET # BLD AUTO: 204 K/UL — SIGNIFICANT CHANGE UP (ref 150–400)
POTASSIUM SERPL-MCNC: 4.4 MMOL/L — SIGNIFICANT CHANGE UP (ref 3.5–5.3)
POTASSIUM SERPL-SCNC: 4.4 MMOL/L — SIGNIFICANT CHANGE UP (ref 3.5–5.3)
PROTHROM AB SERPL-ACNC: 16.7 SEC — HIGH (ref 10–12.9)
RBC # BLD: 3.97 M/UL — LOW (ref 4.2–5.8)
RBC # FLD: 13.5 % — SIGNIFICANT CHANGE UP (ref 10.3–14.5)
SODIUM SERPL-SCNC: 133 MMOL/L — LOW (ref 135–145)
WBC # BLD: 9.14 K/UL — SIGNIFICANT CHANGE UP (ref 3.8–10.5)
WBC # FLD AUTO: 9.14 K/UL — SIGNIFICANT CHANGE UP (ref 3.8–10.5)

## 2019-10-03 PROCEDURE — 85730 THROMBOPLASTIN TIME PARTIAL: CPT

## 2019-10-03 PROCEDURE — 93308 TTE F-UP OR LMTD: CPT

## 2019-10-03 PROCEDURE — 97116 GAIT TRAINING THERAPY: CPT

## 2019-10-03 PROCEDURE — 87040 BLOOD CULTURE FOR BACTERIA: CPT

## 2019-10-03 PROCEDURE — 82947 ASSAY GLUCOSE BLOOD QUANT: CPT

## 2019-10-03 PROCEDURE — 71045 X-RAY EXAM CHEST 1 VIEW: CPT

## 2019-10-03 PROCEDURE — 85652 RBC SED RATE AUTOMATED: CPT

## 2019-10-03 PROCEDURE — 77001 FLUOROGUIDE FOR VEIN DEVICE: CPT

## 2019-10-03 PROCEDURE — 99232 SBSQ HOSP IP/OBS MODERATE 35: CPT

## 2019-10-03 PROCEDURE — 96375 TX/PRO/DX INJ NEW DRUG ADDON: CPT

## 2019-10-03 PROCEDURE — 82330 ASSAY OF CALCIUM: CPT

## 2019-10-03 PROCEDURE — 93306 TTE W/DOPPLER COMPLETE: CPT

## 2019-10-03 PROCEDURE — 97530 THERAPEUTIC ACTIVITIES: CPT

## 2019-10-03 PROCEDURE — 84132 ASSAY OF SERUM POTASSIUM: CPT

## 2019-10-03 PROCEDURE — 87633 RESP VIRUS 12-25 TARGETS: CPT

## 2019-10-03 PROCEDURE — 81001 URINALYSIS AUTO W/SCOPE: CPT

## 2019-10-03 PROCEDURE — 85610 PROTHROMBIN TIME: CPT

## 2019-10-03 PROCEDURE — 82435 ASSAY OF BLOOD CHLORIDE: CPT

## 2019-10-03 PROCEDURE — 36558 INSERT TUNNELED CV CATH: CPT

## 2019-10-03 PROCEDURE — 80053 COMPREHEN METABOLIC PANEL: CPT

## 2019-10-03 PROCEDURE — 82803 BLOOD GASES ANY COMBINATION: CPT

## 2019-10-03 PROCEDURE — 72128 CT CHEST SPINE W/O DYE: CPT

## 2019-10-03 PROCEDURE — 87150 DNA/RNA AMPLIFIED PROBE: CPT

## 2019-10-03 PROCEDURE — 99239 HOSP IP/OBS DSCHRG MGMT >30: CPT

## 2019-10-03 PROCEDURE — 80162 ASSAY OF DIGOXIN TOTAL: CPT

## 2019-10-03 PROCEDURE — 74176 CT ABD & PELVIS W/O CONTRAST: CPT

## 2019-10-03 PROCEDURE — 99233 SBSQ HOSP IP/OBS HIGH 50: CPT

## 2019-10-03 PROCEDURE — 83036 HEMOGLOBIN GLYCOSYLATED A1C: CPT

## 2019-10-03 PROCEDURE — 83880 ASSAY OF NATRIURETIC PEPTIDE: CPT

## 2019-10-03 PROCEDURE — 87184 SC STD DISK METHOD PER PLATE: CPT

## 2019-10-03 PROCEDURE — 84100 ASSAY OF PHOSPHORUS: CPT

## 2019-10-03 PROCEDURE — 84145 PROCALCITONIN (PCT): CPT

## 2019-10-03 PROCEDURE — 96374 THER/PROPH/DIAG INJ IV PUSH: CPT

## 2019-10-03 PROCEDURE — 80048 BASIC METABOLIC PNL TOTAL CA: CPT

## 2019-10-03 PROCEDURE — 85014 HEMATOCRIT: CPT

## 2019-10-03 PROCEDURE — 85027 COMPLETE CBC AUTOMATED: CPT

## 2019-10-03 PROCEDURE — 83605 ASSAY OF LACTIC ACID: CPT

## 2019-10-03 PROCEDURE — 87486 CHLMYD PNEUM DNA AMP PROBE: CPT

## 2019-10-03 PROCEDURE — 76937 US GUIDE VASCULAR ACCESS: CPT

## 2019-10-03 PROCEDURE — 82962 GLUCOSE BLOOD TEST: CPT

## 2019-10-03 PROCEDURE — C1751: CPT

## 2019-10-03 PROCEDURE — 84295 ASSAY OF SERUM SODIUM: CPT

## 2019-10-03 PROCEDURE — 99285 EMERGENCY DEPT VISIT HI MDM: CPT | Mod: 25

## 2019-10-03 PROCEDURE — 87798 DETECT AGENT NOS DNA AMP: CPT

## 2019-10-03 PROCEDURE — 93005 ELECTROCARDIOGRAM TRACING: CPT

## 2019-10-03 PROCEDURE — 87581 M.PNEUMON DNA AMP PROBE: CPT

## 2019-10-03 PROCEDURE — 72131 CT LUMBAR SPINE W/O DYE: CPT

## 2019-10-03 PROCEDURE — 87086 URINE CULTURE/COLONY COUNT: CPT

## 2019-10-03 PROCEDURE — 83735 ASSAY OF MAGNESIUM: CPT

## 2019-10-03 PROCEDURE — 97161 PT EVAL LOW COMPLEX 20 MIN: CPT

## 2019-10-03 RX ORDER — INSULIN GLARGINE 100 [IU]/ML
20 INJECTION, SOLUTION SUBCUTANEOUS
Qty: 0 | Refills: 0 | DISCHARGE

## 2019-10-03 RX ORDER — SODIUM BICARBONATE 1 MEQ/ML
325 SYRINGE (ML) INTRAVENOUS THREE TIMES A DAY
Refills: 0 | Status: DISCONTINUED | OUTPATIENT
Start: 2019-10-03 | End: 2019-10-03

## 2019-10-03 RX ORDER — SODIUM BICARBONATE 1 MEQ/ML
1 SYRINGE (ML) INTRAVENOUS
Qty: 90 | Refills: 0
Start: 2019-10-03 | End: 2019-11-01

## 2019-10-03 RX ORDER — SODIUM BICARBONATE 1 MEQ/ML
650 SYRINGE (ML) INTRAVENOUS THREE TIMES A DAY
Refills: 0 | Status: DISCONTINUED | OUTPATIENT
Start: 2019-10-03 | End: 2019-10-03

## 2019-10-03 RX ORDER — SODIUM BICARBONATE 1 MEQ/ML
2 SYRINGE (ML) INTRAVENOUS
Qty: 0 | Refills: 0 | DISCHARGE
Start: 2019-10-03 | End: 2019-11-01

## 2019-10-03 RX ADMIN — HEPARIN SODIUM 5000 UNIT(S): 5000 INJECTION INTRAVENOUS; SUBCUTANEOUS at 06:38

## 2019-10-03 RX ADMIN — ISOSORBIDE DINITRATE 10 MILLIGRAM(S): 5 TABLET ORAL at 06:38

## 2019-10-03 RX ADMIN — HEPARIN SODIUM 5000 UNIT(S): 5000 INJECTION INTRAVENOUS; SUBCUTANEOUS at 13:05

## 2019-10-03 RX ADMIN — Medication 5 UNIT(S): at 08:59

## 2019-10-03 RX ADMIN — Medication 1: at 08:59

## 2019-10-03 RX ADMIN — Medication 75 MILLIGRAM(S): at 06:38

## 2019-10-03 RX ADMIN — Medication 5 UNIT(S): at 13:05

## 2019-10-03 RX ADMIN — Medication 10 MILLIGRAM(S): at 09:00

## 2019-10-03 RX ADMIN — MEMANTINE HYDROCHLORIDE 5 MILLIGRAM(S): 10 TABLET ORAL at 09:03

## 2019-10-03 RX ADMIN — CHLORHEXIDINE GLUCONATE 1 APPLICATION(S): 213 SOLUTION TOPICAL at 17:58

## 2019-10-03 RX ADMIN — ISOSORBIDE DINITRATE 10 MILLIGRAM(S): 5 TABLET ORAL at 13:06

## 2019-10-03 RX ADMIN — CEFTRIAXONE 100 MILLIGRAM(S): 500 INJECTION, POWDER, FOR SOLUTION INTRAMUSCULAR; INTRAVENOUS at 17:55

## 2019-10-03 RX ADMIN — Medication 3: at 13:05

## 2019-10-03 RX ADMIN — Medication 650 MILLIGRAM(S): at 13:06

## 2019-10-03 RX ADMIN — Medication 5 UNIT(S): at 17:57

## 2019-10-03 RX ADMIN — Medication 4: at 17:57

## 2019-10-03 RX ADMIN — Medication 81 MILLIGRAM(S): at 12:18

## 2019-10-03 RX ADMIN — Medication 60 MILLIGRAM(S): at 06:39

## 2019-10-03 NOTE — PROGRESS NOTE ADULT - NEGATIVE ALLERGY TYPES
no reactions to medicines

## 2019-10-03 NOTE — PROGRESS NOTE ADULT - REASON FOR ADMISSION
fever and lethargy

## 2019-10-03 NOTE — PROGRESS NOTE ADULT - PROBLEM SELECTOR PLAN 6
independent -vegetation noted on TTE  -MARY refused  -cont abx  -surveillance blood cx post iv abx    Case D/W Dr. Hunt from EP

## 2019-10-03 NOTE — PROGRESS NOTE ADULT - PROBLEM SELECTOR PLAN 7
-plan 6 weeks of abx - day 8/42 of abx  -picc  -potential side effects of PICC explained including bleeding and infection  -potential side effects of abx explained including GI, Cdiff, allergy issues, development of resistance, etc.

## 2019-10-03 NOTE — PROGRESS NOTE ADULT - SUBJECTIVE AND OBJECTIVE BOX
[facility] DIVISION OF KIDNEY DISEASES AND HYPERTENSION -- FOLLOW UP NOTE  --------------------------------------------------------------------------------  Chief Complaint:/subjective: feels well, no complaints    24 hour events:as above        PAST HISTORY  --------------------------------------------------------------------------------  No significant changes to PMH, PSH, FHx, SHx, unless otherwise noted    ALLERGIES & MEDICATIONS  --------------------------------------------------------------------------------  Allergies    No Known Allergies    Intolerances      Standing Inpatient Medications  aspirin enteric coated 81 milliGRAM(s) Oral daily  atorvastatin 40 milliGRAM(s) Oral at bedtime  cefTRIAXone   IVPB 2000 milliGRAM(s) IV Intermittent every 24 hours  chlorhexidine 4% Liquid 1 Application(s) Topical <User Schedule>  dextrose 5%. 1000 milliLiter(s) IV Continuous <Continuous>  dextrose 50% Injectable 12.5 Gram(s) IV Push once  dextrose 50% Injectable 25 Gram(s) IV Push once  dextrose 50% Injectable 25 Gram(s) IV Push once  digoxin     Tablet 0.125 milliGRAM(s) Oral <User Schedule>  donepezil 10 milliGRAM(s) Oral at bedtime  heparin  Injectable 5000 Unit(s) SubCutaneous every 8 hours  hydrALAZINE 10 milliGRAM(s) Oral two times a day  insulin glargine Injectable (LANTUS) 15 Unit(s) SubCutaneous at bedtime  insulin lispro (HumaLOG) corrective regimen sliding scale   SubCutaneous three times a day before meals  insulin lispro (HumaLOG) corrective regimen sliding scale   SubCutaneous at bedtime  insulin lispro Injectable (HumaLOG) 5 Unit(s) SubCutaneous three times a day before meals  isosorbide   dinitrate Tablet (ISORDIL) 10 milliGRAM(s) Oral three times a day  memantine 5 milliGRAM(s) Oral two times a day  metoprolol succinate ER 75 milliGRAM(s) Oral daily  sodium bicarbonate 650 milliGRAM(s) Oral three times a day  tamsulosin 0.4 milliGRAM(s) Oral at bedtime  warfarin 3 milliGRAM(s) Oral at bedtime    PRN Inpatient Medications  acetaminophen   Tablet .. 650 milliGRAM(s) Oral every 6 hours PRN  dextrose 40% Gel 15 Gram(s) Oral once PRN  glucagon  Injectable 1 milliGRAM(s) IntraMuscular once PRN  sodium chloride 0.9% lock flush 10 milliLiter(s) IV Push every 1 hour PRN      REVIEW OF SYSTEMS  --------------------------------------------------------------------------------  Gen: No weight changes, fatigue, fevers/chills, weakness  Skin: No rashes  Head/Eyes/Ears/Mouth: No headache;   Respiratory: No dyspnea, cough  CV: No chest pain, PND, orthopnea  GI: No abdominal pain, diarrhea, constipation, nausea, vomiting  : No increased frequency, dysuria, hematuria, nocturia  MSK: No joint pain/swelling; no back pain; no edema  Neuro: No dizziness/lightheadedness, weakness  Heme: No easy bruising or bleeding  Psych: No significant nervousness, anxiety, stress, depression    All other systems were reviewed and are negative, except as noted.    VITALS/PHYSICAL EXAM  --------------------------------------------------------------------------------  T(C): 36.6 (10-03-19 @ 12:15), Max: 36.7 (10-03-19 @ 04:59)  HR: 71 (10-03-19 @ 12:15) (69 - 76)  BP: 162/70 (10-03-19 @ 12:15) (113/53 - 167/67)  RR: 18 (10-03-19 @ 12:15) (17 - 18)  SpO2: 99% (10-03-19 @ 12:15) (96% - 99%)  Wt(kg): --  Adult Advanced Hemodynamics Last 24 Hrs  ABP: --  ABP(mean): --  CVP(mm Hg): --  CO: --  CI: --  PA: --  PA(mean): --  PCWP: --  SVR: --  SVRI: --        10-02-19 @ 07:01  -  10-03-19 @ 07:00  --------------------------------------------------------  IN: 410 mL / OUT: 0 mL / NET: 410 mL    10-03-19 @ 07:01  -  10-03-19 @ 12:30  --------------------------------------------------------  IN: 240 mL / OUT: 350 mL / NET: -110 mL      Physical Exam:  	Gen: NAD,  drymm  	HEENT:   no jvp  	Pulm: CTA B/L  	CV: RRR, S1S2; no rub  	Back:  no sacral edema  	Abd: +BS, soft, nontender/nondistended  	: No suprapubic tenderness  	Ext: no edema  	Neuro:awake  	Psych: alert  	Skin: Warm,   	Vascular access:    LABS/STUDIES  --------------------------------------------------------------------------------              11.6   9.14  >-----------<  204      [10-03-19 @ 06:59]              35.4     Hemoglobin: 11.6 g/dL (10-03-19 @ 06:59)  Hemoglobin: 11.1 g/dL (10-02-19 @ 06:58)    Platelet Count - Automated: 204 K/uL (10-03-19 @ 06:59)  Platelet Count - Automated: 214 K/uL (10-02-19 @ 06:58)    133  |  103  |  104  ----------------------------<  188      [10-03-19 @ 07:05]  4.4   |  16  |  3.40        Ca     9.2     [10-03-19 @ 07:05]    TPro  7.2  /  Alb  3.6  /  TBili  0.2  /  DBili  x   /  AST  19  /  ALT  23  /  AlkPhos  125  [10-02-19 @ 06:57]    PT/INR: PT 16.7 , INR 1.44       [10-03-19 @ 06:59]      Creatinine Trend:  SCr 3.40 [10-03 @ 07:05]  SCr 3.25 [10-02 @ 06:57]  SCr 3.07 [10-01 @ 06:43]  SCr 3.02 [09-30 @ 06:26]  SCr 3.19 [09-29 @ 06:36]    Urinalysis - [09-21-19 @ 22:55]      Color Yellow / Appearance Slightly Turbid / SG 1.017 / pH 6.0      Gluc Negative / Ketone Negative  / Bili Negative / Urobili Negative       Blood Negative / Protein 300 mg/dL / Leuk Est Large / Nitrite Negative      RBC 5 / WBC 49 / Hyaline 6 / Gran  / Sq Epi  / Non Sq Epi 1 / Bacteria Negative      HbA1c 7.2      [09-22-19 @ 08:55]

## 2019-10-03 NOTE — CHART NOTE - NSCHARTNOTEFT_GEN_A_CORE
Nutrition Follow Up    Pt seen as per request of daughter-in-law/son of patient re: review of renal diet education. Pt seen by RD on 10/1. Per note, education provided by RD.     This RD reinforced Diabetes and CKD Stages 1 through 4: Nutrition Guidelines; discussed in further detail appropriate protein sources/serving sizes, importance of monitoring sodium intake and provided brief review on foods high vs. low in potassium and phosphorous. Educated pt and family on fluid restriction as per discretion of medical team. Pt reports very good appetite. RD to remain available as per request of pt/medical team, per department protocol.     Alison Kleiner, RD, [facility] Pager # [phone]

## 2019-10-03 NOTE — PROGRESS NOTE ADULT - PROBLEM SELECTOR PLAN 1
Pt with hx of CKD since 2012, follows with Dr. Miller, CKD attributed from DM/HTN and CHF. On review of labs in [facility] has had scr at 2 mg/dl range in 2015, but since 2016 has been within 2.9-3.4. On admission 09/21/19 3.3 mg/dl now 3.4 mg/dl, scr at baseline.     IR would place Guan catheter, if not would place PICC line on dominant arm, in case pt changes his mind and would go for HD in the future to leave non dominant arm for HD access.   However given cardiac hx pt not good candidate for long term HD.    No dose adjustment for ceftriaxone, however would not increase more than 2 g a day.     On digoxin given CKD monitor levels closely.     Avoid nephrotoxic meds, minimized contrast studies. Daily weight, fluid restriction and renal diet.

## 2019-10-03 NOTE — PROGRESS NOTE ADULT - RS GEN PE MLT RESP DETAILS PC
respirations non-labored/clear to auscultation bilaterally
good air movement/clear to auscultation bilaterally/respirations non-labored
respirations non-labored/clear to auscultation bilaterally
respirations non-labored/clear to auscultation bilaterally
clear to auscultation bilaterally/respirations non-labored

## 2019-10-03 NOTE — PROGRESS NOTE ADULT - GASTROINTESTINAL DETAILS
no distention/soft/no rebound tenderness/no guarding/nontender
soft/nontender/no rebound tenderness/no rigidity/no distention/no guarding
no rebound tenderness/no rigidity/no guarding/no distention/soft/nontender
nontender/no rigidity/soft/no distention/no guarding/no rebound tenderness
no guarding/no rigidity/no rebound tenderness/nontender/no distention/soft

## 2019-10-03 NOTE — PROGRESS NOTE ADULT - NEGATIVE GENERAL GENITOURINARY SYMPTOMS
no dysuria/no hematuria
no dysuria/no hematuria
no hematuria/no dysuria

## 2019-10-03 NOTE — PROGRESS NOTE ADULT - NEGATIVE RESPIRATORY AND THORAX SYMPTOMS
no dyspnea/no hemoptysis/no cough
no hemoptysis/no dyspnea/no cough
no cough/no dyspnea/no hemoptysis
no dyspnea/no cough/no hemoptysis
no dyspnea/no hemoptysis/no cough

## 2019-10-03 NOTE — PROGRESS NOTE ADULT - CONSTITUTIONAL DETAILS
no distress/well-groomed
well-groomed/no distress
well-groomed/no distress
no distress/well-groomed
no distress/well-groomed

## 2019-10-03 NOTE — PROGRESS NOTE ADULT - CARDIOVASCULAR DETAILS
positive S1/positive S2
positive S2/positive S1
positive S1/positive S2
positive S2/positive S1
positive S1/positive S2

## 2019-10-03 NOTE — PROGRESS NOTE ADULT - NEGATIVE OPHTHALMOLOGIC SYMPTOMS
no diplopia/no pain R/no pain L
no pain L/no diplopia/no pain R
no pain L/no pain R/no diplopia
no diplopia/no pain L/no pain R
no diplopia/no pain R/no pain L

## 2019-10-03 NOTE — PROGRESS NOTE ADULT - PROVIDER SPECIALTY LIST ADULT
Infectious Disease
Internal Medicine
Intervent Radiology
Intervent Radiology
Nephrology
Infectious Disease
Internal Medicine
Infectious Disease
Infectious Disease

## 2019-10-03 NOTE — PROGRESS NOTE ADULT - SUBJECTIVE AND OBJECTIVE BOX
PELON DUNCAN 88y [id number]    Patient is a 88y old  Male who presents with a chief complaint of fever and lethargy (03 Oct 2019 07:30)      Follow Up/CC:  ID following for bacteremia    Interval History/ROS: s/p thomson, no fever    Allergies    No Known Allergies    Intolerances        ANTIMICROBIALS:  cefTRIAXone   IVPB 2000 every 24 hours      MEDICATIONS  (STANDING):  aspirin enteric coated 81 milliGRAM(s) Oral daily  atorvastatin 40 milliGRAM(s) Oral at bedtime  cefTRIAXone   IVPB 2000 milliGRAM(s) IV Intermittent every 24 hours  chlorhexidine 4% Liquid 1 Application(s) Topical <User Schedule>  dextrose 5%. 1000 milliLiter(s) (50 mL/Hr) IV Continuous <Continuous>  dextrose 50% Injectable 12.5 Gram(s) IV Push once  dextrose 50% Injectable 25 Gram(s) IV Push once  dextrose 50% Injectable 25 Gram(s) IV Push once  digoxin     Tablet 0.125 milliGRAM(s) Oral <User Schedule>  donepezil 10 milliGRAM(s) Oral at bedtime  heparin  Injectable 5000 Unit(s) SubCutaneous every 8 hours  hydrALAZINE 10 milliGRAM(s) Oral two times a day  insulin glargine Injectable (LANTUS) 15 Unit(s) SubCutaneous at bedtime  insulin lispro (HumaLOG) corrective regimen sliding scale   SubCutaneous three times a day before meals  insulin lispro (HumaLOG) corrective regimen sliding scale   SubCutaneous at bedtime  insulin lispro Injectable (HumaLOG) 5 Unit(s) SubCutaneous three times a day before meals  isosorbide   dinitrate Tablet (ISORDIL) 10 milliGRAM(s) Oral three times a day  memantine 5 milliGRAM(s) Oral two times a day  metoprolol succinate ER 75 milliGRAM(s) Oral daily  sodium bicarbonate 650 milliGRAM(s) Oral three times a day  tamsulosin 0.4 milliGRAM(s) Oral at bedtime  torsemide 60 milliGRAM(s) Oral daily  warfarin 3 milliGRAM(s) Oral at bedtime    MEDICATIONS  (PRN):  acetaminophen   Tablet .. 650 milliGRAM(s) Oral every 6 hours PRN Mild Pain (1 - 3), Moderate Pain (4 - 6), Severe Pain (7 - 10)  dextrose 40% Gel 15 Gram(s) Oral once PRN Blood Glucose LESS THAN 70 milliGRAM(s)/deciliter  glucagon  Injectable 1 milliGRAM(s) IntraMuscular once PRN Glucose LESS THAN 70 milligrams/deciliter  sodium chloride 0.9% lock flush 10 milliLiter(s) IV Push every 1 hour PRN Pre/post blood products, medications, blood draw, and to maintain line patency        Vital Signs Last 24 Hrs  T(C): 36.7 (03 Oct 2019 04:59), Max: 36.7 (03 Oct 2019 04:59)  T(F): 98.1 (03 Oct 2019 04:59), Max: 98.1 (03 Oct 2019 04:59)  HR: 71 (03 Oct 2019 09:07) (69 - 76)  BP: 143/71 (03 Oct 2019 09:07) (113/53 - 167/67)  BP(mean): --  RR: 18 (03 Oct 2019 04:59) (17 - 18)  SpO2: 98% (03 Oct 2019 04:59) (96% - 98%)    CBC Full  -  ( 03 Oct 2019 06:59 )  WBC Count : 9.14 K/uL  RBC Count : 3.97 M/uL  Hemoglobin : 11.6 g/dL  Hematocrit : 35.4 %  Platelet Count - Automated : 204 K/uL  Mean Cell Volume : 89.2 fl  Mean Cell Hemoglobin : 29.2 pg  Mean Cell Hemoglobin Concentration : 32.8 gm/dL  Auto Neutrophil # : x  Auto Lymphocyte # : x  Auto Monocyte # : x  Auto Eosinophil # : x  Auto Basophil # : x  Auto Neutrophil % : x  Auto Lymphocyte % : x  Auto Monocyte % : x  Auto Eosinophil % : x  Auto Basophil % : x    10-03    133<L>  |  103  |  104<H>  ----------------------------<  188<H>  4.4   |  16<L>  |  3.40<H>    Ca    9.2      03 Oct 2019 07:05    TPro  7.2  /  Alb  3.6  /  TBili  0.2  /  DBili  x   /  AST  19  /  ALT  23  /  AlkPhos  125<H>  10-02    LIVER FUNCTIONS - ( 02 Oct 2019 06:57 )  Alb: 3.6 g/dL / Pro: 7.2 g/dL / ALK PHOS: 125 U/L / ALT: 23 U/L / AST: 19 U/L / GGT: x               MICROBIOLOGY:  .Blood  09-25-19   No growth at 5 days.  --  --      .Blood  09-22-19   No growth at 5 days.  --  --      .Urine  09-22-19   <10,000 CFU/mL Normal Urogenital Arielle  --  --      .Blood  09-22-19   Growth in aerobic and anaerobic bottles: Streptococcus agalactiae (Group  B) ***********Note************  This isolate demonstrates inducible  clindamycin resistance.  Clindamycin may still be effective in some patients.  "Due to technical problems, Proteus sp. will Not be reported as part of  the BCID panel until further notice"  ***Blood Panel PCR results on this specimen are available  approximately 3 hours after the Gram stain result.***  Gram stain, PCR, and/or culture results may not always  correspond due to difference in methodologies.  ************************************************************  This PCR assay was performed using [employer]  The following targets are tested for: Enterococcus,  vancomycin resistant enterococci, Listeria monocytogenes,  coagulase negative staphylococci, S. aureus,  methicillin resistant S. aureus, Streptococcus agalactiae  (Group B), S. pneumoniae, S. pyogenes (Group A),  Acinetobacter baumannii, Enterobacter cloacae, E. coli,  Klebsiella oxytoca, K. pneumoniae, Proteus sp.,  Serratia marcescens, Haemophilus influenzae,  Neisseria meningitidis, Pseudomonas aeruginosa, Candida  albicans, C. glabrata, C krusei, C parapsilosis,  C. tropicalis and the KPC resistance gene.  --  Blood Culture PCR  Streptococcus agalactiae (Group B)              v            RADIOLOGY

## 2019-10-03 NOTE — DISCHARGE NOTE NURSING/CASE MANAGEMENT/SOCIAL WORK - PATIENT PORTAL LINK FT
You can access the FollowMyHealth Patient Portal offered by [facility] by registering at the following website: [email or web address]. By joining [email or web address]’s FollowMyHealth portal, you will also be able to view your health information using other applications (apps) compatible with our system.

## 2019-10-03 NOTE — PROGRESS NOTE ADULT - PROBLEM SELECTOR PLAN 1
Pt p/w fever, tachypnea, and leukocytosis w/ evidence of UTI. S/p Vanc/CTX in the ED. Pt additionally presented w/ back pain concerning for spinal abscess, however CT imaging showed no evidence of spinal abscess.   - blood cx GBS +, source likely R leg cellulitis  - ID following, cont ceftriaxone 2g IVSS daily - will need 6 weeks from neg cx 9/25  -TTE showed MV echodensity (vegetation vs torn chord), couldn't r/o vegetation  -MARY refused/deferred due to risk with anesthesia  -sensitivities back: resistant to clinda, sensitive to levo, vanco, ceftriaxone, pcn  -repeat blood cx neg 9/25  -Guan line placement by IR 10/2, will need 6 week course of abx from 9/25, per ID  -will need f/u echo, blood cx at conclusion of treatment along with weekly cbc, cmp

## 2019-10-03 NOTE — PROGRESS NOTE ADULT - NEGATIVE NEUROLOGICAL SYMPTOMS
no confusion/no headache
no headache/no confusion
no headache/no confusion
no confusion/no headache
no headache/no confusion

## 2019-10-03 NOTE — PROGRESS NOTE ADULT - ATTENDING COMMENTS
Willie Swenson  Attending Physician   Division of Infectious Disease  Pager #[phone]  After 5pm/weekend or no response, call #[phone]
#roel on ckd 1v- atn in setting of infection- cr had plateaued but slight uptrending daily for past 72 hours; monitor trend  #acidosis- start sodium bicarbonate tablets but increase to 625 mg  tid; monitor hc03 trend  #vol status- on torsemide; can hold for now  #s/p thomson for antibiotics
#roel on ckd 1v- atn in setting of infection- cr has plateaued; monitor trend  #acidosis- start sodium bicarbonate tablets; monitor hc03 trend  #vol status- on torsemide  #awaiting picc line for antibiotics at home
#roel on ckd 1v- atn in setting of infection- cr had plateaued but slight uptrend today; monitor trend  #acidosis- start sodium bicarbonate tablets but change to tid; monitor hc03 trend  #vol status- on torsemide  #awaiting picc line for antibiotics at home
#roel on ckd 1v- atn in setting of infection- cr has plateaued; monitor trend  #acidosis- start sodium bicarbonate tablets; monitor hc03 trend  #vol status- on torsemide
Willie Swenson  Attending Physician   Division of Infectious Disease  Pager #[phone]  After 5pm/weekend or no response, call #[phone]    Please call the ID service [phone] with questions or concerns over the weekend.
Ceftriaxone 2 g IVSS via Guan catheter daily -> 11/6/19  D/C home with outpatient PT once homecare arranged.
Ceftriaxone 2 g IVSS via Guan catheter daily -> 11/6/19  D/C home with outpatient PT once homecare arranged.   Hold Torsemide today given bump in creatinine   45 minutes spent discharging the patient.
sepsis UTI  consider CT abdomen pelvis no contrast  ID consult  f/up cultures  multi-organ dysfunction.  currently stable hemodynamics.  holding coumadin due to elevated INR  daily labs  monitor closely    patient with multiple co-morbid conditions; high risk for future complications despite optimal medical therapy     Simone Andrade MD, MHA, FACP, [facility]  Pager: [phone]  If no response or off-hours, page [phone]
Group B strep bacteremia with sepsis likely secondary to RLE cellulitis - cont ceftriaxone 2 g IVSS daily  TTE/MARY to r/o vegetation to r/o IE  Hypoxia- cont supp O2, incentive spirometry.
Willie Swenson  Attending Physician   Division of Infectious Disease  Pager #[phone]  After 5pm/weekend or no response, call #[phone]
Willie Swenson  Attending Physician   Division of Infectious Disease  Pager #[phone]  After 5pm/weekend or no response, call #[phone]
Willie Swenson  Attending Physician   Division of Infectious Disease  Pager #[phone]  After 5pm/weekend or no response, call #[phone]

## 2019-10-03 NOTE — PROGRESS NOTE ADULT - NEGATIVE MUSCULOSKELETAL SYMPTOMS
no joint swelling/no back pain
no back pain/no joint swelling
no joint swelling/no back pain

## 2019-10-03 NOTE — PROGRESS NOTE ADULT - NEUROLOGICAL DETAILS
alert and oriented x 3/responds to pain/responds to verbal commands
responds to verbal commands
alert and oriented x 3/responds to verbal commands
responds to verbal commands/alert and oriented x 3
responds to verbal commands/alert and oriented x 3

## 2019-10-03 NOTE — PROGRESS NOTE ADULT - EXTREMITIES COMMENTS
right LE redness resolved
right LE cellulitis - redness better - ecchymosis component present on anterior shin
right LE cellulitis resolved
right LE redness resolved
right LE redness much improved

## 2019-10-03 NOTE — PROGRESS NOTE ADULT - NEGATIVE LYMPHATIC SYMPTOMS
no swelling of extremity

## 2019-10-03 NOTE — PROGRESS NOTE ADULT - PROBLEM SELECTOR PLAN 2
Pt INR found to be elevated in setting of sepsis.   - INR 1.44 today  - last dose given 9/28, but restarted 10/2  - obtain daily INR

## 2019-10-03 NOTE — PROGRESS NOTE ADULT - NEGATIVE GASTROINTESTINAL SYMPTOMS
no vomiting/no diarrhea/no nausea/no abdominal pain
no vomiting/no diarrhea/no nausea/no abdominal pain
no nausea/no abdominal pain/no diarrhea/no vomiting
no vomiting/no diarrhea/no abdominal pain/no nausea
no vomiting/no diarrhea/no abdominal pain/no nausea

## 2019-10-03 NOTE — PROGRESS NOTE ADULT - MS EXT PE MLT D E PC
no pedal edema/no cyanosis
no pedal edema/no cyanosis
no cyanosis
no cyanosis/no pedal edema
no pedal edema/no cyanosis

## 2019-10-03 NOTE — PROGRESS NOTE ADULT - ASSESSMENT
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 Guan placed by IR 10/2. Restarted warfarin.

## 2019-10-03 NOTE — PROGRESS NOTE ADULT - NEGATIVE ENMT SYMPTOMS
no nasal congestion/no ear pain/no throat pain
no nasal congestion/no throat pain/no ear pain
no throat pain/no ear pain/no nasal congestion
no ear pain/no nasal congestion/no throat pain
no ear pain/no nasal congestion/no throat pain

## 2019-10-03 NOTE — PROGRESS NOTE ADULT - PROBLEM SELECTOR PLAN 5
Pt CKD IV. Baseline Cr: 3.0-3.2.   - trend Cr  - Avoid nephrotoxins   - Renally dose all meds Pt CKD IV. Baseline Cr: 3.0-3.2.   - trend Cr  - Avoid nephrotoxins   - Renally dose all meds  - Bicarb tablets tid

## 2019-10-03 NOTE — PROGRESS NOTE ADULT - NEGATIVE CARDIOVASCULAR SYMPTOMS
no palpitations/no chest pain
no chest pain/no palpitations
no palpitations/no chest pain

## 2019-10-08 ENCOUNTER — RX RENEWAL (OUTPATIENT)
Age: 84
End: 2019-10-08

## 2019-10-08 ENCOUNTER — CLINICAL ADVICE (OUTPATIENT)
Age: 84
End: 2019-10-08

## 2019-10-17 NOTE — ED ADULT NURSE NOTE - NS ED NURSE REPORT GIVEN DT
PHYSICIAN ORDERS      DATE & TIME ISSUED: 2019 3:13 PM  PATIENT NAME: Kecia Blue   : 1962     [facility] MR# [if applicable]: [id number]     DIAGNOSIS:  Long Term use of medications  Z79.899 and Lung Transplant  Z94.2    Patient needs following infusions:  10/18: 1L normal saline   10/19: 1L normal saline    Please also draw BMP each time.   Please call patient at [phone] to schedule infusions    Any questions please call: Gaye [phone]    Please fax these results to [phone].      .        18-Apr-2017 17:54

## 2019-10-18 ENCOUNTER — EMERGENCY (EMERGENCY)
Facility: HOSPITAL | Age: 84
LOS: 1 days | Discharge: HOME | End: 2019-10-18
Attending: EMERGENCY MEDICINE
Payer: MEDICARE

## 2019-10-18 VITALS
TEMPERATURE: 98 F | HEART RATE: 69 BPM | SYSTOLIC BLOOD PRESSURE: 137 MMHG | DIASTOLIC BLOOD PRESSURE: 63 MMHG | OXYGEN SATURATION: 99 % | RESPIRATION RATE: 18 BRPM

## 2019-10-18 VITALS
TEMPERATURE: 98 F | OXYGEN SATURATION: 98 % | SYSTOLIC BLOOD PRESSURE: 159 MMHG | HEART RATE: 71 BPM | DIASTOLIC BLOOD PRESSURE: 79 MMHG | WEIGHT: 151.9 LBS | RESPIRATION RATE: 18 BRPM | HEIGHT: 68 IN

## 2019-10-18 DIAGNOSIS — Z98.49 CATARACT EXTRACTION STATUS, UNSPECIFIED EYE: Chronic | ICD-10-CM

## 2019-10-18 DIAGNOSIS — Z95.1 PRESENCE OF AORTOCORONARY BYPASS GRAFT: Chronic | ICD-10-CM

## 2019-10-18 DIAGNOSIS — Z95.810 PRESENCE OF AUTOMATIC (IMPLANTABLE) CARDIAC DEFIBRILLATOR: Chronic | ICD-10-CM

## 2019-10-18 DIAGNOSIS — Z95.4 PRESENCE OF OTHER HEART-VALVE REPLACEMENT: Chronic | ICD-10-CM

## 2019-10-18 DIAGNOSIS — Z98.89 OTHER SPECIFIED POSTPROCEDURAL STATES: Chronic | ICD-10-CM

## 2019-10-18 LAB
ALBUMIN SERPL ELPH-MCNC: 3.8 G/DL — SIGNIFICANT CHANGE UP (ref 3.3–5)
ALP SERPL-CCNC: 140 U/L — HIGH (ref 40–120)
ALT FLD-CCNC: 21 U/L — SIGNIFICANT CHANGE UP (ref 10–45)
ANION GAP SERPL CALC-SCNC: 19 MMOL/L — HIGH (ref 5–17)
APTT BLD: 38.8 SEC — HIGH (ref 27.5–36.3)
AST SERPL-CCNC: 25 U/L — SIGNIFICANT CHANGE UP (ref 10–40)
BASOPHILS # BLD AUTO: 0.02 K/UL — SIGNIFICANT CHANGE UP (ref 0–0.2)
BASOPHILS NFR BLD AUTO: 0.3 % — SIGNIFICANT CHANGE UP (ref 0–2)
BILIRUB SERPL-MCNC: 0.3 MG/DL — SIGNIFICANT CHANGE UP (ref 0.2–1.2)
BUN SERPL-MCNC: 103 MG/DL — HIGH (ref 7–23)
CALCIUM SERPL-MCNC: 9.1 MG/DL — SIGNIFICANT CHANGE UP (ref 8.4–10.5)
CHLORIDE SERPL-SCNC: 102 MMOL/L — SIGNIFICANT CHANGE UP (ref 96–108)
CO2 SERPL-SCNC: 17 MMOL/L — LOW (ref 22–31)
CREAT SERPL-MCNC: 3.33 MG/DL — HIGH (ref 0.5–1.3)
EOSINOPHIL # BLD AUTO: 0.15 K/UL — SIGNIFICANT CHANGE UP (ref 0–0.5)
EOSINOPHIL NFR BLD AUTO: 2.4 % — SIGNIFICANT CHANGE UP (ref 0–6)
GLUCOSE SERPL-MCNC: 132 MG/DL — HIGH (ref 70–99)
HCT VFR BLD CALC: 35 % — LOW (ref 39–50)
HGB BLD-MCNC: 11.2 G/DL — LOW (ref 13–17)
IMM GRANULOCYTES NFR BLD AUTO: 0.5 % — SIGNIFICANT CHANGE UP (ref 0–1.5)
INR BLD: 1.85 RATIO — HIGH (ref 0.88–1.16)
LYMPHOCYTES # BLD AUTO: 0.78 K/UL — LOW (ref 1–3.3)
LYMPHOCYTES # BLD AUTO: 12.5 % — LOW (ref 13–44)
MCHC RBC-ENTMCNC: 28.7 PG — SIGNIFICANT CHANGE UP (ref 27–34)
MCHC RBC-ENTMCNC: 32 GM/DL — SIGNIFICANT CHANGE UP (ref 32–36)
MCV RBC AUTO: 89.7 FL — SIGNIFICANT CHANGE UP (ref 80–100)
MONOCYTES # BLD AUTO: 0.78 K/UL — SIGNIFICANT CHANGE UP (ref 0–0.9)
MONOCYTES NFR BLD AUTO: 12.5 % — SIGNIFICANT CHANGE UP (ref 2–14)
NEUTROPHILS # BLD AUTO: 4.48 K/UL — SIGNIFICANT CHANGE UP (ref 1.8–7.4)
NEUTROPHILS NFR BLD AUTO: 71.8 % — SIGNIFICANT CHANGE UP (ref 43–77)
NRBC # BLD: 0 /100 WBCS — SIGNIFICANT CHANGE UP (ref 0–0)
PLATELET # BLD AUTO: 184 K/UL — SIGNIFICANT CHANGE UP (ref 150–400)
POTASSIUM SERPL-MCNC: 3.8 MMOL/L — SIGNIFICANT CHANGE UP (ref 3.5–5.3)
POTASSIUM SERPL-SCNC: 3.8 MMOL/L — SIGNIFICANT CHANGE UP (ref 3.5–5.3)
PROT SERPL-MCNC: 7.8 G/DL — SIGNIFICANT CHANGE UP (ref 6–8.3)
PROTHROM AB SERPL-ACNC: 21.7 SEC — HIGH (ref 10–12.9)
RBC # BLD: 3.9 M/UL — LOW (ref 4.2–5.8)
RBC # FLD: 13.7 % — SIGNIFICANT CHANGE UP (ref 10.3–14.5)
SODIUM SERPL-SCNC: 138 MMOL/L — SIGNIFICANT CHANGE UP (ref 135–145)
WBC # BLD: 6.24 K/UL — SIGNIFICANT CHANGE UP (ref 3.8–10.5)
WBC # FLD AUTO: 6.24 K/UL — SIGNIFICANT CHANGE UP (ref 3.8–10.5)

## 2019-10-18 PROCEDURE — 99284 EMERGENCY DEPT VISIT MOD MDM: CPT

## 2019-10-18 PROCEDURE — 80053 COMPREHEN METABOLIC PANEL: CPT

## 2019-10-18 PROCEDURE — 99283 EMERGENCY DEPT VISIT LOW MDM: CPT | Mod: 25

## 2019-10-18 PROCEDURE — 85610 PROTHROMBIN TIME: CPT

## 2019-10-18 PROCEDURE — 82962 GLUCOSE BLOOD TEST: CPT

## 2019-10-18 PROCEDURE — 85027 COMPLETE CBC AUTOMATED: CPT

## 2019-10-18 PROCEDURE — 85730 THROMBOPLASTIN TIME PARTIAL: CPT

## 2019-10-18 PROCEDURE — 71045 X-RAY EXAM CHEST 1 VIEW: CPT

## 2019-10-18 PROCEDURE — 71045 X-RAY EXAM CHEST 1 VIEW: CPT | Mod: 26

## 2019-10-18 RX ORDER — DIGOXIN 250 MCG
0.12 TABLET ORAL ONCE
Refills: 0 | Status: COMPLETED | OUTPATIENT
Start: 2019-10-18 | End: 2019-10-18

## 2019-10-18 RX ORDER — METOPROLOL TARTRATE 50 MG
50 TABLET ORAL ONCE
Refills: 0 | Status: COMPLETED | OUTPATIENT
Start: 2019-10-18 | End: 2019-10-18

## 2019-10-18 RX ORDER — HYDRALAZINE HCL 50 MG
10 TABLET ORAL ONCE
Refills: 0 | Status: COMPLETED | OUTPATIENT
Start: 2019-10-18 | End: 2019-10-18

## 2019-10-18 RX ORDER — ISOSORBIDE MONONITRATE 60 MG/1
30 TABLET, EXTENDED RELEASE ORAL ONCE
Refills: 0 | Status: COMPLETED | OUTPATIENT
Start: 2019-10-18 | End: 2019-10-18

## 2019-10-18 RX ORDER — MEMANTINE HYDROCHLORIDE 10 MG/1
5 TABLET ORAL ONCE
Refills: 0 | Status: COMPLETED | OUTPATIENT
Start: 2019-10-18 | End: 2019-10-18

## 2019-10-18 RX ORDER — INSULIN LISPRO 100/ML
8 VIAL (ML) SUBCUTANEOUS ONCE
Refills: 0 | Status: COMPLETED | OUTPATIENT
Start: 2019-10-18 | End: 2019-10-18

## 2019-10-18 RX ADMIN — ISOSORBIDE MONONITRATE 30 MILLIGRAM(S): 60 TABLET, EXTENDED RELEASE ORAL at 14:41

## 2019-10-18 RX ADMIN — Medication 60 MILLIGRAM(S): at 14:40

## 2019-10-18 RX ADMIN — Medication 0.12 MILLIGRAM(S): at 14:42

## 2019-10-18 RX ADMIN — Medication 10 MILLIGRAM(S): at 14:42

## 2019-10-18 RX ADMIN — Medication 50 MILLIGRAM(S): at 14:41

## 2019-10-18 RX ADMIN — Medication 8 UNIT(S): at 14:43

## 2019-10-18 RX ADMIN — MEMANTINE HYDROCHLORIDE 5 MILLIGRAM(S): 10 TABLET ORAL at 14:42

## 2019-10-18 NOTE — ED ADULT NURSE NOTE - OBJECTIVE STATEMENT
88 yr old male to ED with c/o Guan cath bleeding from site. Pt on Coumadin, h/o afib with pacemaker and defibrillator. Pt doesn't remember if he pulled it. Pt awake alert and orientedx3 Resp even and nonlab Denies chest pain or SOB .Denies dizziness or lightheadedness Conjunctiva pink. Family at bedside. Recent admission for endocarditis. Last INR 10/2. On Ceftriaxone 2gms.

## 2019-10-18 NOTE — ED PROVIDER NOTE - PROGRESS NOTE DETAILS
Guan site with no active bleeding for hours. Patient dressing changed. Will monitor for bleeding at site.

## 2019-10-18 NOTE — ED PROVIDER NOTE - PHYSICAL EXAMINATION
Attn - alert, nad, Guan R upper chest with blood around catheter. no hematoma, active bleeding, tenderness or swelling, no pallor, moist mm, lungs-, cor - irreg, irreg, PPM/AICD L upper chest, abdo-, extremities - ankle edema, skin - no ecchymosis. neuro - nonfocal

## 2019-10-18 NOTE — ED ADULT NURSE REASSESSMENT NOTE - NS ED NURSE REASSESS COMMENT FT1
Repeat fingerstick was 158. Pt's son at bedside who is bringing pt home (and is employee here and gets out of work at 3:30pm) requested that pt be brought to main lobby where he can pick pt up after discharge. Pt VSS. Pt and wife verbalize understanding of DC paperwork. IV removed. Pt to be wheeled to main lobby to await transport home.

## 2019-10-18 NOTE — ED PROVIDER NOTE - PATIENT PORTAL LINK FT
You can access the FollowMyHealth Patient Portal offered by [facility] by registering at the following website: [email or web address]. By joining [employer]’s FollowMyHealth portal, you will also be able to view your health information using other applications (apps) compatible with our system.

## 2019-10-18 NOTE — ED PROVIDER NOTE - OBJECTIVE STATEMENT
89 yo M with pmhx with pmhx CABG, afib, chf, hld, htn, endocarditis, pacemaker, dm and sp Right IJ 5french Tunneled central venous catheter placement (Guan)(Dr Dimas and Lee) 89 yo M with pmhx with pmhx CABG, afib, dementia, chf, hld, htn, endocarditis, pacemaker, dm and sp Right IJ 5french Tunneled central venous catheter placement (Guan)(Dr Dimas and Lee) on 10/2 for antibiotics for cellulitis presenting with "bleeding from surgical site". 89 yo M with pmhx with pmhx CABG, afib, dementia, chf, hld, htn, endocarditis, pacemaker, dm and sp Right IJ 5french Tunneled central venous catheter placement (Guan)(Dr Dimas and Lee) on 10/2 for antibiotics for cellulitis/endocarditis presenting with "bleeding from surgical site" x this morning. Patient states he woke up with his shirt covered in blood. Patient states it has slowed down since this AM. As per wife, Patient had his INR checked on tuesday but unsure of how high it was at the time. Patient denies cp, sob, cough, fever, chills, dizziness, weakness, lightheadedness, ha, neck pain, back pain, abd pain, nvd, dysuria, hematuria, brbpr and melena 87 yo M with pmhx with pmhx CABG, afib, dementia, chf, hld, htn, endocarditis, pacemaker, dm and sp Right IJ 5french Tunneled central venous catheter placement (Guan)(Dr Dimas and Lee) on 10/2 for antibiotics for cellulitis/endocarditis presenting with "bleeding from surgical site" x this morning. Patient states he woke up with his shirt covered in blood. Patient states it has slowed down since this AM. As per wife, Patient had his INR checked on tuesday but unsure of how high it was at the time. Patient denies cp, sob, cough, fever, chills, dizziness, weakness, lightheadedness, ha, neck pain, back pain, abd pain, nvd, dysuria, hematuria, brbpr and melena     Attn - pt seen in [facility] - agree with above - pt on same dose of Coumadin for "years" - 5 mg daily, 2.5 mgs on Sundays.  pt without other S&S of coagulopathy.  Getting ceftriaxone for endocarditis.

## 2019-10-18 NOTE — ED ADULT NURSE REASSESSMENT NOTE - NS ED NURSE REASSESS COMMENT FT1
Per SANDRA Wiseman, give 8 units insulin despite finger stick of 121 because "He just ate." Pt wife says that is normally what he takes after eating.

## 2019-10-18 NOTE — ED PROVIDER NOTE - SKIN, MLM
Skin normal color for race, warm, dry and No evidence of rash. right chest with thomson in place. Oozing blood around surgical site which seems to have stopped. Non tender. no erythema no discharge

## 2019-10-18 NOTE — ED PROVIDER NOTE - NSFOLLOWUPINSTRUCTIONS_ED_ALL_ED_FT
Rest and hydration.   keep site clean and dry.  no heavy lifting.   Follow up with your primary doctor to check site and for low INR  Return to the ER immediately for worsening symptoms

## 2019-10-18 NOTE — ED PROVIDER NOTE - CLINICAL SUMMARY MEDICAL DECISION MAKING FREE TEXT BOX
89 yo M with pmhx with pmhx CABG, afib, dementia, chf, hld, htn, endocarditis, pacemaker, dm and sp Right IJ 5french Tunneled central venous catheter placement (Thomson)(Dr Dimas and Lee) on 10/2 for antibiotics for cellulitis/endocarditis presenting with "bleeding from surgical site" x this morning. Slight oozing from right sided thomson. Will obtain labs and cxr. Reassess pending results 87 yo M with pmhx with pmhx CABG, afib, dementia, chf, hld, htn, endocarditis, pacemaker, dm and sp Right IJ 5french Tunneled central venous catheter placement (Thomson)(Dr Dimas and Lee) on 10/2 for antibiotics for cellulitis/endocarditis presenting with "bleeding from surgical site" x this morning. Slight oozing from right sided thomson. Will obtain labs and cxr. Reassess pending results  Attn - bleeding around Thomson catheter - on stable dose of coumadin - just started on ceftriaxone for endocarditis - ck H/H, and INR. no active bleeding around Thomson.

## 2019-10-18 NOTE — ED PROVIDER NOTE - PMH
CAD (coronary artery disease)    Cardiomyopathy, ischemic    Cerebrovascular accident (CVA), unspecified mechanism    Congestive heart failure    Dyslipidemia    Endocarditis    Hypertension    Pacemaker    Paroxysmal atrial fibrillation    Type 2 diabetes mellitus

## 2019-10-22 ENCOUNTER — APPOINTMENT (OUTPATIENT)
Dept: GERIATRICS | Facility: CLINIC | Age: 84
End: 2019-10-22

## 2019-10-22 ENCOUNTER — APPOINTMENT (OUTPATIENT)
Dept: NEUROLOGY | Facility: CLINIC | Age: 84
End: 2019-10-22
Payer: MEDICARE

## 2019-10-22 VITALS
HEART RATE: 86 BPM | HEIGHT: 78 IN | SYSTOLIC BLOOD PRESSURE: 126 MMHG | WEIGHT: 152 LBS | BODY MASS INDEX: 17.59 KG/M2 | DIASTOLIC BLOOD PRESSURE: 68 MMHG

## 2019-10-22 DIAGNOSIS — Z87.898 PERSONAL HISTORY OF OTHER SPECIFIED CONDITIONS: ICD-10-CM

## 2019-10-22 PROCEDURE — 99215 OFFICE O/P EST HI 40 MIN: CPT

## 2019-10-22 RX ORDER — GABAPENTIN 100 MG/1
100 CAPSULE ORAL
Qty: 30 | Refills: 0 | Status: COMPLETED | COMMUNITY
Start: 2019-09-18 | End: 2019-10-22

## 2019-10-22 NOTE — PHYSICAL EXAM
[FreeTextEntry1] : Awake and alert, in no acute distress\par Attends to both sides. Conjugate gaze without directional limitation. No dysarthria. Face symmetric\par hoarse voice\par Moves extremities symmetrically, FFM intact\par No dysmetria with reaching for objects\par Gait steady, good turn, symm arm swing\par  \par The patient was alert, well groomed, NAD. He was fluent without paraphasic errors. anomic in conversation, responded to cues. trouble coming up with daughters name. tylenol got after phonemic cue.  Comprehension was intact. He was active in the discussion. He appeared euthymic, reactive, made jokes, no inappropriate behavior.  can name the president. can mention recent news issues related to trump but only was more specific after cues were given.  couldn’t recall weekend events until given cues and then he did. \par \par clock drawing: intact\par \par rereviewed\par Activities of Daily Living ([place]): independent vs. needs some help vs. unable/needs major assistance.      \par            --responses were the following: except where noted,    indep                                       \par 1. Bathing/showering\par 2. Dressing\par 3. Toileting\par 4. Transferring\par 5. Continence\par 6. Feeding                                                                                                                                                           \par \par Instrumental Activities of Daily Living ([place]): independent vs. needs some help vs. unable/needs major assistance.     \par            --responses were the following: except where noted, some help\par 1. Ability to use telephone- indep\par 2. Shopping- indep\par 3. Food preparatio- never didn\par 4. Housekeeping- never did\par 5. Laundry- never did\par 6. Transportation (public/arranging rides/driving)- local only\par 7. Responsibility for own medications- \par 8. Ability to handle finances - some help but wife always did\par

## 2019-10-22 NOTE — DATA REVIEWED
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 asymmetric moderate increase in activity right basal ganglia and mildly increased right thalamus of uncertain significance.\par \par fdg pet 7/1/18 showed asymmetric moderate-severe hypometabolism in L>R parietal, frontal, and temporal lobes c/w neurodegeneration. also asymmetric moderately increased activity R>L basal ganglia and thalamus of uncertain significance. however, he took insulin 2 hrs prior to the test so results are not clearly able to be interpreted, I spoke with radiologist and she said probably can trust that there was hypometabolism, but cannot be sure\par  [de-identified] : \par Neurobehavioral status testing 6/21/18:\par Cognitive domains:\par 	Attention: within normal limits \par 	Working memory: errors\par 	Executive function: impaired abstraction\par 	Language: anomia more to names than objects; similar phonemic and semantic fluency; \par 	Memory: impaired recall with only mild response to cues; reptition with fluency task; conversationally had good memory for recent events\par 	Visuospatial function: within normal limits \par 	Praxis: within normal limits \par 	Behavior/Mood: appropriate\par 	Other comments: registration was difficult; processing speed seemed appropriate for some tasks, other times felt like i had to repeat an instruction for him to fully grasp\par                moca 18/30

## 2019-10-22 NOTE — HISTORY OF PRESENT ILLNESS
[FreeTextEntry1] : Interval hx:\par 10/212/19 visit:\par Since the last visit he saw Dr. Miller May 2019 and noted headaches, taking a lot of daily tylenol. \par Sept 2019 put on gabapentin renally dosed for presumed cervicogenic component though they never actually started taking it. pending headache appointment with Dr. Angel Jan 2020. has headaches for yrs. \par \par Hospitalized 9/22-10/3/19 - he was difficult to arouse after a nap. had fever. wbc 20.6. had GBS bacteremia from RLE cellulitis and UTI. bcx turned negative 9/25. vegetation was seen on tte, refused devin. has bioprosthetic MV. 6 wks abx recommended til Nov 6. No head imaging was done. 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 PPM leads too adhered in 2nd episode to replace them.\par \par driving is still ok. sometimes he needs to double check which road to take. this visit referred for driving test.\par \par no change in sleep. \par hasn’t been exercising.\par this visit ordered HCT to ensure no stroke related to endocarditis\par \par \par ----------------------\par Background information (initial visit 6/21/18):\par \par  Patient accompanied by wife Fay and daughter marielena Bush ([facility] nurse in apheresis unit). \par \par The patient is a 87 year old right handed man referred by Dr. Rivas.\par \par Symptoms since approx 2013 they thought but osh records note as early as 2010. Started with forgetfulness. Repeats questions. Progressive symptoms. They say his memory was never very good at baseline. Especially worse symptoms when finger infection occurred a few yrs ago and never got back to how good he was prior. Also transiently worse during prolonged hospital stays in 2016, has been about stable in the last year.\par \par May 2016  had a dysconjugate gaze eyes outward they tell me, imbalance, diagnosed with R ANNIKA. Eye patch for a few weeks for diplopia but it resolved. Dr. Trotter saw him once and didn’t recommend any further f/u. 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  On aspirin 81mg since then presumably for what they thought was new brainstem stroke that couldn’t be seen on the hct.\par \par Sept 2016 hospitalized for endocarditis and CHF, also PNA, CKD worse then, considering HD but didn’t need it.  He was in/out of hospital monthly for a while. Heart failure doctors improved medication regimen that helped.  Cognitive symptoms worse then but did improve afterwards.\par \par First visit with Dr. Rivas was 9/15/17, he had followed with prior neurologists before him and was diagnosed with Alzheimer's disease, treated with aricept and namenda prior to seeing Dr. Rivas.  MMSe 27 (-3 recall). Driving evaluation was recommended but they never pursued. Oct 2017 the patient had headaches, was on coumadin, so HCT done and was neg for SDH. He had MMSe 27 (-3 recall), normal intersecting figure copy. OSH records note that neurotrax was done 2/25/10 and had global score of 100.7- was >1SD below average for verbal function, above avg in memory, exec fxn, attn, proc speed, vsp, motor skills, above avg for global score.\par \par Some days better than others. On a bad day will get grandkids confused. No issues faces. No getting lost. Still driving, 90% of time with his wife in car who isnt worried about his driving skills. No one in family is worried about driving. His wife sets out the medications and prepares pill box with him, but he takes them himself. This started after in/out of hospital. \par \[facility] Has a computer, trouble recalling his password per family but he denies. No trouble with email. No issues with remote cotnrol for tv. Plays piano, cant read music as quickly as before, but still can. No decline in piano playing ability.\par \par sleep: says wakes up in night to urinate. snores. sleep study approx 1 yr ago neg for LUNA. \par \par mood: denies hx anxiety and depression. post hospitalization was depressed for some time,  related to his not being able to drive, put on zoloft, off completely for 2 weeks now since they wanted to taper off since not depressed.\par \par Having right back and shoulder pain, saw orthopedist today, got xrays. \par \par They think he has been more stable since aricept and namenda but those were started around time he was also struggling with the in/out of hospital. No side effects.\par \par 11/20/2018 visit: Patient accompanied by wife Fay and son Reinaldo ([facility] [place])\par \par fdg pet 7/1/18 showed asymmetric moderate-severe hypometabolism in L>R parietal, frontal, and temporal lobes c/w neurodegeneration. also asymmetric moderately increased activity R>L basal ganglia and thalamus of uncertain significance. however, he took insulin 2 hrs prior to the test so results are not clearly able to be interpreted, I spoke with radiologist and she said probably can trust that there was hypometabolism, but cannot be sure\par \par we repeated pet 7/22/18 off insulin\par \par FDG PET 7/22/18 no change from 7/1/18.  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 asymmetric moderate increase in activity right basal ganglia and mildly increased right thalamus of uncertain significance.\par \par Called daughter marielena Bush in june and re-reviewed symptoms, which are not consistent with bvFTD so we discussed how the results would at least support idea this is neurodegenerative, despite such slow course over last 8 yrs, and they wanted to keep the Aricept so although I wouldn't have started it in context of being on AC there is at least support for idea of keeping it since results could be c/w AD. I disagree with interpretation that this is probably FTD since AD can affect frontal lobes in addition to temporoparietal and his age of onset is atypical for FTD and his symptoms don't fit into usual clinical syndrome. the other findings could be seen in patients with parkinsons disease but he doesn't have that\par \par Saw Dr. Koroma 9/18 for neck pain, thought he had cervical radiculopathy, normal exam, recommended PT and acupuncture and new mattress.\par \par His wife reports 2 years ago infection on his finger and his cognition was worse after that. Has been stable. \par \par Doing well on lower dose of namenda dosed for renal impairment.\par \par today no medication changes.\par \par 3/19/19 visit: Patient accompanied by wife Fay and son Reinaldo ([facility] [place]). \par  they report symptoms are stable. \par he hasn’t been exercising but is willing to try. he wanted to ice skate but he hasn’t in 19 yrs so we discussed doing something else with less of a risk to hurting himself, especially with him being on coumadin. \par no medication changes this visit.

## 2019-10-22 NOTE — ASSESSMENT
[FreeTextEntry1] : The patient is an 88 year old right handed man with progressive cognitive decline since 2010 primarily affecting memory and naming, associated with mild decline in IADLs. On neurobehavioral status testing he had impaired memory that didn’t respond well to cues though conversationally he had excellent recall for current events. He had anomia and made repetition errors, but phonemic and semantic fluency were similar. No visuospatial dysfunction or apraxia.\par \par Counseled on how he has dementia of mild stage. He was given diagnosis of AD since approx 2010 but I would have expected him to have declined more since then. His memory impairment in conversation seems more of a retrieval deficit, but on screening testing it is more encoding deficit, so it isnt fully clear. The language abnormality would fit with AD but would have expected parietal dysfunction that I'm not seeing, or a phonemic/semantic fluency discrepancy that i'm not seeing. FDG PET showed mod-severe L worse than R hypometabolism in frontal, parietal, and temporal lobes as well as moderately increased activity in the right basal ganglia and r thalamus, which radiologist interpreted as being c/w FTD but cannot rule out superimposed vascular process. I disagree with interpretation that this is probably FTD since AD can affect frontal lobes in addition to temporoparietal and his age of onset is atypical for FTD and his symptoms don't fit into usual clinical syndrome. the other findings could be seen in patients with parkinsons disease but he doesn't have that.  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 The aspirin and coumadin he is on for atrial fibrillation and secondary stroke prophylaxis in combination with aricept puts him at risk for GIB and I explained how i would not have prescribed aricept in the setting of coumadin, however, he is at risk of worsening if we discontinue so after discussing risks/benefits/expectations they prefer to keep. Namenda is renally dosed.\par \par Counseled on strategies for maintaining cognitive health. Counseled on safety concerns. \par \par

## 2019-10-29 ENCOUNTER — APPOINTMENT (OUTPATIENT)
Dept: NEPHROLOGY | Facility: CLINIC | Age: 84
End: 2019-10-29

## 2019-11-05 DIAGNOSIS — Z00.00 ENCOUNTER FOR GENERAL ADULT MEDICAL EXAMINATION W/OUT ABNORMAL FINDINGS: ICD-10-CM

## 2019-11-12 ENCOUNTER — MEDICATION RENEWAL (OUTPATIENT)
Age: 84
End: 2019-11-12

## 2019-11-13 ENCOUNTER — LABORATORY RESULT (OUTPATIENT)
Age: 84
End: 2019-11-13

## 2019-11-17 ENCOUNTER — FORM ENCOUNTER (OUTPATIENT)
Age: 84
End: 2019-11-17

## 2019-11-18 ENCOUNTER — APPOINTMENT (OUTPATIENT)
Dept: CT IMAGING | Facility: CLINIC | Age: 84
End: 2019-11-18
Payer: MEDICARE

## 2019-11-18 ENCOUNTER — OUTPATIENT (OUTPATIENT)
Dept: OUTPATIENT SERVICES | Facility: HOSPITAL | Age: 84
LOS: 1 days | End: 2019-11-18
Payer: MEDICARE

## 2019-11-18 DIAGNOSIS — Z95.4 PRESENCE OF OTHER HEART-VALVE REPLACEMENT: Chronic | ICD-10-CM

## 2019-11-18 DIAGNOSIS — Z95.1 PRESENCE OF AORTOCORONARY BYPASS GRAFT: Chronic | ICD-10-CM

## 2019-11-18 DIAGNOSIS — Z95.810 PRESENCE OF AUTOMATIC (IMPLANTABLE) CARDIAC DEFIBRILLATOR: Chronic | ICD-10-CM

## 2019-11-18 DIAGNOSIS — Z98.89 OTHER SPECIFIED POSTPROCEDURAL STATES: Chronic | ICD-10-CM

## 2019-11-18 DIAGNOSIS — Z00.8 ENCOUNTER FOR OTHER GENERAL EXAMINATION: ICD-10-CM

## 2019-11-18 DIAGNOSIS — Z98.49 CATARACT EXTRACTION STATUS, UNSPECIFIED EYE: Chronic | ICD-10-CM

## 2019-11-18 PROCEDURE — 70450 CT HEAD/BRAIN W/O DYE: CPT

## 2019-11-18 PROCEDURE — 70450 CT HEAD/BRAIN W/O DYE: CPT | Mod: 26

## 2019-11-19 ENCOUNTER — RX RENEWAL (OUTPATIENT)
Age: 84
End: 2019-11-19

## 2019-11-21 ENCOUNTER — LABORATORY RESULT (OUTPATIENT)
Age: 84
End: 2019-11-21

## 2019-11-21 ENCOUNTER — RX RENEWAL (OUTPATIENT)
Age: 84
End: 2019-11-21

## 2019-11-26 ENCOUNTER — APPOINTMENT (OUTPATIENT)
Dept: ENDOCRINOLOGY | Facility: CLINIC | Age: 84
End: 2019-11-26
Payer: MEDICARE

## 2019-11-26 ENCOUNTER — APPOINTMENT (OUTPATIENT)
Dept: ELECTROPHYSIOLOGY | Facility: CLINIC | Age: 84
End: 2019-11-26
Payer: MEDICARE

## 2019-11-26 ENCOUNTER — APPOINTMENT (OUTPATIENT)
Dept: GERIATRICS | Facility: CLINIC | Age: 84
End: 2019-11-26
Payer: MEDICARE

## 2019-11-26 ENCOUNTER — RESULT CHARGE (OUTPATIENT)
Age: 84
End: 2019-11-26

## 2019-11-26 VITALS
HEIGHT: 68 IN | RESPIRATION RATE: 16 BRPM | BODY MASS INDEX: 16.28 KG/M2 | DIASTOLIC BLOOD PRESSURE: 60 MMHG | TEMPERATURE: 97.4 F | SYSTOLIC BLOOD PRESSURE: 120 MMHG | HEART RATE: 89 BPM | OXYGEN SATURATION: 96 % | WEIGHT: 107.4 LBS

## 2019-11-26 VITALS
WEIGHT: 167 LBS | DIASTOLIC BLOOD PRESSURE: 52 MMHG | SYSTOLIC BLOOD PRESSURE: 107 MMHG | BODY MASS INDEX: 25.39 KG/M2 | OXYGEN SATURATION: 98 % | HEART RATE: 84 BPM

## 2019-11-26 VITALS
HEIGHT: 68 IN | WEIGHT: 161 LBS | HEART RATE: 80 BPM | DIASTOLIC BLOOD PRESSURE: 64 MMHG | BODY MASS INDEX: 24.4 KG/M2 | OXYGEN SATURATION: 97 % | SYSTOLIC BLOOD PRESSURE: 124 MMHG

## 2019-11-26 DIAGNOSIS — I38 ENDOCARDITIS, VALVE UNSPECIFIED: ICD-10-CM

## 2019-11-26 LAB — HBA1C MFR BLD HPLC: 7.2

## 2019-11-26 PROCEDURE — 93284 PRGRMG EVAL IMPLANTABLE DFB: CPT

## 2019-11-26 PROCEDURE — 99214 OFFICE O/P EST MOD 30 MIN: CPT | Mod: 25

## 2019-11-26 PROCEDURE — 83036 HEMOGLOBIN GLYCOSYLATED A1C: CPT | Mod: QW

## 2019-11-26 PROCEDURE — 99214 OFFICE O/P EST MOD 30 MIN: CPT

## 2019-11-27 PROBLEM — I38 ENDOCARDITIS, UNSPECIFIED CHRONICITY, UNSPECIFIED ENDOCARDITIS TYPE: Status: ACTIVE | Noted: 2019-10-22

## 2019-11-27 NOTE — HISTORY OF PRESENT ILLNESS
[FreeTextEntry1] : cc: diabetes\par \par 89 year old man with T2DM, with CKD, on basal bolus insulin with reasonable control. He was recently hospitalized for endocarditis, just finished 6 weeks of antibiotics.  He checks glucose 3 times daily and values have been 60 - 252 mg/dl, ( two values less than 60).  No patterns and family does not recall any recent hypoglycemia, thinks date in glucometer may be off\par Taking Lantus  20 units and Novolog 6-12 units before meals depending on glucose and carbohydrate intake (ususally 8 - 10 units)\par He been limiting  carbohydrate intake,  minimal exercise\par  Retinopathy screening up to date, last optho visit was about 11 months ago, no retinopathy\par had flu shot\par \par HTN: blood pressure has been controlled, no recent change in medication\par \par Hyperlipidemia - he takes a statin, reports no side effects\par \par \par

## 2019-11-27 NOTE — REVIEW OF SYSTEMS
[Fever] : no fever [Chills] : no chills [Feeling Poorly] : not feeling poorly [Chest Pain] : no chest pain [Palpitations] : no palpitations [SOB on Exertion] : no shortness of breath during exertion [Constipation] : no constipation [Diarrhea] : no diarrhea [Skin Lesions] : no skin lesions [Skin Wound] : no skin wound [de-identified] : No symptoms of hypoglycemia

## 2019-11-27 NOTE — HISTORY OF PRESENT ILLNESS
[FreeTextEntry1] : 89M PMH HTN, HLD, DM2, CKD5, ICM EF 20% s/p AICD/MV replacement presenting for followup\par \par Since last visit, patient states he has had the port out and blood cultures are negative as of 11/13.  Pt is no longer on antibiotics.  After last visit, patient was hospitalized for bactermia from cellulitis vs UTI.  Pt had become acutely altered per wife which led him to follow up in ED.  Per discharge summary, patient had group B strep bacteremia from RLE cellulitis and UTI with echodenisity seen on bioprostetic mitral valve.  Pt has completed 6 weeks of antibiotics since then.\par \par

## 2019-11-27 NOTE — PHYSICAL EXAM
[General Appearance - Well Nourished] : well nourished [General Appearance - Alert] : alert [General Appearance - In No Acute Distress] : in no acute distress [General Appearance - Well Developed] : well developed [] : no respiratory distress [Auscultation Breath Sounds / Voice Sounds] : lungs were clear to auscultation bilaterally [Heart Rate And Rhythm] : heart rate was normal and rhythm regular [Respiration, Rhythm And Depth] : normal respiratory rhythm and effort [Murmurs] : no murmurs [Heart Sounds] : normal S1 and S2 [Heart Sounds Pericardial Friction Rub] : no pericardial rub [Abdomen Soft] : soft [Bowel Sounds] : normal bowel sounds [Abdomen Mass (___ Cm)] : no abdominal mass palpated [Abdomen Tenderness] : non-tender [No CVA Tenderness] : no ~M costovertebral angle tenderness [FreeTextEntry1] : On RLE, well healed scar noted on medial side of shin.

## 2019-11-27 NOTE — ASSESSMENT
[FreeTextEntry1] : 89 year old man with T2DM, well controlled\par  - Pt with occasional hypoglycemia, no clear pattern, per family not recent\par  - counseled pt and family on risks of hypoglycemia.  Continue current insulin regimen, call with any values less than 70 or frequent values in 70s for adjustment of regimen.\par  - encouraged pt to continue with diet, \par   - Retinopathy screening up to date\par  - had flu shot\par \par HTN: Blood pressure at goal, + CKD, followed by nephrology, + microalbumin, continue current management\par \par Hyperlipidemia - continue atorvastatin\par \par Vit d def'y - continue vitamin 2000 units daily\par \par \par \par f/u in 3 months

## 2019-12-19 ENCOUNTER — APPOINTMENT (OUTPATIENT)
Dept: NEPHROLOGY | Facility: CLINIC | Age: 84
End: 2019-12-19
Payer: MEDICARE

## 2019-12-19 VITALS — SYSTOLIC BLOOD PRESSURE: 132 MMHG | DIASTOLIC BLOOD PRESSURE: 70 MMHG | HEART RATE: 70 BPM

## 2019-12-19 VITALS
HEART RATE: 77 BPM | DIASTOLIC BLOOD PRESSURE: 73 MMHG | SYSTOLIC BLOOD PRESSURE: 138 MMHG | WEIGHT: 165.34 LBS | BODY MASS INDEX: 25.14 KG/M2 | OXYGEN SATURATION: 95 %

## 2019-12-19 PROCEDURE — 99214 OFFICE O/P EST MOD 30 MIN: CPT | Mod: 25

## 2019-12-19 PROCEDURE — 36415 COLL VENOUS BLD VENIPUNCTURE: CPT

## 2019-12-19 NOTE — HISTORY OF PRESENT ILLNESS
[FreeTextEntry1] : 89 year old M with history of CKD since 2012, ischemic cardiomyopathy with EF of 20% with AICD, MV replacement, atrial fibrillation who presents for follow up of CKD IV/V. Wife and son accompany patient to today's visit. \par He was in [facility] for endocarditis Group B strep supposedly from leg cellulitis. He was treated with 6 weeks of abx\par Repeat blood cultures were neg\par Feels well

## 2019-12-19 NOTE — PHYSICAL EXAM
[General Appearance - Alert] : alert [General Appearance - In No Acute Distress] : in no acute distress [Sclera] : the sclera and conjunctiva were normal [Neck Appearance] : the appearance of the neck was normal [Auscultation Breath Sounds / Voice Sounds] : lungs were clear to auscultation bilaterally [Heart Rate And Rhythm] : heart rate was normal and rhythm regular [Heart Sounds] : normal S1 and S2 [Heart Sounds Gallop] : no gallops [Heart Sounds Pericardial Friction Rub] : no pericardial rub [Bowel Sounds] : normal bowel sounds [Edema] : there was no peripheral edema [Abdomen Soft] : soft [] : no rash [No CVA Tenderness] : no ~M costovertebral angle tenderness [Involuntary Movements] : no involuntary movements were seen [Affect] : the affect was normal [No Focal Deficits] : no focal deficits [Mood] : the mood was normal

## 2019-12-19 NOTE — ASSESSMENT
[FreeTextEntry1] : 89 year old M with history of CKD, ischemic cardiomyopathy with decreased EF, AICD, DM for follow up of CKD stage V.\par CKD stage V from longstanding history of DM, HTN, chronic cardiorenal\par Had some RIC in the hospital which was due in the setting of endocarditis.  Is stable\par No signs of fluid overload.  Recent repeat renal panel acceptable.  We will check again today.\par Patient and family will continue conservative management of his renal disease\par Hypertension: BP stable and controlled\par DM: low carb diet and acceptable hemoglobin A1c\par Anemia: check Cbc today with iron studies.\par Labs today\par Followup in 4 months\par D/w wife and son

## 2019-12-20 LAB
25(OH)D3 SERPL-MCNC: 31.5 NG/ML
ALBUMIN SERPL ELPH-MCNC: 4.1 G/DL
ANION GAP SERPL CALC-SCNC: 14 MMOL/L
BASOPHILS # BLD AUTO: 0.05 K/UL
BASOPHILS NFR BLD AUTO: 0.9 %
BUN SERPL-MCNC: 80 MG/DL
CALCIUM SERPL-MCNC: 8.9 MG/DL
CALCIUM SERPL-MCNC: 8.9 MG/DL
CHLORIDE SERPL-SCNC: 104 MMOL/L
CO2 SERPL-SCNC: 23 MMOL/L
CREAT SERPL-MCNC: 2.81 MG/DL
EOSINOPHIL # BLD AUTO: 0.14 K/UL
EOSINOPHIL NFR BLD AUTO: 2.6 %
FERRITIN SERPL-MCNC: 51 NG/ML
GLUCOSE SERPL-MCNC: 131 MG/DL
HCT VFR BLD CALC: 37.5 %
HGB BLD-MCNC: 11.1 G/DL
IMM GRANULOCYTES NFR BLD AUTO: 0.4 %
IRON SATN MFR SERPL: 71 %
IRON SERPL-MCNC: 212 UG/DL
LYMPHOCYTES # BLD AUTO: 1.12 K/UL
LYMPHOCYTES NFR BLD AUTO: 21 %
MAGNESIUM SERPL-MCNC: 2.3 MG/DL
MAN DIFF?: NORMAL
MCHC RBC-ENTMCNC: 28.8 PG
MCHC RBC-ENTMCNC: 29.6 GM/DL
MCV RBC AUTO: 97.4 FL
MONOCYTES # BLD AUTO: 0.73 K/UL
MONOCYTES NFR BLD AUTO: 13.7 %
NEUTROPHILS # BLD AUTO: 3.28 K/UL
NEUTROPHILS NFR BLD AUTO: 61.4 %
PARATHYROID HORMONE INTACT: 254 PG/ML
PHOSPHATE SERPL-MCNC: 4.9 MG/DL
PLATELET # BLD AUTO: 186 K/UL
POTASSIUM SERPL-SCNC: 4.4 MMOL/L
RBC # BLD: 3.85 M/UL
RBC # FLD: 14.6 %
SODIUM SERPL-SCNC: 141 MMOL/L
TIBC SERPL-MCNC: 297 UG/DL
UIBC SERPL-MCNC: 85 UG/DL
URATE SERPL-MCNC: 9.2 MG/DL
WBC # FLD AUTO: 5.34 K/UL

## 2020-01-08 ENCOUNTER — LABORATORY RESULT (OUTPATIENT)
Age: 85
End: 2020-01-08

## 2020-01-21 ENCOUNTER — NON-APPOINTMENT (OUTPATIENT)
Age: 85
End: 2020-01-21

## 2020-01-21 ENCOUNTER — APPOINTMENT (OUTPATIENT)
Dept: GERIATRICS | Facility: CLINIC | Age: 85
End: 2020-01-21
Payer: MEDICARE

## 2020-01-21 ENCOUNTER — APPOINTMENT (OUTPATIENT)
Dept: CARDIOLOGY | Facility: CLINIC | Age: 85
End: 2020-01-21
Payer: MEDICARE

## 2020-01-21 VITALS
OXYGEN SATURATION: 98 % | HEIGHT: 68 IN | DIASTOLIC BLOOD PRESSURE: 70 MMHG | HEART RATE: 79 BPM | SYSTOLIC BLOOD PRESSURE: 154 MMHG | BODY MASS INDEX: 24.71 KG/M2 | WEIGHT: 163 LBS

## 2020-01-21 VITALS
RESPIRATION RATE: 16 BRPM | HEIGHT: 68 IN | BODY MASS INDEX: 25.04 KG/M2 | TEMPERATURE: 97.5 F | WEIGHT: 165.2 LBS | OXYGEN SATURATION: 97 % | DIASTOLIC BLOOD PRESSURE: 60 MMHG | HEART RATE: 85 BPM | SYSTOLIC BLOOD PRESSURE: 142 MMHG

## 2020-01-21 VITALS — SYSTOLIC BLOOD PRESSURE: 126 MMHG | DIASTOLIC BLOOD PRESSURE: 70 MMHG

## 2020-01-21 PROCEDURE — 99214 OFFICE O/P EST MOD 30 MIN: CPT

## 2020-01-21 PROCEDURE — 93000 ELECTROCARDIOGRAM COMPLETE: CPT

## 2020-01-21 RX ORDER — ZOSTER VACCINE RECOMBINANT, ADJUVANTED 50 MCG/0.5
50 KIT INTRAMUSCULAR
Qty: 1 | Refills: 0 | Status: COMPLETED | OUTPATIENT
Start: 2019-01-29 | End: 2020-01-21

## 2020-01-21 NOTE — END OF VISIT
[] : Resident [FreeTextEntry3] : Agree with note and findings. Pt has appointment with Dr. Angel but not dl January for headaches. Headaches worse.

## 2020-01-21 NOTE — PHYSICAL EXAM
[General Appearance - Alert] : alert [General Appearance - Well Nourished] : well nourished [General Appearance - Well-Appearing] : healthy appearing [Sclera] : the sclera and conjunctiva were normal [Extraocular Movements] : extraocular movements were intact [Outer Ear] : the ears and nose were normal in appearance [Both Tympanic Membranes Were Examined] : both tympanic membranes were normal [Neck Appearance] : the appearance of the neck was normal [] : no respiratory distress [Respiration, Rhythm And Depth] : normal respiratory rhythm and effort [Murmurs] : no murmurs [Abdomen Soft] : soft [No Spinal Tenderness] : no spinal tenderness [FreeTextEntry1] : ecchymosis and excoriations of forearm  [Cranial Nerves] : cranial nerves 2-12 were intact [Sensation] : the sensory exam was normal to light touch and pinprick [Motor Exam] : the motor exam was normal [No Focal Deficits] : no focal deficits [Oriented To Time, Place, And Person] : oriented to person, place, and time [Impaired Insight] : insight and judgment were intact [Affect] : the affect was normal

## 2020-01-21 NOTE — REASON FOR VISIT
[Hypertension] : hypertension [Follow-Up - Clinic] : a clinic follow-up of [Cardiomyopathy] : cardiomyopathy

## 2020-01-21 NOTE — ASSESSMENT
[FreeTextEntry1] : 88 M with PMH HTN, HLD, DMT2, CKD5, ischemic cardiomyopathy with EF of 20% with AICD, MV replacement, atrial fibrillation on Coumadin presents for follow up of chronic headaches. \par \par 1) HTN - controlled\par 2) CHF - controlled\par 3) DM - controlled in mArch, check hgba1c.\par 4) CKD - stable crn = 2.81\par 5) Headaches - seeing dr nur next week. No pain meds until his opinion rendered.\par \par \par RTC in 4-6  months \par \par \par consider medical marijuana\par

## 2020-01-21 NOTE — DISCUSSION/SUMMARY
[___ Month(s)] : [unfilled] month(s) [FreeTextEntry1] : The patient is an 89-year-old gentleman mild dementia, diabetes mellitus, CKD stage IV, hypertension, hyperlipidemia, afib, CAD, cardiomyopathy, ICD who is asymptomatic. \par #1 CAD - no angina, continue imdur 30mg\par #2 Htn - stable on hydralazine and toprol\par #3 Cardiomyopathy - euvolemic on exam, continue torsemide  2 tab daily except when go out to eat twice a week, daily weights. ICD interrogation negative, echo severe LV dysfunction\par #4 Afib - rate controlled with low dose toprol, no bleeding on warfarin,INR better\par #5 DM - under control\par #6 Renal - CKD stage IV f/u Dr. Miller\par #7 Lipids-on lipitor 40mg.\par #8 Neuro- on aricept, encourage ambulation with assistance, increase hydration

## 2020-01-21 NOTE — REVIEW OF SYSTEMS
[As noted in HPI] : as noted in HPI [Palpitations] : no palpitations [Arthralgias] : arthralgias [Joint Pain] : joint pain [Joint Swelling] : no joint swelling [Joint Stiffness] : joint stiffness [As Noted in HPI] : as noted in HPI [Confused] : no confusion [Convulsions] : no convulsions [Negative] : Heme/Lymph [FreeTextEntry5] : defib in place [FreeTextEntry9] : mild crepitance bilateral knees, but good range of motion [de-identified] : headaches [Fever] : no fever [Feeling Poorly] : not feeling poorly [Eye Pain] : no eye pain [Eyesight Problems] : no eyesight problems [Loss Of Hearing] : no hearing loss [Chest Pain] : no chest pain [Shortness Of Breath] : no shortness of breath [Orthopnea] : no orthopnea [Abdominal Pain] : no abdominal pain [Dysuria] : no dysuria [Incontinence] : no incontinence [Suicidal] : not suicidal

## 2020-01-21 NOTE — HISTORY OF PRESENT ILLNESS
[FreeTextEntry1] : Klaus is an 89-year-old gentleman mild dementia, CAD, htn, hyperlipidemia,afib,  systolic heart failure who  has been feeling well with no chest pain, palpitations or shortness of breath. He does go around the block occasionally. Log of weights reviewed.

## 2020-01-21 NOTE — PHYSICAL EXAM
[Normal Appearance] : normal appearance [General Appearance - Well Developed] : well developed [Well Groomed] : well groomed [General Appearance - Well Nourished] : well nourished [General Appearance - In No Acute Distress] : no acute distress [No Deformities] : no deformities [Eyelids - No Xanthelasma] : the eyelids demonstrated no xanthelasmas [Normal Conjunctiva] : the conjunctiva exhibited no abnormalities [No Oral Pallor] : no oral pallor [Normal Oral Mucosa] : normal oral mucosa [No Oral Cyanosis] : no oral cyanosis [Normal Jugular Venous A Waves Present] : normal jugular venous A waves present [Normal Jugular Venous V Waves Present] : normal jugular venous V waves present [No Jugular Venous Villanueva A Waves] : no jugular venous villanueva A waves [Respiration, Rhythm And Depth] : normal respiratory rhythm and effort [Auscultation Breath Sounds / Voice Sounds] : lungs were clear to auscultation bilaterally [Exaggerated Use Of Accessory Muscles For Inspiration] : no accessory muscle use [Murmurs] : no murmurs present [Irregularly Irregular] : the rhythm was irregularly irregular [Heart Sounds] : normal S1 and S2 [Abdomen Soft] : soft [Abdomen Tenderness] : non-tender [Abdomen Mass (___ Cm)] : no abdominal mass palpated [Abnormal Walk] : normal gait [Nail Clubbing] : no clubbing of the fingernails [Gait - Sufficient For Exercise Testing] : the gait was sufficient for exercise testing [Petechial Hemorrhages (___cm)] : no petechial hemorrhages [Cyanosis, Localized] : no localized cyanosis [] : no rash [Skin Color & Pigmentation] : normal skin color and pigmentation [No Venous Stasis] : no venous stasis [No Skin Ulcers] : no skin ulcer [Skin Lesions] : no skin lesions [No Xanthoma] : no  xanthoma was observed [Oriented To Time, Place, And Person] : oriented to person, place, and time [Affect] : the affect was normal [No Anxiety] : not feeling anxious [Mood] : the mood was normal

## 2020-01-21 NOTE — HISTORY OF PRESENT ILLNESS
[FreeTextEntry1] : 88 M with PMH HTN, HLD, DMT2, CKD5, ischemic cardiomyopathy with EF of 20% with AICD, MV replacement, atrial fibrillation on Coumadin presents for follow up. Patient follows up closely with cardiology, endocrinology, and nephrology.\par \par Pt continues to have complaints of headache that are chronic Pt notes global headache, more prominent in occipital region. Wife and son who are present during the encounter states that he takes approximately 4 g Tylenol but since last visit he has decreased it to 3 Grams throughout the day with some relief of headache. Denies history of migraines. Denies aura or prodrome. No changes in senses or focal neuro deficit. Son notes chronic back and cervical pain previously treated w opiates that have since been discontinued. Pt has appt w pain management headache specialist next week.  \par \par Pt also went to see orthopedist for OA of knees. Pt wants to get injections to knees but pt able to ambulate without assistance.

## 2020-01-22 ENCOUNTER — APPOINTMENT (OUTPATIENT)
Dept: ELECTROPHYSIOLOGY | Facility: CLINIC | Age: 85
End: 2020-01-22

## 2020-01-27 ENCOUNTER — APPOINTMENT (OUTPATIENT)
Dept: PAIN MANAGEMENT | Facility: CLINIC | Age: 85
End: 2020-01-27
Payer: MEDICARE

## 2020-01-27 VITALS — BODY MASS INDEX: 24.55 KG/M2 | WEIGHT: 162 LBS | HEIGHT: 68 IN

## 2020-01-27 VITALS — DIASTOLIC BLOOD PRESSURE: 63 MMHG | HEART RATE: 74 BPM | SYSTOLIC BLOOD PRESSURE: 124 MMHG

## 2020-01-27 DIAGNOSIS — G44.229 CHRONIC TENSION-TYPE HEADACHE, NOT INTRACTABLE: ICD-10-CM

## 2020-01-27 DIAGNOSIS — M62.838 OTHER MUSCLE SPASM: ICD-10-CM

## 2020-01-27 PROCEDURE — 99214 OFFICE O/P EST MOD 30 MIN: CPT

## 2020-02-01 PROBLEM — G44.229 CHRONIC TENSION-TYPE HEADACHE, NOT INTRACTABLE: Status: ACTIVE | Noted: 2018-05-08

## 2020-02-01 PROBLEM — M62.838 NECK MUSCLE SPASM: Status: ACTIVE | Noted: 2017-10-27

## 2020-02-01 NOTE — REVIEW OF SYSTEMS
[Feeling Poorly] : feeling poorly [Chills] : no chills [Fever] : no fever [Feeling Tired] : feeling tired [Eyesight Problems] : no eyesight problems [Recent Weight Gain (___ Lbs)] : no recent weight gain [Chest Pain] : no chest pain [Shortness Of Breath] : no shortness of breath [Palpitations] : no palpitations [Cough] : no cough [Diarrhea] : no diarrhea [Constipation] : no constipation [Neck Pain] : neck pain [Arthralgias] : arthralgias [Itching] : no itching [Skin Wound] : no skin wound [Skin Lesions] : no skin lesions [Dizziness] : no dizziness [As Noted in HPI] : as noted in HPI [Sleep Disturbances] : sleep disturbances [Muscle Weakness] : no muscle weakness

## 2020-02-01 NOTE — HISTORY OF PRESENT ILLNESS
[FreeTextEntry1] : 89 yo M presents with ROJAS. Has been having headaches constantly for the past year. Constant, 7 days, per week always. Takes tylenol 3 times per day, daily (4-6 pills). No nausea or vomiting. No photophobia or phonophobia. Denies having any triggers (hunger, thirst). Reports having limited ROM in the neck. Denies any diplopia, blurry vision, changes in visual field. No shooting type pain down the arm. Does report occasional neck pain bilaterally. Describes pain as achy. has not tried anything else for headaches. Was unable to do MRI due to AICD/PPM. Pain scale is 8/10, nonfluctuating. Sometimes worse with movement.\par \par Sometimes wakes up in the middle of the night for other reasons, but occasionally has headaches. usually takes aspirin or tylenol before going to sleep. Sleeps 4-5 hours per night, sometimes wakes up 2-3 times per night. Does have occasional snoring. Has had evaluation for sleep apnea, was reportedly normal. \par  [Chronic Headache] : chronic headache [Neck Pain] : neck pain [Scalp Tenderness] : scalp tenderness [> 4 hours] : > 4 hours [Daily] : daily [stayed the same] : stayed the same [4] : a minimum pain level of 4/10 [Worsened] : The patient reports ~his/her~ symptoms since the last visit have worsened [8] : a maximum pain level of 8/10

## 2020-02-01 NOTE — PHYSICAL EXAM
[General Appearance - Alert] : alert [General Appearance - Well Nourished] : well nourished [General Appearance - Well Developed] : well developed [Place] : oriented to place [Time] : oriented to time [Naming Objects] : no difficulty naming common objects [Comprehension] : comprehension intact [Fluency] : fluency intact [Cranial Nerves Trigeminal (V)] : facial sensation intact symmetrically [Cranial Nerves Facial (VII)] : face symmetrical [Reading] : reading intact [Current Events] : adequate knowledge of current events [Cranial Nerves Accessory (XI - Cranial And Spinal)] : head turning and shoulder shrug symmetric [Cranial Nerves Vestibulocochlear (VIII)] : hearing was intact bilaterally [Cranial Nerves Glossopharyngeal (IX)] : tongue and palate midline [Cranial Nerves Hypoglossal (XII)] : there was no tongue deviation with protrusion [No Muscle Atrophy] : normal bulk in all four extremities [Motor Handedness Right-Handed] : the patient is right hand dominant [Motor Strength Upper Extremities Bilaterally] : strength was normal in both upper extremities [Sclera] : the sclera and conjunctiva were normal [Outer Ear] : the ears and nose were normal in appearance [FreeTextEntry1] : severely decreased range of motion of neck in all directions. [Exaggerated Use Of Accessory Muscles For Inspiration] : no accessory muscle use [Edema] : there was no peripheral edema [Abnormal Walk] : normal gait [Involuntary Movements] : no involuntary movements were seen [Nail Clubbing] : no clubbing  or cyanosis of the fingernails [Musculoskeletal - Swelling] : no joint swelling seen [Motor Tone] : muscle strength and tone were normal [Skin Turgor] : normal skin turgor [Skin Color & Pigmentation] : normal skin color and pigmentation [] : no rash

## 2020-02-01 NOTE — ASSESSMENT
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

## 2020-03-03 ENCOUNTER — APPOINTMENT (OUTPATIENT)
Dept: ENDOCRINOLOGY | Facility: CLINIC | Age: 85
End: 2020-03-03

## 2020-03-30 ENCOUNTER — APPOINTMENT (OUTPATIENT)
Dept: ELECTROPHYSIOLOGY | Facility: CLINIC | Age: 85
End: 2020-03-30

## 2020-04-02 DIAGNOSIS — L03.90 CELLULITIS, UNSPECIFIED: ICD-10-CM

## 2020-04-13 ENCOUNTER — RX RENEWAL (OUTPATIENT)
Age: 85
End: 2020-04-13

## 2020-04-14 ENCOUNTER — RX RENEWAL (OUTPATIENT)
Age: 85
End: 2020-04-14

## 2020-04-16 LAB
ALBUMIN SERPL ELPH-MCNC: 4.1 G/DL
ALP BLD-CCNC: 113 U/L
ALT SERPL-CCNC: 20 U/L
ANION GAP SERPL CALC-SCNC: 18 MMOL/L
APTT BLD: 40 SEC
AST SERPL-CCNC: 23 U/L
BASOPHILS # BLD AUTO: 0.03 K/UL
BASOPHILS NFR BLD AUTO: 0.4 %
BILIRUB SERPL-MCNC: 0.9 MG/DL
BUN SERPL-MCNC: 93 MG/DL
CALCIUM SERPL-MCNC: 9.5 MG/DL
CHLORIDE SERPL-SCNC: 101 MMOL/L
CO2 SERPL-SCNC: 22 MMOL/L
CREAT SERPL-MCNC: 3.36 MG/DL
EOSINOPHIL # BLD AUTO: 0.21 K/UL
EOSINOPHIL NFR BLD AUTO: 2.6 %
ESTIMATED AVERAGE GLUCOSE: 177 MG/DL
GLUCOSE SERPL-MCNC: 75 MG/DL
HBA1C MFR BLD HPLC: 7.8 %
HCT VFR BLD CALC: 31.8 %
HGB BLD-MCNC: 9.7 G/DL
IMM GRANULOCYTES NFR BLD AUTO: 0.5 %
INR PPP: 2.93 RATIO
LYMPHOCYTES # BLD AUTO: 1.16 K/UL
LYMPHOCYTES NFR BLD AUTO: 14.1 %
MAN DIFF?: NORMAL
MCHC RBC-ENTMCNC: 28.3 PG
MCHC RBC-ENTMCNC: 30.5 GM/DL
MCV RBC AUTO: 92.7 FL
MONOCYTES # BLD AUTO: 1 K/UL
MONOCYTES NFR BLD AUTO: 12.2 %
NEUTROPHILS # BLD AUTO: 5.79 K/UL
NEUTROPHILS NFR BLD AUTO: 70.2 %
PLATELET # BLD AUTO: 215 K/UL
POTASSIUM SERPL-SCNC: 4.2 MMOL/L
PROT SERPL-MCNC: 7.5 G/DL
PT BLD: 34.5 SEC
RBC # BLD: 3.43 M/UL
RBC # FLD: 14.6 %
SODIUM SERPL-SCNC: 140 MMOL/L
WBC # FLD AUTO: 8.23 K/UL

## 2020-04-20 ENCOUNTER — APPOINTMENT (OUTPATIENT)
Dept: GERIATRICS | Facility: CLINIC | Age: 85
End: 2020-04-20

## 2020-04-20 ENCOUNTER — EMERGENCY (EMERGENCY)
Facility: HOSPITAL | Age: 85
LOS: 1 days | Discharge: HOME | End: 2020-04-20
Attending: EMERGENCY MEDICINE
Payer: MEDICARE

## 2020-04-20 VITALS
OXYGEN SATURATION: 97 % | TEMPERATURE: 98 F | RESPIRATION RATE: 18 BRPM | DIASTOLIC BLOOD PRESSURE: 73 MMHG | HEART RATE: 83 BPM | HEIGHT: 68 IN | WEIGHT: 162.04 LBS | SYSTOLIC BLOOD PRESSURE: 150 MMHG

## 2020-04-20 VITALS
HEART RATE: 80 BPM | RESPIRATION RATE: 18 BRPM | TEMPERATURE: 98 F | DIASTOLIC BLOOD PRESSURE: 91 MMHG | OXYGEN SATURATION: 99 % | SYSTOLIC BLOOD PRESSURE: 136 MMHG

## 2020-04-20 DIAGNOSIS — Z98.49 CATARACT EXTRACTION STATUS, UNSPECIFIED EYE: Chronic | ICD-10-CM

## 2020-04-20 DIAGNOSIS — Z95.1 PRESENCE OF AORTOCORONARY BYPASS GRAFT: Chronic | ICD-10-CM

## 2020-04-20 DIAGNOSIS — Z98.89 OTHER SPECIFIED POSTPROCEDURAL STATES: Chronic | ICD-10-CM

## 2020-04-20 DIAGNOSIS — Z95.810 PRESENCE OF AUTOMATIC (IMPLANTABLE) CARDIAC DEFIBRILLATOR: Chronic | ICD-10-CM

## 2020-04-20 DIAGNOSIS — Z95.4 PRESENCE OF OTHER HEART-VALVE REPLACEMENT: Chronic | ICD-10-CM

## 2020-04-20 LAB
ALBUMIN SERPL ELPH-MCNC: 4.3 G/DL — SIGNIFICANT CHANGE UP (ref 3.3–5)
ALP SERPL-CCNC: 123 U/L — HIGH (ref 40–120)
ALT FLD-CCNC: 18 U/L — SIGNIFICANT CHANGE UP (ref 10–45)
ANION GAP SERPL CALC-SCNC: 17 MMOL/L — SIGNIFICANT CHANGE UP (ref 5–17)
APTT BLD: 33.8 SEC — SIGNIFICANT CHANGE UP (ref 27.5–36.3)
AST SERPL-CCNC: 23 U/L — SIGNIFICANT CHANGE UP (ref 10–40)
BASE EXCESS BLDV CALC-SCNC: -1.7 MMOL/L — SIGNIFICANT CHANGE UP (ref -2–2)
BASOPHILS # BLD AUTO: 0.04 K/UL — SIGNIFICANT CHANGE UP (ref 0–0.2)
BASOPHILS NFR BLD AUTO: 0.5 % — SIGNIFICANT CHANGE UP (ref 0–2)
BILIRUB SERPL-MCNC: 0.9 MG/DL — SIGNIFICANT CHANGE UP (ref 0.2–1.2)
BUN SERPL-MCNC: 90 MG/DL — HIGH (ref 7–23)
CA-I SERPL-SCNC: 1.2 MMOL/L — SIGNIFICANT CHANGE UP (ref 1.12–1.3)
CALCIUM SERPL-MCNC: 9.4 MG/DL — SIGNIFICANT CHANGE UP (ref 8.4–10.5)
CHLORIDE BLDV-SCNC: 104 MMOL/L — SIGNIFICANT CHANGE UP (ref 96–108)
CHLORIDE SERPL-SCNC: 101 MMOL/L — SIGNIFICANT CHANGE UP (ref 96–108)
CO2 BLDV-SCNC: 26 MMOL/L — SIGNIFICANT CHANGE UP (ref 22–30)
CO2 SERPL-SCNC: 22 MMOL/L — SIGNIFICANT CHANGE UP (ref 22–31)
CREAT SERPL-MCNC: 3.02 MG/DL — HIGH (ref 0.5–1.3)
EOSINOPHIL # BLD AUTO: 0.23 K/UL — SIGNIFICANT CHANGE UP (ref 0–0.5)
EOSINOPHIL NFR BLD AUTO: 2.9 % — SIGNIFICANT CHANGE UP (ref 0–6)
GAS PNL BLDV: 139 MMOL/L — SIGNIFICANT CHANGE UP (ref 135–145)
GAS PNL BLDV: SIGNIFICANT CHANGE UP
GLUCOSE BLDV-MCNC: 137 MG/DL — HIGH (ref 70–99)
GLUCOSE SERPL-MCNC: 139 MG/DL — HIGH (ref 70–99)
HCO3 BLDV-SCNC: 24 MMOL/L — SIGNIFICANT CHANGE UP (ref 21–29)
HCT VFR BLD CALC: 33.8 % — LOW (ref 39–50)
HCT VFR BLDA CALC: 34 % — LOW (ref 39–50)
HGB BLD CALC-MCNC: 10.9 G/DL — LOW (ref 13–17)
HGB BLD-MCNC: 10 G/DL — LOW (ref 13–17)
IMM GRANULOCYTES NFR BLD AUTO: 0.4 % — SIGNIFICANT CHANGE UP (ref 0–1.5)
INR BLD: 1.45 RATIO — HIGH (ref 0.88–1.16)
LACTATE BLDV-MCNC: 1.8 MMOL/L — SIGNIFICANT CHANGE UP (ref 0.7–2)
LYMPHOCYTES # BLD AUTO: 1.17 K/UL — SIGNIFICANT CHANGE UP (ref 1–3.3)
LYMPHOCYTES # BLD AUTO: 14.6 % — SIGNIFICANT CHANGE UP (ref 13–44)
MCHC RBC-ENTMCNC: 27.9 PG — SIGNIFICANT CHANGE UP (ref 27–34)
MCHC RBC-ENTMCNC: 29.6 GM/DL — LOW (ref 32–36)
MCV RBC AUTO: 94.4 FL — SIGNIFICANT CHANGE UP (ref 80–100)
MONOCYTES # BLD AUTO: 1.1 K/UL — HIGH (ref 0–0.9)
MONOCYTES NFR BLD AUTO: 13.7 % — SIGNIFICANT CHANGE UP (ref 2–14)
NEUTROPHILS # BLD AUTO: 5.46 K/UL — SIGNIFICANT CHANGE UP (ref 1.8–7.4)
NEUTROPHILS NFR BLD AUTO: 67.9 % — SIGNIFICANT CHANGE UP (ref 43–77)
NRBC # BLD: 0 /100 WBCS — SIGNIFICANT CHANGE UP (ref 0–0)
NT-PROBNP SERPL-SCNC: 4457 PG/ML — HIGH (ref 0–300)
PCO2 BLDV: 51 MMHG — HIGH (ref 35–50)
PH BLDV: 7.3 — LOW (ref 7.35–7.45)
PLATELET # BLD AUTO: 213 K/UL — SIGNIFICANT CHANGE UP (ref 150–400)
PO2 BLDV: <20 MMHG — LOW (ref 25–45)
POTASSIUM BLDV-SCNC: 4.3 MMOL/L — SIGNIFICANT CHANGE UP (ref 3.5–5.3)
POTASSIUM SERPL-MCNC: 4.4 MMOL/L — SIGNIFICANT CHANGE UP (ref 3.5–5.3)
POTASSIUM SERPL-SCNC: 4.4 MMOL/L — SIGNIFICANT CHANGE UP (ref 3.5–5.3)
PROT SERPL-MCNC: 7.9 G/DL — SIGNIFICANT CHANGE UP (ref 6–8.3)
PROTHROM AB SERPL-ACNC: 16.9 SEC — HIGH (ref 10–12.9)
RBC # BLD: 3.58 M/UL — LOW (ref 4.2–5.8)
RBC # FLD: 15.5 % — HIGH (ref 10.3–14.5)
SAO2 % BLDV: 21 % — LOW (ref 67–88)
SODIUM SERPL-SCNC: 140 MMOL/L — SIGNIFICANT CHANGE UP (ref 135–145)
TROPONIN T, HIGH SENSITIVITY RESULT: 43 NG/L — SIGNIFICANT CHANGE UP (ref 0–51)
TROPONIN T, HIGH SENSITIVITY RESULT: 44 NG/L — SIGNIFICANT CHANGE UP (ref 0–51)
WBC # BLD: 8.03 K/UL — SIGNIFICANT CHANGE UP (ref 3.8–10.5)
WBC # FLD AUTO: 8.03 K/UL — SIGNIFICANT CHANGE UP (ref 3.8–10.5)

## 2020-04-20 PROCEDURE — 99284 EMERGENCY DEPT VISIT MOD MDM: CPT | Mod: GC

## 2020-04-20 PROCEDURE — 71045 X-RAY EXAM CHEST 1 VIEW: CPT | Mod: 26

## 2020-04-20 PROCEDURE — 87635 SARS-COV-2 COVID-19 AMP PRB: CPT

## 2020-04-20 PROCEDURE — 82330 ASSAY OF CALCIUM: CPT

## 2020-04-20 PROCEDURE — 71045 X-RAY EXAM CHEST 1 VIEW: CPT

## 2020-04-20 PROCEDURE — 84484 ASSAY OF TROPONIN QUANT: CPT

## 2020-04-20 PROCEDURE — 73630 X-RAY EXAM OF FOOT: CPT

## 2020-04-20 PROCEDURE — 82435 ASSAY OF BLOOD CHLORIDE: CPT

## 2020-04-20 PROCEDURE — 73590 X-RAY EXAM OF LOWER LEG: CPT

## 2020-04-20 PROCEDURE — 71250 CT THORAX DX C-: CPT | Mod: 26

## 2020-04-20 PROCEDURE — 99284 EMERGENCY DEPT VISIT MOD MDM: CPT | Mod: 25

## 2020-04-20 PROCEDURE — 83605 ASSAY OF LACTIC ACID: CPT

## 2020-04-20 PROCEDURE — 93971 EXTREMITY STUDY: CPT | Mod: 26,RT

## 2020-04-20 PROCEDURE — 80053 COMPREHEN METABOLIC PANEL: CPT

## 2020-04-20 PROCEDURE — 99285 EMERGENCY DEPT VISIT HI MDM: CPT | Mod: CS,GC

## 2020-04-20 PROCEDURE — 82947 ASSAY GLUCOSE BLOOD QUANT: CPT

## 2020-04-20 PROCEDURE — 85730 THROMBOPLASTIN TIME PARTIAL: CPT

## 2020-04-20 PROCEDURE — 84132 ASSAY OF SERUM POTASSIUM: CPT

## 2020-04-20 PROCEDURE — 82803 BLOOD GASES ANY COMBINATION: CPT

## 2020-04-20 PROCEDURE — 93926 LOWER EXTREMITY STUDY: CPT

## 2020-04-20 PROCEDURE — 93010 ELECTROCARDIOGRAM REPORT: CPT | Mod: GC

## 2020-04-20 PROCEDURE — 93005 ELECTROCARDIOGRAM TRACING: CPT

## 2020-04-20 PROCEDURE — 93926 LOWER EXTREMITY STUDY: CPT | Mod: 26,RT

## 2020-04-20 PROCEDURE — 85610 PROTHROMBIN TIME: CPT

## 2020-04-20 PROCEDURE — 84295 ASSAY OF SERUM SODIUM: CPT

## 2020-04-20 PROCEDURE — 83880 ASSAY OF NATRIURETIC PEPTIDE: CPT

## 2020-04-20 PROCEDURE — 73590 X-RAY EXAM OF LOWER LEG: CPT | Mod: 26,RT

## 2020-04-20 PROCEDURE — 71250 CT THORAX DX C-: CPT

## 2020-04-20 PROCEDURE — 85027 COMPLETE CBC AUTOMATED: CPT

## 2020-04-20 PROCEDURE — 93971 EXTREMITY STUDY: CPT

## 2020-04-20 PROCEDURE — 73630 X-RAY EXAM OF FOOT: CPT | Mod: 26,RT

## 2020-04-20 PROCEDURE — 85014 HEMATOCRIT: CPT

## 2020-04-20 NOTE — ED ADULT NURSE NOTE - NSIMPLEMENTINTERV_GEN_ALL_ED
Implemented All Fall with Harm Risk Interventions:  [place] to call system. Call bell, personal items and telephone within reach. Instruct patient to call for assistance. Room bathroom lighting operational. Non-slip footwear when patient is off stretcher. Physically safe environment: no spills, clutter or unnecessary equipment. Stretcher in lowest position, wheels locked, appropriate side rails in place. Provide visual cue, wrist band, yellow gown, etc. Monitor gait and stability. Monitor for mental status changes and reorient to person, place, and time. Review medications for side effects contributing to fall risk. Reinforce activity limits and safety measures with patient and family. Provide visual clues: red socks.

## 2020-04-20 NOTE — CONSULT NOTE ADULT - SUBJECTIVE AND OBJECTIVE BOX
VASCULAR SURGERY CONSULT NOTE  --------------------------------------------------------------------------------------------    Patient is an 89 year old male with a PMH of afib on coumadin, AICD/Pacemaker, CABG with right leg GSV, HLD, HTN, CVA, and endocarditis 3x who is presenting to the ED with right leg bruising and right 3rd and 4th toe bruising.  He first noticed the bruising 2 weeks ago that has increased along with swelling.  He stopped taking his coumadin 4 days ago due to the bruising.  He denies having any motor or sensation loss in his left leg.    Patient denies fevers/chills, denies lightheadedness/dizziness, denies SOB/chest pain, denies nausea/vomiting, denies constipation/diarrhea.      ROS: 10-system review is otherwise negative except HPI above.      PAST MEDICAL & SURGICAL HISTORY:  Cerebrovascular accident (CVA), unspecified mechanism  Pacemaker  Endocarditis  CAD (coronary artery disease)  Cardiomyopathy, ischemic  Type 2 diabetes mellitus  Hypertension  Congestive heart failure  Paroxysmal atrial fibrillation  Dyslipidemia  S/P CABG x 1  History of cataract surgery  History of colon surgery  ICD (implantable cardioverter-defibrillator) in place  H/O mitral valve replacement: bovine    FAMILY HISTORY:  No pertinent family history in first degree relatives      ALLERGIES: No Known Allergies      CURRENT MEDICATIONS  MEDICATIONS (STANDING):   MEDICATIONS (PRN):  --------------------------------------------------------------------------------------------    Vitals:   T(C): 36.6 (04-20-20 @ 15:09), Max: 36.6 (04-20-20 @ 15:09)  HR: 80 (04-20-20 @ 15:09) (78 - 87)  BP: 142/78 (04-20-20 @ 15:09) (137/58 - 150/73)  RR: 18 (04-20-20 @ 15:09) (18 - 19)  SpO2: 99% (04-20-20 @ 15:09) (96% - 99%)  CAPILLARY BLOOD GLUCOSE        CAPILLARY BLOOD GLUCOSE          Height (cm): 172.72 (04-20 @ 10:52)  Weight (kg): 73.5 (04-20 @ 10:52)  BMI (kg/m2): 24.6 (04-20 @ 10:52)  BSA (m2): 1.87 (04-20 @ 10:52)    PHYSICAL EXAM:  General: NAD, Lying in bed comfortably  Neuro: A+Ox3  HEENT: NC/AT  Cardio: Regular rate  Resp: Good effort  Vascular: There is a right palpable DP 1+ and left palpable DP 2+.  He has b/l femoral pulses 2+.  There is intermittent ecchymosis over the right leg as well third and fourth toes.  Musculoskeletal: No motor deficit in right foot. His right calf and foot have mild swelling but compartments are all soft.   --------------------------------------------------------------------------------------------    LABS  CBC (04-20 @ 11:58)                              10.0<L>                         8.03    )----------------(  213        67.9  % Neutrophils, 14.6  % Lymphocytes, ANC: 5.46                                33.8<L>    BMP (04-20 @ 11:58)             140     |  101     |  90<H> 		Ca++ --      Ca 9.4                ---------------------------------( 139<H>		Mg --                 4.4     |  22      |  3.02<H>			Ph --        LFTs (04-20 @ 11:58)      TPro 7.9 / Alb 4.3 / TBili 0.9 / DBili -- / AST 23 / ALT 18 / AlkPhos 123<H>    Coags (04-20 @ 11:58)  aPTT 33.8 / INR 1.45<H> / PT 16.9<H>        VBG (04-20 @ 11:56)     7.30<L> / 51<H> / <20<L> / 24 / -1.7 / 21<L>%     Lactate: 1.8    --------------------------------------------------------------------------------------------    MICROBIOLOGY      --------------------------------------------------------------------------------------------    IMAGING    US LE Venous:  IMPRESSION:     No evidence of right lower extremity deep venous thrombosis.      US LE Arterial:  Impression: Calcified atherosclerotic disease is noted at the arteries of the right lower extremity.    Mild stenosis involving the right deep femoral artery.    Moderate stenosis right posterior tibial and peroneal arteries. Severe stenosis involving the mid segment of the right anterior tibial artery. Distally, the right anterior tibial artery is occluded reconstituting at the right ankle.    Patent right dorsalis pedis artery with low velocity flow in a retrograde direction proximally and an anterograde direction distally.      ASSESSMENT: Patient is an 89 year old male with a PMH of afib on coumadin, AICD/Pacemaker, CABG with right leg GSV, HLD, HTN, CVA, and endocarditis 3x who is presenting to the ED with right leg bruising and right 3rd and 4th toe bruising    PLAN:   - No evidence of emboli  - Recommend restarting full anticoagulation per primary team  - Would continue home statin  - Please call with questions  - Plan discussed with Fellow    Alex Mcgowan PGY3  Vascular Surgery

## 2020-04-20 NOTE — ED PROVIDER NOTE - CLINICAL SUMMARY MEDICAL DECISION MAKING FREE TEXT BOX
88yo M with extensive medical Hx presenting with complaints of RLE edema and bruising, now with discoloration of the right 2nd and third digit. PMD uncertain if bruising or embolic event, sent to the ED for dopplers and vascular evaluation. no infectious symptoms at this time. will obtain labs, venous and arterial dopplers, CXR and vascular consult.

## 2020-04-20 NOTE — ED PROVIDER NOTE - OBJECTIVE STATEMENT
88yo M Hx CAD s/p CABG, CVA, CHF, HLD, HTN, PPM/AICD, Endocarditisx3, DM, Afib on coumadin last 5d ago. Mitral Valve Replacement, Anemia, Alzheimer p/w complaints of RLE swelling, bruising and skin changes. Pt presenting with daughter reports that 88yo M Hx CAD s/p CABG, CVA, CHF, HLD, HTN, PPM/AICD, Endocarditisx3, DM, Afib on coumadin last 5d ago. Mitral Valve Replacement, Anemia, Alzheimer p/w complaints of RLE swelling, bruising and skin changes. Pt presenting with family member states that he has been having extensive bruising of RLE, has two wounds on the anterior surface of the calf that haven't been healing 88yo M Hx CAD s/p CABG, CVA, CHF, HLD, HTN, PPM/AICD, Endocarditisx3, DM, Afib on coumadin last 5d ago. Mitral Valve Replacement, Anemia, Alzheimer p/w complaints of RLE swelling, bruising and skin changes. Pt presenting with family member states that he has been having extensive bruising of RLE, has two wounds on the anterior surface of the calf that haven't been healing. worsening redness last week and was given abx for it which has been improving. Had been discussing and sending pictures to Dr. Miller throughout the week sending pictures of his leg and his coumadin was held. last dose 4 days ago. R 2nd and 4rd toes discolored yesterday, sent picture to Dr. Miller who was unsure if it was related.

## 2020-04-20 NOTE — ED PROVIDER NOTE - PHYSICAL EXAMINATION
MSK/skin: edema of the RLE extending to below the knee. bruising present on the anterior thigh, calf and foot. the right 2nd and 3rd digits are dark purple. unable to palpate DP pulses.

## 2020-04-20 NOTE — ED PROVIDER NOTE - NSFOLLOWUPINSTRUCTIONS_ED_ALL_ED_FT
you were seen in the emergency you were seen in the emergency department today for lower extremity swelling and bruising.     you had imaging performed and were evaluated by vascular surgery. no intervention is required at this time. **please resume taking your anticoagulation.     you were tested for covid-19 and will be contacted with results.     Please follow up with Dr. Miller in the next 1-2 days.     Return to the emergency department if you develop any new or worsening symptoms.

## 2020-04-20 NOTE — CONSULT NOTE ADULT - SUBJECTIVE AND OBJECTIVE BOX
Pt. not seen or examined at bedside due to covid rule out    HISTORY OF PRESENT ILLNESS:    88yo M Hx CAD s/p CABG, CVA, CHF, HLD, HTN, PPM/AICD, Endocarditisx3, DM, Afib on coumadin last 5d ago. Mitral Valve Replacement, Anemia, Alzheimer p/w complaints of RLE swelling, bruising and skin changes. Pt. had multiple bruises in the last week that has been improving. Pt. was being prepared for admission and XR performed with concerning CHF vs. COVID. Cardiology reflexively called.     Allergies    No Known Allergies    Intolerances    	    MEDICATIONS:                  PAST MEDICAL & SURGICAL HISTORY:  Cerebrovascular accident (CVA), unspecified mechanism  Pacemaker  Endocarditis  CAD (coronary artery disease)  Cardiomyopathy, ischemic  Type 2 diabetes mellitus  Hypertension  Congestive heart failure  Paroxysmal atrial fibrillation  Dyslipidemia  S/P CABG x 1  History of cataract surgery  History of colon surgery  ICD (implantable cardioverter-defibrillator) in place  H/O mitral valve replacement: bovine      FAMILY HISTORY:  No pertinent family history in first degree relatives      SOCIAL HISTORY:    [ ] Non-smoker  [ ] Smoker  [ ] Alcohol  PHYSICAL EXAM:  T(C): 36.6 (04-20-20 @ 15:09), Max: 36.6 (04-20-20 @ 15:09)  HR: 80 (04-20-20 @ 15:09) (78 - 87)  BP: 142/78 (04-20-20 @ 15:09) (137/58 - 150/73)  RR: 18 (04-20-20 @ 15:09) (18 - 19)  SpO2: 99% (04-20-20 @ 15:09) (96% - 99%)  Wt(kg): --  I&O's Summary      LABS:	 	    CBC Full  -  ( 20 Apr 2020 11:58 )  WBC Count : 8.03 K/uL  Hemoglobin : 10.0 g/dL  Hematocrit : 33.8 %  Platelet Count - Automated : 213 K/uL  Mean Cell Volume : 94.4 fl  Mean Cell Hemoglobin : 27.9 pg  Mean Cell Hemoglobin Concentration : 29.6 gm/dL  Auto Neutrophil # : 5.46 K/uL  Auto Lymphocyte # : 1.17 K/uL  Auto Monocyte # : 1.10 K/uL  Auto Eosinophil # : 0.23 K/uL  Auto Basophil # : 0.04 K/uL  Auto Neutrophil % : 67.9 %  Auto Lymphocyte % : 14.6 %  Auto Monocyte % : 13.7 %  Auto Eosinophil % : 2.9 %  Auto Basophil % : 0.5 %    04-20    140  |  101  |  90<H>  ----------------------------<  139<H>  4.4   |  22  |  3.02<H>    Ca    9.4      20 Apr 2020 11:58    TPro  7.9  /  Alb  4.3  /  TBili  0.9  /  DBili  x   /  AST  23  /  ALT  18  /  AlkPhos  123<H>  04-20      proBNP: Serum Pro-Brain Natriuretic Peptide: 4457 pg/mL (04-20 @ 15:11)    Lipid Profile:   HgA1c:   TSH:       CARDIAC MARKERS:        TELEMETRY: 	    ECG:  	  RADIOLOGY: < from: VA Duplex Low Ext Arterial, Ltd, Right (04.20.20 @ 13:51) >  mpression: Calcified atherosclerotic disease is noted at the arteries of the right lower extremity.    Mild stenosis involving the right deep femoral artery.    Moderate stenosis right posterior tibial and peroneal arteries. Severe stenosis involving the mid segment of the right anterior tibial artery. Distally, the right anterior tibial artery is occluded reconstituting at the right ankle.    Patent right dorsalis pedis artery with low velocity flow in a retrograde direction proximally and an anterograde direction distally.      < end of copied text >    < from: VA Duplex Lower Ext Vein Scan, Right (04.20.20 @ 12:55) >  IMPRESSION:     No evidence of right lower extremity deep venous thrombosis.    < end of copied text >      OTHER: 	    PREVIOUS DIAGNOSTIC TESTING:    [ ] Echocardiogram: < from: Transthoracic Echocardiogram (09.24.19 @ 19:48) >  Conclusions:  1. Bioprosthetic mitral valve replacement. Minimal mitral  regurgitation. Peak mitral valve gradient equals 12 mm Hg,  mean transmitral valve gradient equals 5 mm Hg, which is  probably normal in the setting of a bioprosthetic mitral  valve replacement.  2. Aortic valve not well visualized; appears calcified.  Mild aortic regurgitation.  3. Eccentric left ventricular hypertrophy (dilated left  ventricle with normal relative wall thickness).  4. Moderate global left ventricular systolic dysfunction.  5. Normal right ventricular size with decreased right  ventricular systolic function. A device wire is noted in  the right heart. TV s = 8 cm/sec.  6. Estimated right ventricular systolic pressure equals 57  mm Hg, assuming right atrial pressure equals 8 mm Hg,  consistent with moderate pulmonary hypertension.  7. A small mobile echodensity is seen on the ventricular  side of the mitral valve. Differential includes vegetation  vs torn chord. The aortic, pulmonic, and tricuspid valves  are not well visualized. Can not exclude endocarditis.  Consider MARY if clinically indicated.  *** Compared with echocardiogram of 7/15/2019, left  ventricular function has improved. Pulmonary hypertension  has decreased. A small mobile echodensity is seen on the  ventricular side of the mitral valve.    < end of copied text >    [ ]  Catheterization:    [ ] Stress Test:  	  	  ASSESSMENT/PLAN: 	    88yo M Hx CAD s/p CABG, CVA, CHF, HLD, HTN, PPM/AICD, Endocarditisx3, DM, Afib on coumadin last 5d ago. Mitral Valve Replacement, Anemia, Alzheimer p/w complaints of RLE swelling, bruising and skin changes. Pt. had multiple bruises in the last week that has been improving. Pt. was being prepared for admission and XR performed with concerning CHF vs. COVID. Cardiology reflexively called.     Pt. not currently on oxygen and did not complain of respiratory complaints  Creatinine appears to be at baseline  Would consult renal given elevation in BUN as might be approaching dialysis  Would start lasix 40mg IV BID and trend labs  COVID pending  Please call cardiology with questions or changes in clinical status.         Alex Bingham  Cardiology Fellow  [phone]  All Cardiology service information can be found 24/7 on [email or web address], password: [email or web address] 89yo M Hx CAD s/p CABG, CVA, CHF, HLD, HTN, PPM, endocarditis x 3, DM, Afib on coumadin last 5d ago and hx. of mitral Valve Replacement as well as Alzheimer's dz.  P/W complaints of RLE swelling, bruising and skin changes. Pt. had multiple bruises in the last week that has been improving. Pt. was being prepared for admission and XR performed with concerning CHF vs. COVID. Cardiology reflexively called.       Allergies:  No Known Allergies      Home Medications:  aspirin 81 mg oral delayed release tablet: 1 tab(s) orally once a day (22 Sep 2019 06:00)  atorvastatin 40 mg oral tablet: 1 tab(s) orally once a day (22 Sep 2019 06:00)  Coumadin 5 mg oral tablet: 1 tab(s) orally once a day Monday through Saturday. 0.5 tab(s) on Sunday.    (22 Sep 2019 06:00)  digoxin 125 mcg (0.125 mg) oral tablet: 1 tab(s) orally Monday, Wednesday, and Friday (22 Sep 2019 06:00)  donepezil 10 mg oral tablet: 1 tab(s) orally once a day (at bedtime) (22 Sep 2019 06:00)  hydrALAZINE 10 mg oral tablet: 2 tab(s) orally 2 times a day (22 Sep 2019 06:00)  insulin lispro 100 units/mL subcutaneous solution: 5 unit(s) subcutaneous 3 times a day (before meals)   (03 Oct 2019 11:30)  isosorbide dinitrate 30 mg oral tablet: 1 tab(s) orally once a day (22 Sep 2019 06:00)  Lantus 100 units/mL subcutaneous solution: 15 unit(s) subcutaneous once a day (at bedtime) (03 Oct 2019 11:30)  Metoprolol Succinate ER 25 mg oral tablet, extended release: 3 tab(s) orally once a day (22 Sep 2019 06:00)  Namenda 5 mg oral tablet: 1 tab(s) orally 2 times a day (22 Sep 2019 06:00)  Slow Fe (as elemental iron) 45 mg oral tablet, extended release: 1 tab(s) orally once a day (22 Sep 2019 06:00)  tamsulosin 0.4 mg oral capsule: 1 cap(s) orally once a day (at bedtime) (22 Sep 2019 06:00)  torsemide 20 mg oral tablet: 3 tab(s) orally once a day (22 Sep 2019 06:00)      PAST MEDICAL & SURGICAL HISTORY:  Cerebrovascular accident (CVA), unspecified mechanism  Pacemaker  Endocarditis  CAD (coronary artery disease)  Cardiomyopathy, ischemic  Type 2 diabetes mellitus  Hypertension  Congestive heart failure  Paroxysmal atrial fibrillation  Dyslipidemia  S/P CABG x 1  History of cataract surgery  History of colon surgery  ICD (implantable cardioverter-defibrillator) in place  H/O mitral valve replacement: bovine      FAMILY HISTORY:  No pertinent family history in first degree relatives      SOCIAL HISTORY:    Non-smoker  No alcohol      PHYSICAL EXAM:  T(C): 36.6 (04-20-20 @ 15:09), Max: 36.6 (04-20-20 @ 15:09)  HR: 80 (04-20-20 @ 15:09) (78 - 87)  BP: 142/78 (04-20-20 @ 15:09) (137/58 - 150/73)  RR: 18 (04-20-20 @ 15:09) (18 - 19)  SpO2: 99% (04-20-20 @ 15:09) (96% - 99%)    Limited exam:  	  GEN: Well appearing, awake, alert, oriented to person, place, time/situation and in no apparent distress.	  CARDIAC:  Normal rate, regular rhythm.  Heart sounds S1, S2.  No murmurs, rubs or gallops.	  RESPIRATORY:  Breath sounds clear and equal bilaterally.	  GASTROINTESTINAL:  Abdomen soft, non-tender, no guarding.	  EXT:  Edema of RLE below the knee.  Bruising of anterior thigh, calf and foot.  Right 2nd and 3rd digits are dark purple.  Unable to palpate DP pulses.      Xray Chest 1 View AP/PA (04.20.20 @ 13:58) >  INTERPRETATION:  CLINICAL INFORMATION: Lower extremity swelling. Screening x-ray.  EXAM: AP portable radiograph of the chest.  COMPARISON: Chest x-raydated 10/18/2019..  FINDINGS:  Left chest wall cardiac AICD. Status post median sternotomy and mitral valve replacement.  Subtle hazy and interstitial bibasilar opacities.  No focal consolidation.  No pleural effusion or pneumothorax.  The heart is enlarged.  Intact visualized osseous structures.    IMPRESSION:  Subtle hazy and interstitial bibasilar opacities which may be secondary to pulmonary interstitial edema versus atypical pneumonia/viral infection from atypical agents including COVID-19.     CHACHO KINNEY M.D., RADIOLOGY RESIDENT  This document has been electronically signed.  GERSON LOPEZ M.D., ATTENDING RADIOLOGIST  This document has been electronically signed. Apr 20 2020  2:29PM        LABS:	 	  CBC Full  -  ( 20 Apr 2020 11:58 )  WBC Count : 8.03 K/uL  Hemoglobin : 10.0 g/dL  Hematocrit : 33.8 %  Platelet Count - Automated : 213 K/uL  Mean Cell Volume : 94.4 fl  Mean Cell Hemoglobin : 27.9 pg  Mean Cell Hemoglobin Concentration : 29.6 gm/dL  Auto Neutrophil # : 5.46 K/uL  Auto Lymphocyte # : 1.17 K/uL  Auto Monocyte # : 1.10 K/uL  Auto Eosinophil # : 0.23 K/uL  Auto Basophil # : 0.04 K/uL  Auto Neutrophil % : 67.9 %  Auto Lymphocyte % : 14.6 %  Auto Monocyte % : 13.7 %  Auto Eosinophil % : 2.9 %  Auto Basophil % : 0.5 %      04-20  140  |  101  |  90<H>  ----------------------------<  139<H>  4.4   |  22  |  3.02<H>    Ca    9.4      20 Apr 2020 11:58    TPro  7.9  /  Alb  4.3  /  TBili  0.9  /  DBili  x   /  AST  23  /  ALT  18  /  AlkPhos  123<H>  04-20    proBNP: Serum Pro-Brain Natriuretic Peptide: 4457 pg/mL (04-20 @ 15:11)          VA Duplex Low Ext Arterial, Ltd, Right (04.20.20 @ 13:51) >  Impression:   Calcified atherosclerotic disease is noted at the arteries of the right lower extremity.  Mild stenosis involving the right deep femoral artery.  Moderate stenosis right posterior tibial and peroneal arteries. Severe stenosis involving the mid segment of the right anterior tibial artery. Distally, the right anterior tibial artery is occluded reconstituting at the right ankle.  Patent right dorsalis pedis artery with low velocity flow in a retrograde direction proximally and an anterograde direction distally.      VA Duplex Lower Ext Vein Scan, Right (04.20.20 @ 12:55) >  IMPRESSION:   No evidence of right lower extremity deep venous thrombosis.        Transthoracic Echocardiogram (09.24.19 @ 19:48) >  Conclusions:  1. Bioprosthetic mitral valve replacement. Minimal mitral regurgitation. Peak mitral valve gradient equals 12 mm Hg, mean transmitral valve gradient equals 5 mm Hg, which is probably normal in the setting of a bioprosthetic mitral valve replacement.   2. Aortic valve not well visualized; appears calcified. Mild aortic regurgitation.  3. Eccentric left ventricular hypertrophy (dilated left ventricle with normal relative wall thickness).  4. Moderate global left ventricular systolic dysfunction.   5. Normal right ventricular size with decreased right ventricular systolic function. A device wire is noted in the right heart. TV s = 8 cm/sec.   6. Estimated right ventricular systolic pressure equals 57 mm Hg, assuming right atrial pressure equals 8 mm Hg, consistent with moderate pulmonary hypertension. 7. A small mobile echodensity is seen on the ventricular side of the mitral valve. Differential includes vegetation vs torn chord. The aortic, pulmonic, and tricuspid valves are not well visualized. Can not exclude endocarditis. Consider MARY if clinically indicated.  *** Compared with echocardiogram of 7/15/2019, left ventricular function has improved. Pulmonary hypertension has decreased. A small mobile echodensity is seen on the ventricular side of the mitral valve.

## 2020-04-20 NOTE — ED CLERICAL - NS ED CLERK NOTE PRE-ARRIVAL INFORMATION; ADDITIONAL PRE-ARRIVAL INFORMATION
CC/Reason For referral: patient with complex history that includes afib on coumadin, seen in office last week with bruising to right leg. coumadin held. Today lower extremity appears gangrenous, needs vascular w/u. doppler  Preferred Consultant(if applicable): vasc  Who admits for you (if needed): hospitalist  Do you have documents you would like to fax over? no  Would you still like to speak to an ED attending? please call Dr breaux to discuss plan of care

## 2020-04-20 NOTE — CONSULT NOTE ADULT - ASSESSMENT
89yo M Hx CAD s/p CABG, CVA, CHF, HLD, HTN, PPM/AICD, Endocarditisx3, DM, Afib on coumadin last 5d ago. Mitral Valve Replacement, Anemia, Alzheimer p/w complaints of RLE swelling, bruising and skin changes. Pt. had multiple bruises in the last week that has been improving. Pt. was being prepared for admission and CXR performed with concerning CHF vs. COVID. Cardiology reflexively called.       REC:    Pt. not currently on oxygen and did not complain of respiratory complaints  Creatinine appears to be at baseline  Would consult renal given elevation in BUN as might be approaching dialysis  Would start Lasix 40mg IV BID and trend labs  COVID pending  Please call cardiology with questions or changes in clinical status.       Alex Bingham  Cardiology Fellow  [phone]    Meño Marquez M.D.  Cardiology Attending, Consult Service  Beeper     All Cardiology service information can be found 24/7 on [email or web address], password: [email or web address]

## 2020-04-20 NOTE — ED PROVIDER NOTE - PROGRESS NOTE DETAILS
Spoke with Dr. Miller concerning the patient. states that he is only on coumadin for his atrial fibrillation. She received a phone call and pictures last week of bruising to his entire R extremity including thigh and hip. Held coumadin 4 days ago (thursday) with plan to check in today after the weekend. She received a picture of his foot today and saw that the 2nd and 3rd toe on the R was extremely discolored. Uncertain if it was severe bruising or an embolic event and so she sent him to the ED. Noted ideally she would want arterial and venous dopplers and assessment by the vascular team. Advised that patient is currently at ultrasound getting the above imaging and that vascular had been paged. Noted that daughter-in-law is the healthcare proxy and that she has had numerous conversations with family concerning goals of care. no decision has been made relating to DNR/DNI and family usually defers to the pt but notes that they are open to the above and that the pt has some cognitive impairment. Will call back with results from tests. Cardiology recommending the following: Would consult renal given elevation in BUN as might be approaching dialysis, Would start lasix 40mg IV BID and trend labs, COVID pending Cristiane Major): Spoke with hospitalist who discussed with cardio after CT scan. Reports IV lasix not needed. Will update consult note and call Dr. Miller to discuss results. Likely discharge with follow up with Dr. Miller. Endorsed to KRISTIN Moore MD, Facep Cristiane Major PGY1: disucssed results with Dr. Miller and she agrees that the pt should go home. notes that his family is very attentive and that she will follow up with them via telephone. States that he can start his anticoagulation again and doesn't require a bridge at this time. Spoke with Daughter in law Rachelle about the above and she will come  the patient.

## 2020-04-20 NOTE — ED PROVIDER NOTE - ATTENDING CONTRIBUTION TO CARE
Private Physician Airam Miller PCP/[address]  89y male pmh CAD, CVA, CHF, HLD, HTN, PPM/Aidc, Endocarditis, DM, Afib on coumadin last 5d ago. 3v Cabg,  Mitral Valve Replacement Anemia, Alzheimer. PT comes to ed with daughter, Had stopped coumadin for le bruising, Pt has had lower swelling, and Rt foot 2,3 toes ecchymosis, Had been taking abx for rle infection. PE  WDWN male awake alert normocephalic atraumatic neck supple chest clear anterior & posterior abd soft +bs no mass guarding. CV occasional irregular, rle swollen edematous, with #2 superficial ulcers, mild erythemak Rt foot swollen with ecchymosis to 2.3 toe. unable to palp pulses.   Josué Moran MD, Facep

## 2020-04-20 NOTE — ED PROVIDER NOTE - PATIENT PORTAL LINK FT
You can access the FollowMyHealth Patient Portal offered by [facility] by registering at the following website: [email or web address]. By joining [facility]’s FollowMyHealth portal, you will also be able to view your health information using other applications (apps) compatible with our system.

## 2020-04-21 LAB — SARS-COV-2 RNA SPEC QL NAA+PROBE: SIGNIFICANT CHANGE UP

## 2020-04-24 ENCOUNTER — APPOINTMENT (OUTPATIENT)
Dept: GERIATRICS | Facility: CLINIC | Age: 85
End: 2020-04-24
Payer: MEDICARE

## 2020-04-24 DIAGNOSIS — T14.8XXA OTHER INJURY OF UNSPECIFIED BODY REGION, INITIAL ENCOUNTER: ICD-10-CM

## 2020-04-24 PROCEDURE — 99214 OFFICE O/P EST MOD 30 MIN: CPT | Mod: 95

## 2020-04-29 NOTE — PATIENT PROFILE ADULT. - PRO ANTICIPATED CHANGES
Care Coordination:    Noted pt did not have a specific PCP listed in EPIC.  Discussed with patient, and procured options based on patient preferences.  Established plan:   + Pt has previously seen Dr. Seamus Carroll (now at [facility] [place], [place])  + Also appreciated Sharda Daugherty NP (at [facility])    Main question is if these providers take [employer].    Sharda Amos RN, BSN  Formerly [facility] Care Coordinator   Mobile Phone: [phone]     RIGHT UPPER QUADRANT ABDOMINAL ULTRASOUND:

 

COMPARISON: 

CT abdomen/pelvis 4/29/2020.

 

HISTORY: 

Right upper quadrant abdominal pain for 4 days.

 

TECHNIQUE: 

Multiplanar, gray scale, and color Doppler images were obtained in a right upper quadrant abdominal u
ltrasound.  

 

FINDINGS: 

The liver demonstrates increased echogenicity without focal lesions or intrahepatic ductal dilatation
.  The gallbladder contains a small amount of sludge without shadowing gallstones.  There is no peric
holecystic fluid or gallbladder wall thickening.  The common bile duct is normal measuring 4 mm.

 

There is possibly a small amount of fluid adjacent the pancreatic head.  No focal pancreatic lesions 
are seen.  The right kidney is normal in echogenicity without hydronephrosis or calculus and measures
 10.3 cm in length.

 

IMPRESSION: 

Fatty liver.

 

POS: BALAJIA inability to care for self

## 2020-05-05 ENCOUNTER — APPOINTMENT (OUTPATIENT)
Dept: GERIATRICS | Facility: CLINIC | Age: 85
End: 2020-05-05

## 2020-05-12 LAB
ANION GAP SERPL CALC-SCNC: 14 MMOL/L
BASOPHILS # BLD AUTO: 0.04 K/UL
BASOPHILS NFR BLD AUTO: 0.5 %
BUN SERPL-MCNC: 84 MG/DL
CALCIUM SERPL-MCNC: 9.4 MG/DL
CHLORIDE SERPL-SCNC: 103 MMOL/L
CO2 SERPL-SCNC: 24 MMOL/L
CREAT SERPL-MCNC: 2.95 MG/DL
EOSINOPHIL # BLD AUTO: 0.29 K/UL
EOSINOPHIL NFR BLD AUTO: 3.9 %
GLUCOSE SERPL-MCNC: 83 MG/DL
HCT VFR BLD CALC: 36.5 %
HGB BLD-MCNC: 10.9 G/DL
IMM GRANULOCYTES NFR BLD AUTO: 0.4 %
INR PPP: 1.79 RATIO
LYMPHOCYTES # BLD AUTO: 1.44 K/UL
LYMPHOCYTES NFR BLD AUTO: 19.4 %
MAN DIFF?: NORMAL
MCHC RBC-ENTMCNC: 28.7 PG
MCHC RBC-ENTMCNC: 29.9 GM/DL
MCV RBC AUTO: 96.1 FL
MONOCYTES # BLD AUTO: 0.94 K/UL
MONOCYTES NFR BLD AUTO: 12.7 %
NEUTROPHILS # BLD AUTO: 4.69 K/UL
NEUTROPHILS NFR BLD AUTO: 63.1 %
PLATELET # BLD AUTO: 228 K/UL
POTASSIUM SERPL-SCNC: 5.6 MMOL/L
PT BLD: 20.8 SEC
RBC # BLD: 3.8 M/UL
RBC # FLD: 15.4 %
SODIUM SERPL-SCNC: 141 MMOL/L
WBC # FLD AUTO: 7.43 K/UL

## 2020-05-13 ENCOUNTER — APPOINTMENT (OUTPATIENT)
Dept: VASCULAR SURGERY | Facility: CLINIC | Age: 85
End: 2020-05-13
Payer: MEDICARE

## 2020-05-13 VITALS
BODY MASS INDEX: 24.12 KG/M2 | TEMPERATURE: 97.5 F | SYSTOLIC BLOOD PRESSURE: 170 MMHG | DIASTOLIC BLOOD PRESSURE: 75 MMHG | WEIGHT: 161 LBS | HEART RATE: 96 BPM | HEIGHT: 68.5 IN

## 2020-05-13 DIAGNOSIS — I73.9 PERIPHERAL VASCULAR DISEASE, UNSPECIFIED: ICD-10-CM

## 2020-05-13 PROCEDURE — 99204 OFFICE O/P NEW MOD 45 MIN: CPT

## 2020-05-13 PROCEDURE — 93923 UPR/LXTR ART STDY 3+ LVLS: CPT

## 2020-05-13 PROCEDURE — 93970 EXTREMITY STUDY: CPT

## 2020-05-13 NOTE — HISTORY OF PRESENT ILLNESS
[FreeTextEntry1] : A very pleasant 89 year old man who is referred for right leg pain that has been going on for a while. It is unclear if it related to walking but he says that he has to step off the treadmill because of the pain. Also, he says that his pain is worse when he squeezes the muscle of his calf.

## 2020-05-13 NOTE — PHYSICAL EXAM
[JVD] : no jugular venous distention  [Normal Heart Sounds] : normal heart sounds [Normal Breath Sounds] : Normal breath sounds [0] : right 0 [2+] : left 2+ [FreeTextEntry1] : right calf: point tenderness in the gastrocnemius muscle

## 2020-05-13 NOTE — ASSESSMENT
[FreeTextEntry1] : A an 89 year old man with right calf pain. We discussed the possible relation of CHF with leg pain and functional venous outflow issues and also PAD and claudication.

## 2020-05-18 ENCOUNTER — LABORATORY RESULT (OUTPATIENT)
Age: 85
End: 2020-05-18

## 2020-05-19 LAB
BASOPHILS # BLD AUTO: 0.04 K/UL
BASOPHILS NFR BLD AUTO: 0.7 %
EOSINOPHIL # BLD AUTO: 0.25 K/UL
EOSINOPHIL NFR BLD AUTO: 4.2 %
HCT VFR BLD CALC: 36.4 %
HGB BLD-MCNC: 10.8 G/DL
IMM GRANULOCYTES NFR BLD AUTO: 0.5 %
LYMPHOCYTES # BLD AUTO: 0.87 K/UL
LYMPHOCYTES NFR BLD AUTO: 14.8 %
MAN DIFF?: NORMAL
MCHC RBC-ENTMCNC: 28.4 PG
MCHC RBC-ENTMCNC: 29.7 GM/DL
MCV RBC AUTO: 95.8 FL
MONOCYTES # BLD AUTO: 0.64 K/UL
MONOCYTES NFR BLD AUTO: 10.9 %
NEUTROPHILS # BLD AUTO: 4.06 K/UL
NEUTROPHILS NFR BLD AUTO: 68.9 %
PLATELET # BLD AUTO: 153 K/UL
RBC # BLD: 3.8 M/UL
RBC # FLD: 14.6 %
WBC # FLD AUTO: 5.89 K/UL

## 2020-05-27 PROBLEM — T14.8XXA BRUISE: Status: ACTIVE | Noted: 2019-04-27

## 2020-05-27 LAB
ESTIMATED AVERAGE GLUCOSE: 166 MG/DL
HBA1C MFR BLD HPLC: 7.4 %

## 2020-05-29 NOTE — ED PROVIDER NOTE - PMH
Discharge Instruction for Undelivered Patients      You were seen for: Membrane Assessment  We Consulted: MFM  You had (Test or Medicine):Ultrasounds     Diet:   You may eat meals and snacks.     Activity:  Count fetal kicks everyday (see handout)     Call your provider if you notice:  Swelling in your face or increased swelling in your hands or legs.  Headaches that are not relieved by Tylenol (acetaminophen).  Changes in your vision (blurring: seeing spots or stars.)  Nausea (sick to your stomach) and vomiting (throwing up).   Weight gain of 5 pounds or more per week.  Heartburn that doesn't go away.  Signs of bladder infection: pain when you urinate (use the toilet), need to go more often and more urgently.  Bright red blood in your underwear.  Abdominal (lower belly) or stomach pain.  Second (plus) baby: Contractions (tightening) less than 10 minutes apart and getting stronger.  Increase or change in vaginal discharge (note the color and amount)      Follow-up:  As scheduled in the clinic        
CAD (coronary artery disease)    Cardiomyopathy, ischemic    Cerebrovascular accident (CVA), unspecified mechanism    Congestive heart failure    Dyslipidemia    Endocarditis    Hypertension    Pacemaker    Paroxysmal atrial fibrillation    Type 2 diabetes mellitus

## 2020-06-04 LAB
ANION GAP SERPL CALC-SCNC: 14 MMOL/L
BASOPHILS # BLD AUTO: 0.04 K/UL
BASOPHILS NFR BLD AUTO: 0.8 %
BUN SERPL-MCNC: 68 MG/DL
CALCIUM SERPL-MCNC: 9.4 MG/DL
CHLORIDE SERPL-SCNC: 106 MMOL/L
CO2 SERPL-SCNC: 21 MMOL/L
CREAT SERPL-MCNC: 2.82 MG/DL
EOSINOPHIL # BLD AUTO: 0.32 K/UL
EOSINOPHIL NFR BLD AUTO: 6.8 %
GLUCOSE SERPL-MCNC: 158 MG/DL
HCT VFR BLD CALC: 39.2 %
HGB BLD-MCNC: 11.5 G/DL
IMM GRANULOCYTES NFR BLD AUTO: 0.4 %
INR PPP: 2.49 RATIO
LYMPHOCYTES # BLD AUTO: 0.97 K/UL
LYMPHOCYTES NFR BLD AUTO: 20.5 %
MAN DIFF?: NORMAL
MCHC RBC-ENTMCNC: 28 PG
MCHC RBC-ENTMCNC: 29.3 GM/DL
MCV RBC AUTO: 95.4 FL
MONOCYTES # BLD AUTO: 0.67 K/UL
MONOCYTES NFR BLD AUTO: 14.2 %
NEUTROPHILS # BLD AUTO: 2.71 K/UL
NEUTROPHILS NFR BLD AUTO: 57.3 %
PLATELET # BLD AUTO: 144 K/UL
POTASSIUM SERPL-SCNC: 4.5 MMOL/L
PT BLD: 29 SEC
RBC # BLD: 4.11 M/UL
RBC # FLD: 14.4 %
SODIUM SERPL-SCNC: 142 MMOL/L
WBC # FLD AUTO: 4.73 K/UL

## 2020-06-22 ENCOUNTER — RX RENEWAL (OUTPATIENT)
Age: 85
End: 2020-06-22

## 2020-06-23 ENCOUNTER — RX RENEWAL (OUTPATIENT)
Age: 85
End: 2020-06-23

## 2020-06-23 LAB
INR PPP: 3.5 RATIO
PT BLD: 41.5 SEC

## 2020-06-29 ENCOUNTER — LABORATORY RESULT (OUTPATIENT)
Age: 85
End: 2020-06-29

## 2020-07-07 ENCOUNTER — APPOINTMENT (OUTPATIENT)
Dept: NEUROLOGY | Facility: CLINIC | Age: 85
End: 2020-07-07
Payer: MEDICARE

## 2020-07-07 ENCOUNTER — APPOINTMENT (OUTPATIENT)
Dept: ENDOCRINOLOGY | Facility: CLINIC | Age: 85
End: 2020-07-07
Payer: MEDICARE

## 2020-07-07 ENCOUNTER — APPOINTMENT (OUTPATIENT)
Dept: NEPHROLOGY | Facility: CLINIC | Age: 85
End: 2020-07-07

## 2020-07-07 VITALS — TEMPERATURE: 97.4 F

## 2020-07-07 VITALS
OXYGEN SATURATION: 96 % | DIASTOLIC BLOOD PRESSURE: 70 MMHG | HEART RATE: 93 BPM | WEIGHT: 164 LBS | SYSTOLIC BLOOD PRESSURE: 120 MMHG | BODY MASS INDEX: 24.57 KG/M2 | HEIGHT: 68.5 IN | TEMPERATURE: 97.7 F

## 2020-07-07 VITALS
HEIGHT: 68.5 IN | BODY MASS INDEX: 24.87 KG/M2 | SYSTOLIC BLOOD PRESSURE: 132 MMHG | DIASTOLIC BLOOD PRESSURE: 74 MMHG | WEIGHT: 166 LBS | HEART RATE: 94 BPM

## 2020-07-07 PROCEDURE — 99214 OFFICE O/P EST MOD 30 MIN: CPT

## 2020-07-07 PROCEDURE — 96116 NUBHVL XM PHYS/QHP 1ST HR: CPT | Mod: 59

## 2020-07-07 PROCEDURE — 99215 OFFICE O/P EST HI 40 MIN: CPT | Mod: 25

## 2020-07-07 RX ORDER — LIDOCAINE 5 G/100G
5 OINTMENT TOPICAL
Qty: 20 | Refills: 3 | Status: COMPLETED | COMMUNITY
Start: 2020-01-27 | End: 2020-07-07

## 2020-07-07 RX ORDER — CEPHALEXIN 250 MG/1
250 CAPSULE ORAL
Qty: 10 | Refills: 0 | Status: COMPLETED | COMMUNITY
Start: 2020-04-02 | End: 2020-07-07

## 2020-07-08 NOTE — ASSESSMENT
[FreeTextEntry1] : 89 year old man with T2DM, well controlled\par  - Pt with continued intermittent hypoglycemia, decrease prandial insulin by 2 units, continue lantus dose.\par   - Retinopathy screening up to date\par \par HTN: Blood pressure at goal, + CKD,  + microalbumin, followed by nephrology, continue current management\par \par Hyperlipidemia - continue atorvastatin, check lipids\par \par Vit d def'y - continue vitamin 2000 units daily\par \par \par \par f/u in 3 months

## 2020-07-08 NOTE — PHYSICAL EXAM
[Healthy Appearance] : healthy appearance [Alert] : alert [Normal Sclera/Conjunctiva] : normal sclera/conjunctiva [No Acute Distress] : no acute distress [No Proptosis] : no proptosis [No Neck Mass] : no neck mass was observed [Thyroid Not Enlarged] : the thyroid was not enlarged [No Respiratory Distress] : no respiratory distress [Normal S1, S2] : normal S1 and S2 [Regular Rhythm] : with a regular rhythm [Clear to Auscultation] : lungs were clear to auscultation bilaterally [Normal Bowel Sounds] : normal bowel sounds [Not Tender] : non-tender [No Spinal Tenderness] : no spinal tenderness [Soft] : abdomen soft [No Involuntary Movements] : no involuntary movements were seen [Normal Reflexes] : deep tendon reflexes were 2+ and symmetric [Normal Mood] : the mood was normal [Normal Strength/Tone] : muscle strength and tone were normal

## 2020-07-08 NOTE — HISTORY OF PRESENT ILLNESS
[FreeTextEntry1] : cc: diabetes\par \par 89 year old man with T2DM, with CKD, on basal bolus insulin with reasonable control. He checks glucose 3 times daily and values have been 58 - 180 mg/dl.   \par Taking Lantus  20 units and Novolog 6-12 units before meals depending on glucose and carbohydrate intake (ususally 8 - 10 units)\par He been limiting  carbohydrate intake, especiially now that eating at home due to covid.  Minimal exercise, has treadmill, not using it right now, had ulcers in leg, now bandaged, healing\par  Retinopathy screening up to date, last optho visit was  Dec 2019, no retinopathy\par \par \par HTN: blood pressure has been controlled, no recent change in regimen\par \par Hyperlipidemia - he takes a statin\par \par \par

## 2020-07-13 ENCOUNTER — LABORATORY RESULT (OUTPATIENT)
Age: 85
End: 2020-07-13

## 2020-07-13 LAB
CHOLEST SERPL-MCNC: 126 MG/DL
CHOLEST/HDLC SERPL: 2.8 RATIO
HDLC SERPL-MCNC: 45 MG/DL
LDLC SERPL CALC-MCNC: 51 MG/DL
TRIGL SERPL-MCNC: 151 MG/DL

## 2020-07-28 ENCOUNTER — LABORATORY RESULT (OUTPATIENT)
Age: 85
End: 2020-07-28

## 2020-08-04 ENCOUNTER — NON-APPOINTMENT (OUTPATIENT)
Age: 85
End: 2020-08-04

## 2020-08-04 ENCOUNTER — APPOINTMENT (OUTPATIENT)
Dept: ELECTROPHYSIOLOGY | Facility: CLINIC | Age: 85
End: 2020-08-04
Payer: MEDICARE

## 2020-08-04 ENCOUNTER — APPOINTMENT (OUTPATIENT)
Dept: CARDIOLOGY | Facility: CLINIC | Age: 85
End: 2020-08-04
Payer: MEDICARE

## 2020-08-04 VITALS
HEIGHT: 68.5 IN | BODY MASS INDEX: 23.82 KG/M2 | WEIGHT: 159 LBS | OXYGEN SATURATION: 96 % | SYSTOLIC BLOOD PRESSURE: 134 MMHG | DIASTOLIC BLOOD PRESSURE: 76 MMHG | HEART RATE: 84 BPM

## 2020-08-04 PROCEDURE — 99214 OFFICE O/P EST MOD 30 MIN: CPT

## 2020-08-04 PROCEDURE — 99213 OFFICE O/P EST LOW 20 MIN: CPT

## 2020-08-04 PROCEDURE — 93000 ELECTROCARDIOGRAM COMPLETE: CPT | Mod: 59

## 2020-08-04 PROCEDURE — 93284 PRGRMG EVAL IMPLANTABLE DFB: CPT

## 2020-08-04 NOTE — DISCUSSION/SUMMARY
[FreeTextEntry1] : Normal device function with adequate safety margins. Permanent atrial arrhythmia with good rate control 91% BIV paced. Estimated longevity is 12 months.  Will set up for remote monitoring.  Atrial sensitivity increased to 4 mV.  Optivol is below threshold. Follow up in 6 months.

## 2020-08-04 NOTE — PHYSICAL EXAM
[General Appearance - Well Developed] : well developed [Normal Appearance] : normal appearance [Well Groomed] : well groomed [General Appearance - Well Nourished] : well nourished [No Deformities] : no deformities [General Appearance - In No Acute Distress] : no acute distress [Normal Oral Mucosa] : normal oral mucosa [Normal Conjunctiva] : the conjunctiva exhibited no abnormalities [Eyelids - No Xanthelasma] : the eyelids demonstrated no xanthelasmas [Normal Jugular Venous A Waves Present] : normal jugular venous A waves present [No Oral Cyanosis] : no oral cyanosis [No Oral Pallor] : no oral pallor [Normal Jugular Venous V Waves Present] : normal jugular venous V waves present [No Jugular Venous Villanueva A Waves] : no jugular venous villanueva A waves [Exaggerated Use Of Accessory Muscles For Inspiration] : no accessory muscle use [Respiration, Rhythm And Depth] : normal respiratory rhythm and effort [Heart Sounds] : normal S1 and S2 [Murmurs] : no murmurs present [Auscultation Breath Sounds / Voice Sounds] : lungs were clear to auscultation bilaterally [Abdomen Soft] : soft [Irregularly Irregular] : the rhythm was irregularly irregular [Abdomen Tenderness] : non-tender [Abdomen Mass (___ Cm)] : no abdominal mass palpated [Abnormal Walk] : normal gait [Nail Clubbing] : no clubbing of the fingernails [Gait - Sufficient For Exercise Testing] : the gait was sufficient for exercise testing [Petechial Hemorrhages (___cm)] : no petechial hemorrhages [Cyanosis, Localized] : no localized cyanosis [Skin Color & Pigmentation] : normal skin color and pigmentation [] : no ischemic changes [No Venous Stasis] : no venous stasis [Skin Lesions] : no skin lesions [No Skin Ulcers] : no skin ulcer [No Xanthoma] : no  xanthoma was observed [Mood] : the mood was normal [Affect] : the affect was normal [Oriented To Time, Place, And Person] : oriented to person, place, and time [No Anxiety] : not feeling anxious

## 2020-08-04 NOTE — REVIEW OF SYSTEMS
[Shortness Of Breath] : no shortness of breath [Recent Weight Loss (___ Lbs)] : recent [unfilled] ~Ulb weight loss [Dyspnea on exertion] : not dyspnea during exertion [Chest Pain] : no chest pain [Lower Ext Edema] : no extremity edema [Palpitations] : no palpitations [Negative] : Heme/Lymph

## 2020-08-04 NOTE — HISTORY OF PRESENT ILLNESS
[Palpitations] : no palpitations [SOB] : no dyspnea [Syncope] : no syncope [Dizziness] : no dizziness [Chest Pain] : no chest pain or discomfort [ICD Shocks] : no recent ICD shocks [Shoulder Pain] : no shoulder pain [Pain at Site] : no pain at device site [de-identified] : No complaints.

## 2020-08-04 NOTE — PROCEDURE
[Lead Imp:  ___ohms] : lead impedance was [unfilled] ohms [___ ms] : [unfilled] ms [___V @] : [unfilled] V [Sensing Amplitude ___mv] : sensing amplitude was [unfilled] mv [de-identified] : [employer] [de-identified] : 7/10/2015 [de-identified] : Viva Quad CRT-D [de-identified] : [id number]

## 2020-08-04 NOTE — DISCUSSION/SUMMARY
[___ Month(s)] : [unfilled] month(s) [FreeTextEntry1] : The patient is an 89-year-old gentleman mild dementia, diabetes mellitus, CKD stage IV, hypertension, hyperlipidemia, afib, CAD, cardiomyopathy, ICD who is asymptomatic. \par #1 CAD - no angina, continue imdur 30mg\par #2 Htn - stable on hydralazine and toprol\par #3 Cardiomyopathy - euvolemic on exam, continue torsemide  2 tab daily, daily weights. ICD interrogation negative, echo severe LV dysfunction\par #4 Afib - rate controlled with low dose toprol, no bleeding on warfarin,INR therapeutic\par #5 DM - under control\par #6 Renal - CKD stage IV f/u Dr. Mliler\par #7 Lipids-on lipitor 40mg.\par #8 Neuro- on aricept, encourage ambulation with assistance, increase hydration

## 2020-08-05 LAB
SARS-COV-2 IGG SERPL IA-ACNC: 0.08 INDEX
SARS-COV-2 IGG SERPL QL IA: NEGATIVE

## 2020-08-10 ENCOUNTER — LABORATORY RESULT (OUTPATIENT)
Age: 85
End: 2020-08-10

## 2020-08-24 ENCOUNTER — LABORATORY RESULT (OUTPATIENT)
Age: 85
End: 2020-08-24

## 2020-08-31 ENCOUNTER — NON-APPOINTMENT (OUTPATIENT)
Age: 85
End: 2020-08-31

## 2020-08-31 ENCOUNTER — APPOINTMENT (OUTPATIENT)
Dept: ELECTROPHYSIOLOGY | Facility: CLINIC | Age: 85
End: 2020-08-31
Payer: MEDICARE

## 2020-08-31 PROCEDURE — 93283 PRGRMG EVAL IMPLANTABLE DFB: CPT

## 2020-09-08 ENCOUNTER — LABORATORY RESULT (OUTPATIENT)
Age: 85
End: 2020-09-08

## 2020-09-09 ENCOUNTER — RX RENEWAL (OUTPATIENT)
Age: 85
End: 2020-09-09

## 2020-09-10 ENCOUNTER — RX RENEWAL (OUTPATIENT)
Age: 85
End: 2020-09-10

## 2020-09-17 ENCOUNTER — APPOINTMENT (OUTPATIENT)
Dept: GERIATRICS | Facility: CLINIC | Age: 85
End: 2020-09-17
Payer: MEDICARE

## 2020-09-17 ENCOUNTER — MED ADMIN CHARGE (OUTPATIENT)
Age: 85
End: 2020-09-17

## 2020-09-17 VITALS
DIASTOLIC BLOOD PRESSURE: 70 MMHG | SYSTOLIC BLOOD PRESSURE: 118 MMHG | HEART RATE: 90 BPM | HEIGHT: 68.5 IN | OXYGEN SATURATION: 97 % | RESPIRATION RATE: 14 BRPM | BODY MASS INDEX: 24.8 KG/M2 | TEMPERATURE: 97.2 F | WEIGHT: 165.5 LBS

## 2020-09-17 DIAGNOSIS — Z23 ENCOUNTER FOR IMMUNIZATION: ICD-10-CM

## 2020-09-17 PROCEDURE — 90662 IIV NO PRSV INCREASED AG IM: CPT

## 2020-09-17 PROCEDURE — 99214 OFFICE O/P EST MOD 30 MIN: CPT | Mod: GC

## 2020-09-17 PROCEDURE — G0008: CPT

## 2020-09-17 NOTE — REASON FOR VISIT
[Follow-Up] : a follow-up visit [Pre-Visit Preparation] : pre-visit preparation was done [Spouse] : spouse

## 2020-09-17 NOTE — PHYSICAL EXAM
[Extraocular Movements] : extraocular movements were intact [Normal Oral Mucosa] : normal oral mucosa [Neck Appearance] : the appearance of the neck was normal [Neck Cervical Mass (___cm)] : no neck mass was observed [Jugular Venous Distention Increased] : there was no jugular-venous distention [] : no respiratory distress [Respiration, Rhythm And Depth] : normal respiratory rhythm and effort [Auscultation Breath Sounds / Voice Sounds] : lungs were clear to auscultation bilaterally [Bowel Sounds] : normal bowel sounds [Abdomen Tenderness] : non-tender [Affect] : the affect was normal [Mood] : the mood was normal [Memory Remote] : remote memory was not impaired [FreeTextEntry1] : + venous stasis pigmentation RLE. Scabed on Right shin size of nickel, no erythema or drainage

## 2020-09-17 NOTE — SOCIAL HISTORY
[FreeTextEntry1] : son and dtr in law and wife [FreeTextEntry2] : good [FreeTextEntry4] : poor [No falls in past year] : Patient reported no falls in the past year [Fully functional (bathing, dressing, toileting, transferring, walking, feeding)] : Fully functional (bathing, dressing, toileting, transferring, walking, feeding) [Independent] : feeding [Some assistance needed] : shopping [Full assistance needed] : managing finances

## 2020-09-17 NOTE — HISTORY OF PRESENT ILLNESS
[Mild] : Stage: Mild [0] : 2) Feeling down, depressed, or hopeless: Not at all [FreeTextEntry1] : 88 M with PMH HTN, HLD, DMT2, CKD5, ischemic cardiomyopathy on Coumadin presents today w/ his wife. Pt denies any acute complaints. Pt presents today for the flu shot.\par \par No falls. No more bleeding bruising on leg. Has a sore o shin of right leg which is slowly healing , size of Nickel

## 2020-09-29 ENCOUNTER — LABORATORY RESULT (OUTPATIENT)
Age: 85
End: 2020-09-29

## 2020-10-06 ENCOUNTER — APPOINTMENT (OUTPATIENT)
Dept: SURGERY | Facility: HOSPITAL | Age: 85
End: 2020-10-06
Payer: MEDICARE

## 2020-10-06 ENCOUNTER — OUTPATIENT (OUTPATIENT)
Dept: OUTPATIENT SERVICES | Facility: HOSPITAL | Age: 85
LOS: 1 days | Discharge: HOME | End: 2020-10-06
Payer: MEDICARE

## 2020-10-06 VITALS
RESPIRATION RATE: 20 BRPM | BODY MASS INDEX: 24.72 KG/M2 | HEIGHT: 68.5 IN | OXYGEN SATURATION: 98 % | HEART RATE: 73 BPM | DIASTOLIC BLOOD PRESSURE: 66 MMHG | WEIGHT: 165 LBS | TEMPERATURE: 97.5 F | SYSTOLIC BLOOD PRESSURE: 125 MMHG

## 2020-10-06 DIAGNOSIS — Z98.89 OTHER SPECIFIED POSTPROCEDURAL STATES: Chronic | ICD-10-CM

## 2020-10-06 DIAGNOSIS — E11.42 TYPE 2 DIABETES MELLITUS WITH DIABETIC POLYNEUROPATHY: ICD-10-CM

## 2020-10-06 DIAGNOSIS — Z79.4 LONG TERM (CURRENT) USE OF INSULIN: ICD-10-CM

## 2020-10-06 DIAGNOSIS — N18.5 CHRONIC KIDNEY DISEASE, STAGE 5: ICD-10-CM

## 2020-10-06 DIAGNOSIS — Z87.891 PERSONAL HISTORY OF NICOTINE DEPENDENCE: ICD-10-CM

## 2020-10-06 DIAGNOSIS — I13.2 HYPERTENSIVE HEART AND CHRONIC KIDNEY DISEASE WITH HEART FAILURE AND WITH STAGE 5 CHRONIC KIDNEY DISEASE, OR END STAGE RENAL DISEASE: ICD-10-CM

## 2020-10-06 DIAGNOSIS — D64.9 ANEMIA, UNSPECIFIED: ICD-10-CM

## 2020-10-06 DIAGNOSIS — E78.5 HYPERLIPIDEMIA, UNSPECIFIED: ICD-10-CM

## 2020-10-06 DIAGNOSIS — Z98.49 CATARACT EXTRACTION STATUS, UNSPECIFIED EYE: Chronic | ICD-10-CM

## 2020-10-06 DIAGNOSIS — E11.51 TYPE 2 DIABETES MELLITUS WITH DIABETIC PERIPHERAL ANGIOPATHY WITHOUT GANGRENE: ICD-10-CM

## 2020-10-06 DIAGNOSIS — Z95.4 PRESENCE OF OTHER HEART-VALVE REPLACEMENT: Chronic | ICD-10-CM

## 2020-10-06 DIAGNOSIS — I83.218 VARICOSE VEINS OF RIGHT LOWER EXTREMITY WITH BOTH ULCER OF OTHER PART OF LOWER EXTREMITY AND INFLAMMATION: ICD-10-CM

## 2020-10-06 DIAGNOSIS — L97.812 NON-PRESSURE CHRONIC ULCER OF OTHER PART OF RIGHT LOWER LEG WITH FAT LAYER EXPOSED: ICD-10-CM

## 2020-10-06 DIAGNOSIS — I48.91 UNSPECIFIED ATRIAL FIBRILLATION: ICD-10-CM

## 2020-10-06 DIAGNOSIS — Z86.73 PERSONAL HISTORY OF TRANSIENT ISCHEMIC ATTACK (TIA), AND CEREBRAL INFARCTION WITHOUT RESIDUAL DEFICITS: ICD-10-CM

## 2020-10-06 DIAGNOSIS — E11.22 TYPE 2 DIABETES MELLITUS WITH DIABETIC CHRONIC KIDNEY DISEASE: ICD-10-CM

## 2020-10-06 DIAGNOSIS — Z79.82 LONG TERM (CURRENT) USE OF ASPIRIN: ICD-10-CM

## 2020-10-06 DIAGNOSIS — N13.8 OTHER OBSTRUCTIVE AND REFLUX UROPATHY: ICD-10-CM

## 2020-10-06 DIAGNOSIS — Z80.9 FAMILY HISTORY OF MALIGNANT NEOPLASM, UNSPECIFIED: ICD-10-CM

## 2020-10-06 DIAGNOSIS — Z98.61 CORONARY ANGIOPLASTY STATUS: ICD-10-CM

## 2020-10-06 DIAGNOSIS — Z95.4 PRESENCE OF OTHER HEART-VALVE REPLACEMENT: ICD-10-CM

## 2020-10-06 DIAGNOSIS — Z82.0 FAMILY HISTORY OF EPILEPSY AND OTHER DISEASES OF THE NERVOUS SYSTEM: ICD-10-CM

## 2020-10-06 DIAGNOSIS — N40.1 BENIGN PROSTATIC HYPERPLASIA WITH LOWER URINARY TRACT SYMPTOMS: ICD-10-CM

## 2020-10-06 DIAGNOSIS — Z79.899 OTHER LONG TERM (CURRENT) DRUG THERAPY: ICD-10-CM

## 2020-10-06 DIAGNOSIS — Z98.49 CATARACT EXTRACTION STATUS, UNSPECIFIED EYE: ICD-10-CM

## 2020-10-06 DIAGNOSIS — Z96.1 PRESENCE OF INTRAOCULAR LENS: ICD-10-CM

## 2020-10-06 DIAGNOSIS — I50.22 CHRONIC SYSTOLIC (CONGESTIVE) HEART FAILURE: ICD-10-CM

## 2020-10-06 DIAGNOSIS — Z95.810 PRESENCE OF AUTOMATIC (IMPLANTABLE) CARDIAC DEFIBRILLATOR: ICD-10-CM

## 2020-10-06 DIAGNOSIS — Z95.810 PRESENCE OF AUTOMATIC (IMPLANTABLE) CARDIAC DEFIBRILLATOR: Chronic | ICD-10-CM

## 2020-10-06 DIAGNOSIS — L97.801 NON-PRESSURE CHRONIC ULCER OF OTHER PART OF UNSPECIFIED LOWER LEG LIMITED TO BREAKDOWN OF SKIN: ICD-10-CM

## 2020-10-06 DIAGNOSIS — I83.893 VARICOSE VEINS OF BILATERAL LOWER EXTREMITIES WITH OTHER COMPLICATIONS: ICD-10-CM

## 2020-10-06 DIAGNOSIS — Z87.440 PERSONAL HISTORY OF URINARY (TRACT) INFECTIONS: ICD-10-CM

## 2020-10-06 DIAGNOSIS — Z95.1 PRESENCE OF AORTOCORONARY BYPASS GRAFT: Chronic | ICD-10-CM

## 2020-10-06 DIAGNOSIS — E55.9 VITAMIN D DEFICIENCY, UNSPECIFIED: ICD-10-CM

## 2020-10-06 PROCEDURE — G0463: CPT

## 2020-10-06 PROCEDURE — 99203 OFFICE O/P NEW LOW 30 MIN: CPT

## 2020-10-06 NOTE — REASON FOR VISIT
[Consultation] : a consultation visit [Spouse] : spouse [FreeTextEntry1] : Right anterior leg ulcer.

## 2020-10-06 NOTE — PLAN
[FreeTextEntry1] : Xeroform, dry dressing, ACE wrap, Vascular testing, return to office in two weeks.\par

## 2020-10-06 NOTE — ASSESSMENT
[Verbal] : Verbal [Patient] : Patient [Good - alert, interested, motivated] : Good - alert, interested, motivated [Verbalizes knowledge/Understanding] : Verbalizes knowledge/understanding [Dressing changes] : dressing changes [Skin Care] : skin care [Signs and symptoms of infection] : sign and symptoms of infection [Venous Disease] : venous disease [How and When to Call] : how and when to call [Labs and Tests] : labs and tests [Patient responsibility to plan of care] : patient responsibility to plan of care [Glycemic Control] : glycemic control [] : Yes [Stable] : stable [Home] : Home [Ambulatory] : Ambulatory [Not Applicable - Long Term Care/Home Health Agency] : Long Term Care/Home Health Agency: Not Applicable [FreeTextEntry2] : Promote optimal skin integrity,  infection prevention, edema control\par  [FreeTextEntry4] : Circulation\par \par Dorsalis Pedis:  bilateral palpable \par Posterior Tibialis:   unable to palpate\par Doppler Pulses:  bilateral  present \par Extremity Color:  bilateral pigmented\par Extremity Temperature:  bilateral warm\par Capillary Refill:  bilateral <3 sec\par CAITLYN:  Non-invasive vascular studies ordered - submitted for authorization\par \par f/u 2 weeks\par \par \par  [FreeTextEntry1] : Right leg stasis ulcers, no acute infection\par

## 2020-10-06 NOTE — PHYSICAL EXAM
[Normal Breath Sounds] : Normal breath sounds [Normal Heart Sounds] : normal heart sounds [2+] : left 2+ [1+] : right 1+ [0] : left 0 [Ankle Swelling (On Exam)] : present [Ankle Swelling Bilaterally] : bilaterally  [Varicose Veins Of Lower Extremities] : bilaterally [] : of the right leg [Ankle Swelling On The Left] : moderate [Alert] : alert [Oriented to Person] : oriented to person [Calm] : calm [JVD] : no jugular venous distention  [de-identified] : WD/WN in no acute distress. [de-identified] : WNL [de-identified] : KENYATTAL [de-identified] : WNL [de-identified] : Right leg with three ulcers, bases are red and viable, no weeping, no acute infection, periwound skin is intact with no cellulitis. [FreeTextEntry1] : right anterior leg [FreeTextEntry2] : 1.3 [FreeTextEntry3] : 2.7 [FreeTextEntry4] : 0.1 / black eschar [de-identified] : small sanguineous [de-identified] : mild erythema, distal 2 small dry eschar areas [de-identified] : none [de-identified] : NSC

## 2020-10-06 NOTE — HISTORY OF PRESENT ILLNESS
[FreeTextEntry1] : The wound is located on patient's right lower leg.  Patient does not recall trauma to area and reports that the wound appeared gradually in the spring 2020.  Patient was tx with oral antibiotics by his PCP.  Patient reports leaving the wound "open to air" during the summer and the wound became infected with cellulitis and he was tx at [facility].   Patient's wife reports that her daughter-in-law who is a nurse referred patient to [facility].  Patient is tx the wound with a dry dressing.\par \par Several weeks history of right anterior leg ulcer, no weeping, no C/O

## 2020-10-08 ENCOUNTER — APPOINTMENT (OUTPATIENT)
Dept: PLASTIC SURGERY | Facility: HOSPITAL | Age: 85
End: 2020-10-08

## 2020-10-12 ENCOUNTER — APPOINTMENT (OUTPATIENT)
Dept: PLASTIC SURGERY | Facility: HOSPITAL | Age: 85
End: 2020-10-12

## 2020-10-12 ENCOUNTER — APPOINTMENT (OUTPATIENT)
Dept: OBGYN | Facility: HOSPITAL | Age: 85
End: 2020-10-12

## 2020-10-12 ENCOUNTER — LABORATORY RESULT (OUTPATIENT)
Age: 85
End: 2020-10-12

## 2020-10-20 ENCOUNTER — OUTPATIENT (OUTPATIENT)
Dept: OUTPATIENT SERVICES | Facility: HOSPITAL | Age: 85
LOS: 1 days | Discharge: HOME | End: 2020-10-20
Payer: MEDICARE

## 2020-10-20 ENCOUNTER — APPOINTMENT (OUTPATIENT)
Dept: SURGERY | Facility: HOSPITAL | Age: 85
End: 2020-10-20
Payer: MEDICARE

## 2020-10-20 VITALS
RESPIRATION RATE: 20 BRPM | OXYGEN SATURATION: 98 % | SYSTOLIC BLOOD PRESSURE: 112 MMHG | BODY MASS INDEX: 24.72 KG/M2 | HEIGHT: 68.5 IN | HEART RATE: 78 BPM | WEIGHT: 165 LBS | TEMPERATURE: 97.8 F | DIASTOLIC BLOOD PRESSURE: 60 MMHG

## 2020-10-20 DIAGNOSIS — Z95.4 PRESENCE OF OTHER HEART-VALVE REPLACEMENT: Chronic | ICD-10-CM

## 2020-10-20 DIAGNOSIS — Z98.49 CATARACT EXTRACTION STATUS, UNSPECIFIED EYE: Chronic | ICD-10-CM

## 2020-10-20 DIAGNOSIS — L97.801 NON-PRESSURE CHRONIC ULCER OF OTHER PART OF UNSPECIFIED LOWER LEG LIMITED TO BREAKDOWN OF SKIN: ICD-10-CM

## 2020-10-20 DIAGNOSIS — Z95.1 PRESENCE OF AORTOCORONARY BYPASS GRAFT: Chronic | ICD-10-CM

## 2020-10-20 DIAGNOSIS — Z95.810 PRESENCE OF AUTOMATIC (IMPLANTABLE) CARDIAC DEFIBRILLATOR: Chronic | ICD-10-CM

## 2020-10-20 DIAGNOSIS — Z98.89 OTHER SPECIFIED POSTPROCEDURAL STATES: Chronic | ICD-10-CM

## 2020-10-20 PROCEDURE — G0463: CPT

## 2020-10-20 PROCEDURE — 99213 OFFICE O/P EST LOW 20 MIN: CPT

## 2020-10-20 NOTE — PHYSICAL EXAM
[4 x 4] : 4 x 4  [JVD] : no jugular venous distention  [Normal Breath Sounds] : Normal breath sounds [Normal Heart Sounds] : normal heart sounds [2+] : left 2+ [1+] : right 1+ [0] : left 0 [Ankle Swelling (On Exam)] : present [Ankle Swelling Bilaterally] : bilaterally  [Varicose Veins Of Lower Extremities] : bilaterally [] : of the right leg [Ankle Swelling On The Left] : moderate [Alert] : alert [Oriented to Person] : oriented to person [Calm] : calm [de-identified] : WD/WN in no acute distress. [de-identified] : KENYATTAL [de-identified] : WNL [de-identified] : Right leg ulcers are closed, no drainage, no acute infection.  [FreeTextEntry1] : Right Anterior Leg - Proximal [FreeTextEntry2] : 0.3 [FreeTextEntry3] : 0.3 [FreeTextEntry4] : 0.1 [de-identified] : small sanguineous [de-identified] : 2 [de-identified] : Xeroform [de-identified] : Cleansed with Normal Saline [FreeTextEntry7] : Right Anterior Leg - Distal to wound 1 - 2 small openings within measurement [FreeTextEntry8] : 0.6 [FreeTextEntry9] : 0.4 [de-identified] : 0.1 [de-identified] : Xeroform [de-identified] : Cleansed with Normal Saline [de-identified] : Normal [de-identified] : None [de-identified] : None [de-identified] : 100% [de-identified] : No [de-identified] : Ace wraps [de-identified] : Every other day [de-identified] : Normal [de-identified] : None [de-identified] : None [de-identified] : 100% [de-identified] : No [de-identified] : Ace wraps [de-identified] : Every other day

## 2020-10-20 NOTE — ASSESSMENT
[Patient] : Patient [Verbal] : Verbal [Good - alert, interested, motivated] : Good - alert, interested, motivated [Verbalizes knowledge/Understanding] : Verbalizes knowledge/understanding [Dressing changes] : dressing changes [Skin Care] : skin care [Signs and symptoms of infection] : sign and symptoms of infection [Venous Disease] : venous disease [How and When to Call] : how and when to call [Labs and Tests] : labs and tests [Patient responsibility to plan of care] : patient responsibility to plan of care [Glycemic Control] : glycemic control [] : Yes [Stable] : stable [Home] : Home [Ambulatory] : Ambulatory [Not Applicable - Long Term Care/Home Health Agency] : Long Term Care/Home Health Agency: Not Applicable [FreeTextEntry2] : Restore Skin Integrity\par Infection Control\par Localized wound care\par Maintain acceptable pain levels [FreeTextEntry4] : Photos Taken\par Patient to use ACE wraps until he receives his compression stockings in the mail.\par Patient discharged as per MD [FreeTextEntry1] : Right leg ulcers are closed, no acute infection\par

## 2020-10-21 DIAGNOSIS — Z87.440 PERSONAL HISTORY OF URINARY (TRACT) INFECTIONS: ICD-10-CM

## 2020-10-21 DIAGNOSIS — G31.84 MILD COGNITIVE IMPAIRMENT OF UNCERTAIN OR UNKNOWN ETIOLOGY: ICD-10-CM

## 2020-10-21 DIAGNOSIS — I83.218 VARICOSE VEINS OF RIGHT LOWER EXTREMITY WITH BOTH ULCER OF OTHER PART OF LOWER EXTREMITY AND INFLAMMATION: ICD-10-CM

## 2020-10-21 DIAGNOSIS — Z79.899 OTHER LONG TERM (CURRENT) DRUG THERAPY: ICD-10-CM

## 2020-10-21 DIAGNOSIS — N40.1 BENIGN PROSTATIC HYPERPLASIA WITH LOWER URINARY TRACT SYMPTOMS: ICD-10-CM

## 2020-10-21 DIAGNOSIS — E11.42 TYPE 2 DIABETES MELLITUS WITH DIABETIC POLYNEUROPATHY: ICD-10-CM

## 2020-10-21 DIAGNOSIS — Z98.49 CATARACT EXTRACTION STATUS, UNSPECIFIED EYE: ICD-10-CM

## 2020-10-21 DIAGNOSIS — N13.8 OTHER OBSTRUCTIVE AND REFLUX UROPATHY: ICD-10-CM

## 2020-10-21 DIAGNOSIS — Z95.810 PRESENCE OF AUTOMATIC (IMPLANTABLE) CARDIAC DEFIBRILLATOR: ICD-10-CM

## 2020-10-21 DIAGNOSIS — Z95.4 PRESENCE OF OTHER HEART-VALVE REPLACEMENT: ICD-10-CM

## 2020-10-21 DIAGNOSIS — Z82.0 FAMILY HISTORY OF EPILEPSY AND OTHER DISEASES OF THE NERVOUS SYSTEM: ICD-10-CM

## 2020-10-21 DIAGNOSIS — E78.5 HYPERLIPIDEMIA, UNSPECIFIED: ICD-10-CM

## 2020-10-21 DIAGNOSIS — Z86.73 PERSONAL HISTORY OF TRANSIENT ISCHEMIC ATTACK (TIA), AND CEREBRAL INFARCTION WITHOUT RESIDUAL DEFICITS: ICD-10-CM

## 2020-10-21 DIAGNOSIS — Z79.82 LONG TERM (CURRENT) USE OF ASPIRIN: ICD-10-CM

## 2020-10-21 DIAGNOSIS — Z96.1 PRESENCE OF INTRAOCULAR LENS: ICD-10-CM

## 2020-10-21 DIAGNOSIS — Z87.891 PERSONAL HISTORY OF NICOTINE DEPENDENCE: ICD-10-CM

## 2020-10-21 DIAGNOSIS — E55.9 VITAMIN D DEFICIENCY, UNSPECIFIED: ICD-10-CM

## 2020-10-21 DIAGNOSIS — I83.893 VARICOSE VEINS OF BILATERAL LOWER EXTREMITIES WITH OTHER COMPLICATIONS: ICD-10-CM

## 2020-10-21 DIAGNOSIS — E11.51 TYPE 2 DIABETES MELLITUS WITH DIABETIC PERIPHERAL ANGIOPATHY WITHOUT GANGRENE: ICD-10-CM

## 2020-10-21 DIAGNOSIS — N18.5 CHRONIC KIDNEY DISEASE, STAGE 5: ICD-10-CM

## 2020-10-21 DIAGNOSIS — Z79.4 LONG TERM (CURRENT) USE OF INSULIN: ICD-10-CM

## 2020-10-21 DIAGNOSIS — I13.2 HYPERTENSIVE HEART AND CHRONIC KIDNEY DISEASE WITH HEART FAILURE AND WITH STAGE 5 CHRONIC KIDNEY DISEASE, OR END STAGE RENAL DISEASE: ICD-10-CM

## 2020-10-21 DIAGNOSIS — L97.812 NON-PRESSURE CHRONIC ULCER OF OTHER PART OF RIGHT LOWER LEG WITH FAT LAYER EXPOSED: ICD-10-CM

## 2020-10-21 DIAGNOSIS — Z98.61 CORONARY ANGIOPLASTY STATUS: ICD-10-CM

## 2020-10-21 DIAGNOSIS — I48.91 UNSPECIFIED ATRIAL FIBRILLATION: ICD-10-CM

## 2020-10-21 DIAGNOSIS — Z80.9 FAMILY HISTORY OF MALIGNANT NEOPLASM, UNSPECIFIED: ICD-10-CM

## 2020-10-21 DIAGNOSIS — I50.22 CHRONIC SYSTOLIC (CONGESTIVE) HEART FAILURE: ICD-10-CM

## 2020-10-21 DIAGNOSIS — E11.22 TYPE 2 DIABETES MELLITUS WITH DIABETIC CHRONIC KIDNEY DISEASE: ICD-10-CM

## 2020-10-26 ENCOUNTER — LABORATORY RESULT (OUTPATIENT)
Age: 85
End: 2020-10-26

## 2020-10-26 ENCOUNTER — EMERGENCY (EMERGENCY)
Facility: HOSPITAL | Age: 85
LOS: 1 days | Discharge: HOME | End: 2020-10-26
Attending: EMERGENCY MEDICINE | Admitting: EMERGENCY MEDICINE
Payer: MEDICARE

## 2020-10-26 VITALS
HEART RATE: 68 BPM | RESPIRATION RATE: 16 BRPM | SYSTOLIC BLOOD PRESSURE: 132 MMHG | DIASTOLIC BLOOD PRESSURE: 67 MMHG | OXYGEN SATURATION: 99 % | TEMPERATURE: 98 F

## 2020-10-26 VITALS
TEMPERATURE: 98 F | DIASTOLIC BLOOD PRESSURE: 60 MMHG | SYSTOLIC BLOOD PRESSURE: 128 MMHG | RESPIRATION RATE: 16 BRPM | HEIGHT: 68 IN | HEART RATE: 72 BPM | WEIGHT: 162.04 LBS | OXYGEN SATURATION: 98 %

## 2020-10-26 DIAGNOSIS — Z95.4 PRESENCE OF OTHER HEART-VALVE REPLACEMENT: Chronic | ICD-10-CM

## 2020-10-26 DIAGNOSIS — Z98.49 CATARACT EXTRACTION STATUS, UNSPECIFIED EYE: Chronic | ICD-10-CM

## 2020-10-26 DIAGNOSIS — Z95.810 PRESENCE OF AUTOMATIC (IMPLANTABLE) CARDIAC DEFIBRILLATOR: Chronic | ICD-10-CM

## 2020-10-26 DIAGNOSIS — Z98.89 OTHER SPECIFIED POSTPROCEDURAL STATES: Chronic | ICD-10-CM

## 2020-10-26 DIAGNOSIS — T14.8XXA OTHER INJURY OF UNSPECIFIED BODY REGION, INITIAL ENCOUNTER: ICD-10-CM

## 2020-10-26 DIAGNOSIS — Z95.1 PRESENCE OF AORTOCORONARY BYPASS GRAFT: Chronic | ICD-10-CM

## 2020-10-26 PROCEDURE — 99284 EMERGENCY DEPT VISIT MOD MDM: CPT | Mod: 25

## 2020-10-26 PROCEDURE — 73590 X-RAY EXAM OF LOWER LEG: CPT

## 2020-10-26 PROCEDURE — 73590 X-RAY EXAM OF LOWER LEG: CPT | Mod: 26,RT

## 2020-10-26 NOTE — ED PROVIDER NOTE - PATIENT PORTAL LINK FT
You can access the FollowMyHealth Patient Portal offered by [facility] by registering at the following website: [email or web address]. By joining [facility]’s FollowMyHealth portal, you will also be able to view your health information using other applications (apps) compatible with our system.

## 2020-10-26 NOTE — PROGRESS NOTE ADULT - PROBLEM SELECTOR PLAN 1
- Patient seen and evaluated at bedside in the ED  - Mechanical debridement of bullae with sterile gauze which expressed 2cc of serous fluid  - Area dressed with Xeroform and DSD, secured with ACE bandage   - Recommend daily or every other day dressing with xeroform  - Follow up in Wound Care Clinic/ Hyperbaric Center within 1 week of being discharged      WOUND CARE INSTRUCTIONS   1. Cleanse wound with sterile saline solution and gently pat dry.  2. Dress wound with Xeroform and dry, sterile dressing.  3. Change dressings daily and monitor for any signs of infection.  4. Patient is to follow up in the Hyperbaric/Wound Care Center with Dr. Ayers or Dr. Trinh within 1 week upon discharge. - Patient seen and evaluated at bedside in the ED  - Bullae lanced down to the level of skin and subcutaneous tissue and expressed 2cc of serous fluid  - Area dressed with Xeroform and DSD, secured with ACE bandage   - Recommend daily or every other day dressing with xeroform  - Follow up in Wound Care Clinic/ Hyperbaric Center within 1 week of being discharged      WOUND CARE INSTRUCTIONS   1. Cleanse wound with sterile saline solution and gently pat dry.  2. Dress wound with Xeroform and dry, sterile dressing.  3. Change dressings daily and monitor for any signs of infection.  4. Patient is to follow up in the Hyperbaric/Wound Care Center with Dr. Ayers or Dr. Trinh within 1 week upon discharge.

## 2020-10-26 NOTE — ED ADULT NURSE NOTE - NSIMPLEMENTINTERV_GEN_ALL_ED
Implemented All Fall with Harm Risk Interventions:  [place] to call system. Call bell, personal items and telephone within reach. Instruct patient to call for assistance. Room bathroom lighting operational. Non-slip footwear when patient is off stretcher. Physically safe environment: no spills, clutter or unnecessary equipment. Stretcher in lowest position, wheels locked, appropriate side rails in place. Provide visual cue, wrist band, yellow gown, etc. Monitor gait and stability. Monitor for mental status changes and reorient to person, place, and time. Review medications for side effects contributing to fall risk. Reinforce activity limits and safety measures with patient and family. Provide visual clues: red socks.

## 2020-10-26 NOTE — ED ADULT NURSE NOTE - ED COMFORT CARE
Doing well. Regaining strength. Anxious to have physical therapy. Family informed/Explanation of wait/Patient informed

## 2020-10-26 NOTE — PROGRESS NOTE ADULT - SUBJECTIVE AND OBJECTIVE BOX
HPI:  89y year old Male seen at [facility] ED for bullae along the anterior central aspect of the right lower extremity. The patient is accompanied by his wife who states that the blister appeared this past saturday. His wife is very cautious because he had a wound along the right lower extremity which has healed over time. She was dressing the area with xeroform and DSD and secure with an ACE bandage. Denies any fever, chills, nausea, vomiting, chest pain, shortness of breath, or calf pain at this time.    REVIEW OF SYSTEMS    PAST MEDICAL & SURGICAL HISTORY:  Cerebrovascular accident (CVA), unspecified mechanism    Pacemaker    Endocarditis    CAD (coronary artery disease)    Cardiomyopathy, ischemic    Type 2 diabetes mellitus    Hypertension    Congestive heart failure    Paroxysmal atrial fibrillation    Dyslipidemia    S/P CABG x 1    History of cataract surgery    History of colon surgery    ICD (implantable cardioverter-defibrillator) in place    H/O mitral valve replacement  bovine        Allergies    No Known Allergies    Intolerances        MEDICATIONS  (STANDING):    MEDICATIONS  (PRN):      Social History:      FAMILY HISTORY:  No pertinent family history in first degree relatives        Vital Signs Last 24 Hrs  T(C): 36.4 (26 Oct 2020 10:49), Max: 36.4 (26 Oct 2020 10:49)  T(F): 97.5 (26 Oct 2020 10:49), Max: 97.5 (26 Oct 2020 10:49)  HR: 72 (26 Oct 2020 10:49) (72 - 72)  BP: 128/60 (26 Oct 2020 10:49) (128/60 - 128/60)  BP(mean): --  RR: 16 (26 Oct 2020 10:49) (16 - 16)  SpO2: 98% (26 Oct 2020 10:49) (98% - 98%)    PHYSICAL EXAM:  Vascular: DP & PT faintly palpable bilaterally, Capillary refill 3 seconds, No pedal hair or LE hair present   Neurological: Gross epicritic sensation intact b/l   Musculoskeletal: 5/5 strength in all quadrants bilaterally, No pain upon palpation of the bullae along the right central lower extremity   Dermatological:   1. Bullae- along the anterior central aspect of the right lower extremity- size 5cmx1.5cm- serous fluid-diffuse hyperkeratosis along the lower extremity , mild erythema, no streaking or malodor or signs of infection         ----------CHEM PANEL----------    None performed         Imaging: ----------  Right Tib-Fib:     EXAM:  LEG AP&LAT - RIGHT                            PROCEDURE DATE:  10/26/2020          INTERPRETATION:  Right leg wound    2 views right tibia-fibula.    No fracture dislocation focal bone lysis or unusual periosteal reaction. Extensive arterial calcification. Postoperative clips noted. Diffuse nonspecific soft tissue edema. No soft tissue gas.    Impression: No plain film evidence of active osteomyelitis.

## 2020-10-26 NOTE — ED PROVIDER NOTE - PHYSICAL EXAMINATION
R lower leg with 2 x 4cm blister with slight surr warmth, no surr erythema. Nl dist str/sens equal bl, 2+ pulses. nl cap refill. Nl prox leg

## 2020-10-26 NOTE — PROGRESS NOTE ADULT - ASSESSMENT
Bullae along the anterior central aspect of the right lower extremity secondary to venous insufficiency. Bullae mechanically debrided with sterile guaze and 2cc of serous fluid expressed, no sanguineous drainage. Wound not unroofed dressed with xeroform and DSD and secured with ACE bandage. WCI below Bullae along the anterior central aspect of the right lower extremity secondary to venous insufficiency.

## 2020-10-26 NOTE — ED PROVIDER NOTE - CHPI ED SYMPTOMS NEG
no chills/no decreased eating/drinking/no rash/no abdominal pain/no shortness of breath/no diarrhea/no fever

## 2020-10-26 NOTE — ED ADULT NURSE NOTE - OBJECTIVE STATEMENT
received pt in bed #18b Pt A&O seeing wound care x 3-4 weeks for right lower shin wound States wound healed but noticed a blister where wound was on Saturday Denies fever

## 2020-10-26 NOTE — ED PROVIDER NOTE - OBJECTIVE STATEMENT
89 yo M P/w hx RLE wound which was being treated by wound care center. Now with new blister on R lower leg, over where prev wound was. min discomfort. no discharge. no surr erythema. no cp/sob/palp. no numb/ting/focal weak. No weakness / malaise. no fever/chills. no agg/allev factors. no other inj or co. Pt went to wound care today - sent to ed for eval (was not seen by wound care md)

## 2020-10-26 NOTE — ED PROVIDER NOTE - CARE PROVIDER_API CALL
Sandoval Trinh (DPHARRY)  [place] [address]  Phone: [phone]  Fax: [phone]  Follow Up Time:

## 2020-10-27 PROCEDURE — 99283 EMERGENCY DEPT VISIT LOW MDM: CPT | Mod: 25

## 2020-10-28 ENCOUNTER — NON-APPOINTMENT (OUTPATIENT)
Age: 85
End: 2020-10-28

## 2020-11-02 ENCOUNTER — OUTPATIENT (OUTPATIENT)
Dept: OUTPATIENT SERVICES | Facility: HOSPITAL | Age: 85
LOS: 1 days | Discharge: HOME | End: 2020-11-02
Payer: MEDICARE

## 2020-11-02 ENCOUNTER — APPOINTMENT (OUTPATIENT)
Dept: WOUND CARE | Facility: HOSPITAL | Age: 85
End: 2020-11-02
Payer: MEDICARE

## 2020-11-02 VITALS
RESPIRATION RATE: 20 BRPM | DIASTOLIC BLOOD PRESSURE: 76 MMHG | OXYGEN SATURATION: 96 % | HEIGHT: 68.5 IN | SYSTOLIC BLOOD PRESSURE: 158 MMHG | BODY MASS INDEX: 24.72 KG/M2 | WEIGHT: 165 LBS | HEART RATE: 77 BPM | TEMPERATURE: 97.2 F

## 2020-11-02 DIAGNOSIS — L97.801 NON-PRESSURE CHRONIC ULCER OF OTHER PART OF UNSPECIFIED LOWER LEG LIMITED TO BREAKDOWN OF SKIN: ICD-10-CM

## 2020-11-02 DIAGNOSIS — Z95.4 PRESENCE OF OTHER HEART-VALVE REPLACEMENT: Chronic | ICD-10-CM

## 2020-11-02 DIAGNOSIS — Z98.49 CATARACT EXTRACTION STATUS, UNSPECIFIED EYE: Chronic | ICD-10-CM

## 2020-11-02 DIAGNOSIS — Z95.810 PRESENCE OF AUTOMATIC (IMPLANTABLE) CARDIAC DEFIBRILLATOR: Chronic | ICD-10-CM

## 2020-11-02 DIAGNOSIS — Z98.89 OTHER SPECIFIED POSTPROCEDURAL STATES: Chronic | ICD-10-CM

## 2020-11-02 DIAGNOSIS — Z95.1 PRESENCE OF AORTOCORONARY BYPASS GRAFT: Chronic | ICD-10-CM

## 2020-11-02 PROCEDURE — 99214 OFFICE O/P EST MOD 30 MIN: CPT

## 2020-11-02 PROCEDURE — G0463: CPT

## 2020-11-02 NOTE — ASSESSMENT
[Verbal] : Verbal [Patient] : Patient [Good - alert, interested, motivated] : Good - alert, interested, motivated [Verbalizes knowledge/Understanding] : Verbalizes knowledge/understanding [Dressing changes] : dressing changes [Skin Care] : skin care [Signs and symptoms of infection] : sign and symptoms of infection [Venous Disease] : venous disease [How and When to Call] : how and when to call [Labs and Tests] : labs and tests [Patient responsibility to plan of care] : patient responsibility to plan of care [Glycemic Control] : glycemic control [] : Yes [Stable] : stable [Home] : Home [Ambulatory] : Ambulatory [Not Applicable - Long Term Care/Home Health Agency] : Long Term Care/Home Health Agency: Not Applicable [FreeTextEntry2] : Promote optimal skin integrity,  infection prevention, edema control\par  [FreeTextEntry4] : Circulation\par \par Dorsalis Pedis:  bilateral palpable \par Posterior Tibialis:   bilateral unable to palpate\par Doppler Pulses:  bilateral  present \par Extremity Color:  bilateral pigmented\par Extremity Temperature:  bilateral warm\par Capillary Refill:  bilateral <3 sec\par CAITLYN:  Non-invasive vascular studies (arterial) are scheduled  to be done at [facility] on Thursday\par \par Non-invasive vascular studies (venous) ordered - submitted for authorization\par \par f/u 1 week with Dr. Nunez on 11/9/20 @ 10:30 AM and [facility] assessment @ 12 PM.\par \par \par \par \par \par

## 2020-11-02 NOTE — PHYSICAL EXAM
[4 x 4] : 4 x 4  [0] : right 0 [1+] : left 1+ [Varicose Veins Of Lower Extremities] : bilaterally [] : bilaterally [Ankle Swelling On The Left] : moderate [Skin Ulcer] : ulcer [Calm] : calm [Ankle Swelling (On Exam)] : not present [de-identified] : A&Ox3, NAD [de-identified] : 5/5 strength in all quadrants bilaterally [de-identified] : right lower leg venous stasis ulceration down to skin, subcutaeous tissue, and fat with bilateral venous stasis dermatitis [de-identified] : light touch sensation intact bilaterally [FreeTextEntry1] : right anterior leg [FreeTextEntry2] : 0.7 [FreeTextEntry3] : 2.5 [FreeTextEntry4] : 0.1 [de-identified] : small sanguineous [de-identified] : Intact with small blister superior to wound, and 2 small open areas inferior to wound [de-identified] : none [de-identified] : 100% [de-identified] : Xeroform [de-identified] : NSC [de-identified] : Ace wraps [de-identified] : Every other day

## 2020-11-02 NOTE — PLAN
[FreeTextEntry1] : Patient examined and evaluated at this time.\par Continue local wound care and offloading.\par Will auth for venous vascular studies.\par Referral to Dr. Schmitz for vascular surgery consult.\par Arterial studies prior to application of multilayer compression dressing.\par Patient to follow up in 1 week.

## 2020-11-02 NOTE — HISTORY OF PRESENT ILLNESS
[FreeTextEntry1] : The wound is located on patient's right leg.  Patient was discharged from [facility] on 10/20/20.  Patient reports that a new blister appeared gradually last week and broke causing wound.  Patient is applying Xeroform, dry dressing and an ace bandage.

## 2020-11-03 DIAGNOSIS — I83.218 VARICOSE VEINS OF RIGHT LOWER EXTREMITY WITH BOTH ULCER OF OTHER PART OF LOWER EXTREMITY AND INFLAMMATION: ICD-10-CM

## 2020-11-03 DIAGNOSIS — E11.22 TYPE 2 DIABETES MELLITUS WITH DIABETIC CHRONIC KIDNEY DISEASE: ICD-10-CM

## 2020-11-03 DIAGNOSIS — G31.84 MILD COGNITIVE IMPAIRMENT OF UNCERTAIN OR UNKNOWN ETIOLOGY: ICD-10-CM

## 2020-11-03 DIAGNOSIS — I83.893 VARICOSE VEINS OF BILATERAL LOWER EXTREMITIES WITH OTHER COMPLICATIONS: ICD-10-CM

## 2020-11-03 DIAGNOSIS — L97.812 NON-PRESSURE CHRONIC ULCER OF OTHER PART OF RIGHT LOWER LEG WITH FAT LAYER EXPOSED: ICD-10-CM

## 2020-11-03 DIAGNOSIS — D64.9 ANEMIA, UNSPECIFIED: ICD-10-CM

## 2020-11-03 DIAGNOSIS — E11.51 TYPE 2 DIABETES MELLITUS WITH DIABETIC PERIPHERAL ANGIOPATHY WITHOUT GANGRENE: ICD-10-CM

## 2020-11-03 DIAGNOSIS — E78.5 HYPERLIPIDEMIA, UNSPECIFIED: ICD-10-CM

## 2020-11-03 DIAGNOSIS — Z79.82 LONG TERM (CURRENT) USE OF ASPIRIN: ICD-10-CM

## 2020-11-03 DIAGNOSIS — Z79.899 OTHER LONG TERM (CURRENT) DRUG THERAPY: ICD-10-CM

## 2020-11-03 DIAGNOSIS — Z86.73 PERSONAL HISTORY OF TRANSIENT ISCHEMIC ATTACK (TIA), AND CEREBRAL INFARCTION WITHOUT RESIDUAL DEFICITS: ICD-10-CM

## 2020-11-03 DIAGNOSIS — Z87.440 PERSONAL HISTORY OF URINARY (TRACT) INFECTIONS: ICD-10-CM

## 2020-11-03 DIAGNOSIS — E11.42 TYPE 2 DIABETES MELLITUS WITH DIABETIC POLYNEUROPATHY: ICD-10-CM

## 2020-11-03 DIAGNOSIS — I13.2 HYPERTENSIVE HEART AND CHRONIC KIDNEY DISEASE WITH HEART FAILURE AND WITH STAGE 5 CHRONIC KIDNEY DISEASE, OR END STAGE RENAL DISEASE: ICD-10-CM

## 2020-11-03 DIAGNOSIS — I48.91 UNSPECIFIED ATRIAL FIBRILLATION: ICD-10-CM

## 2020-11-03 DIAGNOSIS — Z98.61 CORONARY ANGIOPLASTY STATUS: ICD-10-CM

## 2020-11-03 DIAGNOSIS — N18.5 CHRONIC KIDNEY DISEASE, STAGE 5: ICD-10-CM

## 2020-11-03 DIAGNOSIS — Z82.0 FAMILY HISTORY OF EPILEPSY AND OTHER DISEASES OF THE NERVOUS SYSTEM: ICD-10-CM

## 2020-11-03 DIAGNOSIS — N40.1 BENIGN PROSTATIC HYPERPLASIA WITH LOWER URINARY TRACT SYMPTOMS: ICD-10-CM

## 2020-11-03 DIAGNOSIS — Z95.810 PRESENCE OF AUTOMATIC (IMPLANTABLE) CARDIAC DEFIBRILLATOR: ICD-10-CM

## 2020-11-03 DIAGNOSIS — Z87.891 PERSONAL HISTORY OF NICOTINE DEPENDENCE: ICD-10-CM

## 2020-11-03 DIAGNOSIS — E55.9 VITAMIN D DEFICIENCY, UNSPECIFIED: ICD-10-CM

## 2020-11-03 DIAGNOSIS — Z95.4 PRESENCE OF OTHER HEART-VALVE REPLACEMENT: ICD-10-CM

## 2020-11-03 DIAGNOSIS — Z98.49 CATARACT EXTRACTION STATUS, UNSPECIFIED EYE: ICD-10-CM

## 2020-11-03 DIAGNOSIS — Z79.4 LONG TERM (CURRENT) USE OF INSULIN: ICD-10-CM

## 2020-11-03 DIAGNOSIS — Z80.9 FAMILY HISTORY OF MALIGNANT NEOPLASM, UNSPECIFIED: ICD-10-CM

## 2020-11-03 DIAGNOSIS — N13.8 OTHER OBSTRUCTIVE AND REFLUX UROPATHY: ICD-10-CM

## 2020-11-03 DIAGNOSIS — I50.22 CHRONIC SYSTOLIC (CONGESTIVE) HEART FAILURE: ICD-10-CM

## 2020-11-03 DIAGNOSIS — Z96.1 PRESENCE OF INTRAOCULAR LENS: ICD-10-CM

## 2020-11-05 ENCOUNTER — RESULT REVIEW (OUTPATIENT)
Age: 85
End: 2020-11-05

## 2020-11-05 ENCOUNTER — NON-APPOINTMENT (OUTPATIENT)
Age: 85
End: 2020-11-05

## 2020-11-05 ENCOUNTER — OUTPATIENT (OUTPATIENT)
Dept: OUTPATIENT SERVICES | Facility: HOSPITAL | Age: 85
LOS: 1 days | End: 2020-11-05
Payer: MEDICARE

## 2020-11-05 DIAGNOSIS — Z95.4 PRESENCE OF OTHER HEART-VALVE REPLACEMENT: Chronic | ICD-10-CM

## 2020-11-05 DIAGNOSIS — Z95.1 PRESENCE OF AORTOCORONARY BYPASS GRAFT: Chronic | ICD-10-CM

## 2020-11-05 DIAGNOSIS — Z98.89 OTHER SPECIFIED POSTPROCEDURAL STATES: Chronic | ICD-10-CM

## 2020-11-05 DIAGNOSIS — Z98.49 CATARACT EXTRACTION STATUS, UNSPECIFIED EYE: Chronic | ICD-10-CM

## 2020-11-05 DIAGNOSIS — Z95.810 PRESENCE OF AUTOMATIC (IMPLANTABLE) CARDIAC DEFIBRILLATOR: Chronic | ICD-10-CM

## 2020-11-05 DIAGNOSIS — I70.221 ATHEROSCLEROSIS OF NATIVE ARTERIES OF EXTREMITIES WITH REST PAIN, RIGHT LEG: ICD-10-CM

## 2020-11-05 PROCEDURE — 93922 UPR/L XTREMITY ART 2 LEVELS: CPT | Mod: 26

## 2020-11-05 PROCEDURE — 93923 UPR/LXTR ART STDY 3+ LVLS: CPT | Mod: 26

## 2020-11-05 PROCEDURE — 93923 UPR/LXTR ART STDY 3+ LVLS: CPT

## 2020-11-05 PROCEDURE — 93970 EXTREMITY STUDY: CPT | Mod: 26

## 2020-11-05 PROCEDURE — 93970 EXTREMITY STUDY: CPT

## 2020-11-09 ENCOUNTER — OUTPATIENT (OUTPATIENT)
Dept: OUTPATIENT SERVICES | Facility: HOSPITAL | Age: 85
LOS: 1 days | Discharge: HOME | End: 2020-11-09
Payer: MEDICARE

## 2020-11-09 ENCOUNTER — APPOINTMENT (OUTPATIENT)
Dept: SURGERY | Facility: HOSPITAL | Age: 85
End: 2020-11-09
Payer: MEDICARE

## 2020-11-09 ENCOUNTER — APPOINTMENT (OUTPATIENT)
Dept: VASCULAR SURGERY | Facility: CLINIC | Age: 85
End: 2020-11-09
Payer: MEDICARE

## 2020-11-09 VITALS
TEMPERATURE: 97 F | HEIGHT: 68.5 IN | BODY MASS INDEX: 24.72 KG/M2 | WEIGHT: 165 LBS | HEART RATE: 81 BPM | OXYGEN SATURATION: 96 % | RESPIRATION RATE: 20 BRPM | SYSTOLIC BLOOD PRESSURE: 129 MMHG | DIASTOLIC BLOOD PRESSURE: 63 MMHG

## 2020-11-09 VITALS
WEIGHT: 165 LBS | OXYGEN SATURATION: 96 % | RESPIRATION RATE: 20 BRPM | SYSTOLIC BLOOD PRESSURE: 129 MMHG | BODY MASS INDEX: 24.72 KG/M2 | DIASTOLIC BLOOD PRESSURE: 63 MMHG | TEMPERATURE: 97 F | HEART RATE: 81 BPM | HEIGHT: 68.5 IN

## 2020-11-09 VITALS
DIASTOLIC BLOOD PRESSURE: 63 MMHG | SYSTOLIC BLOOD PRESSURE: 129 MMHG | BODY MASS INDEX: 24.72 KG/M2 | HEIGHT: 68.5 IN | OXYGEN SATURATION: 96 % | HEART RATE: 81 BPM | RESPIRATION RATE: 20 BRPM | TEMPERATURE: 97 F | WEIGHT: 165 LBS

## 2020-11-09 DIAGNOSIS — Z95.4 PRESENCE OF OTHER HEART-VALVE REPLACEMENT: Chronic | ICD-10-CM

## 2020-11-09 DIAGNOSIS — Z98.49 CATARACT EXTRACTION STATUS, UNSPECIFIED EYE: Chronic | ICD-10-CM

## 2020-11-09 DIAGNOSIS — I87.2 VENOUS INSUFFICIENCY (CHRONIC) (PERIPHERAL): ICD-10-CM

## 2020-11-09 DIAGNOSIS — Z98.89 OTHER SPECIFIED POSTPROCEDURAL STATES: Chronic | ICD-10-CM

## 2020-11-09 DIAGNOSIS — I83.218 VARICOSE VEINS OF RIGHT LOWER EXTREMITY WITH BOTH ULCER OF OTHER PART OF LOWER EXTREMITY AND INFLAMMATION: ICD-10-CM

## 2020-11-09 DIAGNOSIS — Z95.1 PRESENCE OF AORTOCORONARY BYPASS GRAFT: Chronic | ICD-10-CM

## 2020-11-09 DIAGNOSIS — Z95.810 PRESENCE OF AUTOMATIC (IMPLANTABLE) CARDIAC DEFIBRILLATOR: Chronic | ICD-10-CM

## 2020-11-09 PROCEDURE — 99214 OFFICE O/P EST MOD 30 MIN: CPT

## 2020-11-09 PROCEDURE — G0463: CPT

## 2020-11-09 PROCEDURE — 99213 OFFICE O/P EST LOW 20 MIN: CPT

## 2020-11-09 NOTE — PHYSICAL EXAM
[2+] : right 2+ [Ankle Swelling (On Exam)] : not present [Varicose Veins Of Lower Extremities] : bilaterally [] : bilaterally [Ankle Swelling On The Left] : moderate [Skin Ulcer] : ulcer [Alert] : alert [Oriented to Person] : oriented to person [Oriented to Place] : oriented to place [Oriented to Time] : oriented to time [Calm] : calm [de-identified] : B shin hyperpigmentation, R shin 1 cm round wound with subcutaneous exposed, no erythema or discharge, prominent 3-5 mm varicose veins along B shins

## 2020-11-09 NOTE — HISTORY OF PRESENT ILLNESS
[FreeTextEntry1] : 89 yo M with R shin wound that recurred after healing large wound recently. Patient states that they are spontaneous. Has significant cardiac hx. Has worked on his feet since he was 6 years old. Denies severe swelling. Bilateral shins have been dark for many years

## 2020-11-09 NOTE — ASSESSMENT
[Verbal] : Verbal [Patient] : Patient [Good - alert, interested, motivated] : Good - alert, interested, motivated [Verbalizes knowledge/Understanding] : Verbalizes knowledge/understanding [Dressing changes] : dressing changes [Skin Care] : skin care [Signs and symptoms of infection] : sign and symptoms of infection [Venous Disease] : venous disease [How and When to Call] : how and when to call [Labs and Tests] : labs and tests [Patient responsibility to plan of care] : patient responsibility to plan of care [Glycemic Control] : glycemic control [Stable] : stable [Home] : Home [Ambulatory] : Ambulatory [Not Applicable - Long Term Care/Home Health Agency] : Long Term Care/Home Health Agency: Not Applicable [] : No [FreeTextEntry2] : Promote optimal skin integrity,  infection prevention, edema control\par  [FreeTextEntry3] : New open area (see wound #2) [FreeTextEntry4] : Patient was seen by Dr. Nunez.  Vascular studies discussed with patient.  No interventions ordered.  Dr. Nunez.\par \par f/u 2 weeks [FreeTextEntry1] : Right leg stasis ulcers, pinpoint, clean, no acute infection, seen by Dr. Nunez Vascular surgeon, no intervention is needed.\par

## 2020-11-09 NOTE — VITALS
[] : No [de-identified] : Pain scale:  0/10 - Patient reports no c/o pains or discomforts at present.

## 2020-11-09 NOTE — ASSESSMENT
[FreeTextEntry1] : 89 yo M with BLE varicose veins and stasis dermatitis. R shin recurrent wounds. High risk factors for venous disease as well as CHF. No evidence of PVD. Insuf was not found on US but I am clinically highly suspicious for venous disease.

## 2020-11-09 NOTE — PHYSICAL EXAM
[4 x 4] : 4 x 4  [Normal Breath Sounds] : Normal breath sounds [Normal Heart Sounds] : normal heart sounds [2+] : left 2+ [1+] : right 1+ [0] : left 0 [Ankle Swelling (On Exam)] : present [Ankle Swelling Bilaterally] : bilaterally  [Varicose Veins Of Lower Extremities] : bilaterally [] : of the right leg [Ankle Swelling On The Left] : moderate [Alert] : alert [Oriented to Person] : oriented to person [Calm] : calm [JVD] : no jugular venous distention  [de-identified] : WD/WN in no acute distress. [de-identified] : KENYATTAL [de-identified] : WNL [de-identified] : Right leg with few pinpoint ulcers, no drainage, no infection, periwound skin is intact with no cellulitis. [FreeTextEntry1] : right anterior leg - fragile new epithelium [de-identified] : none [de-identified] : none [de-identified] : Xeroform [de-identified] : NSC [FreeTextEntry7] : Right anterior leg - distal - 2 small open areas within measurement (new) [FreeTextEntry8] : 0.7 [FreeTextEntry9] : 0.8 [de-identified] : 0.1 [de-identified] : scant serosanguineous [de-identified] : none [de-identified] : 100% [de-identified] : Xeroform [de-identified] : NSC [de-identified] : Ace wraps [de-identified] : Every other day [de-identified] : Ace wraps [de-identified] : Every other day

## 2020-11-10 ENCOUNTER — RX RENEWAL (OUTPATIENT)
Age: 85
End: 2020-11-10

## 2020-11-10 DIAGNOSIS — E11.51 TYPE 2 DIABETES MELLITUS WITH DIABETIC PERIPHERAL ANGIOPATHY WITHOUT GANGRENE: ICD-10-CM

## 2020-11-10 DIAGNOSIS — Z87.440 PERSONAL HISTORY OF URINARY (TRACT) INFECTIONS: ICD-10-CM

## 2020-11-10 DIAGNOSIS — Z79.899 OTHER LONG TERM (CURRENT) DRUG THERAPY: ICD-10-CM

## 2020-11-10 DIAGNOSIS — Z98.61 CORONARY ANGIOPLASTY STATUS: ICD-10-CM

## 2020-11-10 DIAGNOSIS — D64.9 ANEMIA, UNSPECIFIED: ICD-10-CM

## 2020-11-10 DIAGNOSIS — E11.22 TYPE 2 DIABETES MELLITUS WITH DIABETIC CHRONIC KIDNEY DISEASE: ICD-10-CM

## 2020-11-10 DIAGNOSIS — I83.218 VARICOSE VEINS OF RIGHT LOWER EXTREMITY WITH BOTH ULCER OF OTHER PART OF LOWER EXTREMITY AND INFLAMMATION: ICD-10-CM

## 2020-11-10 DIAGNOSIS — L97.812 NON-PRESSURE CHRONIC ULCER OF OTHER PART OF RIGHT LOWER LEG WITH FAT LAYER EXPOSED: ICD-10-CM

## 2020-11-10 DIAGNOSIS — E11.42 TYPE 2 DIABETES MELLITUS WITH DIABETIC POLYNEUROPATHY: ICD-10-CM

## 2020-11-10 DIAGNOSIS — N18.5 CHRONIC KIDNEY DISEASE, STAGE 5: ICD-10-CM

## 2020-11-10 DIAGNOSIS — G31.84 MILD COGNITIVE IMPAIRMENT OF UNCERTAIN OR UNKNOWN ETIOLOGY: ICD-10-CM

## 2020-11-10 DIAGNOSIS — I48.91 UNSPECIFIED ATRIAL FIBRILLATION: ICD-10-CM

## 2020-11-10 DIAGNOSIS — I83.893 VARICOSE VEINS OF BILATERAL LOWER EXTREMITIES WITH OTHER COMPLICATIONS: ICD-10-CM

## 2020-11-10 DIAGNOSIS — Z96.1 PRESENCE OF INTRAOCULAR LENS: ICD-10-CM

## 2020-11-10 DIAGNOSIS — I50.22 CHRONIC SYSTOLIC (CONGESTIVE) HEART FAILURE: ICD-10-CM

## 2020-11-10 DIAGNOSIS — I13.2 HYPERTENSIVE HEART AND CHRONIC KIDNEY DISEASE WITH HEART FAILURE AND WITH STAGE 5 CHRONIC KIDNEY DISEASE, OR END STAGE RENAL DISEASE: ICD-10-CM

## 2020-11-10 DIAGNOSIS — Z95.4 PRESENCE OF OTHER HEART-VALVE REPLACEMENT: ICD-10-CM

## 2020-11-10 DIAGNOSIS — Z98.49 CATARACT EXTRACTION STATUS, UNSPECIFIED EYE: ICD-10-CM

## 2020-11-10 DIAGNOSIS — E78.5 HYPERLIPIDEMIA, UNSPECIFIED: ICD-10-CM

## 2020-11-10 DIAGNOSIS — Z79.4 LONG TERM (CURRENT) USE OF INSULIN: ICD-10-CM

## 2020-11-10 DIAGNOSIS — N40.1 BENIGN PROSTATIC HYPERPLASIA WITH LOWER URINARY TRACT SYMPTOMS: ICD-10-CM

## 2020-11-10 DIAGNOSIS — Z82.0 FAMILY HISTORY OF EPILEPSY AND OTHER DISEASES OF THE NERVOUS SYSTEM: ICD-10-CM

## 2020-11-10 DIAGNOSIS — Z80.9 FAMILY HISTORY OF MALIGNANT NEOPLASM, UNSPECIFIED: ICD-10-CM

## 2020-11-10 DIAGNOSIS — N13.8 OTHER OBSTRUCTIVE AND REFLUX UROPATHY: ICD-10-CM

## 2020-11-10 DIAGNOSIS — Z95.810 PRESENCE OF AUTOMATIC (IMPLANTABLE) CARDIAC DEFIBRILLATOR: ICD-10-CM

## 2020-11-10 DIAGNOSIS — Z86.73 PERSONAL HISTORY OF TRANSIENT ISCHEMIC ATTACK (TIA), AND CEREBRAL INFARCTION WITHOUT RESIDUAL DEFICITS: ICD-10-CM

## 2020-11-10 DIAGNOSIS — Z79.82 LONG TERM (CURRENT) USE OF ASPIRIN: ICD-10-CM

## 2020-11-10 DIAGNOSIS — E55.9 VITAMIN D DEFICIENCY, UNSPECIFIED: ICD-10-CM

## 2020-11-10 DIAGNOSIS — Z87.891 PERSONAL HISTORY OF NICOTINE DEPENDENCE: ICD-10-CM

## 2020-11-23 ENCOUNTER — OUTPATIENT (OUTPATIENT)
Dept: OUTPATIENT SERVICES | Facility: HOSPITAL | Age: 85
LOS: 1 days | Discharge: HOME | End: 2020-11-23
Payer: MEDICARE

## 2020-11-23 ENCOUNTER — APPOINTMENT (OUTPATIENT)
Dept: SURGERY | Facility: HOSPITAL | Age: 85
End: 2020-11-23
Payer: MEDICARE

## 2020-11-23 ENCOUNTER — LABORATORY RESULT (OUTPATIENT)
Age: 85
End: 2020-11-23

## 2020-11-23 VITALS
HEIGHT: 68.5 IN | TEMPERATURE: 96.69 F | SYSTOLIC BLOOD PRESSURE: 150 MMHG | RESPIRATION RATE: 20 BRPM | HEART RATE: 78 BPM | DIASTOLIC BLOOD PRESSURE: 76 MMHG | BODY MASS INDEX: 24.72 KG/M2 | OXYGEN SATURATION: 96 % | WEIGHT: 165 LBS

## 2020-11-23 DIAGNOSIS — Z95.4 PRESENCE OF OTHER HEART-VALVE REPLACEMENT: Chronic | ICD-10-CM

## 2020-11-23 DIAGNOSIS — Z98.49 CATARACT EXTRACTION STATUS, UNSPECIFIED EYE: Chronic | ICD-10-CM

## 2020-11-23 DIAGNOSIS — I83.218 VARICOSE VEINS OF RIGHT LOWER EXTREMITY WITH BOTH ULCER OF OTHER PART OF LOWER EXTREMITY AND INFLAMMATION: ICD-10-CM

## 2020-11-23 DIAGNOSIS — Z95.1 PRESENCE OF AORTOCORONARY BYPASS GRAFT: Chronic | ICD-10-CM

## 2020-11-23 DIAGNOSIS — Z98.89 OTHER SPECIFIED POSTPROCEDURAL STATES: Chronic | ICD-10-CM

## 2020-11-23 DIAGNOSIS — Z95.810 PRESENCE OF AUTOMATIC (IMPLANTABLE) CARDIAC DEFIBRILLATOR: Chronic | ICD-10-CM

## 2020-11-23 PROCEDURE — G0463: CPT

## 2020-11-23 PROCEDURE — 99213 OFFICE O/P EST LOW 20 MIN: CPT

## 2020-11-23 NOTE — PHYSICAL EXAM
[4 x 4] : 4 x 4  [Normal Breath Sounds] : Normal breath sounds [Normal Heart Sounds] : normal heart sounds [2+] : left 2+ [1+] : right 1+ [0] : left 0 [Ankle Swelling (On Exam)] : present [Ankle Swelling Bilaterally] : bilaterally  [Varicose Veins Of Lower Extremities] : bilaterally [] : of the right leg [Ankle Swelling On The Left] : moderate [Alert] : alert [Oriented to Person] : oriented to person [Calm] : calm [JVD] : no jugular venous distention  [de-identified] : WD/WN in no acute distress. [de-identified] : KENYATTAL [de-identified] : WNL [de-identified] : Right leg ulce is closed in most part, no drainage, no infection, minimal swelling, periwound skin is intact with no cellulitis. [de-identified] : No neurovascular deficit noted  [FreeTextEntry1] : right anterior leg - fragile new epithelium [FreeTextEntry2] : 1.8 [FreeTextEntry3] : 3.5 [FreeTextEntry4] : 0.1 [de-identified] : Scant Serosanguineous  [de-identified] : none [de-identified] : Pt denies any pain or discomfort post ACE application [de-identified] : Xeroform [de-identified] : Cleansed with Normal Saline\par  [de-identified] : No neurovascular deficit noted  [FreeTextEntry7] : Right anterior leg - distal - 2 small open areas within measurement  [FreeTextEntry8] : 0.8 [FreeTextEntry9] : 0.8 [de-identified] : 0.1 [de-identified] : scant serosanguineous [de-identified] : none [de-identified] : 100% [de-identified] : Pt denies any pain or discomfort post ACE application  [de-identified] : Xeroform [de-identified] : Cleansed with Normal Saline  [de-identified] : None [de-identified] : 100% [de-identified] : No [de-identified] : Ace wraps [de-identified] : Every other day [de-identified] : None [de-identified] : No [de-identified] : Ace wraps [de-identified] : Every other day

## 2020-11-23 NOTE — VITALS
[Occasional] : occasional [] : No [de-identified] : Pt reports pain 3/10 [FreeTextEntry3] : Right Lower Leg  [FreeTextEntry1] : Walking or having legs in downward position  [FreeTextEntry2] : Laying down at night  [FreeTextEntry4] : pt put in sitting position

## 2020-11-23 NOTE — ASSESSMENT
[Verbal] : Verbal [Written] : Written [Patient] : Patient [Good - alert, interested, motivated] : Good - alert, interested, motivated [Verbalizes knowledge/Understanding] : Verbalizes knowledge/understanding [Dressing changes] : dressing changes [Skin Care] : skin care [Pressure relief] : pressure relief [Signs and symptoms of infection] : sign and symptoms of infection [How and When to Call] : how and when to call [Compression Therapy] : compression therapy [Patient responsibility to plan of care] : patient responsibility to plan of care [] : Yes [Stable] : stable [Home] : Home [Ambulatory] : Ambulatory [Not Applicable - Long Term Care/Home Health Agency] : Long Term Care/Home Health Agency: Not Applicable [FreeTextEntry2] : Infection Prevention\par Localized Wound Care\par Promote Optimal Skin Integrity\par Achieve acceptable pain level with use of pharmacological and non pharmacological interventions \par Compression Therapy\par \par  [FreeTextEntry4] : F/U to [facility] in 2 weeks  [FreeTextEntry1] : Right leg stasis ulcer is healing well, no acute infection\par

## 2020-11-24 ENCOUNTER — APPOINTMENT (OUTPATIENT)
Dept: GERIATRICS | Facility: CLINIC | Age: 85
End: 2020-11-24
Payer: MEDICARE

## 2020-11-24 VITALS
HEART RATE: 87 BPM | SYSTOLIC BLOOD PRESSURE: 138 MMHG | HEIGHT: 68 IN | TEMPERATURE: 97.2 F | BODY MASS INDEX: 25.31 KG/M2 | OXYGEN SATURATION: 94 % | RESPIRATION RATE: 16 BRPM | DIASTOLIC BLOOD PRESSURE: 62 MMHG | WEIGHT: 167 LBS

## 2020-11-24 DIAGNOSIS — Z79.82 LONG TERM (CURRENT) USE OF ASPIRIN: ICD-10-CM

## 2020-11-24 DIAGNOSIS — I50.22 CHRONIC SYSTOLIC (CONGESTIVE) HEART FAILURE: ICD-10-CM

## 2020-11-24 DIAGNOSIS — E11.22 TYPE 2 DIABETES MELLITUS WITH DIABETIC CHRONIC KIDNEY DISEASE: ICD-10-CM

## 2020-11-24 DIAGNOSIS — Z95.4 PRESENCE OF OTHER HEART-VALVE REPLACEMENT: ICD-10-CM

## 2020-11-24 DIAGNOSIS — Z20.828 CONTACT WITH AND (SUSPECTED) EXPOSURE TO OTHER VIRAL COMMUNICABLE DISEASES: ICD-10-CM

## 2020-11-24 DIAGNOSIS — I13.2 HYPERTENSIVE HEART AND CHRONIC KIDNEY DISEASE WITH HEART FAILURE AND WITH STAGE 5 CHRONIC KIDNEY DISEASE, OR END STAGE RENAL DISEASE: ICD-10-CM

## 2020-11-24 DIAGNOSIS — Z95.810 PRESENCE OF AUTOMATIC (IMPLANTABLE) CARDIAC DEFIBRILLATOR: ICD-10-CM

## 2020-11-24 DIAGNOSIS — E78.5 HYPERLIPIDEMIA, UNSPECIFIED: ICD-10-CM

## 2020-11-24 DIAGNOSIS — Z87.440 PERSONAL HISTORY OF URINARY (TRACT) INFECTIONS: ICD-10-CM

## 2020-11-24 DIAGNOSIS — Z87.891 PERSONAL HISTORY OF NICOTINE DEPENDENCE: ICD-10-CM

## 2020-11-24 DIAGNOSIS — G31.84 MILD COGNITIVE IMPAIRMENT OF UNCERTAIN OR UNKNOWN ETIOLOGY: ICD-10-CM

## 2020-11-24 DIAGNOSIS — L97.812 NON-PRESSURE CHRONIC ULCER OF OTHER PART OF RIGHT LOWER LEG WITH FAT LAYER EXPOSED: ICD-10-CM

## 2020-11-24 DIAGNOSIS — I83.218 VARICOSE VEINS OF RIGHT LOWER EXTREMITY WITH BOTH ULCER OF OTHER PART OF LOWER EXTREMITY AND INFLAMMATION: ICD-10-CM

## 2020-11-24 DIAGNOSIS — I48.91 UNSPECIFIED ATRIAL FIBRILLATION: ICD-10-CM

## 2020-11-24 DIAGNOSIS — N40.1 BENIGN PROSTATIC HYPERPLASIA WITH LOWER URINARY TRACT SYMPTOMS: ICD-10-CM

## 2020-11-24 DIAGNOSIS — Z79.899 OTHER LONG TERM (CURRENT) DRUG THERAPY: ICD-10-CM

## 2020-11-24 DIAGNOSIS — Z79.4 LONG TERM (CURRENT) USE OF INSULIN: ICD-10-CM

## 2020-11-24 DIAGNOSIS — E11.51 TYPE 2 DIABETES MELLITUS WITH DIABETIC PERIPHERAL ANGIOPATHY WITHOUT GANGRENE: ICD-10-CM

## 2020-11-24 DIAGNOSIS — S81.801A UNSPECIFIED OPEN WOUND, RIGHT LOWER LEG, INITIAL ENCOUNTER: ICD-10-CM

## 2020-11-24 DIAGNOSIS — Z80.9 FAMILY HISTORY OF MALIGNANT NEOPLASM, UNSPECIFIED: ICD-10-CM

## 2020-11-24 DIAGNOSIS — I83.893 VARICOSE VEINS OF BILATERAL LOWER EXTREMITIES WITH OTHER COMPLICATIONS: ICD-10-CM

## 2020-11-24 DIAGNOSIS — E11.42 TYPE 2 DIABETES MELLITUS WITH DIABETIC POLYNEUROPATHY: ICD-10-CM

## 2020-11-24 DIAGNOSIS — D64.9 ANEMIA, UNSPECIFIED: ICD-10-CM

## 2020-11-24 DIAGNOSIS — Z96.1 PRESENCE OF INTRAOCULAR LENS: ICD-10-CM

## 2020-11-24 DIAGNOSIS — Z86.73 PERSONAL HISTORY OF TRANSIENT ISCHEMIC ATTACK (TIA), AND CEREBRAL INFARCTION WITHOUT RESIDUAL DEFICITS: ICD-10-CM

## 2020-11-24 DIAGNOSIS — Z82.0 FAMILY HISTORY OF EPILEPSY AND OTHER DISEASES OF THE NERVOUS SYSTEM: ICD-10-CM

## 2020-11-24 DIAGNOSIS — E55.9 VITAMIN D DEFICIENCY, UNSPECIFIED: ICD-10-CM

## 2020-11-24 DIAGNOSIS — N18.5 CHRONIC KIDNEY DISEASE, STAGE 5: ICD-10-CM

## 2020-11-24 DIAGNOSIS — N13.8 OTHER OBSTRUCTIVE AND REFLUX UROPATHY: ICD-10-CM

## 2020-11-24 DIAGNOSIS — Z98.49 CATARACT EXTRACTION STATUS, UNSPECIFIED EYE: ICD-10-CM

## 2020-11-24 DIAGNOSIS — Z98.61 CORONARY ANGIOPLASTY STATUS: ICD-10-CM

## 2020-11-24 PROCEDURE — 99214 OFFICE O/P EST MOD 30 MIN: CPT | Mod: CS

## 2020-11-25 PROBLEM — Z20.828 EXPOSURE TO COVID-19 VIRUS: Status: ACTIVE | Noted: 2020-11-25

## 2020-12-01 NOTE — HISTORY OF PRESENT ILLNESS
[FreeTextEntry1] : 89-yo M w/ PMH of HTN, HLD, T2DM, CKD5, and ischemic cardiomyopathy on Coumadin, presenting for follow-up. C/o R leg wound. \par \par Compression stocking to be prescribed. Measurement to be done at the [facility] next month. Has not been wearing compression stockings. Bothers patient all day, especially in the night. No significant decrease of ambulation.\par \par Never skips medications. FSG ranges 100-200. Follows Endo for DM.\par Sleeps 12AM, gets 6-7 hours of sleep.\par \par No fever, chills, nausea, vomiting, chest pain, dizziness, SOB, or coughs.

## 2020-12-01 NOTE — END OF VISIT
[] : Resident [FreeTextEntry3] : pt seen with resident .  pt stable, well known. Has skin lesion getting better.

## 2020-12-07 ENCOUNTER — APPOINTMENT (OUTPATIENT)
Dept: SURGERY | Facility: HOSPITAL | Age: 85
End: 2020-12-07
Payer: MEDICARE

## 2020-12-07 ENCOUNTER — OUTPATIENT (OUTPATIENT)
Dept: OUTPATIENT SERVICES | Facility: HOSPITAL | Age: 85
LOS: 1 days | Discharge: HOME | End: 2020-12-07
Payer: MEDICARE

## 2020-12-07 VITALS
TEMPERATURE: 97.1 F | BODY MASS INDEX: 25.31 KG/M2 | OXYGEN SATURATION: 96 % | RESPIRATION RATE: 20 BRPM | HEIGHT: 68 IN | HEART RATE: 76 BPM | WEIGHT: 167 LBS | SYSTOLIC BLOOD PRESSURE: 133 MMHG | DIASTOLIC BLOOD PRESSURE: 69 MMHG

## 2020-12-07 DIAGNOSIS — Z95.810 PRESENCE OF AUTOMATIC (IMPLANTABLE) CARDIAC DEFIBRILLATOR: Chronic | ICD-10-CM

## 2020-12-07 DIAGNOSIS — Z95.4 PRESENCE OF OTHER HEART-VALVE REPLACEMENT: Chronic | ICD-10-CM

## 2020-12-07 DIAGNOSIS — Z98.89 OTHER SPECIFIED POSTPROCEDURAL STATES: Chronic | ICD-10-CM

## 2020-12-07 DIAGNOSIS — I83.218 VARICOSE VEINS OF RIGHT LOWER EXTREMITY WITH BOTH ULCER OF OTHER PART OF LOWER EXTREMITY AND INFLAMMATION: ICD-10-CM

## 2020-12-07 DIAGNOSIS — Z95.1 PRESENCE OF AORTOCORONARY BYPASS GRAFT: Chronic | ICD-10-CM

## 2020-12-07 DIAGNOSIS — Z98.49 CATARACT EXTRACTION STATUS, UNSPECIFIED EYE: Chronic | ICD-10-CM

## 2020-12-07 PROCEDURE — G0463: CPT

## 2020-12-07 PROCEDURE — 99213 OFFICE O/P EST LOW 20 MIN: CPT

## 2020-12-07 NOTE — ASSESSMENT
[Verbal] : Verbal [Demo] : Demo [Patient] : Patient [Fair - mild discomfort, physical impairment, low acceptance] : Fair - mild discomfort, physical impairment, low acceptance [Needs reinforcement] : needs reinforcement [Dressing changes] : dressing changes [Skin Care] : skin care [Pressure relief] : pressure relief [Signs and symptoms of infection] : sign and symptoms of infection [Venous Disease] : venous disease [Nutrition] : nutrition [How and When to Call] : how and when to call [Off-loading] : off-loading [Compression Therapy] : compression therapy [Patient responsibility to plan of care] : patient responsibility to plan of care [Glycemic Control] : glycemic control [] : Yes [Stable] : stable [Home] : Home [Ambulatory] : Ambulatory [Not Applicable - Long Term Care/Home Health Agency] : Long Term Care/Home Health Agency: Not Applicable [FreeTextEntry2] : Infection Prevention\par Promote Skin Integrity\par Offloading\par Elevation\par Compression Compliance\par Low Na+ Diet\par Encourage Glycemic Control\par \par  [FreeTextEntry3] : New Wound [FreeTextEntry4] : F/U 1 week for assessment [FreeTextEntry1] : Stasis ulcer right leg stable, no acute infection\par

## 2020-12-07 NOTE — PHYSICAL EXAM
[4 x 4] : 4 x 4  [Abdominal Pad] : Abdominal Pad [Normal Breath Sounds] : Normal breath sounds [Normal Heart Sounds] : normal heart sounds [2+] : left 2+ [1+] : right 1+ [0] : left 0 [Ankle Swelling (On Exam)] : present [Ankle Swelling Bilaterally] : bilaterally  [Varicose Veins Of Lower Extremities] : bilaterally [] : of the right leg [Ankle Swelling On The Left] : moderate [Alert] : alert [Oriented to Person] : oriented to person [Calm] : calm [JVD] : no jugular venous distention  [de-identified] : WD/WN in no acute distress. [de-identified] : KENYATTAL [de-identified] : WNL [de-identified] : Right leg with partially open blister, base is red and viable, no acute infection,.ps [de-identified] : Circ neurovascular function WNL post Ace \par  [FreeTextEntry1] : Anterior Leg [FreeTextEntry2] : 1.8 [FreeTextEntry3] : 1.5 [FreeTextEntry4] : 0.1 [de-identified] : scant serosanguineous  [de-identified] : intact [de-identified] : Xeroform [de-identified] : Cleansed with NS [de-identified] : Circ neurovascular function WNL post ACE\par Blister drained by MD\par  [FreeTextEntry7] : Anterior Leg- Superior-Blister [FreeTextEntry8] : 3.0 [FreeTextEntry9] : 3.0 [de-identified] : 0.1 [de-identified] : serous [de-identified] : intact [de-identified] : Xeroform [de-identified] : cleansed with NS [TWNoteComboBox1] : Right [TWNoteComboBox5] : No [TWNoteComboBox6] : Venous [de-identified] : No [de-identified] : Normal [de-identified] : None [de-identified] : None [de-identified] : 100% [de-identified] : No [de-identified] : Ace wraps [de-identified] : Every other day [de-identified] : Primary Dressing [TWNoteComboBox9] : Right [de-identified] : Moderate [de-identified] : No [de-identified] : Venous [de-identified] : No [de-identified] : Normal [de-identified] : None [de-identified] : None [de-identified] : 100% [de-identified] : No [de-identified] : Ace wraps [de-identified] : Every other day [de-identified] : Primary Dressing

## 2020-12-08 DIAGNOSIS — I83.218 VARICOSE VEINS OF RIGHT LOWER EXTREMITY WITH BOTH ULCER OF OTHER PART OF LOWER EXTREMITY AND INFLAMMATION: ICD-10-CM

## 2020-12-08 DIAGNOSIS — I83.893 VARICOSE VEINS OF BILATERAL LOWER EXTREMITIES WITH OTHER COMPLICATIONS: ICD-10-CM

## 2020-12-08 DIAGNOSIS — Z98.49 CATARACT EXTRACTION STATUS, UNSPECIFIED EYE: ICD-10-CM

## 2020-12-08 DIAGNOSIS — D64.9 ANEMIA, UNSPECIFIED: ICD-10-CM

## 2020-12-08 DIAGNOSIS — Z79.4 LONG TERM (CURRENT) USE OF INSULIN: ICD-10-CM

## 2020-12-08 DIAGNOSIS — E78.5 HYPERLIPIDEMIA, UNSPECIFIED: ICD-10-CM

## 2020-12-08 DIAGNOSIS — Z79.899 OTHER LONG TERM (CURRENT) DRUG THERAPY: ICD-10-CM

## 2020-12-08 DIAGNOSIS — Z95.810 PRESENCE OF AUTOMATIC (IMPLANTABLE) CARDIAC DEFIBRILLATOR: ICD-10-CM

## 2020-12-08 DIAGNOSIS — I48.91 UNSPECIFIED ATRIAL FIBRILLATION: ICD-10-CM

## 2020-12-08 DIAGNOSIS — Z95.4 PRESENCE OF OTHER HEART-VALVE REPLACEMENT: ICD-10-CM

## 2020-12-08 DIAGNOSIS — E55.9 VITAMIN D DEFICIENCY, UNSPECIFIED: ICD-10-CM

## 2020-12-08 DIAGNOSIS — G31.84 MILD COGNITIVE IMPAIRMENT OF UNCERTAIN OR UNKNOWN ETIOLOGY: ICD-10-CM

## 2020-12-08 DIAGNOSIS — N18.5 CHRONIC KIDNEY DISEASE, STAGE 5: ICD-10-CM

## 2020-12-08 DIAGNOSIS — Z87.891 PERSONAL HISTORY OF NICOTINE DEPENDENCE: ICD-10-CM

## 2020-12-08 DIAGNOSIS — Z86.73 PERSONAL HISTORY OF TRANSIENT ISCHEMIC ATTACK (TIA), AND CEREBRAL INFARCTION WITHOUT RESIDUAL DEFICITS: ICD-10-CM

## 2020-12-08 DIAGNOSIS — E11.22 TYPE 2 DIABETES MELLITUS WITH DIABETIC CHRONIC KIDNEY DISEASE: ICD-10-CM

## 2020-12-08 DIAGNOSIS — E11.51 TYPE 2 DIABETES MELLITUS WITH DIABETIC PERIPHERAL ANGIOPATHY WITHOUT GANGRENE: ICD-10-CM

## 2020-12-08 DIAGNOSIS — Z96.1 PRESENCE OF INTRAOCULAR LENS: ICD-10-CM

## 2020-12-08 DIAGNOSIS — Z87.440 PERSONAL HISTORY OF URINARY (TRACT) INFECTIONS: ICD-10-CM

## 2020-12-08 DIAGNOSIS — N40.1 BENIGN PROSTATIC HYPERPLASIA WITH LOWER URINARY TRACT SYMPTOMS: ICD-10-CM

## 2020-12-08 DIAGNOSIS — I50.22 CHRONIC SYSTOLIC (CONGESTIVE) HEART FAILURE: ICD-10-CM

## 2020-12-08 DIAGNOSIS — Z82.0 FAMILY HISTORY OF EPILEPSY AND OTHER DISEASES OF THE NERVOUS SYSTEM: ICD-10-CM

## 2020-12-08 DIAGNOSIS — Z98.61 CORONARY ANGIOPLASTY STATUS: ICD-10-CM

## 2020-12-08 DIAGNOSIS — L97.812 NON-PRESSURE CHRONIC ULCER OF OTHER PART OF RIGHT LOWER LEG WITH FAT LAYER EXPOSED: ICD-10-CM

## 2020-12-08 DIAGNOSIS — Z80.9 FAMILY HISTORY OF MALIGNANT NEOPLASM, UNSPECIFIED: ICD-10-CM

## 2020-12-08 DIAGNOSIS — I13.2 HYPERTENSIVE HEART AND CHRONIC KIDNEY DISEASE WITH HEART FAILURE AND WITH STAGE 5 CHRONIC KIDNEY DISEASE, OR END STAGE RENAL DISEASE: ICD-10-CM

## 2020-12-08 DIAGNOSIS — Z79.82 LONG TERM (CURRENT) USE OF ASPIRIN: ICD-10-CM

## 2020-12-08 DIAGNOSIS — N13.8 OTHER OBSTRUCTIVE AND REFLUX UROPATHY: ICD-10-CM

## 2020-12-08 DIAGNOSIS — E11.42 TYPE 2 DIABETES MELLITUS WITH DIABETIC POLYNEUROPATHY: ICD-10-CM

## 2020-12-10 LAB
ALBUMIN SERPL ELPH-MCNC: 4.1 G/DL
ALP BLD-CCNC: 91 U/L
ALT SERPL-CCNC: 22 U/L
ANION GAP SERPL CALC-SCNC: 13 MMOL/L
AST SERPL-CCNC: 22 U/L
BASOPHILS # BLD AUTO: 0.04 K/UL
BASOPHILS NFR BLD AUTO: 0.7 %
BILIRUB SERPL-MCNC: 0.3 MG/DL
BUN SERPL-MCNC: 73 MG/DL
CALCIUM SERPL-MCNC: 9.3 MG/DL
CHLORIDE SERPL-SCNC: 105 MMOL/L
CHOLEST SERPL-MCNC: 118 MG/DL
CO2 SERPL-SCNC: 24 MMOL/L
CREAT SERPL-MCNC: 3.07 MG/DL
EOSINOPHIL # BLD AUTO: 0.31 K/UL
EOSINOPHIL NFR BLD AUTO: 5.1 %
ESTIMATED AVERAGE GLUCOSE: 186 MG/DL
GLUCOSE SERPL-MCNC: 119 MG/DL
HBA1C MFR BLD HPLC: 8.1 %
HCT VFR BLD CALC: 39.1 %
HDLC SERPL-MCNC: 37 MG/DL
HGB BLD-MCNC: 12.1 G/DL
IMM GRANULOCYTES NFR BLD AUTO: 0.3 %
LDLC SERPL CALC-MCNC: 44 MG/DL
LYMPHOCYTES # BLD AUTO: 1.02 K/UL
LYMPHOCYTES NFR BLD AUTO: 16.7 %
MAN DIFF?: NORMAL
MCHC RBC-ENTMCNC: 29.2 PG
MCHC RBC-ENTMCNC: 30.9 GM/DL
MCV RBC AUTO: 94.2 FL
MONOCYTES # BLD AUTO: 0.69 K/UL
MONOCYTES NFR BLD AUTO: 11.3 %
NEUTROPHILS # BLD AUTO: 4.02 K/UL
NEUTROPHILS NFR BLD AUTO: 65.9 %
NONHDLC SERPL-MCNC: 81 MG/DL
PLATELET # BLD AUTO: 164 K/UL
POTASSIUM SERPL-SCNC: 4.5 MMOL/L
PROT SERPL-MCNC: 6.7 G/DL
RBC # BLD: 4.15 M/UL
RBC # FLD: 13.4 %
SARS-COV-2 IGG SERPL IA-ACNC: 0.1 INDEX
SARS-COV-2 IGG SERPL QL IA: NEGATIVE
SARS-COV-2 N GENE NPH QL NAA+PROBE: NOT DETECTED
SODIUM SERPL-SCNC: 141 MMOL/L
TRIGL SERPL-MCNC: 181 MG/DL
TSH SERPL-ACNC: 2.02 UIU/ML
WBC # FLD AUTO: 6.1 K/UL

## 2020-12-14 ENCOUNTER — APPOINTMENT (OUTPATIENT)
Dept: SURGERY | Facility: HOSPITAL | Age: 85
End: 2020-12-14

## 2020-12-14 ENCOUNTER — APPOINTMENT (OUTPATIENT)
Dept: PLASTIC SURGERY | Facility: HOSPITAL | Age: 85
End: 2020-12-14
Payer: MEDICARE

## 2020-12-14 ENCOUNTER — OUTPATIENT (OUTPATIENT)
Dept: OUTPATIENT SERVICES | Facility: HOSPITAL | Age: 85
LOS: 1 days | Discharge: HOME | End: 2020-12-14
Payer: MEDICARE

## 2020-12-14 VITALS
OXYGEN SATURATION: 96 % | TEMPERATURE: 97.3 F | HEART RATE: 85 BPM | HEIGHT: 68 IN | DIASTOLIC BLOOD PRESSURE: 76 MMHG | RESPIRATION RATE: 16 BRPM | WEIGHT: 167 LBS | BODY MASS INDEX: 25.31 KG/M2 | SYSTOLIC BLOOD PRESSURE: 139 MMHG

## 2020-12-14 DIAGNOSIS — I83.218 VARICOSE VEINS OF RIGHT LOWER EXTREMITY WITH BOTH ULCER OF OTHER PART OF LOWER EXTREMITY AND INFLAMMATION: ICD-10-CM

## 2020-12-14 DIAGNOSIS — Z98.49 CATARACT EXTRACTION STATUS, UNSPECIFIED EYE: Chronic | ICD-10-CM

## 2020-12-14 DIAGNOSIS — Z95.4 PRESENCE OF OTHER HEART-VALVE REPLACEMENT: Chronic | ICD-10-CM

## 2020-12-14 DIAGNOSIS — Z98.89 OTHER SPECIFIED POSTPROCEDURAL STATES: Chronic | ICD-10-CM

## 2020-12-14 DIAGNOSIS — Z95.810 PRESENCE OF AUTOMATIC (IMPLANTABLE) CARDIAC DEFIBRILLATOR: Chronic | ICD-10-CM

## 2020-12-14 DIAGNOSIS — Z95.1 PRESENCE OF AORTOCORONARY BYPASS GRAFT: Chronic | ICD-10-CM

## 2020-12-14 PROCEDURE — 99213 OFFICE O/P EST LOW 20 MIN: CPT

## 2020-12-14 PROCEDURE — G0463: CPT

## 2020-12-14 NOTE — ASSESSMENT
[Verbal] : Verbal [Demo] : Demo [Patient] : Patient [Fair - mild discomfort, physical impairment, low acceptance] : Fair - mild discomfort, physical impairment, low acceptance [Needs reinforcement] : needs reinforcement [Dressing changes] : dressing changes [Foot Care] : foot care [Skin Care] : skin care [Signs and symptoms of infection] : sign and symptoms of infection [Venous Disease] : venous disease [Nutrition] : nutrition [How and When to Call] : how and when to call [Off-loading] : off-loading [Compression Therapy] : compression therapy [Home Health] : home health [Patient responsibility to plan of care] : patient responsibility to plan of care [Glycemic Control] : glycemic control [Stable] : stable [Home] : Home [Ambulatory] : Ambulatory [Not Applicable - Long Term Care/Home Health Agency] : Long Term Care/Home Health Agency: Not Applicable [Written] : Written [Spouse] : Spouse [] : No [FreeTextEntry2] : Infection Prevention\par Promote Skin Integrity\par Offloading\par Elevation\par Compression Compliance\par Low Na+ Diet\par Encourage Glycemic Control\par  [FreeTextEntry3] : W [FreeTextEntry4] : F/U 1 week for assessment

## 2020-12-14 NOTE — PHYSICAL EXAM
[4 x 4] : 4 x 4  [Abdominal Pad] : Abdominal Pad [Normal Breath Sounds] : Normal breath sounds [Normal Heart Sounds] : normal heart sounds [2+] : left 2+ [1+] : right 1+ [0] : left 0 [Ankle Swelling (On Exam)] : present [Ankle Swelling Bilaterally] : bilaterally  [Varicose Veins Of Lower Extremities] : bilaterally [] : of the right leg [Ankle Swelling On The Left] : moderate [Alert] : alert [Oriented to Person] : oriented to person [Calm] : calm [JVD] : no jugular venous distention  [de-identified] : WD/WN in no acute distress. [de-identified] : KENYATTAL [de-identified] : WNL [de-identified] : Right leg with partially open blister, base is red and viable, no acute infection,.ps [de-identified] : Circ neurovascular function WNL post Ace application, Pt expressed comfort.\par  [FreeTextEntry1] : Anterior Leg-Inferior [FreeTextEntry2] : 0.5 [FreeTextEntry3] : 1.2 [FreeTextEntry4] : 0.1 [de-identified] : scant serosanguineous  [de-identified] : intact [de-identified] : Xeroform [de-identified] : Cleansed with NS [de-identified] : Circ neurovascular function WNL post ace application, Pt expressed comfort.\par  [FreeTextEntry7] : Anterior Leg-Superior [FreeTextEntry8] : 2.3 [FreeTextEntry9] : 0.7 [de-identified] : 0.1 [de-identified] : scant serosanguineous  [de-identified] : intact [de-identified] : Xeroform [de-identified] : Cleansed with NS [TWNoteComboBox1] : Right [TWNoteComboBox5] : No [TWNoteComboBox6] : Venous [de-identified] : No [de-identified] : Normal [de-identified] : None [de-identified] : None [de-identified] : 100% [de-identified] : No [de-identified] : Ace wraps [de-identified] : Every other day [de-identified] : Primary Dressing [TWNoteComboBox9] : Right [de-identified] : No [de-identified] : Venous [de-identified] : No [de-identified] : Normal [de-identified] : None [de-identified] : None [de-identified] : 100% [de-identified] : No [de-identified] : Ace wraps [de-identified] : Every other day [de-identified] : Primary Dressing

## 2020-12-14 NOTE — HISTORY OF PRESENT ILLNESS
[FreeTextEntry1] : Right leg with blister, partially open, no c/O\par 12/14/20 right lower leg ulcers almost closed

## 2020-12-15 DIAGNOSIS — I13.2 HYPERTENSIVE HEART AND CHRONIC KIDNEY DISEASE WITH HEART FAILURE AND WITH STAGE 5 CHRONIC KIDNEY DISEASE, OR END STAGE RENAL DISEASE: ICD-10-CM

## 2020-12-15 DIAGNOSIS — Z95.4 PRESENCE OF OTHER HEART-VALVE REPLACEMENT: ICD-10-CM

## 2020-12-15 DIAGNOSIS — Z79.4 LONG TERM (CURRENT) USE OF INSULIN: ICD-10-CM

## 2020-12-15 DIAGNOSIS — N13.8 OTHER OBSTRUCTIVE AND REFLUX UROPATHY: ICD-10-CM

## 2020-12-15 DIAGNOSIS — Z98.49 CATARACT EXTRACTION STATUS, UNSPECIFIED EYE: ICD-10-CM

## 2020-12-15 DIAGNOSIS — N40.1 BENIGN PROSTATIC HYPERPLASIA WITH LOWER URINARY TRACT SYMPTOMS: ICD-10-CM

## 2020-12-15 DIAGNOSIS — Z82.0 FAMILY HISTORY OF EPILEPSY AND OTHER DISEASES OF THE NERVOUS SYSTEM: ICD-10-CM

## 2020-12-15 DIAGNOSIS — L97.812 NON-PRESSURE CHRONIC ULCER OF OTHER PART OF RIGHT LOWER LEG WITH FAT LAYER EXPOSED: ICD-10-CM

## 2020-12-15 DIAGNOSIS — Z79.899 OTHER LONG TERM (CURRENT) DRUG THERAPY: ICD-10-CM

## 2020-12-15 DIAGNOSIS — Z79.82 LONG TERM (CURRENT) USE OF ASPIRIN: ICD-10-CM

## 2020-12-15 DIAGNOSIS — E11.42 TYPE 2 DIABETES MELLITUS WITH DIABETIC POLYNEUROPATHY: ICD-10-CM

## 2020-12-15 DIAGNOSIS — E55.9 VITAMIN D DEFICIENCY, UNSPECIFIED: ICD-10-CM

## 2020-12-15 DIAGNOSIS — Z95.810 PRESENCE OF AUTOMATIC (IMPLANTABLE) CARDIAC DEFIBRILLATOR: ICD-10-CM

## 2020-12-15 DIAGNOSIS — E78.5 HYPERLIPIDEMIA, UNSPECIFIED: ICD-10-CM

## 2020-12-15 DIAGNOSIS — N18.5 CHRONIC KIDNEY DISEASE, STAGE 5: ICD-10-CM

## 2020-12-15 DIAGNOSIS — E11.51 TYPE 2 DIABETES MELLITUS WITH DIABETIC PERIPHERAL ANGIOPATHY WITHOUT GANGRENE: ICD-10-CM

## 2020-12-15 DIAGNOSIS — Z96.1 PRESENCE OF INTRAOCULAR LENS: ICD-10-CM

## 2020-12-15 DIAGNOSIS — I83.218 VARICOSE VEINS OF RIGHT LOWER EXTREMITY WITH BOTH ULCER OF OTHER PART OF LOWER EXTREMITY AND INFLAMMATION: ICD-10-CM

## 2020-12-15 DIAGNOSIS — Z87.440 PERSONAL HISTORY OF URINARY (TRACT) INFECTIONS: ICD-10-CM

## 2020-12-15 DIAGNOSIS — Z86.73 PERSONAL HISTORY OF TRANSIENT ISCHEMIC ATTACK (TIA), AND CEREBRAL INFARCTION WITHOUT RESIDUAL DEFICITS: ICD-10-CM

## 2020-12-15 DIAGNOSIS — Z87.891 PERSONAL HISTORY OF NICOTINE DEPENDENCE: ICD-10-CM

## 2020-12-15 DIAGNOSIS — D64.9 ANEMIA, UNSPECIFIED: ICD-10-CM

## 2020-12-15 DIAGNOSIS — Z80.9 FAMILY HISTORY OF MALIGNANT NEOPLASM, UNSPECIFIED: ICD-10-CM

## 2020-12-15 DIAGNOSIS — Z98.61 CORONARY ANGIOPLASTY STATUS: ICD-10-CM

## 2020-12-15 DIAGNOSIS — I50.22 CHRONIC SYSTOLIC (CONGESTIVE) HEART FAILURE: ICD-10-CM

## 2020-12-15 DIAGNOSIS — I83.893 VARICOSE VEINS OF BILATERAL LOWER EXTREMITIES WITH OTHER COMPLICATIONS: ICD-10-CM

## 2020-12-15 DIAGNOSIS — G31.84 MILD COGNITIVE IMPAIRMENT OF UNCERTAIN OR UNKNOWN ETIOLOGY: ICD-10-CM

## 2020-12-15 DIAGNOSIS — I48.91 UNSPECIFIED ATRIAL FIBRILLATION: ICD-10-CM

## 2020-12-15 DIAGNOSIS — E11.22 TYPE 2 DIABETES MELLITUS WITH DIABETIC CHRONIC KIDNEY DISEASE: ICD-10-CM

## 2020-12-16 ENCOUNTER — RX RENEWAL (OUTPATIENT)
Age: 85
End: 2020-12-16

## 2020-12-21 ENCOUNTER — LABORATORY RESULT (OUTPATIENT)
Age: 85
End: 2020-12-21

## 2020-12-21 ENCOUNTER — APPOINTMENT (OUTPATIENT)
Dept: SURGERY | Facility: HOSPITAL | Age: 85
End: 2020-12-21
Payer: MEDICARE

## 2020-12-21 ENCOUNTER — OUTPATIENT (OUTPATIENT)
Dept: OUTPATIENT SERVICES | Facility: HOSPITAL | Age: 85
LOS: 1 days | Discharge: HOME | End: 2020-12-21
Payer: MEDICARE

## 2020-12-21 VITALS
HEIGHT: 68 IN | TEMPERATURE: 97 F | OXYGEN SATURATION: 97 % | WEIGHT: 167 LBS | SYSTOLIC BLOOD PRESSURE: 133 MMHG | RESPIRATION RATE: 20 BRPM | HEART RATE: 80 BPM | DIASTOLIC BLOOD PRESSURE: 67 MMHG | BODY MASS INDEX: 25.31 KG/M2

## 2020-12-21 DIAGNOSIS — Z95.810 PRESENCE OF AUTOMATIC (IMPLANTABLE) CARDIAC DEFIBRILLATOR: Chronic | ICD-10-CM

## 2020-12-21 DIAGNOSIS — Z95.4 PRESENCE OF OTHER HEART-VALVE REPLACEMENT: Chronic | ICD-10-CM

## 2020-12-21 DIAGNOSIS — Z95.1 PRESENCE OF AORTOCORONARY BYPASS GRAFT: Chronic | ICD-10-CM

## 2020-12-21 DIAGNOSIS — Z98.49 CATARACT EXTRACTION STATUS, UNSPECIFIED EYE: Chronic | ICD-10-CM

## 2020-12-21 DIAGNOSIS — Z98.89 OTHER SPECIFIED POSTPROCEDURAL STATES: Chronic | ICD-10-CM

## 2020-12-21 DIAGNOSIS — I83.218 VARICOSE VEINS OF RIGHT LOWER EXTREMITY WITH BOTH ULCER OF OTHER PART OF LOWER EXTREMITY AND INFLAMMATION: ICD-10-CM

## 2020-12-21 PROCEDURE — 99213 OFFICE O/P EST LOW 20 MIN: CPT

## 2020-12-21 PROCEDURE — G0463: CPT

## 2020-12-21 NOTE — ASSESSMENT
[Verbal] : Verbal [Patient] : Patient [Good - alert, interested, motivated] : Good - alert, interested, motivated [Verbalizes knowledge/Understanding] : Verbalizes knowledge/understanding [Dressing changes] : dressing changes [Skin Care] : skin care [Signs and symptoms of infection] : sign and symptoms of infection [How and When to Call] : how and when to call [Compression Therapy] : compression therapy [Patient responsibility to plan of care] : patient responsibility to plan of care [Stable] : stable [Home] : Home [Ambulatory] : Ambulatory [Not Applicable - Long Term Care/Home Health Agency] : Long Term Care/Home Health Agency: Not Applicable [] : No [FreeTextEntry2] : Restore Skin Integrity\par Infection Control\par Localized wound care\par Maintain acceptable pain levels at satisfactory relief.\par Demonstrates use of both nonpharmacological and pharmacological pain relief strategies [FreeTextEntry4] : Photos Taken\par F/U to [facility] in 2 weeks [FreeTextEntry1] : Right leg stais ulcers are stable, no acute infection\par

## 2020-12-21 NOTE — PHYSICAL EXAM
[4 x 4] : 4 x 4  [Abdominal Pad] : Abdominal Pad [Normal Breath Sounds] : Normal breath sounds [Normal Heart Sounds] : normal heart sounds [2+] : left 2+ [1+] : right 1+ [0] : left 0 [Ankle Swelling (On Exam)] : present [Ankle Swelling Bilaterally] : bilaterally  [Varicose Veins Of Lower Extremities] : bilaterally [] : of the right leg [Ankle Swelling On The Left] : moderate [Alert] : alert [Oriented to Person] : oriented to person [Calm] : calm [JVD] : no jugular venous distention  [de-identified] : WD/WN in no acute distress. [de-identified] : KENYATTAL [de-identified] : WNL [de-identified] : Right leg with few small superficial ulcers, bases are red and viable, no drainage, no acute infection, periwound skin is intact with no cellulitis. [FreeTextEntry1] : Right Anterior Leg- Inferior [FreeTextEntry2] : 1.0 [FreeTextEntry3] : 1.0 [FreeTextEntry4] : 0.2 [de-identified] : Serosanguineous [de-identified] : Intact [de-identified] : Xeroform [de-identified] : Cleansed with Normal Saline\par  [FreeTextEntry7] : Right Anterior Leg- Superior [FreeTextEntry8] : 1.2 [FreeTextEntry9] : 1.8 [de-identified] : 0.1 [de-identified] : Serosanguineous [de-identified] : Intact [de-identified] : Xeroform [de-identified] : Cleansed with Normal Saline\par  [TWNoteComboBox4] : Small [TWNoteComboBox5] : No [de-identified] : No [de-identified] : None [de-identified] : None [de-identified] : 100% [de-identified] : No [de-identified] : Ace wraps [de-identified] : Every other day [de-identified] : Small [de-identified] : No [de-identified] : No [de-identified] : None [de-identified] : None [de-identified] : 100% [de-identified] : No [de-identified] : Ace wraps [de-identified] : Every other day

## 2020-12-22 ENCOUNTER — NON-APPOINTMENT (OUTPATIENT)
Age: 85
End: 2020-12-22

## 2020-12-22 DIAGNOSIS — Z79.82 LONG TERM (CURRENT) USE OF ASPIRIN: ICD-10-CM

## 2020-12-22 DIAGNOSIS — E11.42 TYPE 2 DIABETES MELLITUS WITH DIABETIC POLYNEUROPATHY: ICD-10-CM

## 2020-12-22 DIAGNOSIS — G31.84 MILD COGNITIVE IMPAIRMENT OF UNCERTAIN OR UNKNOWN ETIOLOGY: ICD-10-CM

## 2020-12-22 DIAGNOSIS — N13.8 OTHER OBSTRUCTIVE AND REFLUX UROPATHY: ICD-10-CM

## 2020-12-22 DIAGNOSIS — Z82.0 FAMILY HISTORY OF EPILEPSY AND OTHER DISEASES OF THE NERVOUS SYSTEM: ICD-10-CM

## 2020-12-22 DIAGNOSIS — N40.1 BENIGN PROSTATIC HYPERPLASIA WITH LOWER URINARY TRACT SYMPTOMS: ICD-10-CM

## 2020-12-22 DIAGNOSIS — Z79.4 LONG TERM (CURRENT) USE OF INSULIN: ICD-10-CM

## 2020-12-22 DIAGNOSIS — E11.51 TYPE 2 DIABETES MELLITUS WITH DIABETIC PERIPHERAL ANGIOPATHY WITHOUT GANGRENE: ICD-10-CM

## 2020-12-22 DIAGNOSIS — E78.5 HYPERLIPIDEMIA, UNSPECIFIED: ICD-10-CM

## 2020-12-22 DIAGNOSIS — E11.22 TYPE 2 DIABETES MELLITUS WITH DIABETIC CHRONIC KIDNEY DISEASE: ICD-10-CM

## 2020-12-22 DIAGNOSIS — Z87.891 PERSONAL HISTORY OF NICOTINE DEPENDENCE: ICD-10-CM

## 2020-12-22 DIAGNOSIS — I50.22 CHRONIC SYSTOLIC (CONGESTIVE) HEART FAILURE: ICD-10-CM

## 2020-12-22 DIAGNOSIS — L97.812 NON-PRESSURE CHRONIC ULCER OF OTHER PART OF RIGHT LOWER LEG WITH FAT LAYER EXPOSED: ICD-10-CM

## 2020-12-22 DIAGNOSIS — Z95.4 PRESENCE OF OTHER HEART-VALVE REPLACEMENT: ICD-10-CM

## 2020-12-22 DIAGNOSIS — N18.5 CHRONIC KIDNEY DISEASE, STAGE 5: ICD-10-CM

## 2020-12-22 DIAGNOSIS — I83.893 VARICOSE VEINS OF BILATERAL LOWER EXTREMITIES WITH OTHER COMPLICATIONS: ICD-10-CM

## 2020-12-22 DIAGNOSIS — E55.9 VITAMIN D DEFICIENCY, UNSPECIFIED: ICD-10-CM

## 2020-12-22 DIAGNOSIS — Z98.49 CATARACT EXTRACTION STATUS, UNSPECIFIED EYE: ICD-10-CM

## 2020-12-22 DIAGNOSIS — I13.2 HYPERTENSIVE HEART AND CHRONIC KIDNEY DISEASE WITH HEART FAILURE AND WITH STAGE 5 CHRONIC KIDNEY DISEASE, OR END STAGE RENAL DISEASE: ICD-10-CM

## 2020-12-22 DIAGNOSIS — D64.9 ANEMIA, UNSPECIFIED: ICD-10-CM

## 2020-12-22 DIAGNOSIS — Z86.73 PERSONAL HISTORY OF TRANSIENT ISCHEMIC ATTACK (TIA), AND CEREBRAL INFARCTION WITHOUT RESIDUAL DEFICITS: ICD-10-CM

## 2020-12-22 DIAGNOSIS — I83.218 VARICOSE VEINS OF RIGHT LOWER EXTREMITY WITH BOTH ULCER OF OTHER PART OF LOWER EXTREMITY AND INFLAMMATION: ICD-10-CM

## 2020-12-22 DIAGNOSIS — Z80.9 FAMILY HISTORY OF MALIGNANT NEOPLASM, UNSPECIFIED: ICD-10-CM

## 2020-12-22 DIAGNOSIS — Z95.810 PRESENCE OF AUTOMATIC (IMPLANTABLE) CARDIAC DEFIBRILLATOR: ICD-10-CM

## 2020-12-22 DIAGNOSIS — Z79.899 OTHER LONG TERM (CURRENT) DRUG THERAPY: ICD-10-CM

## 2020-12-22 DIAGNOSIS — Z96.1 PRESENCE OF INTRAOCULAR LENS: ICD-10-CM

## 2020-12-22 DIAGNOSIS — Z87.440 PERSONAL HISTORY OF URINARY (TRACT) INFECTIONS: ICD-10-CM

## 2020-12-22 DIAGNOSIS — I48.91 UNSPECIFIED ATRIAL FIBRILLATION: ICD-10-CM

## 2020-12-22 DIAGNOSIS — Z98.61 CORONARY ANGIOPLASTY STATUS: ICD-10-CM

## 2020-12-28 ENCOUNTER — RX RENEWAL (OUTPATIENT)
Age: 85
End: 2020-12-28

## 2020-12-29 ENCOUNTER — OUTPATIENT (OUTPATIENT)
Dept: OUTPATIENT SERVICES | Facility: HOSPITAL | Age: 85
LOS: 1 days | Discharge: HOME | End: 2020-12-29
Payer: MEDICARE

## 2020-12-29 ENCOUNTER — APPOINTMENT (OUTPATIENT)
Dept: OBGYN | Facility: HOSPITAL | Age: 85
End: 2020-12-29
Payer: MEDICARE

## 2020-12-29 ENCOUNTER — RX RENEWAL (OUTPATIENT)
Age: 85
End: 2020-12-29

## 2020-12-29 VITALS
SYSTOLIC BLOOD PRESSURE: 151 MMHG | DIASTOLIC BLOOD PRESSURE: 71 MMHG | TEMPERATURE: 97.2 F | OXYGEN SATURATION: 99 % | HEART RATE: 81 BPM | RESPIRATION RATE: 18 BRPM

## 2020-12-29 DIAGNOSIS — Z95.4 PRESENCE OF OTHER HEART-VALVE REPLACEMENT: Chronic | ICD-10-CM

## 2020-12-29 DIAGNOSIS — I83.218 VARICOSE VEINS OF RIGHT LOWER EXTREMITY WITH BOTH ULCER OF OTHER PART OF LOWER EXTREMITY AND INFLAMMATION: ICD-10-CM

## 2020-12-29 DIAGNOSIS — Z95.1 PRESENCE OF AORTOCORONARY BYPASS GRAFT: Chronic | ICD-10-CM

## 2020-12-29 DIAGNOSIS — Z98.49 CATARACT EXTRACTION STATUS, UNSPECIFIED EYE: Chronic | ICD-10-CM

## 2020-12-29 DIAGNOSIS — Z95.810 PRESENCE OF AUTOMATIC (IMPLANTABLE) CARDIAC DEFIBRILLATOR: Chronic | ICD-10-CM

## 2020-12-29 DIAGNOSIS — Z98.89 OTHER SPECIFIED POSTPROCEDURAL STATES: Chronic | ICD-10-CM

## 2020-12-29 PROCEDURE — 29581 APPL MULTLAYER CMPRN SYS LEG: CPT | Mod: RT

## 2020-12-29 PROCEDURE — 99213 OFFICE O/P EST LOW 20 MIN: CPT

## 2020-12-29 RX ORDER — BLOOD SUGAR DIAGNOSTIC
STRIP MISCELLANEOUS
Qty: 3 | Refills: 0 | Status: ACTIVE | COMMUNITY
Start: 2020-12-29 | End: 1900-01-01

## 2020-12-29 RX ORDER — LANCETS 33 GAUGE
EACH MISCELLANEOUS
Qty: 3 | Refills: 3 | Status: ACTIVE | COMMUNITY
Start: 2020-12-29 | End: 1900-01-01

## 2020-12-29 RX ORDER — BLOOD-GLUCOSE METER
W/DEVICE EACH MISCELLANEOUS
Qty: 1 | Refills: 0 | Status: ACTIVE | COMMUNITY
Start: 2020-12-29 | End: 1900-01-01

## 2020-12-29 NOTE — VITALS
[Pain related to present condition?] : The patient's  pain is related to present condition. [Throbbing] : throbbing [Tender] : tender [Occasional] : occasional [] : No [de-identified] : 5/10           Acceptable pain tolerance levels deemed to be 3/10. [FreeTextEntry3] : Right leg  [FreeTextEntry1] : Tylenol  [FreeTextEntry2] : Contact  [FreeTextEntry4] : Cobans applied

## 2020-12-29 NOTE — HISTORY OF PRESENT ILLNESS
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 Pt seen by vascular surgeon , Dr. Barnes in Nov. 2020 who advised conservative rx. ABIs from 11/20 revealed 1.33 and 1.27.  Pt c/o pain in the ulcer.

## 2020-12-29 NOTE — PHYSICAL EXAM
[Normal Thyroid] : the thyroid was normal [Normal Breath Sounds] : Normal breath sounds [Normal Heart Sounds] : normal heart sounds [Normal Rate and Rhythm] : normal rate and rhythm [Ankle Swelling (On Exam)] : present [Ankle Swelling On The Right] : of the right ankle [] : of the right leg [Ankle Swelling On The Left] : moderate [Alert] : alert [Oriented to Person] : oriented to person [Oriented to Place] : oriented to place [Oriented to Time] : oriented to time [Calm] : calm [4 x 4] : 4 x 4  [JVD] : no jugular venous distention  [Abdomen Masses] : No abdominal massess [Abdomen Tenderness] : ~T ~M No abdominal tenderness [Tender] : nontender [Enlarged] : not enlarged [de-identified] : elderly WM, NAD, alert, Ox3. [FreeTextEntry1] : Right anterior leg - Inferior  [FreeTextEntry2] : 1.4 [FreeTextEntry3] : 1.1 [FreeTextEntry4] : 0.1 [de-identified] : Serous/sanguinous [de-identified] : Coban. Expressed comfort post Coban application. [de-identified] : Silver alginate [de-identified] : Cleansed with NS\par Kerlix  [FreeTextEntry7] : Right anterior leg - superior  [FreeTextEntry8] : 6.8 [FreeTextEntry9] : 3.2 [de-identified] : 0.1 [de-identified] : Serous/sanguinous [de-identified] : Coban. Expressed comfort post Coban application. [de-identified] : Silver alginate [de-identified] : Cleansed with NS\par Kerlix  [TWNoteComboBox4] : Small [de-identified] : Normal [de-identified] : None [de-identified] : None [de-identified] : 100% [de-identified] : No [de-identified] : Multilayer other compression wrap [de-identified] : Weekly [de-identified] : Primary Dressing [de-identified] : Moderate [de-identified] : Normal [de-identified] : None [de-identified] : None [de-identified] : 100% [de-identified] : No [de-identified] : Multilayer other compression wrap [de-identified] : Weekly [de-identified] : Primary Dressing

## 2020-12-29 NOTE — ASSESSMENT
[Verbal] : Verbal [Written] : Written [Demo] : Demo [Patient] : Patient [Good - alert, interested, motivated] : Good - alert, interested, motivated [Needs reinforcement] : needs reinforcement [Dressing changes] : dressing changes [Skin Care] : skin care [Signs and symptoms of infection] : sign and symptoms of infection [Venous Disease] : venous disease [Nutrition] : nutrition [How and When to Call] : how and when to call [Pain Management] : pain management [Compression Therapy] : compression therapy [Patient responsibility to plan of care] : patient responsibility to plan of care [Stable] : stable [Home] : Home [Ambulatory] : Ambulatory [Not Applicable - Long Term Care/Home Health Agency] : Long Term Care/Home Health Agency: Not Applicable [] : No [FreeTextEntry2] : Infection prevention\par Localized wound care \par Goal of remaining pain free regarding wounds.\par Acceptable pain tolerance levels deemed to be 3/10.  [FreeTextEntry3] : Wounds larger in size  [FreeTextEntry4] : Follow up in 1 week

## 2020-12-30 DIAGNOSIS — E11.42 TYPE 2 DIABETES MELLITUS WITH DIABETIC POLYNEUROPATHY: ICD-10-CM

## 2020-12-30 DIAGNOSIS — Z79.899 OTHER LONG TERM (CURRENT) DRUG THERAPY: ICD-10-CM

## 2020-12-30 DIAGNOSIS — I13.2 HYPERTENSIVE HEART AND CHRONIC KIDNEY DISEASE WITH HEART FAILURE AND WITH STAGE 5 CHRONIC KIDNEY DISEASE, OR END STAGE RENAL DISEASE: ICD-10-CM

## 2020-12-30 DIAGNOSIS — I83.893 VARICOSE VEINS OF BILATERAL LOWER EXTREMITIES WITH OTHER COMPLICATIONS: ICD-10-CM

## 2020-12-30 DIAGNOSIS — Z79.4 LONG TERM (CURRENT) USE OF INSULIN: ICD-10-CM

## 2020-12-30 DIAGNOSIS — Z98.49 CATARACT EXTRACTION STATUS, UNSPECIFIED EYE: ICD-10-CM

## 2020-12-30 DIAGNOSIS — I83.218 VARICOSE VEINS OF RIGHT LOWER EXTREMITY WITH BOTH ULCER OF OTHER PART OF LOWER EXTREMITY AND INFLAMMATION: ICD-10-CM

## 2020-12-30 DIAGNOSIS — N13.8 OTHER OBSTRUCTIVE AND REFLUX UROPATHY: ICD-10-CM

## 2020-12-30 DIAGNOSIS — Z98.61 CORONARY ANGIOPLASTY STATUS: ICD-10-CM

## 2020-12-30 DIAGNOSIS — E11.51 TYPE 2 DIABETES MELLITUS WITH DIABETIC PERIPHERAL ANGIOPATHY WITHOUT GANGRENE: ICD-10-CM

## 2020-12-30 DIAGNOSIS — Z82.0 FAMILY HISTORY OF EPILEPSY AND OTHER DISEASES OF THE NERVOUS SYSTEM: ICD-10-CM

## 2020-12-30 DIAGNOSIS — Z80.9 FAMILY HISTORY OF MALIGNANT NEOPLASM, UNSPECIFIED: ICD-10-CM

## 2020-12-30 DIAGNOSIS — Z79.82 LONG TERM (CURRENT) USE OF ASPIRIN: ICD-10-CM

## 2020-12-30 DIAGNOSIS — Z95.4 PRESENCE OF OTHER HEART-VALVE REPLACEMENT: ICD-10-CM

## 2020-12-30 DIAGNOSIS — E78.5 HYPERLIPIDEMIA, UNSPECIFIED: ICD-10-CM

## 2020-12-30 DIAGNOSIS — E11.22 TYPE 2 DIABETES MELLITUS WITH DIABETIC CHRONIC KIDNEY DISEASE: ICD-10-CM

## 2020-12-30 DIAGNOSIS — N18.5 CHRONIC KIDNEY DISEASE, STAGE 5: ICD-10-CM

## 2020-12-30 DIAGNOSIS — I50.22 CHRONIC SYSTOLIC (CONGESTIVE) HEART FAILURE: ICD-10-CM

## 2020-12-30 DIAGNOSIS — Z96.1 PRESENCE OF INTRAOCULAR LENS: ICD-10-CM

## 2020-12-30 DIAGNOSIS — G31.84 MILD COGNITIVE IMPAIRMENT OF UNCERTAIN OR UNKNOWN ETIOLOGY: ICD-10-CM

## 2020-12-30 DIAGNOSIS — N40.1 BENIGN PROSTATIC HYPERPLASIA WITH LOWER URINARY TRACT SYMPTOMS: ICD-10-CM

## 2020-12-30 DIAGNOSIS — Z87.440 PERSONAL HISTORY OF URINARY (TRACT) INFECTIONS: ICD-10-CM

## 2020-12-30 DIAGNOSIS — Z87.891 PERSONAL HISTORY OF NICOTINE DEPENDENCE: ICD-10-CM

## 2020-12-30 DIAGNOSIS — Z95.810 PRESENCE OF AUTOMATIC (IMPLANTABLE) CARDIAC DEFIBRILLATOR: ICD-10-CM

## 2020-12-30 DIAGNOSIS — Z86.73 PERSONAL HISTORY OF TRANSIENT ISCHEMIC ATTACK (TIA), AND CEREBRAL INFARCTION WITHOUT RESIDUAL DEFICITS: ICD-10-CM

## 2020-12-30 DIAGNOSIS — D64.9 ANEMIA, UNSPECIFIED: ICD-10-CM

## 2020-12-30 DIAGNOSIS — E55.9 VITAMIN D DEFICIENCY, UNSPECIFIED: ICD-10-CM

## 2020-12-30 DIAGNOSIS — L97.812 NON-PRESSURE CHRONIC ULCER OF OTHER PART OF RIGHT LOWER LEG WITH FAT LAYER EXPOSED: ICD-10-CM

## 2020-12-30 DIAGNOSIS — I48.91 UNSPECIFIED ATRIAL FIBRILLATION: ICD-10-CM

## 2021-01-01 ENCOUNTER — LABORATORY RESULT (OUTPATIENT)
Age: 86
End: 2021-01-01

## 2021-01-01 ENCOUNTER — APPOINTMENT (OUTPATIENT)
Dept: WOUND CARE | Facility: HOSPITAL | Age: 86
End: 2021-01-01
Payer: MEDICARE

## 2021-01-01 ENCOUNTER — RX RENEWAL (OUTPATIENT)
Age: 86
End: 2021-01-01

## 2021-01-01 ENCOUNTER — APPOINTMENT (OUTPATIENT)
Dept: PAIN MANAGEMENT | Facility: CLINIC | Age: 86
End: 2021-01-01

## 2021-01-01 ENCOUNTER — APPOINTMENT (OUTPATIENT)
Dept: ELECTROPHYSIOLOGY | Facility: CLINIC | Age: 86
End: 2021-01-01
Payer: MEDICARE

## 2021-01-01 ENCOUNTER — OUTPATIENT (OUTPATIENT)
Dept: OUTPATIENT SERVICES | Facility: HOSPITAL | Age: 86
LOS: 1 days | Discharge: HOME | End: 2021-01-01
Payer: MEDICARE

## 2021-01-01 ENCOUNTER — APPOINTMENT (OUTPATIENT)
Dept: ULTRASOUND IMAGING | Facility: CLINIC | Age: 86
End: 2021-01-01
Payer: MEDICARE

## 2021-01-01 ENCOUNTER — RESULT REVIEW (OUTPATIENT)
Age: 86
End: 2021-01-01

## 2021-01-01 ENCOUNTER — APPOINTMENT (OUTPATIENT)
Dept: CARDIOLOGY | Facility: CLINIC | Age: 86
End: 2021-01-01
Payer: MEDICARE

## 2021-01-01 ENCOUNTER — APPOINTMENT (OUTPATIENT)
Dept: ELECTROPHYSIOLOGY | Facility: CLINIC | Age: 86
End: 2021-01-01

## 2021-01-01 ENCOUNTER — RESULT CHARGE (OUTPATIENT)
Age: 86
End: 2021-01-01

## 2021-01-01 ENCOUNTER — NON-APPOINTMENT (OUTPATIENT)
Age: 86
End: 2021-01-01

## 2021-01-01 ENCOUNTER — APPOINTMENT (OUTPATIENT)
Dept: OBGYN | Facility: HOSPITAL | Age: 86
End: 2021-01-01
Payer: MEDICARE

## 2021-01-01 ENCOUNTER — APPOINTMENT (OUTPATIENT)
Dept: GERIATRICS | Facility: CLINIC | Age: 86
End: 2021-01-01
Payer: MEDICARE

## 2021-01-01 ENCOUNTER — APPOINTMENT (OUTPATIENT)
Dept: CT IMAGING | Facility: CLINIC | Age: 86
End: 2021-01-01
Payer: MEDICARE

## 2021-01-01 ENCOUNTER — APPOINTMENT (OUTPATIENT)
Dept: GERIATRICS | Facility: CLINIC | Age: 86
End: 2021-01-01

## 2021-01-01 ENCOUNTER — OUTPATIENT (OUTPATIENT)
Dept: OUTPATIENT SERVICES | Facility: HOSPITAL | Age: 86
LOS: 1 days | Discharge: OTHER INSTITUTION | End: 2021-01-01
Payer: MEDICARE

## 2021-01-01 ENCOUNTER — APPOINTMENT (OUTPATIENT)
Dept: PAIN MANAGEMENT | Facility: CLINIC | Age: 86
End: 2021-01-01
Payer: MEDICARE

## 2021-01-01 ENCOUNTER — OUTPATIENT (OUTPATIENT)
Dept: OUTPATIENT SERVICES | Facility: HOSPITAL | Age: 86
LOS: 1 days | End: 2021-01-01
Payer: MEDICARE

## 2021-01-01 ENCOUNTER — APPOINTMENT (OUTPATIENT)
Dept: WOUND CARE | Facility: HOSPITAL | Age: 86
End: 2021-01-01

## 2021-01-01 ENCOUNTER — APPOINTMENT (OUTPATIENT)
Dept: NEUROLOGY | Facility: CLINIC | Age: 86
End: 2021-01-01
Payer: MEDICARE

## 2021-01-01 ENCOUNTER — APPOINTMENT (OUTPATIENT)
Dept: ENDOCRINOLOGY | Facility: CLINIC | Age: 86
End: 2021-01-01
Payer: MEDICARE

## 2021-01-01 ENCOUNTER — INPATIENT (INPATIENT)
Facility: HOSPITAL | Age: 86
LOS: 7 days | Discharge: HOME HEALTH | DRG: 951 | End: 2022-01-05
Attending: INTERNAL MEDICINE | Admitting: HOSPITALIST
Payer: MEDICARE

## 2021-01-01 ENCOUNTER — APPOINTMENT (OUTPATIENT)
Dept: HEART FAILURE | Facility: CLINIC | Age: 86
End: 2021-01-01
Payer: MEDICARE

## 2021-01-01 ENCOUNTER — APPOINTMENT (OUTPATIENT)
Dept: NEPHROLOGY | Facility: CLINIC | Age: 86
End: 2021-01-01
Payer: MEDICARE

## 2021-01-01 VITALS
SYSTOLIC BLOOD PRESSURE: 125 MMHG | HEART RATE: 79 BPM | TEMPERATURE: 97 F | BODY MASS INDEX: 25.46 KG/M2 | WEIGHT: 168 LBS | OXYGEN SATURATION: 96 % | HEIGHT: 68 IN | DIASTOLIC BLOOD PRESSURE: 69 MMHG | RESPIRATION RATE: 20 BRPM

## 2021-01-01 VITALS
RESPIRATION RATE: 16 BRPM | HEART RATE: 62 BPM | TEMPERATURE: 96.9 F | SYSTOLIC BLOOD PRESSURE: 141 MMHG | OXYGEN SATURATION: 100 % | DIASTOLIC BLOOD PRESSURE: 63 MMHG

## 2021-01-01 VITALS
HEIGHT: 68 IN | SYSTOLIC BLOOD PRESSURE: 145 MMHG | DIASTOLIC BLOOD PRESSURE: 63 MMHG | OXYGEN SATURATION: 94 % | HEART RATE: 95 BPM

## 2021-01-01 VITALS
HEIGHT: 68 IN | DIASTOLIC BLOOD PRESSURE: 67 MMHG | WEIGHT: 161 LBS | SYSTOLIC BLOOD PRESSURE: 137 MMHG | BODY MASS INDEX: 24.4 KG/M2 | OXYGEN SATURATION: 94 % | RESPIRATION RATE: 18 BRPM | HEART RATE: 86 BPM | TEMPERATURE: 97.2 F

## 2021-01-01 VITALS
BODY MASS INDEX: 24.4 KG/M2 | DIASTOLIC BLOOD PRESSURE: 75 MMHG | SYSTOLIC BLOOD PRESSURE: 139 MMHG | HEIGHT: 68 IN | HEART RATE: 87 BPM | OXYGEN SATURATION: 99 % | WEIGHT: 161 LBS | TEMPERATURE: 97.2 F | RESPIRATION RATE: 18 BRPM

## 2021-01-01 VITALS
HEIGHT: 68 IN | OXYGEN SATURATION: 98 % | DIASTOLIC BLOOD PRESSURE: 61 MMHG | WEIGHT: 166 LBS | SYSTOLIC BLOOD PRESSURE: 121 MMHG | RESPIRATION RATE: 18 BRPM | HEART RATE: 70 BPM | BODY MASS INDEX: 25.16 KG/M2 | TEMPERATURE: 96.9 F

## 2021-01-01 VITALS
WEIGHT: 160 LBS | DIASTOLIC BLOOD PRESSURE: 71 MMHG | RESPIRATION RATE: 18 BRPM | TEMPERATURE: 96.8 F | HEART RATE: 101 BPM | OXYGEN SATURATION: 97 % | BODY MASS INDEX: 24.25 KG/M2 | HEIGHT: 68 IN | SYSTOLIC BLOOD PRESSURE: 137 MMHG

## 2021-01-01 VITALS
DIASTOLIC BLOOD PRESSURE: 63 MMHG | RESPIRATION RATE: 18 BRPM | SYSTOLIC BLOOD PRESSURE: 123 MMHG | HEIGHT: 68 IN | BODY MASS INDEX: 24.25 KG/M2 | HEIGHT: 68 IN | DIASTOLIC BLOOD PRESSURE: 64 MMHG | HEART RATE: 70 BPM | OXYGEN SATURATION: 94 % | HEART RATE: 96 BPM | TEMPERATURE: 97.5 F | RESPIRATION RATE: 20 BRPM | WEIGHT: 166 LBS | TEMPERATURE: 97.6 F | BODY MASS INDEX: 25.16 KG/M2 | SYSTOLIC BLOOD PRESSURE: 141 MMHG | WEIGHT: 160 LBS | OXYGEN SATURATION: 98 %

## 2021-01-01 VITALS
HEIGHT: 68 IN | RESPIRATION RATE: 18 BRPM | BODY MASS INDEX: 25.46 KG/M2 | TEMPERATURE: 97.2 F | HEART RATE: 92 BPM | WEIGHT: 168 LBS | DIASTOLIC BLOOD PRESSURE: 64 MMHG | SYSTOLIC BLOOD PRESSURE: 126 MMHG | OXYGEN SATURATION: 95 %

## 2021-01-01 VITALS
WEIGHT: 165 LBS | TEMPERATURE: 97.6 F | BODY MASS INDEX: 25.01 KG/M2 | HEIGHT: 68 IN | SYSTOLIC BLOOD PRESSURE: 108 MMHG | DIASTOLIC BLOOD PRESSURE: 61 MMHG | RESPIRATION RATE: 20 BRPM | OXYGEN SATURATION: 95 % | HEART RATE: 84 BPM

## 2021-01-01 VITALS
SYSTOLIC BLOOD PRESSURE: 131 MMHG | HEART RATE: 80 BPM | OXYGEN SATURATION: 94 % | RESPIRATION RATE: 16 BRPM | WEIGHT: 165 LBS | TEMPERATURE: 96.9 F | HEIGHT: 68 IN | BODY MASS INDEX: 25.01 KG/M2 | DIASTOLIC BLOOD PRESSURE: 71 MMHG

## 2021-01-01 VITALS
DIASTOLIC BLOOD PRESSURE: 66 MMHG | OXYGEN SATURATION: 98 % | HEIGHT: 68 IN | WEIGHT: 165 LBS | SYSTOLIC BLOOD PRESSURE: 127 MMHG | RESPIRATION RATE: 18 BRPM | HEART RATE: 81 BPM | BODY MASS INDEX: 25.01 KG/M2 | TEMPERATURE: 97 F

## 2021-01-01 VITALS
OXYGEN SATURATION: 97 % | SYSTOLIC BLOOD PRESSURE: 140 MMHG | HEART RATE: 87 BPM | DIASTOLIC BLOOD PRESSURE: 76 MMHG | HEIGHT: 68 IN | TEMPERATURE: 97 F | BODY MASS INDEX: 24.4 KG/M2 | RESPIRATION RATE: 18 BRPM | WEIGHT: 161 LBS

## 2021-01-01 VITALS
HEART RATE: 77 BPM | SYSTOLIC BLOOD PRESSURE: 142 MMHG | RESPIRATION RATE: 16 BRPM | TEMPERATURE: 97.5 F | BODY MASS INDEX: 25.61 KG/M2 | WEIGHT: 169 LBS | DIASTOLIC BLOOD PRESSURE: 60 MMHG | HEIGHT: 68 IN | OXYGEN SATURATION: 95 %

## 2021-01-01 VITALS
OXYGEN SATURATION: 97 % | RESPIRATION RATE: 18 BRPM | HEIGHT: 68 IN | DIASTOLIC BLOOD PRESSURE: 65 MMHG | BODY MASS INDEX: 24.1 KG/M2 | HEART RATE: 83 BPM | TEMPERATURE: 97 F | WEIGHT: 159 LBS | SYSTOLIC BLOOD PRESSURE: 117 MMHG

## 2021-01-01 VITALS
HEART RATE: 78 BPM | WEIGHT: 160 LBS | RESPIRATION RATE: 20 BRPM | HEIGHT: 68 IN | OXYGEN SATURATION: 97 % | TEMPERATURE: 97.6 F | DIASTOLIC BLOOD PRESSURE: 73 MMHG | SYSTOLIC BLOOD PRESSURE: 142 MMHG | BODY MASS INDEX: 24.25 KG/M2

## 2021-01-01 VITALS
BODY MASS INDEX: 25.01 KG/M2 | HEART RATE: 88 BPM | SYSTOLIC BLOOD PRESSURE: 132 MMHG | DIASTOLIC BLOOD PRESSURE: 74 MMHG | OXYGEN SATURATION: 93 % | HEIGHT: 68 IN | WEIGHT: 165 LBS

## 2021-01-01 VITALS
BODY MASS INDEX: 25.01 KG/M2 | OXYGEN SATURATION: 97 % | SYSTOLIC BLOOD PRESSURE: 121 MMHG | WEIGHT: 165 LBS | TEMPERATURE: 97 F | HEIGHT: 68 IN | DIASTOLIC BLOOD PRESSURE: 67 MMHG | HEART RATE: 71 BPM | RESPIRATION RATE: 18 BRPM

## 2021-01-01 VITALS
HEIGHT: 68 IN | HEART RATE: 68 BPM | BODY MASS INDEX: 24.4 KG/M2 | RESPIRATION RATE: 16 BRPM | DIASTOLIC BLOOD PRESSURE: 72 MMHG | SYSTOLIC BLOOD PRESSURE: 151 MMHG | WEIGHT: 161 LBS | TEMPERATURE: 96.8 F | OXYGEN SATURATION: 98 %

## 2021-01-01 VITALS
SYSTOLIC BLOOD PRESSURE: 124 MMHG | BODY MASS INDEX: 24.25 KG/M2 | TEMPERATURE: 96.9 F | DIASTOLIC BLOOD PRESSURE: 72 MMHG | OXYGEN SATURATION: 96 % | HEIGHT: 68 IN | RESPIRATION RATE: 18 BRPM | WEIGHT: 160 LBS | HEART RATE: 93 BPM

## 2021-01-01 VITALS
DIASTOLIC BLOOD PRESSURE: 63 MMHG | WEIGHT: 165 LBS | SYSTOLIC BLOOD PRESSURE: 132 MMHG | OXYGEN SATURATION: 96 % | HEART RATE: 74 BPM | BODY MASS INDEX: 25.01 KG/M2 | RESPIRATION RATE: 18 BRPM | TEMPERATURE: 97.5 F | HEIGHT: 68 IN

## 2021-01-01 VITALS
SYSTOLIC BLOOD PRESSURE: 110 MMHG | HEART RATE: 96 BPM | RESPIRATION RATE: 15 BRPM | OXYGEN SATURATION: 90 % | WEIGHT: 167.5 LBS | DIASTOLIC BLOOD PRESSURE: 70 MMHG | HEIGHT: 68 IN | TEMPERATURE: 95.6 F | BODY MASS INDEX: 25.39 KG/M2

## 2021-01-01 VITALS
DIASTOLIC BLOOD PRESSURE: 72 MMHG | BODY MASS INDEX: 25.46 KG/M2 | WEIGHT: 168 LBS | HEART RATE: 99 BPM | HEIGHT: 68 IN | SYSTOLIC BLOOD PRESSURE: 125 MMHG

## 2021-01-01 VITALS
RESPIRATION RATE: 16 BRPM | OXYGEN SATURATION: 96 % | DIASTOLIC BLOOD PRESSURE: 74 MMHG | WEIGHT: 161 LBS | SYSTOLIC BLOOD PRESSURE: 131 MMHG | BODY MASS INDEX: 24.4 KG/M2 | HEART RATE: 92 BPM | HEIGHT: 68 IN

## 2021-01-01 VITALS
SYSTOLIC BLOOD PRESSURE: 122 MMHG | WEIGHT: 166.38 LBS | HEIGHT: 68 IN | TEMPERATURE: 97.3 F | OXYGEN SATURATION: 93 % | BODY MASS INDEX: 25.22 KG/M2 | DIASTOLIC BLOOD PRESSURE: 62 MMHG | HEART RATE: 95 BPM | RESPIRATION RATE: 16 BRPM

## 2021-01-01 VITALS
WEIGHT: 165 LBS | HEART RATE: 95 BPM | RESPIRATION RATE: 18 BRPM | DIASTOLIC BLOOD PRESSURE: 73 MMHG | OXYGEN SATURATION: 94 % | SYSTOLIC BLOOD PRESSURE: 127 MMHG | HEIGHT: 68 IN | TEMPERATURE: 97.9 F | BODY MASS INDEX: 25.01 KG/M2

## 2021-01-01 VITALS
SYSTOLIC BLOOD PRESSURE: 127 MMHG | WEIGHT: 161 LBS | TEMPERATURE: 97.3 F | HEIGHT: 68 IN | OXYGEN SATURATION: 95 % | RESPIRATION RATE: 18 BRPM | HEIGHT: 68 IN | RESPIRATION RATE: 20 BRPM | DIASTOLIC BLOOD PRESSURE: 67 MMHG | HEART RATE: 79 BPM | DIASTOLIC BLOOD PRESSURE: 66 MMHG | TEMPERATURE: 97.6 F | HEART RATE: 84 BPM | SYSTOLIC BLOOD PRESSURE: 124 MMHG | OXYGEN SATURATION: 96 % | BODY MASS INDEX: 24.25 KG/M2 | BODY MASS INDEX: 24.4 KG/M2 | WEIGHT: 160 LBS

## 2021-01-01 VITALS
SYSTOLIC BLOOD PRESSURE: 127 MMHG | OXYGEN SATURATION: 96 % | HEART RATE: 77 BPM | TEMPERATURE: 97.5 F | DIASTOLIC BLOOD PRESSURE: 64 MMHG | BODY MASS INDEX: 24.4 KG/M2 | WEIGHT: 161 LBS | HEIGHT: 68 IN | RESPIRATION RATE: 18 BRPM

## 2021-01-01 VITALS
TEMPERATURE: 98 F | HEIGHT: 68 IN | RESPIRATION RATE: 18 BRPM | SYSTOLIC BLOOD PRESSURE: 133 MMHG | HEART RATE: 88 BPM | OXYGEN SATURATION: 98 % | DIASTOLIC BLOOD PRESSURE: 62 MMHG | WEIGHT: 162.92 LBS

## 2021-01-01 VITALS
WEIGHT: 165 LBS | OXYGEN SATURATION: 96 % | BODY MASS INDEX: 25.01 KG/M2 | HEIGHT: 68 IN | DIASTOLIC BLOOD PRESSURE: 72 MMHG | HEART RATE: 87 BPM | SYSTOLIC BLOOD PRESSURE: 133 MMHG | RESPIRATION RATE: 14 BRPM

## 2021-01-01 VITALS
HEIGHT: 68 IN | WEIGHT: 168 LBS | RESPIRATION RATE: 18 BRPM | TEMPERATURE: 97 F | OXYGEN SATURATION: 97 % | DIASTOLIC BLOOD PRESSURE: 67 MMHG | HEART RATE: 82 BPM | BODY MASS INDEX: 25.46 KG/M2 | SYSTOLIC BLOOD PRESSURE: 124 MMHG

## 2021-01-01 VITALS
BODY MASS INDEX: 24.25 KG/M2 | HEART RATE: 93 BPM | DIASTOLIC BLOOD PRESSURE: 63 MMHG | OXYGEN SATURATION: 95 % | SYSTOLIC BLOOD PRESSURE: 112 MMHG | WEIGHT: 160 LBS | HEIGHT: 68 IN

## 2021-01-01 VITALS
OXYGEN SATURATION: 95 % | DIASTOLIC BLOOD PRESSURE: 70 MMHG | SYSTOLIC BLOOD PRESSURE: 110 MMHG | TEMPERATURE: 96.8 F | RESPIRATION RATE: 18 BRPM | HEART RATE: 95 BPM

## 2021-01-01 VITALS
SYSTOLIC BLOOD PRESSURE: 130 MMHG | TEMPERATURE: 97.3 F | BODY MASS INDEX: 25.01 KG/M2 | HEART RATE: 84 BPM | OXYGEN SATURATION: 97 % | DIASTOLIC BLOOD PRESSURE: 72 MMHG | HEIGHT: 68 IN | WEIGHT: 165 LBS

## 2021-01-01 VITALS
SYSTOLIC BLOOD PRESSURE: 124 MMHG | BODY MASS INDEX: 24.25 KG/M2 | DIASTOLIC BLOOD PRESSURE: 71 MMHG | OXYGEN SATURATION: 98 % | WEIGHT: 160 LBS | HEIGHT: 68 IN | HEART RATE: 93 BPM

## 2021-01-01 VITALS
DIASTOLIC BLOOD PRESSURE: 62 MMHG | HEIGHT: 68 IN | TEMPERATURE: 98 F | RESPIRATION RATE: 18 BRPM | WEIGHT: 164.91 LBS | SYSTOLIC BLOOD PRESSURE: 132 MMHG | HEART RATE: 90 BPM | OXYGEN SATURATION: 100 %

## 2021-01-01 VITALS
SYSTOLIC BLOOD PRESSURE: 107 MMHG | HEIGHT: 68 IN | WEIGHT: 159 LBS | DIASTOLIC BLOOD PRESSURE: 64 MMHG | HEART RATE: 84 BPM | BODY MASS INDEX: 24.1 KG/M2

## 2021-01-01 VITALS
SYSTOLIC BLOOD PRESSURE: 136 MMHG | DIASTOLIC BLOOD PRESSURE: 73 MMHG | HEART RATE: 81 BPM | RESPIRATION RATE: 20 BRPM | OXYGEN SATURATION: 95 %

## 2021-01-01 DIAGNOSIS — E11.22 TYPE 2 DIABETES MELLITUS WITH DIABETIC CHRONIC KIDNEY DISEASE: ICD-10-CM

## 2021-01-01 DIAGNOSIS — I11.0 HYPERTENSIVE HEART DISEASE WITH HEART FAILURE: ICD-10-CM

## 2021-01-01 DIAGNOSIS — Z95.4 PRESENCE OF OTHER HEART-VALVE REPLACEMENT: Chronic | ICD-10-CM

## 2021-01-01 DIAGNOSIS — L97.801 NON-PRESSURE CHRONIC ULCER OF OTHER PART OF UNSPECIFIED LOWER LEG LIMITED TO BREAKDOWN OF SKIN: ICD-10-CM

## 2021-01-01 DIAGNOSIS — Z95.810 PRESENCE OF AUTOMATIC (IMPLANTABLE) CARDIAC DEFIBRILLATOR: Chronic | ICD-10-CM

## 2021-01-01 DIAGNOSIS — I83.893 VARICOSE VEINS OF BILATERAL LOWER EXTREMITIES WITH OTHER COMPLICATIONS: ICD-10-CM

## 2021-01-01 DIAGNOSIS — Z96.1 PRESENCE OF INTRAOCULAR LENS: ICD-10-CM

## 2021-01-01 DIAGNOSIS — Z98.61 CORONARY ANGIOPLASTY STATUS: ICD-10-CM

## 2021-01-01 DIAGNOSIS — I48.91 UNSPECIFIED ATRIAL FIBRILLATION: ICD-10-CM

## 2021-01-01 DIAGNOSIS — G31.84 MILD COGNITIVE IMPAIRMENT OF UNCERTAIN OR UNKNOWN ETIOLOGY: ICD-10-CM

## 2021-01-01 DIAGNOSIS — N18.5 CHRONIC KIDNEY DISEASE, STAGE 5: ICD-10-CM

## 2021-01-01 DIAGNOSIS — E11.51 TYPE 2 DIABETES MELLITUS WITH DIABETIC PERIPHERAL ANGIOPATHY WITHOUT GANGRENE: ICD-10-CM

## 2021-01-01 DIAGNOSIS — Z95.1 PRESENCE OF AORTOCORONARY BYPASS GRAFT: Chronic | ICD-10-CM

## 2021-01-01 DIAGNOSIS — L97.812 NON-PRESSURE CHRONIC ULCER OF OTHER PART OF RIGHT LOWER LEG WITH FAT LAYER EXPOSED: ICD-10-CM

## 2021-01-01 DIAGNOSIS — D64.9 ANEMIA, UNSPECIFIED: ICD-10-CM

## 2021-01-01 DIAGNOSIS — N13.8 OTHER OBSTRUCTIVE AND REFLUX UROPATHY: ICD-10-CM

## 2021-01-01 DIAGNOSIS — Z79.82 LONG TERM (CURRENT) USE OF ASPIRIN: ICD-10-CM

## 2021-01-01 DIAGNOSIS — L97.201 NON-PRESSURE CHRONIC ULCER OF UNSPECIFIED CALF LIMITED TO BREAKDOWN OF SKIN: ICD-10-CM

## 2021-01-01 DIAGNOSIS — E78.5 HYPERLIPIDEMIA, UNSPECIFIED: ICD-10-CM

## 2021-01-01 DIAGNOSIS — E11.42 TYPE 2 DIABETES MELLITUS WITH DIABETIC POLYNEUROPATHY: ICD-10-CM

## 2021-01-01 DIAGNOSIS — I50.22 CHRONIC SYSTOLIC (CONGESTIVE) HEART FAILURE: ICD-10-CM

## 2021-01-01 DIAGNOSIS — Z95.4 PRESENCE OF OTHER HEART-VALVE REPLACEMENT: ICD-10-CM

## 2021-01-01 DIAGNOSIS — N40.1 BENIGN PROSTATIC HYPERPLASIA WITH LOWER URINARY TRACT SYMPTOMS: ICD-10-CM

## 2021-01-01 DIAGNOSIS — Z98.49 CATARACT EXTRACTION STATUS, UNSPECIFIED EYE: ICD-10-CM

## 2021-01-01 DIAGNOSIS — Z98.89 OTHER SPECIFIED POSTPROCEDURAL STATES: Chronic | ICD-10-CM

## 2021-01-01 DIAGNOSIS — Z82.0 FAMILY HISTORY OF EPILEPSY AND OTHER DISEASES OF THE NERVOUS SYSTEM: ICD-10-CM

## 2021-01-01 DIAGNOSIS — I13.2 HYPERTENSIVE HEART AND CHRONIC KIDNEY DISEASE WITH HEART FAILURE AND WITH STAGE 5 CHRONIC KIDNEY DISEASE, OR END STAGE RENAL DISEASE: ICD-10-CM

## 2021-01-01 DIAGNOSIS — Z95.810 PRESENCE OF AUTOMATIC (IMPLANTABLE) CARDIAC DEFIBRILLATOR: ICD-10-CM

## 2021-01-01 DIAGNOSIS — R25.1 TREMOR, UNSPECIFIED: ICD-10-CM

## 2021-01-01 DIAGNOSIS — Z79.899 OTHER LONG TERM (CURRENT) DRUG THERAPY: ICD-10-CM

## 2021-01-01 DIAGNOSIS — Z98.49 CATARACT EXTRACTION STATUS, UNSPECIFIED EYE: Chronic | ICD-10-CM

## 2021-01-01 DIAGNOSIS — I83.218 VARICOSE VEINS OF RIGHT LOWER EXTREMITY WITH BOTH ULCER OF OTHER PART OF LOWER EXTREMITY AND INFLAMMATION: ICD-10-CM

## 2021-01-01 DIAGNOSIS — E55.9 VITAMIN D DEFICIENCY, UNSPECIFIED: ICD-10-CM

## 2021-01-01 DIAGNOSIS — Z79.4 LONG TERM (CURRENT) USE OF INSULIN: ICD-10-CM

## 2021-01-01 DIAGNOSIS — I48.92 UNSPECIFIED ATRIAL FLUTTER: ICD-10-CM

## 2021-01-01 DIAGNOSIS — Z87.891 PERSONAL HISTORY OF NICOTINE DEPENDENCE: ICD-10-CM

## 2021-01-01 DIAGNOSIS — Z87.440 PERSONAL HISTORY OF URINARY (TRACT) INFECTIONS: ICD-10-CM

## 2021-01-01 DIAGNOSIS — Z86.73 PERSONAL HISTORY OF TRANSIENT ISCHEMIC ATTACK (TIA), AND CEREBRAL INFARCTION WITHOUT RESIDUAL DEFICITS: ICD-10-CM

## 2021-01-01 DIAGNOSIS — I83.213 VARICOSE VEINS OF RIGHT LOWER EXTREMITY WITH BOTH ULCER OF ANKLE AND INFLAMMATION: ICD-10-CM

## 2021-01-01 DIAGNOSIS — Z79.84 LONG TERM (CURRENT) USE OF ORAL HYPOGLYCEMIC DRUGS: ICD-10-CM

## 2021-01-01 DIAGNOSIS — E87.2 ACIDOSIS: ICD-10-CM

## 2021-01-01 DIAGNOSIS — Z80.9 FAMILY HISTORY OF MALIGNANT NEOPLASM, UNSPECIFIED: ICD-10-CM

## 2021-01-01 DIAGNOSIS — L92.9 GRANULOMATOUS DISORDER OF THE SKIN AND SUBCUTANEOUS TISSUE, UNSPECIFIED: ICD-10-CM

## 2021-01-01 DIAGNOSIS — R55 SYNCOPE AND COLLAPSE: ICD-10-CM

## 2021-01-01 DIAGNOSIS — Z79.01 LONG TERM (CURRENT) USE OF ANTICOAGULANTS: ICD-10-CM

## 2021-01-01 DIAGNOSIS — F03.90 UNSPECIFIED DEMENTIA W/OUT BEHAVIORAL DISTURBANCE: ICD-10-CM

## 2021-01-01 DIAGNOSIS — M54.12 RADICULOPATHY, CERVICAL REGION: ICD-10-CM

## 2021-01-01 DIAGNOSIS — Z51.5 ENCOUNTER FOR PALLIATIVE CARE: ICD-10-CM

## 2021-01-01 DIAGNOSIS — I25.10 ATHEROSCLEROTIC HEART DISEASE OF NATIVE CORONARY ARTERY W/OUT ANGINA PECTORIS: ICD-10-CM

## 2021-01-01 DIAGNOSIS — Z71.89 OTHER SPECIFIED COUNSELING: ICD-10-CM

## 2021-01-01 DIAGNOSIS — L97.819 VARICOSE VEINS OF RIGHT LOWER EXTREMITY WITH BOTH ULCER OF OTHER PART OF LOWER EXTREMITY AND INFLAMMATION: ICD-10-CM

## 2021-01-01 DIAGNOSIS — Z45.010 ENCOUNTER FOR CHECKING AND TESTING OF CARDIAC PACEMAKER PULSE GENERATOR [BATTERY]: ICD-10-CM

## 2021-01-01 DIAGNOSIS — I10 ESSENTIAL (PRIMARY) HYPERTENSION: ICD-10-CM

## 2021-01-01 DIAGNOSIS — Z95.0 PRESENCE OF CARDIAC PACEMAKER: ICD-10-CM

## 2021-01-01 DIAGNOSIS — I11.9 HYPERTENSIVE HEART DISEASE WITHOUT HEART FAILURE: ICD-10-CM

## 2021-01-01 DIAGNOSIS — F03.90 UNSPECIFIED DEMENTIA WITHOUT BEHAVIORAL DISTURBANCE: ICD-10-CM

## 2021-01-01 DIAGNOSIS — R51.9 HEADACHE, UNSPECIFIED: ICD-10-CM

## 2021-01-01 DIAGNOSIS — Z95.1 PRESENCE OF AORTOCORONARY BYPASS GRAFT: ICD-10-CM

## 2021-01-01 DIAGNOSIS — I25.10 ATHEROSCLEROTIC HEART DISEASE OF NATIVE CORONARY ARTERY WITHOUT ANGINA PECTORIS: ICD-10-CM

## 2021-01-01 DIAGNOSIS — I67.9 CEREBROVASCULAR DISEASE, UNSPECIFIED: ICD-10-CM

## 2021-01-01 DIAGNOSIS — R53.81 OTHER MALAISE: ICD-10-CM

## 2021-01-01 DIAGNOSIS — I31.2 HEMOPERICARDIUM, NOT ELSEWHERE CLASSIFIED: ICD-10-CM

## 2021-01-01 DIAGNOSIS — N18.4 CHRONIC KIDNEY DISEASE, STAGE 4 (SEVERE): ICD-10-CM

## 2021-01-01 DIAGNOSIS — I83.228 VARICOSE VEINS OF LEFT LOWER EXTREMITY WITH BOTH ULCER OF OTHER PART OF LOWER EXTREMITY AND INFLAMMATION: ICD-10-CM

## 2021-01-01 DIAGNOSIS — I50.9 HEART FAILURE, UNSPECIFIED: ICD-10-CM

## 2021-01-01 DIAGNOSIS — Z29.9 ENCOUNTER FOR PROPHYLACTIC MEASURES, UNSPECIFIED: ICD-10-CM

## 2021-01-01 DIAGNOSIS — E11.9 TYPE 2 DIABETES MELLITUS WITHOUT COMPLICATIONS: ICD-10-CM

## 2021-01-01 DIAGNOSIS — R52 PAIN, UNSPECIFIED: ICD-10-CM

## 2021-01-01 DIAGNOSIS — Z01.818 ENCOUNTER FOR OTHER PREPROCEDURAL EXAMINATION: ICD-10-CM

## 2021-01-01 LAB
25(OH)D3 SERPL-MCNC: 36.8 NG/ML
A1C WITH ESTIMATED AVERAGE GLUCOSE RESULT: 8 % — HIGH (ref 4–5.6)
ALBUMIN SERPL ELPH-MCNC: 3.7 G/DL — SIGNIFICANT CHANGE UP (ref 3.3–5)
ALBUMIN SERPL ELPH-MCNC: 4 G/DL
ALBUMIN SERPL ELPH-MCNC: 4 G/DL — SIGNIFICANT CHANGE UP (ref 3.3–5)
ALBUMIN SERPL ELPH-MCNC: 4.2 G/DL
ALP BLD-CCNC: 116 U/L
ALP BLD-CCNC: 119 U/L
ALP SERPL-CCNC: 139 U/L — HIGH (ref 40–120)
ALP SERPL-CCNC: 146 U/L — HIGH (ref 40–120)
ALT FLD-CCNC: 17 U/L — SIGNIFICANT CHANGE UP (ref 10–45)
ALT FLD-CCNC: 22 U/L — SIGNIFICANT CHANGE UP (ref 10–45)
ALT SERPL-CCNC: 19 U/L
ALT SERPL-CCNC: 27 U/L
ANION GAP SERPL CALC-SCNC: 13 MMOL/L
ANION GAP SERPL CALC-SCNC: 15 MMOL/L
ANION GAP SERPL CALC-SCNC: 16 MMOL/L
ANION GAP SERPL CALC-SCNC: 17 MMOL/L
ANION GAP SERPL CALC-SCNC: 17 MMOL/L — SIGNIFICANT CHANGE UP (ref 5–17)
ANION GAP SERPL CALC-SCNC: 17 MMOL/L — SIGNIFICANT CHANGE UP (ref 5–17)
ANION GAP SERPL CALC-SCNC: 18 MMOL/L — HIGH (ref 5–17)
ANION GAP SERPL CALC-SCNC: 23 MMOL/L
APPEARANCE UR: CLEAR — SIGNIFICANT CHANGE UP
APPEARANCE: CLEAR
APTT BLD: 36.4 SEC — HIGH (ref 27.5–35.5)
APTT BLD: 38.2 SEC — HIGH (ref 27.5–35.5)
APTT BLD: 39.6 SEC — HIGH (ref 27.5–35.5)
AST SERPL-CCNC: 25 U/L
AST SERPL-CCNC: 26 U/L
AST SERPL-CCNC: 29 U/L — SIGNIFICANT CHANGE UP (ref 10–40)
AST SERPL-CCNC: 35 U/L — SIGNIFICANT CHANGE UP (ref 10–40)
BACTERIA # UR AUTO: NEGATIVE — SIGNIFICANT CHANGE UP
BACTERIA: NEGATIVE
BASOPHILS # BLD AUTO: 0.03 K/UL
BASOPHILS # BLD AUTO: 0.03 K/UL — SIGNIFICANT CHANGE UP (ref 0–0.2)
BASOPHILS # BLD AUTO: 0.03 K/UL — SIGNIFICANT CHANGE UP (ref 0–0.2)
BASOPHILS NFR BLD AUTO: 0.5 %
BASOPHILS NFR BLD AUTO: 0.5 % — SIGNIFICANT CHANGE UP (ref 0–2)
BASOPHILS NFR BLD AUTO: 0.5 % — SIGNIFICANT CHANGE UP (ref 0–2)
BILIRUB SERPL-MCNC: 0.3 MG/DL
BILIRUB SERPL-MCNC: 0.4 MG/DL
BILIRUB SERPL-MCNC: 0.5 MG/DL — SIGNIFICANT CHANGE UP (ref 0.2–1.2)
BILIRUB SERPL-MCNC: 0.7 MG/DL — SIGNIFICANT CHANGE UP (ref 0.2–1.2)
BILIRUB UR-MCNC: NEGATIVE — SIGNIFICANT CHANGE UP
BILIRUBIN URINE: NEGATIVE
BLD GP AB SCN SERPL QL: NEGATIVE — SIGNIFICANT CHANGE UP
BLOOD URINE: ABNORMAL
BUN SERPL-MCNC: 105 MG/DL — HIGH (ref 7–23)
BUN SERPL-MCNC: 66 MG/DL
BUN SERPL-MCNC: 71 MG/DL
BUN SERPL-MCNC: 72 MG/DL
BUN SERPL-MCNC: 79 MG/DL
BUN SERPL-MCNC: 84 MG/DL — HIGH (ref 7–23)
BUN SERPL-MCNC: 86 MG/DL
BUN SERPL-MCNC: 92 MG/DL — HIGH (ref 7–23)
CALCIUM SERPL-MCNC: 8.6 MG/DL — SIGNIFICANT CHANGE UP (ref 8.4–10.5)
CALCIUM SERPL-MCNC: 9.1 MG/DL
CALCIUM SERPL-MCNC: 9.1 MG/DL
CALCIUM SERPL-MCNC: 9.2 MG/DL
CALCIUM SERPL-MCNC: 9.2 MG/DL — SIGNIFICANT CHANGE UP (ref 8.4–10.5)
CALCIUM SERPL-MCNC: 9.4 MG/DL
CALCIUM SERPL-MCNC: 9.4 MG/DL
CALCIUM SERPL-MCNC: 9.4 MG/DL — SIGNIFICANT CHANGE UP (ref 8.4–10.5)
CALCIUM SERPL-MCNC: 9.6 MG/DL
CHLORIDE SERPL-SCNC: 101 MMOL/L
CHLORIDE SERPL-SCNC: 103 MMOL/L — SIGNIFICANT CHANGE UP (ref 96–108)
CHLORIDE SERPL-SCNC: 103 MMOL/L — SIGNIFICANT CHANGE UP (ref 96–108)
CHLORIDE SERPL-SCNC: 104 MMOL/L
CHLORIDE SERPL-SCNC: 104 MMOL/L — SIGNIFICANT CHANGE UP (ref 96–108)
CHLORIDE SERPL-SCNC: 105 MMOL/L
CHLORIDE SERPL-SCNC: 106 MMOL/L
CHLORIDE SERPL-SCNC: 107 MMOL/L
CHOLEST SERPL-MCNC: 119 MG/DL
CO2 SERPL-SCNC: 16 MMOL/L
CO2 SERPL-SCNC: 17 MMOL/L — LOW (ref 22–31)
CO2 SERPL-SCNC: 18 MMOL/L — LOW (ref 22–31)
CO2 SERPL-SCNC: 20 MMOL/L
CO2 SERPL-SCNC: 20 MMOL/L
CO2 SERPL-SCNC: 21 MMOL/L — LOW (ref 22–31)
CO2 SERPL-SCNC: 22 MMOL/L
CO2 SERPL-SCNC: 22 MMOL/L
COLOR SPEC: SIGNIFICANT CHANGE UP
COLOR: NORMAL
CREAT SERPL-MCNC: 2.88 MG/DL
CREAT SERPL-MCNC: 2.98 MG/DL
CREAT SERPL-MCNC: 2.99 MG/DL
CREAT SERPL-MCNC: 3.04 MG/DL
CREAT SERPL-MCNC: 3.32 MG/DL
CREAT SERPL-MCNC: 3.51 MG/DL — HIGH (ref 0.5–1.3)
CREAT SERPL-MCNC: 3.52 MG/DL — HIGH (ref 0.5–1.3)
CREAT SERPL-MCNC: 3.73 MG/DL — HIGH (ref 0.5–1.3)
CREAT SPEC-SCNC: 55 MG/DL
CRP SERPL-MCNC: 13 MG/L — HIGH (ref 0–4)
CULTURE RESULTS: SIGNIFICANT CHANGE UP
DIFF PNL FLD: NEGATIVE — SIGNIFICANT CHANGE UP
DIGOXIN SERPL-MCNC: 0.6 NG/ML — LOW (ref 0.8–2)
EOSINOPHIL # BLD AUTO: 0.1 K/UL — SIGNIFICANT CHANGE UP (ref 0–0.5)
EOSINOPHIL # BLD AUTO: 0.1 K/UL — SIGNIFICANT CHANGE UP (ref 0–0.5)
EOSINOPHIL # BLD AUTO: 0.31 K/UL
EOSINOPHIL NFR BLD AUTO: 1.7 % — SIGNIFICANT CHANGE UP (ref 0–6)
EOSINOPHIL NFR BLD AUTO: 1.7 % — SIGNIFICANT CHANGE UP (ref 0–6)
EOSINOPHIL NFR BLD AUTO: 5.1 %
EPI CELLS # UR: 0 /HPF — SIGNIFICANT CHANGE UP
ERYTHROCYTE [SEDIMENTATION RATE] IN BLOOD: 62 MM/HR — HIGH (ref 0–20)
ESTIMATED AVERAGE GLUCOSE: 183 MG/DL
ESTIMATED AVERAGE GLUCOSE: 183 MG/DL — HIGH (ref 68–114)
FERRITIN SERPL-MCNC: 64 NG/ML
FLUAV AG NPH QL: SIGNIFICANT CHANGE UP
FLUBV AG NPH QL: SIGNIFICANT CHANGE UP
GLUCOSE BLDC GLUCOMTR-MCNC: 124 MG/DL — HIGH (ref 70–99)
GLUCOSE BLDC GLUCOMTR-MCNC: 125 MG/DL — HIGH (ref 70–99)
GLUCOSE BLDC GLUCOMTR-MCNC: 146 MG/DL — HIGH (ref 70–99)
GLUCOSE BLDC GLUCOMTR-MCNC: 153 MG/DL — HIGH (ref 70–99)
GLUCOSE BLDC GLUCOMTR-MCNC: 170 MG/DL — HIGH (ref 70–99)
GLUCOSE BLDC GLUCOMTR-MCNC: 180 MG/DL — HIGH (ref 70–99)
GLUCOSE BLDC GLUCOMTR-MCNC: 190 MG/DL — HIGH (ref 70–99)
GLUCOSE BLDC GLUCOMTR-MCNC: 190 MG/DL — HIGH (ref 70–99)
GLUCOSE BLDC GLUCOMTR-MCNC: 199 MG/DL — HIGH (ref 70–99)
GLUCOSE BLDC GLUCOMTR-MCNC: 211 MG/DL — HIGH (ref 70–99)
GLUCOSE BLDC GLUCOMTR-MCNC: 213 MG/DL — HIGH (ref 70–99)
GLUCOSE BLDC GLUCOMTR-MCNC: 233 MG/DL — HIGH (ref 70–99)
GLUCOSE BLDC GLUCOMTR-MCNC: 262 MG/DL — HIGH (ref 70–99)
GLUCOSE BLDC GLUCOMTR-MCNC: 71 MG/DL — SIGNIFICANT CHANGE UP (ref 70–99)
GLUCOSE QUALITATIVE U: NORMAL
GLUCOSE SERPL-MCNC: 105 MG/DL
GLUCOSE SERPL-MCNC: 106 MG/DL — HIGH (ref 70–99)
GLUCOSE SERPL-MCNC: 115 MG/DL
GLUCOSE SERPL-MCNC: 127 MG/DL — HIGH (ref 70–99)
GLUCOSE SERPL-MCNC: 139 MG/DL
GLUCOSE SERPL-MCNC: 173 MG/DL
GLUCOSE SERPL-MCNC: 223 MG/DL
GLUCOSE SERPL-MCNC: 69 MG/DL — LOW (ref 70–99)
GLUCOSE UR QL: NEGATIVE — SIGNIFICANT CHANGE UP
HBA1C MFR BLD HPLC: 7.6
HBA1C MFR BLD HPLC: 8 %
HCT VFR BLD CALC: 34.6 % — LOW (ref 39–50)
HCT VFR BLD CALC: 35.3 % — LOW (ref 39–50)
HCT VFR BLD CALC: 36.1 % — LOW (ref 39–50)
HCT VFR BLD CALC: 39.4 %
HDLC SERPL-MCNC: 44 MG/DL
HGB BLD-MCNC: 10.2 G/DL — LOW (ref 13–17)
HGB BLD-MCNC: 10.5 G/DL — LOW (ref 13–17)
HGB BLD-MCNC: 10.7 G/DL — LOW (ref 13–17)
HGB BLD-MCNC: 11.7 G/DL
HYALINE CASTS # UR AUTO: 2 /LPF — SIGNIFICANT CHANGE UP (ref 0–2)
HYALINE CASTS: 0 /LPF
IMM GRANULOCYTES NFR BLD AUTO: 0.3 % — SIGNIFICANT CHANGE UP (ref 0–1.5)
IMM GRANULOCYTES NFR BLD AUTO: 0.5 %
IMM GRANULOCYTES NFR BLD AUTO: 0.5 % — SIGNIFICANT CHANGE UP (ref 0–1.5)
INR BLD: 1.74 RATIO — HIGH (ref 0.88–1.16)
INR BLD: 2 RATIO — HIGH (ref 0.88–1.16)
INR BLD: 2.35 RATIO — HIGH (ref 0.88–1.16)
INR BLD: 2.95 RATIO — HIGH (ref 0.88–1.16)
INR BLD: 3.34 RATIO — HIGH (ref 0.88–1.16)
INR PPP: 1.91 RATIO
INR PPP: 2.2 RATIO
INR PPP: 2.85 RATIO
INR PPP: 3.22 RATIO
INR PPP: 3.87 RATIO
IRON SATN MFR SERPL: 17 %
IRON SERPL-MCNC: 53 UG/DL
KETONES UR-MCNC: NEGATIVE — SIGNIFICANT CHANGE UP
KETONES URINE: NEGATIVE
LDLC SERPL CALC-MCNC: 53 MG/DL
LEUKOCYTE ESTERASE UR-ACNC: NEGATIVE — SIGNIFICANT CHANGE UP
LEUKOCYTE ESTERASE URINE: NEGATIVE
LYMPHOCYTES # BLD AUTO: 0.8 K/UL — LOW (ref 1–3.3)
LYMPHOCYTES # BLD AUTO: 0.92 K/UL — LOW (ref 1–3.3)
LYMPHOCYTES # BLD AUTO: 1.31 K/UL
LYMPHOCYTES # BLD AUTO: 13.2 % — SIGNIFICANT CHANGE UP (ref 13–44)
LYMPHOCYTES # BLD AUTO: 15.9 % — SIGNIFICANT CHANGE UP (ref 13–44)
LYMPHOCYTES NFR BLD AUTO: 21.7 %
MAGNESIUM SERPL-MCNC: 2.2 MG/DL — SIGNIFICANT CHANGE UP (ref 1.6–2.6)
MAGNESIUM SERPL-MCNC: 2.3 MG/DL
MAGNESIUM SERPL-MCNC: 2.4 MG/DL — SIGNIFICANT CHANGE UP (ref 1.6–2.6)
MAN DIFF?: NORMAL
MCHC RBC-ENTMCNC: 25.6 PG — LOW (ref 27–34)
MCHC RBC-ENTMCNC: 25.8 PG — LOW (ref 27–34)
MCHC RBC-ENTMCNC: 27.4 PG — SIGNIFICANT CHANGE UP (ref 27–34)
MCHC RBC-ENTMCNC: 29 PG
MCHC RBC-ENTMCNC: 29.1 GM/DL — LOW (ref 32–36)
MCHC RBC-ENTMCNC: 29.5 GM/DL — LOW (ref 32–36)
MCHC RBC-ENTMCNC: 29.7 GM/DL
MCHC RBC-ENTMCNC: 30.3 GM/DL — LOW (ref 32–36)
MCV RBC AUTO: 87.4 FL — SIGNIFICANT CHANGE UP (ref 80–100)
MCV RBC AUTO: 88 FL — SIGNIFICANT CHANGE UP (ref 80–100)
MCV RBC AUTO: 90.5 FL — SIGNIFICANT CHANGE UP (ref 80–100)
MCV RBC AUTO: 97.5 FL
MICROALBUMIN 24H UR DL<=1MG/L-MCNC: 12.3 MG/DL
MICROALBUMIN/CREAT 24H UR-RTO: 223 MG/G
MICROSCOPIC-UA: NORMAL
MONOCYTES # BLD AUTO: 0.76 K/UL
MONOCYTES # BLD AUTO: 0.77 K/UL — SIGNIFICANT CHANGE UP (ref 0–0.9)
MONOCYTES # BLD AUTO: 0.95 K/UL — HIGH (ref 0–0.9)
MONOCYTES NFR BLD AUTO: 12.6 %
MONOCYTES NFR BLD AUTO: 12.7 % — SIGNIFICANT CHANGE UP (ref 2–14)
MONOCYTES NFR BLD AUTO: 16.4 % — HIGH (ref 2–14)
NEUTROPHILS # BLD AUTO: 3.61 K/UL
NEUTROPHILS # BLD AUTO: 3.77 K/UL — SIGNIFICANT CHANGE UP (ref 1.8–7.4)
NEUTROPHILS # BLD AUTO: 4.31 K/UL — SIGNIFICANT CHANGE UP (ref 1.8–7.4)
NEUTROPHILS NFR BLD AUTO: 59.6 %
NEUTROPHILS NFR BLD AUTO: 65.2 % — SIGNIFICANT CHANGE UP (ref 43–77)
NEUTROPHILS NFR BLD AUTO: 71.4 % — SIGNIFICANT CHANGE UP (ref 43–77)
NITRITE UR-MCNC: NEGATIVE — SIGNIFICANT CHANGE UP
NITRITE URINE: NEGATIVE
NONHDLC SERPL-MCNC: 75 MG/DL
NRBC # BLD: 0 /100 WBCS — SIGNIFICANT CHANGE UP (ref 0–0)
NT-PROBNP SERPL-MCNC: ABNORMAL PG/ML
PARATHYROID HORMONE INTACT: 294 PG/ML
PH UR: 6 — SIGNIFICANT CHANGE UP (ref 5–8)
PH URINE: 6
PHOSPHATE SERPL-MCNC: 3.7 MG/DL
PHOSPHATE SERPL-MCNC: 5.1 MG/DL — HIGH (ref 2.5–4.5)
PLATELET # BLD AUTO: 169 K/UL — SIGNIFICANT CHANGE UP (ref 150–400)
PLATELET # BLD AUTO: 175 K/UL — SIGNIFICANT CHANGE UP (ref 150–400)
PLATELET # BLD AUTO: 178 K/UL
PLATELET # BLD AUTO: 192 K/UL — SIGNIFICANT CHANGE UP (ref 150–400)
POTASSIUM SERPL-MCNC: 4.7 MMOL/L — SIGNIFICANT CHANGE UP (ref 3.5–5.3)
POTASSIUM SERPL-MCNC: 4.7 MMOL/L — SIGNIFICANT CHANGE UP (ref 3.5–5.3)
POTASSIUM SERPL-MCNC: 4.9 MMOL/L — SIGNIFICANT CHANGE UP (ref 3.5–5.3)
POTASSIUM SERPL-SCNC: 4.6 MMOL/L
POTASSIUM SERPL-SCNC: 4.6 MMOL/L
POTASSIUM SERPL-SCNC: 4.7 MMOL/L
POTASSIUM SERPL-SCNC: 4.7 MMOL/L — SIGNIFICANT CHANGE UP (ref 3.5–5.3)
POTASSIUM SERPL-SCNC: 4.7 MMOL/L — SIGNIFICANT CHANGE UP (ref 3.5–5.3)
POTASSIUM SERPL-SCNC: 4.9 MMOL/L — SIGNIFICANT CHANGE UP (ref 3.5–5.3)
POTASSIUM SERPL-SCNC: 5.1 MMOL/L
POTASSIUM SERPL-SCNC: 5.2 MMOL/L
PROT SERPL-MCNC: 6.9 G/DL — SIGNIFICANT CHANGE UP (ref 6–8.3)
PROT SERPL-MCNC: 7 G/DL
PROT SERPL-MCNC: 7.1 G/DL
PROT SERPL-MCNC: 7.3 G/DL — SIGNIFICANT CHANGE UP (ref 6–8.3)
PROT UR-MCNC: ABNORMAL
PROTEIN URINE: ABNORMAL
PROTHROM AB SERPL-ACNC: 20.3 SEC — HIGH (ref 10.6–13.6)
PROTHROM AB SERPL-ACNC: 23.2 SEC — HIGH (ref 10.6–13.6)
PROTHROM AB SERPL-ACNC: 27.1 SEC — HIGH (ref 10.6–13.6)
PROTHROM AB SERPL-ACNC: 33.6 SEC — HIGH (ref 10.6–13.6)
PROTHROM AB SERPL-ACNC: 37.8 SEC — HIGH (ref 10.6–13.6)
PT BLD: 21.9 SEC
PT BLD: 25 SEC
PT BLD: 32.3 SEC
PT BLD: 35.9 SEC
PT BLD: 43.2 SEC
RBC # BLD: 3.9 M/UL — LOW (ref 4.2–5.8)
RBC # BLD: 3.96 M/UL — LOW (ref 4.2–5.8)
RBC # BLD: 4.04 M/UL
RBC # BLD: 4.1 M/UL — LOW (ref 4.2–5.8)
RBC # FLD: 13.7 %
RBC # FLD: 14.9 % — HIGH (ref 10.3–14.5)
RBC # FLD: 16 % — HIGH (ref 10.3–14.5)
RBC # FLD: 16.2 % — HIGH (ref 10.3–14.5)
RBC CASTS # UR COMP ASSIST: 0 /HPF — SIGNIFICANT CHANGE UP (ref 0–4)
RED BLOOD CELLS URINE: 5 /HPF
RH IG SCN BLD-IMP: POSITIVE — SIGNIFICANT CHANGE UP
RH IG SCN BLD-IMP: POSITIVE — SIGNIFICANT CHANGE UP
RHEUMATOID FACT SERPL-ACNC: <10 IU/ML — SIGNIFICANT CHANGE UP (ref 0–13)
RSV RNA NPH QL NAA+NON-PROBE: SIGNIFICANT CHANGE UP
SARS-COV-2 N GENE NPH QL NAA+PROBE: NOT DETECTED
SARS-COV-2 RNA SPEC QL NAA+PROBE: SIGNIFICANT CHANGE UP
SODIUM SERPL-SCNC: 138 MMOL/L — SIGNIFICANT CHANGE UP (ref 135–145)
SODIUM SERPL-SCNC: 138 MMOL/L — SIGNIFICANT CHANGE UP (ref 135–145)
SODIUM SERPL-SCNC: 140 MMOL/L
SODIUM SERPL-SCNC: 141 MMOL/L
SODIUM SERPL-SCNC: 141 MMOL/L
SODIUM SERPL-SCNC: 142 MMOL/L — SIGNIFICANT CHANGE UP (ref 135–145)
SODIUM SERPL-SCNC: 144 MMOL/L
SODIUM SERPL-SCNC: 144 MMOL/L
SP GR SPEC: 1.01 — SIGNIFICANT CHANGE UP (ref 1.01–1.02)
SPECIFIC GRAVITY URINE: 1.02
SPECIMEN SOURCE: SIGNIFICANT CHANGE UP
SQUAMOUS EPITHELIAL CELLS: 0 /HPF
TIBC SERPL-MCNC: 313 UG/DL
TRIGL SERPL-MCNC: 113 MG/DL
TROPONIN T, HIGH SENSITIVITY RESULT: 58 NG/L — HIGH (ref 0–51)
TSH SERPL-ACNC: 2.65 UIU/ML
UIBC SERPL-MCNC: 260 UG/DL
URATE SERPL-MCNC: 8.2 MG/DL
UROBILINOGEN FLD QL: NEGATIVE — SIGNIFICANT CHANGE UP
UROBILINOGEN URINE: NORMAL
WBC # BLD: 5.79 K/UL — SIGNIFICANT CHANGE UP (ref 3.8–10.5)
WBC # BLD: 6.04 K/UL — SIGNIFICANT CHANGE UP (ref 3.8–10.5)
WBC # BLD: 6.21 K/UL — SIGNIFICANT CHANGE UP (ref 3.8–10.5)
WBC # FLD AUTO: 5.79 K/UL — SIGNIFICANT CHANGE UP (ref 3.8–10.5)
WBC # FLD AUTO: 6.04 K/UL — SIGNIFICANT CHANGE UP (ref 3.8–10.5)
WBC # FLD AUTO: 6.05 K/UL
WBC # FLD AUTO: 6.21 K/UL — SIGNIFICANT CHANGE UP (ref 3.8–10.5)
WBC UR QL: 0 /HPF — SIGNIFICANT CHANGE UP (ref 0–5)
WHITE BLOOD CELLS URINE: 0 /HPF

## 2021-01-01 PROCEDURE — 99213 OFFICE O/P EST LOW 20 MIN: CPT

## 2021-01-01 PROCEDURE — 93000 ELECTROCARDIOGRAM COMPLETE: CPT

## 2021-01-01 PROCEDURE — 86901 BLOOD TYPING SEROLOGIC RH(D): CPT

## 2021-01-01 PROCEDURE — 99233 SBSQ HOSP IP/OBS HIGH 50: CPT | Mod: GC

## 2021-01-01 PROCEDURE — 29581 APPL MULTLAYER CMPRN SYS LEG: CPT | Mod: RT

## 2021-01-01 PROCEDURE — G0463: CPT

## 2021-01-01 PROCEDURE — 15271 SKIN SUB GRAFT TRNK/ARM/LEG: CPT

## 2021-01-01 PROCEDURE — 76770 US EXAM ABDO BACK WALL COMP: CPT | Mod: 26

## 2021-01-01 PROCEDURE — 88305 TISSUE EXAM BY PATHOLOGIST: CPT

## 2021-01-01 PROCEDURE — 72131 CT LUMBAR SPINE W/O DYE: CPT | Mod: ME

## 2021-01-01 PROCEDURE — 99443: CPT | Mod: 95

## 2021-01-01 PROCEDURE — 83036 HEMOGLOBIN GLYCOSYLATED A1C: CPT | Mod: QW

## 2021-01-01 PROCEDURE — 99214 OFFICE O/P EST MOD 30 MIN: CPT

## 2021-01-01 PROCEDURE — 99215 OFFICE O/P EST HI 40 MIN: CPT

## 2021-01-01 PROCEDURE — 93296 REM INTERROG EVL PM/IDS: CPT

## 2021-01-01 PROCEDURE — 85730 THROMBOPLASTIN TIME PARTIAL: CPT

## 2021-01-01 PROCEDURE — 72125 CT NECK SPINE W/O DYE: CPT | Mod: MF

## 2021-01-01 PROCEDURE — G0463: CPT | Mod: 25

## 2021-01-01 PROCEDURE — 33221 INSERT PULSE GEN MULT LEADS: CPT

## 2021-01-01 PROCEDURE — C1889: CPT

## 2021-01-01 PROCEDURE — 93281 PM DEVICE PROGR EVAL MULTI: CPT | Mod: 26

## 2021-01-01 PROCEDURE — 93010 ELECTROCARDIOGRAM REPORT: CPT

## 2021-01-01 PROCEDURE — 93284 PRGRMG EVAL IMPLANTABLE DFB: CPT

## 2021-01-01 PROCEDURE — 72128 CT CHEST SPINE W/O DYE: CPT | Mod: ME

## 2021-01-01 PROCEDURE — 93306 TTE W/DOPPLER COMPLETE: CPT | Mod: 26

## 2021-01-01 PROCEDURE — C2621: CPT

## 2021-01-01 PROCEDURE — 99214 OFFICE O/P EST MOD 30 MIN: CPT | Mod: 25

## 2021-01-01 PROCEDURE — 11104 PUNCH BX SKIN SINGLE LESION: CPT

## 2021-01-01 PROCEDURE — 93295 DEV INTERROG REMOTE 1/2/MLT: CPT

## 2021-01-01 PROCEDURE — G0008: CPT

## 2021-01-01 PROCEDURE — 86850 RBC ANTIBODY SCREEN: CPT

## 2021-01-01 PROCEDURE — 99223 1ST HOSP IP/OBS HIGH 75: CPT | Mod: GC

## 2021-01-01 PROCEDURE — 99497 ADVNCD CARE PLAN 30 MIN: CPT

## 2021-01-01 PROCEDURE — 99024 POSTOP FOLLOW-UP VISIT: CPT

## 2021-01-01 PROCEDURE — 99285 EMERGENCY DEPT VISIT HI MDM: CPT | Mod: GC

## 2021-01-01 PROCEDURE — 99233 SBSQ HOSP IP/OBS HIGH 50: CPT

## 2021-01-01 PROCEDURE — G1004: CPT

## 2021-01-01 PROCEDURE — 93000 ELECTROCARDIOGRAM COMPLETE: CPT | Mod: 59

## 2021-01-01 PROCEDURE — 82962 GLUCOSE BLOOD TEST: CPT

## 2021-01-01 PROCEDURE — 85610 PROTHROMBIN TIME: CPT

## 2021-01-01 PROCEDURE — 88305 TISSUE EXAM BY PATHOLOGIST: CPT | Mod: 26

## 2021-01-01 PROCEDURE — 90662 IIV NO PRSV INCREASED AG IM: CPT

## 2021-01-01 PROCEDURE — 85027 COMPLETE CBC AUTOMATED: CPT

## 2021-01-01 PROCEDURE — 93281 PM DEVICE PROGR EVAL MULTI: CPT

## 2021-01-01 PROCEDURE — 70450 CT HEAD/BRAIN W/O DYE: CPT | Mod: 26

## 2021-01-01 PROCEDURE — 71045 X-RAY EXAM CHEST 1 VIEW: CPT | Mod: 26

## 2021-01-01 PROCEDURE — 99204 OFFICE O/P NEW MOD 45 MIN: CPT

## 2021-01-01 PROCEDURE — 93005 ELECTROCARDIOGRAM TRACING: CPT | Mod: XU

## 2021-01-01 PROCEDURE — 86900 BLOOD TYPING SEROLOGIC ABO: CPT

## 2021-01-01 PROCEDURE — 17250 CHEM CAUT OF GRANLTJ TISSUE: CPT

## 2021-01-01 PROCEDURE — 80048 BASIC METABOLIC PNL TOTAL CA: CPT

## 2021-01-01 PROCEDURE — 93970 EXTREMITY STUDY: CPT

## 2021-01-01 PROCEDURE — 93970 EXTREMITY STUDY: CPT | Mod: 26

## 2021-01-01 PROCEDURE — 99232 SBSQ HOSP IP/OBS MODERATE 35: CPT

## 2021-01-01 RX ORDER — ISOSORBIDE MONONITRATE 60 MG/1
1 TABLET, EXTENDED RELEASE ORAL
Qty: 0 | Refills: 0 | DISCHARGE

## 2021-01-01 RX ORDER — LIDOCAINE 4 G/100G
2 CREAM TOPICAL DAILY
Refills: 0 | Status: DISCONTINUED | OUTPATIENT
Start: 2021-01-01 | End: 2022-01-01

## 2021-01-01 RX ORDER — WARFARIN 2.5 MG/1
2.5 TABLET ORAL
Qty: 180 | Refills: 0 | Status: ACTIVE | COMMUNITY
Start: 2019-09-12 | End: 1900-01-01

## 2021-01-01 RX ORDER — METOPROLOL TARTRATE 50 MG
75 TABLET ORAL DAILY
Refills: 0 | Status: DISCONTINUED | OUTPATIENT
Start: 2021-01-01 | End: 2022-01-01

## 2021-01-01 RX ORDER — MEMANTINE HYDROCHLORIDE 10 MG/1
5 TABLET ORAL
Refills: 0 | Status: DISCONTINUED | OUTPATIENT
Start: 2021-01-01 | End: 2022-01-01

## 2021-01-01 RX ORDER — INSULIN GLARGINE 100 [IU]/ML
15 INJECTION, SOLUTION SUBCUTANEOUS
Qty: 0 | Refills: 0 | DISCHARGE

## 2021-01-01 RX ORDER — ESCITALOPRAM OXALATE 10 MG/1
10 TABLET ORAL
Qty: 90 | Refills: 1 | Status: COMPLETED | COMMUNITY
Start: 2021-01-01 | End: 2021-01-01

## 2021-01-01 RX ORDER — DEXTROSE 50 % IN WATER 50 %
25 SYRINGE (ML) INTRAVENOUS ONCE
Refills: 0 | Status: DISCONTINUED | OUTPATIENT
Start: 2021-01-01 | End: 2022-01-01

## 2021-01-01 RX ORDER — CHOLECALCIFEROL (VITAMIN D3) 125 MCG
2000 CAPSULE ORAL DAILY
Refills: 0 | Status: DISCONTINUED | OUTPATIENT
Start: 2021-01-01 | End: 2022-01-01

## 2021-01-01 RX ORDER — WARFARIN 5 MG/1
5 TABLET ORAL
Refills: 0 | Status: ACTIVE | COMMUNITY
Start: 2021-01-01

## 2021-01-01 RX ORDER — DONEPEZIL HYDROCHLORIDE 10 MG/1
10 TABLET, FILM COATED ORAL AT BEDTIME
Refills: 0 | Status: DISCONTINUED | OUTPATIENT
Start: 2021-01-01 | End: 2022-01-01

## 2021-01-01 RX ORDER — ACETAMINOPHEN 500 MG
1 TABLET ORAL
Qty: 0 | Refills: 0 | DISCHARGE

## 2021-01-01 RX ORDER — ONDANSETRON 8 MG/1
4 TABLET, FILM COATED ORAL EVERY 8 HOURS
Refills: 0 | Status: DISCONTINUED | OUTPATIENT
Start: 2021-01-01 | End: 2022-01-01

## 2021-01-01 RX ORDER — DEXTROSE 50 % IN WATER 50 %
25 SYRINGE (ML) INTRAVENOUS ONCE
Refills: 0 | Status: COMPLETED | OUTPATIENT
Start: 2021-01-01 | End: 2021-01-01

## 2021-01-01 RX ORDER — ACETAMINOPHEN 500 MG
650 TABLET ORAL EVERY 6 HOURS
Refills: 0 | Status: DISCONTINUED | OUTPATIENT
Start: 2021-01-01 | End: 2022-01-01

## 2021-01-01 RX ORDER — OXYCODONE HYDROCHLORIDE 5 MG/1
5 TABLET ORAL EVERY 8 HOURS
Refills: 0 | Status: DISCONTINUED | OUTPATIENT
Start: 2021-01-01 | End: 2021-01-01

## 2021-01-01 RX ORDER — ATORVASTATIN CALCIUM 40 MG/1
40 TABLET, FILM COATED ORAL
Qty: 90 | Refills: 3 | Status: ACTIVE | COMMUNITY
Start: 2021-01-01 | End: 1900-01-01

## 2021-01-01 RX ORDER — HYDRALAZINE HCL 50 MG
1 TABLET ORAL
Qty: 0 | Refills: 0 | DISCHARGE

## 2021-01-01 RX ORDER — ASPIRIN/CALCIUM CARB/MAGNESIUM 324 MG
81 TABLET ORAL DAILY
Refills: 0 | Status: DISCONTINUED | OUTPATIENT
Start: 2021-01-01 | End: 2021-01-01

## 2021-01-01 RX ORDER — LANOLIN ALCOHOL/MO/W.PET/CERES
3 CREAM (GRAM) TOPICAL AT BEDTIME
Refills: 0 | Status: DISCONTINUED | OUTPATIENT
Start: 2021-01-01 | End: 2022-01-01

## 2021-01-01 RX ORDER — SODIUM BICARBONATE 1 MEQ/ML
650 SYRINGE (ML) INTRAVENOUS
Refills: 0 | Status: DISCONTINUED | OUTPATIENT
Start: 2021-01-01 | End: 2022-01-01

## 2021-01-01 RX ORDER — DICLOFENAC SODIUM 1% 10 MG/G
1 GEL TOPICAL DAILY
Qty: 2 | Refills: 1 | Status: ACTIVE | COMMUNITY
Start: 2021-01-01 | End: 1900-01-01

## 2021-01-01 RX ORDER — CEPHALEXIN 500 MG
1 CAPSULE ORAL
Qty: 6 | Refills: 0
Start: 2021-01-01 | End: 2021-01-01

## 2021-01-01 RX ORDER — INSULIN LISPRO 100/ML
VIAL (ML) SUBCUTANEOUS AT BEDTIME
Refills: 0 | Status: DISCONTINUED | OUTPATIENT
Start: 2021-01-01 | End: 2022-01-01

## 2021-01-01 RX ORDER — CHLORHEXIDINE GLUCONATE 4 %
325 (65 FE) LIQUID (ML) TOPICAL
Qty: 60 | Refills: 3 | Status: COMPLETED | COMMUNITY
Start: 2017-07-19 | End: 2021-01-01

## 2021-01-01 RX ORDER — SODIUM BICARBONATE 650 MG/1
650 TABLET ORAL TWICE DAILY
Qty: 180 | Refills: 3 | Status: ACTIVE | COMMUNITY
Start: 2021-01-01 | End: 1900-01-01

## 2021-01-01 RX ORDER — INSULIN LISPRO 100/ML
VIAL (ML) SUBCUTANEOUS
Refills: 0 | Status: DISCONTINUED | OUTPATIENT
Start: 2021-01-01 | End: 2022-01-01

## 2021-01-01 RX ORDER — HYDRALAZINE HCL 50 MG
10 TABLET ORAL
Refills: 0 | Status: DISCONTINUED | OUTPATIENT
Start: 2021-01-01 | End: 2022-01-01

## 2021-01-01 RX ORDER — OXYCODONE HYDROCHLORIDE 5 MG/1
5 TABLET ORAL EVERY 6 HOURS
Refills: 0 | Status: DISCONTINUED | OUTPATIENT
Start: 2021-01-01 | End: 2022-01-01

## 2021-01-01 RX ORDER — FERROUS SULFATE 325(65) MG
1 TABLET ORAL
Qty: 0 | Refills: 0 | DISCHARGE

## 2021-01-01 RX ORDER — ESCITALOPRAM OXALATE 5 MG/1
5 TABLET ORAL DAILY
Refills: 0 | Status: ACTIVE | COMMUNITY
Start: 2021-01-01

## 2021-01-01 RX ORDER — MEMANTINE HYDROCHLORIDE 10 MG/1
1 TABLET ORAL
Qty: 0 | Refills: 0 | DISCHARGE

## 2021-01-01 RX ORDER — GLUCAGON INJECTION, SOLUTION 0.5 MG/.1ML
1 INJECTION, SOLUTION SUBCUTANEOUS ONCE
Refills: 0 | Status: DISCONTINUED | OUTPATIENT
Start: 2021-01-01 | End: 2022-01-01

## 2021-01-01 RX ORDER — DEXTROSE 50 % IN WATER 50 %
12.5 SYRINGE (ML) INTRAVENOUS ONCE
Refills: 0 | Status: DISCONTINUED | OUTPATIENT
Start: 2021-01-01 | End: 2022-01-01

## 2021-01-01 RX ORDER — DEXTROSE 50 % IN WATER 50 %
15 SYRINGE (ML) INTRAVENOUS ONCE
Refills: 0 | Status: DISCONTINUED | OUTPATIENT
Start: 2021-01-01 | End: 2022-01-01

## 2021-01-01 RX ORDER — HYDRALAZINE HCL 50 MG
2 TABLET ORAL
Qty: 0 | Refills: 0 | DISCHARGE

## 2021-01-01 RX ORDER — POTASSIUM CHLORIDE 750 MG/1
10 TABLET, EXTENDED RELEASE ORAL TWICE DAILY
Qty: 180 | Refills: 3 | Status: DISCONTINUED | COMMUNITY
Start: 2021-01-01 | End: 2021-01-01

## 2021-01-01 RX ORDER — ACETAMINOPHEN 500 MG
2 TABLET ORAL
Qty: 0 | Refills: 0 | DISCHARGE

## 2021-01-01 RX ORDER — METHOCARBAMOL 500 MG/1
500 TABLET, FILM COATED ORAL
Qty: 60 | Refills: 3 | Status: ACTIVE | COMMUNITY
Start: 2021-01-01 | End: 1900-01-01

## 2021-01-01 RX ORDER — APIXABAN 2.5 MG/1
2.5 TABLET, FILM COATED ORAL
Qty: 180 | Refills: 2 | Status: DISCONTINUED | COMMUNITY
Start: 2021-01-01 | End: 2021-01-01

## 2021-01-01 RX ORDER — ISOSORBIDE MONONITRATE 30 MG/1
30 TABLET, EXTENDED RELEASE ORAL
Qty: 90 | Refills: 3 | Status: ACTIVE | COMMUNITY
Start: 2017-12-19 | End: 1900-01-01

## 2021-01-01 RX ORDER — ATORVASTATIN CALCIUM 80 MG/1
40 TABLET, FILM COATED ORAL AT BEDTIME
Refills: 0 | Status: DISCONTINUED | OUTPATIENT
Start: 2021-01-01 | End: 2022-01-01

## 2021-01-01 RX ORDER — OXYCODONE 5 MG/1
5 TABLET ORAL
Qty: 90 | Refills: 0 | Status: ACTIVE | COMMUNITY
Start: 2021-01-01 | End: 1900-01-01

## 2021-01-01 RX ORDER — ACETAMINOPHEN 500 MG/1
500 TABLET, COATED ORAL
Qty: 60 | Refills: 0 | Status: ACTIVE | COMMUNITY
Start: 2021-01-01

## 2021-01-01 RX ORDER — ATORVASTATIN CALCIUM 80 MG/1
1 TABLET, FILM COATED ORAL
Qty: 0 | Refills: 0 | DISCHARGE

## 2021-01-01 RX ORDER — WARFARIN SODIUM 2.5 MG/1
2.5 TABLET ORAL ONCE
Refills: 0 | Status: DISCONTINUED | OUTPATIENT
Start: 2021-01-01 | End: 2021-01-01

## 2021-01-01 RX ORDER — ACETAMINOPHEN 500 MG
1000 TABLET ORAL ONCE
Refills: 0 | Status: COMPLETED | OUTPATIENT
Start: 2021-01-01 | End: 2021-01-01

## 2021-01-01 RX ORDER — INSULIN ASPART 100 [IU]/ML
100 INJECTION, SOLUTION INTRAVENOUS; SUBCUTANEOUS
Qty: 45 | Refills: 3 | Status: ACTIVE | COMMUNITY
Start: 2017-04-21 | End: 1900-01-01

## 2021-01-01 RX ORDER — ISOSORBIDE DINITRATE 5 MG/1
1 TABLET ORAL
Qty: 0 | Refills: 0 | DISCHARGE

## 2021-01-01 RX ORDER — METOPROLOL TARTRATE 50 MG
3 TABLET ORAL
Qty: 0 | Refills: 0 | DISCHARGE

## 2021-01-01 RX ORDER — TAMSULOSIN HYDROCHLORIDE 0.4 MG/1
0.4 CAPSULE ORAL AT BEDTIME
Refills: 0 | Status: DISCONTINUED | OUTPATIENT
Start: 2021-01-01 | End: 2022-01-01

## 2021-01-01 RX ORDER — METOPROLOL SUCCINATE 25 MG/1
25 TABLET, EXTENDED RELEASE ORAL
Qty: 90 | Refills: 0 | Status: ACTIVE | COMMUNITY
Start: 2017-07-11 | End: 1900-01-01

## 2021-01-01 RX ORDER — ISOSORBIDE MONONITRATE 60 MG/1
30 TABLET, EXTENDED RELEASE ORAL DAILY
Refills: 0 | Status: DISCONTINUED | OUTPATIENT
Start: 2021-01-01 | End: 2022-01-01

## 2021-01-01 RX ADMIN — Medication 10 MILLIGRAM(S): at 06:22

## 2021-01-01 RX ADMIN — OXYCODONE HYDROCHLORIDE 5 MILLIGRAM(S): 5 TABLET ORAL at 10:55

## 2021-01-01 RX ADMIN — OXYCODONE HYDROCHLORIDE 5 MILLIGRAM(S): 5 TABLET ORAL at 11:25

## 2021-01-01 RX ADMIN — Medication 10 MILLIGRAM(S): at 17:59

## 2021-01-01 RX ADMIN — TAMSULOSIN HYDROCHLORIDE 0.4 MILLIGRAM(S): 0.4 CAPSULE ORAL at 21:34

## 2021-01-01 RX ADMIN — Medication 2000 UNIT(S): at 12:58

## 2021-01-01 RX ADMIN — Medication 81 MILLIGRAM(S): at 12:58

## 2021-01-01 RX ADMIN — Medication 10 MILLIGRAM(S): at 05:47

## 2021-01-01 RX ADMIN — OXYCODONE HYDROCHLORIDE 5 MILLIGRAM(S): 5 TABLET ORAL at 06:30

## 2021-01-01 RX ADMIN — Medication 81 MILLIGRAM(S): at 12:02

## 2021-01-01 RX ADMIN — LIDOCAINE 2 PATCH: 4 CREAM TOPICAL at 08:37

## 2021-01-01 RX ADMIN — Medication 3 MILLIGRAM(S): at 22:04

## 2021-01-01 RX ADMIN — Medication 2000 UNIT(S): at 11:29

## 2021-01-01 RX ADMIN — Medication 75 MILLIGRAM(S): at 05:47

## 2021-01-01 RX ADMIN — ISOSORBIDE MONONITRATE 30 MILLIGRAM(S): 60 TABLET, EXTENDED RELEASE ORAL at 12:58

## 2021-01-01 RX ADMIN — DONEPEZIL HYDROCHLORIDE 10 MILLIGRAM(S): 10 TABLET, FILM COATED ORAL at 22:05

## 2021-01-01 RX ADMIN — Medication 1000 MILLIGRAM(S): at 09:24

## 2021-01-01 RX ADMIN — OXYCODONE HYDROCHLORIDE 5 MILLIGRAM(S): 5 TABLET ORAL at 17:28

## 2021-01-01 RX ADMIN — Medication 1: at 07:53

## 2021-01-01 RX ADMIN — Medication 1: at 08:50

## 2021-01-01 RX ADMIN — Medication 10 MILLIGRAM(S): at 18:22

## 2021-01-01 RX ADMIN — MEMANTINE HYDROCHLORIDE 5 MILLIGRAM(S): 10 TABLET ORAL at 05:58

## 2021-01-01 RX ADMIN — Medication 25 MILLILITER(S): at 10:40

## 2021-01-01 RX ADMIN — Medication 10 MILLIGRAM(S): at 05:58

## 2021-01-01 RX ADMIN — Medication 650 MILLIGRAM(S): at 06:22

## 2021-01-01 RX ADMIN — MEMANTINE HYDROCHLORIDE 5 MILLIGRAM(S): 10 TABLET ORAL at 17:58

## 2021-01-01 RX ADMIN — MEMANTINE HYDROCHLORIDE 5 MILLIGRAM(S): 10 TABLET ORAL at 05:47

## 2021-01-01 RX ADMIN — Medication 3: at 17:29

## 2021-01-01 RX ADMIN — Medication 650 MILLIGRAM(S): at 12:45

## 2021-01-01 RX ADMIN — OXYCODONE HYDROCHLORIDE 5 MILLIGRAM(S): 5 TABLET ORAL at 11:30

## 2021-01-01 RX ADMIN — Medication 400 MILLIGRAM(S): at 08:54

## 2021-01-01 RX ADMIN — LIDOCAINE 2 PATCH: 4 CREAM TOPICAL at 20:17

## 2021-01-01 RX ADMIN — Medication 1: at 16:59

## 2021-01-01 RX ADMIN — OXYCODONE HYDROCHLORIDE 5 MILLIGRAM(S): 5 TABLET ORAL at 01:38

## 2021-01-01 RX ADMIN — Medication 1: at 12:00

## 2021-01-01 RX ADMIN — Medication 75 MILLIGRAM(S): at 05:57

## 2021-01-01 RX ADMIN — Medication 650 MILLIGRAM(S): at 17:58

## 2021-01-01 RX ADMIN — LIDOCAINE 2 PATCH: 4 CREAM TOPICAL at 19:17

## 2021-01-01 RX ADMIN — Medication 2000 UNIT(S): at 12:01

## 2021-01-01 RX ADMIN — Medication 2: at 12:07

## 2021-01-01 RX ADMIN — ATORVASTATIN CALCIUM 40 MILLIGRAM(S): 80 TABLET, FILM COATED ORAL at 22:05

## 2021-01-01 RX ADMIN — OXYCODONE HYDROCHLORIDE 5 MILLIGRAM(S): 5 TABLET ORAL at 15:38

## 2021-01-01 RX ADMIN — Medication 650 MILLIGRAM(S): at 20:27

## 2021-01-01 RX ADMIN — OXYCODONE HYDROCHLORIDE 5 MILLIGRAM(S): 5 TABLET ORAL at 16:08

## 2021-01-01 RX ADMIN — Medication 650 MILLIGRAM(S): at 13:15

## 2021-01-01 RX ADMIN — Medication 1: at 17:40

## 2021-01-01 RX ADMIN — ISOSORBIDE MONONITRATE 30 MILLIGRAM(S): 60 TABLET, EXTENDED RELEASE ORAL at 11:29

## 2021-01-01 RX ADMIN — MEMANTINE HYDROCHLORIDE 5 MILLIGRAM(S): 10 TABLET ORAL at 17:29

## 2021-01-01 RX ADMIN — OXYCODONE HYDROCHLORIDE 5 MILLIGRAM(S): 5 TABLET ORAL at 12:00

## 2021-01-01 RX ADMIN — OXYCODONE HYDROCHLORIDE 5 MILLIGRAM(S): 5 TABLET ORAL at 02:23

## 2021-01-01 RX ADMIN — TAMSULOSIN HYDROCHLORIDE 0.4 MILLIGRAM(S): 0.4 CAPSULE ORAL at 22:05

## 2021-01-01 RX ADMIN — Medication 650 MILLIGRAM(S): at 21:07

## 2021-01-01 RX ADMIN — ATORVASTATIN CALCIUM 40 MILLIGRAM(S): 80 TABLET, FILM COATED ORAL at 21:35

## 2021-01-01 RX ADMIN — Medication 650 MILLIGRAM(S): at 05:47

## 2021-01-01 RX ADMIN — ISOSORBIDE MONONITRATE 30 MILLIGRAM(S): 60 TABLET, EXTENDED RELEASE ORAL at 12:02

## 2021-01-01 RX ADMIN — Medication 650 MILLIGRAM(S): at 17:42

## 2021-01-01 RX ADMIN — OXYCODONE HYDROCHLORIDE 5 MILLIGRAM(S): 5 TABLET ORAL at 17:58

## 2021-01-01 RX ADMIN — TAMSULOSIN HYDROCHLORIDE 0.4 MILLIGRAM(S): 0.4 CAPSULE ORAL at 21:39

## 2021-01-01 RX ADMIN — OXYCODONE HYDROCHLORIDE 5 MILLIGRAM(S): 5 TABLET ORAL at 05:54

## 2021-01-01 RX ADMIN — ATORVASTATIN CALCIUM 40 MILLIGRAM(S): 80 TABLET, FILM COATED ORAL at 21:39

## 2021-01-01 RX ADMIN — MEMANTINE HYDROCHLORIDE 5 MILLIGRAM(S): 10 TABLET ORAL at 17:41

## 2021-01-01 RX ADMIN — Medication 10 MILLIGRAM(S): at 17:26

## 2021-01-01 RX ADMIN — Medication 650 MILLIGRAM(S): at 17:28

## 2021-01-01 RX ADMIN — DONEPEZIL HYDROCHLORIDE 10 MILLIGRAM(S): 10 TABLET, FILM COATED ORAL at 21:35

## 2021-01-01 RX ADMIN — DONEPEZIL HYDROCHLORIDE 10 MILLIGRAM(S): 10 TABLET, FILM COATED ORAL at 21:39

## 2021-01-01 RX ADMIN — MEMANTINE HYDROCHLORIDE 5 MILLIGRAM(S): 10 TABLET ORAL at 06:23

## 2021-01-01 RX ADMIN — Medication 650 MILLIGRAM(S): at 05:59

## 2021-01-01 RX ADMIN — Medication 75 MILLIGRAM(S): at 06:22

## 2021-01-01 NOTE — ED ADULT NURSE NOTE - CARDIO ASSESSMENT
VSS in RA. Consolidated feeds to over 2 hours and 30 minutes. Seems to be tolerating well. Bottled 26, 26, 28 and 31ml, although more tired today and taking longer to finish feeds. Voiding and stooling. Mother present, active, and competent with cares throughout shift. Father present and active with cares as well. Continue to monitor parameters and notify care team with variations or other concerns.      WDL

## 2021-01-04 ENCOUNTER — APPOINTMENT (OUTPATIENT)
Dept: SURGERY | Facility: HOSPITAL | Age: 86
End: 2021-01-04
Payer: MEDICARE

## 2021-01-04 ENCOUNTER — OUTPATIENT (OUTPATIENT)
Dept: OUTPATIENT SERVICES | Facility: HOSPITAL | Age: 86
LOS: 1 days | Discharge: HOME | End: 2021-01-04
Payer: MEDICARE

## 2021-01-04 VITALS
HEART RATE: 75 BPM | BODY MASS INDEX: 25.31 KG/M2 | WEIGHT: 167 LBS | HEIGHT: 68 IN | TEMPERATURE: 97.8 F | OXYGEN SATURATION: 98 % | DIASTOLIC BLOOD PRESSURE: 76 MMHG | RESPIRATION RATE: 20 BRPM | SYSTOLIC BLOOD PRESSURE: 157 MMHG

## 2021-01-04 DIAGNOSIS — Z98.49 CATARACT EXTRACTION STATUS, UNSPECIFIED EYE: Chronic | ICD-10-CM

## 2021-01-04 DIAGNOSIS — Z95.4 PRESENCE OF OTHER HEART-VALVE REPLACEMENT: Chronic | ICD-10-CM

## 2021-01-04 DIAGNOSIS — Z95.1 PRESENCE OF AORTOCORONARY BYPASS GRAFT: Chronic | ICD-10-CM

## 2021-01-04 DIAGNOSIS — Z95.810 PRESENCE OF AUTOMATIC (IMPLANTABLE) CARDIAC DEFIBRILLATOR: Chronic | ICD-10-CM

## 2021-01-04 DIAGNOSIS — I83.228 VARICOSE VEINS OF LEFT LOWER EXTREMITY WITH BOTH ULCER OF OTHER PART OF LOWER EXTREMITY AND INFLAMMATION: ICD-10-CM

## 2021-01-04 DIAGNOSIS — Z98.89 OTHER SPECIFIED POSTPROCEDURAL STATES: Chronic | ICD-10-CM

## 2021-01-04 LAB
INR PPP: 2.42 RATIO
PT BLD: 27.4 SEC

## 2021-01-04 PROCEDURE — 29581 APPL MULTLAYER CMPRN SYS LEG: CPT | Mod: RT

## 2021-01-04 PROCEDURE — 99213 OFFICE O/P EST LOW 20 MIN: CPT

## 2021-01-04 NOTE — PHYSICAL EXAM
[4 x 4] : 4 x 4  [Normal Breath Sounds] : Normal breath sounds [Normal Heart Sounds] : normal heart sounds [2+] : left 2+ [1+] : right 1+ [0] : left 0 [Ankle Swelling (On Exam)] : present [Ankle Swelling Bilaterally] : bilaterally  [Varicose Veins Of Lower Extremities] : bilaterally [] : of the right leg [Ankle Swelling On The Left] : moderate [Alert] : alert [Oriented to Person] : oriented to person [Calm] : calm [JVD] : no jugular venous distention  [de-identified] : WD/WN in no acute distress. [de-identified] : KENYATTAL [de-identified] : WNL [de-identified] : Right leg with few small superficial ulcers, bases are red and viable, no drainage, no acute infection, periwound skin is intact with no cellulitis. [FreeTextEntry1] :  Anterior Leg - Inferior  [FreeTextEntry2] : 1.4 [FreeTextEntry3] : 1.1 [FreeTextEntry4] : 0.1/eschar [de-identified] : Serous/sanguinous [de-identified] : Coban. Expressed comfort post Coban application. [de-identified] : Adaptic, Silver alginate [de-identified] : Cleansed with Normal Saline\par  [FreeTextEntry7] : Anterior Leg - superior  [FreeTextEntry8] : 8.1 [FreeTextEntry9] : 4.7 [de-identified] : 0.1 [de-identified] : Serous/sanguinous [de-identified] : Coban. Expressed comfort post Coban application. [de-identified] : Adaptic, Silver alginate [de-identified] : Cleansed with Normal Saline\par  [TWNoteComboBox1] : Right [TWNoteComboBox4] : Small [de-identified] : Normal [de-identified] : None [de-identified] : None [de-identified] : 100% [de-identified] : No [de-identified] : Multilayer other compression wrap [de-identified] : Weekly [de-identified] : Primary Dressing [TWNoteComboBox9] : Right [de-identified] : Moderate [de-identified] : Normal [de-identified] : None [de-identified] : None [de-identified] : 100% [de-identified] : No [de-identified] : Multilayer other compression wrap [de-identified] : Weekly [de-identified] : Primary Dressing

## 2021-01-04 NOTE — VITALS
[Pain related to present condition?] : The patient's  pain is related to present condition. [] : No [de-identified] : 3/10 [FreeTextEntry3] : Right leg  [FreeTextEntry1] : Elevation  [FreeTextEntry2] : Keeping leg dependent [FreeTextEntry4] : None

## 2021-01-04 NOTE — PLAN
[FreeTextEntry1] : Silver Alginate, dry dressing, Coband wrap, return to office in one week.\par 25 minutes spent for patient care and medical decision making.\par \par \par

## 2021-01-04 NOTE — ASSESSMENT
[Verbal] : Verbal [Written] : Written [Demo] : Demo [Patient] : Patient [Good - alert, interested, motivated] : Good - alert, interested, motivated [Needs reinforcement] : needs reinforcement [Dressing changes] : dressing changes [Skin Care] : skin care [Signs and symptoms of infection] : sign and symptoms of infection [Venous Disease] : venous disease [Nutrition] : nutrition [How and When to Call] : how and when to call [Pain Management] : pain management [Compression Therapy] : compression therapy [Patient responsibility to plan of care] : patient responsibility to plan of care [Stable] : stable [Home] : Home [Ambulatory] : Ambulatory [Not Applicable - Long Term Care/Home Health Agency] : Long Term Care/Home Health Agency: Not Applicable [] : No [FreeTextEntry2] : Infection prevention\par Localized wound care \par Goal of remaining pain free regarding wounds.\par Acceptable pain tolerance levels deemed to be 3/10. \par F/U  [FreeTextEntry3] : Wound measures larger in size  [FreeTextEntry4] : F/U 1 week  [FreeTextEntry1] : Right leg stasis ulcers are stable, no acute infection\par

## 2021-01-05 DIAGNOSIS — I83.893 VARICOSE VEINS OF BILATERAL LOWER EXTREMITIES WITH OTHER COMPLICATIONS: ICD-10-CM

## 2021-01-05 DIAGNOSIS — N40.1 BENIGN PROSTATIC HYPERPLASIA WITH LOWER URINARY TRACT SYMPTOMS: ICD-10-CM

## 2021-01-05 DIAGNOSIS — Z79.899 OTHER LONG TERM (CURRENT) DRUG THERAPY: ICD-10-CM

## 2021-01-05 DIAGNOSIS — E11.51 TYPE 2 DIABETES MELLITUS WITH DIABETIC PERIPHERAL ANGIOPATHY WITHOUT GANGRENE: ICD-10-CM

## 2021-01-05 DIAGNOSIS — Z95.4 PRESENCE OF OTHER HEART-VALVE REPLACEMENT: ICD-10-CM

## 2021-01-05 DIAGNOSIS — I83.218 VARICOSE VEINS OF RIGHT LOWER EXTREMITY WITH BOTH ULCER OF OTHER PART OF LOWER EXTREMITY AND INFLAMMATION: ICD-10-CM

## 2021-01-05 DIAGNOSIS — N13.8 OTHER OBSTRUCTIVE AND REFLUX UROPATHY: ICD-10-CM

## 2021-01-05 DIAGNOSIS — N18.5 CHRONIC KIDNEY DISEASE, STAGE 5: ICD-10-CM

## 2021-01-05 DIAGNOSIS — Z79.4 LONG TERM (CURRENT) USE OF INSULIN: ICD-10-CM

## 2021-01-05 DIAGNOSIS — Z86.73 PERSONAL HISTORY OF TRANSIENT ISCHEMIC ATTACK (TIA), AND CEREBRAL INFARCTION WITHOUT RESIDUAL DEFICITS: ICD-10-CM

## 2021-01-05 DIAGNOSIS — Z96.1 PRESENCE OF INTRAOCULAR LENS: ICD-10-CM

## 2021-01-05 DIAGNOSIS — E55.9 VITAMIN D DEFICIENCY, UNSPECIFIED: ICD-10-CM

## 2021-01-05 DIAGNOSIS — E11.22 TYPE 2 DIABETES MELLITUS WITH DIABETIC CHRONIC KIDNEY DISEASE: ICD-10-CM

## 2021-01-05 DIAGNOSIS — G31.84 MILD COGNITIVE IMPAIRMENT OF UNCERTAIN OR UNKNOWN ETIOLOGY: ICD-10-CM

## 2021-01-05 DIAGNOSIS — D64.9 ANEMIA, UNSPECIFIED: ICD-10-CM

## 2021-01-05 DIAGNOSIS — Z82.0 FAMILY HISTORY OF EPILEPSY AND OTHER DISEASES OF THE NERVOUS SYSTEM: ICD-10-CM

## 2021-01-05 DIAGNOSIS — L97.812 NON-PRESSURE CHRONIC ULCER OF OTHER PART OF RIGHT LOWER LEG WITH FAT LAYER EXPOSED: ICD-10-CM

## 2021-01-05 DIAGNOSIS — Z87.440 PERSONAL HISTORY OF URINARY (TRACT) INFECTIONS: ICD-10-CM

## 2021-01-05 DIAGNOSIS — Z80.9 FAMILY HISTORY OF MALIGNANT NEOPLASM, UNSPECIFIED: ICD-10-CM

## 2021-01-05 DIAGNOSIS — Z98.61 CORONARY ANGIOPLASTY STATUS: ICD-10-CM

## 2021-01-05 DIAGNOSIS — Z87.891 PERSONAL HISTORY OF NICOTINE DEPENDENCE: ICD-10-CM

## 2021-01-05 DIAGNOSIS — E78.5 HYPERLIPIDEMIA, UNSPECIFIED: ICD-10-CM

## 2021-01-05 DIAGNOSIS — Z79.82 LONG TERM (CURRENT) USE OF ASPIRIN: ICD-10-CM

## 2021-01-05 DIAGNOSIS — I50.22 CHRONIC SYSTOLIC (CONGESTIVE) HEART FAILURE: ICD-10-CM

## 2021-01-05 DIAGNOSIS — Z95.810 PRESENCE OF AUTOMATIC (IMPLANTABLE) CARDIAC DEFIBRILLATOR: ICD-10-CM

## 2021-01-05 DIAGNOSIS — Z98.49 CATARACT EXTRACTION STATUS, UNSPECIFIED EYE: ICD-10-CM

## 2021-01-05 DIAGNOSIS — I13.2 HYPERTENSIVE HEART AND CHRONIC KIDNEY DISEASE WITH HEART FAILURE AND WITH STAGE 5 CHRONIC KIDNEY DISEASE, OR END STAGE RENAL DISEASE: ICD-10-CM

## 2021-01-05 DIAGNOSIS — E11.42 TYPE 2 DIABETES MELLITUS WITH DIABETIC POLYNEUROPATHY: ICD-10-CM

## 2021-01-05 DIAGNOSIS — I48.91 UNSPECIFIED ATRIAL FIBRILLATION: ICD-10-CM

## 2021-01-11 ENCOUNTER — APPOINTMENT (OUTPATIENT)
Dept: NEUROLOGY | Facility: CLINIC | Age: 86
End: 2021-01-11

## 2021-01-11 ENCOUNTER — OUTPATIENT (OUTPATIENT)
Dept: OUTPATIENT SERVICES | Facility: HOSPITAL | Age: 86
LOS: 1 days | Discharge: HOME | End: 2021-01-11
Payer: MEDICARE

## 2021-01-11 ENCOUNTER — APPOINTMENT (OUTPATIENT)
Dept: SURGERY | Facility: HOSPITAL | Age: 86
End: 2021-01-11
Payer: MEDICARE

## 2021-01-11 VITALS
WEIGHT: 167 LBS | TEMPERATURE: 97.6 F | DIASTOLIC BLOOD PRESSURE: 72 MMHG | SYSTOLIC BLOOD PRESSURE: 140 MMHG | HEART RATE: 77 BPM | OXYGEN SATURATION: 97 % | HEIGHT: 68 IN | BODY MASS INDEX: 25.31 KG/M2 | RESPIRATION RATE: 20 BRPM

## 2021-01-11 DIAGNOSIS — Z98.89 OTHER SPECIFIED POSTPROCEDURAL STATES: Chronic | ICD-10-CM

## 2021-01-11 DIAGNOSIS — Z95.810 PRESENCE OF AUTOMATIC (IMPLANTABLE) CARDIAC DEFIBRILLATOR: Chronic | ICD-10-CM

## 2021-01-11 DIAGNOSIS — Z95.4 PRESENCE OF OTHER HEART-VALVE REPLACEMENT: Chronic | ICD-10-CM

## 2021-01-11 DIAGNOSIS — Z95.1 PRESENCE OF AORTOCORONARY BYPASS GRAFT: Chronic | ICD-10-CM

## 2021-01-11 DIAGNOSIS — Z98.49 CATARACT EXTRACTION STATUS, UNSPECIFIED EYE: Chronic | ICD-10-CM

## 2021-01-11 DIAGNOSIS — I83.228 VARICOSE VEINS OF LEFT LOWER EXTREMITY WITH BOTH ULCER OF OTHER PART OF LOWER EXTREMITY AND INFLAMMATION: ICD-10-CM

## 2021-01-11 PROCEDURE — 99213 OFFICE O/P EST LOW 20 MIN: CPT

## 2021-01-11 PROCEDURE — 29581 APPL MULTLAYER CMPRN SYS LEG: CPT | Mod: RT

## 2021-01-11 NOTE — ASSESSMENT
[Verbal] : Verbal [Demo] : Demo [Patient] : Patient [Needs reinforcement] : needs reinforcement [Dressing changes] : dressing changes [Skin Care] : skin care [Signs and symptoms of infection] : sign and symptoms of infection [Venous Disease] : venous disease [Nutrition] : nutrition [How and When to Call] : how and when to call [Pain Management] : pain management [Compression Therapy] : compression therapy [Patient responsibility to plan of care] : patient responsibility to plan of care [Stable] : stable [Home] : Home [Ambulatory] : Ambulatory [Not Applicable - Long Term Care/Home Health Agency] : Long Term Care/Home Health Agency: Not Applicable [Fair - mild discomfort, physical impairment, low acceptance] : Fair - mild discomfort, physical impairment, low acceptance [Off-loading] : off-loading [Glycemic Control] : glycemic control [] : Yes [FreeTextEntry2] : Infection Prevention\par Promote Skin Integrity\par Offloading\par Elevation\par Compression Compliance\par Low Na+ Diet\par Maintain acceptable levels of pain\par Demonstrates use of both pharmacological and nonpharmacological pain management interventions\par Encourage glycemic control\par  [FreeTextEntry4] : F/U 1 week for assessment [FreeTextEntry1] : Right leg stasis ulcers are stable, no acute infection\par

## 2021-01-11 NOTE — PHYSICAL EXAM
[4 x 4] : 4 x 4  [Normal Breath Sounds] : Normal breath sounds [Normal Heart Sounds] : normal heart sounds [2+] : left 2+ [1+] : right 1+ [0] : left 0 [Ankle Swelling (On Exam)] : present [Ankle Swelling Bilaterally] : bilaterally  [Varicose Veins Of Lower Extremities] : bilaterally [] : of the right leg [Alert] : alert [Ankle Swelling On The Left] : moderate [Oriented to Person] : oriented to person [Calm] : calm [JVD] : no jugular venous distention  [de-identified] : WD/WN in no acute distress. [de-identified] : KENYATTAL [de-identified] : WNL [de-identified] : Right leg with few small superficial ulcers, bases are red and viable, no drainage, no acute infection, periwound skin is intact with no cellulitis. [de-identified] : Circ neurovascular function WNL post coban, pt expressed comfort.\par  [FreeTextEntry1] :  Anterior Leg - Inferior- scattered eschar within measurement [FreeTextEntry2] : 1.2 [FreeTextEntry3] : 1.0 [FreeTextEntry4] : 0.1 [de-identified] : sanguinous  [de-identified] : Coban [de-identified] : Adaptic, Silver alginate [de-identified] : Cleansed with Normal Saline\par  [de-identified] : Circ neurovascular function WNL post coban, pt expressed comfort.\par  [FreeTextEntry7] : Anterior Leg - superior- areas of fragile epithelium [FreeTextEntry8] : 8.0 [FreeTextEntry9] : 4.1 [de-identified] : 0.1 [de-identified] : sanguinous  [de-identified] : Adaptic, Silver alginate [de-identified] : Coban [de-identified] : Cleansed with Normal Saline\par  [TWNoteComboBox1] : Right [de-identified] : Normal [TWNoteComboBox4] : Small [de-identified] : None [de-identified] : None [de-identified] : 100% [de-identified] : No [de-identified] : Multilayer other compression wrap [de-identified] : Weekly [de-identified] : Primary Dressing [TWNoteComboBox9] : Right [de-identified] : Moderate [de-identified] : No [de-identified] : No [de-identified] : Normal [de-identified] : None [de-identified] : None [de-identified] : 100% [de-identified] : No [de-identified] : Multilayer other compression wrap [de-identified] : Weekly [de-identified] : Primary Dressing

## 2021-01-12 ENCOUNTER — APPOINTMENT (OUTPATIENT)
Dept: CARDIOLOGY | Facility: CLINIC | Age: 86
End: 2021-01-12
Payer: MEDICARE

## 2021-01-12 ENCOUNTER — NON-APPOINTMENT (OUTPATIENT)
Age: 86
End: 2021-01-12

## 2021-01-12 ENCOUNTER — APPOINTMENT (OUTPATIENT)
Dept: ENDOCRINOLOGY | Facility: CLINIC | Age: 86
End: 2021-01-12
Payer: MEDICARE

## 2021-01-12 ENCOUNTER — APPOINTMENT (OUTPATIENT)
Dept: ELECTROPHYSIOLOGY | Facility: CLINIC | Age: 86
End: 2021-01-12
Payer: MEDICARE

## 2021-01-12 VITALS
BODY MASS INDEX: 25.31 KG/M2 | HEART RATE: 72 BPM | SYSTOLIC BLOOD PRESSURE: 117 MMHG | HEIGHT: 68 IN | OXYGEN SATURATION: 99 % | WEIGHT: 167 LBS | DIASTOLIC BLOOD PRESSURE: 67 MMHG

## 2021-01-12 VITALS
HEART RATE: 88 BPM | WEIGHT: 160 LBS | SYSTOLIC BLOOD PRESSURE: 110 MMHG | BODY MASS INDEX: 24.25 KG/M2 | OXYGEN SATURATION: 92 % | TEMPERATURE: 97.8 F | DIASTOLIC BLOOD PRESSURE: 60 MMHG | HEIGHT: 68 IN

## 2021-01-12 DIAGNOSIS — N13.8 OTHER OBSTRUCTIVE AND REFLUX UROPATHY: ICD-10-CM

## 2021-01-12 DIAGNOSIS — Z98.49 CATARACT EXTRACTION STATUS, UNSPECIFIED EYE: ICD-10-CM

## 2021-01-12 DIAGNOSIS — E78.5 HYPERLIPIDEMIA, UNSPECIFIED: ICD-10-CM

## 2021-01-12 DIAGNOSIS — Z96.1 PRESENCE OF INTRAOCULAR LENS: ICD-10-CM

## 2021-01-12 DIAGNOSIS — I83.218 VARICOSE VEINS OF RIGHT LOWER EXTREMITY WITH BOTH ULCER OF OTHER PART OF LOWER EXTREMITY AND INFLAMMATION: ICD-10-CM

## 2021-01-12 DIAGNOSIS — I83.893 VARICOSE VEINS OF BILATERAL LOWER EXTREMITIES WITH OTHER COMPLICATIONS: ICD-10-CM

## 2021-01-12 DIAGNOSIS — Z95.4 PRESENCE OF OTHER HEART-VALVE REPLACEMENT: ICD-10-CM

## 2021-01-12 DIAGNOSIS — D64.9 ANEMIA, UNSPECIFIED: ICD-10-CM

## 2021-01-12 DIAGNOSIS — Z87.891 PERSONAL HISTORY OF NICOTINE DEPENDENCE: ICD-10-CM

## 2021-01-12 DIAGNOSIS — Z79.82 LONG TERM (CURRENT) USE OF ASPIRIN: ICD-10-CM

## 2021-01-12 DIAGNOSIS — Z79.899 OTHER LONG TERM (CURRENT) DRUG THERAPY: ICD-10-CM

## 2021-01-12 DIAGNOSIS — I50.22 CHRONIC SYSTOLIC (CONGESTIVE) HEART FAILURE: ICD-10-CM

## 2021-01-12 DIAGNOSIS — N40.1 BENIGN PROSTATIC HYPERPLASIA WITH LOWER URINARY TRACT SYMPTOMS: ICD-10-CM

## 2021-01-12 DIAGNOSIS — Z87.440 PERSONAL HISTORY OF URINARY (TRACT) INFECTIONS: ICD-10-CM

## 2021-01-12 DIAGNOSIS — G31.84 MILD COGNITIVE IMPAIRMENT OF UNCERTAIN OR UNKNOWN ETIOLOGY: ICD-10-CM

## 2021-01-12 DIAGNOSIS — I13.2 HYPERTENSIVE HEART AND CHRONIC KIDNEY DISEASE WITH HEART FAILURE AND WITH STAGE 5 CHRONIC KIDNEY DISEASE, OR END STAGE RENAL DISEASE: ICD-10-CM

## 2021-01-12 DIAGNOSIS — Z80.9 FAMILY HISTORY OF MALIGNANT NEOPLASM, UNSPECIFIED: ICD-10-CM

## 2021-01-12 DIAGNOSIS — Z82.0 FAMILY HISTORY OF EPILEPSY AND OTHER DISEASES OF THE NERVOUS SYSTEM: ICD-10-CM

## 2021-01-12 DIAGNOSIS — E11.51 TYPE 2 DIABETES MELLITUS WITH DIABETIC PERIPHERAL ANGIOPATHY WITHOUT GANGRENE: ICD-10-CM

## 2021-01-12 DIAGNOSIS — E11.42 TYPE 2 DIABETES MELLITUS WITH DIABETIC POLYNEUROPATHY: ICD-10-CM

## 2021-01-12 DIAGNOSIS — I48.91 UNSPECIFIED ATRIAL FIBRILLATION: ICD-10-CM

## 2021-01-12 DIAGNOSIS — N18.5 CHRONIC KIDNEY DISEASE, STAGE 5: ICD-10-CM

## 2021-01-12 DIAGNOSIS — E11.22 TYPE 2 DIABETES MELLITUS WITH DIABETIC CHRONIC KIDNEY DISEASE: ICD-10-CM

## 2021-01-12 DIAGNOSIS — Z79.4 LONG TERM (CURRENT) USE OF INSULIN: ICD-10-CM

## 2021-01-12 DIAGNOSIS — Z95.810 PRESENCE OF AUTOMATIC (IMPLANTABLE) CARDIAC DEFIBRILLATOR: ICD-10-CM

## 2021-01-12 DIAGNOSIS — Z86.73 PERSONAL HISTORY OF TRANSIENT ISCHEMIC ATTACK (TIA), AND CEREBRAL INFARCTION WITHOUT RESIDUAL DEFICITS: ICD-10-CM

## 2021-01-12 DIAGNOSIS — L97.812 NON-PRESSURE CHRONIC ULCER OF OTHER PART OF RIGHT LOWER LEG WITH FAT LAYER EXPOSED: ICD-10-CM

## 2021-01-12 DIAGNOSIS — Z98.61 CORONARY ANGIOPLASTY STATUS: ICD-10-CM

## 2021-01-12 DIAGNOSIS — E55.9 VITAMIN D DEFICIENCY, UNSPECIFIED: ICD-10-CM

## 2021-01-12 PROCEDURE — 99213 OFFICE O/P EST LOW 20 MIN: CPT

## 2021-01-12 PROCEDURE — 93284 PRGRMG EVAL IMPLANTABLE DFB: CPT

## 2021-01-12 PROCEDURE — 99214 OFFICE O/P EST MOD 30 MIN: CPT

## 2021-01-12 RX ORDER — BLOOD SUGAR DIAGNOSTIC
STRIP MISCELLANEOUS
Qty: 3 | Refills: 3 | Status: ACTIVE | COMMUNITY
Start: 2021-01-12 | End: 1900-01-01

## 2021-01-12 NOTE — HISTORY OF PRESENT ILLNESS
[FreeTextEntry1] : Klaus is a 89-year-old gentleman mild dementia, CAD, htn, hyperlipidemia,afib,  systolic heart failure who  has been feeling well with no chest pain, palpitations or shortness of breath. He does not move around much and rarely goes out. Dementia is progressing.

## 2021-01-12 NOTE — PHYSICAL EXAM
[General Appearance - Well Developed] : well developed [Normal Appearance] : normal appearance [Well Groomed] : well groomed [General Appearance - Well Nourished] : well nourished [No Deformities] : no deformities [General Appearance - In No Acute Distress] : no acute distress [Normal Conjunctiva] : the conjunctiva exhibited no abnormalities [Eyelids - No Xanthelasma] : the eyelids demonstrated no xanthelasmas [Normal Oral Mucosa] : normal oral mucosa [No Oral Pallor] : no oral pallor [No Oral Cyanosis] : no oral cyanosis [Normal Jugular Venous A Waves Present] : normal jugular venous A waves present [Normal Jugular Venous V Waves Present] : normal jugular venous V waves present [No Jugular Venous Villanueva A Waves] : no jugular venous villanueva A waves [Respiration, Rhythm And Depth] : normal respiratory rhythm and effort [Exaggerated Use Of Accessory Muscles For Inspiration] : no accessory muscle use [Auscultation Breath Sounds / Voice Sounds] : lungs were clear to auscultation bilaterally [Heart Sounds] : normal S1 and S2 [Murmurs] : no murmurs present [Irregularly Irregular] : the rhythm was irregularly irregular [Abdomen Soft] : soft [Abdomen Tenderness] : non-tender [Abdomen Mass (___ Cm)] : no abdominal mass palpated [Abnormal Walk] : normal gait [Gait - Sufficient For Exercise Testing] : the gait was sufficient for exercise testing [Nail Clubbing] : no clubbing of the fingernails [Petechial Hemorrhages (___cm)] : no petechial hemorrhages [Cyanosis, Localized] : no localized cyanosis [Skin Color & Pigmentation] : normal skin color and pigmentation [] : no rash [No Venous Stasis] : no venous stasis [Skin Lesions] : no skin lesions [No Skin Ulcers] : no skin ulcer [No Xanthoma] : no  xanthoma was observed [Oriented To Time, Place, And Person] : oriented to person, place, and time [Affect] : the affect was normal [Mood] : the mood was normal [No Anxiety] : not feeling anxious

## 2021-01-12 NOTE — DISCUSSION/SUMMARY
[___ Month(s)] : [unfilled] month(s) [FreeTextEntry1] : The patient is a 89-year-old gentleman mild dementia, diabetes mellitus, CKD stage IV, hypertension, hyperlipidemia, afib, CAD, cardiomyopathy, ICD whose mental status continues to deteriorate. \par #1 CAD - no angina, continue imdur 30mg\par #2 Htn - stable on hydralazine and toprol\par #3 Cardiomyopathy - euvolemic on exam, continue torsemide  2 tab daily, daily weights. ICD interrogation negative, echo severe LV dysfunction\par #4 Afib - rate controlled with low dose toprol, no bleeding on warfarin,INR therapeutic\par #5 DM - under control\par #6 Renal - CKD stage IV f/u Dr. Miller\par #7 Lipids-on lipitor 40mg.\par #8 Neuro- on aricept, encourage ambulation with assistance, increase hydration

## 2021-01-13 NOTE — HISTORY OF PRESENT ILLNESS
[FreeTextEntry1] : cc: diabetes\par \par 89 year old man with T2DM, with CKD, on basal bolus insulin with reasonable control. He checks glucose 1-3 times daily and values have been 82 - 240 mg/dl and had one episode of hypoglycemia 62 mg/dl.\par Taking Lantus  20 units and Novolog 6-10 units before meals depending on glucose and carbohydrate intake.  Misses doses about once per week (maybe more)\par Has been limiting  carbohydrate intake.  Minimal exercise,\par Has ulcer on leg, seeing wound care specialist\par  Retinopathy screening up to date, last optho visit was  Nov 2020, no retinopathy\par Had flu shot\par \par \par HTN: blood pressure has been controlled, no recent change in regimen\par \par Hyperlipidemia - he takes a statin\par \par \par

## 2021-01-13 NOTE — ASSESSMENT
[FreeTextEntry1] : 89 year old man with T2DM, with reasonable control\par - continue current regimen\par  - No hypoglycemia in past few weeks, does not remember circumstances surrounding prior episode  Recommend writing on glucose log any changes in diet, exercise or insulin dose in future if hypoglycemia occurs, and call MD.\par   - Retinopathy screening up to date\par  - had flu shot\par \par HTN: Blood pressure at goal, + CKD,  + microalbumin, followed by nephrology, continue current management\par \par Hyperlipidemia - continue atorvastatin,\par \par Vit d def'y - continue vitamin 2000 units daily\par \par \par \par f/u in 3 months ( if seeing Dr Miller in 3 months, can f/u in 6 months)

## 2021-01-13 NOTE — HISTORY OF PRESENT ILLNESS
[Palpitations] : no palpitations [SOB] : no dyspnea [Syncope] : no syncope [Dizziness] : no dizziness [Chest Pain] : no chest pain or discomfort [ICD Shocks] : no recent ICD shocks [Shoulder Pain] : no shoulder pain [Pain at Site] : no pain at device site [de-identified] : No complaints.

## 2021-01-13 NOTE — PROCEDURE
[Sensing Amplitude ___mv] : sensing amplitude was [unfilled] mv [___V @] : [unfilled] V [___ ms] : [unfilled] ms [Lead Imp:  ___ohms] : lead impedance was [unfilled] ohms [de-identified] : Viva Quad CRT-D [de-identified] : [employer] [de-identified] : [id number] [de-identified] : 7/10/2015

## 2021-01-13 NOTE — PHYSICAL EXAM
[Alert] : alert [Healthy Appearance] : healthy appearance [No Acute Distress] : no acute distress [Normal Sclera/Conjunctiva] : normal sclera/conjunctiva [No Proptosis] : no proptosis [No LAD] : no lymphadenopathy [Thyroid Not Enlarged] : the thyroid was not enlarged [No Respiratory Distress] : no respiratory distress [Clear to Auscultation] : lungs were clear to auscultation bilaterally [Normal S1, S2] : normal S1 and S2 [Regular Rhythm] : with a regular rhythm [Normal Bowel Sounds] : normal bowel sounds [Not Tender] : non-tender [Soft] : abdomen soft [No Spinal Tenderness] : no spinal tenderness [No Involuntary Movements] : no involuntary movements were seen [Normal Reflexes] : deep tendon reflexes were 2+ and symmetric [No Tremors] : no tremors [Normal Affect] : the affect was normal [Normal Mood] : the mood was normal [de-identified] : right le edema [de-identified] : Dressing on right leg

## 2021-01-13 NOTE — DISCUSSION/SUMMARY
[FreeTextEntry1] : Normal device function with adequate safety margins. Permanent atrial arrhythmia with good rate control 95.4% BIV paced.  He is approaching NUVIA (10 months).  We discussed the recommendation of "downgrading" to CRT-P.  He has no history of appropriate ICD therapy. \par \par He is set up for remote monitoring and tones for NUVIA were demonstrated. \par \par Follow up in 6 months.

## 2021-01-19 NOTE — PLAN
[FreeTextEntry1] : ag alginate, DD, coban\par leg elevation\par f/u 1 wk.\par \par time spent- 20 mins.

## 2021-01-19 NOTE — PHYSICAL EXAM
[4 x 4] : 4 x 4  [Normal Thyroid] : the thyroid was normal [Normal Breath Sounds] : Normal breath sounds [Normal Heart Sounds] : normal heart sounds [Normal Rate and Rhythm] : normal rate and rhythm [Ankle Swelling (On Exam)] : present [Ankle Swelling On The Left] : moderate [] : of the right leg [Ankle Swelling On The Right] : mild [Alert] : alert [Oriented to Person] : oriented to person [Oriented to Place] : oriented to place [Oriented to Time] : oriented to time [Calm] : calm [JVD] : no jugular venous distention  [Abdomen Masses] : No abdominal massess [Abdomen Tenderness] : ~T ~M No abdominal tenderness [Tender] : nontender [Enlarged] : not enlarged [de-identified] : elderly WM, NAD, alert, Ox 3. [de-identified] : No neurovascular deficit noted \par  [FreeTextEntry1] :  Anterior Leg - Inferior- scattered eschar within measurement [FreeTextEntry2] : 2.0 [FreeTextEntry3] : 2.5 [FreeTextEntry4] : 0.1 [de-identified] : Scant Serosanguineous  [de-identified] : 1-10% [de-identified] : 90% [de-identified] : Pt denies any pain or discomfort post Coban application  [de-identified] : Adaptic, Silver alginate [de-identified] : Cleansed with Normal Saline\par Kerlix \par  [de-identified] : No neurovascular deficit noted \par  [FreeTextEntry7] : Anterior Leg - superior- Scattered areas of fragile epithelium [FreeTextEntry8] : 9.5 [FreeTextEntry9] : 7.0 [de-identified] : 0.1 [de-identified] : Sanguineous  [de-identified] : 50% [de-identified] : Pt denies any pain or discomfort post Coban application  [de-identified] : Adaptic, Silver alginate [de-identified] : Cleansed with Normal Saline\par Kerlix \par  [TWNoteComboBox1] : Right [TWNoteComboBox4] : Small [de-identified] : Normal [de-identified] : None [de-identified] : None [de-identified] : <20% [de-identified] : Yes [de-identified] : Multilayer other compression wrap [de-identified] : Weekly [de-identified] : Primary Dressing [TWNoteComboBox9] : Right [de-identified] : No [de-identified] : Moderate [de-identified] : No [de-identified] : Normal [de-identified] : None [de-identified] : None [de-identified] : 50% [de-identified] : Yes [de-identified] : Multilayer other compression wrap [de-identified] : Weekly [de-identified] : Primary Dressing

## 2021-01-19 NOTE — ASSESSMENT
[Verbal] : Verbal [Demo] : Demo [Patient] : Patient [Fair - mild discomfort, physical impairment, low acceptance] : Fair - mild discomfort, physical impairment, low acceptance [Verbalizes knowledge/Understanding] : Verbalizes knowledge/understanding [Dressing changes] : dressing changes [Skin Care] : skin care [Signs and symptoms of infection] : sign and symptoms of infection [Pressure relief] : pressure relief [How and When to Call] : how and when to call [Pain Management] : pain management [Compression Therapy] : compression therapy [Patient responsibility to plan of care] : patient responsibility to plan of care [Stable] : stable [Home] : Home [Ambulatory] : Ambulatory [Not Applicable - Long Term Care/Home Health Agency] : Long Term Care/Home Health Agency: Not Applicable [] : No [FreeTextEntry2] : Infection Prevention\par Localized Wound Care\par Promote Optimal Skin Integrity\par Compression Therapy - Coban Compression\par Compression, Elevation and Low Na+ Diet \par Maintain acceptable pain level with use of pharmacological and nonpharmacological interventions\par Pressure Relief   [FreeTextEntry3] : Area of scattered fragile epithelium has increased in size  [FreeTextEntry4] : F/U to [facility] in One Week for assessment

## 2021-01-19 NOTE — VITALS
[Throbbing] : throbbing [] : No [de-identified] : Pt reports pain 4/10 [FreeTextEntry3] : Right Lower Leg  [FreeTextEntry1] : Rest [FreeTextEntry2] : First thing in the morning  [FreeTextEntry4] : Elevation

## 2021-01-19 NOTE — HISTORY OF PRESENT ILLNESS
[FreeTextEntry1] : 89 yo WM, here for f/u of chronic RLE VSU. Using coban wrap. New epithel forming.

## 2021-01-25 NOTE — PLAN
[FreeTextEntry1] : Patient examined and evaluated at this time.\par Continue local wound care and offloading.\par Pt to obtain compression socks.\par Patient to follow up in 1 week.\par Spent 20 minutes for patient care and medical decision making.\par

## 2021-01-25 NOTE — PHYSICAL EXAM
[0] : right 0 [1+] : left 1+ [Ankle Swelling (On Exam)] : not present [Varicose Veins Of Lower Extremities] : bilaterally [] : bilaterally [Ankle Swelling On The Left] : moderate [Skin Ulcer] : ulcer [Calm] : calm [de-identified] : A&Ox3, NAD [de-identified] : 5/5 strength in all quadrants bilaterally [de-identified] : right lower leg venous stasis ulceration down to skin, subcutaneous tissue, and fat, healed. Bilateral venous stasis dermatitis [de-identified] : light touch sensation intact bilaterally [FreeTextEntry1] : Right Anterior Leg- Inferior- Closed [de-identified] : Ace Bandages here today- pt's own compression stockings at home [de-identified] : No Dressing [de-identified] : Cleansed with Normal saline\par  [FreeTextEntry7] : Right Anterior Leg- Superior- Closed [de-identified] : Ace Bandages here today- pt's own compression stockings at home [de-identified] : No Dressing [de-identified] : Cleansed with Normal saline\par

## 2021-01-25 NOTE — REVIEW OF SYSTEMS
[Fever] : no fever [Eye Pain] : no eye pain [Earache] : no earache [Chest Pain] : no chest pain [Shortness Of Breath] : no shortness of breath [Cough] : no cough [Abdominal Pain] : no abdominal pain [Skin Wound] : skin wound [de-identified] : right lower leg venous stasis ulceration down to skin, subcutaneous tissue, and fat, healed. Bilateral venous stasis dermatitis

## 2021-01-25 NOTE — REVIEW OF SYSTEMS
[Fever] : no fever [Eye Pain] : no eye pain [Earache] : no earache [Chest Pain] : no chest pain [Shortness Of Breath] : no shortness of breath [Cough] : no cough [Abdominal Pain] : no abdominal pain [Skin Wound] : skin wound [de-identified] : right lower leg venous stasis ulceration down to skin, subcutaneous tissue, and fat, healed. Bilateral venous stasis dermatitis

## 2021-01-25 NOTE — ASSESSMENT
[Verbal] : Verbal [Demo] : Demo [Patient] : Patient [Good - alert, interested, motivated] : Good - alert, interested, motivated [Verbalizes knowledge/Understanding] : Verbalizes knowledge/understanding [Dressing changes] : dressing changes [Skin Care] : skin care [Pressure relief] : pressure relief [Signs and symptoms of infection] : sign and symptoms of infection [How and When to Call] : how and when to call [Off-loading] : off-loading [Compression Therapy] : compression therapy [Patient responsibility to plan of care] : patient responsibility to plan of care [Stable] : stable [Home] : Home [Ambulatory] : Ambulatory [] : No [FreeTextEntry2] : Maintain optimal skin integrity\par  [FreeTextEntry4] : Dr Trinh\par Wounds closed\par Importance of Compression Therapy reviewed with pt by DPM- [facility] but will [facility] a pair of OTC compression stockings for now- Dr Trinh aware\par F/U to [facility] in 2 weeks

## 2021-01-25 NOTE — HISTORY OF PRESENT ILLNESS
[FreeTextEntry1] : Patient seen for right lower leg venous stasis ulceration down to skin, subcutaneous tissue, and fat, healed. Bilateral venous stasis dermatitis. Denies any other complaints at this time.

## 2021-01-25 NOTE — ASSESSMENT
[Verbal] : Verbal [Demo] : Demo [Patient] : Patient [Good - alert, interested, motivated] : Good - alert, interested, motivated [Verbalizes knowledge/Understanding] : Verbalizes knowledge/understanding [Dressing changes] : dressing changes [Skin Care] : skin care [Pressure relief] : pressure relief [Signs and symptoms of infection] : sign and symptoms of infection [How and When to Call] : how and when to call [Off-loading] : off-loading [Compression Therapy] : compression therapy [Patient responsibility to plan of care] : patient responsibility to plan of care [Stable] : stable [Home] : Home [Ambulatory] : Ambulatory [] : No [FreeTextEntry2] : Maintain optimal skin integrity\par  [FreeTextEntry4] : Dr Trinh\par Wounds closed\par Importance of Compression Therapy reviewed with pt by DPM- Pt states [facility] but will purchase a pair of OTC compression stockings for now- Dr Trinh aware\par F/U to [facility] in 2 weeks

## 2021-01-25 NOTE — PHYSICAL EXAM
[0] : right 0 [1+] : left 1+ [Ankle Swelling (On Exam)] : not present [Varicose Veins Of Lower Extremities] : bilaterally [] : bilaterally [Ankle Swelling On The Left] : moderate [Skin Ulcer] : ulcer [Calm] : calm [de-identified] : A&Ox3, NAD [de-identified] : 5/5 strength in all quadrants bilaterally [de-identified] : right lower leg venous stasis ulceration down to skin, subcutaneous tissue, and fat, healed. Bilateral venous stasis dermatitis [de-identified] : light touch sensation intact bilaterally [FreeTextEntry1] : Right Anterior Leg- Inferior- Closed [de-identified] : Ace Bandages here today- pt's own compression stockings at home [de-identified] : No Dressing [de-identified] : Cleansed with Normal saline\par  [FreeTextEntry7] : Right Anterior Leg- Superior- Closed [de-identified] : Ace Bandages here today- pt's own compression stockings at home [de-identified] : No Dressing [de-identified] : Cleansed with Normal saline\par

## 2021-02-15 NOTE — PHYSICAL EXAM
[1+] : left 1+ [0] : right 0 [Ankle Swelling (On Exam)] : not present [Varicose Veins Of Lower Extremities] : bilaterally [] : bilaterally [Ankle Swelling On The Left] : moderate [Skin Ulcer] : ulcer [Calm] : calm [de-identified] : A&Ox3, NAD [de-identified] : 5/5 strength in all quadrants bilaterally [de-identified] : right lower leg venous stasis ulceration down to skin, subcutaneous tissue, and fat, healed. Bilateral venous stasis dermatitis [de-identified] : light touch sensation intact bilaterally [FreeTextEntry1] : Right Anterior Leg- Inferior- Closed [de-identified] : Ace Bandages here today- pt's own compression stockings at home [de-identified] : No Dressing [de-identified] : Cleansed with Normal saline\par  [FreeTextEntry7] : Right Anterior Leg- Superior- Closed [de-identified] : Ace Bandages here today- pt's own compression stockings at home [de-identified] : No Dressing [de-identified] : Cleansed with Normal saline\par

## 2021-02-15 NOTE — REVIEW OF SYSTEMS
[Fever] : no fever [Eye Pain] : no eye pain [Earache] : no earache [Chest Pain] : no chest pain [Shortness Of Breath] : no shortness of breath [Cough] : no cough [Abdominal Pain] : no abdominal pain [Skin Wound] : skin wound [de-identified] : right lower leg venous stasis ulceration down to skin, subcutaneous tissue, and fat, healed. Bilateral venous stasis dermatitis

## 2021-02-15 NOTE — ASSESSMENT
[Verbal] : Verbal [Patient] : Patient [Demo] : Demo [Good - alert, interested, motivated] : Good - alert, interested, motivated [Verbalizes knowledge/Understanding] : Verbalizes knowledge/understanding [Dressing changes] : dressing changes [Pressure relief] : pressure relief [Skin Care] : skin care [Signs and symptoms of infection] : sign and symptoms of infection [How and When to Call] : how and when to call [Off-loading] : off-loading [Compression Therapy] : compression therapy [Patient responsibility to plan of care] : patient responsibility to plan of care [Stable] : stable [Home] : Home [Ambulatory] : Ambulatory [] : No [FreeTextEntry2] : Maintain optimal skin integrity\par  [FreeTextEntry3] : Wounds are closed [FreeTextEntry4] : Patient discharged as per DPM

## 2021-02-15 NOTE — PLAN
[FreeTextEntry1] : Patient happy with outcome of treatment.\par Patient to be discharged at this time.\par All questions answered to satisfaction and patient verbalized understanding.\par Patient to continue use of compression socks.\par Spent 20 minutes for patient care and medical decision making.\par

## 2021-02-16 NOTE — SOCIAL HISTORY
[FreeTextEntry1] : wife [FreeTextEntry2] : good [FreeTextEntry4] : good for some decisions [No falls in past year] : Patient reported no falls in the past year [Independent] : feeding [Some assistance needed] : managing finances [de-identified] : none [de-identified] : No safety concerns identified. [de-identified] : No safety concerns identified.

## 2021-02-16 NOTE — PHYSICAL EXAM
[General Appearance - Alert] : alert [General Appearance - In No Acute Distress] : in no acute distress [General Appearance - Well Nourished] : well nourished [General Appearance - Well-Appearing] : healthy appearing [General Appearance - Well Developed] : well developed [] : normal voice and communication [PERRL With Normal Accommodation] : pupils were equal in size, round, and reactive to light [Sclera] : the sclera and conjunctiva were normal [Optic Disc Abnormality] : the optic disc were normal in size and color [Outer Ear] : the ears and nose were normal in appearance [Hearing Threshold Finger Rub Not Dale] : hearing was normal [Examination Of The Oral Cavity] : the lips and gums were normal [Nasal Cavity] : the nasal mucosa and septum were normal [Oropharynx] : The oropharynx was normal [Neck Appearance] : the appearance of the neck was normal [Neck Cervical Mass (___cm)] : no neck mass was observed [Jugular Venous Distention Increased] : there was no jugular-venous distention [Thyroid Diffuse Enlargement] : the thyroid was not enlarged [Thyroid Nodule] : there were no palpable thyroid nodules [Respiration, Rhythm And Depth] : normal respiratory rhythm and effort [Auscultation Breath Sounds / Voice Sounds] : lungs were clear to auscultation bilaterally [Chest Palpation] : palpation of the chest revealed no abnormalities [Heart Rate And Rhythm] : heart rate was normal and rhythm regular [Heart Sounds] : normal S1 and S2 [Heart Sounds Gallop] : no gallops [Murmurs] : no murmurs [Full Pulse] : the pedal pulses are present [Edema] : there was no peripheral edema [Abdomen Tenderness] : non-tender [Penis Abnormality] : the penis was normal [Scrotum] : the scrotum was normal [Testes Swelling] : the testicles were not swollen [Cervical Lymph Nodes Enlarged Anterior Bilaterally] : anterior cervical [Testes Mass (___cm)] : there were no testicular masses [Femoral Lymph Nodes Enlarged Bilaterally] : femoral [No CVA Tenderness] : no ~M costovertebral angle tenderness [Nail Clubbing] : no clubbing  or cyanosis of the fingernails [Musculoskeletal - Swelling] : no joint swelling seen [Motor Tone] : muscle strength and tone were normal [Cranial Nerves] : cranial nerves 2-12 were intact [FreeTextEntry1] : R medial shin with non-oozing stasis ulcer, mildly tender [Sensation] : the sensory exam was normal to light touch and pinprick [Oriented To Time, Place, And Person] : oriented to person, place, and time [Impaired Insight] : insight and judgment were intact [Affect] : the affect was normal [Mood] : the mood was normal

## 2021-02-16 NOTE — HISTORY OF PRESENT ILLNESS
[FreeTextEntry1] : 89 yo male with CAD,  CHF, Defib, DM, BPH who has not gone to hospital or ED in past year. Pt had wound on right lower leg which seen by wound specialist. Pt doing well. No fever. No sob, palpitations, cp.\par \par No falls. No ED visits. \par \par Pt complaining of headache, and LBP for years.  Takes tylenol. Pt able to walk without assistance and get up from chair without assistance [0] : 2) Feeling down, depressed, or hopeless: Not at all

## 2021-02-16 NOTE — ASSESSMENT
[FreeTextEntry1] : 89 yo male with CAD , CHF, Defib.  No change in meds\par PT for neck pain and headaches and LBP - discussed fully. withpt. will discuss with wifed.

## 2021-03-15 PROBLEM — E87.2 ACIDOSIS: Status: ACTIVE | Noted: 2017-06-12

## 2021-03-15 NOTE — ASSESSMENT
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  Following with endocrinology.\par Anemia: Hemoglobin stable.\par Follow-up in 4 to 6 months.\par D/w wife

## 2021-03-15 NOTE — HISTORY OF PRESENT ILLNESS
[Home] : at home, [unfilled] , at the time of the visit. [Medical Office: ([facility])___] : at the medical office located in  [Spouse] : spouse [Verbal consent obtained from patient] : the patient, [unfilled] [FreeTextEntry1] : 89 year old M with history of CKD since 2012, ischemic cardiomyopathy with EF of 20% with AICD, MV replacement, atrial fibrillation who presents for follow up of CKD IV/V. Wife is on telephone visit with patient.\par He has been doing very well.  He has been dealing with a leg wound that has been followed by wound care.\par He got his Covid vaccine.  Him and his wife have been doing well.\par Denies any shortness of breath or chest pain.  Appetite is good.  Eating a lot of fruits

## 2021-06-17 PROBLEM — Z71.89 ADVANCE CARE PLANNING: Status: ACTIVE | Noted: 2017-03-21

## 2021-06-17 PROBLEM — N18.5 STAGE 5 CHRONIC KIDNEY DISEASE: Status: ACTIVE | Noted: 2017-06-12

## 2021-06-17 PROBLEM — D64.9 ANEMIA: Status: ACTIVE | Noted: 2017-06-12

## 2021-06-17 NOTE — ASSESSMENT
[FreeTextEntry1] : 89 yo male with multiple medical problems\par \par Start lexapro 5 mg a day - many somatic complaints, continue with tylenol\par \par MOLST form completed. discussed with HCP, Rachelle and son. Met with HCP via phone with son in person to discuss pts status and possiblity of next phases. Goals of care discussion held and MOLST orders reviewed. THirty min discussion held. Pt is DNR/DNI.MOLST form in chart.\par \par \par CHF and CKD stable.\par \par

## 2021-06-17 NOTE — PHYSICAL EXAM
[General Appearance - Alert] : alert [General Appearance - In No Acute Distress] : in no acute distress [General Appearance - Well Nourished] : well nourished [General Appearance - Well Developed] : well developed [General Appearance - Well-Appearing] : healthy appearing [] : normal voice and communication [Sclera] : the sclera and conjunctiva were normal [PERRL With Normal Accommodation] : pupils were equal in size, round, and reactive to light [Optic Disc Abnormality] : the optic disc were normal in size and color [Outer Ear] : the ears and nose were normal in appearance [Hearing Threshold Finger Rub Not [place]] : hearing was normal [Examination Of The Oral Cavity] : the lips and gums were normal [Nasal Cavity] : the nasal mucosa and septum were normal [Oropharynx] : The oropharynx was normal [Neck Appearance] : the appearance of the neck was normal [Neck Cervical Mass (___cm)] : no neck mass was observed [Jugular Venous Distention Increased] : there was no jugular-venous distention [Thyroid Diffuse Enlargement] : the thyroid was not enlarged [Thyroid Nodule] : there were no palpable thyroid nodules [Auscultation Breath Sounds / Voice Sounds] : lungs were clear to auscultation bilaterally [Respiration, Rhythm And Depth] : normal respiratory rhythm and effort [Chest Palpation] : palpation of the chest revealed no abnormalities [Heart Rate And Rhythm] : heart rate was normal and rhythm regular [Heart Sounds] : normal S1 and S2 [Heart Sounds Gallop] : no gallops [Murmurs] : no murmurs [Full Pulse] : the pedal pulses are present [Edema] : there was no peripheral edema [Abdomen Tenderness] : non-tender [Penis Abnormality] : the penis was normal [Scrotum] : the scrotum was normal [Testes Swelling] : the testicles were not swollen [Testes Mass (___cm)] : there were no testicular masses [Cervical Lymph Nodes Enlarged Anterior Bilaterally] : anterior cervical [Femoral Lymph Nodes Enlarged Bilaterally] : femoral [No CVA Tenderness] : no ~M costovertebral angle tenderness [Nail Clubbing] : no clubbing  or cyanosis of the fingernails [Musculoskeletal - Swelling] : no joint swelling seen [Motor Tone] : muscle strength and tone were normal [FreeTextEntry1] : R medial shin with non-oozing stasis ulcer, mildly tender [Cranial Nerves] : cranial nerves 2-12 were intact [Sensation] : the sensory exam was normal to light touch and pinprick [Oriented To Time, Place, And Person] : oriented to person, place, and time [Impaired Insight] : insight and judgment were intact [Affect] : the affect was normal [Mood] : the mood was normal

## 2021-06-17 NOTE — HISTORY OF PRESENT ILLNESS
[FreeTextEntry1] : 89 yo male with same complaints - headaches, pain in shoulders, both sides across the top, Pain lower back\par Pain in both knees - but able to walk. Family says he has not changed.  He also complaining of constipation. No new evetns. Pt asking for meds.  His function is good, walks with no assistance. He has chf, ckd stage 5.\par \par He has sexpressed conservative management for kidneys.  He defers questions about care to son and dtr in law. Dtr in law is HCP.\par \par Pt getting his pacemenaker and defib replaced. He is 89 and defib has never gone off. therefore family decided to replace pm only even though defib battery due. Pt defers to son and dtr in law. Decision to just change to pacemaker seems reasonable.  spoke with son (in person) and dtr in law (by phone) - all in agreement. MOLST form completed in light of discussion and decisions. Wife supportive and aware. Pt defers to son and dtr in law repeatedly\par \par

## 2021-06-22 NOTE — ED PROVIDER NOTE - NSFOLLOWUPINSTRUCTIONS_ED_ALL_ED_FT
normal (ped)... 1) Follow-up with your Primary Medical Doctor. Call today / next business day for prompt follow-up.  2) Return to Emergency room for any worsening or persistent pain, weakness, fever, redness or swelling, or any other concerning symptoms.  3) See attached instruction sheets for additional information, including information regarding signs and symptoms to look out for, reasons to seek immediate care and other important instructions.  4) Follow-up with the wound care center. Call today to make an appointment for prompt follow-up.  [facility] : [phone]

## 2021-06-22 NOTE — ED ADULT NURSE NOTE - NS ED NOTE  TALK SOMEONE YN
Darwin Santo,  I saw your patient, Williams Meraz, and discharged him from active care after treatment today. He responded very well to his treatment plan.  I appreciate the opportunity to have participated in his care.  Lamonte no

## 2021-07-13 NOTE — PHYSICAL EXAM
[Alert] : alert [No Acute Distress] : no acute distress [Normal Sclera/Conjunctiva] : normal sclera/conjunctiva [No Proptosis] : no proptosis [No LAD] : no lymphadenopathy [Thyroid Not Enlarged] : the thyroid was not enlarged [No Respiratory Distress] : no respiratory distress [Clear to Auscultation] : lungs were clear to auscultation bilaterally [Normal S1, S2] : normal S1 and S2 [Regular Rhythm] : with a regular rhythm [Normal Bowel Sounds] : normal bowel sounds [Soft] : abdomen soft [No Spinal Tenderness] : no spinal tenderness [No Involuntary Movements] : no involuntary movements were seen [Normal Strength/Tone] : muscle strength and tone were normal [Normal Reflexes] : deep tendon reflexes were 2+ and symmetric [No Tremors] : no tremors [Normal Affect] : the affect was normal [Normal Mood] : the mood was normal

## 2021-07-14 PROBLEM — Z95.810 ICD (IMPLANTABLE CARDIOVERTER-DEFIBRILLATOR), DUAL, IN SITU: Status: ACTIVE | Noted: 2017-11-19

## 2021-07-14 PROBLEM — E55.9 VITAMIN D INSUFFICIENCY: Status: ACTIVE | Noted: 2018-05-09

## 2021-07-14 NOTE — REVIEW OF SYSTEMS
[Skin Wound] : skin wound [Fever] : no fever [Eye Pain] : no eye pain [Earache] : no earache [Chest Pain] : no chest pain [Shortness Of Breath] : no shortness of breath [Cough] : no cough [Abdominal Pain] : no abdominal pain [de-identified] : right lower leg venous stasis ulceration down to skin, subcutaneous tissue, and fat. Bilateral venous stasis dermatitis

## 2021-07-14 NOTE — HISTORY OF PRESENT ILLNESS
[FreeTextEntry1] : Patient seen for newly open right lower leg venous stasis ulcer down to skin, subcutaneous tissue, fat with bilateral stasis dermatitis, edema, and varicose veins. Patient's wife relates that her  has not been wearing the compression socks recently. Patient relates that he has not been wearing them as it is too warm.

## 2021-07-14 NOTE — PHYSICAL EXAM
[4 x 4] : 4 x 4  [0] : right 0 [1+] : left 1+ [Varicose Veins Of Lower Extremities] : bilaterally [] : bilaterally [Ankle Swelling On The Left] : moderate [Skin Ulcer] : ulcer [Calm] : calm [Ankle Swelling (On Exam)] : not present [de-identified] : A&Ox3, NAD [de-identified] : 5/5 strength in all quadrants bilaterally [de-identified] : right lower leg venous stasis ulceration down to skin, subcutaneous tissue, and fat. Bilateral venous stasis dermatitis [de-identified] : light touch sensation intact bilaterally [FreeTextEntry1] : lower leg [FreeTextEntry2] : 5.1 [FreeTextEntry3] : 5.5 [FreeTextEntry4] : 0.1 [de-identified] : Sanguinous  [de-identified] : Coban [de-identified] : Adaptic Touch and Alginate [de-identified] : Cleansed with Normal Saline\par  [de-identified] : Circulation:\par \par Dorsalis Pedis:   Bilateral palpable\par Posterior Tibialis:  Bilateral unable to palpable\par Doppler Pulses:  \par Extremity Color:  bilateral pink  \par Extremity Temperature:  bilateral warm\par Capillary Refill:  bilateral <3 sec  \par CAITLYN:  Non-invasive vascular studies not ordered as per DPM [TWNoteComboBox1] : Right [TWNoteComboBox4] : Small [de-identified] : Normal [de-identified] : None [de-identified] : 100% [de-identified] : No [de-identified] : Multilayer other compression wrap [de-identified] : Weekly

## 2021-07-14 NOTE — PLAN
[FreeTextEntry1] : Patient examined and evaluated at this time.\par Continue local wound care and offloading.\par Coban multilayer compression.\par Patient to follow up in 1 week.\par Spent 30 minutes for patient care and medical decision making.\par

## 2021-07-14 NOTE — PROCEDURE
[Sensing Amplitude ___mv] : sensing amplitude was [unfilled] mv [Lead Imp:  ___ohms] : lead impedance was [unfilled] ohms [___V @] : [unfilled] V [___ ms] : [unfilled] ms [de-identified] : [employer] [de-identified] : Viva Quad CRT-D [de-identified] : [id number] [de-identified] : 7/10/2015

## 2021-07-14 NOTE — VITALS
[Dull] : dull [Tender] : tender [] : No [de-identified] : Patient reports pain is 5/10 [FreeTextEntry3] : Bilateral legs [FreeTextEntry1] : Tylenol [FreeTextEntry2] : Patient reports nothing makes the pain worse [FreeTextEntry4] : Tylenol at home

## 2021-07-14 NOTE — HISTORY OF PRESENT ILLNESS
[FreeTextEntry1] : cc: diabetes\par \par 89  year old man with T2DM, with CKD, on basal bolus insulin with reasonable control. He checks glucose 1-3 times daily and values have been 77 - 190 mg/dl , usually 100 - 180\par Taking Lantus  20 units and Novolog 4-12 units before meals depending on glucose and carbohydrate intake.  \par Has been limiting  carbohydrate intake.  Minimal exercise,\par Has recurrence of ulcer on leg, seeing wound care specialist\par  Retinopathy screening up to date, last optho visit was  Nov 2020, no retinopathy\par Had covid vaccine, J&J\par \par \par HTN: blood pressure has been controlled, no recent change in medication\par \par Hyperlipidemia - he takes a statin\par \par \par

## 2021-07-14 NOTE — DISCUSSION/SUMMARY
[FreeTextEntry1] : Normal device function with adequate safety margins. Permanent atrial arrhythmia with good rate control 95.4% BIV paced.  \par \par 4 months to NUVIA, but PM dependent.  We will plan for generator replacement at which time we will "downgrade" to CRT-P, as previously discussed.

## 2021-07-14 NOTE — ASSESSMENT
[Verbal] : Verbal [Patient] : Patient [Good - alert, interested, motivated] : Good - alert, interested, motivated [Verbalizes knowledge/Understanding] : Verbalizes knowledge/understanding [Signs and symptoms of infection] : sign and symptoms of infection [How and When to Call] : how and when to call [Patient responsibility to plan of care] : patient responsibility to plan of care [] : Yes [Dressing changes] : dressing changes [Stable] : stable [Home] : Home [Ambulatory] : Ambulatory [Not Applicable - Long Term Care/Home Health Agency] : Long Term Care/Home Health Agency: Not Applicable [Venous Disease] : venous disease [Compression Therapy] : compression therapy [FreeTextEntry2] : Restore Skin Integrity\par Infection Control\par Localized wound care\par Compression Therapy\par Edema Prevention [FreeTextEntry3] : Initial visit  [FreeTextEntry4] : Patient to return to [facility] in

## 2021-07-14 NOTE — HISTORY OF PRESENT ILLNESS
[Palpitations] : no palpitations [SOB] : no dyspnea [Syncope] : no syncope [Dizziness] : no dizziness [Chest Pain] : no chest pain or discomfort [ICD Shocks] : no recent ICD shocks [Shoulder Pain] : no shoulder pain [Pain at Site] : no pain at device site [de-identified] : No complaints.

## 2021-07-14 NOTE — ASSESSMENT
[FreeTextEntry1] : 89 year old man with T2DM, with reasonable control\par - continue current regimen\par   - Retinopathy screening up to date\par  - had covid vaccine\par  - check cmp\par  - foot exam next visit (currently wrapped by wound care, has f/u tomorrow)\par \par HTN: Blood pressure at goal, + CKD,  + microalbumin, followed by nephrology, continue current management\par \par Hyperlipidemia - continue atorvastatin,\par \par Vit d def'y - continue vitamin 2000 units daily\par \par \par \par f/u  in 6 months and with PMD in 3 months

## 2021-07-18 NOTE — REASON FOR VISIT
[Arrhythmia/ECG Abnorrmalities] : arrhythmia/ECG abnormalities [Spouse] : spouse [Family Member] : family member

## 2021-07-18 NOTE — HISTORY OF PRESENT ILLNESS
[FreeTextEntry1] : Klaus offers no complaints. He continues to get more forgetful. Here with family.

## 2021-07-18 NOTE — PHYSICAL EXAM
[Well Developed] : well developed [Well Nourished] : well nourished [No Acute Distress] : no acute distress [Normal Conjunctiva] : normal conjunctiva [Normal Venous Pressure] : normal venous pressure [No Carotid Bruit] : no carotid bruit [Normal S1, S2] : normal S1, S2 [No Murmur] : no murmur [No Rub] : no rub [No Gallop] : no gallop [Clear Lung Fields] : clear lung fields [Good Air Entry] : good air entry [No Respiratory Distress] : no respiratory distress  [Soft] : abdomen soft [Non Tender] : non-tender [No Masses/organomegaly] : no masses/organomegaly [Normal Bowel Sounds] : normal bowel sounds [No Edema] : no edema [No Cyanosis] : no cyanosis [No Clubbing] : no clubbing [No Varicosities] : no varicosities [No Rash] : no rash [No Skin Lesions] : no skin lesions [Moves all extremities] : moves all extremities [No Focal Deficits] : no focal deficits [Normal Speech] : normal speech [Alert and Oriented] : alert and oriented [Normal memory] : normal memory

## 2021-07-18 NOTE — DISCUSSION/SUMMARY
[FreeTextEntry1] : The patient is a 89-year-old gentleman mild dementia, diabetes mellitus, CKD stage IV, hypertension, hyperlipidemia, afib, CAD, cardiomyopathy, ICD whose mental status continues to deteriorate. \par #1 CAD - no angina, continue imdur 30mg\par #2 Htn - stable, continue hydralazine and toprol\par #3 Cardiomyopathy - euvolemic on exam, continue torsemide  2 tab daily, daily weights. ICD interrogation negative, echo severe LV dysfunction\par #4 Afib - rate controlled with low dose toprol, no bleeding on warfarin,INR therapeutic\par #5 DM - under control\par #6 Renal - CKD stage IV f/u Dr. Miller\par #7 Lipids-continue lipitor 40mg.\par #8 Neuro- on aricept, encourage ambulation with assistance, increase hydration, f/u wound care\par Received COVID vaccines

## 2021-07-20 NOTE — ASSESSMENT
[Verbal] : Verbal [Patient] : Patient [Good - alert, interested, motivated] : Good - alert, interested, motivated [Verbalizes knowledge/Understanding] : Verbalizes knowledge/understanding [Dressing changes] : dressing changes [Signs and symptoms of infection] : sign and symptoms of infection [Venous Disease] : venous disease [How and When to Call] : how and when to call [Compression Therapy] : compression therapy [Patient responsibility to plan of care] : patient responsibility to plan of care [Stable] : stable [Home] : Home [Ambulatory] : Ambulatory [Not Applicable - Long Term Care/Home Health Agency] : Long Term Care/Home Health Agency: Not Applicable [Skin Care] : skin care [Nutrition] : nutrition [Off-loading] : off-loading [Home Health] : home health [] : No [FreeTextEntry2] : Infection Prevention\par Promote Skin Integrity\par Offloading\par Elevation\par Compression Compliance\par Low Na+ Diet\par Maintain acceptable levels of pain\par Demonstrates use of both pharmacological and nonpharmacological pain management interventions\par  [FreeTextEntry3] : New wound [FreeTextEntry4] : F/U 1 week for assessment\par Pt fitted for B/L CircAid compression wraps\par Blood work ordered as per DPM, pt to F/U with PCP with results.

## 2021-07-20 NOTE — HISTORY OF PRESENT ILLNESS
[FreeTextEntry1] : Patient seen for right lower leg venous stasis ulcer down to skin, subcutaneous tissue, fat with bilateral stasis dermatitis, edema, and varicose veins. Denies any other complaints at this time.

## 2021-07-20 NOTE — PHYSICAL EXAM
[0] : right 0 [1+] : left 1+ [Varicose Veins Of Lower Extremities] : bilaterally [] : bilaterally [Ankle Swelling On The Left] : moderate [Skin Ulcer] : ulcer [Calm] : calm [2 x 2] : 2 x 2  [4 x 4] : 4 x 4  [Ankle Swelling (On Exam)] : not present [de-identified] : A&Ox3, NAD [de-identified] : 5/5 strength in all quadrants bilaterally [de-identified] : right lower leg venous stasis ulceration down to skin, subcutaneous tissue, and fat. Bilateral venous stasis dermatitis [de-identified] : light touch sensation intact bilaterally [de-identified] : Circ neurovascular function WNL post coban compression, pt expressed comfort.\par  [FreeTextEntry1] : lower leg- small scattered scabs/open area w/ new epithelium within measurement [FreeTextEntry2] : 1.3 [FreeTextEntry3] : 1.3 [FreeTextEntry4] : 0.1 [de-identified] : serosanguineous [de-identified] : Coban [de-identified] : Adaptic Touch and Alginate [de-identified] : Cleansed with Normal Saline\par  [FreeTextEntry7] : Right Knee  [FreeTextEntry8] : 4.0 [FreeTextEntry9] : 2.5 [de-identified] : 0.1 [de-identified] : mild ecchymosis  [de-identified] : Adaptic touch [de-identified] : Cleansed with NS\par Tegaderm [de-identified] : Circ neurovascular function WNL post ace compression, pt expressed comfort.\par  [de-identified] : Left Anterior Leg-(new) [de-identified] : 0.3 [de-identified] : 0.4 [de-identified] : 0.1 [de-identified] : scant serosanguineous [de-identified] : pt to wear own compression when home. [de-identified] : Adaptic touch and alginate [de-identified] : Cleansed with NS\par  [TWNoteComboBox1] : Right [TWNoteComboBox4] : Small [TWNoteComboBox5] : No [TWNoteComboBox6] : Venous [de-identified] : No [de-identified] : Normal [de-identified] : None [de-identified] : None [de-identified] : 100% [de-identified] : No [de-identified] : Multilayer other compression wrap [de-identified] : Weekly [de-identified] : Primary Dressing [de-identified] : None [de-identified] : No [de-identified] : Traumatic [de-identified] : No [de-identified] : other [de-identified] : None [de-identified] : None [de-identified] : None [de-identified] : No [de-identified] : 3x Weekly [de-identified] : Primary Dressing [de-identified] : No [de-identified] : Venous [de-identified] : No [de-identified] : Normal [de-identified] : None [de-identified] : None [de-identified] : 100% [de-identified] : No [de-identified] : Ace wraps [de-identified] : 3x Weekly [de-identified] : Primary Dressing

## 2021-07-20 NOTE — REVIEW OF SYSTEMS
[Skin Wound] : skin wound [Fever] : no fever [Eye Pain] : no eye pain [Earache] : no earache [Chest Pain] : no chest pain [Shortness Of Breath] : no shortness of breath [Cough] : no cough [Abdominal Pain] : no abdominal pain [de-identified] : right lower leg venous stasis ulceration down to skin, subcutaneous tissue, and fat. Bilateral venous stasis dermatitis

## 2021-07-20 NOTE — PLAN
[FreeTextEntry1] : Patient examined and evaluated at this time.\par Continue local wound care and offloading.\par Coban multilayer compression. Patient will benefit from circaid compressions bilaterally.\par Patient to follow up in 1 week.\par Spent 20 minutes for patient care and medical decision making.\par

## 2021-08-02 NOTE — REVIEW OF SYSTEMS
[Skin Wound] : skin wound [Fever] : no fever [Eye Pain] : no eye pain [Earache] : no earache [Chest Pain] : no chest pain [Shortness Of Breath] : no shortness of breath [Cough] : no cough [Abdominal Pain] : no abdominal pain [de-identified] : right lower leg venous stasis ulceration down to skin, subcutaneous tissue, and fat. Bilateral venous stasis dermatitis

## 2021-08-02 NOTE — PHYSICAL EXAM
[2 x 2] : 2 x 2  [4 x 4] : 4 x 4  [0] : right 0 [1+] : left 1+ [Varicose Veins Of Lower Extremities] : bilaterally [] : bilaterally [Ankle Swelling On The Left] : moderate [Skin Ulcer] : ulcer [Calm] : calm [Ankle Swelling (On Exam)] : not present [de-identified] : A&Ox3, NAD [de-identified] : 5/5 strength in all quadrants bilaterally [de-identified] : right lower leg venous stasis ulceration down to skin, subcutaneous tissue, and fat. Bilateral venous stasis dermatitis [de-identified] : light touch sensation intact bilaterally [FreeTextEntry7] : Right Knee-Abrasion [FreeTextEntry8] : 0.9 [FreeTextEntry9] : 1.7 [de-identified] : <0.1 [de-identified] : scant serosanguineous  [de-identified] : Adaptic touch [de-identified] : Cleansed with NS\par Tegaderm [de-identified] : Circ neurovascular function WNL post ace compression, pt expressed comfort.\par  [de-identified] : Left Anterior Leg-CLOSED [de-identified] : NONE [de-identified] : ACE WRAPS [de-identified] : NONE [de-identified] : Cleansed with NS\par  [de-identified] : Circ neurovascular function WNL post coban compression, pt expressed comfort.\par  [FreeTextEntry1] : Right Anterior lower leg- Inferior to Wound 1- small open areas/fragile epithelium within measurement [FreeTextEntry2] : 2.5 [FreeTextEntry3] : 2.1 [FreeTextEntry4] : <0.1 [de-identified] : serosanguineous  [de-identified] : COBAN [de-identified] : asim and adaptic touch [de-identified] : Cleansed with NS\par  [TWNoteComboBox1] : Right [de-identified] : No [de-identified] : None [de-identified] : 100% [de-identified] : Weekly [de-identified] : Primary Dressing [de-identified] : None [de-identified] : No [de-identified] : Traumatic [de-identified] : No [de-identified] : Normal [de-identified] : None [de-identified] : None [de-identified] : None [de-identified] : 3x Weekly [de-identified] : No [de-identified] : Primary Dressing [de-identified] : No [de-identified] : Venous [de-identified] : No [de-identified] : Normal [de-identified] : None [de-identified] : None [de-identified] : None [de-identified] : No [de-identified] : Ace wraps [de-identified] : Daily [de-identified] : Compression [TWNoteComboBox4] : Small [TWNoteComboBox5] : No [TWNoteComboBox6] : Venous [de-identified] : No [de-identified] : Normal [de-identified] : None [de-identified] : None [de-identified] : 100% [de-identified] : No [de-identified] : Multilayer other compression wrap [de-identified] : Weekly [de-identified] : Primary Dressing

## 2021-08-02 NOTE — PLAN
[FreeTextEntry1] : Patient examined and evaluated at this time.\par Continue local wound care and offloading.\par Coban multilayer compression. Patient will benefit from circaid compressions bilaterally.\par Will auth for [facility]\par Patient to follow up in 1 week.\par Spent 20 minutes for patient care and medical decision making.\par

## 2021-08-02 NOTE — ASSESSMENT
[Verbal] : Verbal [Patient] : Patient [Good - alert, interested, motivated] : Good - alert, interested, motivated [Verbalizes knowledge/Understanding] : Verbalizes knowledge/understanding [Dressing changes] : dressing changes [Skin Care] : skin care [Signs and symptoms of infection] : sign and symptoms of infection [Venous Disease] : venous disease [Nutrition] : nutrition [How and When to Call] : how and when to call [Off-loading] : off-loading [Compression Therapy] : compression therapy [Home Health] : home health [Patient responsibility to plan of care] : patient responsibility to plan of care [Stable] : stable [Home] : Home [Ambulatory] : Ambulatory [Not Applicable - Long Term Care/Home Health Agency] : Long Term Care/Home Health Agency: Not Applicable [Written] : Written [Demo] : Demo [Spouse] : Spouse [] : Yes [FreeTextEntry2] : Infection Prevention\par Promote Skin Integrity\par Offloading\par Elevation\par Compression Compliance\par Low Na+ Diet\par Maintain acceptable levels of pain\par Demonstrates use of both pharmacological and nonpharmacological pain management interventions\par  [FreeTextEntry4] : F/U 1 week for assessment\par Preauthorization for sharp debridement and Nu shield #1 submitted for next assessment\par Pt awaiting delivery of B/L circaid wraps\par Blood work obtained and reviewed by pt PCP\par 1- 2x3 nushield ordered for next assessment

## 2021-08-03 PROBLEM — F03.90: Status: ACTIVE | Noted: 2017-09-15

## 2021-08-03 PROBLEM — I67.9 CEREBROVASCULAR DISEASE: Status: ACTIVE | Noted: 2018-06-21

## 2021-08-04 NOTE — ASSESSMENT
[Verbal] : Verbal [Written] : Written [Demo] : Demo [Patient] : Patient [Spouse] : Spouse [Family member] : Family member [Good - alert, interested, motivated] : Good - alert, interested, motivated [Verbalizes knowledge/Understanding] : Verbalizes knowledge/understanding [Dressing changes] : dressing changes [Skin Care] : skin care [Signs and symptoms of infection] : sign and symptoms of infection [Venous Disease] : venous disease [How and When to Call] : how and when to call [Pain Management] : pain management [Compression Therapy] : compression therapy [Patient responsibility to plan of care] : patient responsibility to plan of care [Stable] : stable [Home] : Home [Ambulatory] : Ambulatory [Not Applicable - Long Term Care/Home Health Agency] : Long Term Care/Home Health Agency: Not Applicable [] : No [FreeTextEntry2] : Infection prevention\par Localized wound care \par Goal of remaining pain free regarding wounds.\par Compression therapy  [FreeTextEntry4] : Nushield not necessary per DPM. Ordering technician made aware  \par Circaids brought in by patient. Pt educated on usage and expressed understanding. \par Follow up in 2 weeks \par

## 2021-08-04 NOTE — PLAN
[FreeTextEntry1] : Patient examined and evaluated at this time.\par Continue local wound care and offloading.\par Patient will benefit from circaid compressions bilaterally.\par Patient to follow up in 1 week.\par Spent 20 minutes for patient care and medical decision making.\par

## 2021-08-04 NOTE — REVIEW OF SYSTEMS
[Fever] : no fever [Eye Pain] : no eye pain [Earache] : no earache [Chest Pain] : no chest pain [Shortness Of Breath] : no shortness of breath [Cough] : no cough [Abdominal Pain] : no abdominal pain [Skin Wound] : skin wound [de-identified] : right lower leg venous stasis ulceration down to skin, subcutaneous tissue, and fat, epithelialized. Bilateral venous stasis dermatitis

## 2021-08-04 NOTE — PHYSICAL EXAM
[0] : right 0 [1+] : left 1+ [Ankle Swelling (On Exam)] : not present [Varicose Veins Of Lower Extremities] : bilaterally [] : bilaterally [Ankle Swelling On The Left] : moderate [Skin Ulcer] : ulcer [Calm] : calm [de-identified] : A&Ox3, NAD [de-identified] : 5/5 strength in all quadrants bilaterally [de-identified] : right lower leg venous stasis ulceration down to skin, subcutaneous tissue, and fat, epithelialized. Bilateral venous stasis dermatitis [de-identified] : light touch sensation intact bilaterally [FreeTextEntry7] : Right knee abrasion - Closed  [FreeTextEntry1] : Right anterior lower leg - Inferior to wound #1 - Dry scab  [de-identified] : Scant Serous/sanguinous [de-identified] : Onesimo jovel  [de-identified] : Cleansed with NS\par  [de-identified] : None [de-identified] : 3x Weekly [de-identified] : Secondary Dressing [de-identified] : Normal [de-identified] : None [de-identified] : None [de-identified] : No [de-identified] : Circaid wrap [de-identified] : 3x Weekly [de-identified] : Secondary Dressing

## 2021-08-18 NOTE — VITALS
[Throbbing] : throbbing [Continuous] : continuous [] : No [de-identified] : Patient reports pain is 5/10 [FreeTextEntry3] : Right lower leg [FreeTextEntry1] : Aspirin [FreeTextEntry2] : Patient reports nothing makes the pain worse [FreeTextEntry4] : Aspirin at home

## 2021-08-18 NOTE — REVIEW OF SYSTEMS
[Skin Wound] : skin wound [Fever] : no fever [Eye Pain] : no eye pain [Earache] : no earache [Chest Pain] : no chest pain [Shortness Of Breath] : no shortness of breath [Cough] : no cough [Abdominal Pain] : no abdominal pain [de-identified] : right lower leg venous stasis ulceration down to skin, subcutaneous tissue, and fat. Bilateral venous stasis dermatitis

## 2021-08-18 NOTE — ED ADULT NURSE NOTE - TEMPLATE
Lazara Dooley is a 76 year old female presenting for consult:  discuss peritoneal dialysis catheter port placement  Medications verified, no changes.    Denies known Latex allergy or symptoms of Latex sensitivity.    Social History     Tobacco Use   Smoking Status Never Smoker   Smokeless Tobacco Never Used             
Orders for peritoneal dialysis catheter placement and COVID pre-op swab placed per verbal order, Dr. Kyaw Palma.    1 bottle of Chorahexadine Gluconate solution given to patient, along with instructions.      
Cardiac

## 2021-08-18 NOTE — ASSESSMENT
[Verbal] : Verbal [Written] : Written [Demo] : Demo [Patient] : Patient [Spouse] : Spouse [Family member] : Family member [Good - alert, interested, motivated] : Good - alert, interested, motivated [Verbalizes knowledge/Understanding] : Verbalizes knowledge/understanding [Dressing changes] : dressing changes [Skin Care] : skin care [Signs and symptoms of infection] : sign and symptoms of infection [Venous Disease] : venous disease [How and When to Call] : how and when to call [Pain Management] : pain management [Compression Therapy] : compression therapy [Patient responsibility to plan of care] : patient responsibility to plan of care [] : Yes [Stable] : stable [Home] : Home [Ambulatory] : Ambulatory [Not Applicable - Long Term Care/Home Health Agency] : Long Term Care/Home Health Agency: Not Applicable [FreeTextEntry2] : Infection prevention\par Localized wound care \par Goal of remaining pain free regarding wounds.\par Compression therapy  [FreeTextEntry4] : Submitted pre auth for Nu Shield as per DPM\par pt to f/u in 1 week\par  1- 2x3 Nushield and 1- 4x4 Nushield ordered for next assessment

## 2021-08-18 NOTE — PHYSICAL EXAM
[0] : right 0 [1+] : left 1+ [Varicose Veins Of Lower Extremities] : bilaterally [] : present [Ankle Swelling On The Left] : moderate [Skin Ulcer] : ulcer [Calm] : calm [Ankle Swelling (On Exam)] : not present [de-identified] : A&Ox3, NAD [de-identified] : 5/5 strength in all quadrants bilaterally [de-identified] : right lower leg venous stasis ulceration down to skin, subcutaneous tissue, and fat. Bilateral venous stasis dermatitis [de-identified] : light touch sensation intact bilaterally [FreeTextEntry1] : Right lower leg - Scattered open areas within measurement [FreeTextEntry2] : 14.4 [FreeTextEntry3] : 6.5 [FreeTextEntry4] : 0.1 [de-identified] : Bloody/Sanguinous [de-identified] : Coban [de-identified] : Jessica then Adaptic Touch [de-identified] : Cleansed with Normal Saline\par \par  [FreeTextEntry7] : Right knee abrasion - Closed  [TWNoteComboBox4] : Moderate [de-identified] : None [de-identified] : Weekly [de-identified] : Secondary Dressing [de-identified] : Normal [de-identified] : None [de-identified] : None [de-identified] : No [de-identified] : Circaid wrap [de-identified] : Daily [de-identified] : Secondary Dressing

## 2021-08-18 NOTE — HISTORY OF PRESENT ILLNESS
[FreeTextEntry1] : Patient seen for newly open right lower leg venous stasis ulcer down to skin, subcutaneous tissue, fat with bilateral stasis dermatitis, edema, and varicose veins. Denies any other complaints at this time. [FreeTextEntry1] : CT scans reviewed\par Distant LNodes\par Lung nodules\par c/w stage IV pan ca\par \par Repeat CT 8/9/20 showed stable disease\par

## 2021-08-23 PROBLEM — Z01.818 PREOP TESTING: Status: ACTIVE | Noted: 2021-01-01

## 2021-08-26 NOTE — ASU DISCHARGE PLAN (ADULT/PEDIATRIC) - CARE PROVIDER_API CALL
Sina Odell)  Cardiac Electrophysiology; Cardiology  [address] [place] [address]  Phone: [phone]  Fax: [phone]  Scheduled Appointment: 09/08/2021 01:40 PM

## 2021-08-26 NOTE — ASU DISCHARGE PLAN (ADULT/PEDIATRIC) - PROVIDER TOKENS
Immunization chart prep completed.  Immunization records verified.  Reji Edwards due for Pediarix (Dtap, IPV, Hep B), Pedvaxhib (hib), Prevnar 13 (pneumococcal) and Rotateq   
There are no preventive care reminders to display for this patient.    Patient is up to date, no discussion needed.            
This office note has been dictated.  
PROVIDER:[TOKEN:[[id number]],SCHEDULEDAPPT:[09/08/2021],SCHEDULEDAPPTTIME:[01:40 PM]]

## 2021-08-26 NOTE — H&P CARDIOLOGY - HISTORY OF PRESENT ILLNESS
89-year-old  gentleman Mild dementia, CAD/CABG, HTN, HLD, Afib, DMT2, Systolic heart failure, CKD Stage IV, Cardiomyopathy, ICD who has been feeling well with no chest pain, palpitations or shortness of breath.    On interrogation-Normal device function with adequate safety margins. Permanent atrial arrhythmia with good rate control 95.4% BIV paced. 4 months to NUVIA, but PM dependent. Pt presents today for generator replacement at which time it will also be "downgrade" to CRT-P.  89-year-old  gentleman Mild dementia, CAD/CABG 7 yrs ago, HTN, HLD, Afib-on Coumadin last dose 8/23/21, DMT2 (controlled, managed by Endo Dr Uriah Cobb), Systolic heart failure, CKD Stage IV, Cardiomyopathy, ICD who has been feeling well with no chest pain, palpitations or shortness of breath. On interrogation-Normal device function with adequate safety margins. Permanent atrial arrhythmia with good rate control 95.4% BIV paced. 4 months to NUVIA, but PM dependent. Pt presents today for generator replacement at which time it will also be "downgrade" to CRT-P.  89-year-old  gentleman Mild dementia, CAD/CABG 7 yrs ago, HTN, HLD, Afib-on Coumadin last dose 8/23/21, DMT2 (controlled, managed by Endo Dr Uriah Cobb, Hgba1c 7.6 7/21), Systolic heart failure, CKD Stage IV, Cardiomyopathy, ICD who has been feeling well with no chest pain, palpitations or shortness of breath. On interrogation-Normal device function with adequate safety margins. Permanent atrial arrhythmia with good rate control 95.4% BIV paced. 4 months to NUVIA, but PM dependent. Pt presents today for generator replacement at which time it will also be "downgrade" to CRT-P.

## 2021-08-26 NOTE — ASU PATIENT PROFILE, ADULT - AS SC BRADEN ACTIVITY
The patient was contacted with her home sleep study results. She was not found to have numerically significant sleep apnea but there were strong suggestions that she has the disorder. I recommended that she have a an attended polysomnogram to help exclude a false-negative home study result and she was in agreement with this. An order was placed.   (3) walks occasionally

## 2021-08-26 NOTE — ASU DISCHARGE PLAN (ADULT/PEDIATRIC) - ASU DC SPECIAL INSTRUCTIONSFT
WOUND CARE:  Do NOT scrub, rub, or pick at your incision site  AFTER 3 DAYS you may SHOWER  - use mild soap and gentle warm, water stream, pat dry  DO NOT apply lotions, creams, ointments, powder, perfumes to your incision site  DO NOT SOAK your site for 4-6 weeks ( no baths, no pools, no tubs, etc...)  wear loose clothing around site for 1-2 weeks  IF surgical tape was used DO NOT remove the strips, they will fall off after 7days, if glue was used, it will naturally fall off within 3 weeks  if staples were used, they will be removed in 7-10 days by your doctor    ACTIVITY:  for 2 weeks AFTER  your procedure  - DO NOT RAISE your arm above shoulder level ( on the same side of your incision)  for 4 weeks AFTER your procedure   - DO NOT LIFT anything 10 lbs or heavier ( on the side of your implant)   - certain activities may be limited longer, those that involve swinging your arm, and will be discussed with your EP doctor  DO NOT DRIVE until your EP Doctor or nurse practitioner/ physician assistant states it is safe to do so  A follow up appointment in 7-14 days will be arranged before your discharge    ID CARD:   you will receive an ID CARD and device company booklet   - please carry that card with you at all times    ***CALL YOUR DOCTOR ***  IF you have fever, chills, body aches, or severe pain, swelling, redness, heat, yellow drainage from your incision site  IF bleeding  or significant new swelling from your puncture site  IF your experience lightheadedness, dizziness, or fainting spell.  IF unable to ge tin contact with your doctor, you may call the Cardiology Office at [facility] at [phone]

## 2021-08-27 NOTE — PLAN
[FreeTextEntry1] : Patient examined and evaluated at this time.\par Continue local wound care and offloading.\par Will auth for [facility].\par family did not want graft today because of procedure scheduled for tomorrow\par asim,Adaptic touch and coban dressing weekly\par Patient to follow up in 1 week.\par Spent 20 minutes for patient care and medical decision making.\par

## 2021-08-27 NOTE — REVIEW OF SYSTEMS
[Fever] : no fever [Eye Pain] : no eye pain [Earache] : no earache [Chest Pain] : no chest pain [Shortness Of Breath] : no shortness of breath [Cough] : no cough [Abdominal Pain] : no abdominal pain [de-identified] : right lower leg venous stasis ulceration down to skin, subcutaneous tissue, and fat. Bilateral venous stasis dermatitis

## 2021-08-27 NOTE — HISTORY OF PRESENT ILLNESS
[FreeTextEntry1] : Patient seen for newly open right lower leg venous stasis ulcer down to skin, subcutaneous tissue, fat with bilateral stasis dermatitis, edema, and varicose veins. Denies any other complaints at this time.\par \par 8/25/21 wound smaller

## 2021-08-27 NOTE — ASSESSMENT
[] : No [FreeTextEntry2] : Infection prevention\par Localized wound care \par Goal of remaining pain free regarding wounds.\par Compression therapy  [FreeTextEntry4] : Auth submitted for vascular studies \par Patient and patients wife refuse to have NuShield applied due to him having procedure tomorrow to replace batteries in his pace maker. They were afraid it would interfere in some way. Explained to pt and pt's wife that the dressing is remotely the same as the one prescribed currently and wouldn't effect procedure. Pt and pt's wife showed understanding but still want to hold off until next assessment.  \par Follow up in 1 week

## 2021-09-01 NOTE — HISTORY OF PRESENT ILLNESS
[FreeTextEntry1] : Patient seen for newly open right lower leg venous stasis ulcer down to skin, subcutaneous tissue, fat with bilateral stasis dermatitis, edema, and varicose veins. Denies any other complaints at this time.\par \par 8/25/21 wound smaller\par 9/1/21 wound smaller,  with epithelial budding

## 2021-09-01 NOTE — REVIEW OF SYSTEMS
[Skin Wound] : skin wound [Fever] : no fever [Eye Pain] : no eye pain [Earache] : no earache [Chest Pain] : no chest pain [Shortness Of Breath] : no shortness of breath [Cough] : no cough [Abdominal Pain] : no abdominal pain [de-identified] : right lower leg venous stasis ulceration down to skin, subcutaneous tissue, and fat. Bilateral venous stasis dermatitis

## 2021-09-01 NOTE — PHYSICAL EXAM
[0] : right 0 [1+] : left 1+ [Varicose Veins Of Lower Extremities] : bilaterally [] : present [Ankle Swelling On The Left] : moderate [Skin Ulcer] : ulcer [Calm] : calm [4 x 4] : 4 x 4  [Ankle Swelling (On Exam)] : not present [de-identified] : A&Ox3, NAD [de-identified] : 5/5 strength in all quadrants bilaterally [de-identified] : right lower leg venous stasis ulceration down to skin, subcutaneous tissue, and fat. Bilateral venous stasis dermatitis [de-identified] : light touch sensation intact bilaterally [FreeTextEntry1] : Right leg superior (Was scattered open areas now 2 wounds)  [FreeTextEntry2] : 2.5 [FreeTextEntry3] : 2.8 [FreeTextEntry4] : 0.1 [de-identified] : Serous/sanguinous [de-identified] : Expressed comfort post Coban application. [de-identified] : Adaptic touch, Silver alginate [de-identified] : Cleansed with Chlorhexidine then NS\par Kerlix  [FreeTextEntry7] : Right leg - Inferior  (Was scattered open areas now 2 wounds)  [FreeTextEntry8] : 0.8 [FreeTextEntry9] : 1.8 [de-identified] : 0.1 [de-identified] : Serous/sanguinous [de-identified] : Expressed comfort post Coban application. [de-identified] : Adaptic touch, Silver alginate [de-identified] : Cleansed with Chlorhexidine then NS\par Kerlix  [TWNoteComboBox4] : Small [de-identified] : Normal [de-identified] : None [de-identified] : None [de-identified] : 100% [de-identified] : No [de-identified] : Multilayer other compression wrap [de-identified] : Weekly [de-identified] : Primary Dressing [de-identified] : Small [de-identified] : Normal [de-identified] : None [de-identified] : None [de-identified] : 100% [de-identified] : No [de-identified] : Multilayer other compression wrap [de-identified] : Weekly [de-identified] : Primary Dressing

## 2021-09-01 NOTE — PLAN
[FreeTextEntry1] : Patient examined and evaluated at this time.\par Continue local wound care and offloading.\par AgAlginate,Adaptic touch and coban dressing weekly\par Patient to follow up in 1 week.\par Spent 20 minutes for patient care and medical decision making.\par

## 2021-09-01 NOTE — ASSESSMENT
[Verbal] : Verbal [Written] : Written [Demo] : Demo [Patient] : Patient [Spouse] : Spouse [Good - alert, interested, motivated] : Good - alert, interested, motivated [Verbalizes knowledge/Understanding] : Verbalizes knowledge/understanding [Dressing changes] : dressing changes [Skin Care] : skin care [Signs and symptoms of infection] : sign and symptoms of infection [Venous Disease] : venous disease [How and When to Call] : how and when to call [Pain Management] : pain management [Compression Therapy] : compression therapy [Patient responsibility to plan of care] : patient responsibility to plan of care [Stable] : stable [Home] : Home [Ambulatory] : Ambulatory [Not Applicable - Long Term Care/Home Health Agency] : Long Term Care/Home Health Agency: Not Applicable [] : No [FreeTextEntry2] : Infection prevention\par Localized wound care \par Goal of remaining pain free regarding wounds.\par Compression therapy  [FreeTextEntry4] : No Nushield necessary per MD due to improvement. \par Follow up in 1 week

## 2021-09-10 NOTE — ASSESSMENT
[Verbal] : Verbal [Written] : Written [Demo] : Demo [Patient] : Patient [Spouse] : Spouse [Good - alert, interested, motivated] : Good - alert, interested, motivated [Verbalizes knowledge/Understanding] : Verbalizes knowledge/understanding [Dressing changes] : dressing changes [Skin Care] : skin care [Signs and symptoms of infection] : sign and symptoms of infection [How and When to Call] : how and when to call [Venous Disease] : venous disease [Pain Management] : pain management [Compression Therapy] : compression therapy [Patient responsibility to plan of care] : patient responsibility to plan of care [Stable] : stable [Home] : Home [Ambulatory] : Ambulatory [Not Applicable - Long Term Care/Home Health Agency] : Long Term Care/Home Health Agency: Not Applicable [Family member] : Family member [Nutrition] : nutrition [Glycemic Control] : glycemic control [] : No [FreeTextEntry2] : Infection Prevention\par Promote Skin Integrity\par Offloading\par Elevation\par Compression Compliance\par Low Na+ Diet\par Maintain acceptable levels of pain\par Demonstrates use of both pharmacological and nonpharmacological pain management interventions\par encourage glycemic control\par  [FreeTextEntry4] : F/U 1 week for assessment

## 2021-09-10 NOTE — HISTORY OF PRESENT ILLNESS
[FreeTextEntry1] : Patient seen for newly open right lower leg venous stasis ulcer down to skin, subcutaneous tissue, fat with bilateral stasis dermatitis, edema, and varicose veins. Denies any other complaints at this time.\par \par 8/25/21 wound smaller\par 9/1/21 wound smaller,  with epithelial budding\par 9/10/21 wounds continue to improve, left lower leg closed

## 2021-09-10 NOTE — REVIEW OF SYSTEMS
[Skin Wound] : skin wound [Fever] : no fever [Eye Pain] : no eye pain [Earache] : no earache [Chest Pain] : no chest pain [Shortness Of Breath] : no shortness of breath [Cough] : no cough [Abdominal Pain] : no abdominal pain [de-identified] : right lower leg venous stasis ulceration down to skin, subcutaneous tissue, and fat. Bilateral venous stasis dermatitis

## 2021-09-13 PROBLEM — M54.12 CERVICAL RADICULOPATHY: Status: ACTIVE | Noted: 2018-09-18

## 2021-09-13 PROBLEM — R51.9 CERVICOGENIC HEADACHE: Status: ACTIVE | Noted: 2020-02-01

## 2021-09-19 NOTE — REVIEW OF SYSTEMS
[Fever] : no fever [Chills] : no chills [Feeling Poorly] : feeling poorly [Feeling Tired] : feeling tired [Eyesight Problems] : no eyesight problems [Nasal Discharge] : no nasal discharge [Chest Pain] : no chest pain [Palpitations] : no palpitations [Cough] : no cough [Arthralgias] : arthralgias [Neck Pain] : neck pain [Back Pain] : ~T no back pain [Skin Lesions] : no skin lesions [Itching] : no itching [Convulsions] : no convulsions [As Noted in HPI] : as noted in HPI [Fainting] : no fainting [Sleep Disturbances] : sleep disturbances [Muscle Weakness] : no muscle weakness [Swollen Glands] : no swollen glands

## 2021-09-19 NOTE — ASSESSMENT
[FreeTextEntry1] : diclofenac gel\par trial of low dose robaxin\par physical therapy with trial of TENS unit.

## 2021-09-19 NOTE — HISTORY OF PRESENT ILLNESS
[FreeTextEntry1] : Pt remains with neck pain with reduced range of motion in all direction but moreso in trapezius to left.  Does have cap like tension type headache to top of head.\par Does take tylenol 2-3 */ day for this continuous pain.\par Had been in for initial visit in 1/2020 but had not seen benefit from PT, had coverage issue with obtaining lidocaine gel.\par We discussed options re: other medicaiton and physical manipulation to neck. [Chronic Headache] : chronic headache [Dizziness] : no dizziness [Neck Pain] : neck pain [Scalp Tenderness] : scalp tenderness [Daily] : daily [> 4 hours] : > 4 hours [stayed the same] : stayed the same [5] : an average pain level of 5/10 [Stable] : Overall, patient's activity level is stable [Worsened] : The patient reports ~his/her~ symptoms since the last visit have worsened

## 2021-09-20 NOTE — PLAN
[FreeTextEntry1] : Patient examined and evaluated at this time.\par Continue local wound care and offloading.\par AgNO3 1 stick to hypertrophic granulation\par asim and coban dressing weekly\par epifix for next week\par Patient to follow up in 1 week.\par Spent 20 minutes for patient care and medical decision making.\par

## 2021-09-20 NOTE — HISTORY OF PRESENT ILLNESS
[FreeTextEntry1] : Patient seen for newly open right lower leg venous stasis ulcer down to skin, subcutaneous tissue, fat with bilateral stasis dermatitis, edema, and varicose veins. Denies any other complaints at this time.\par \par 8/25/21 wound smaller\par 9/1/21 wound smaller,  with epithelial budding\par 9/10/21 wounds continue to improve, left lower leg closed\par 9/20/21 left leg remains closed. Right lower leg wounds about the same and hypertrophic granulation

## 2021-09-20 NOTE — ASSESSMENT
[Verbal] : Verbal [Written] : Written [Demo] : Demo [Patient] : Patient [Spouse] : Spouse [Family member] : Family member [Good - alert, interested, motivated] : Good - alert, interested, motivated [Verbalizes knowledge/Understanding] : Verbalizes knowledge/understanding [Dressing changes] : dressing changes [Skin Care] : skin care [Signs and symptoms of infection] : sign and symptoms of infection [Venous Disease] : venous disease [Nutrition] : nutrition [How and When to Call] : how and when to call [Pain Management] : pain management [Compression Therapy] : compression therapy [Patient responsibility to plan of care] : patient responsibility to plan of care [Glycemic Control] : glycemic control [Stable] : stable [Home] : Home [Ambulatory] : Ambulatory [Not Applicable - Long Term Care/Home Health Agency] : Long Term Care/Home Health Agency: Not Applicable [Off-loading] : off-loading [] : No [FreeTextEntry2] : Infection Prevention\par Promote Skin Integrity\par Offloading\par Elevation\par Compression Compliance\par Low Na+ Diet\par Maintain acceptable levels of pain\par Demonstrates use of both pharmacological and nonpharmacological pain management interventions\par encourage glycemic control\par  [FreeTextEntry3] : Wound is larger and hypergranular [FreeTextEntry4] : F/U 1 week for assessment\par Preauthorization for Epifix submitted for next assessment

## 2021-09-20 NOTE — REVIEW OF SYSTEMS
[Skin Wound] : skin wound [Fever] : no fever [Eye Pain] : no eye pain [Earache] : no earache [Chest Pain] : no chest pain [Shortness Of Breath] : no shortness of breath [Cough] : no cough [Abdominal Pain] : no abdominal pain [de-identified] : right lower leg venous stasis ulceration down to skin, subcutaneous tissue, and fat. Bilateral venous stasis dermatitis

## 2021-09-20 NOTE — PHYSICAL EXAM
[4 x 4] : 4 x 4  [0] : right 0 [1+] : left 1+ [Varicose Veins Of Lower Extremities] : bilaterally [] : bilaterally [Ankle Swelling On The Left] : moderate [Skin Ulcer] : ulcer [Calm] : calm [Ankle Swelling (On Exam)] : not present [de-identified] : A&Ox3, NAD [de-identified] : 5/5 strength in all quadrants bilaterally [de-identified] : right lower leg venous stasis ulceration down to skin, subcutaneous tissue, and fat. Bilateral venous stasis dermatitis [de-identified] : light touch sensation intact bilaterally [de-identified] : Circ neurovascular function WNL post coban compression, pt expressed comfort.\par AgNo3\par  [FreeTextEntry7] : Right Anterior Leg- two wounds with bridge of epithelium in measurement [FreeTextEntry8] : 0.9 [FreeTextEntry9] : 2.2 [de-identified] : hypergranular [de-identified] : Scant Serosanguineous [de-identified] : COBAN [de-identified] : Jessica and Adaptic Touch [de-identified] : cleansed with  NS\par Kerlix  [de-identified] : Normal [de-identified] : None [de-identified] : None [de-identified] : None [de-identified] : No [de-identified] : Multilayer other compression wrap [de-identified] : Weekly [de-identified] : Primary Dressing [de-identified] : Small [de-identified] : No [de-identified] : Venous [de-identified] : Yes [de-identified] : Normal [de-identified] : None [de-identified] : None [de-identified] : 100% [de-identified] : No [de-identified] : Multilayer other compression wrap [de-identified] : Weekly [de-identified] : Primary Dressing

## 2021-09-27 PROBLEM — R51.9 CHRONIC DAILY HEADACHE: Status: ACTIVE | Noted: 2017-10-27

## 2021-09-27 PROBLEM — F03.90 DEMENTIA: Status: ACTIVE | Noted: 2017-03-21

## 2021-09-27 NOTE — HISTORY OF PRESENT ILLNESS
[FreeTextEntry1] : 89 yo male with CAD,  CHF, Defib, DM, BPH who has not gone to hospital or ED in past year. Pt had wound on right lower leg which seen by wound specialist. Pt doing well. No fever. No sob, palpitations, cp.\par \par No falls. No ED visits. \par \par Pt complaining of headache, and LBP for years.  Takes tylenol. Pt able to walk without assistance and get up from chair without assistance\par \par Pt with chronic headaches who saw neurologist for headaches and no new findings. Trial of muscle relaxant prescribed. Pt headaches are behind neck and on top of head.  NO changes in presentation. Pt also complaining of swelling of defibriallator area.\par \par Son visiting from [place] and here with parents.\par \par  Pt INR = 3.25 (2-3 goal) [0] : 2) Feeling down, depressed, or hopeless: Not at all (0) [DNR] : DNR [DNI] : DNI [FreeTextEntry4] : MOLST done at last visit

## 2021-09-27 NOTE — PHYSICAL EXAM
[General Appearance - Alert] : alert [General Appearance - In No Acute Distress] : in no acute distress [General Appearance - Well Nourished] : well nourished [General Appearance - Well Developed] : well developed [General Appearance - Well-Appearing] : healthy appearing [] : normal voice and communication [Sclera] : the sclera and conjunctiva were normal [PERRL With Normal Accommodation] : pupils were equal in size, round, and reactive to light [Optic Disc Abnormality] : the optic disc were normal in size and color [Outer Ear] : the ears and nose were normal in appearance [Hearing Threshold Finger Rub Not [place]] : hearing was normal [Examination Of The Oral Cavity] : the lips and gums were normal [Nasal Cavity] : the nasal mucosa and septum were normal [Oropharynx] : The oropharynx was normal [Neck Appearance] : the appearance of the neck was normal [Neck Cervical Mass (___cm)] : no neck mass was observed [Jugular Venous Distention Increased] : there was no jugular-venous distention [Thyroid Diffuse Enlargement] : the thyroid was not enlarged [Thyroid Nodule] : there were no palpable thyroid nodules [Respiration, Rhythm And Depth] : normal respiratory rhythm and effort [Auscultation Breath Sounds / Voice Sounds] : lungs were clear to auscultation bilaterally [Chest Palpation] : palpation of the chest revealed no abnormalities [Heart Rate And Rhythm] : heart rate was normal and rhythm regular [Heart Sounds] : normal S1 and S2 [Heart Sounds Gallop] : no gallops [Murmurs] : no murmurs [Full Pulse] : the pedal pulses are present [Edema] : there was no peripheral edema [Abdomen Tenderness] : non-tender [Penis Abnormality] : the penis was normal [Scrotum] : the scrotum was normal [Testes Swelling] : the testicles were not swollen [Testes Mass (___cm)] : there were no testicular masses [Cervical Lymph Nodes Enlarged Anterior Bilaterally] : anterior cervical [Femoral Lymph Nodes Enlarged Bilaterally] : femoral [No CVA Tenderness] : no ~M costovertebral angle tenderness [Nail Clubbing] : no clubbing  or cyanosis of the fingernails [Musculoskeletal - Swelling] : no joint swelling seen [Motor Tone] : muscle strength and tone were normal [FreeTextEntry1] : R medial shin with non-oozing stasis ulcer, mildly tender [Cranial Nerves] : cranial nerves 2-12 were intact [Sensation] : the sensory exam was normal to light touch and pinprick [Oriented To Time, Place, And Person] : oriented to person, place, and time [Impaired Insight] : insight and judgment were intact [Affect] : the affect was normal [Mood] : the mood was normal

## 2021-09-27 NOTE — ASSESSMENT
[FreeTextEntry1] : 89 yo male with CAD , CHF, Defib.  No change in meds\par PT for neck pain and headaches and LBP - discussed fully. withpt. \par \par Son and wife in agreement with MOLST and current situation. Will reach out to Dr. Sina Tomlinson re swelling of defib area. Photo sent to him.\par \par Flu shot and covid shot due. Pt and wife aware.

## 2021-09-27 NOTE — ASSESSMENT
[Verbal] : Verbal [Written] : Written [Demo] : Demo [Patient] : Patient [Spouse] : Spouse [Family member] : Family member [Good - alert, interested, motivated] : Good - alert, interested, motivated [Verbalizes knowledge/Understanding] : Verbalizes knowledge/understanding [Dressing changes] : dressing changes [Skin Care] : skin care [Signs and symptoms of infection] : sign and symptoms of infection [Venous Disease] : venous disease [Nutrition] : nutrition [How and When to Call] : how and when to call [Pain Management] : pain management [Off-loading] : off-loading [Compression Therapy] : compression therapy [Patient responsibility to plan of care] : patient responsibility to plan of care [Glycemic Control] : glycemic control [Stable] : stable [Home] : Home [Ambulatory] : Ambulatory [Not Applicable - Long Term Care/Home Health Agency] : Long Term Care/Home Health Agency: Not Applicable [] : No [FreeTextEntry2] : Infection Prevention\par Promote Skin Integrity\par Offloading\par Elevation\par Compression Compliance\par Low Na+ Diet\par Maintain acceptable levels of pain\par Demonstrates use of both pharmacological and nonpharmacological pain management interventions\par encourage glycemic control\par  [FreeTextEntry4] : Submitted for 2nd epifix application to be applied at next assessment as per MD\par F/U 1 week for assessment\par

## 2021-09-27 NOTE — PROCEDURE
[Other: ___] : [unfilled] [Sharp curette] : sharp curette [Subcutaneous Tissue] : subcutaneous tissue [Hydrated with saline] : hydrated with saline [Epifix] : epifix [____ % was used] : and [unfilled] % was used [FreeTextEntry1] : asim,adaptic touch,DD,Coban [] : No [FreeTextEntry9] : 10:50 [de-identified] : VSU right lower leg [de-identified] : moises fox [de-identified] : none [FreeTextEntry6] : CARAU  [FreeTextEntry7] : same [de-identified] : topical lidocaine [de-identified] : 3ml, AgNO3 needed to stop bleeding [de-identified] : Yes, not sent

## 2021-09-27 NOTE — PHYSICAL EXAM
[4 x 4] : 4 x 4  [0] : right 0 [1+] : left 1+ [Varicose Veins Of Lower Extremities] : bilaterally [] : bilaterally [Ankle Swelling On The Left] : moderate [Skin Ulcer] : ulcer [Calm] : calm [Ankle Swelling (On Exam)] : not present [de-identified] : A&Ox3, NAD [de-identified] : 5/5 strength in all quadrants bilaterally [de-identified] : right lower leg venous stasis ulceration down to skin, subcutaneous tissue, and fat. Bilateral venous stasis dermatitis [de-identified] : light touch sensation intact bilaterally [de-identified] : Circ neurovascular function WNL post coban compression, pt expressed comfort.\par MD cauterized wound with AgN03 [FreeTextEntry7] : Right Anterior Leg [FreeTextEntry8] : 0.9 [FreeTextEntry9] : 2.1 [de-identified] : 0.1 [de-identified] : Scant Serosanguineous [FreeTextEntry6] : Post debridement measurements: 1.0 x 2.2 x 0.1 [de-identified] : COBAN [de-identified] : Epifix, Jessica and Adaptic Touch [de-identified] : Cleansed with Normal Saline\par Kerlix \par \par 1 Unit of Epifix 18mm disc, application #1\par ITM - [id number]\par Expiration- 06/01/2026\par \par Reconstituted with Normal Saline \par Lot#- [id number]\par Expiration Date- 8/1/2023\par \par 100% applied, 0% discarded\par  [de-identified] : No [de-identified] : Venous [de-identified] : Normal [de-identified] : None [de-identified] : None [de-identified] : 100% [de-identified] : No [de-identified] : 2.5% Lidocaine Topical [TWNoteComboBox8] : Aisha [de-identified] : Multilayer other compression wrap [de-identified] : Weekly [de-identified] : Primary Dressing

## 2021-09-27 NOTE — REVIEW OF SYSTEMS
[Skin Wound] : skin wound [FreeTextEntry1] : 10:25 [Fever] : no fever [Eye Pain] : no eye pain [Earache] : no earache [Chest Pain] : no chest pain [Shortness Of Breath] : no shortness of breath [Cough] : no cough [Abdominal Pain] : no abdominal pain [de-identified] : right lower leg venous stasis ulceration down to skin, subcutaneous tissue, and fat. Bilateral venous stasis dermatitis

## 2021-10-04 NOTE — ASSESSMENT
[Verbal] : Verbal [Written] : Written [Demo] : Demo [Patient] : Patient [Spouse] : Spouse [Family member] : Family member [Good - alert, interested, motivated] : Good - alert, interested, motivated [Verbalizes knowledge/Understanding] : Verbalizes knowledge/understanding [Dressing changes] : dressing changes [Skin Care] : skin care [Signs and symptoms of infection] : sign and symptoms of infection [Venous Disease] : venous disease [Nutrition] : nutrition [How and When to Call] : how and when to call [Pain Management] : pain management [Off-loading] : off-loading [Compression Therapy] : compression therapy [Patient responsibility to plan of care] : patient responsibility to plan of care [Glycemic Control] : glycemic control [] : Yes [Stable] : stable [Home] : Home [Ambulatory] : Ambulatory [Not Applicable - Long Term Care/Home Health Agency] : Long Term Care/Home Health Agency: Not Applicable [FreeTextEntry2] : Infection Prevention\par Promote Skin Integrity\par Offloading\par Elevation\par Compression Compliance\par Low Na+ Diet\par Maintain acceptable levels of pain\par Demonstrates use of both pharmacological and nonpharmacological pain management interventions\par encourage glycemic control\par  [FreeTextEntry4] : Submitted pre auth for 3rd epifix application to be applied at next assessment as per DPM\par F/U 1 week for assessment\par

## 2021-10-04 NOTE — PHYSICAL EXAM
[4 x 4] : 4 x 4  [1+] : left 1+ [0] : right 0 [Ankle Swelling (On Exam)] : not present [Varicose Veins Of Lower Extremities] : bilaterally [] : bilaterally [Ankle Swelling On The Left] : moderate [Skin Ulcer] : ulcer [Calm] : calm [de-identified] : A&Ox3, NAD [de-identified] : 5/5 strength in all quadrants bilaterally [de-identified] : right lower leg venous stasis ulceration down to skin, subcutaneous tissue, and fat. Bilateral venous stasis dermatitis [de-identified] : light touch sensation intact bilaterally [de-identified] : \par  [FreeTextEntry7] : Right Anterior Leg [FreeTextEntry8] : 0.9 [FreeTextEntry9] : 2.1 [de-identified] : 0.1 [de-identified] : Scant Serosanguineous [FreeTextEntry6] : Post debridement measurements: 1.0 x 2.2 x 0.1 [de-identified] : COBAN [de-identified] : Epifix, Jessica and Adaptic Touch [de-identified] : Cleansed with Normal Saline\par Kerlix \par \par 1 Unit of Epifix 18mm disc, application #2\par ITM - [id number]\par Expiration- 06/01/2026\par \par Reconstituted with Normal Saline \par Lot#- [id number]\par Expiration Date- 8/1/2023\par \par 75% applied, 25% discarded\par  [de-identified] : No [de-identified] : Venous [de-identified] : Normal [de-identified] : None [de-identified] : None [de-identified] : 100% [de-identified] : No [de-identified] : 2.5% Lidocaine Topical [TWNoteComboBox8] : Aisha [de-identified] : Multilayer other compression wrap [de-identified] : Weekly [de-identified] : Primary Dressing

## 2021-10-04 NOTE — PROCEDURE
[Other: ___] : [unfilled] [Sharp curette] : sharp curette [Subcutaneous Tissue] : subcutaneous tissue [Hydrated with saline] : hydrated with saline [Epifix] : epifix [____ % was used] : and [unfilled] % was used [FreeTextEntry1] : asim,adaptic touch,DD,Coban [] : Yes [FreeTextEntry9] : 11:35 [de-identified] : VSU right lower leg [de-identified] : dale [de-identified] : hegarty [FreeTextEntry6] : CARAU  [FreeTextEntry7] : same [de-identified] : topical lidocaine [de-identified] : 1mL

## 2021-10-04 NOTE — REVIEW OF SYSTEMS
[FreeTextEntry1] : 11:10 [Fever] : no fever [Eye Pain] : no eye pain [Earache] : no earache [Chest Pain] : no chest pain [Shortness Of Breath] : no shortness of breath [Cough] : no cough [Abdominal Pain] : no abdominal pain [Skin Wound] : skin wound [de-identified] : right lower leg venous stasis ulceration down to skin, subcutaneous tissue, and fat. Bilateral venous stasis dermatitis

## 2021-10-11 NOTE — PROCEDURE
[Other: ___] : [unfilled] [Sharp curette] : sharp curette [Subcutaneous Tissue] : subcutaneous tissue [Hydrated with saline] : hydrated with saline [Epifix] : epifix [____ % was used] : and [unfilled] % was used [____ % was discarded] : and [unfilled] % was discarded [FreeTextEntry1] : asim,adaptic touch,DD,Coban [] : Yes [FreeTextEntry9] : 1030 [de-identified] : VSU right lower leg [de-identified] : dale [de-identified] : priti lindquist [FreeTextEntry7] : same [FreeTextEntry6] : CARAU  [de-identified] : topical lidocaine [de-identified] : 1mL

## 2021-10-11 NOTE — PHYSICAL EXAM
[4 x 4] : 4 x 4  [Abdominal Pad] : Abdominal Pad [0] : right 0 [1+] : left 1+ [Ankle Swelling (On Exam)] : not present [Varicose Veins Of Lower Extremities] : present [] : bilaterally [Ankle Swelling On The Left] : moderate [Skin Ulcer] : ulcer [Calm] : calm [de-identified] : A&Ox3, NAD [de-identified] : 5/5 strength in all quadrants bilaterally [de-identified] : right lower leg venous stasis ulceration down to skin, subcutaneous tissue, and fat. Bilateral venous stasis dermatitis [de-identified] : light touch sensation intact bilaterally [de-identified] : Small amount of Blood Loss, No Pain During or Post Procedure, Pt tolerated Well.\par  [FreeTextEntry1] : Right Anterior Leg [FreeTextEntry2] : 1.6 [FreeTextEntry3] : 0.4 [FreeTextEntry4] : 0.1 [de-identified] : Serosanguineous [de-identified] : Post Debridement Measurements 1.7 x 0.5 x 0.2 [de-identified] : Coban Compression [de-identified] : Epifix, Jessica, Adaptic Touch [FreeTextEntry7] : Right Proximal Leg- New Excoriated Areas [de-identified] : Cleansed with Normal Saline\par \par Epifix 18.0mm\par Lot: [id number]\par Item: [id number]\par Exp: 07/01/2026\par Reconstituted with NS,\par Lot: [id number]\par Exp:12/01/2023\par Applied to: Right Anterior Leg\par 50%Used 50%Discarded\par  [de-identified] : Serosanguineous [de-identified] : Mild Erythema [de-identified] : Coban Compression [de-identified] : Jessica, Adaptic Touch [de-identified] : Cleansed with Normal Saline\par  [TWNoteComboBox4] : Small [TWNoteComboBox5] : No [de-identified] : No [de-identified] : Normal [de-identified] : None [de-identified] : None [de-identified] : 100% [de-identified] : No [TWNoteComboBox7] : Aisha [de-identified] : Debridement performed of all devitalized tissue to bleeding viable tissue [de-identified] : Multilayer other compression wrap [de-identified] : Weekly [de-identified] : Small [de-identified] : No [de-identified] : No [de-identified] : None [de-identified] : None [de-identified] : 100% [de-identified] : No [de-identified] : Multilayer other compression wrap [de-identified] : Weekly

## 2021-10-11 NOTE — REVIEW OF SYSTEMS
[FreeTextEntry1] : 2773 [Fever] : no fever [Eye Pain] : no eye pain [Earache] : no earache [Chest Pain] : no chest pain [Shortness Of Breath] : no shortness of breath [Cough] : no cough [Abdominal Pain] : no abdominal pain [Skin Wound] : skin wound [de-identified] : right lower leg venous stasis ulceration down to skin, subcutaneous tissue, and fat. Bilateral venous stasis dermatitis

## 2021-10-11 NOTE — ASSESSMENT
[Verbal] : Verbal [Demo] : Demo [Patient] : Patient [Spouse] : Spouse [Good - alert, interested, motivated] : Good - alert, interested, motivated [Verbalizes knowledge/Understanding] : Verbalizes knowledge/understanding [Dressing changes] : dressing changes [Skin Care] : skin care [Signs and symptoms of infection] : sign and symptoms of infection [How and When to Call] : how and when to call [Compression Therapy] : compression therapy [Patient responsibility to plan of care] : patient responsibility to plan of care [Stable] : stable [Home] : Home [Ambulatory] : Ambulatory [Not Applicable - Long Term Care/Home Health Agency] : Long Term Care/Home Health Agency: Not Applicable [] : No [FreeTextEntry2] : Restore Skin Integrity\par Infection Control\par Localized wound care\par Maintain acceptable pain levels at satisfactory relief.\par Demonstrates use of both nonpharmacological and pharmacological pain relief strategies [FreeTextEntry4] : F/U to [facility] in 1 week\par DPM Recommend Patient Have additional Epifix, Auth Submitted independent

## 2021-10-13 NOTE — HISTORY OF PRESENT ILLNESS
[FreeTextEntry1] : Klaus is here with his family. He has gained weight and more short of breath. There were two episodes that he felt like he was going to fall to the ground. Thinks he feels worse since generator change. No CP, palpitations, lightheadedness, or dizziness.

## 2021-10-13 NOTE — REVIEW OF SYSTEMS
[Dyspnea on exertion] : dyspnea during exertion [Negative] : Heme/Lymph [SOB] : no shortness of breath [Chest Discomfort] : no chest discomfort [Lower Ext Edema] : no extremity edema [Leg Claudication] : no intermittent leg claudication [Palpitations] : no palpitations [Syncope] : no syncope

## 2021-10-13 NOTE — DISCUSSION/SUMMARY
[___ Month(s)] : in [unfilled] month(s) [FreeTextEntry1] : The patient is a 89-year-old gentleman mild dementia, diabetes mellitus, CKD stage IV, hypertension, hyperlipidemia, afib, CAD, cardiomyopathy, ICD s/p generator change who is fluid overloaded.\par #1 CAD - no angina, continue imdur 30mg\par #2 Htn - stable, continue hydralazine and toprol\par #3 HFrEF - increase torsemide 3 tab 2x daily, daily weights. ICD interrogation negative, echo severe LV dysfunction\par #4 Afib - rate controlled with low dose toprol, no bleeding on warfarin,INR therapeutic\par #5 DM - under control\par #6 Renal - CKD stage IV f/u Dr. Miller\par #7 Lipids-continue lipitor 40mg.\par #8 Neuro- on aricept, encourage ambulation with assistance, increase hydration, f/u wound care\par Received COVID vaccines

## 2021-10-14 NOTE — DISCHARGE NOTE NURSING/CASE MANAGEMENT/SOCIAL WORK - NSDCPNINST_GEN_ALL_CORE
PPD Reading Note  PPD read and results entered in [facility]. Result: 00 mm induration.   Interpretation: Negative  If test not read within 48-72 hours of initial placement, patient advised to repeat in other arm 1-3 weeks after this test.  Allergic reaction: no Follow up with MD. Take all medication as prescribed by MD. IV antibiotics at home.

## 2021-10-18 NOTE — REVIEW OF SYSTEMS
[Fever] : no fever [Eye Pain] : no eye pain [Earache] : no earache [Chest Pain] : no chest pain [Shortness Of Breath] : no shortness of breath [Cough] : no cough [Abdominal Pain] : no abdominal pain [Skin Wound] : skin wound [de-identified] : right lower leg venous stasis ulceration down to skin, subcutaneous tissue, and fat, healed. Bilateral venous stasis dermatitis

## 2021-10-18 NOTE — HISTORY OF PRESENT ILLNESS
[FreeTextEntry1] : Patient seen for right lower leg venous stasis ulcer down to skin, subcutaneous tissue, fat, healed. bilateral stasis dermatitis, edema, and varicose veins. Denies any other complaints at this time.

## 2021-10-18 NOTE — VITALS
[Pain related to present condition?] : The patient's  pain is not related to present condition. [] : No [de-identified] : 0

## 2021-10-18 NOTE — PLAN
[FreeTextEntry1] : Patient examined and evaluated at this time.\par Discussed the importance of compression and elevation.\par Continue local wound care and offloading.\par Patient to follow up in 1 week.\par Spent 20 minutes for patient care and medical decision making.\par

## 2021-10-18 NOTE — PHYSICAL EXAM
[4 x 4] : 4 x 4  [0] : right 0 [1+] : left 1+ [Ankle Swelling (On Exam)] : not present [Varicose Veins Of Lower Extremities] : bilaterally [] : bilaterally [Ankle Swelling On The Left] : moderate [Skin Ulcer] : ulcer [Calm] : calm [de-identified] : A&Ox3, NAD [de-identified] : 5/5 strength in all quadrants bilaterally [de-identified] : right lower leg venous stasis ulceration down to skin, subcutaneous tissue, and fat, healed. Bilateral venous stasis dermatitis [de-identified] : light touch sensation intact bilaterally [de-identified] : No neurovascular deficits noted. Patient verbalized comfort post compression application  [FreeTextEntry1] : Anterior Leg- Fragile epithelium [de-identified] : scant Serosanguineous [de-identified] : Coban Compression [de-identified] : Calcium Alginate [de-identified] : Cleansed with Normal Saline\par \par  [de-identified] : No neurovascular deficits noted. Patient verbalized comfort post compression application  [FreeTextEntry7] : Right Proximal Leg- CLOSED  [de-identified] : Coban Compression [de-identified] : No treatment required  [de-identified] : Cleansed with Normal Saline\par  [TWNoteComboBox1] : Right [TWNoteComboBox5] : No [de-identified] : No [de-identified] : Normal [de-identified] : None [de-identified] : None [de-identified] : 100% [de-identified] : No [de-identified] : False [TWNoteComboBox7] : False [de-identified] : Multilayer other compression wrap [de-identified] : Weekly [de-identified] : Primary Dressing [de-identified] : None [de-identified] : No [de-identified] : 100% [de-identified] : Multilayer other compression wrap

## 2021-10-18 NOTE — PHYSICAL EXAM
[4 x 4] : 4 x 4  [0] : right 0 [1+] : left 1+ [Ankle Swelling (On Exam)] : not present [Varicose Veins Of Lower Extremities] : bilaterally [] : bilaterally [Ankle Swelling On The Left] : moderate [Skin Ulcer] : ulcer [Calm] : calm [de-identified] : A&Ox3, NAD [de-identified] : 5/5 strength in all quadrants bilaterally [de-identified] : right lower leg venous stasis ulceration down to skin, subcutaneous tissue, and fat, healed. Bilateral venous stasis dermatitis [de-identified] : light touch sensation intact bilaterally [de-identified] : No neurovascular deficits noted. Patient verbalized comfort post compression application  [FreeTextEntry1] : Anterior Leg- Fragile epithelium [de-identified] : scant Serosanguineous [de-identified] : Coban Compression [de-identified] : Calcium Alginate [de-identified] : Cleansed with Normal Saline\par \par  [de-identified] : No neurovascular deficits noted. Patient verbalized comfort post compression application  [FreeTextEntry7] : Right Proximal Leg- CLOSED  [de-identified] : Coban Compression [de-identified] : No treatment required  [de-identified] : Cleansed with Normal Saline\par  [TWNoteComboBox1] : Right [TWNoteComboBox5] : No [de-identified] : No [de-identified] : Normal [de-identified] : None [de-identified] : None [de-identified] : 100% [de-identified] : No [TWNoteComboBox7] : False [de-identified] : False [de-identified] : Multilayer other compression wrap [de-identified] : Weekly [de-identified] : Primary Dressing [de-identified] : None [de-identified] : No [de-identified] : 100% [de-identified] : Multilayer other compression wrap

## 2021-10-18 NOTE — ASSESSMENT
[Verbal] : Verbal [Demo] : Demo [Patient] : Patient [Spouse] : Spouse [Good - alert, interested, motivated] : Good - alert, interested, motivated [Verbalizes knowledge/Understanding] : Verbalizes knowledge/understanding [Dressing changes] : dressing changes [Skin Care] : skin care [Signs and symptoms of infection] : sign and symptoms of infection [How and When to Call] : how and when to call [Compression Therapy] : compression therapy [Patient responsibility to plan of care] : patient responsibility to plan of care [Stable] : stable [Home] : Home [Ambulatory] : Ambulatory [Not Applicable - Long Term Care/Home Health Agency] : Long Term Care/Home Health Agency: Not Applicable [] : No [FreeTextEntry2] : Restore Skin Integrity\par Infection Control\par Localized wound care\par Maintain acceptable pain levels at satisfactory relief.\par Demonstrates use of both nonpharmacological and pharmacological pain relief strategies\par Vascular consult\par F/U 1 week  [FreeTextEntry4] : Today's fraft held due to progression of the wound, no further grafts recommended.\par DPM recommended vascular consult. patient and wife stated that they did not want to follow up. \par F/U 1 week\par

## 2021-10-18 NOTE — REVIEW OF SYSTEMS
[Fever] : no fever [Eye Pain] : no eye pain [Earache] : no earache [Chest Pain] : no chest pain [Shortness Of Breath] : no shortness of breath [Cough] : no cough [Abdominal Pain] : no abdominal pain [Skin Wound] : skin wound [de-identified] : right lower leg venous stasis ulceration down to skin, subcutaneous tissue, and fat, healed. Bilateral venous stasis dermatitis

## 2021-10-25 NOTE — HISTORY OF PRESENT ILLNESS
[FreeTextEntry1] : Patient seen for right lower leg venous stasis ulcer down to skin, subcutaneous tissue, fat, healed. bilateral stasis dermatitis, edema, and varicose veins. Denies any other complaints at this time.\par \par \par today for wound eval RLE. Compliant with compression therapy

## 2021-10-25 NOTE — ASSESSMENT
[Verbal] : Verbal [Demo] : Demo [Patient] : Patient [Spouse] : Spouse [Good - alert, interested, motivated] : Good - alert, interested, motivated [Verbalizes knowledge/Understanding] : Verbalizes knowledge/understanding [Dressing changes] : dressing changes [Skin Care] : skin care [Signs and symptoms of infection] : sign and symptoms of infection [How and When to Call] : how and when to call [Compression Therapy] : compression therapy [Patient responsibility to plan of care] : patient responsibility to plan of care [] : Yes [Stable] : stable [Home] : Home [Ambulatory] : Ambulatory [Not Applicable - Long Term Care/Home Health Agency] : Long Term Care/Home Health Agency: Not Applicable [FreeTextEntry2] : Restore Skin Integrity\par Infection Control\par Localized wound care\par Maintain acceptable pain levels at satisfactory relief.\par Demonstrates use of both nonpharmacological and pharmacological pain relief strategies\par Vascular consult [FreeTextEntry4] : Submitted for Epifix to right anterior leg (proximal)\par Patient to f/u in 1 week\par  [FreeTextEntry1] : 89 yo M with venous stasis changes. RLE VV and dark hyperpigmented shin. One stasis ulcer healed but another one opened up at the upper shin. Likely to be related to dressing.

## 2021-10-25 NOTE — PLAN
[FreeTextEntry1] : Continue compression therapy. Will apply for epifix to apply to upper shin wound. A total of 20 min spent with them today.

## 2021-10-25 NOTE — REVIEW OF SYSTEMS
[Skin Wound] : skin wound [Negative] : Heme/Lymph [Fever] : no fever [Eye Pain] : no eye pain [Earache] : no earache [Chest Pain] : no chest pain [Shortness Of Breath] : no shortness of breath [Cough] : no cough [Abdominal Pain] : no abdominal pain [de-identified] : right lower leg venous stasis ulceration down to skin, subcutaneous tissue, and fat, healed. Bilateral venous stasis dermatitis

## 2021-10-25 NOTE — PHYSICAL EXAM
[4 x 4] : 4 x 4  [0] : right 0 [1+] : left 1+ [Varicose Veins Of Lower Extremities] : bilaterally [] : bilaterally [Ankle Swelling On The Left] : moderate [Skin Ulcer] : ulcer [Calm] : calm [Ankle Swelling (On Exam)] : not present [de-identified] : A&Ox3, NAD [de-identified] : 5/5 strength in all quadrants bilaterally [de-identified] : right lower leg venous stasis dermatitis, anterior shin wound scabbed over, upper lateral shin full thickness wound [de-identified] : light touch sensation intact bilaterally [FreeTextEntry1] : Anterior Leg - Proximal [FreeTextEntry2] : 1.6 [FreeTextEntry3] : 0.8 [FreeTextEntry4] : 0.1 [de-identified] : Serosanguineous [de-identified] : Coban Compression [de-identified] : Jessica [de-identified] : Cleansed with Normal Saline\par \par  [FreeTextEntry7] : Right Anterior Leg- Distal - CLOSED  [de-identified] : Coban Compression [de-identified] : Jessica [de-identified] : Cleansed with Normal Saline\par  [TWNoteComboBox1] : Right [TWNoteComboBox4] : Small [TWNoteComboBox5] : No [de-identified] : No [de-identified] : Normal [de-identified] : None [de-identified] : None [de-identified] : 100% [de-identified] : No [de-identified] : Multilayer other compression wrap [de-identified] : Weekly [de-identified] : Primary Dressing [de-identified] : None [de-identified] : No [de-identified] : 100% [de-identified] : Multilayer other compression wrap [de-identified] : Weekly

## 2021-11-01 NOTE — REVIEW OF SYSTEMS
[Skin Wound] : skin wound [FreeTextEntry1] : 1026hr  [Fever] : no fever [Eye Pain] : no eye pain [Earache] : no earache [Chest Pain] : no chest pain [Shortness Of Breath] : no shortness of breath [Cough] : no cough [Abdominal Pain] : no abdominal pain [de-identified] : right lower leg venous stasis ulceration down to skin, subcutaneous tissue, and fat, healed. Bilateral venous stasis dermatitis

## 2021-11-01 NOTE — ASSESSMENT
[Verbal] : Verbal [Written] : Written [Demo] : Demo [Patient] : Patient [Spouse] : Spouse [Family member] : Family member [Good - alert, interested, motivated] : Good - alert, interested, motivated [Needs reinforcement] : needs reinforcement [Dressing changes] : dressing changes [Skin Care] : skin care [Signs and symptoms of infection] : sign and symptoms of infection [Venous Disease] : venous disease [How and When to Call] : how and when to call [Pain Management] : pain management [Compression Therapy] : compression therapy [Patient responsibility to plan of care] : patient responsibility to plan of care [Stable] : stable [Home] : Home [Ambulatory] : Ambulatory [Not Applicable - Long Term Care/Home Health Agency] : Long Term Care/Home Health Agency: Not Applicable [] : No [FreeTextEntry2] : Infection prevention\par Localized wound care \par Goal of remaining pain free regarding wounds.\par Compression therapy \par Skin substitute therapy  [FreeTextEntry4] : auth submitted for Epifix #5\par Follow up in 1 week

## 2021-11-01 NOTE — PROCEDURE
[Saline] : saline [Hydrated with saline] : hydrated with saline [Epifix] : epifix [____ % was used] : and [unfilled] % was used [____ % was discarded] : and [unfilled] % was discarded [FreeTextEntry1] : asim, adaptic, dry dressing, coban multilayer compression [FreeTextEntry9] : 1045hr [de-identified] : right lower leg venous stasis ulceration down to skin, subcutaneous tissue, and fat. Bilateral venous stasis dermatitis [de-identified] : dale [de-identified] : ramo [FreeTextEntry6] : right lower leg venous stasis ulceration down to skin, subcutaneous tissue, and fat. Bilateral venous stasis dermatitis [FreeTextEntry7] : right lower leg venous stasis ulceration down to skin, subcutaneous tissue, and fat. Bilateral venous stasis dermatitis

## 2021-11-05 PROBLEM — I50.22 CHRONIC SYSTOLIC CONGESTIVE HEART FAILURE: Status: ACTIVE | Noted: 2017-03-21

## 2021-11-08 NOTE — PROGRESS NOTE ADULT - PROBLEM SELECTOR PLAN 3
Pt w/ HFrEF: (TTE on 7/15/19 w/ EF: 25% severe global LV dysfxn)  - C/w hydralazine 10mg BID  - C/w Isosorbide 30mg qd  - C/w metoprolol 75mg qd  - C/w torsemide 60mg qd no

## 2021-11-08 NOTE — REVIEW OF SYSTEMS
[Fever] : no fever [Eye Pain] : no eye pain [Earache] : no earache [Chest Pain] : no chest pain [Shortness Of Breath] : no shortness of breath [Cough] : no cough [Abdominal Pain] : no abdominal pain [Skin Wound] : skin wound [Negative] : Heme/Lymph [de-identified] : right lower leg venous stasis ulceration down to skin, subcutaneous tissue, and fat, healed. Bilateral venous stasis dermatitis

## 2021-11-08 NOTE — REASON FOR VISIT
[Follow-Up: _____] : a [unfilled] follow-up visit [Spouse] : spouse [Family Member] : family member [FreeTextEntry1] : R shin wounds

## 2021-11-08 NOTE — ASSESSMENT
[Verbal] : Verbal [Demo] : Demo [Patient] : Patient [Spouse] : Spouse [Family member] : Family member [Good - alert, interested, motivated] : Good - alert, interested, motivated [Needs reinforcement] : needs reinforcement [Dressing changes] : dressing changes [Skin Care] : skin care [Signs and symptoms of infection] : sign and symptoms of infection [Venous Disease] : venous disease [How and When to Call] : how and when to call [Compression Therapy] : compression therapy [Patient responsibility to plan of care] : patient responsibility to plan of care [Stable] : stable [Home] : Home [Ambulatory] : Ambulatory [Not Applicable - Long Term Care/Home Health Agency] : Long Term Care/Home Health Agency: Not Applicable [] : No [FreeTextEntry2] : Localized Wound Care \par Compression therapy \par Infection Prevention  [FreeTextEntry4] : Epifix is no longer needed\par Pt to F/U to [facility] in 2 Weeks  [FreeTextEntry1] : 89 yo M with venous stasis changes. RLE VV and dark hyperpigmented shin. One stasis ulcer healed but another one opened up at the upper shin. Likely to be related to dressing. \par \par Today only one small wound present. Progressing well with compression therapy

## 2021-11-08 NOTE — PHYSICAL EXAM
[4 x 4] : 4 x 4  [0] : right 0 [1+] : left 1+ [Ankle Swelling (On Exam)] : not present [Varicose Veins Of Lower Extremities] : bilaterally [] : bilaterally [Ankle Swelling On The Left] : moderate [Skin Ulcer] : ulcer [Calm] : calm [de-identified] : A&Ox3, NAD [de-identified] : 5/5 strength in all quadrants bilaterally [de-identified] : R shin 4 mm wound-flat, pink base, oozing  [de-identified] : light touch sensation intact bilaterally [FreeTextEntry1] : Right Anterior Leg - Proximal Resolved [de-identified] : Expressed comfort post Circaids application. [de-identified] : No Treatment  [de-identified] : Cleansed with Normal Saline.  [FreeTextEntry7] : Right Anterior Leg  [FreeTextEntry8] : 0.5 [FreeTextEntry9] : 0.3 [de-identified] : 0.1  [de-identified] : Scant serous [de-identified] : Expressed comfort post Circaid application. [de-identified] : Jessica  [de-identified] : Cleansed with chlorhexidine then NS\par Kerlix  [TWNoteComboBox4] : None [de-identified] : Normal [de-identified] : None [de-identified] : False [de-identified] : False [de-identified] : False [TWNoteComboBox7] : False [de-identified] : False [de-identified] : Circaid wrap [de-identified] : False [de-identified] : False [de-identified] : False [de-identified] : Normal [de-identified] : None [de-identified] : None [de-identified] : 100% [de-identified] : No [de-identified] : Circaid wrap [de-identified] : Weekly [de-identified] : Primary Dressing

## 2021-11-08 NOTE — HISTORY OF PRESENT ILLNESS
Warm
[FreeTextEntry1] : Patient seen for right lower leg venous stasis ulcer down to skin, subcutaneous tissue, fat, healed. bilateral stasis dermatitis, edema, and varicose veins. Denies any other complaints at this time.\par \par 11/8/2021\par Klaus has been wearing Cobans without any problem. Wounds decreased in size significantly.

## 2021-11-12 NOTE — HISTORY OF PRESENT ILLNESS
[FreeTextEntry1] : Mr. Klaus Morrow is a 89 year old man with an ICM/HFrEF (LVIDd 5.4 cm, EF 35-40%), s/p CABG and MVR in 2004. He is s/p BIV-ICD with recent generator change and downgrade to BIV-PPM on 8/26/21. His other comorbidities include persistent atrial fibrillation, chronic kidney disease, and diabetes mellitus. He presents today for routine follow-up of his HF, previously followed by Dr. Varela (last seen in 2017) and is also followed by Dr. Genesis Morales.\par \par He has had no recent HF admissions, and was recently seen by Dr. Morales on 10/12, and at that visit was volume overloaded in which his diuretics were escalated to Torsemide 60 mg BID from 30 mg QD. He was instructed to follow up with his HF specialist for his increasing SOB and recent weight gain. \par \par He presents today in clinic accompanied by his wife and son, he endorses that he can walk up 1 flight of stairs before becoming SOB and is able to walk a total of 1 block before before needing to stop and rest. He denies Orthopnea/PND. He states his appetite is good and generally maintains sodium restrictions in his diet. He states that he has a right lower extremity wound which is managed by his wound care MD, and he wears compression stockings on his left leg. His dry weights at home have ranged ~159 lbs. He Denies CP/palp, no LH/Dizz, No abd distention.

## 2021-11-12 NOTE — DISCUSSION/SUMMARY
[FreeTextEntry1] : #Chronic Systolic Congestive Heart Failure\par - c/w Metoprolol 75 mg QD\par - c/w Isosorbide Mononitrate 30 mg; \par - c/w Hydralazine 10 mg BID;\par - no RAASi given his CKD\par - Will continue Torsemide 60 mg in AM and 40 mg in PM; with instructions to take additional 20 mg in evening as needed, if there is  +2lbs/day and/or +3lbs/day weight gain.  \par - Will stop K+ supplement daily and have him take K+ 10 MEQ  in evening only when additional diuretic is needed.\par \par #Afib\par - c/w Digoxin 125 mcg TIW and BB as above\par - Continue AC w/ Coumadin\par - Follow Dr. Morales who manges INR \par \par Follow up with Dr. Martínez in 6 months or sooner if needed.

## 2021-11-12 NOTE — REVIEW OF SYSTEMS
[Negative] : Constitutional [Orthopnea] : no orthopnea [PND] : no PND [Syncope] : no syncope [Cough] : no cough

## 2021-11-12 NOTE — ASSESSMENT
[FreeTextEntry1] : Mr. Klaus Morrow is a 89 year old man with an ICM/HFrEF (LVIDd 5.4 cm, EF 35-40%), s/p CABG and MVR in 2004. He is s/p BIV-ICD with recent generator change and downgrade to BIV-PPM on 8/26/21. His other comorbidities Afib, CKD, and DM. He is ACC/AHA Stage C HF, presents to clinic today endorsing NYHA Class 3 symptoms and euvolemic on exam.

## 2021-11-12 NOTE — CARDIOLOGY SUMMARY
[de-identified] : \par 9/24/19 TTE: LVIDd 5.4 cm, LVEF 35-40%, eccentric LVH (septum 1.3 cm, PWT 1.3 cm), normal RV size with decreased RVSF, bio-MVR, min MR, mild AI, min TR, RVSP 57 mmHg.

## 2021-11-22 NOTE — REVIEW OF SYSTEMS
[Skin Wound] : skin wound [Negative] : Heme/Lymph [Fever] : no fever [Eye Pain] : no eye pain [Earache] : no earache [Chest Pain] : no chest pain [Shortness Of Breath] : no shortness of breath [Cough] : no cough [Abdominal Pain] : no abdominal pain [de-identified] : right lower leg venous stasis ulceration down to skin, subcutaneous tissue, and fat, healed. Bilateral venous stasis dermatitis

## 2021-11-22 NOTE — PHYSICAL EXAM
[4 x 4] : 4 x 4  [0] : right 0 [1+] : left 1+ [Varicose Veins Of Lower Extremities] : bilaterally [] : bilaterally [Ankle Swelling On The Left] : moderate [Skin Ulcer] : ulcer [Calm] : calm [Ankle Swelling (On Exam)] : not present [de-identified] : 5/5 strength in all quadrants bilaterally [de-identified] : A&Ox3, NAD [de-identified] : R shin 4 mm wound-flat, pink base, oozing  [de-identified] : light touch sensation intact bilaterally [FreeTextEntry1] : Right Anterior Leg - Proximal Resolved [de-identified] : Expressed comfort post Coban application. [de-identified] : No Treatment  [de-identified] : Cleansed with Normal Saline.  [FreeTextEntry7] : Right Anterior Leg  [FreeTextEntry8] : 1.0 [FreeTextEntry9] : 2.5 [de-identified] : 0.1  [de-identified] : Scant Serosanguineous  [de-identified] : Expressed comfort post Coban application. [de-identified] : Jessica  [de-identified] : Cleansed with Normal Saline. \par Kerlix  [TWNoteComboBox4] : None [de-identified] : Normal [de-identified] : None [de-identified] : Multilayer other compression wrap [de-identified] : Normal [de-identified] : None [de-identified] : None [de-identified] : 100% [de-identified] : No [de-identified] : Multilayer other compression wrap [de-identified] : Weekly [de-identified] : Primary Dressing

## 2021-11-22 NOTE — ASSESSMENT
[Verbal] : Verbal [Demo] : Demo [Patient] : Patient [Spouse] : Spouse [Family member] : Family member [Good - alert, interested, motivated] : Good - alert, interested, motivated [Needs reinforcement] : needs reinforcement [Dressing changes] : dressing changes [Skin Care] : skin care [Signs and symptoms of infection] : sign and symptoms of infection [Venous Disease] : venous disease [How and When to Call] : how and when to call [Compression Therapy] : compression therapy [Patient responsibility to plan of care] : patient responsibility to plan of care [Stable] : stable [Home] : Home [Ambulatory] : Ambulatory [Not Applicable - Long Term Care/Home Health Agency] : Long Term Care/Home Health Agency: Not Applicable [] : No [FreeTextEntry2] : Restore Optimal Skin Integrity \par Compression therapy \par Infection Prevention  [FreeTextEntry3] : WOUNDS LARGER [FreeTextEntry4] : Pt to F/U to [facility] in 1 Week

## 2021-11-22 NOTE — HISTORY OF PRESENT ILLNESS
[FreeTextEntry1] : Patient seen for right lower leg venous stasis ulcer down to skin, subcutaneous tissue, fat, healed. bilateral stasis dermatitis, edema, and varicose veins. Denies any other complaints at this time.\par \par 11/8/2021\par Klaus has been wearing Cobans without any problem. Wounds decreased in size significantly. \par 11/22/21 ulcer reopened rght lower leg.

## 2021-11-29 NOTE — ASSESSMENT
[Verbal] : Verbal [Written] : Written [Demo] : Demo [Patient] : Patient [Spouse] : Spouse [Family member] : Family member [Good - alert, interested, motivated] : Good - alert, interested, motivated [Verbalizes knowledge/Understanding] : Verbalizes knowledge/understanding [Dressing changes] : dressing changes [Skin Care] : skin care [Signs and symptoms of infection] : sign and symptoms of infection [Nutrition] : nutrition [How and When to Call] : how and when to call [Labs and Tests] : labs and tests [Pain Management] : pain management [Compression Therapy] : compression therapy [Patient responsibility to plan of care] : patient responsibility to plan of care [Stable] : stable [Home] : Home [Ambulatory] : Ambulatory [Not Applicable - Long Term Care/Home Health Agency] : Long Term Care/Home Health Agency: Not Applicable [] : No [FreeTextEntry2] : Infection prevention\par Localized wound care \par Goal remaining pain free regarding wounds\par Compression therapy  [FreeTextEntry4] : auth submitted for pinch biopsy \par follow up in 1 week

## 2021-11-29 NOTE — PLAN
[FreeTextEntry1] : Patient examined and evaluated at this time.\par Discussed the importance of compression and elevation.\par Continue local wound care and offloading.\par Will auth for biopsy.\par Patient to follow up in 1 week.\par Spent 20 minutes for patient care and medical decision making.\par

## 2021-11-29 NOTE — HISTORY OF PRESENT ILLNESS
[FreeTextEntry1] : Patient seen for right lower leg venous stasis ulcer down to skin, subcutaneous tissue, fat. bilateral stasis dermatitis, edema, and varicose veins. Denies any other complaints at this time.

## 2021-11-29 NOTE — REVIEW OF SYSTEMS
[Fever] : no fever [Eye Pain] : no eye pain [Earache] : no earache [Chest Pain] : no chest pain [Shortness Of Breath] : no shortness of breath [Cough] : no cough [Abdominal Pain] : no abdominal pain [Skin Wound] : skin wound [de-identified] : right lower leg venous stasis ulceration down to skin, subcutaneous tissue, and fat. Bilateral venous stasis dermatitis

## 2021-11-29 NOTE — PHYSICAL EXAM
[4 x 4] : 4 x 4  [0] : right 0 [1+] : left 1+ [Ankle Swelling (On Exam)] : not present [Varicose Veins Of Lower Extremities] : bilaterally [] : bilaterally [Ankle Swelling On The Left] : moderate [Skin Ulcer] : ulcer [Calm] : calm [de-identified] : A&Ox3, NAD [de-identified] : 5/5 strength in all quadrants bilaterally [de-identified] : right lower leg venous stasis ulceration down to skin, subcutaneous tissue, and fat. Bilateral venous stasis dermatitis [de-identified] : light touch sensation intact bilaterally [FreeTextEntry7] : Right anterior leg  [FreeTextEntry8] : 1.3 [FreeTextEntry9] : 2.1 [de-identified] : 0.1 [de-identified] : Serous/sanguinous [de-identified] : Expressed comfort post Coban application [de-identified] : alginate  [de-identified] : Cleansed with chlorhexidine then NS\par Kerlix  [de-identified] : Small [de-identified] : Normal [de-identified] : None [de-identified] : None [de-identified] : 100% [de-identified] : No [de-identified] : Multilayer other compression wrap [de-identified] : Weekly [de-identified] : Primary Dressing

## 2021-12-07 PROBLEM — Z95.0 CARDIAC RESYNCHRONIZATION THERAPY PACEMAKER (CRT-P) IN PLACE: Status: ACTIVE | Noted: 2021-01-01

## 2021-12-08 NOTE — REVIEW OF SYSTEMS
[Dyspnea on exertion] : dyspnea during exertion [Negative] : Heme/Lymph [Weight Loss (___ Lbs)] : [unfilled] ~Ulb weight loss [SOB] : no shortness of breath [Chest Discomfort] : no chest discomfort [Lower Ext Edema] : lower extremity edema [Leg Claudication] : no intermittent leg claudication [Palpitations] : no palpitations [Syncope] : no syncope

## 2021-12-08 NOTE — DISCUSSION/SUMMARY
[___ Month(s)] : in [unfilled] month(s) [FreeTextEntry1] : The patient is a 89-year-old gentleman mild dementia, diabetes mellitus, CKD stage IV, hypertension, hyperlipidemia, afib, CAD, cardiomyopathy, ICD s/p generator change who is fluid overloaded but less than previous visit\par #1 CAD - no angina, continue imdur 30mg\par #2 Htn - stable, continue hydralazine and toprol\par #3 HFrEF -continue torsemide 3 tab 2x daily, daily weights. ICD interrogation today negative, echo severe LV dysfunction\par #4 Afib - rate controlled with low dose toprol, no bleeding on warfarin,INR therapeutic\par #5 DM - under control\par #6 Renal - CKD stage IV f/u Dr. Miller\par #7 Lipids-continue lipitor 40mg.\par #8 Neuro- on aricept, encourage ambulation with assistance, increase hydration, f/u wound care\par Received COVID vaccines

## 2021-12-08 NOTE — HISTORY OF PRESENT ILLNESS
[FreeTextEntry1] : Klaus right leg wound has not completely healed, f/u wound care and may need to see ID as well. Here with wife and niece. His right leg is more swollen than left. Occasionally either off balance or lightheaded but he is not reliable historian. No CP, palpitations, syncope or SOB at rest.

## 2021-12-08 NOTE — PHYSICAL EXAM
[Well Developed] : well developed [Well Nourished] : well nourished [No Acute Distress] : no acute distress [Normal Conjunctiva] : normal conjunctiva [Normal Venous Pressure] : normal venous pressure [No Carotid Bruit] : no carotid bruit [Normal S1, S2] : normal S1, S2 [No Murmur] : no murmur [No Rub] : no rub [No Gallop] : no gallop [Clear Lung Fields] : clear lung fields [Good Air Entry] : good air entry [No Respiratory Distress] : no respiratory distress  [Soft] : abdomen soft [Non Tender] : non-tender [No Masses/organomegaly] : no masses/organomegaly [Normal Bowel Sounds] : normal bowel sounds [No Cyanosis] : no cyanosis [No Clubbing] : no clubbing [No Varicosities] : no varicosities [No Rash] : no rash [No Skin Lesions] : no skin lesions [Moves all extremities] : moves all extremities [No Focal Deficits] : no focal deficits [Normal Speech] : normal speech [Alert and Oriented] : alert and oriented [Normal memory] : normal memory [de-identified] : RLE wrapped

## 2021-12-09 NOTE — VITALS
[Pain related to present condition?] : The patient's  pain is related to present condition. [Aching] : aching [Occasional] : occasional [] : No [de-identified] : 5/10 [FreeTextEntry3] : Right Leg  [FreeTextEntry1] : Tylenol  [FreeTextEntry2] : Touch  [FreeTextEntry4] : None

## 2021-12-09 NOTE — PHYSICAL EXAM
[4 x 4] : 4 x 4  [0] : right 0 [1+] : left 1+ [Varicose Veins Of Lower Extremities] : bilaterally [] : bilaterally [Ankle Swelling On The Left] : moderate [Skin Ulcer] : ulcer [Calm] : calm [Ankle Swelling (On Exam)] : not present [de-identified] : A&Ox3, NAD [de-identified] : 5/5 strength in all quadrants bilaterally [de-identified] : right lower leg venous stasis ulceration down to skin, subcutaneous tissue, and fat. Bilateral venous stasis dermatitis [de-identified] : light touch sensation intact bilaterally [FreeTextEntry1] : Anterior Leg - Proximal Resolved [de-identified] : Expressed comfort post Coban application. [de-identified] : No Treatment  [de-identified] : Cleansed with Normal Saline.  [de-identified] : 4 mm sample obtained from central aspect of wound  [FreeTextEntry7] : Anterior Leg  [FreeTextEntry8] : 1.5 [FreeTextEntry9] : 3.1 [de-identified] : 0.1  [de-identified] :  Serosanguineous  [de-identified] : mildly  [de-identified] : 10ml injected [de-identified] : Pathology obtained from central aspect of wound [de-identified] : Expressed comfort post Coban application. [de-identified] : Silver Alginate [de-identified] : Cleansed with Normal Saline. \par  [TWNoteComboBox1] : Right [TWNoteComboBox4] : None [de-identified] : Normal [de-identified] : None [de-identified] : Multilayer other compression wrap [TWNoteComboBox9] : Right [de-identified] : Small [de-identified] : No [de-identified] : Other [de-identified] : No [de-identified] : Macerated [de-identified] : None [de-identified] : None [de-identified] : 100% [de-identified] : No [de-identified] : Lidocaine 1%, Epinephrine 1:100,000, 8.4% Na Bicarb [TWNoteComboBox8] : Aisha [de-identified] : Biopsy taken and sent for pathology [de-identified] : Multilayer other compression wrap [de-identified] : Weekly [de-identified] : Primary Dressing

## 2021-12-09 NOTE — PROCEDURE
[FreeTextEntry9] : 12:27pm [de-identified] : right lower leg ulceration down to skin, subcutaneous tissue, and fat [de-identified] : dale [de-identified] : izquierdo [FreeTextEntry6] : right lower leg ulceration down to skin, subcutaneous tissue, and fat [FreeTextEntry7] : right lower leg ulceration down to skin, subcutaneous tissue, and fat [de-identified] : 4mL of 1% lidocaine with epinephrine [de-identified] : 3mL [de-identified] : skin, subcutaneous tissue, and fat

## 2021-12-09 NOTE — REVIEW OF SYSTEMS
[Skin Wound] : skin wound [FreeTextEntry1] : 11:58am [Fever] : no fever [Eye Pain] : no eye pain [Earache] : no earache [Chest Pain] : no chest pain [Shortness Of Breath] : no shortness of breath [Cough] : no cough [Abdominal Pain] : no abdominal pain [de-identified] : right lower leg venous stasis ulceration down to skin, subcutaneous tissue, and fat. Bilateral venous stasis dermatitis

## 2021-12-09 NOTE — VITALS
[Pain related to present condition?] : The patient's  pain is related to present condition. [Aching] : aching [Occasional] : occasional [] : No [de-identified] : 5/10 [FreeTextEntry3] : Right Leg  [FreeTextEntry1] : Tylenol  [FreeTextEntry2] : Touch  [FreeTextEntry4] : None

## 2021-12-09 NOTE — REVIEW OF SYSTEMS
[Skin Wound] : skin wound [FreeTextEntry1] : 11:58am [Fever] : no fever [Eye Pain] : no eye pain [Earache] : no earache [Chest Pain] : no chest pain [Shortness Of Breath] : no shortness of breath [Cough] : no cough [Abdominal Pain] : no abdominal pain [de-identified] : right lower leg venous stasis ulceration down to skin, subcutaneous tissue, and fat. Bilateral venous stasis dermatitis

## 2021-12-09 NOTE — PROCEDURE
[FreeTextEntry9] : 12:27pm [de-identified] : right lower leg ulceration down to skin, subcutaneous tissue, and fat [de-identified] : dale [de-identified] : izquierdo [FreeTextEntry6] : right lower leg ulceration down to skin, subcutaneous tissue, and fat [FreeTextEntry7] : right lower leg ulceration down to skin, subcutaneous tissue, and fat [de-identified] : 4mL of 1% lidocaine with epinephrine [de-identified] : 3mL [de-identified] : skin, subcutaneous tissue, and fat

## 2021-12-09 NOTE — PHYSICAL EXAM
[4 x 4] : 4 x 4  [0] : right 0 [1+] : left 1+ [Varicose Veins Of Lower Extremities] : bilaterally [] : bilaterally [Ankle Swelling On The Left] : moderate [Skin Ulcer] : ulcer [Calm] : calm [Ankle Swelling (On Exam)] : not present [de-identified] : A&Ox3, NAD [de-identified] : 5/5 strength in all quadrants bilaterally [de-identified] : right lower leg venous stasis ulceration down to skin, subcutaneous tissue, and fat. Bilateral venous stasis dermatitis [de-identified] : light touch sensation intact bilaterally [FreeTextEntry1] : Anterior Leg - Proximal Resolved [de-identified] : Expressed comfort post Coban application. [de-identified] : No Treatment  [de-identified] : Cleansed with Normal Saline.  [de-identified] : 4 mm sample obtained from central aspect of wound  [FreeTextEntry7] : Anterior Leg  [FreeTextEntry8] : 1.5 [FreeTextEntry9] : 3.1 [de-identified] : 0.1  [de-identified] :  Serosanguineous  [de-identified] : mildly  [de-identified] : 10ml injected [de-identified] : Pathology obtained from central aspect of wound [de-identified] : Expressed comfort post Coban application. [de-identified] : Silver Alginate [de-identified] : Cleansed with Normal Saline. \par  [TWNoteComboBox1] : Right [TWNoteComboBox4] : None [de-identified] : Normal [de-identified] : None [de-identified] : Multilayer other compression wrap [TWNoteComboBox9] : Right [de-identified] : Small [de-identified] : No [de-identified] : Other [de-identified] : No [de-identified] : Macerated [de-identified] : None [de-identified] : None [de-identified] : 100% [de-identified] : No [de-identified] : Lidocaine 1%, Epinephrine 1:100,000, 8.4% Na Bicarb [TWNoteComboBox8] : Aisha [de-identified] : Biopsy taken and sent for pathology [de-identified] : Multilayer other compression wrap [de-identified] : Weekly [de-identified] : Primary Dressing

## 2021-12-09 NOTE — ASSESSMENT
[Verbal] : Verbal [Written] : Written [Demo] : Demo [Patient] : Patient [Spouse] : Spouse [Family member] : Family member [Good - alert, interested, motivated] : Good - alert, interested, motivated [Needs reinforcement] : needs reinforcement [Dressing changes] : dressing changes [Skin Care] : skin care [Signs and symptoms of infection] : sign and symptoms of infection [Venous Disease] : venous disease [Nutrition] : nutrition [How and When to Call] : how and when to call [Labs and Tests] : labs and tests [Pain Management] : pain management [Compression Therapy] : compression therapy [Patient responsibility to plan of care] : patient responsibility to plan of care [Stable] : stable [Home] : Home [Ambulatory] : Ambulatory [Not Applicable - Long Term Care/Home Health Agency] : Long Term Care/Home Health Agency: Not Applicable [] : No [FreeTextEntry2] : Restore Optimal Skin Integrity \par Compression therapy \par Infection Prevention \par Punch Biopsy\par F/U 1 week  [FreeTextEntry3] : Wound measures larger  [FreeTextEntry4] : Punch Biopsy performed,  pathology obtained\par F/U 1 week

## 2021-12-13 PROBLEM — E11.42 DIABETIC PERIPHERAL NEUROPATHY: Status: ACTIVE | Noted: 2017-09-15

## 2021-12-13 NOTE — ASSESSMENT
[Verbal] : Verbal [Written] : Written [Demo] : Demo [Patient] : Patient [Good - alert, interested, motivated] : Good - alert, interested, motivated [Demonstrates independently] : demonstrates independently [Dressing changes] : dressing changes [Skin Care] : skin care [Signs and symptoms of infection] : sign and symptoms of infection [Venous Disease] : venous disease [Nutrition] : nutrition [How and When to Call] : how and when to call [Pain Management] : pain management [Off-loading] : off-loading [Compression Therapy] : compression therapy [Patient responsibility to plan of care] : patient responsibility to plan of care [Stable] : stable [Home] : Home [Ambulatory] : Ambulatory [Not Applicable - Long Term Care/Home Health Agency] : Long Term Care/Home Health Agency: Not Applicable [] : No [FreeTextEntry2] : Infection prevention \par Wound care (dressing changes)\par Maintain optimal skin integrity to high pressure areas\par Compression therapy\par Elevation and low sodium compliance.\par Skin substitutes  [FreeTextEntry4] : Punch Biopsy from 12/7/21 reviewed by DPM and reviewed with Pt and Pt's spouse. Pt understanding of care plan\par Auth submitted for [facility]. To be applied at next visit.\par Emphasized the importance of compression, elevation, and low sodium compliance. Pt understanding of same.\par F/u in 1 week

## 2021-12-13 NOTE — PHYSICAL EXAM
[4 x 4] : 4 x 4  [0] : right 0 [1+] : left 1+ [Ankle Swelling (On Exam)] : not present [Varicose Veins Of Lower Extremities] : bilaterally [] : bilaterally [Ankle Swelling On The Left] : moderate [Skin Ulcer] : ulcer [Calm] : calm [de-identified] : A&Ox3, NAD [de-identified] : 5/5 strength in all quadrants bilaterally [de-identified] : right lower leg venous stasis ulceration down to skin, subcutaneous tissue, and fat. Bilateral venous stasis dermatitis [de-identified] : light touch sensation intact bilaterally [FreeTextEntry1] : Right Leg (Anterior) [FreeTextEntry2] : 1.6 [FreeTextEntry3] : 3.1 [FreeTextEntry4] : 0.1 [de-identified] : dried sanguineous  [de-identified] : None [de-identified] : Pt expressed comfort post COBAN application. Circ/Neuromuscular functions WNL post application. [de-identified] : Jessica, Adaptic touch [de-identified] : 4% CHG\par NSC\par DD\par  [TWNoteComboBox4] : Small [TWNoteComboBox5] : No [TWNoteComboBox6] : Venous [de-identified] : No [de-identified] : None [de-identified] : None [de-identified] : 100% [de-identified] : Multilayer other compression wrap [de-identified] : 3x Weekly [de-identified] : Primary Dressing

## 2021-12-13 NOTE — PLAN
[FreeTextEntry1] : Patient examined and evaluated at this time.\par Pathology reviewed with patient and family.\par Discussed the importance of compression and elevation.\par Continue local wound care and offloading.\par Will auth for [facility].\par Patient to follow up in 1 week.\par Spent 20 minutes for patient care and medical decision making.\par

## 2021-12-13 NOTE — REVIEW OF SYSTEMS
[Fever] : no fever [Eye Pain] : no eye pain [Earache] : no earache [Chest Pain] : no chest pain [Shortness Of Breath] : no shortness of breath [Cough] : no cough [Abdominal Pain] : no abdominal pain [Skin Wound] : skin wound [de-identified] : right lower leg venous stasis ulceration down to skin, subcutaneous tissue, and fat. Bilateral venous stasis dermatitis

## 2021-12-21 NOTE — PROCEDURE
[Saline] : saline [Other:___] : [unfilled] [Hydrated with saline] : hydrated with saline [____ % was used] : and [unfilled] % was used [____ % was discarded] : and [unfilled] % was discarded [FreeTextEntry1] : asim, adaptic, dry dressing [FreeTextEntry9] : 1050hr [de-identified] : right lower leg venous stasis ulceration down to skin, subcutaneous tissue, and fat. Bilateral venous stasis dermatitis [de-identified] : dale [de-identified] : ramo [FreeTextEntry6] : right lower leg venous stasis ulceration down to skin, subcutaneous tissue, and fat. Bilateral venous stasis dermatitis [FreeTextEntry7] : right lower leg venous stasis ulceration down to skin, subcutaneous tissue, and fat. Bilateral venous stasis dermatitis

## 2021-12-21 NOTE — PROCEDURE
[Saline] : saline [Other:___] : [unfilled] [Hydrated with saline] : hydrated with saline [____ % was used] : and [unfilled] % was used [____ % was discarded] : and [unfilled] % was discarded [FreeTextEntry1] : asim, adaptic, dry dressing [FreeTextEntry9] : 1050hr [de-identified] : right lower leg venous stasis ulceration down to skin, subcutaneous tissue, and fat. Bilateral venous stasis dermatitis [de-identified] : dale [de-identified] : ramo [FreeTextEntry6] : right lower leg venous stasis ulceration down to skin, subcutaneous tissue, and fat. Bilateral venous stasis dermatitis [FreeTextEntry7] : right lower leg venous stasis ulceration down to skin, subcutaneous tissue, and fat. Bilateral venous stasis dermatitis

## 2021-12-21 NOTE — ASSESSMENT
[Verbal] : Verbal [Written] : Written [Demo] : Demo [Patient] : Patient [Family member] : Family member [Good - alert, interested, motivated] : Good - alert, interested, motivated [Verbalizes knowledge/Understanding] : Verbalizes knowledge/understanding [Dressing changes] : dressing changes [Skin Care] : skin care [Signs and symptoms of infection] : sign and symptoms of infection [Nutrition] : nutrition [How and When to Call] : how and when to call [Pain Management] : pain management [Compression Therapy] : compression therapy [Patient responsibility to plan of care] : patient responsibility to plan of care [Stable] : stable [Home] : Home [Ambulatory] : Ambulatory [Not Applicable - Long Term Care/Home Health Agency] : Long Term Care/Home Health Agency: Not Applicable [] : No [FreeTextEntry2] : Infection prevention\par Localized wound care \par Goal remaining pain free regarding wounds\par Compression therapy \par Skin substitute therapy  [FreeTextEntry4] : Auth submitted for [facility] \par Follow up in 1 week

## 2021-12-21 NOTE — DISCHARGE NOTE PROVIDER - NSDCADMDATE_GEN_ALL_CORE_FT
1731 Pt arrives to recovery responsive to verbal stimulation. Pt moaning and yelling out stating he is in pain. Pt respirations ar even and unlabored on 4 L nasal canula. Pt VSS  1740 20G IV placed in left antecubital at this time   1742 Pt medicated for back pain with fentanyl at this time. Pt respirations are even and unlabored. Pt VSS  1745 Pt medicated with toradol for pain at this time. 1752 Pt drowsy however when awakened states his pain is terrible and will begin to moan. Pt medicated with fentanyl at this time. Pt respirations ar even and unlabored. Pt VSS.   1802 Pt remains drowsy and is resting in bed with snoring respirations. Pt when awakened states his pain is better. Pt heart rate in 120-130s. Dr. Celso Mallory notified  5 Mg lopressor ordered at this time  1805 Pt hr decreased to low upper 80s-low 90s after 2 mg. Dr. Celso Mallory notified. No lopressor given after notification. 1825 Report called to 5K. Pt meets criteria for discharge from recovery at this time.    [address] Pt departing recovery in stable condition with transport 22-Sep-2019 03:50

## 2021-12-21 NOTE — PHYSICAL EXAM
[0] : right 0 [1+] : left 1+ [Varicose Veins Of Lower Extremities] : bilaterally [] : bilaterally [Ankle Swelling On The Left] : moderate [Skin Ulcer] : ulcer [Calm] : calm [4 x 4] : 4 x 4  [Abdominal Pad] : Abdominal Pad [Ankle Swelling (On Exam)] : not present [de-identified] : A&Ox3, NAD [de-identified] : 5/5 strength in all quadrants bilaterally [de-identified] : right lower leg venous stasis ulceration down to skin, subcutaneous tissue, and fat. Bilateral venous stasis dermatitis [de-identified] : light touch sensation intact bilaterally [FreeTextEntry1] : Right anterior leg  [FreeTextEntry2] : 1.6 [FreeTextEntry3] : 2.4 [FreeTextEntry4] : 0.1 [de-identified] : Serous/sanguinous [de-identified] : Anjel  [de-identified] : Expressed comfort post Coban application. [de-identified] : Anjel, Jessica, Adaptic touch  [de-identified] : Cleansed with chlorhexidine then NS\par Kerlix \par * Bandage applied by DPM \par Post debridement measurements: 1.7/2.5/0.1\par \par 2x3cm Nushield applied \par 100% used, 0% discarded \par ITM: [id number]\par EXP: 9/22/26\par \par Irrigated with 0.9% Normal saline \par LOT: 24 - 701 - 4B - 02\par EXP: 12/1/23  [TWNoteComboBox4] : Small [de-identified] : Normal [de-identified] : None [de-identified] : None [de-identified] : 100% [de-identified] : No [TWNoteComboBox7] : Aisha [de-identified] : Application of skin substitute [de-identified] : Multilayer other compression wrap [de-identified] : Primary Dressing [de-identified] : Weekly

## 2021-12-21 NOTE — REVIEW OF SYSTEMS
[Skin Wound] : skin wound [FreeTextEntry1] : 1030hr [Fever] : no fever [Eye Pain] : no eye pain [Earache] : no earache [Chest Pain] : no chest pain [Shortness Of Breath] : no shortness of breath [Cough] : no cough [Abdominal Pain] : no abdominal pain [de-identified] : right lower leg venous stasis ulceration down to skin, subcutaneous tissue, and fat. Bilateral venous stasis dermatitis

## 2021-12-21 NOTE — PHYSICAL EXAM
[0] : right 0 [1+] : left 1+ [Varicose Veins Of Lower Extremities] : bilaterally [] : bilaterally [Ankle Swelling On The Left] : moderate [Skin Ulcer] : ulcer [Calm] : calm [4 x 4] : 4 x 4  [Abdominal Pad] : Abdominal Pad [Ankle Swelling (On Exam)] : not present [de-identified] : A&Ox3, NAD [de-identified] : 5/5 strength in all quadrants bilaterally [de-identified] : right lower leg venous stasis ulceration down to skin, subcutaneous tissue, and fat. Bilateral venous stasis dermatitis [de-identified] : light touch sensation intact bilaterally [FreeTextEntry1] : Right anterior leg  [FreeTextEntry2] : 1.6 [FreeTextEntry3] : 2.4 [FreeTextEntry4] : 0.1 [de-identified] : Serous/sanguinous [de-identified] : Anjel  [de-identified] : Expressed comfort post Coban application. [de-identified] : Anjel, Jessica, Adaptic touch  [de-identified] : Cleansed with chlorhexidine then NS\par Kerlix \par * Bandage applied by DPM \par Post debridement measurements: 1.7/2.5/0.1\par \par 2x3cm Nushield applied \par 100% used, 0% discarded \par ITM: [id number]\par EXP: 9/22/26\par \par Irrigated with 0.9% Normal saline \par LOT: 24 - 701 - 4B - 02\par EXP: 12/1/23  [TWNoteComboBox4] : Small [de-identified] : Normal [de-identified] : None [de-identified] : None [de-identified] : 100% [de-identified] : No [TWNoteComboBox7] : Aisha [de-identified] : Application of skin substitute [de-identified] : Multilayer other compression wrap [de-identified] : Weekly [de-identified] : Primary Dressing

## 2021-12-21 NOTE — REVIEW OF SYSTEMS
[Skin Wound] : skin wound [FreeTextEntry1] : 1030hr [Fever] : no fever [Eye Pain] : no eye pain [Earache] : no earache [Chest Pain] : no chest pain [Shortness Of Breath] : no shortness of breath [Cough] : no cough [Abdominal Pain] : no abdominal pain [de-identified] : right lower leg venous stasis ulceration down to skin, subcutaneous tissue, and fat. Bilateral venous stasis dermatitis

## 2021-12-27 PROBLEM — L97.812 NON-PRS CHRONIC ULCER OTH PRT R LOW LEG W FAT LAYER EXPOSED: Status: ACTIVE | Noted: 2020-10-06

## 2021-12-27 PROBLEM — I83.893 SYMPTOMATIC VARICOSE VEINS OF BOTH LOWER EXTREMITIES: Status: ACTIVE | Noted: 2020-10-06

## 2021-12-27 PROBLEM — I83.218 VARICOSE VEINS OF RIGHT LOWER EXTREMITY WITH BOTH ULCER OF OTHER PART OF LOWER EXTREMITY AND INFLAMMATION: Status: ACTIVE | Noted: 2020-10-06

## 2021-12-27 NOTE — H&P ADULT. - SKIN/BREAST
Patient Education     Shingles  Shingles is a viral infection caused by the same virus that causes chicken pox. Anyone who has had chicken pox may get shingles later in life. The virus stays in the body, but remains asleep (dormant). Shingles often occurs in older persons or persons with lowered immunity. But it can affect anyone at any age.  Shingles starts as a tingling patch of skin on one side of the body. Small, painful blisters may then appear. The rash rarely spreads to other parts of the body.  Exposure to shingles can't cause shingles. However, it can cause chicken pox in anyone who has not had chicken pox or has not been vaccinated. The contagious period ends when all blisters have crusted over, generally 1 to 2 weeks after the illness starts.  After the blisters heal, the affected skin may be sensitive or painful for weeks or months, gradually resolving over time. But, sometimes this can last longer and be permanent (called postherpetic neuralgia.)  Shingles vaccines are available. Vaccination can help prevent shingles or make it less painful. It is generally recommended for adults older than 50, even if you've had singles in the past. Talk with your healthcare provider about when to get vaccinated and which vaccine is best for you.  Home care    Medicines may be prescribed to help relieve pain. Take these medicines as directed. Ask your healthcare provider or pharmacist before using over-the-counter medicines for helping treat pain and itching.    In certain cases, antiviral medicines may be prescribed to reduce pain, shorten the illness, and prevent neuralgia. Take these medicines as directed.    Compresses made from a solution of cool water mixed with cornstarch or baking soda may help relieve pain and itching.     Gently wash skin daily with soap and water to help prevent infection. Be certain to rinse off all of the soap, which can be irritating.    Trim fingernails and try not to scratch.  Scratching the sores may leave scars.    Stay home from work or school until all blisters have formed a crust and you are no longer contagious.  Follow-up care  Follow up with your healthcare provider, or as directed.  When to seek medical advice    Fever of 100.4 F (38 C) or higher, or as directed by your healthcare provider    Affected skin is on the face or neck and any of the following occur:  ? Headache  ? Eye pain  ? Changes in vision  ? Sores near the eye  ? Weakness of facial muscles    Blisters occurring on new areas of the body    Pain, redness, or swelling of a joint    Signs of skin infection: colored drainage from the sores, warmth, increasing redness, fever, or increasing pain  [employer] last reviewed this educational content on 4/1/2018 2000-2021 [employer]. All rights reserved. This information is not intended as a substitute for professional medical care. Always follow your healthcare professional's instructions.            details…

## 2021-12-27 NOTE — REVIEW OF SYSTEMS
[Skin Wound] : skin wound [FreeTextEntry1] : 1030hr [Fever] : no fever [Eye Pain] : no eye pain [Earache] : no earache [Chest Pain] : no chest pain [Shortness Of Breath] : no shortness of breath [Cough] : no cough [Abdominal Pain] : no abdominal pain [de-identified] : right lower leg venous stasis ulceration down to skin, subcutaneous tissue, and fat. Bilateral venous stasis dermatitis

## 2021-12-27 NOTE — PROCEDURE
[Saline] : saline [Other:___] : [unfilled] [Hydrated with saline] : hydrated with saline [____ % was used] : and [unfilled] % was used [____ % was discarded] : and [unfilled] % was discarded [FreeTextEntry1] : asim, adaptic, dry dressing [FreeTextEntry9] : 1040hr [de-identified] : right lower leg venous stasis ulceration down to skin, subcutaneous tissue, and fat. Bilateral venous stasis dermatitis [de-identified] : dale [de-identified] : ramo [FreeTextEntry6] : right lower leg venous stasis ulceration down to skin, subcutaneous tissue, and fat. Bilateral venous stasis dermatitis [FreeTextEntry7] : right lower leg venous stasis ulceration down to skin, subcutaneous tissue, and fat. Bilateral venous stasis dermatitis

## 2021-12-27 NOTE — ASSESSMENT
[Verbal] : Verbal [Written] : Written [Demo] : Demo [Patient] : Patient [Good - alert, interested, motivated] : Good - alert, interested, motivated [Needs reinforcement] : needs reinforcement [Dressing changes] : dressing changes [Skin Care] : skin care [Signs and symptoms of infection] : sign and symptoms of infection [Venous Disease] : venous disease [How and When to Call] : how and when to call [Pain Management] : pain management [Compression Therapy] : compression therapy [Patient responsibility to plan of care] : patient responsibility to plan of care [Stable] : stable [Home] : Home [Cane] : Cane [Not Applicable - Long Term Care/Home Health Agency] : Long Term Care/Home Health Agency: Not Applicable [] : No [FreeTextEntry2] : Infection prevention\par Localized wound care \par Goal remaining pain free regarding wounds\par Compression therapy \par Skin substitute therapy  [FreeTextEntry4] : auth submitted for [facility] \par Follow up in 1 week

## 2021-12-27 NOTE — PHYSICAL EXAM
[4 x 4] : 4 x 4  [Abdominal Pad] : Abdominal Pad [0] : right 0 [1+] : left 1+ [Varicose Veins Of Lower Extremities] : bilaterally [] : bilaterally [Ankle Swelling On The Left] : moderate [Skin Ulcer] : ulcer [Calm] : calm [Ankle Swelling (On Exam)] : not present [de-identified] : A&Ox3, NAD [de-identified] : 5/5 strength in all quadrants bilaterally [de-identified] : right lower leg venous stasis ulceration down to skin, subcutaneous tissue, and fat. Bilateral venous stasis dermatitis [de-identified] : light touch sensation intact bilaterally [FreeTextEntry1] : Right anterior leg  [FreeTextEntry2] : 1.2 [FreeTextEntry3] : 1.3 [FreeTextEntry4] : 0.1 [de-identified] : Serous/sanguinous [de-identified] : Anjel  [de-identified] : Expressed comfort post Coban application. [de-identified] : Anjel, Jessica, Adaptic touch  [de-identified] : Cleansed with chlorhexidine then NS\par Kerlix \par * Bandage applied by DPM \par Post debridement measurements: 1.3/1.4/0.1\par \par 2x3cm Nushield applied \par   50% used, 50% discarded \par ITM: [id number]\par EXP: 10/19/26 \par \par Irrigated with 0.9% Normal saline \par LOT: 24 - 701 - 4B - 02\par EXP:  12/1/23  [TWNoteComboBox4] : Small [de-identified] : Normal [de-identified] : None [de-identified] : None [de-identified] : 100% [de-identified] : No [TWNoteComboBox7] : Aisha [de-identified] : Application of skin substitute [de-identified] : Multilayer other compression wrap [de-identified] : Weekly [de-identified] : Primary Dressing

## 2021-12-28 NOTE — H&P ADULT - ATTENDING COMMENTS
12/28/21    6211 12/28/21    2350   NIGHT HOSPITALIST:   Patient UNKNOWN to me previously, assigned to me at this point via the ER to admit this 89 y/o M--followed by Dr. Miller above-- 12/28/21    2350   NIGHT HOSPITALIST:   Patient UNKNOWN to me previously, assigned to me at this point via the ER to admit this 89 y/o M--followed by Dr. Miller above--patient with a history of Alzheimer's disease with moderate cognitive impairment, cerebrovascular disease, past history of endocarditis 12/28/21    2350   NIGHT HOSPITALIST:   Patient UNKNOWN to me previously, assigned to me at this point via the ER to admit this 89 y/o M--followed by Dr. Miller above--patient with a history of Alzheimer's disease with moderate cognitive impairment, cerebrovascular disease, past history of endocarditis and completed a six week course of IV antibiotics following and admission in Sept 2019 for sepsis and delirium following RIGHT LE cellulitis and with group B strep bacteremia with noted mitral valve vegetation on transthoracic echo but patient/family declined MARY at the time, HF with reduced EF%, chronic kidney disease stage 4, spinal stenosis, 12/28/21    2350   NIGHT HOSPITALIST:   Patient UNKNOWN to me previously, assigned to me at this point via the ER to admit this 89 y/o M--followed by Dr. Miller above--patient with a history of Alzheimer's disease with moderate cognitive impairment, cerebrovascular disease, past history of endocarditis and completed a six week course of IV antibiotics following and admission in Sept 2019 for sepsis and delirium following RIGHT LE cellulitis and with group B strep bacteremia with noted mitral valve vegetation on transthoracic echo but patient/family declined MARY at the time, HF with reduced EF%, chronic kidney disease stage 4, spinal stenosis, with apparently two episodes yesterday and today, of patient walking to his car with LOC, no reported prodrome, but patient does not recall all events.  History corroborated by examiner with the patient's son, Reinaldo Morrow, [phone].   No apparent faecal or urinary incontinence noted by family.  Limited history from patient due to short term memory impairment.    ROS:  --NO HA, no focal weakness.  --NO chest pain/pressure.  NO palpitations.  --No abdominal pain, no red blood per rectum or melena.  --No back pain, no tearing back pain.  --No rash.    --NO SI/HI  --NO cough, no dyspnoea.  --No joint pain.  --NO thyroid symptoms.  --NO dysuria, no hematuria. 12/28/21    2350   NIGHT HOSPITALIST:   Patient UNKNOWN to me previously, assigned to me at this point via the ER to admit this 89 y/o M--followed by Dr. Miller above--patient with a history of Alzheimer's disease with moderate cognitive impairment, cerebrovascular disease, past history of endocarditis and completed a six week course of IV antibiotics following and admission in Sept 2019 for sepsis and delirium following RIGHT LE cellulitis and with group B strep bacteremia with noted mitral valve vegetation on transthoracic echo but patient/family declined MARY at the time, HF with reduced EF%, chronic kidney disease stage 4, spinal stenosis, with apparently two episodes yesterday and today, of patient walking to his car with LOC, no reported prodrome, but patient does not recall all events.  History corroborated by examiner with the patient's son, Reinaldo Morrow, [phone].   No apparent faecal or urinary incontinence noted by family.  Limited history from patient due to short term memory impairment.    ROS:  --NO HA, no focal weakness.  --NO chest pain/pressure.  NO palpitations.  --No abdominal pain, no red blood per rectum or melena.  --No back pain, no tearing back pain.  --No rash.    --NO SI/HI  --NO cough, no dyspnoea.  --No joint pain.  --NO thyroid symptoms.  --NO dysuria, no hematuria.    Physical exam with an elderly, nontoxic, chronically ill appearing M in no acute distress.    Afebrile.   HR  67    RR 14  /65  93% on RA    HEENT< PERRL< EOMI, no Lai sign, no raccoon eye.  Neck supple, NO JVD  Chest clear, PPM c/d/i.  Abdomen soft nontender, normal bowel sounds, no rebound.  Non tender  Skin dry, healed RIGHT anterior shin abrasion.  NO erythema or oedema.  Ext poor nail hygiene.  No oedema.  Neurologic Alert to self/place/time.   Poor short term recall but interactive with examiner with no need for prompting.   UE/LE 5/5. 12/28/21    2350   NIGHT HOSPITALIST:   Patient UNKNOWN to me previously, assigned to me at this point via the ER to admit this 89 y/o M--followed by Dr. Miller above--patient with a history of Alzheimer's disease with moderate cognitive impairment, cerebrovascular disease, past history of endocarditis and completed a six week course of IV antibiotics following and admission in Sept 2019 for sepsis and delirium following RIGHT LE cellulitis and with group B strep bacteremia with noted mitral valve vegetation on transthoracic echo but patient/family declined MARY at the time, HF with reduced EF%, chronic kidney disease stage 4, spinal stenosis, with apparently two episodes yesterday and today, of patient walking to his car with LOC, no reported prodrome, but patient does not recall all events.  History corroborated by examiner with the patient's son, Reinaldo Morrow, [phone].   No apparent faecal or urinary incontinence noted by family.  Limited history from patient due to short term memory impairment.    ROS:  --NO HA, no focal weakness.  --NO chest pain/pressure.  NO palpitations.  --No abdominal pain, no red blood per rectum or melena.  --No back pain, no tearing back pain.  --No rash.    --NO SI/HI  --NO cough, no dyspnoea.  --No joint pain.  --NO thyroid symptoms.  --NO dysuria, no hematuria.    Physical exam with an elderly, nontoxic, chronically ill appearing M in no acute distress.    Afebrile.   HR  67    RR 14  /65  93% on RA    HEENT< PERRL< EOMI, no Lai sign, no raccoon eye.  Neck supple, NO JVD  Chest clear, PPM c/d/i.  Abdomen soft nontender, normal bowel sounds, no rebound.  Non tender  Skin dry, healed RIGHT anterior shin abrasion.  NO erythema or oedema.  Ext poor nail hygiene.  No oedema.  Neurologic Alert to self/place/time.   Poor short term recall but interactive with examiner with no need for prompting.   UE/LE 5/5.  No SI/HI    WBC 6.0    Hgb 10.5  Platelets of 175K.    INR 3.3    K+ 4.7  Random glucose of 106  Cr 3.5  HS troponin 66.    COVID-19 PCR>>negative. 12/28/21    2350   NIGHT HOSPITALIST:   Patient UNKNOWN to me previously, assigned to me at this point via the ER to admit this 89 y/o M--followed by Dr. Miller above--patient with a history of Alzheimer's disease with moderate cognitive impairment, cerebrovascular disease, past history of endocarditis and completed a six week course of IV antibiotics following and admission in Sept 2019 for sepsis and delirium following RIGHT LE cellulitis and with group B strep bacteremia with noted mitral valve vegetation on transthoracic echo but patient/family declined MARY at the time, HF with reduced EF%, chronic kidney disease stage 4, spinal stenosis, type 2 DM on insulin, with apparently two episodes yesterday and today, of patient walking to his car with LOC, no reported prodrome, but patient does not recall all events.  History corroborated by examiner with the patient's son, Reinaldo Morrow, [phone].   No apparent faecal or urinary incontinence noted by family.  Limited history from patient due to short term memory impairment.    ROS:  --NO HA, no focal weakness.  --NO chest pain/pressure.  NO palpitations.  --No abdominal pain, no red blood per rectum or melena.  --No back pain, no tearing back pain.  --No rash.    --NO SI/HI  --NO cough, no dyspnoea.  --No joint pain.  --NO thyroid symptoms.  --NO dysuria, no hematuria.    Physical exam with an elderly, nontoxic, chronically ill appearing M in no acute distress.    Afebrile.   HR  67    RR 14  /65  93% on RA    HEENT< PERRL< EOMI, no Lai sign, no raccoon eye.  Neck supple, NO JVD  Chest clear, PPM c/d/i.  Abdomen soft nontender, normal bowel sounds, no rebound.  Non tender  Skin dry, healed RIGHT anterior shin abrasion.  NO erythema or oedema.  Ext poor nail hygiene.  No oedema.  Neurologic Alert to self/place/time.   Poor short term recall but interactive with examiner with no need for prompting.   UE/LE 5/5.  No SI/HI    WBC 6.0    Hgb 10.5  Platelets of 175K.    INR 3.3    K+ 4.7  Random glucose of 106  Cr 3.5  HS troponin 66.    COVID-19 PCR>>negative.  Chest radiograph reviewed with PPM, no gross infiltrate or effusion.    Admitted for syncopal episode with no clear prodrome.   Patient with acute over chronic kidney disease stage 4 in the context of patient with a PPM, HF with reduced EF%, moderate cognitive impairment, type 2 DM on insulin (unclear if patient has his FS checked with his insulin), with mild supra therapeutic INR on Coumadin (would HOLD dose 12/28/21 and recalculate Coumadin dosing).    Doubt CNS or intracranial process but would obtain a CTT head no contrast--neurologic exam nonfocal except for short term memory impairment and insight.    Patient is also not in overt CHF but with worsening azotemia would consider reducing of HOLDING the torsemide and recalculating the dosing>>would consider formal cardiology and EPS evaluation in the AM.   Would check echo and consider also ID opinion if a vegetation is noted but with negative blood cultures would retreatment be a consideration, since apparently that is a concern by the family, with patient and family refusal of a MARY.  Would check orthostatics and favor revising bedtime Lantus to avoid hypoglycemic episodes that would be explaining the syncopal events.  Patient verbally defers to his son and daughter in law for health care decisions.   Patient is DNR/Do Not Resuscitate by patient's MOLST in the chart.  Patient/son interviewed by examiner aware of course and agree with plan/care as above.  Reviewed with Dr. Mckeon for endorsement to my physician colleagues.    Nam Delgado MD  [phone]  Available on [employer] Teams 12/28/21    2350   NIGHT HOSPITALIST:   Patient UNKNOWN to me previously, assigned to me at this point via the ER to admit this 89 y/o M--followed by Dr. Miller above--patient with a history of Alzheimer's disease with moderate cognitive impairment, cerebrovascular disease, past history of endocarditis and completed a six week course of IV antibiotics following and admission in Sept 2019 for sepsis and delirium following RIGHT LE cellulitis and with group B strep bacteremia with noted mitral valve vegetation on transthoracic echo but patient/family declined MARY at the time, HF with reduced EF%, chronic kidney disease stage 4, spinal stenosis, type 2 DM on insulin, with apparently two episodes yesterday and today, of patient walking to his car with LOC, no reported prodrome, but patient does not recall all events.  History corroborated by examiner with the patient's son, Reinaldo Morrow, [phone].   No apparent faecal or urinary incontinence noted by family.  Limited history from patient due to short term memory impairment.    ROS:  --NO HA, no focal weakness.  --NO chest pain/pressure.  NO palpitations.  --No abdominal pain, no red blood per rectum or melena.  --No back pain, no tearing back pain.  --No rash.    --NO SI/HI  --NO cough, no dyspnoea.  --No joint pain.  --NO thyroid symptoms.  --NO dysuria, no hematuria.    Physical exam with an elderly, nontoxic, chronically ill appearing M in no acute distress.    Afebrile.   HR  67    RR 14  /65  93% on RA    HEENT< PERRL< EOMI, no Lai sign, no raccoon eye.  Neck supple, NO JVD  Chest clear, PPM c/d/i.  Abdomen soft nontender, normal bowel sounds, no rebound.  Non tender  Skin dry, healed RIGHT anterior shin abrasion.  NO erythema or oedema.  Ext poor nail hygiene.  No oedema.  Neurologic Alert to self/place/time.   Poor short term recall but interactive with examiner with no need for prompting.   UE/LE 5/5.  No SI/HI    WBC 6.0    Hgb 10.5  Platelets of 175K.    INR 3.3    K+ 4.7  Random glucose of 106  Cr 3.5  HS troponin 66.    COVID-19 PCR>>negative.  Chest radiograph reviewed with PPM, no gross infiltrate or effusion.    EKG tracing reviewed with vent paced at 88.    Admitted for syncopal episode with no clear prodrome.   Patient with acute over chronic kidney disease stage 4 in the context of patient with a PPM, HF with reduced EF%, moderate cognitive impairment, type 2 DM on insulin (unclear if patient has his FS checked with his insulin), with mild supra therapeutic INR on Coumadin (would HOLD dose 12/28/21 and recalculate Coumadin dosing).    Doubt CNS or intracranial process but would obtain a CTT head no contrast--neurologic exam nonfocal except for short term memory impairment and insight.    Patient is also not in overt CHF but with worsening azotemia would consider reducing of HOLDING the torsemide and recalculating the dosing>>would consider formal cardiology and EPS evaluation in the AM.   Would check echo and consider also ID opinion if a vegetation is noted but with negative blood cultures would retreatment be a consideration, since apparently that is a concern by the family, with patient and family refusal of a MARY.  Would check orthostatics and favor revising bedtime Lantus to avoid hypoglycemic episodes that would be explaining the syncopal events.  Patient verbally defers to his son and daughter in law for health care decisions.   Patient is DNR/Do Not Resuscitate by patient's MOLST in the chart.  Patient/son interviewed by examiner aware of course and agree with plan/care as above.  Reviewed with Dr. Mckeon for endorsement to my physician colleagues.    Nam Delgado MD  [phone]  Available on [employer] Teams

## 2021-12-28 NOTE — ED PROVIDER NOTE - PHYSICAL EXAMINATION
General: well appearing, no acute distress, AOx3  Skin: no rash, no pallor, well-healing wounds RLE anterior shin   Head: normocephalic, atraumatic  Eyes: clear conjunctiva, EOMI  ENMT: airway patent, no nasal discharge  Cardiovascular: normal rate, normal rhythm, S1/S2  Pulmonary: clear to auscultation bilaterally, no rales, rhonchi, or wheeze  Abdomen: soft, nontender  Musculoskeletal: moving extremities well, no deformity  Psych: normal mood, normal affect

## 2021-12-28 NOTE — CONSULT NOTE ADULT - PROBLEM SELECTOR RECOMMENDATION 5
This is a patient of Dr. Airam Miller.  Ezekiel seen at the bedside. Patient is a DNR/I. Limited intervention  Palliative Attending and I spoke to the patient's son Reinaldo and daughter in Law (HCP) Rachelle.  The most important thing to them is to keep their father comfortable and bring him home. At this time they are conflicted on rather to take him home or have him admitted to do a syncope workup and  they also want to r/o endocarditis. At this time no available beds on the PCU. But if one becomes available, family is amendable to moving the patient to the PCU.  Pending further workup. Will evaluate for home hospice. This is a patient of Dr. Airam Miller.  Palliative consulted for [facility] in the setting of a 89y/o M with Hx of endocarditis, CVA, CAD, T2DM, CHF and HTN   Molst completed as an outpatient with Dr. Miller. Patient is a DNR/I. Limited intervention  Palliative attending and I spoke to the patient's son Reinaldo and daughter in Law (HCP) Rachelle. At this time they are conflicted on  weather to take him home or have him admitted to do a syncope workup. They also want to r/o endocarditis. At this time no available beds on the PCU. However, if one becomes available, family is amendable to moving the patient to the PCU.  Pending further workup. Will evaluate for home hospice.    Social: Patient is  and has three kids. He loves to play the piano and watch Netflix. He lives at home with his spouse.

## 2021-12-28 NOTE — H&P ADULT - NSICDXFAMILYHX_GEN_ALL_CORE_FT
FAMILY HISTORY:  Child  Still living? Unknown  FH: breast cancer in first degree relative, Age at diagnosis: Age Unknown

## 2021-12-28 NOTE — PATIENT PROFILE ADULT - FALL HARM RISK - HARM RISK INTERVENTIONS

## 2021-12-28 NOTE — ED PROVIDER NOTE - ATTENDING CONTRIBUTION TO CARE
Josué Pinzon MD, FACEP: In this physician's medical judgement based on clinical history and physical exam, patient with a near fall last night and fall today brought in by EMS as family wasn't home to keep patient from reporting to the hospital. patient with significant medical history including endocarditis and ppm. Patient without fever or chills here but fatigue and falling are new as of the last two days, patient is DNR/DNI.  Patient mildly dry  trachea midline  non-tachycardic   non-tachypneic   soft, ntnd  no gross deformity of extremities, no asymmetry, right lower extremity with ulcerated shin lesion to the right that is bandaged and healing without gross signs of infection  patient alert to person and answering a few questions  no acute distress  ncat  will get iv, cbc, cmp, blood cultures x 3, COVID-19 screen, ekg, and palliative consultations will be taken under advisement. Patient likely to be admitted if patient's family ok with plan and understand this to be the best representation of the patient's wishes.   Will follow up on labs, analgesia prn, imaging, reassess and disposition to the inpatient team as clinically indicated.     *The above represents an initial assessment/impression. Please refer to my progress notes below for potential changes in patient clinical course*

## 2021-12-28 NOTE — CONSULT NOTE ADULT - CONVERSATION DETAILS
This is a patient of Dr. Airam Miller.  Palliative consulted for [facility] [place] in the setting of a 89y/o M with Hx of endocarditis, CVA, CAD, T2DM, CHF and HTN presenting to ED with syncopal episode X2  Molst seen at the bedside. Patient is a DNR/I. Limited intervention  Palliative Attending and I spoke to the patient's son Reinaldo and daughter in Law (HCP) Rachelle.  The most important thing to them is to keep their father comfortable and bring him home. At this time they are conflicted on rather to take him home or have him admitted to do a syncope workup and  they also want to r/o endocarditis. At this time no available beds on the PCU. But if one becomes available, family is amendable to moving the patient to the PCU.  Pending further workup. Will evaluate for home hospice.    Social: Patient is  and has three kids. He loves to play the piano and watch Netflix. He lives at home with his spouse. This is a patient of Dr. Airam Miller.  Palliative consulted for GOC in the setting of a 89y/o M with Hx of endocarditis, CVA, CAD, T2DM, CHF and HTN presenting to ED with syncopal episode X2  Molst completed as an outpatient with Dr. Miller. Patient is a DNR/I. Limited intervention  Palliative attending and I spoke to the patient's son Reinaldo and daughter in Law (HCP) Rachelle. At this time they are conflicted on  weather to take him home or have him admitted to do a syncope workup. They also want to r/o endocarditis. At this time no available beds on the PCU. However, if one becomes available, family is amendable to moving the patient to the PCU.  Pending further workup. Will evaluate for home hospice.    Social: Patient is  and has three kids. He loves to play the piano and watch Netflix. He lives at home with his spouse.

## 2021-12-28 NOTE — CONSULT NOTE ADULT - ASSESSMENT
89y/o M with PMH of endocarditis, CVA, CAD, T2DM, CHF and HTN presenting to ED with syncopal episode X2. Patient with PPM, last check two weeks ago as per the family. Palliative consulted for GOC and help with safe dispo planning.

## 2021-12-28 NOTE — H&P ADULT - NSICDXPASTMEDICALHX_GEN_ALL_CORE_FT
PAST MEDICAL HISTORY:  CAD (coronary artery disease)     Cardiomyopathy, ischemic     Cerebrovascular accident (CVA), unspecified mechanism     Congestive heart failure     Dyslipidemia     Endocarditis     Hypertension     Pacemaker     Paroxysmal atrial fibrillation     Type 2 diabetes mellitus      PAST MEDICAL HISTORY:  Alzheimers disease     CAD (coronary artery disease)     Cardiomyopathy, ischemic     Cerebrovascular accident (CVA), unspecified mechanism     Congestive heart failure Systolic HF    Dyslipidemia     Endocarditis     Hypertension     Pacemaker     Paroxysmal atrial fibrillation     Spinal stenosis     Type 2 diabetes mellitus Insulin Dependent

## 2021-12-28 NOTE — H&P ADULT - PROBLEM SELECTOR PLAN 2
Palliative consulted in ED  -MOLST filled out, patient DNR/DNI w/ limited interventions Patient currently in A Fib, HR 70s, not pacing   -Monitor on TELE   -Keep K>4 and Mg>2  -C/w Digoxin and Toprol, home doses   -C/w Coumadin, INR goal 2-3

## 2021-12-28 NOTE — H&P ADULT - NSICDXPASTSURGICALHX_GEN_ALL_CORE_FT
PAST SURGICAL HISTORY:  H/O mitral valve replacement bovine    History of cataract surgery     History of colon surgery     ICD (implantable cardioverter-defibrillator) in place     S/P CABG x 1      PAST SURGICAL HISTORY:  H/O mitral valve replacement bovine    History of cataract surgery     History of colon surgery     History of implantable cardiac defibrillator (ICD) now removed    S/P CABG x 1

## 2021-12-28 NOTE — H&P ADULT - NSHPSOCIALHISTORY_GEN_ALL_CORE
Patient is  and has three kids. He loves to play the piano and watch Netflix. He lives at home with his spouse.  Reinaldo (son) [phone]  Rachelle daughter in law  (HCP) [phone] Stopped smoking 55 years ago  Denies ETOH use   Denies any drug use    Patient is  and has three kids. He loves to play the piano and watch [employer]. He lives at home with his spouse.  Reinaldo (son) [phone]  Rachelle daughter in law  (HCP) [phone]

## 2021-12-28 NOTE — H&P ADULT - PROBLEM SELECTOR PLAN 1
May be cardiac vs neuro etiology. AS per family's request, r/o endocarditis as well.   -Monitor on TELE  -Keep K>4, Mg>2  -F/u BCx  -TTE ordered   -May consider CTH, if aligns with GOC May be cardiac (patient with A Fib) vs neuro etiology. As per family's request, r/o endocarditis as well.   -Monitor on TELE  -Keep K>4, Mg>2  -F/u BCx  -TTE ordered   -May consider CTH, if aligns with GOC May be cardiac (patient with A Fib) vs neuro etiology. As per family's request, r/o endocarditis as well.   -Monitor on TELE  -Keep K>4, Mg>2  -F/u BCx  -TTE ordered   -EP C/s for PPM interrogation in AM   -F/u CTH   -F/u Dig level

## 2021-12-28 NOTE — H&P ADULT - NSHPLABSRESULTS_GEN_ALL_CORE
LABS:                          10.5   6.04  )-----------( 175      ( 28 Dec 2021 15:21 )             36.1         142  |  103  |  105<H>  ----------------------------<  106<H>  4.7   |  21<L>  |  3.73<H>    Ca    9.4      28 Dec 2021 15:21  Mg     2.4         TPro  7.3  /  Alb  4.0  /  TBili  0.5  /  DBili  x   /  AST  29  /  ALT  17  /  AlkPhos  146<H>      LIVER FUNCTIONS - ( 28 Dec 2021 15:21 )  Alb: 4.0 g/dL / Pro: 7.3 g/dL / ALK PHOS: 146 U/L / ALT: 17 U/L / AST: 29 U/L / GGT: x             Urinalysis Basic - ( 28 Dec 2021 17:12 )    Color: Light Yellow / Appearance: Clear / S.012 / pH: x  Gluc: x / Ketone: Negative  / Bili: Negative / Urobili: Negative   Blood: x / Protein: Trace / Nitrite: Negative   Leuk Esterase: Negative / RBC: 0 /hpf / WBC 0 /HPF   Sq Epi: x / Non Sq Epi: 0 /hpf / Bacteria: Negative      < from: Xray Chest 1 View AP/PA (21 @ 16:40) >    FINDINGS:  Median sternotomy.  Mitral valve replacement.  Left-sided ICD.  The heart is enlarged.  Right basilar linear opacity. Lungs otherwise clear.  There is no pneumothorax or pleural effusion.  No acute bony abnormality.    IMPRESSION:  Right basilar linear opacity likely represents atelectasis. Lungs   otherwise clear.    < end of copied text >

## 2021-12-28 NOTE — ED PROVIDER NOTE - PROGRESS NOTE DETAILS
Redd Abebe, PGY-2- Multiple discussions with son/daughter at bedside, okay with patient being admitted. Requesting blood cultures due to previous hx of endocarditis. Family ok with basic labs, cultures, cxr, ekg, ua, ucx at this point. Deferring additional cardiac work up such as troponin. No palliative care beds. Will be admitted to hospitalist. Redd Abebe, PGY-2- Discussion with hospitalist, adding on troponin to labs already in lab. Will admit to tele MIKHAIL Mckeon, Attending: signed out to me form Jeromy. Admitted for possible IE workup. Palliative care attg knows pt well and is following. No beds in PCU (and no covid test back yet) otherwise pt would go there.

## 2021-12-28 NOTE — CONSULT NOTE ADULT - SUBJECTIVE AND OBJECTIVE BOX
HPI:   · HPI Objective Statement: Patient BIBEMS s/p syncopal episode today. As per son who arrived with patient, he also had another syncopal episode last night as well. Patient denies any chest pain, palpitations, dizziness, SOB, abd pain, n/v or other complaints prior to syncopal episode. States he was on his way to his doctor's appt for possible need to go on O2 therapy at home when this happened. Patient awake, alert, oriented x 3, able to verbalize needs and follow commands. Patient denies any pain or discomforts. EKG performed--reviewed by MD. Patient received with IV line in place from EMS. 20g PIV also started on R arm and labs obtained. Patient placed on cardiac monitor. Denies chest pain. Patients lips appear cyanotic. Denies SOB or difficulty breathing at this time. SpO2 92% on RA--improved to 98% on 4LNC. Patient tolerating it well, breathing even and non-labored. Pending MD delacruz. Will continue to monitor.    PERTINENT PM/SXH:   Dyslipidemia    Paroxysmal atrial fibrillation    Congestive heart failure    Hypertension    Type 2 diabetes mellitus    Cardiomyopathy, ischemic    VT (ventricular tachycardia)    CAD (coronary artery disease)    Endocarditis    Pacemaker    Cerebrovascular accident (CVA), unspecified mechanism      H/O mitral valve replacement    ICD (implantable cardioverter-defibrillator) in place    History of colon surgery    History of cataract surgery    S/P CABG x 1      FAMILY HISTORY:  No pertinent family history in first degree relatives      ITEMS NOT CHECKED ARE NOT PRESENT    SOCIAL HISTORY:   Significant other/partner[X ]  Children[X ]  Caodaism/Spirituality: Spiritism   Substance hx:  [ ]   Tobacco hx:  [ ]   Alcohol hx: [ ]   Home Opioid hx:  [X ] I-Stop Reference No: [id number]  Patient Name: Klaus MorrowBirth Date: 11/05/1930  Address: [address] [place] [id number]: Male  Rx Written	Rx Dispensed	Drug	Quantity	Days Supply	Prescriber Name	Prescriber Gaby #	Payment Method	Dispenser  12/23/2021	12/24/2021	oxycodone hcl (ir) 5 mg tablet	90	30	Airam Miller MD	[id number]	Medicare	[id number]  11/26/2021	11/27/2021	oxycodone hcl (ir) 5 mg tablet	7	7	Airam Miller MD	[id number]	Medicare	[id number]    Living Situation: [X ]Home  [ ]Long term care  [ ]Rehab [ ]Other    ADVANCE DIRECTIVES:    DNR  MOLST  [ X]  Living Will  [ ]   DECISION MAKER(s):  [X ] Health Care Proxy(s)  [ ] Surrogate(s)  [ ] Guardian           Name(s): Rachelle Morrow Phone Number(s): [phone]    BASELINE (I)ADL(s) (prior to admission):  [place]: [ ]Total  [ X] Moderate [ ]Dependent    Allergies    No Known Allergies    Intolerances    MEDICATIONS  (STANDING):    MEDICATIONS  (PRN):    PRESENT SYMPTOMS: [ ]Unable to obtain due to poor mentation   Source if other than patient:  [ ]Family   [ ]Team     Pain: [ ]yes [ X]no  QOL impact -   Location -                    Aggravating factors -  Quality -  Radiation -  Timing-  Severity (0-10 scale):  Minimal acceptable level (0-10 scale):     CPOT:    https://www.[facility].org/getattachment/[email or web address]/Critical-Care-Pain-Observation-Tool-(CPOT)      PAIN AD Score:     http://geriatrictoolkit.[facility]/cog/painad.pdf (press ctrl +  left click to view)    Dyspnea:                           [ ]Mild [ ]Moderate [ ]Severe  Anxiety:                             [ ]Mild [ ]Moderate [ ]Severe  Fatigue:                             [ ]Mild [ ]Moderate [ ]Severe  Nausea:                             [ ]Mild [ ]Moderate [ ]Severe  Loss of appetite:              [ ]Mild [ ]Moderate [ ]Severe  Constipation:                    [ ]Mild [ ]Moderate [ ]Severe    Other Symptoms:  [X ]All other review of systems negative     Palliative Performance Status Version 2:  60  %    http://npcrc.org/files/news/palliative_performance_scale_ppsv2.pdf  PHYSICAL EXAM:  Vital Signs Last 24 Hrs  T(C): 36.3 (28 Dec 2021 14:15), Max: 36.4 (28 Dec 2021 13:25)  T(F): 97.3 (28 Dec 2021 14:15), Max: 97.6 (28 Dec 2021 13:25)  HR: 93 (28 Dec 2021 14:15) (90 - 93)  BP: 144/88 (28 Dec 2021 14:15) (132/62 - 144/88)  BP(mean): --  RR: 18 (28 Dec 2021 14:15) (18 - 18)  SpO2: 98% (28 Dec 2021 14:15) (98% - 100%) I&O's Summary    GENERAL:  [ X]Alert  [ X]Oriented x 2-3 but forgetful   [ ]Lethargic  [ ]Cachexia  [ ]Unarousable  [ X]Verbal  [ ]Non-Verbal  Behavioral:   [ ] Anxiety  [ ] Delirium [ ] Agitation [ X] Other- Calm  HEENT:  [X ]Normal   [ ]Dry mouth   [ ]ET Tube/Trach  [ ]Oral lesions  PULMONARY:   [X ]Clear [ ]Tachypnea  [ ]Audible excessive secretions   [ ]Rhonchi        [ ]Right [ ]Left [ ]Bilateral  [ ]Crackles        [ ]Right [ ]Left [ ]Bilateral  [ ]Wheezing     [ ]Right [ ]Left [ ]Bilateral  [ ]Diminished breath sounds [ ]right [ ]left [ ]bilateral  CARDIOVASCULAR:    [ X]Regular [ ]Irregular [ ]Tachy  [ ]Al [ ]Murmur [ ]Other  GASTROINTESTINAL:  [X ]Soft  [X ]Distended   [X ]+BS  [ ]Non tender [ ]Tender  [ ]PEG [ ]OGT/ NGT  Last BM:   GENITOURINARY:  [X ]Normal [ ] Incontinent   [ ]Oliguria/Anuria   [ ]Soto  MUSCULOSKELETAL:   [ ]Normal   [ X]Weakness  [ ]Bed/Wheelchair bound [ ]Edema  NEUROLOGIC:   [ ]No focal deficits  [ ]Cognitive impairment  [ ]Dysphagia [ ]Dysarthria [ ]Paresis [ X]Other- Forgetful  SKIN: toes and fingers are dusky   [ ]Normal    [ ]Rash  [ ]Pressure ulcer(s)       Present on admission [ ]y [ ]n    CRITICAL CARE:  [ ] Shock Present  [ ]Septic [ ]Cardiogenic [ ]Neurologic [ ]Hypovolemic  [ ]  Vasopressors [ ]  Inotropes   [ ]Respiratory failure present [ ]Mechanical ventilation [ ]Non-invasive ventilatory support [ ]High flow    [ ]Acute  [ ]Chronic [ ]Hypoxic  [ ]Hypercarbic [ ]Other  [ ]Other organ failure     LABS:                        10.5   6.04  )-----------( 175      ( 28 Dec 2021 15:21 )             36.1       RADIOLOGY & ADDITIONAL STUDIES: none new    PROTEIN CALORIE MALNUTRITION PRESENT: [ ]mild [ ]moderate [ ]severe [ ]underweight [ ]morbid obesity  https://www.andeal.org/vault/8980/web/files/ONC/Table_Clinical%20Characteristics%20to%20Document%20Malnutrition-White%20JV%20et%20al%202012.pdf    Height (cm): 172.7 (12-28-21 @ 13:25), 172.7 (08-26-21 @ 10:10)  Weight (kg): 74.8 (12-28-21 @ 13:25), 73.9 (08-26-21 @ 10:10)  BMI (kg/m2): 25.1 (12-28-21 @ 13:25), 24.8 (08-26-21 @ 10:10)    [ ]PPSV2 < or = to 30% [ ]significant weight loss  [ ]poor nutritional intake  [ ]anasarca      [ ]Artificial Nutrition      REFERRALS:   [ ]Chaplaincy  [ ]Hospice  [ ]Child Life  [ X]Social Work  [ ]Case management [ ]Holistic Therapy     Goals of Care Document:  HPI:   · HPI Objective Statement: Patient BIBEMS s/p syncopal episode today. As per son who arrived with patient, he also had another syncopal episode last night as well. Patient denies any chest pain, palpitations, dizziness, SOB, abd pain, n/v or other complaints prior to syncopal episode. States he was on his way to his doctor's appt for possible need to go on O2 therapy at home when this happened. Patient awake, alert, oriented x 3, able to verbalize needs and follow commands. Patient denies any pain or discomforts. EKG performed--reviewed by MD. Patient received with IV line in place from EMS. 20g PIV also started on R arm and labs obtained. Patient placed on cardiac monitor. Denies chest pain. Patients lips appear cyanotic. Denies SOB or difficulty breathing at this time. SpO2 92% on RA--improved to 98% on 4LNC. Patient tolerating it well, breathing even and non-labored. Pending MD delacruz. Will continue to monitor.    PERTINENT PM/SXH:   Dyslipidemia    Paroxysmal atrial fibrillation    Congestive heart failure    Hypertension    Type 2 diabetes mellitus    Cardiomyopathy, ischemic    VT (ventricular tachycardia)    CAD (coronary artery disease)    Endocarditis    Pacemaker    Cerebrovascular accident (CVA), unspecified mechanism      H/O mitral valve replacement    ICD (implantable cardioverter-defibrillator) in place    History of colon surgery    History of cataract surgery    S/P CABG x 1      FAMILY HISTORY:  No pertinent family history in first degree relatives      ITEMS NOT CHECKED ARE NOT PRESENT    SOCIAL HISTORY:   Significant other/partner[X ]  Children[X ]  Samaritan/Spirituality: Sikh   Substance hx:  [ ]   Tobacco hx:  [ ]   Alcohol hx: [ ]   Home Opioid hx:  [X ] I-Stop Reference No: [id number]  Patient Name: Klaus MorrowBirth Date: 11/05/1930  Address: [address] [place] [id number]: Male  Rx Written	Rx Dispensed	Drug	Quantity	Days Supply	Prescriber Name	Prescriber Gaby #	Payment Method	Dispenser  12/23/2021	12/24/2021	oxycodone hcl (ir) 5 mg tablet	90	30	Airam Miller MD	[id number]	Medicare	[id number]  11/26/2021	11/27/2021	oxycodone hcl (ir) 5 mg tablet	7	7	Airam Miller MD	[id number]	Medicare	[id number]    Living Situation: [X ]Home  [ ]Long term care  [ ]Rehab [ ]Other    ADVANCE DIRECTIVES:    DNR  MOLST  [ X]  Living Will  [ ]   DECISION MAKER(s):  [X ] Health Care Proxy(s)  [ ] Surrogate(s)  [ ] Guardian           Name(s): Rachelle Morrow Phone Number(s): [phone]    BASELINE (I)ADL(s) (prior to admission):  [place]: [ ]Total  [ X] Moderate [ ]Dependent    Allergies    No Known Allergies    Intolerances    MEDICATIONS  (STANDING):    MEDICATIONS  (PRN):    PRESENT SYMPTOMS: [ ]Unable to obtain due to poor mentation   Source if other than patient:  [ ]Family   [ ]Team     Pain: [ ]yes [ X]no  QOL impact -   Location -                    Aggravating factors -  Quality -  Radiation -  Timing-  Severity (0-10 scale):  Minimal acceptable level (0-10 scale):     CPOT:    https://www.[facility].org/getattachment/[email or web address]/Critical-Care-Pain-Observation-Tool-(CPOT)      PAIN AD Score:     http://geriatrictoolkit.[facility]/cog/painad.pdf (press ctrl +  left click to view)    Dyspnea:                           [ ]Mild [ ]Moderate [ ]Severe  Anxiety:                             [ ]Mild [ ]Moderate [ ]Severe  Fatigue:                             [ ]Mild [ ]Moderate [ ]Severe  Nausea:                             [ ]Mild [ ]Moderate [ ]Severe  Loss of appetite:              [ ]Mild [ ]Moderate [ ]Severe  Constipation:                    [ ]Mild [ ]Moderate [ ]Severe    Other Symptoms:  [X ]All other review of systems negative     Palliative Performance Status Version 2:  60  %    http://npcrc.org/files/news/palliative_performance_scale_ppsv2.pdf  PHYSICAL EXAM:  Vital Signs Last 24 Hrs  T(C): 36.3 (28 Dec 2021 14:15), Max: 36.4 (28 Dec 2021 13:25)  T(F): 97.3 (28 Dec 2021 14:15), Max: 97.6 (28 Dec 2021 13:25)  HR: 93 (28 Dec 2021 14:15) (90 - 93)  BP: 144/88 (28 Dec 2021 14:15) (132/62 - 144/88)  BP(mean): --  RR: 18 (28 Dec 2021 14:15) (18 - 18)  SpO2: 98% (28 Dec 2021 14:15) (98% - 100%) I&O's Summary    GENERAL:  [ X]Alert  [ X]Oriented x 2-3 but forgetful   [ ]Lethargic  [ ]Cachexia  [ ]Unarousable  [ X]Verbal  [ ]Non-Verbal  Behavioral:   [ ] Anxiety  [ ] Delirium [ ] Agitation [ X] Other- Calm  HEENT:  [X ]Normal   [ ]Dry mouth   [ ]ET Tube/Trach  [ ]Oral lesions  PULMONARY:   [X ]Clear [ ]Tachypnea  [ ]Audible excessive secretions   [ ]Rhonchi        [ ]Right [ ]Left [ ]Bilateral  [ ]Crackles        [ ]Right [ ]Left [ ]Bilateral  [ ]Wheezing     [ ]Right [ ]Left [ ]Bilateral  [ ]Diminished breath sounds [ ]right [ ]left [ ]bilateral  CARDIOVASCULAR:    [ X]Regular [ ]Irregular [ ]Tachy  [ ]Al [ ]Murmur [ ]Other  GASTROINTESTINAL:  [X ]Soft  [X ]Distended   [X ]+BS  [ ]Non tender [ ]Tender  [ ]PEG [ ]OGT/ NGT  Last BM:   GENITOURINARY:  [X ]Normal [ ] Incontinent   [ ]Oliguria/Anuria   [ ]Soto  MUSCULOSKELETAL:   [ ]Normal   [ X]Weakness  [ ]Bed/Wheelchair bound [X ]Edema- b/l LE  NEUROLOGIC:   [ ]No focal deficits  [ ]Cognitive impairment  [ ]Dysphagia [ ]Dysarthria [ ]Paresis [ X]Other- Forgetful  SKIN: toes and fingers are dusky   [ ]Normal    [ ]Rash  [ ]Pressure ulcer(s)       Present on admission [ ]y [ ]n    CRITICAL CARE:  [ ] Shock Present  [ ]Septic [ ]Cardiogenic [ ]Neurologic [ ]Hypovolemic  [ ]  Vasopressors [ ]  Inotropes   [ ]Respiratory failure present [ ]Mechanical ventilation [ ]Non-invasive ventilatory support [ ]High flow    [ ]Acute  [ ]Chronic [ ]Hypoxic  [ ]Hypercarbic [ ]Other  [ ]Other organ failure     LABS:                        10.5   6.04  )-----------( 175      ( 28 Dec 2021 15:21 )             36.1       RADIOLOGY & ADDITIONAL STUDIES: none new    PROTEIN CALORIE MALNUTRITION PRESENT: [ ]mild [ ]moderate [ ]severe [ ]underweight [ ]morbid obesity  https://www.andeal.org/vault/2440/web/files/ONC/Table_Clinical%20Characteristics%20to%20Document%20Malnutrition-White%20JV%20et%20al%202012.pdf    Height (cm): 172.7 (12-28-21 @ 13:25), 172.7 (08-26-21 @ 10:10)  Weight (kg): 74.8 (12-28-21 @ 13:25), 73.9 (08-26-21 @ 10:10)  BMI (kg/m2): 25.1 (12-28-21 @ 13:25), 24.8 (08-26-21 @ 10:10)    [ ]PPSV2 < or = to 30% [ ]significant weight loss  [ ]poor nutritional intake  [ ]anasarca      [ ]Artificial Nutrition      REFERRALS:   [ ]Chaplaincy  [ ]Hospice  [ ]Child Life  [ X]Social Work  [ ]Case management [ ]Holistic Therapy     Goals of Care Document:  HPI:   · HPI Objective Statement: Patient BIBEMS s/p syncopal episode today. As per son who arrived with patient, he also had another syncopal episode last night as well. Patient denies any chest pain, palpitations, dizziness, SOB, abd pain, n/v or other complaints prior to syncopal episode. States he was on his way to his doctor's appt for possible need to go on O2 therapy at home when this happened. Patient awake, alert, oriented x 3, able to verbalize needs and follow commands. Patient denies any pain or discomforts. EKG performed--reviewed by MD. Patient received with IV line in place from EMS. 20g PIV also started on R arm and labs obtained. Patient placed on cardiac monitor. Denies chest pain. Patients lips appear cyanotic. Denies SOB or difficulty breathing at this time. SpO2 92% on RA--improved to 98% on 4LNC. Patient tolerating it well, breathing even and non-labored. Pending MD delacruz. Will continue to monitor.  Hx as above, case discussed with Dr. Miller - patient with moderate Alzheimers dementia, ambulatory at home, syncope at home x 2, after second episode BIBE    PERTINENT PM/SXH:   Dyslipidemia    Paroxysmal atrial fibrillation    Congestive heart failure    Hypertension    Type 2 diabetes mellitus    Cardiomyopathy, ischemic    VT (ventricular tachycardia)    CAD (coronary artery disease)    Endocarditis    Pacemaker    Cerebrovascular accident (CVA), unspecified mechanism      H/O mitral valve replacement    ICD (implantable cardioverter-defibrillator) in place    History of colon surgery    History of cataract surgery    S/P CABG x 1      FAMILY HISTORY:  No pertinent family history in first degree relatives      ITEMS NOT CHECKED ARE NOT PRESENT    SOCIAL HISTORY:   Significant other/partner[X ]  Children[X ]  Anabaptism/Spirituality: Alevism   Substance hx:  [ ]   Tobacco hx:  [ ]   Alcohol hx: [ ]   Home Opioid hx:  [X ] I-Stop Reference No: [id number]  Patient Name: Klaus MorrowBirth Date: 11/05/1930  Address: [address] [place] [id number]: Male  Rx Written	Rx Dispensed	Drug	Quantity	Days Supply	Prescriber Name	Prescriber Gaby #	Payment Method	Dispenser  12/23/2021	12/24/2021	oxycodone hcl (ir) 5 mg tablet	90	30	Airam Miller MD	[id number]	Medicare	[id number]  11/26/2021	11/27/2021	oxycodone hcl (ir) 5 mg tablet	7	7	Airam Miller MD	[id number]	Medicare	[id number]    Living Situation: [X ]Home  [ ]Long term care  [ ]Rehab [ ]Other    ADVANCE DIRECTIVES:    DNR  MOLST  [ X]  Living Will  [ ]   DECISION MAKER(s):  [X ] Health Care Proxy(s)  [ ] Surrogate(s)  [ ] Guardian           Name(s): Rachelle Morrow Phone Number(s): [phone]    BASELINE (I)ADL(s) (prior to admission):  [place]: [ ]Total  [ X] Moderate [ ]Dependent    Allergies    No Known Allergies    Intolerances    MEDICATIONS  (STANDING):    MEDICATIONS  (PRN):    PRESENT SYMPTOMS: [ ]Unable to obtain due to poor mentation   Source if other than patient:  [ ]Family   [ ]Team     Pain: [ ]yes [ X]no  QOL impact -   Location -                    Aggravating factors -  Quality -  Radiation -  Timing-  Severity (0-10 scale):  Minimal acceptable level (0-10 scale):     CPOT:    https://www.[facility].org/getattachment/[email or web address]/Critical-Care-Pain-Observation-Tool-(CPOT)      PAIN AD Score:     http://geriatrictoolkit.[facility]/cog/painad.pdf (press ctrl +  left click to view)    Dyspnea:                           [ ]Mild [ ]Moderate [ ]Severe  Anxiety:                             [ ]Mild [ ]Moderate [ ]Severe  Fatigue:                             [ ]Mild [ ]Moderate [ ]Severe  Nausea:                             [ ]Mild [ ]Moderate [ ]Severe  Loss of appetite:              [ ]Mild [ ]Moderate [ ]Severe  Constipation:                    [ ]Mild [ ]Moderate [ ]Severe    Other Symptoms:  [X ]All other review of systems negative     Palliative Performance Status Version 2:  60  %    http://npcrc.org/files/news/palliative_performance_scale_ppsv2.pdf  PHYSICAL EXAM:  Vital Signs Last 24 Hrs  T(C): 36.3 (28 Dec 2021 14:15), Max: 36.4 (28 Dec 2021 13:25)  T(F): 97.3 (28 Dec 2021 14:15), Max: 97.6 (28 Dec 2021 13:25)  HR: 93 (28 Dec 2021 14:15) (90 - 93)  BP: 144/88 (28 Dec 2021 14:15) (132/62 - 144/88)  BP(mean): --  RR: 18 (28 Dec 2021 14:15) (18 - 18)  SpO2: 98% (28 Dec 2021 14:15) (98% - 100%) I&O's Summary    GENERAL:  [ X]Alert  [ X]Oriented x 2-3 but forgetful   [ ]Lethargic  [ ]Cachexia  [ ]Unarousable  [ X]Verbal  [ ]Non-Verbal  Behavioral:   [ ] Anxiety  [ ] Delirium [ ] Agitation [ X] Other- Calm  HEENT:  [X ]Normal   [ ]Dry mouth   [ ]ET Tube/Trach  [ ]Oral lesions  PULMONARY:   [X ]Clear [ ]Tachypnea  [ ]Audible excessive secretions   [ ]Rhonchi        [ ]Right [ ]Left [ ]Bilateral  [ ]Crackles        [ ]Right [ ]Left [ ]Bilateral  [ ]Wheezing     [ ]Right [ ]Left [ ]Bilateral  [ ]Diminished breath sounds [ ]right [ ]left [ ]bilateral  CARDIOVASCULAR:    [ X]Regular [ ]Irregular [ ]Tachy  [ ]Al [ ]Murmur [ ]Other  GASTROINTESTINAL:  [X ]Soft  [X ]Distended   [X ]+BS  [ ]Non tender [ ]Tender  [ ]PEG [ ]OGT/ NGT  Last BM:   GENITOURINARY:  [X ]Normal [ ] Incontinent   [ ]Oliguria/Anuria   [ ]Soto  MUSCULOSKELETAL:   [ ]Normal   [ X]Weakness  [ ]Bed/Wheelchair bound [X ]Edema- b/l LE  NEUROLOGIC:   [ ]No focal deficits  [ ]Cognitive impairment  [ ]Dysphagia [ ]Dysarthria [ ]Paresis [ X]Other- Forgetful  SKIN: toes and fingers are dusky   [ ]Normal    [ ]Rash  [ ]Pressure ulcer(s)       Present on admission [ ]y [ ]n    CRITICAL CARE:  [ ] Shock Present  [ ]Septic [ ]Cardiogenic [ ]Neurologic [ ]Hypovolemic  [ ]  Vasopressors [ ]  Inotropes   [ ]Respiratory failure present [ ]Mechanical ventilation [ ]Non-invasive ventilatory support [ ]High flow    [ ]Acute  [ ]Chronic [ ]Hypoxic  [ ]Hypercarbic [ ]Other  [ ]Other organ failure     LABS:                        10.5   6.04  )-----------( 175      ( 28 Dec 2021 15:21 )             36.1       RADIOLOGY & ADDITIONAL STUDIES: none new    PROTEIN CALORIE MALNUTRITION PRESENT: [ ]mild [ ]moderate [ ]severe [ ]underweight [ ]morbid obesity  https://www.andeal.org/vault/2440/web/files/ONC/Table_Clinical%20Characteristics%20to%20Document%20Malnutrition-White%20JV%20et%20al%202012.pdf    Height (cm): 172.7 (12-28-21 @ 13:25), 172.7 (08-26-21 @ 10:10)  Weight (kg): 74.8 (12-28-21 @ 13:25), 73.9 (08-26-21 @ 10:10)  BMI (kg/m2): 25.1 (12-28-21 @ 13:25), 24.8 (08-26-21 @ 10:10)    [ ]PPSV2 < or = to 30% [ ]significant weight loss  [ ]poor nutritional intake  [ ]anasarca      [ ]Artificial Nutrition      REFERRALS:   [ ]Chaplaincy  [ ]Hospice  [ ]Child Life  [ X]Social Work  [ ]Case management [ ]Holistic Therapy     Goals of Care Document:

## 2021-12-28 NOTE — CHART NOTE - NSCHARTNOTEFT_GEN_A_CORE
Patient seen in ED, full consult to follow.  Patient of Dr. Airam Miller, discussed pt hx and family concerns.  At this time, family wishes to see lab work and r/o endocarditis, leaning towards very conservative management of findings.  There are no beds at the PCU at this time, but we expect some to become available later this evening.  Patient to be admitted to the hospitalist service (discussed with Dr. MOHIT Mccarthy) who will manage overnight either on the floor or preferably when a PCU bed becomes available.  We will co-managed and determine further coverage in the AM.

## 2021-12-28 NOTE — H&P ADULT - NSHPPHYSICALEXAM_GEN_ALL_CORE
Vital Signs Last 24 Hrs  T(C): 36.3 (28 Dec 2021 14:15), Max: 36.4 (28 Dec 2021 13:25)  T(F): 97.3 (28 Dec 2021 14:15), Max: 97.6 (28 Dec 2021 13:25)  HR: 93 (28 Dec 2021 14:15) (90 - 93)  BP: 144/88 (28 Dec 2021 14:15) (132/62 - 144/88)  BP(mean): --  RR: 18 (28 Dec 2021 14:15) (18 - 18)  SpO2: 98% (28 Dec 2021 14:15) (98% - 100%)  PHYSICAL EXAM:  GENERAL: NAD, well appearing   EYES: PERRL, +EOMI  NECK/ENT: Neck soft, supple. No epistaxis   CHEST/LUNG: Clear to auscultation bilaterally; No wheezing  HEART: S1S2+, Regular rate and rhythm; No murmurs, rubs, or gallops  ABDOMEN: Soft, Nontender, Nondistended; Bowel sounds present  EXTREMITIES: No edema b/l, moving all extremities   SKIN: No rashes or lesions  NEURO: AAOX3, no focal deficits, no motor or sensory loss  PSYCH: normal affect and speech Vital Signs Last 24 Hrs  T(C): 36.3 (28 Dec 2021 14:15), Max: 36.4 (28 Dec 2021 13:25)  T(F): 97.3 (28 Dec 2021 14:15), Max: 97.6 (28 Dec 2021 13:25)  HR: 93 (28 Dec 2021 14:15) (90 - 93)  BP: 144/88 (28 Dec 2021 14:15) (132/62 - 144/88)  BP(mean): --  RR: 18 (28 Dec 2021 14:15) (18 - 18)  SpO2: 98% (28 Dec 2021 14:15) (98% - 100%)  PHYSICAL EXAM:  GENERAL: NAD, well appearing   EYES: PERRL, +EOMI  NECK/ENT: Neck soft, supple. No epistaxis   CHEST/LUNG: +Faint crackles at bases; +TTP on L side of chest. No wheezing  HEART: S1S2+, Regular rate and rhythm; No murmurs, rubs, or gallops  ABDOMEN: Soft, Nontender, Nondistended; Bowel sounds present  EXTREMITIES: +Trace edema b/l, moving all extremities,    SKIN: No rashes or lesions, now janeway lesions, osler nodes, or splinter hemorrhages   NEURO: AAOX3, no focal deficits, no motor or sensory loss  PSYCH: normal affect and speech

## 2021-12-28 NOTE — H&P ADULT - PROBLEM SELECTOR PLAN 4
-C/w Warfarin for AC Insulin Dependent, on Lantus 20 units qhs and Humalog 4-12 units TID depending on fingersticks   -Will start Lantus 16 units qhs for now   -ISS Insulin Dependent, on Lantus 20 units qhs and Humalog 4-12 units TID depending on fingersticks   -Will hold Lantus for tonight, given low blood sugar and syncope (possible hypoglycemic episode?)   -ISS

## 2021-12-28 NOTE — H&P ADULT - PROBLEM SELECTOR PLAN 3
EF of 35-40% in 2019 Echo   -HF consult in AM   -C/w Toprol, Torsemide  -F/u TTE EF of 35-40% in 2019 Echo   -HF consult in AM, as patient has had increasing weight gain, even with Torsemide increase   -C/w Toprol, Torsemide  -F/u TTE

## 2021-12-28 NOTE — CONSULT NOTE ADULT - CONSULT REASON
Palliative consulted for GOC in the setting of a 89y/o M with Hx of endocarditis, CVA, CAD, T2DM, CHF and HTN presenting to ED with syncopal episode X2 Palliative consulted for GOC in the setting of a 89y/o M with Hx of endocarditis, CVA, CAD, T2DM, CHF and HTN presenting to ED with syncopal episode X2, MOLST and family requesting comfort care - Dr. Miller requesting GAP team evaluation.

## 2021-12-28 NOTE — ED ADULT NURSE NOTE - OBJECTIVE STATEMENT
Patient BIBEMS s/p syncopal episode today. As per son who arrived with patient, he also had another syncopal episode last night as well. Patient denies any chest pain, palpitations, dizziness or SOB prior to syncopal episode. States he was on his way to his doctor's appt for possible need to go on O2 therapy at home when this happened. Patient awake, alert, oriented x 3, able to verbalize needs and follow commands. Patient denies any pain or discomforts. EKG performed--reviewed by MD. Patient received with IV line in place from EMS. 20g PIV also started on R arm and labs obtained. Patient placed on cardiac monitor. Denies chest pain. Patients lips appear cyanotic. Denies SOB or difficulty breathing at this time. SpO2 92% on RA--improved to 98% on 4LNC. Patient tolerating it well, breathing even and non-labored. Pending MD delacruz. Will continue to monitor. Patient BIBEMS s/p syncopal episode today. As per son who arrived with patient, he also had another syncopal episode last night as well. Patient denies any chest pain, palpitations, dizziness, SOB, abd pain, n/v or other complaints prior to syncopal episode. States he was on his way to his doctor's appt for possible need to go on O2 therapy at home when this happened. Patient awake, alert, oriented x 3, able to verbalize needs and follow commands. Patient denies any pain or discomforts. EKG performed--reviewed by MD. Patient received with IV line in place from EMS. 20g PIV also started on R arm and labs obtained. Patient placed on cardiac monitor. Denies chest pain. Patients lips appear cyanotic. Denies SOB or difficulty breathing at this time. SpO2 92% on RA--improved to 98% on 4LNC. Patient tolerating it well, breathing even and non-labored. Pending MD delacruz. Will continue to monitor.

## 2021-12-28 NOTE — H&P ADULT - HISTORY OF PRESENT ILLNESS
89 year old male with a pmhx of dementia, CAD/CABG, HTN, HLD, Afib-on Coumadin last dose 8/23/21, DMT2 (controlled, managed by Endo Dr Uriah Cobb, Hgba1c 7.6 7/21), Systolic heart failure, CKD Stage IV, Cardiomyopathy, ICD, endocarditis, is brought to ED by EMS for evaluation of syncopal episode. Per family, pt was walking yesterday and today when he got weak and collapsed to his knees. This was witnessed by family and pt was supported by them. Did not get hurt or hit his head. No recent fever, chills, cp, cough, abd pain, vomiting, diarrhea. Was on way to PCP office when this happened again and EMS was called bringing him here. Has recently had Torsemide uptitrated. Pt has signed MOLST with family, DNR/DNI/limited medical interventions.     89 year old male with a pmhx of Endocarditis, Alzheimers dementia, CVA, CAD/CABG, HTN, HLD, Afib-on Coumadin s/p PPM, T2DM, insulin dependent, Systolic heart failure, CKD Stage IV, Cardiomyopathy, Spinal stenosis is brought in for evaluation of syncope x2. Patient was walking yesterday to his car and suddenly passed out. Patient had the same episode this AM. Patient denies any dizziness, lightheadedness, or mechanical fall. Patient denied any urinary incontinence, tongue biting, post-ictal state during/after the episodes. Patient Did not get hurt or hit his head. No recent fever, chills, cp, cough, abd pain, vomiting, diarrhea. PAtient Has recently had Torsemide uptitrated, as he has had increasing weight gain.     Patient also reports L sided CP since yest, with radiation to L back and L arm. Patient denies any SOB, diaphoresis, N/V, dizziness during episodes.     In the ED, Pt has signed MOLST with family, DNR/DNI/limited medical interventions. Blood cultures were drawn.

## 2021-12-28 NOTE — ED ADULT NURSE NOTE - SKIN INTEGRITY
intact Patient being treated on R leg with skin graft as per milo Murillo--compression dressings in place on b/l legs.

## 2021-12-28 NOTE — ED PROVIDER NOTE - CLINICAL SUMMARY MEDICAL DECISION MAKING FREE TEXT BOX
Redd Abebe, PGY-2- 89 year old male with a complex pmhx, here with sob. Vitals significant for O2 sat low 90s on RA. Pt with signed DNR/DNI/limited medical intervention molst. Family at bedside, okay with basic labs, cultures, ekg, ua, ucx, cxr. Will admit.

## 2021-12-28 NOTE — H&P ADULT - NSHPREVIEWOFSYSTEMS_GEN_ALL_CORE
REVIEW OF SYSTEMS:    CONSTITUTIONAL: No weakness, fevers or chills  EYES: No blurry vision. No scleral icterus   Neck/ENT: No dysphagia or odynophagia   RESPIRATORY: No cough, wheezing, hemoptysis; No shortness of breath  CARDIOVASCULAR: No chest pain or palpitations  GASTROINTESTINAL: No abdominal pain. No nausea, vomiting; No diarrhea or constipation. No melena or hematochezia.  GENITOURINARY: No dysuria, frequency or hematuria  NEUROLOGICAL: No dizziness or LOC. No numbness  PSYCHIATRIC: No depression or anxiety   MUSCULOSKELETAL: No myalgias or muscle weakness   SKIN: No itching, rashes REVIEW OF SYSTEMS:    CONSTITUTIONAL: + 6lb weight gain in last few days. +Malaise. No fevers or chills or night sweats  EYES: No blurry vision. No scleral icterus   Neck/ENT: No dysphagia or odynophagia   RESPIRATORY: No cough, No shortness of breath  CARDIOVASCULAR: +L sided CP. No palpitations  GASTROINTESTINAL: No abdominal pain. No nausea, vomiting; No diarrhea or constipation. No melena or hematochezia.  GENITOURINARY: No dysuria, frequency or hematuria  NEUROLOGICAL: +LOC x2. No dizziness  PSYCHIATRIC: No depression or anxiety   MUSCULOSKELETAL: No myalgias or muscle weakness   SKIN: No itching, rashes

## 2021-12-28 NOTE — H&P ADULT - ASSESSMENT
89y/o M with PMH of endocarditis, CVA, CAD, T2DM, CHF and HTN presenting to ED with syncopal episode X2. 89y/o M with PMH of endocarditis, CVA, CAD, T2DM, CHF and HTN presenting to ED with syncopal episode X2 89 year old male with a pmhx of Endocarditis, Alzheimers dementia, CVA, CAD/CABG, HTN, HLD, Afib-on Coumadin s/p PPM, T2DM, insulin dependent, Systolic heart failure, CKD Stage IV, Cardiomyopathy, Spinal stenosis p/w syncope x2, here for evaluation of Endocarditis.

## 2021-12-28 NOTE — ED PROVIDER NOTE - OBJECTIVE STATEMENT
89 year old male with a pmhx of dementia, CAD/CABG, HTN, HLD, Afib-on Coumadin last dose 8/23/21, DMT2 (controlled, managed by Endo Dr Uriah Cobb, Hgba1c 7.6 7/21), Systolic heart failure, CKD Stage IV, Cardiomyopathy, ICD, endocarditis, is brought to ED by EMS for evaluation of syncopal episode. Per family, pt was walking yesterd 89 year old male with a pmhx of dementia, CAD/CABG, HTN, HLD, Afib-on Coumadin last dose 8/23/21, DMT2 (controlled, managed by Endo Dr Uriah Cobb, Hgba1c 7.6 7/21), Systolic heart failure, CKD Stage IV, Cardiomyopathy, ICD, endocarditis, is brought to ED by EMS for evaluation of syncopal episode. Per family, pt was walking yesterday and today when he got weak and collapsed to his knees. This was witnessed by family and pt was supported by them. Did not get hurt or hit his head. No recent fever, chills, cp, cough, abd pain, vomiting, diarrhea. Was on way to PCP office when this happened again and EMS was called bringing him here. Has recently had Torsemide uptitrated. Pt has signed MOLST with family, DNR/DNI/limited medical interventions.

## 2021-12-29 NOTE — PROGRESS NOTE ADULT - PROBLEM SELECTOR PLAN 3
Art held, RISS with coverage for now.  Will adjust and or consider stopping depending on FS results/GOC discussions

## 2021-12-29 NOTE — PROGRESS NOTE ADULT - SUBJECTIVE AND OBJECTIVE BOX
GAP TEAM PALLIATIVE CARE UNIT PROGRESS NOTE:      [  ] Patient on hospice program.    INDICATION FOR PALLIATIVE CARE UNIT SERVICES:  89 year old male with HFrEF, bioprosthestic MV, on AC, PPM, DM on insulin, CKD, Alzheimer's dementia, BIBEMS with syncope x 2 within 24 hours.  Admitted to PCU under medicine to establish care goals and disposition planning.      INTERVAL HPI/OVERNIGHT EVENTS:  No acute events overnight, confused but easily re-directed.      DNR on chart: Yes  Yes    Allergies    No Known Allergies    Intolerances    MEDICATIONS  (STANDING):  aspirin enteric coated 81 milliGRAM(s) Oral daily  atorvastatin 40 milliGRAM(s) Oral at bedtime  cholecalciferol 2000 Unit(s) Oral daily  dextrose 40% Gel 15 Gram(s) Oral once  dextrose 50% Injectable 25 Gram(s) IV Push once  dextrose 50% Injectable 12.5 Gram(s) IV Push once  dextrose 50% Injectable 25 Gram(s) IV Push once  donepezil 10 milliGRAM(s) Oral at bedtime  glucagon  Injectable 1 milliGRAM(s) IntraMuscular once  hydrALAZINE 10 milliGRAM(s) Oral two times a day  insulin lispro (ADMELOG) corrective regimen sliding scale   SubCutaneous three times a day before meals  insulin lispro (ADMELOG) corrective regimen sliding scale   SubCutaneous at bedtime  isosorbide   mononitrate ER Tablet (IMDUR) 30 milliGRAM(s) Oral daily  memantine 5 milliGRAM(s) Oral two times a day  metoprolol succinate ER 75 milliGRAM(s) Oral daily  sodium bicarbonate 650 milliGRAM(s) Oral two times a day  tamsulosin 0.4 milliGRAM(s) Oral at bedtime    MEDICATIONS  (PRN):  acetaminophen     Tablet .. 650 milliGRAM(s) Oral every 6 hours PRN Temp greater or equal to 38C (100.4F), Mild Pain (1 - 3)  aluminum hydroxide/magnesium hydroxide/simethicone Suspension 30 milliLiter(s) Oral every 4 hours PRN Dyspepsia  melatonin 3 milliGRAM(s) Oral at bedtime PRN Insomnia  ondansetron Injectable 4 milliGRAM(s) IV Push every 8 hours PRN Nausea and/or Vomiting  oxyCODONE    IR 5 milliGRAM(s) Oral every 8 hours PRN Severe Pain (7 - 10)    ITEMS UNCHECKED ARE NOT PRESENT    PRESENT SYMPTOMS: [ ]Unable to obtain due to poor mentation   Source if other than patient:  [ ]Family   [ ]Team     Pain: [ ] yes [x ] no  QOL impact -   Location -                    Aggravating factors -  Quality -  Radiation -  Timing-  Severity (0-10 scale):  Minimal acceptable level (0-10 scale):     Dyspnea:                           [ ]Mild [ ]Moderate [ ]Severe  Anxiety:                             [ ]Mild [ ]Moderate [ ]Severe  Fatigue:                             [ ]Mild [ ]Moderate [ ]Severe  Nausea:                             [ ]Mild [ ]Moderate [ ]Severe  Loss of appetite:              [ ]Mild [ ]Moderate [ ]Severe  Constipation:                    [ ]Mild [ ]Moderate [ ]Severe    PAINAD Score:    [email or web address] (Ctrl +  left click to view)  		  Other Symptoms:  [ ]All other review of systems negative     Palliative Performance Status Version 2:  40-50       %         [email or web address]_[email or web address]  PHYSICAL EXAM:  Vital Signs Last 24 Hrs  T(C): 36.8 (29 Dec 2021 00:45), Max: 36.8 (29 Dec 2021 00:45)  T(F): 98.2 (29 Dec 2021 00:45), Max: 98.2 (29 Dec 2021 00:45)  HR: 65 (29 Dec 2021 00:45) (65 - 93)  BP: 148/65 (29 Dec 2021 06:20) (132/62 - 158/65)  BP(mean): --  RR: 18 (29 Dec 2021 06:20) (18 - 18)  SpO2: 97% (29 Dec 2021 06:30) (89% - 100%) I&O's Summary    28 Dec 2021 07:01  -  29 Dec 2021 07:00  --------------------------------------------------------  IN: 0 mL / OUT: 250 mL / NET: -250 mL    GENERAL:  [x ]Alert  [x ]Oriented x   [ ]Lethargic  [ ]Cachexia  [ ]Unarousable  [x ]Verbal  [ ]Non-Verbal  Behavioral:   [ ] Anxiety  [x ] Delirium mild intermittent [ ] Agitation [ ] Other  HEENT:  [ x]Normal   [ ]Dry mouth   [ ]ET Tube/Trach  [ ]Oral lesions  PULMONARY:   [ ]Clear [ ]Tachypnea  [ ]Audible excessive secretions   [ ]Rhonchi        [ ]Right [ ]Left [ ]Bilateral  [x ]Crackles        [ ]Right [ ]Left [x ]Bilateral faint  [ ]Wheezing     [ ]Right [ ]Left [ ]Bilateral  [ ]Diminished BS [ ]Right [ ]Left [ ]Bilateral    CARDIOVASCULAR:    [x ]Regular [ ]Irregular [ ]Tachy  [ ]Al [ ]Murmur [ ]Other  GASTROINTESTINAL:  [x ]Soft  [ ]Distended   [x ]+BS  [x ]Non tender [ ]Tender  [ ]PEG [ ]OGT/ NGT   Last BM:  none documented as of yet.  GENITOURINARY:  [x ]Normal [ ] Incontinent   [ ]Oliguria/Anuria   [ ][place]  MUSCULOSKELETAL:   [x ]Normal   [ ]Weakness  [ ]Bed/Wheelchair bound [ ]Edema  NEUROLOGIC:   [ ]No focal deficits  [x ] Cognitive impairment  [ ] Dysphagia [ ]Dysarthria [ ] Paresis [ ]Other   SKIN:   [ ]Normal  [ ]Rash     [ ]Pressure ulcer(s)  [ ]y [ ]n  Present on admission  wound, lower extremity right.      CRITICAL CARE:  [ ] Shock Present  [ ]Septic [ ]Cardiogenic [ ]Neurologic [ ]Hypovolemic  [ ]  Vasopressors [ ]  Inotropes   [ ] Respiratory failure present [ ] Mechanical Ventilation [ ] Non-invasive ventilatory support [ ] High-Flow  [ ] Acute  [ ] Chronic [ ] Hypoxic  [ ] Hypercarbic [ ] Other  [x ] Other organ failure kidney, heart, brain    LABS:                        10.2   5.79  )-----------( 169      ( 29 Dec 2021 07:31 )             34.6       138  |  104  |  92<H>  ----------------------------<  127<H>  4.7   |  17<L>  |  3.52<H>    Ca    8.6      29 Dec 2021 07:31  Phos  5.1       Mg     2.2         TPro  6.9  /  Alb  3.7  /  TBili  0.7  /  DBili  x   /  AST  35  /  ALT  22  /  AlkPhos  139<H>  12-29  PT/INR - ( 29 Dec 2021 07:31 )   PT: 33.6 sec;   INR: 2.95 ratio         PTT - ( 29 Dec 2021 07:31 )  PTT:39.6 sec    Urinalysis Basic - ( 28 Dec 2021 17:12 )    Color: Light Yellow / Appearance: Clear / S.012 / pH: x  Gluc: x / Ketone: Negative  / Bili: Negative / Urobili: Negative   Blood: x / Protein: Trace / Nitrite: Negative   Leuk Esterase: Negative / RBC: 0 /hpf / WBC 0 /HPF   Sq Epi: x / Non Sq Epi: 0 /hpf / Bacteria: Negative      RADIOLOGY & ADDITIONAL STUDIES:  reviewed CT/CXR  echo pending US kidneys pending, digoxin level pending    PROTEIN CALORIE MALNUTRITION: [ ] mild [ ] moderate [ ] severe  [ ] underweight [ ] morbid obesity    https://www.andeal.org/vault/2440/web/files/ONC/Table_Clinical%20Characteristics%20to%20Document%20Malnutrition-White%20JV%20et%20al%2020.pdf    Height (cm): 172.7 (21 @ 13:25), 172.7 (21 @ 10:10)  Weight (kg): 74.8 (21 @ 13:25), 73.9 (21 @ 10:10)  BMI (kg/m2): 25.1 (21 @ 13:25), 24.8 (21 @ 10:10)    [ ] PPSV2 < or = 30% [ ] significant weight loss [ ] poor nutritional intake [ ] anasarca   Artificial Nutrition [ ]     REFERRALS:   [ ]Chaplaincy  [ ] Hospice  [ ]Child Life  [ x]Social Work  [ ]Case management [ ]Holistic Therapy [ ] Physical Therapy [ ] Dietary   Goals of Care Document:

## 2021-12-29 NOTE — CONSULT NOTE ADULT - SUBJECTIVE AND OBJECTIVE BOX
DATE OF SERVICE: 12-29-21      CHIEF COMPLAINT: Patient is a 89y old  Male who presents with a chief complaint of Syncope (29 Dec 2021 13:08)      HISTORY OF PRESENT ILLNESS:HPI:  89 year old male with a pmhx of Endocarditis, Alzheimers dementia, CVA, CAD/CABG, HTN, HLD, Afib-on Coumadin s/p PPM, T2DM, insulin dependent, Systolic heart failure, CKD Stage IV, Cardiomyopathy, Spinal stenosis is brought in for evaluation of syncope x2. Patient was walking yesterday to his car and suddenly passed out. Patient had the same episode this AM. Patient denies any dizziness, lightheadedness, or mechanical fall. Patient denied any urinary incontinence, tongue biting, post-ictal state during/after the episodes. Patient Did not get hurt or hit his head. No recent fever, chills, cp, cough, abd pain, vomiting, diarrhea. PAtient Has recently had Torsemide uptitrated, as he has had increasing weight gain.     Patient also reports L sided CP since yest, with radiation to L back and L arm. Patient denies any SOB, diaphoresis, N/V, dizziness during episodes.     In the ED, Pt has signed MOLST with family, DNR/DNI/limited medical interventions. Blood cultures were drawn.      (28 Dec 2021 19:23)      PAST MEDICAL & SURGICAL HISTORY:  Dyslipidemia    Paroxysmal atrial fibrillation    Congestive heart failure  Systolic HF    Hypertension    Type 2 diabetes mellitus  Insulin Dependent    Cardiomyopathy, ischemic    CAD (coronary artery disease)    Endocarditis    Pacemaker    Cerebrovascular accident (CVA), unspecified mechanism    Alzheimers disease    Spinal stenosis    H/O mitral valve replacement  bovine    History of colon surgery    History of cataract surgery    S/P CABG x 1    History of implantable cardiac defibrillator (ICD)  now removed            MEDICATIONS:  aspirin enteric coated 81 milliGRAM(s) Oral daily  hydrALAZINE 10 milliGRAM(s) Oral two times a day  isosorbide   mononitrate ER Tablet (IMDUR) 30 milliGRAM(s) Oral daily  metoprolol succinate ER 75 milliGRAM(s) Oral daily  tamsulosin 0.4 milliGRAM(s) Oral at bedtime        acetaminophen     Tablet .. 650 milliGRAM(s) Oral every 6 hours PRN  donepezil 10 milliGRAM(s) Oral at bedtime  melatonin 3 milliGRAM(s) Oral at bedtime PRN  memantine 5 milliGRAM(s) Oral two times a day  ondansetron Injectable 4 milliGRAM(s) IV Push every 8 hours PRN  oxyCODONE    IR 5 milliGRAM(s) Oral every 8 hours PRN    aluminum hydroxide/magnesium hydroxide/simethicone Suspension 30 milliLiter(s) Oral every 4 hours PRN    atorvastatin 40 milliGRAM(s) Oral at bedtime  dextrose 40% Gel 15 Gram(s) Oral once  dextrose 50% Injectable 25 Gram(s) IV Push once  dextrose 50% Injectable 12.5 Gram(s) IV Push once  dextrose 50% Injectable 25 Gram(s) IV Push once  glucagon  Injectable 1 milliGRAM(s) IntraMuscular once  insulin lispro (ADMELOG) corrective regimen sliding scale   SubCutaneous three times a day before meals  insulin lispro (ADMELOG) corrective regimen sliding scale   SubCutaneous at bedtime    cholecalciferol 2000 Unit(s) Oral daily  sodium bicarbonate 650 milliGRAM(s) Oral two times a day      FAMILY HISTORY:  FH: breast cancer in first degree relative (Child)        Non-contributory    SOCIAL HISTORY:    [ ] not a current smoker    Allergies    No Known Allergies    Intolerances    	    REVIEW OF SYSTEMS:  CONSTITUTIONAL: No fever  EYES: No eye pain, visual disturbances, or discharge  ENMT:  No difficulty hearing, tinnitus  NECK: No pain or stiffness  RESPIRATORY: No cough, wheezing, +SOB  CARDIOVASCULAR: No chest pain, palpitations, passing out, dizziness, or leg swelling  GASTROINTESTINAL:  No nausea, vomiting, diarrhea or constipation. No melena.  GENITOURINARY: No dysuria, hematuria  NEUROLOGICAL: No stroke like symptoms  SKIN: No burning or lesions   ENDOCRINE: No heat or cold intolerance  MUSCULOSKELETAL: No joint pain or swelling  PSYCHIATRIC: No  anxiety, mood swings  HEME/LYMPH: No bleeding gums  ALLERGY AND IMMUNOLOGIC: No hives or eczema	      All other ROS negative    PHYSICAL EXAM:  T(C): 37.1 (12-29-21 @ 21:36), Max: 37.1 (12-29-21 @ 21:36)  HR: 68 (12-29-21 @ 21:36) (65 - 82)  BP: 149/67 (12-29-21 @ 21:36) (133/70 - 158/65)  RR: 19 (12-29-21 @ 21:36) (18 - 19)  SpO2: 97% (12-29-21 @ 21:36) (89% - 97%)  Wt(kg): --  I&O's Summary    28 Dec 2021 07:01  -  29 Dec 2021 07:00  --------------------------------------------------------  IN: 0 mL / OUT: 250 mL / NET: -250 mL    29 Dec 2021 07:01  -  29 Dec 2021 23:07  --------------------------------------------------------  IN: 510 mL / OUT: 1050 mL / NET: -540 mL        Appearance: Normal	  HEENT:   Normal oral mucosa, EOMI	  Cardiovascular:  S1 S2, No JVD,    Respiratory: Lungs clear to auscultation	  Psychiatry: Alert  Gastrointestinal:  Soft, Non-tender, + BS	  Skin: No rashes   Neurologic: Non-focal  Extremities:  No edema  Vascular: Peripheral pulses palpable    	    	  	  CARDIAC MARKERS:  Labs personally reviewed by me                                  10.2   5.79  )-----------( 169      ( 29 Dec 2021 07:31 )             34.6     12-29    138  |  104  |  92<H>  ----------------------------<  127<H>  4.7   |  17<L>  |  3.52<H>    Ca    8.6      29 Dec 2021 07:31  Phos  5.1     12-29  Mg     2.2     12-29    TPro  6.9  /  Alb  3.7  /  TBili  0.7  /  DBili  x   /  AST  35  /  ALT  22  /  AlkPhos  139<H>  12-29          Assessment:  · Assessment	  89 year old male with a pmhx of Endocarditis, Alzheimers dementia, CVA, CAD/CABG, HTN, HLD, Afib-on Coumadin s/p PPM, T2DM, insulin dependent, Systolic heart failure, CKD Stage IV, Cardiomyopathy, Spinal stenosis p/w syncope x2, here for evaluation of Endocarditis.     Problem/Plan - 1:  ·  Problem: Syncope.   ·  Plan:  -EP eval for PPM interrogation to r/o arrhythmogenic syncope if in line with pt/family [facility].     - ? 2/2 volume depletion with orthostasis   - check orthostatics    Problem/Plan - 2:  ·  Problem: Atrial fibrillation.   ·  Plan: Patient currently in A Fib, HR 70s, not pacing   - goal INR 2-3    Problem/Plan - 3:  ·  Problem: Chronic systolic heart failure.   ·  Plan: EF of 35-40% in 2019 Echo   - Son reports 6 pound weight gain with increasing SOB  - Reports home dose Torsemide 60mg qAM with additional 20-40mg qAM as 6 pound weight gain, despite that no change in SOB and weight  - consider one time dose of Lasix IV and assess for improvement   -   Problem/Plan - 4:  ·  Problem: CAD (coronary artery disease).   ·  Plan: C/w Toprol currently.    Problem/Plan - 5:  ·  Problem: HTN (hypertension).   ·  Plan: BP currently well controlled   -C/w Toprol.           Differential diagnosis and plan of care discussed with patient after the evaluation. Counseling on diet, nutritional counseling, weight management, exercise and medication compliance was done.   Advanced care planning/advanced directives discussed with patient/family. DNR status including forceful chest compressions to attempt to restart the heart, ventilator support/artificial breathing, electric shock, artificial nutrition, health care proxy, Molst form all discussed with pt. DNR/DNI. More than fifteen minutes spent on discussing advanced directives.         Ankit Kaur DO [facility]  Cardiovascular Medicine  [address] [place] [address], [address]  Office [phone]  Cell [phone]

## 2021-12-30 NOTE — PROCEDURE NOTE - ADDITIONAL PROCEDURE DETAILS
Indication for Interrogation: Syncope  Presenting Rhythm: BiV-Paced 80s  Measured Data: Atrial Lead OFF, RV/LV sensing and threshold WNL, Lead Impedence stable.   Normal Device Function. Not PPM dependent. BiV Paced 88%  Events since Last Interrogation: no recorded ventricular events, unable to determine atrial events d/t lead sensitivity turned off.   Changes made: none  Discussed with primary team      KATYA Sanchez PA-C  [id number]

## 2021-12-30 NOTE — PROGRESS NOTE ADULT - PROBLEM SELECTOR PLAN 3
Fast 5, monitor for delirium, and constipation, urinary retention, pain, hunger, thirst, etc.  Promote sleep wake cycle and reorientation as indicated in hospital environment.

## 2021-12-30 NOTE — PROGRESS NOTE ADULT - SUBJECTIVE AND OBJECTIVE BOX
GAP TEAM PALLIATIVE CARE UNIT PROGRESS NOTE:      [  ] Patient on hospice program.    INDICATION FOR PALLIATIVE CARE UNIT SERVICES:  89 year old male with HFrEF, bioprosthestic MV, on AC, PPM, DM on insulin, CKD, Alzheimer's dementia, BIBEMS with syncope x 2 within 24 hours.  Admitted to PCU under medicine to establish care goals and disposition planning.      INTERVAL HPI/OVERNIGHT EVENTS: Chart reviewed. The patient is seen and examined at the bedside. Patient is awake and alert. A&OX2-3. Walked with PT this morning. He require PRN oxycodone 5mg PO X1 within a 24hr period 8am-8am    DNR on chart:   Allergies    No Known Allergies    Intolerances    MEDICATIONS  (STANDING):  acetaminophen   IVPB .. 1000 milliGRAM(s) IV Intermittent once  aspirin enteric coated 81 milliGRAM(s) Oral daily  atorvastatin 40 milliGRAM(s) Oral at bedtime  cholecalciferol 2000 Unit(s) Oral daily  dextrose 40% Gel 15 Gram(s) Oral once  dextrose 50% Injectable 25 Gram(s) IV Push once  dextrose 50% Injectable 12.5 Gram(s) IV Push once  dextrose 50% Injectable 25 Gram(s) IV Push once  donepezil 10 milliGRAM(s) Oral at bedtime  glucagon  Injectable 1 milliGRAM(s) IntraMuscular once  hydrALAZINE 10 milliGRAM(s) Oral two times a day  insulin lispro (ADMELOG) corrective regimen sliding scale   SubCutaneous three times a day before meals  insulin lispro (ADMELOG) corrective regimen sliding scale   SubCutaneous at bedtime  isosorbide   mononitrate ER Tablet (IMDUR) 30 milliGRAM(s) Oral daily  lidocaine   4% Patch 2 Patch Transdermal daily  memantine 5 milliGRAM(s) Oral two times a day  metoprolol succinate ER 75 milliGRAM(s) Oral daily  sodium bicarbonate 650 milliGRAM(s) Oral two times a day  tamsulosin 0.4 milliGRAM(s) Oral at bedtime  warfarin 2.5 milliGRAM(s) Oral once    MEDICATIONS  (PRN):  acetaminophen     Tablet .. 650 milliGRAM(s) Oral every 6 hours PRN Temp greater or equal to 38C (100.4F), Mild Pain (1 - 3)  aluminum hydroxide/magnesium hydroxide/simethicone Suspension 30 milliLiter(s) Oral every 4 hours PRN Dyspepsia  melatonin 3 milliGRAM(s) Oral at bedtime PRN Insomnia  ondansetron Injectable 4 milliGRAM(s) IV Push every 8 hours PRN Nausea and/or Vomiting  oxyCODONE    IR 5 milliGRAM(s) Oral every 6 hours PRN Severe Pain (7 - 10)    ITEMS UNCHECKED ARE NOT PRESENT    PRESENT SYMPTOMS: [ ]Unable to obtain due to poor mentation   Source if other than patient:  [ ]Family   [ ]Team     Pain: [ X] yes [ ] no  QOL impact - ADLs  Location -   right upper back              Aggravating factors - touch  Quality - aching   Radiation -   Timing- Sporadic   Severity (0-10 scale): 6  Minimal acceptable level (0-10 scale):     Dyspnea:                           [ ]Mild [ ]Moderate [ ]Severe  Anxiety:                             [ ]Mild [ ]Moderate [ ]Severe  Fatigue:                             [ ]Mild [ ]Moderate [ ]Severe  Nausea:                             [ ]Mild [ ]Moderate [ ]Severe  Loss of appetite:              [ ]Mild [ ]Moderate [ ]Severe  Constipation:                    [ ]Mild [ ]Moderate [ ]Severe    PAINAD Score:    [email or web address].[email or web address]/painad.pdf (Ctrl +  left click to view)  		  Other Symptoms:  [X ]All other review of systems negative     Palliative Performance Status Version 2:  50-60 %         http://npcrc.org/files/news/palliative_performance_scale_ppsv2.pdf  PHYSICAL EXAM:  Vital Signs Last 24 Hrs  T(C): 36.3 (30 Dec 2021 09:00), Max: 37.1 (29 Dec 2021 21:36)  T(F): 97.4 (30 Dec 2021 09:00), Max: 98.8 (29 Dec 2021 21:36)  HR: 48 (30 Dec 2021 09:37) (48 - 86)  BP: 136/66 (30 Dec 2021 09:37) (126/73 - 149/67)  BP(mean): --  RR: 18 (30 Dec 2021 09:00) (18 - 19)  SpO2: 89% (30 Dec 2021 12:00) (89% - 98%) I&O's Summary    29 Dec 2021 07:01  -  30 Dec 2021 07:00  --------------------------------------------------------  IN: 510 mL / OUT: 1300 mL / NET: -790 mL    GENERAL:  [X ]Alert  [X]Oriented x2-3 but forgetful   [ ]Lethargic  [ ]Cachexia  [ ]Unarousable  [ ]Verbal  [X]Non-Verbal  Behavioral:   [ ] Anxiety  [ ] Delirium [ ] Agitation [ ] Other  HEENT:  [X ]Normal   [ ]Dry mouth   [ ]ET Tube/Trach  [ ]Oral lesions  PULMONARY:   [ ]Clear [ ]Tachypnea  [ ]Audible excessive secretions   [ ]Rhonchi        [ ]Right [ ]Left [ ]Bilateral  [ ]Crackles        [ ]Right [ ]Left [ ]Bilateral  [ ]Wheezing     [ ]Right [ ]Left [ ]Bilateral  [ X]Diminshed BS [ ]Right [ ]Left [ X]Bilateral    CARDIOVASCULAR:    [ ]Regular [ ]Irregular [ ]Tachy  [ X]Al [ ]Murmur [ ]Other  GASTROINTESTINAL:  [X ]Soft  [ ]Distended   [ X]+BS  [X ]Non tender [ ]Tender  [ ]PEG [ ]OGT/ NGT   Last BM:   21  GENITOURINARY:  [ ]Normal [X ] Incontinent   [ ]Oliguria/Anuria   [ ]Soto [X]Condom catheter   MUSCULOSKELETAL:   [ ]Normal   [ ]Weakness  [ ]Bed/Wheelchair bound [ X]Edema- b/l LE  NEUROLOGIC:   [ ]No focal deficits  [ X] Cognitive impairment  [ ] Dysphagia [ ]Dysarthria [ ] Paresis [ ]Other   SKIN: b/l LE wounds. Please see nursing assessment for further details for which I have reviewed   [ ]Normal  [ ]Rash     [ ]Pressure ulcer(s)  [ ]y [ ]n  Present on admission      CRITICAL CARE:  [ ] Shock Present  [ ]Septic [ ]Cardiogenic [ ]Neurologic [ ]Hypovolemic  [ ]  Vasopressors [ ]  Inotropes   [ ] Respiratory failure present [ ] Mechanical Ventilation [ ] Non-invasive ventilatory support [ ] High-Flow  [ ] Acute  [ ] Chronic [ ] Hypoxic  [ ] Hypercarbic [ ] Other  [X ] Other organ failure- Brain, kidneys    LABS:                        10.2   5.79  )-----------( 169      ( 29 Dec 2021 07:31 )             34.6       138  |  104  |  92<H>  ----------------------------<  127<H>  4.7   |  17<L>  |  3.52<H>    Ca    8.6      29 Dec 2021 07:31  Phos  5.1       Mg     2.2         TPro  6.9  /  Alb  3.7  /  TBili  0.7  /  DBili  x   /  AST  35  /  ALT  22  /  AlkPhos  139<H>    PT/INR - ( 30 Dec 2021 06:34 )   PT: 27.1 sec;   INR: 2.35 ratio         PTT - ( 29 Dec 2021 07:31 )  PTT:39.6 sec    Urinalysis Basic - ( 28 Dec 2021 17:12 )    Color: Light Yellow / Appearance: Clear / S.012 / pH: x  Gluc: x / Ketone: Negative  / Bili: Negative / Urobili: Negative   Blood: x / Protein: Trace / Nitrite: Negative   Leuk Esterase: Negative / RBC: 0 /hpf / WBC 0 /HPF   Sq Epi: x / Non Sq Epi: 0 /hpf / Bacteria: Negative      RADIOLOGY & ADDITIONAL STUDIES:    PROTEIN CALORIE MALNUTRITION: [ ] mild [ ] moderate [ ] severe  [ ] underweight [ ] morbid obesity    https://www.andeal.org/vault/2440/web/files/ONC/Table_Clinical%20Characteristics%20to%20Document%20Malnutrition-White%20JV%20et%20al%2020.pdf    Height (cm): 172.7 (21 @ 13:25), 172.7 (21 @ 10:10)  Weight (kg): 74.8 (21 @ 13:25), 73.9 (21 @ 10:10)  BMI (kg/m2): 25.1 (21 @ 13:25), 24.8 (21 @ 10:10)    [ ] PPSV2 < or = 30% [ ] significant weight loss [ ] poor nutritional intake [ ] anasarca   Artificial Nutrition [ ]     REFERRALS:   [ ]Chaplaincy  [X ] Hospice  [ ]Child Life  [ X]Social Work  [ ]Case management [ ]Holistic Therapy [ ] Physical Therapy [ ] Dietary   Goals of Care Document:

## 2021-12-30 NOTE — PHYSICAL THERAPY INITIAL EVALUATION ADULT - ADDITIONAL COMMENTS
CXR 12/28: Right basilar linear opacity likely represents atelectasis. Lungs otherwise clear. CTH: neg. Kidney US: No hydronephrosis. Increased cortical echogenicity bilaterally, which may be secondary to medical renal disease. Bilateral pleural effusions, right greater than left. Incidental note is made of cholelithiasis. CXR 12/28: Right basilar linear opacity likely represents atelectasis. Lungs otherwise clear. CTH: neg. Kidney US: No hydronephrosis. Increased cortical echogenicity bilaterally, which may be secondary to medical renal disease. Bilateral pleural effusions, right greater than left. Incidental note is made of cholelithiasis.    per son at bedside: pt lives with wife in private house with 2 steps to enter with bilateral rails, 6 steps up with 1 rail and chair lift, 6steps down with bilateral rails and also a chair lift, right hand dominant, wears reading glasses

## 2021-12-30 NOTE — HOSPICE CARE NOTE - CONVESATION DETAILS
-	At this time, due to critical shortage staffing in the area, [facility] is unable to provide the best services needed for the patient in question. We suggest the referral sent to other hospices if hospice is the patients/families desired d/c plan. Referring  aware.   Meenakshi Ordonez LMSW

## 2021-12-30 NOTE — PHYSICAL THERAPY INITIAL EVALUATION ADULT - PRECAUTIONS/LIMITATIONS, REHAB EVAL
Pt reports L sided CP since yest, with radiation to L back & LUE.here for eval of syncope and endocarditis/fall precautions

## 2021-12-30 NOTE — PHYSICAL THERAPY INITIAL EVALUATION ADULT - PERTINENT HX OF CURRENT PROBLEM, REHAB EVAL
PMHx: Endocarditis, Alzheimers dementia, CVA, CAD/CABG, HTN, HLD, Afib-on, PPM, T2DM, Systolic heart failure, CKD Stage IV, Cardiomyopathy, Spinal stenosis. Pt was walking yesterday to car, suddenly passed out & had the same episode this AM. denies dizziness, lightheadedness, or mechanical fall, urinary incontinence, tongue biting, post-ictal state during/after the episodes. Pt recently had Torsemide uptitrated, as he has had increasing wt gain. cont...

## 2021-12-30 NOTE — PROGRESS NOTE ADULT - SUBJECTIVE AND OBJECTIVE BOX
Date of Service   12-30-21 @ 12:45    Patient is a 89y old  Male who presents with a chief complaint of Syncope (29 Dec 2021 16:06)      INTERVAL HISTORY: pt feels ok    REVIEW OF SYSTEMS:   CONSTITUTIONAL: No weakness  EYES/ENT: No visual changes; No throat pain  Neck: No pain or stiffness  Respiratory: No cough, wheezing, No shortness of breath  CARDIOVASCULAR: no chest pain or palpitations  GASTROINTESTINAL: No abdominal pain, no nausea, vomiting or hematemesis  GENITOURINARY: No dysuria, frequency or hematuria  NEUROLOGICAL: No stroke like symptoms  SKIN: No rashes    	  MEDICATIONS:  hydrALAZINE 10 milliGRAM(s) Oral two times a day  isosorbide   mononitrate ER Tablet (IMDUR) 30 milliGRAM(s) Oral daily  metoprolol succinate ER 75 milliGRAM(s) Oral daily  tamsulosin 0.4 milliGRAM(s) Oral at bedtime        PHYSICAL EXAM:  T(C): 36.3 (12-30-21 @ 09:00), Max: 37.1 (12-29-21 @ 21:36)  HR: 48 (12-30-21 @ 09:37) (48 - 86)  BP: 136/66 (12-30-21 @ 09:37) (126/73 - 149/67)  RR: 18 (12-30-21 @ 09:00) (18 - 19)  SpO2: 95% (12-30-21 @ 09:37) (95% - 98%)  Wt(kg): --  I&O's Summary    29 Dec 2021 07:01  -  30 Dec 2021 07:00  --------------------------------------------------------  IN: 510 mL / OUT: 1300 mL / NET: -790 mL          Appearance: In no distress	  HEENT:    PERRL, EOMI	  Cardiovascular:  S1 S2, No JVD  Respiratory: Lungs clear to auscultation	  Gastrointestinal:  Soft, Non-tender, + BS	  Vascularature:  No edema of LE  Psychiatric: Appropriate affect   Neuro: no acute focal deficits                               10.2   5.79  )-----------( 169      ( 29 Dec 2021 07:31 )             34.6     12-29    138  |  104  |  92<H>  ----------------------------<  127<H>  4.7   |  17<L>  |  3.52<H>    Ca    8.6      29 Dec 2021 07:31  Phos  5.1     12-29  Mg     2.2     12-29    TPro  6.9  /  Alb  3.7  /  TBili  0.7  /  DBili  x   /  AST  35  /  ALT  22  /  AlkPhos  139<H>  12-29        Labs personally reviewed  < from: Transthoracic Echocardiogram (12.29.21 @ 10:14) >  EF (Visual Estimate): 45 %  Doppler Peak Velocity (m/sec): AoV=1.4 TV=3.9    < end of copied text >  < from: Transthoracic Echocardiogram (12.29.21 @ 10:14) >  Mild-moderate left ventricular enlargement.  Estiamted ejection fraction 45%. Mild diffuse hypokinesis,  with no regional variation.  Bioprosthetic mitral valve with normal function.  Mild-moderate aortic regurgitation, directed posteriorly.  Severe pulmonary hypertension.    < end of copied text >  < from: Transthoracic Echocardiogram (12.29.21 @ 10:14) >  Mild-moderate left ventricular enlargement.  Estiamted ejection fraction 45%. Mild diffuse hypokinesis,  with no regional variation.  Bioprosthetic mitral valve with normal function.  Mild-moderate aortic regurgitation, directed posteriorly.  Severe pulmonary hypertension.    < end of copied text >      ASSESSMENT/PLAN: 	  89 year old male with a pmhx of Endocarditis, Alzheimers dementia, CVA, CAD/CABG, HTN, HLD, Afib-on Coumadin s/p PPM, T2DM, insulin dependent, Systolic heart failure, CKD Stage IV, Cardiomyopathy, Spinal stenosis p/w syncope x2, here for evaluation of Endocarditis.     Problem/Plan - 1:  ·  Problem: Syncope.   ·  Plan:  -EP eval for PPM interrogation to r/o arrhythmogenic syncope if in line with pt/family GOC.     - ? 2/2 volume depletion with orthostasis   - check orthostatics    Problem/Plan - 2:  ·  Problem: Atrial fibrillation.   ·  Plan: Patient currently in A Fib, HR 70s, not pacing   - goal INR 2-3    Problem/Plan - 3:  ·  Problem: Chronic systolic heart failure.   ·  Plan: EF of 35-40% in 2019 Echo   - Son reports 6 pound weight gain with increasing SOB  - Reports home dose Torsemide 60mg qAM with additional 20-40mg qAM as 6 pound weight gain, despite that no change in SOB and weight  - consider one time dose of Lasix IV and assess for improvement   -   Problem/Plan - 4:  ·  Problem: CAD    ·  Plan: C/w Toprol currently.    Problem/Plan - 5:  ·  Problem: HTN   ·  Plan: BP currently well controlled   -C/w Toprol.    Problem/Plan - 6:  ·  Problem:  r/o endocarditis   - pt denies any chest pain or discomfort   - TTE as noted above shows no signs of endocarditis           Cherise HARRISP-BC   Ankit Kaur DO [facility]  Cardiovascular Medicine  [address] [place] [address], [address]  Office: [phone]  Cell: [phone] Date of Service   12-30-21 @ 12:45    Patient is a 89y old  Male who presents with a chief complaint of Syncope (29 Dec 2021 16:06)      INTERVAL HISTORY: pt feels ok    REVIEW OF SYSTEMS:   CONSTITUTIONAL: No weakness  EYES/ENT: No visual changes; No throat pain  Neck: No pain or stiffness  Respiratory: No cough, wheezing, No shortness of breath  CARDIOVASCULAR: no chest pain or palpitations  GASTROINTESTINAL: No abdominal pain, no nausea, vomiting or hematemesis  GENITOURINARY: No dysuria, frequency or hematuria  NEUROLOGICAL: No stroke like symptoms  SKIN: No rashes    	  MEDICATIONS:  hydrALAZINE 10 milliGRAM(s) Oral two times a day  isosorbide   mononitrate ER Tablet (IMDUR) 30 milliGRAM(s) Oral daily  metoprolol succinate ER 75 milliGRAM(s) Oral daily  tamsulosin 0.4 milliGRAM(s) Oral at bedtime        PHYSICAL EXAM:  T(C): 36.3 (12-30-21 @ 09:00), Max: 37.1 (12-29-21 @ 21:36)  HR: 48 (12-30-21 @ 09:37) (48 - 86)  BP: 136/66 (12-30-21 @ 09:37) (126/73 - 149/67)  RR: 18 (12-30-21 @ 09:00) (18 - 19)  SpO2: 95% (12-30-21 @ 09:37) (95% - 98%)  Wt(kg): --  I&O's Summary    29 Dec 2021 07:01  -  30 Dec 2021 07:00  --------------------------------------------------------  IN: 510 mL / OUT: 1300 mL / NET: -790 mL          Appearance: In no distress	  HEENT:    PERRL, EOMI	  Cardiovascular:  S1 S2, No JVD  Respiratory: Lungs clear to auscultation	  Gastrointestinal:  Soft, Non-tender, + BS	  Vascularature:  No edema of LE  Psychiatric: Appropriate affect   Neuro: no acute focal deficits                               10.2   5.79  )-----------( 169      ( 29 Dec 2021 07:31 )             34.6     12-29    138  |  104  |  92<H>  ----------------------------<  127<H>  4.7   |  17<L>  |  3.52<H>    Ca    8.6      29 Dec 2021 07:31  Phos  5.1     12-29  Mg     2.2     12-29    TPro  6.9  /  Alb  3.7  /  TBili  0.7  /  DBili  x   /  AST  35  /  ALT  22  /  AlkPhos  139<H>  12-29        Labs personally reviewed  < from: Transthoracic Echocardiogram (12.29.21 @ 10:14) >  EF (Visual Estimate): 45 %  Doppler Peak Velocity (m/sec): AoV=1.4 TV=3.9    < end of copied text >  < from: Transthoracic Echocardiogram (12.29.21 @ 10:14) >  Mild-moderate left ventricular enlargement.  Estiamted ejection fraction 45%. Mild diffuse hypokinesis,  with no regional variation.  Bioprosthetic mitral valve with normal function.  Mild-moderate aortic regurgitation, directed posteriorly.  Severe pulmonary hypertension.    < end of copied text >  < from: Transthoracic Echocardiogram (12.29.21 @ 10:14) >  Mild-moderate left ventricular enlargement.  Estiamted ejection fraction 45%. Mild diffuse hypokinesis,  with no regional variation.  Bioprosthetic mitral valve with normal function.  Mild-moderate aortic regurgitation, directed posteriorly.  Severe pulmonary hypertension.    < end of copied text >      ASSESSMENT/PLAN: 	  89 year old male with a pmhx of Endocarditis, Alzheimers dementia, CVA, CAD/CABG, HTN, HLD, Afib-on Coumadin s/p PPM, T2DM, insulin dependent, Systolic heart failure, CKD Stage IV, Cardiomyopathy, Spinal stenosis p/w syncope x2, here for evaluation of Endocarditis.     Problem/Plan - 1:  ·  Problem: Syncope.   ·  Plan:  -EP eval for PPM interrogation to r/o arrhythmogenic syncope if in line with pt/family GOC.     - ? 2/2 volume depletion with orthostasis   - orthostatics initial set positive, repeat normal     Problem/Plan - 2:  ·  Problem: Atrial fibrillation.   ·  Plan: Patient currently in A Fib, HR 70s, not pacing   - goal INR 2-3    Problem/Plan - 3:  ·  Problem: Chronic systolic heart failure.   ·  Plan: EF of 35-40% in 2019 Echo   - Son reports 6 pound weight gain with increasing SOB  - Reports home dose Torsemide 60mg qAM with additional 20-40mg qAM as 6 pound weight gain, despite that no change in SOB and weight  - consider one time dose of Lasix IV and assess for improvement   -   Problem/Plan - 4:  ·  Problem: CAD    ·  Plan: C/w Toprol currently.    Problem/Plan - 5:  ·  Problem: HTN   ·  Plan: BP currently well controlled   -C/w Toprol.    Problem/Plan - 6:  ·  Problem:  r/o endocarditis   - pt denies any chest pain or discomfort   - TTE as noted above shows no signs of endocarditis           Cherise HARRIS-BC   Ankit Kaur DO [facility]  Cardiovascular Medicine  [address] [place] [address], [address]  Office: [phone]  Cell: [phone]

## 2021-12-30 NOTE — PROGRESS NOTE ADULT - PROBLEM SELECTOR PLAN 9
Palliative Attending spoke with the patient's son Reinaldo and daughter in law Rachelle.  Educated as to what to expect.  Questions answered.  Emotional support provided.   Patient seen by Pt. They recommended home PT with rolling walker.  Plan for d/c home with hospice.  to make referral   Ongoing dispo planning with the .

## 2021-12-31 NOTE — PROGRESS NOTE ADULT - SUBJECTIVE AND OBJECTIVE BOX
GAP TEAM PALLIATIVE CARE UNIT PROGRESS NOTE:      [  ] Patient on hospice program.    INDICATION FOR PALLIATIVE CARE UNIT SERVICES: 89 year old male with HFrEF, bioprosthestic MV, on AC, PPM, DM on insulin, CKD, Alzheimer's dementia, BIBEMS with syncope x 2 within 24 hours.  Admitted to PCU under medicine to establish care goals and disposition planning.      INTERVAL HPI/OVERNIGHT EVENTS: Required 3 xycodone in 24 hours. Pain resolved after IV tylenol    DNR on chart:   Allergies    No Known Allergies    Intolerances    MEDICATIONS  (STANDING):  atorvastatin 40 milliGRAM(s) Oral at bedtime  cholecalciferol 2000 Unit(s) Oral daily  dextrose 40% Gel 15 Gram(s) Oral once  dextrose 50% Injectable 25 Gram(s) IV Push once  dextrose 50% Injectable 12.5 Gram(s) IV Push once  dextrose 50% Injectable 25 Gram(s) IV Push once  donepezil 10 milliGRAM(s) Oral at bedtime  glucagon  Injectable 1 milliGRAM(s) IntraMuscular once  hydrALAZINE 10 milliGRAM(s) Oral two times a day  insulin lispro (ADMELOG) corrective regimen sliding scale   SubCutaneous three times a day before meals  insulin lispro (ADMELOG) corrective regimen sliding scale   SubCutaneous at bedtime  isosorbide   mononitrate ER Tablet (IMDUR) 30 milliGRAM(s) Oral daily  lidocaine   4% Patch 2 Patch Transdermal daily  memantine 5 milliGRAM(s) Oral two times a day  metoprolol succinate ER 75 milliGRAM(s) Oral daily  sodium bicarbonate 650 milliGRAM(s) Oral two times a day  tamsulosin 0.4 milliGRAM(s) Oral at bedtime    MEDICATIONS  (PRN):  acetaminophen     Tablet .. 650 milliGRAM(s) Oral every 6 hours PRN Temp greater or equal to 38C (100.4F), Mild Pain (1 - 3)  aluminum hydroxide/magnesium hydroxide/simethicone Suspension 30 milliLiter(s) Oral every 4 hours PRN Dyspepsia  melatonin 3 milliGRAM(s) Oral at bedtime PRN Insomnia  ondansetron Injectable 4 milliGRAM(s) IV Push every 8 hours PRN Nausea and/or Vomiting  oxyCODONE    IR 5 milliGRAM(s) Oral every 6 hours PRN Severe Pain (7 - 10)    ITEMS UNCHECKED ARE NOT PRESENT    PRESENT SYMPTOMS: [ ]Unable to obtain due to poor mentation   Source if other than patient:  [ ]Family   [ ]Team     Pain: [ ] yes [x ] no  QOL impact -   Location -                    Aggravating factors -  Quality -  Radiation -  Timing-  Severity (0-10 scale):  Minimal acceptable level (0-10 scale):     Dyspnea:                           [ ]Mild [ ]Moderate [ ]Severe  Anxiety:                             [ ]Mild [ ]Moderate [ ]Severe  Fatigue:                             [ ]Mild [ ]Moderate [ ]Severe  Nausea:                             [ ]Mild [ ]Moderate [ ]Severe  Loss of appetite:              [ ]Mild [ ]Moderate [ ]Severe  Constipation:                    [ ]Mild [ ]Moderate [ ]Severe    PAINAD Score:    [email or web address] (Ctrl +  left click to view)  		  Other Symptoms:  [x ]All other review of systems negative     Palliative Performance Status Version 2:     50    %         [email or web address]  PHYSICAL EXAM:  Vital Signs Last 24 Hrs  T(C): 36.4 (31 Dec 2021 08:50), Max: 36.7 (31 Dec 2021 00:02)  T(F): 97.5 (31 Dec 2021 08:50), Max: 98.1 (31 Dec 2021 00:02)  HR: 66 (31 Dec 2021 08:50) (63 - 67)  BP: 147/67 (31 Dec 2021 08:50) (147/67 - 151/66)  BP(mean): --  RR: 18 (31 Dec 2021 08:50) (18 - 18)  SpO2: 96% (31 Dec 2021 08:50) (95% - 98%) I&O's Summary    30 Dec 2021 07:01  -  31 Dec 2021 07:00  --------------------------------------------------------  IN: 0 mL / OUT: 150 mL / NET: -150 mL    GENERAL:  [x ]Alert  [ ]Oriented x   [ ]Lethargic  [ ]Cachexia  [ ]Unarousable  [ x]Verbal  [ ]Non-Verbal  Behavioral:   [ ] Anxiety  [ ] Delirium [ ] Agitation [ ] Other  HEENT:  [ x]Normal   [ ]Dry mouth   [ ]ET Tube/Trach  [ ]Oral lesions  PULMONARY:   [ ]Clear [ x]Tachypnea  [ ]Audible excessive secretions   [ ]Rhonchi        [ ]Right [ ]Left [ ]Bilateral  [ ]Crackles        [ ]Right [ ]Left [ ]Bilateral  [ ]Wheezing     [ ]Right [ ]Left [ ]Bilateral  [ ]Diminished BS [ ]Right [ ]Left [ ]Bilateral    CARDIOVASCULAR:    [x ]Regular [ ]Irregular [ ]Tachy  [ ]Al [ ]Murmur [ ]Other  GASTROINTESTINAL:  [x ]Soft  [ ]Distended   [ ]+BS  [ ]Non tender [ ]Tender  [ ]PEG [ ]OGT/ NGT   Last BM:     GENITOURINARY:  [ ]Normal [x ] Incontinent   [ ]Oliguria/Anuria   [ ]Soto  MUSCULOSKELETAL:   [ ]Normal   [x ]Weakness  [ ]Bed/Wheelchair bound [ ]Edema  NEUROLOGIC:   [ ]No focal deficits  [x ] Cognitive impairment  [ ] Dysphagia [ ]Dysarthria [ ] Paresis [ ]Other   SKIN:   [ ]Normal  [ ]Rash     [ ]Pressure ulcer(s)  [ ]y [ ]n  Present on admission      CRITICAL CARE:  [ ] Shock Present  [ ]Septic [ ]Cardiogenic [ ]Neurologic [ ]Hypovolemic  [ ]  Vasopressors [ ]  Inotropes   [ ] Respiratory failure present [ ] Mechanical Ventilation [ ] Non-invasive ventilatory support [ ] High-Flow  [ ] Acute  [ ] Chronic [ ] Hypoxic  [ ] Hypercarbic [ ] Other  [ ] Other organ failure     LABS:    PT/INR - ( 31 Dec 2021 07:23 )   PT: 20.3 sec;   INR: 1.74 ratio      RADIOLOGY & ADDITIONAL STUDIES:  no new imaging    PROTEIN CALORIE MALNUTRITION: [ ] mild [ ] moderate [ ] severe  [ ] underweight [ ] morbid obesity    https://www.andeal.org/vault/2440/web/files/ONC/Table_Clinical%20Characteristics%20to%20Document%20Malnutrition-White%20JV%20et%20al%202012.pdf    Height (cm): 172.7 (12-28-21 @ 13:25), 172.7 (08-26-21 @ 10:10)  Weight (kg): 74.8 (12-28-21 @ 13:25), 73.9 (08-26-21 @ 10:10)  BMI (kg/m2): 25.1 (12-28-21 @ 13:25), 24.8 (08-26-21 @ 10:10)    [ ] PPSV2 < or = 30% [ ] significant weight loss [ ] poor nutritional intake [ ] anasarca   Artificial Nutrition [ ]     REFERRALS:   [ ]Chaplaincy  [ ] Hospice  [ ]Child Life  [ ]Social Work  [ ]Case management [ ]Holistic Therapy [ ] Physical Therapy [ ] Dietary   Goals of Care Document:

## 2021-12-31 NOTE — PROGRESS NOTE ADULT - PROBLEM SELECTOR PLAN 3
Fast 5, monitor for delirium, and constipation, urinary retention, pain, hunger, thirst, etc.  Promote sleep wake cycle and reorientation as indicated in hospital environment. Fast score 5, monitor for delirium, and constipation, urinary retention, pain, hunger, thirst, etc.  Promote sleep wake cycle and reorientation as indicated in hospital environment.

## 2021-12-31 NOTE — PROGRESS NOTE ADULT - SUBJECTIVE AND OBJECTIVE BOX
Date of Service   12-31-21 @ 11:30    Patient is a 89y old  Male who presents with a chief complaint of Syncope (30 Dec 2021 15:27)      INTERVAL HISTORY: pt feels ok    REVIEW OF SYSTEMS:   CONSTITUTIONAL: No weakness  EYES/ENT: No visual changes; No throat pain  Neck: No pain or stiffness  Respiratory: No cough, wheezing, No shortness of breath  CARDIOVASCULAR: no chest pain or palpitations  GASTROINTESTINAL: No abdominal pain, no nausea, vomiting or hematemesis  GENITOURINARY: No dysuria, frequency or hematuria  NEUROLOGICAL: No stroke like symptoms  SKIN: scabs on legs     	  MEDICATIONS:  hydrALAZINE 10 milliGRAM(s) Oral two times a day  isosorbide   mononitrate ER Tablet (IMDUR) 30 milliGRAM(s) Oral daily  metoprolol succinate ER 75 milliGRAM(s) Oral daily  tamsulosin 0.4 milliGRAM(s) Oral at bedtime        PHYSICAL EXAM:  T(C): 36.4 (12-31-21 @ 08:50), Max: 36.7 (12-31-21 @ 00:02)  HR: 66 (12-31-21 @ 08:50) (63 - 67)  BP: 147/67 (12-31-21 @ 08:50) (147/67 - 151/66)  RR: 18 (12-31-21 @ 08:50) (18 - 18)  SpO2: 96% (12-31-21 @ 08:50) (89% - 98%)  Wt(kg): --  I&O's Summary    30 Dec 2021 07:01  -  31 Dec 2021 07:00  --------------------------------------------------------  IN: 0 mL / OUT: 150 mL / NET: -150 mL          Appearance: In no distress	  HEENT:    PERRL, EOMI	  Cardiovascular:  S1 S2, No JVD  Respiratory: Lungs clear to auscultation	  Gastrointestinal:  Soft, Non-tender, + BS	  Vascularature:  No edema of LE  Psychiatric: Appropriate affect   Neuro: no acute focal deficits                     Labs personally reviewed  < from: Transthoracic Echocardiogram (12.29.21 @ 10:14) >  EF (Visual Estimate): 45 %  Doppler Peak Velocity (m/sec): AoV=1.4 TV=3.9    < end of copied text >  < from: Transthoracic Echocardiogram (12.29.21 @ 10:14) >  Mild-moderate left ventricular enlargement.  Estiamted ejection fraction 45%. Mild diffuse hypokinesis,  with no regional variation.  Bioprosthetic mitral valve with normal function.  Mild-moderate aortic regurgitation, directed posteriorly.  Severe pulmonary hypertension.        ASSESSMENT/PLAN: 	  89 year old male with a pmhx of Endocarditis, Alzheimers dementia, CVA, CAD/CABG, HTN, HLD, Afib-on Coumadin s/p PPM, T2DM, insulin dependent, Systolic heart failure, CKD Stage IV, Cardiomyopathy, Spinal stenosis p/w syncope x2, here for evaluation of Endocarditis.     Problem/Plan - 1:  ·  Problem: Syncope.   ·  Plan:  -EP eval for PPM interrogation to r/o arrhythmogenic syncope if in line with pt/family [facility].     - ? 2/2 volume depletion with orthostasis   - orthostatics initial set positive, repeat normal     Problem/Plan - 2:  ·  Problem: Atrial fibrillation.   ·  Plan: Patient currently in A Fib, HR 70s, not pacing   - goal INR 2-3    Problem/Plan - 3:  ·  Problem: Chronic systolic heart failure.   - Son reports 6 pound weight gain with increasing SOB  - Reports home dose Torsemide 60mg qAM with additional 20-40mg qAM as 6 pound weight gain, despite that no change in SOB and weight  - consider one time dose of Lasix IV and assess for improvement   - Last TTE shows EF OF 45%     Problem/Plan - 4:  ·  Problem: CAD    ·  Plan: C/w Toprol currently.    Problem/Plan - 5:  ·  Problem: HTN   ·  Plan: BP currently well controlled   -C/w Toprol.    Problem/Plan - 6:  ·  Problem:  r/o endocarditis   - pt denies any chest pain or discomfort   - TTE as noted above shows no signs of endocarditis         palliative and family in discussion for [facility]         Cherise HARRIS-BC   Ankit Kaur DO [facility]  Cardiovascular Medicine  [address], [address]  Office: [phone]  Cell: [phone]

## 2021-12-31 NOTE — PROGRESS NOTE ADULT - PROBLEM SELECTOR PLAN 9
Son at bedside, updated on current condition.  Emotional support provided.   Patient seen by Pt. They recommended home PT with rolling walker.  Plan for d/c home with hospice- pending referral acceptance  Ongoing dispo planning with the .

## 2022-01-01 ENCOUNTER — TRANSCRIPTION ENCOUNTER (OUTPATIENT)
Age: 87
End: 2022-01-01

## 2022-01-01 ENCOUNTER — APPOINTMENT (OUTPATIENT)
Dept: ENDOCRINOLOGY | Facility: CLINIC | Age: 87
End: 2022-01-01

## 2022-01-01 ENCOUNTER — APPOINTMENT (OUTPATIENT)
Dept: WOUND CARE | Facility: HOSPITAL | Age: 87
End: 2022-01-01

## 2022-01-01 ENCOUNTER — APPOINTMENT (OUTPATIENT)
Dept: GERIATRICS | Facility: CLINIC | Age: 87
End: 2022-01-01
Payer: MEDICARE

## 2022-01-01 ENCOUNTER — NON-APPOINTMENT (OUTPATIENT)
Age: 87
End: 2022-01-01

## 2022-01-01 ENCOUNTER — APPOINTMENT (OUTPATIENT)
Dept: GERIATRICS | Facility: CLINIC | Age: 87
End: 2022-01-01

## 2022-01-01 ENCOUNTER — FORM ENCOUNTER (OUTPATIENT)
Age: 87
End: 2022-01-01

## 2022-01-01 ENCOUNTER — RX RENEWAL (OUTPATIENT)
Age: 87
End: 2022-01-01

## 2022-01-01 ENCOUNTER — APPOINTMENT (OUTPATIENT)
Dept: NEPHROLOGY | Facility: CLINIC | Age: 87
End: 2022-01-01

## 2022-01-01 VITALS
SYSTOLIC BLOOD PRESSURE: 146 MMHG | HEART RATE: 66 BPM | TEMPERATURE: 98 F | RESPIRATION RATE: 18 BRPM | DIASTOLIC BLOOD PRESSURE: 71 MMHG | OXYGEN SATURATION: 98 %

## 2022-01-01 DIAGNOSIS — Z79.899 OTHER LONG TERM (CURRENT) DRUG THERAPY: ICD-10-CM

## 2022-01-01 DIAGNOSIS — I83.893 VARICOSE VEINS OF BILATERAL LOWER EXTREMITIES WITH OTHER COMPLICATIONS: ICD-10-CM

## 2022-01-01 DIAGNOSIS — E11.42 TYPE 2 DIABETES MELLITUS WITH DIABETIC POLYNEUROPATHY: ICD-10-CM

## 2022-01-01 DIAGNOSIS — N18.5 CHRONIC KIDNEY DISEASE, STAGE 5: ICD-10-CM

## 2022-01-01 DIAGNOSIS — Z96.1 PRESENCE OF INTRAOCULAR LENS: ICD-10-CM

## 2022-01-01 DIAGNOSIS — Z98.61 CORONARY ANGIOPLASTY STATUS: ICD-10-CM

## 2022-01-01 DIAGNOSIS — Z95.4 PRESENCE OF OTHER HEART-VALVE REPLACEMENT: ICD-10-CM

## 2022-01-01 DIAGNOSIS — E11.9 TYPE 2 DIABETES MELLITUS W/OUT COMPLICATIONS: ICD-10-CM

## 2022-01-01 DIAGNOSIS — I83.218 VARICOSE VEINS OF RIGHT LOWER EXTREMITY WITH BOTH ULCER OF OTHER PART OF LOWER EXTREMITY AND INFLAMMATION: ICD-10-CM

## 2022-01-01 DIAGNOSIS — F41.9 ANXIETY DISORDER, UNSPECIFIED: ICD-10-CM

## 2022-01-01 DIAGNOSIS — Z86.73 PERSONAL HISTORY OF TRANSIENT ISCHEMIC ATTACK (TIA), AND CEREBRAL INFARCTION WITHOUT RESIDUAL DEFICITS: ICD-10-CM

## 2022-01-01 DIAGNOSIS — G31.84 MILD COGNITIVE IMPAIRMENT OF UNCERTAIN OR UNKNOWN ETIOLOGY: ICD-10-CM

## 2022-01-01 DIAGNOSIS — N40.1 BENIGN PROSTATIC HYPERPLASIA WITH LOWER URINARY TRACT SYMPTOMS: ICD-10-CM

## 2022-01-01 DIAGNOSIS — Z79.82 LONG TERM (CURRENT) USE OF ASPIRIN: ICD-10-CM

## 2022-01-01 DIAGNOSIS — M89.9 CHRONIC KIDNEY DISEASE, UNSPECIFIED: ICD-10-CM

## 2022-01-01 DIAGNOSIS — E55.9 VITAMIN D DEFICIENCY, UNSPECIFIED: ICD-10-CM

## 2022-01-01 DIAGNOSIS — N13.8 OTHER OBSTRUCTIVE AND REFLUX UROPATHY: ICD-10-CM

## 2022-01-01 DIAGNOSIS — I11.0 HYPERTENSIVE HEART DISEASE WITH HEART FAILURE: ICD-10-CM

## 2022-01-01 DIAGNOSIS — K59.00 CONSTIPATION, UNSPECIFIED: ICD-10-CM

## 2022-01-01 DIAGNOSIS — I48.91 UNSPECIFIED ATRIAL FIBRILLATION: ICD-10-CM

## 2022-01-01 DIAGNOSIS — Z98.49 CATARACT EXTRACTION STATUS, UNSPECIFIED EYE: ICD-10-CM

## 2022-01-01 DIAGNOSIS — L97.812 NON-PRESSURE CHRONIC ULCER OF OTHER PART OF RIGHT LOWER LEG WITH FAT LAYER EXPOSED: ICD-10-CM

## 2022-01-01 DIAGNOSIS — E11.22 TYPE 2 DIABETES MELLITUS WITH DIABETIC CHRONIC KIDNEY DISEASE: ICD-10-CM

## 2022-01-01 DIAGNOSIS — R06.02 SHORTNESS OF BREATH: ICD-10-CM

## 2022-01-01 DIAGNOSIS — M54.9 DORSALGIA, UNSPECIFIED: ICD-10-CM

## 2022-01-01 DIAGNOSIS — N18.4 CHRONIC KIDNEY DISEASE, STAGE 4 (SEVERE): ICD-10-CM

## 2022-01-01 DIAGNOSIS — Z95.810 PRESENCE OF AUTOMATIC (IMPLANTABLE) CARDIAC DEFIBRILLATOR: ICD-10-CM

## 2022-01-01 DIAGNOSIS — Z87.891 PERSONAL HISTORY OF NICOTINE DEPENDENCE: ICD-10-CM

## 2022-01-01 DIAGNOSIS — Z79.4 LONG TERM (CURRENT) USE OF INSULIN: ICD-10-CM

## 2022-01-01 DIAGNOSIS — E78.5 HYPERLIPIDEMIA, UNSPECIFIED: ICD-10-CM

## 2022-01-01 DIAGNOSIS — N18.9 CHRONIC KIDNEY DISEASE, UNSPECIFIED: ICD-10-CM

## 2022-01-01 DIAGNOSIS — E83.9 CHRONIC KIDNEY DISEASE, UNSPECIFIED: ICD-10-CM

## 2022-01-01 DIAGNOSIS — I50.22 CHRONIC SYSTOLIC (CONGESTIVE) HEART FAILURE: ICD-10-CM

## 2022-01-01 DIAGNOSIS — Z87.440 PERSONAL HISTORY OF URINARY (TRACT) INFECTIONS: ICD-10-CM

## 2022-01-01 DIAGNOSIS — E11.51 TYPE 2 DIABETES MELLITUS WITH DIABETIC PERIPHERAL ANGIOPATHY WITHOUT GANGRENE: ICD-10-CM

## 2022-01-01 LAB
CULTURE RESULTS: SIGNIFICANT CHANGE UP
GLUCOSE BLDC GLUCOMTR-MCNC: 139 MG/DL — HIGH (ref 70–99)
GLUCOSE BLDC GLUCOMTR-MCNC: 160 MG/DL — HIGH (ref 70–99)
GLUCOSE BLDC GLUCOMTR-MCNC: 185 MG/DL — HIGH (ref 70–99)
GLUCOSE BLDC GLUCOMTR-MCNC: 205 MG/DL — HIGH (ref 70–99)
GLUCOSE BLDC GLUCOMTR-MCNC: 211 MG/DL — HIGH (ref 70–99)
GLUCOSE BLDC GLUCOMTR-MCNC: 213 MG/DL — HIGH (ref 70–99)
GLUCOSE BLDC GLUCOMTR-MCNC: 220 MG/DL — HIGH (ref 70–99)
GLUCOSE BLDC GLUCOMTR-MCNC: 225 MG/DL — HIGH (ref 70–99)
GLUCOSE BLDC GLUCOMTR-MCNC: 232 MG/DL — HIGH (ref 70–99)
GLUCOSE BLDC GLUCOMTR-MCNC: 233 MG/DL — HIGH (ref 70–99)
GLUCOSE BLDC GLUCOMTR-MCNC: 235 MG/DL — HIGH (ref 70–99)
GLUCOSE BLDC GLUCOMTR-MCNC: 235 MG/DL — HIGH (ref 70–99)
GLUCOSE BLDC GLUCOMTR-MCNC: 240 MG/DL — HIGH (ref 70–99)
GLUCOSE BLDC GLUCOMTR-MCNC: 243 MG/DL — HIGH (ref 70–99)
GLUCOSE BLDC GLUCOMTR-MCNC: 249 MG/DL — HIGH (ref 70–99)
GLUCOSE BLDC GLUCOMTR-MCNC: 258 MG/DL — HIGH (ref 70–99)
GLUCOSE BLDC GLUCOMTR-MCNC: 262 MG/DL — HIGH (ref 70–99)
GLUCOSE BLDC GLUCOMTR-MCNC: 262 MG/DL — HIGH (ref 70–99)
SARS-COV-2 RNA SPEC QL NAA+PROBE: SIGNIFICANT CHANGE UP
SPECIMEN SOURCE: SIGNIFICANT CHANGE UP

## 2022-01-01 PROCEDURE — 70450 CT HEAD/BRAIN W/O DYE: CPT

## 2022-01-01 PROCEDURE — 93306 TTE W/DOPPLER COMPLETE: CPT

## 2022-01-01 PROCEDURE — 83735 ASSAY OF MAGNESIUM: CPT

## 2022-01-01 PROCEDURE — 99215 OFFICE O/P EST HI 40 MIN: CPT | Mod: 95

## 2022-01-01 PROCEDURE — 85610 PROTHROMBIN TIME: CPT

## 2022-01-01 PROCEDURE — U0003: CPT

## 2022-01-01 PROCEDURE — 97530 THERAPEUTIC ACTIVITIES: CPT

## 2022-01-01 PROCEDURE — 80162 ASSAY OF DIGOXIN TOTAL: CPT

## 2022-01-01 PROCEDURE — 86431 RHEUMATOID FACTOR QUANT: CPT

## 2022-01-01 PROCEDURE — 82962 GLUCOSE BLOOD TEST: CPT

## 2022-01-01 PROCEDURE — 84484 ASSAY OF TROPONIN QUANT: CPT

## 2022-01-01 PROCEDURE — 99232 SBSQ HOSP IP/OBS MODERATE 35: CPT

## 2022-01-01 PROCEDURE — 87040 BLOOD CULTURE FOR BACTERIA: CPT

## 2022-01-01 PROCEDURE — 80053 COMPREHEN METABOLIC PANEL: CPT

## 2022-01-01 PROCEDURE — 71045 X-RAY EXAM CHEST 1 VIEW: CPT

## 2022-01-01 PROCEDURE — 99239 HOSP IP/OBS DSCHRG MGMT >30: CPT

## 2022-01-01 PROCEDURE — 36415 COLL VENOUS BLD VENIPUNCTURE: CPT

## 2022-01-01 PROCEDURE — U0005: CPT

## 2022-01-01 PROCEDURE — 83036 HEMOGLOBIN GLYCOSYLATED A1C: CPT

## 2022-01-01 PROCEDURE — 99233 SBSQ HOSP IP/OBS HIGH 50: CPT | Mod: GC

## 2022-01-01 PROCEDURE — 85652 RBC SED RATE AUTOMATED: CPT

## 2022-01-01 PROCEDURE — 97116 GAIT TRAINING THERAPY: CPT

## 2022-01-01 PROCEDURE — 81001 URINALYSIS AUTO W/SCOPE: CPT

## 2022-01-01 PROCEDURE — 76770 US EXAM ABDO BACK WALL COMP: CPT

## 2022-01-01 PROCEDURE — 86140 C-REACTIVE PROTEIN: CPT

## 2022-01-01 PROCEDURE — 85025 COMPLETE CBC W/AUTO DIFF WBC: CPT

## 2022-01-01 PROCEDURE — 87637 SARSCOV2&INF A&B&RSV AMP PRB: CPT

## 2022-01-01 PROCEDURE — 85730 THROMBOPLASTIN TIME PARTIAL: CPT

## 2022-01-01 PROCEDURE — 87086 URINE CULTURE/COLONY COUNT: CPT

## 2022-01-01 PROCEDURE — 84100 ASSAY OF PHOSPHORUS: CPT

## 2022-01-01 PROCEDURE — 97161 PT EVAL LOW COMPLEX 20 MIN: CPT

## 2022-01-01 PROCEDURE — 99285 EMERGENCY DEPT VISIT HI MDM: CPT

## 2022-01-01 RX ORDER — CHOLECALCIFEROL (VITAMIN D3) 125 MCG
2000 CAPSULE ORAL
Qty: 0 | Refills: 0 | DISCHARGE
Start: 2022-01-01

## 2022-01-01 RX ORDER — MORPHINE SULFATE 10 MG/5ML
10 SOLUTION ORAL
Qty: 50 | Refills: 0 | Status: ACTIVE | COMMUNITY
Start: 2022-01-01 | End: 1900-01-01

## 2022-01-01 RX ORDER — INSULIN GLARGINE 100 [IU]/ML
20 INJECTION, SOLUTION SUBCUTANEOUS
Qty: 0 | Refills: 0 | DISCHARGE

## 2022-01-01 RX ORDER — INSULIN GLARGINE 100 [IU]/ML
5 INJECTION, SOLUTION SUBCUTANEOUS
Qty: 70 | Refills: 0
Start: 2022-01-01 | End: 2022-01-17

## 2022-01-01 RX ORDER — OXYCODONE HYDROCHLORIDE 5 MG/1
7.5 TABLET ORAL EVERY 6 HOURS
Refills: 0 | Status: DISCONTINUED | OUTPATIENT
Start: 2022-01-01 | End: 2022-01-01

## 2022-01-01 RX ORDER — OXYCODONE HYDROCHLORIDE 5 MG/1
1 TABLET ORAL
Qty: 0 | Refills: 0 | DISCHARGE
Start: 2022-01-01

## 2022-01-01 RX ORDER — SODIUM CHLORIDE 9 MG/ML
1000 INJECTION, SOLUTION INTRAVENOUS
Refills: 0 | Status: DISCONTINUED | OUTPATIENT
Start: 2022-01-01 | End: 2022-01-01

## 2022-01-01 RX ORDER — POLYETHYLENE GLYCOL 3350 17 G/17G
17 POWDER, FOR SOLUTION ORAL
Qty: 119 | Refills: 0
Start: 2022-01-01 | End: 2022-01-01

## 2022-01-01 RX ORDER — SENNA PLUS 8.6 MG/1
2 TABLET ORAL
Qty: 28 | Refills: 0
Start: 2022-01-01 | End: 2022-01-18

## 2022-01-01 RX ORDER — SENNA PLUS 8.6 MG/1
2 TABLET ORAL AT BEDTIME
Refills: 0 | Status: DISCONTINUED | OUTPATIENT
Start: 2022-01-01 | End: 2022-01-01

## 2022-01-01 RX ORDER — LIDOCAINE 4 G/100G
2 CREAM TOPICAL
Qty: 14 | Refills: 0
Start: 2022-01-01 | End: 2022-01-01

## 2022-01-01 RX ORDER — INSULIN GLARGINE 100 [IU]/ML
10 INJECTION, SOLUTION SUBCUTANEOUS
Qty: 0 | Refills: 0 | DISCHARGE
Start: 2022-01-01

## 2022-01-01 RX ORDER — DEXTROSE 50 % IN WATER 50 %
15 SYRINGE (ML) INTRAVENOUS ONCE
Refills: 0 | Status: DISCONTINUED | OUTPATIENT
Start: 2022-01-01 | End: 2022-01-01

## 2022-01-01 RX ORDER — LORAZEPAM 2 MG/ML
2 CONCENTRATE ORAL
Qty: 1 | Refills: 0 | Status: ACTIVE | COMMUNITY
Start: 2022-01-01 | End: 1900-01-01

## 2022-01-01 RX ORDER — INSULIN LISPRO 100/ML
8 VIAL (ML) SUBCUTANEOUS
Qty: 0 | Refills: 0 | DISCHARGE

## 2022-01-01 RX ORDER — INSULIN GLARGINE 100 [IU]/ML
5 INJECTION, SOLUTION SUBCUTANEOUS AT BEDTIME
Refills: 0 | Status: DISCONTINUED | OUTPATIENT
Start: 2022-01-01 | End: 2022-01-01

## 2022-01-01 RX ORDER — OXYCODONE HYDROCHLORIDE 5 MG/1
1 TABLET ORAL
Qty: 0 | Refills: 0 | DISCHARGE

## 2022-01-01 RX ORDER — INSULIN LISPRO 100/ML
0 VIAL (ML) SUBCUTANEOUS
Qty: 0 | Refills: 0 | DISCHARGE

## 2022-01-01 RX ORDER — DEXTROSE 50 % IN WATER 50 %
25 SYRINGE (ML) INTRAVENOUS ONCE
Refills: 0 | Status: DISCONTINUED | OUTPATIENT
Start: 2022-01-01 | End: 2022-01-01

## 2022-01-01 RX ORDER — INSULIN GLARGINE 100 [IU]/ML
5 INJECTION, SOLUTION SUBCUTANEOUS
Qty: 0 | Refills: 0 | DISCHARGE
Start: 2022-01-01

## 2022-01-01 RX ORDER — POLYETHYLENE GLYCOL 3350 17 G/17G
17 POWDER, FOR SOLUTION ORAL DAILY
Refills: 0 | Status: DISCONTINUED | OUTPATIENT
Start: 2022-01-01 | End: 2022-01-01

## 2022-01-01 RX ORDER — OXYCODONE HYDROCHLORIDE 5 MG/1
5 TABLET ORAL ONCE
Refills: 0 | Status: DISCONTINUED | OUTPATIENT
Start: 2022-01-01 | End: 2022-01-01

## 2022-01-01 RX ORDER — OXYCODONE HYDROCHLORIDE 5 MG/1
5 TABLET ORAL EVERY 6 HOURS
Refills: 0 | Status: DISCONTINUED | OUTPATIENT
Start: 2022-01-01 | End: 2022-01-01

## 2022-01-01 RX ORDER — DEXTROSE 50 % IN WATER 50 %
12.5 SYRINGE (ML) INTRAVENOUS ONCE
Refills: 0 | Status: DISCONTINUED | OUTPATIENT
Start: 2022-01-01 | End: 2022-01-01

## 2022-01-01 RX ORDER — WARFARIN SODIUM 2.5 MG/1
1 TABLET ORAL
Qty: 0 | Refills: 0 | DISCHARGE

## 2022-01-01 RX ORDER — INSULIN GLARGINE 100 [IU]/ML
10 INJECTION, SOLUTION SUBCUTANEOUS AT BEDTIME
Refills: 0 | Status: DISCONTINUED | OUTPATIENT
Start: 2022-01-01 | End: 2022-01-01

## 2022-01-01 RX ORDER — SENNA PLUS 8.6 MG/1
2 TABLET ORAL
Qty: 28 | Refills: 0
Start: 2022-01-01 | End: 2022-01-17

## 2022-01-01 RX ORDER — SYRINGE AND NEEDLE,INSULIN,1ML 31GX15/64"
31G X 15/64" SYRINGE, EMPTY DISPOSABLE MISCELLANEOUS
Qty: 100 | Refills: 3 | Status: ACTIVE | COMMUNITY
Start: 2018-11-02 | End: 1900-01-01

## 2022-01-01 RX ORDER — ACETAMINOPHEN 500 MG
1000 TABLET ORAL ONCE
Refills: 0 | Status: COMPLETED | OUTPATIENT
Start: 2022-01-01 | End: 2022-01-01

## 2022-01-01 RX ORDER — DIGOXIN 250 MCG
1 TABLET ORAL
Qty: 0 | Refills: 0 | DISCHARGE

## 2022-01-01 RX ADMIN — Medication 650 MILLIGRAM(S): at 05:35

## 2022-01-01 RX ADMIN — OXYCODONE HYDROCHLORIDE 5 MILLIGRAM(S): 5 TABLET ORAL at 11:00

## 2022-01-01 RX ADMIN — Medication 10 MILLIGRAM(S): at 17:10

## 2022-01-01 RX ADMIN — Medication 650 MILLIGRAM(S): at 05:36

## 2022-01-01 RX ADMIN — Medication 650 MILLIGRAM(S): at 05:08

## 2022-01-01 RX ADMIN — Medication 2: at 12:06

## 2022-01-01 RX ADMIN — INSULIN GLARGINE 5 UNIT(S): 100 INJECTION, SOLUTION SUBCUTANEOUS at 22:08

## 2022-01-01 RX ADMIN — Medication 10 MILLIGRAM(S): at 05:35

## 2022-01-01 RX ADMIN — Medication 10 MILLIGRAM(S): at 18:01

## 2022-01-01 RX ADMIN — OXYCODONE HYDROCHLORIDE 5 MILLIGRAM(S): 5 TABLET ORAL at 10:17

## 2022-01-01 RX ADMIN — Medication 3 MILLIGRAM(S): at 22:09

## 2022-01-01 RX ADMIN — Medication 3 MILLIGRAM(S): at 21:47

## 2022-01-01 RX ADMIN — ATORVASTATIN CALCIUM 40 MILLIGRAM(S): 80 TABLET, FILM COATED ORAL at 22:09

## 2022-01-01 RX ADMIN — Medication 10 MILLIGRAM(S): at 17:42

## 2022-01-01 RX ADMIN — Medication 400 MILLIGRAM(S): at 20:01

## 2022-01-01 RX ADMIN — OXYCODONE HYDROCHLORIDE 5 MILLIGRAM(S): 5 TABLET ORAL at 18:12

## 2022-01-01 RX ADMIN — TAMSULOSIN HYDROCHLORIDE 0.4 MILLIGRAM(S): 0.4 CAPSULE ORAL at 22:09

## 2022-01-01 RX ADMIN — Medication 2: at 08:10

## 2022-01-01 RX ADMIN — OXYCODONE HYDROCHLORIDE 5 MILLIGRAM(S): 5 TABLET ORAL at 17:37

## 2022-01-01 RX ADMIN — MEMANTINE HYDROCHLORIDE 5 MILLIGRAM(S): 10 TABLET ORAL at 17:10

## 2022-01-01 RX ADMIN — LIDOCAINE 2 PATCH: 4 CREAM TOPICAL at 21:47

## 2022-01-01 RX ADMIN — MEMANTINE HYDROCHLORIDE 5 MILLIGRAM(S): 10 TABLET ORAL at 05:08

## 2022-01-01 RX ADMIN — OXYCODONE HYDROCHLORIDE 5 MILLIGRAM(S): 5 TABLET ORAL at 18:07

## 2022-01-01 RX ADMIN — Medication 75 MILLIGRAM(S): at 05:08

## 2022-01-01 RX ADMIN — MEMANTINE HYDROCHLORIDE 5 MILLIGRAM(S): 10 TABLET ORAL at 05:36

## 2022-01-01 RX ADMIN — LIDOCAINE 2 PATCH: 4 CREAM TOPICAL at 09:44

## 2022-01-01 RX ADMIN — MEMANTINE HYDROCHLORIDE 5 MILLIGRAM(S): 10 TABLET ORAL at 06:04

## 2022-01-01 RX ADMIN — Medication 2: at 12:01

## 2022-01-01 RX ADMIN — Medication 1000 MILLIGRAM(S): at 20:45

## 2022-01-01 RX ADMIN — Medication 10 MILLIGRAM(S): at 06:04

## 2022-01-01 RX ADMIN — Medication 650 MILLIGRAM(S): at 18:01

## 2022-01-01 RX ADMIN — Medication 650 MILLIGRAM(S): at 09:36

## 2022-01-01 RX ADMIN — ATORVASTATIN CALCIUM 40 MILLIGRAM(S): 80 TABLET, FILM COATED ORAL at 21:26

## 2022-01-01 RX ADMIN — OXYCODONE HYDROCHLORIDE 5 MILLIGRAM(S): 5 TABLET ORAL at 04:05

## 2022-01-01 RX ADMIN — TAMSULOSIN HYDROCHLORIDE 0.4 MILLIGRAM(S): 0.4 CAPSULE ORAL at 21:25

## 2022-01-01 RX ADMIN — LIDOCAINE 2 PATCH: 4 CREAM TOPICAL at 21:30

## 2022-01-01 RX ADMIN — LIDOCAINE 2 PATCH: 4 CREAM TOPICAL at 19:09

## 2022-01-01 RX ADMIN — Medication 650 MILLIGRAM(S): at 17:11

## 2022-01-01 RX ADMIN — SENNA PLUS 2 TABLET(S): 8.6 TABLET ORAL at 22:37

## 2022-01-01 RX ADMIN — Medication 650 MILLIGRAM(S): at 09:06

## 2022-01-01 RX ADMIN — Medication 650 MILLIGRAM(S): at 17:42

## 2022-01-01 RX ADMIN — Medication 650 MILLIGRAM(S): at 17:35

## 2022-01-01 RX ADMIN — ISOSORBIDE MONONITRATE 30 MILLIGRAM(S): 60 TABLET, EXTENDED RELEASE ORAL at 13:25

## 2022-01-01 RX ADMIN — OXYCODONE HYDROCHLORIDE 5 MILLIGRAM(S): 5 TABLET ORAL at 06:30

## 2022-01-01 RX ADMIN — OXYCODONE HYDROCHLORIDE 5 MILLIGRAM(S): 5 TABLET ORAL at 12:00

## 2022-01-01 RX ADMIN — LIDOCAINE 2 PATCH: 4 CREAM TOPICAL at 19:13

## 2022-01-01 RX ADMIN — OXYCODONE HYDROCHLORIDE 5 MILLIGRAM(S): 5 TABLET ORAL at 10:47

## 2022-01-01 RX ADMIN — MEMANTINE HYDROCHLORIDE 5 MILLIGRAM(S): 10 TABLET ORAL at 17:35

## 2022-01-01 RX ADMIN — OXYCODONE HYDROCHLORIDE 5 MILLIGRAM(S): 5 TABLET ORAL at 17:42

## 2022-01-01 RX ADMIN — OXYCODONE HYDROCHLORIDE 5 MILLIGRAM(S): 5 TABLET ORAL at 11:34

## 2022-01-01 RX ADMIN — ISOSORBIDE MONONITRATE 30 MILLIGRAM(S): 60 TABLET, EXTENDED RELEASE ORAL at 11:01

## 2022-01-01 RX ADMIN — Medication 75 MILLIGRAM(S): at 05:35

## 2022-01-01 RX ADMIN — LIDOCAINE 2 PATCH: 4 CREAM TOPICAL at 09:05

## 2022-01-01 RX ADMIN — Medication 75 MILLIGRAM(S): at 06:04

## 2022-01-01 RX ADMIN — TAMSULOSIN HYDROCHLORIDE 0.4 MILLIGRAM(S): 0.4 CAPSULE ORAL at 22:07

## 2022-01-01 RX ADMIN — LIDOCAINE 2 PATCH: 4 CREAM TOPICAL at 12:23

## 2022-01-01 RX ADMIN — MEMANTINE HYDROCHLORIDE 5 MILLIGRAM(S): 10 TABLET ORAL at 18:02

## 2022-01-01 RX ADMIN — Medication 3: at 16:53

## 2022-01-01 RX ADMIN — OXYCODONE HYDROCHLORIDE 5 MILLIGRAM(S): 5 TABLET ORAL at 08:14

## 2022-01-01 RX ADMIN — MEMANTINE HYDROCHLORIDE 5 MILLIGRAM(S): 10 TABLET ORAL at 05:35

## 2022-01-01 RX ADMIN — OXYCODONE HYDROCHLORIDE 5 MILLIGRAM(S): 5 TABLET ORAL at 11:04

## 2022-01-01 RX ADMIN — INSULIN GLARGINE 5 UNIT(S): 100 INJECTION, SOLUTION SUBCUTANEOUS at 22:06

## 2022-01-01 RX ADMIN — OXYCODONE HYDROCHLORIDE 5 MILLIGRAM(S): 5 TABLET ORAL at 04:50

## 2022-01-01 RX ADMIN — Medication 2: at 18:01

## 2022-01-01 RX ADMIN — Medication 2000 UNIT(S): at 12:23

## 2022-01-01 RX ADMIN — Medication 2000 UNIT(S): at 11:01

## 2022-01-01 RX ADMIN — ISOSORBIDE MONONITRATE 30 MILLIGRAM(S): 60 TABLET, EXTENDED RELEASE ORAL at 12:29

## 2022-01-01 RX ADMIN — ATORVASTATIN CALCIUM 40 MILLIGRAM(S): 80 TABLET, FILM COATED ORAL at 21:47

## 2022-01-01 RX ADMIN — ATORVASTATIN CALCIUM 40 MILLIGRAM(S): 80 TABLET, FILM COATED ORAL at 22:06

## 2022-01-01 RX ADMIN — DONEPEZIL HYDROCHLORIDE 10 MILLIGRAM(S): 10 TABLET, FILM COATED ORAL at 22:07

## 2022-01-01 RX ADMIN — Medication 2: at 12:28

## 2022-01-01 RX ADMIN — DONEPEZIL HYDROCHLORIDE 10 MILLIGRAM(S): 10 TABLET, FILM COATED ORAL at 22:08

## 2022-01-01 RX ADMIN — Medication 75 MILLIGRAM(S): at 05:09

## 2022-01-01 RX ADMIN — POLYETHYLENE GLYCOL 3350 17 GRAM(S): 17 POWDER, FOR SOLUTION ORAL at 12:27

## 2022-01-01 RX ADMIN — OXYCODONE HYDROCHLORIDE 5 MILLIGRAM(S): 5 TABLET ORAL at 08:44

## 2022-01-01 RX ADMIN — Medication 2: at 12:22

## 2022-01-01 RX ADMIN — LIDOCAINE 2 PATCH: 4 CREAM TOPICAL at 10:34

## 2022-01-01 RX ADMIN — OXYCODONE HYDROCHLORIDE 7.5 MILLIGRAM(S): 5 TABLET ORAL at 15:09

## 2022-01-01 RX ADMIN — Medication 2: at 09:06

## 2022-01-01 RX ADMIN — Medication 2000 UNIT(S): at 12:03

## 2022-01-01 RX ADMIN — Medication 1: at 22:07

## 2022-01-01 RX ADMIN — LIDOCAINE 2 PATCH: 4 CREAM TOPICAL at 19:40

## 2022-01-01 RX ADMIN — DONEPEZIL HYDROCHLORIDE 10 MILLIGRAM(S): 10 TABLET, FILM COATED ORAL at 21:47

## 2022-01-01 RX ADMIN — Medication 10 MILLIGRAM(S): at 17:37

## 2022-01-01 RX ADMIN — DONEPEZIL HYDROCHLORIDE 10 MILLIGRAM(S): 10 TABLET, FILM COATED ORAL at 21:25

## 2022-01-01 RX ADMIN — LIDOCAINE 2 PATCH: 4 CREAM TOPICAL at 22:34

## 2022-01-01 RX ADMIN — MEMANTINE HYDROCHLORIDE 5 MILLIGRAM(S): 10 TABLET ORAL at 17:42

## 2022-01-01 RX ADMIN — TAMSULOSIN HYDROCHLORIDE 0.4 MILLIGRAM(S): 0.4 CAPSULE ORAL at 21:47

## 2022-01-01 RX ADMIN — OXYCODONE HYDROCHLORIDE 5 MILLIGRAM(S): 5 TABLET ORAL at 05:43

## 2022-01-01 RX ADMIN — SENNA PLUS 2 TABLET(S): 8.6 TABLET ORAL at 22:06

## 2022-01-01 RX ADMIN — Medication 1: at 08:18

## 2022-01-01 RX ADMIN — Medication 10 MILLIGRAM(S): at 05:36

## 2022-01-01 RX ADMIN — ISOSORBIDE MONONITRATE 30 MILLIGRAM(S): 60 TABLET, EXTENDED RELEASE ORAL at 12:55

## 2022-01-01 RX ADMIN — Medication 10 MILLIGRAM(S): at 05:08

## 2022-01-01 RX ADMIN — Medication 3 MILLIGRAM(S): at 21:26

## 2022-01-01 RX ADMIN — Medication 1: at 21:53

## 2022-01-01 RX ADMIN — Medication 2: at 17:07

## 2022-01-01 RX ADMIN — POLYETHYLENE GLYCOL 3350 17 GRAM(S): 17 POWDER, FOR SOLUTION ORAL at 12:23

## 2022-01-01 RX ADMIN — Medication 1: at 08:13

## 2022-01-01 RX ADMIN — LIDOCAINE 2 PATCH: 4 CREAM TOPICAL at 22:07

## 2022-01-01 RX ADMIN — Medication 2000 UNIT(S): at 12:55

## 2022-01-01 RX ADMIN — Medication 2: at 12:55

## 2022-01-01 RX ADMIN — Medication 2: at 17:41

## 2022-01-01 RX ADMIN — Medication 2000 UNIT(S): at 12:28

## 2022-01-01 RX ADMIN — ISOSORBIDE MONONITRATE 30 MILLIGRAM(S): 60 TABLET, EXTENDED RELEASE ORAL at 12:03

## 2022-01-01 RX ADMIN — LIDOCAINE 2 PATCH: 4 CREAM TOPICAL at 08:11

## 2022-01-01 RX ADMIN — OXYCODONE HYDROCHLORIDE 7.5 MILLIGRAM(S): 5 TABLET ORAL at 14:39

## 2022-01-01 RX ADMIN — Medication 650 MILLIGRAM(S): at 06:04

## 2022-01-01 RX ADMIN — LIDOCAINE 2 PATCH: 4 CREAM TOPICAL at 19:37

## 2022-01-01 NOTE — CHART NOTE - NSCHARTNOTEFT_GEN_A_CORE
Patient completed walk test, and was found to desaturate to 84% on ambulation. Saturation improved to 95% on 2 L O2 nasal canula. Patient qualifies for home oxygen.

## 2022-01-01 NOTE — PROGRESS NOTE ADULT - PROBLEM SELECTOR PLAN 3
Fast score 5, monitor for delirium, and constipation, urinary retention, pain, hunger, thirst, etc.  Promote sleep wake cycle and reorientation as indicated in hospital environment.

## 2022-01-01 NOTE — PROGRESS NOTE ADULT - PROBLEM SELECTOR PLAN 9
Son at bedside, updated on current condition.  Emotional support provided.   Patient seen by Pt. They recommended home PT with rolling walker.  Plan for d/c home with hospice- pending referral acceptance  may need home oxygen, will need walk test  Ongoing dispo planning with the . Patient seen by Pt. They recommended home PT with rolling walker.  Plan for d/c home with hospice- pending referral acceptance  may need home oxygen, will need walk test  Ongoing dispo planning with the .

## 2022-01-01 NOTE — PROGRESS NOTE ADULT - SUBJECTIVE AND OBJECTIVE BOX
GAP TEAM PALLIATIVE CARE UNIT PROGRESS NOTE:      [  ] Patient on hospice program.    INDICATION FOR PALLIATIVE CARE UNIT SERVICES: complex dispo planning and GOC in setting of syncope    INTERVAL HPI/OVERNIGHT EVENTS: Required 2 PRNs of oxycodone for back pain.    DNR on chart:   Allergies    No Known Allergies    Intolerances    MEDICATIONS  (STANDING):  atorvastatin 40 milliGRAM(s) Oral at bedtime  cholecalciferol 2000 Unit(s) Oral daily  dextrose 40% Gel 15 Gram(s) Oral once  dextrose 50% Injectable 25 Gram(s) IV Push once  dextrose 50% Injectable 12.5 Gram(s) IV Push once  dextrose 50% Injectable 25 Gram(s) IV Push once  donepezil 10 milliGRAM(s) Oral at bedtime  glucagon  Injectable 1 milliGRAM(s) IntraMuscular once  hydrALAZINE 10 milliGRAM(s) Oral two times a day  insulin lispro (ADMELOG) corrective regimen sliding scale   SubCutaneous three times a day before meals  insulin lispro (ADMELOG) corrective regimen sliding scale   SubCutaneous at bedtime  isosorbide   mononitrate ER Tablet (IMDUR) 30 milliGRAM(s) Oral daily  lidocaine   4% Patch 2 Patch Transdermal daily  memantine 5 milliGRAM(s) Oral two times a day  metoprolol succinate ER 75 milliGRAM(s) Oral daily  sodium bicarbonate 650 milliGRAM(s) Oral two times a day  tamsulosin 0.4 milliGRAM(s) Oral at bedtime    MEDICATIONS  (PRN):  acetaminophen     Tablet .. 650 milliGRAM(s) Oral every 6 hours PRN Temp greater or equal to 38C (100.4F), Mild Pain (1 - 3)  aluminum hydroxide/magnesium hydroxide/simethicone Suspension 30 milliLiter(s) Oral every 4 hours PRN Dyspepsia  melatonin 3 milliGRAM(s) Oral at bedtime PRN Insomnia  ondansetron Injectable 4 milliGRAM(s) IV Push every 8 hours PRN Nausea and/or Vomiting  oxyCODONE    IR 5 milliGRAM(s) Oral every 6 hours PRN Severe Pain (7 - 10)    ITEMS UNCHECKED ARE NOT PRESENT    PRESENT SYMPTOMS: [ ]Unable to obtain due to poor mentation   Source if other than patient:  [ ]Family   [ ]Team     Pain: [ ] yes [x ] no  QOL impact -   Location -                    Aggravating factors -  Quality -  Radiation -  Timing-  Severity (0-10 scale):  Minimal acceptable level (0-10 scale):     Dyspnea:                           [x ]Mild [ ]Moderate [ ]Severe  Anxiety:                             [ ]Mild [ ]Moderate [ ]Severe  Fatigue:                             [ ]Mild [ ]Moderate [ ]Severe  Nausea:                             [ ]Mild [ ]Moderate [ ]Severe  Loss of appetite:              [ ]Mild [ ]Moderate [ ]Severe  Constipation:                    [ ]Mild [ ]Moderate [ ]Severe    PAINAD Score:    [email or web address] (Ctrl +  left click to view)  		  Other Symptoms:  [ x]All other review of systems negative     Palliative Performance Status Version 2:   50      %         [email or web address]  PHYSICAL EXAM:  Vital Signs Last 24 Hrs  T(C): 36.7 (31 Dec 2021 23:45), Max: 36.7 (31 Dec 2021 23:45)  T(F): 98 (31 Dec 2021 23:45), Max: 98 (31 Dec 2021 23:45)  HR: 64 (01 Jan 2022 05:01) (64 - 67)  BP: 132/73 (01 Jan 2022 05:01) (121/63 - 157/69)  BP(mean): --  RR: 87 (01 Jan 2022 11:00) (18 - 87)  SpO2: 95% (31 Dec 2021 23:45) (95% - 97%) I&O's Summary    31 Dec 2021 07:01  -  01 Jan 2022 07:00  --------------------------------------------------------  IN: 0 mL / OUT: 600 mL / NET: -600 mL    GENERAL:  [x ]Alert  [ ]Oriented x   [ ]Lethargic  [ ]Cachexia  [ ]Unarousable  [x ]Verbal  [ ]Non-Verbal  Behavioral:   [ ] Anxiety  [ ] Delirium [ ] Agitation [ ] Other  HEENT:  [ ]Normal   [x ]Dry mouth   [ ]ET Tube/Trach  [ ]Oral lesions  PULMONARY:   [x ]Clear [ ]Tachypnea  [ ]Audible excessive secretions   [ ]Rhonchi        [ ]Right [ ]Left [ ]Bilateral  [ ]Crackles        [ ]Right [ ]Left [ ]Bilateral  [ ]Wheezing     [ ]Right [ ]Left [ ]Bilateral  [ ]Diminished BS [ ]Right [ ]Left [ ]Bilateral    CARDIOVASCULAR:    [x ]Regular [ ]Irregular [ ]Tachy  [ ]Al [ ]Murmur [ ]Other  GASTROINTESTINAL:  [ x]Soft  [ ]Distended   [ ]+BS  [ ]Non tender [ ]Tender  [ ]PEG [ ]OGT/ NGT   Last BM:   12/31  GENITOURINARY:  [ ]Normal [x ] Incontinent   [ ]Oliguria/Anuria   [ ]Soto  MUSCULOSKELETAL:   [ ]Normal   [ ]Weakness  [ ]Bed/Wheelchair bound [ ]Edema  NEUROLOGIC:   [ ]No focal deficits  [x ] Cognitive impairment  [ ] Dysphagia [ ]Dysarthria [ ] Paresis [ ]Other   SKIN:   [ ]Normal  [ ]Rash     [ ]Pressure ulcer(s)  [ ]y [ ]n  Present on admission      CRITICAL CARE:  [ ] Shock Present  [ ]Septic [ ]Cardiogenic [ ]Neurologic [ ]Hypovolemic  [ ]  Vasopressors [ ]  Inotropes   [ ] Respiratory failure present [ ] Mechanical Ventilation [ ] Non-invasive ventilatory support [ ] High-Flow  [ ] Acute  [ ] Chronic [ ] Hypoxic  [ ] Hypercarbic [ ] Other  [ ] Other organ failure     LABS:    PT/INR - ( 31 Dec 2021 07:23 )   PT: 20.3 sec;   INR: 1.74 ratio               RADIOLOGY & ADDITIONAL STUDIES:  no new imaging    PROTEIN CALORIE MALNUTRITION: [ ] mild [ ] moderate [ ] severe  [ ] underweight [ ] morbid obesity    https://www.andeal.org/vault/2440/web/files/ONC/Table_Clinical%20Characteristics%20to%20Document%20Malnutrition-White%20JV%20et%20al%202012.pdf    Height (cm): 172.7 (12-28-21 @ 13:25), 172.7 (08-26-21 @ 10:10)  Weight (kg): 74.8 (12-28-21 @ 13:25), 73.9 (08-26-21 @ 10:10)  BMI (kg/m2): 25.1 (12-28-21 @ 13:25), 24.8 (08-26-21 @ 10:10)    [ ] PPSV2 < or = 30% [ ] significant weight loss [ ] poor nutritional intake [ ] anasarca   Artificial Nutrition [ ]     REFERRALS:   [ ]Chaplaincy  [x ] Hospice  [ ]Child Life  [ ]Social Work  [ ]Case management [ ]Holistic Therapy [ ] Physical Therapy [ ] Dietary   Goals of Care Document:

## 2022-01-02 NOTE — PROGRESS NOTE ADULT - SUBJECTIVE AND OBJECTIVE BOX
Date of Service:  01-02-22 @ 14:10    Patient is a 89y old  Male who presents with a chief complaint of Syncope (02 Jan 2022 10:45)      INTERVAL HISTORY: feels ok      REVIEW OF SYSTEMS:   CONSTITUTIONAL: generalized weakness  EYES/ENT: No visual changes; No throat pain  Neck: No pain or stiffness  Respiratory: No cough, wheezing, No shortness of breath  CARDIOVASCULAR: no chest pain or palpitations  GASTROINTESTINAL: No abdominal pain, no nausea, vomiting or hematemesis  GENITOURINARY: No dysuria, frequency or hematuria; eckert in place  NEUROLOGICAL: No stroke like symptoms  SKIN: RLL wound    	  MEDICATIONS:  hydrALAZINE 10 milliGRAM(s) Oral two times a day  isosorbide   mononitrate ER Tablet (IMDUR) 30 milliGRAM(s) Oral daily  metoprolol succinate ER 75 milliGRAM(s) Oral daily  tamsulosin 0.4 milliGRAM(s) Oral at bedtime        PHYSICAL EXAM:  T(C): 36.4 (01-02-22 @ 06:09), Max: 36.7 (01-01-22 @ 14:35)  HR: 66 (01-02-22 @ 06:09) (64 - 66)  BP: 157/75 (01-02-22 @ 06:09) (145/65 - 159/70)  RR: 18 (01-02-22 @ 06:09) (18 - 18)  SpO2: 96% (01-02-22 @ 06:09) (95% - 100%)  Wt(kg): --  I&O's Summary        Appearance: In no distress	  HEENT:    PERRL, EOMI	  Cardiovascular:  S1 S2, No JVD  Respiratory: Lungs clear to auscultation	  Gastrointestinal:  Soft, Non-tender, + BS	  Vascularature:  No edema of LE  Psychiatric: Appropriate affect   Neuro: no acute focal deficits       Labs personally reviewed      ASSESSMENT/PLAN: 	    89 year old male with a pmhx of Endocarditis, Alzheimers dementia, CVA, CAD/CABG, HTN, HLD, Afib-on Coumadin s/p PPM, T2DM, insulin dependent, Systolic heart failure, CKD Stage IV, Cardiomyopathy, Spinal stenosis p/w syncope x2, here for evaluation of Endocarditis.      Problem/Plan - 1:  ·  Problem: Syncope.   ·  Plan:  -EP eval for PPM interrogation- no events .     - ? 2/2 volume depletion with orthostasis   - orthostatics initial set positive, repeat normal      Problem/Plan - 2:  ·  Problem: Atrial fibrillation.   ·  Plan: Patient currently in A Fib, HR 70s, not pacing   - goal INR 2-3     Problem/Plan - 3:  ·  Problem: Chronic systolic heart failure.   - Son reports 6 pound weight gain with increasing SOB  - Reports home dose Torsemide 60mg qAM with additional 20-40mg qAM as 6 pound weight gain, despite that no change in SOB and weight  - Last TTE shows EF OF 45%   -plan for home with home hospice      Problem/Plan - 4:  ·  Problem: CAD    ·  Plan: C/w Toprol currently.     Problem/Plan - 5:  ·  Problem: HTN   ·  Plan: BP currently well controlled   -C/w Toprol, imdur, hydralazine     Problem/Plan - 6:  ·  Problem:  r/o endocarditis   - pt denies any chest pain or discomfort   - TTE as noted above shows no signs of endocarditis         Hallie Raygoza MSN, FNP-BC, AGACNP-BC, SIDNEY  Ankit Kaur,  [facility]  Cardiovascular Medicine  [address] [place] [address], [address]  Office: [phone]  Cell: [phone] Date of Service:  01-02-22 @ 14:10    Patient is a 89y old  Male who presents with a chief complaint of Syncope (02 Jan 2022 10:45)      INTERVAL HISTORY: feels ok      REVIEW OF SYSTEMS:   CONSTITUTIONAL: generalized weakness  EYES/ENT: No visual changes; No throat pain  Neck: No pain or stiffness  Respiratory: No cough, wheezing, No shortness of breath  CARDIOVASCULAR: no chest pain or palpitations  GASTROINTESTINAL: No abdominal pain, no nausea, vomiting or hematemesis  GENITOURINARY: No dysuria, frequency or hematuria; eckert in place  NEUROLOGICAL: No stroke like symptoms  SKIN: RLL wound    	  MEDICATIONS:  hydrALAZINE 10 milliGRAM(s) Oral two times a day  isosorbide   mononitrate ER Tablet (IMDUR) 30 milliGRAM(s) Oral daily  metoprolol succinate ER 75 milliGRAM(s) Oral daily  tamsulosin 0.4 milliGRAM(s) Oral at bedtime        PHYSICAL EXAM:  T(C): 36.4 (01-02-22 @ 06:09), Max: 36.7 (01-01-22 @ 14:35)  HR: 66 (01-02-22 @ 06:09) (64 - 66)  BP: 157/75 (01-02-22 @ 06:09) (145/65 - 159/70)  RR: 18 (01-02-22 @ 06:09) (18 - 18)  SpO2: 96% (01-02-22 @ 06:09) (95% - 100%)  Wt(kg): --  I&O's Summary        Appearance: In no distress	  HEENT:    PERRL, EOMI	  Cardiovascular:  S1 S2, No JVD  Respiratory: Lungs clear to auscultation	  Gastrointestinal:  Soft, Non-tender, + BS	  Vascularature:  No edema of LE  Psychiatric: Appropriate affect   Neuro: no acute focal deficits       Labs personally reviewed      ASSESSMENT/PLAN: 	    89 year old male with a pmhx of Endocarditis, Alzheimers dementia, CVA, CAD/CABG, HTN, HLD, Afib-on Coumadin s/p PPM, T2DM, insulin dependent, Systolic heart failure, CKD Stage IV, Cardiomyopathy, Spinal stenosis p/w syncope x2, here for evaluation of Endocarditis.      Problem/Plan - 1:  ·  Problem: Syncope.   ·  Plan:  -EP eval for PPM interrogation- no events .     - ? 2/2 volume depletion with orthostasis   - orthostatics initial set positive, repeat normal      Problem/Plan - 2:  ·  Problem: Atrial fibrillation.   ·  Plan: Patient currently in A Fib, HR 70s, not pacing   - goal INR 2-3     Problem/Plan - 3:  ·  Problem: Chronic systolic heart failure.   - Son reports 6 pound weight gain with increasing SOB  - Reports home dose Torsemide 60mg qAM with additional 20-40mg qAM as 6 pound weight gain, despite that no change in SOB and weight  - Last TTE shows EF OF 45%   -plan for home with home hospice      Problem/Plan - 4:  ·  Problem: CAD    ·  Plan: C/w Toprol currently.     Problem/Plan - 5:  ·  Problem: HTN   ·  Plan: BP currently well controlled   -C/w Toprol, imdur, hydralazine     Problem/Plan - 6:  ·  Problem:  r/o endocarditis   - pt denies any chest pain or discomfort   - TTE as noted above shows no signs of endocarditis         Hallie Raygoza MSN, FNP-BC   Ankit Kaur DO [facility]  Cardiovascular Medicine  [address] [place] [address], [address]  Office: [phone]  Cell: [phone]

## 2022-01-02 NOTE — PROGRESS NOTE ADULT - PROBLEM SELECTOR PLAN 9
Patient seen by Pt. They recommended home PT with rolling walker.  Plan for d/c home with hospice- pending referral acceptance  Walk test performed - patient desaturated to 84% upon ambulation without oxygen which improved to 95% on 2 L NC  If patient denied by hospice, still qualifies for home oxygen  Ongoing dispo planning with the .

## 2022-01-02 NOTE — PROGRESS NOTE ADULT - SUBJECTIVE AND OBJECTIVE BOX
GAP TEAM PALLIATIVE CARE UNIT PROGRESS NOTE:      [  ] Patient on hospice program.    INDICATION FOR PALLIATIVE CARE UNIT SERVICES: symptom management, GOC and dispo planning in setting of CHF    INTERVAL HPI/OVERNIGHT EVENTS: required 2 PRNs oxycodone in past 24 hours    DNR on chart:   Allergies    No Known Allergies    Intolerances    MEDICATIONS  (STANDING):  atorvastatin 40 milliGRAM(s) Oral at bedtime  cholecalciferol 2000 Unit(s) Oral daily  dextrose 40% Gel 15 Gram(s) Oral once  dextrose 50% Injectable 25 Gram(s) IV Push once  dextrose 50% Injectable 12.5 Gram(s) IV Push once  dextrose 50% Injectable 25 Gram(s) IV Push once  donepezil 10 milliGRAM(s) Oral at bedtime  glucagon  Injectable 1 milliGRAM(s) IntraMuscular once  hydrALAZINE 10 milliGRAM(s) Oral two times a day  insulin lispro (ADMELOG) corrective regimen sliding scale   SubCutaneous three times a day before meals  insulin lispro (ADMELOG) corrective regimen sliding scale   SubCutaneous at bedtime  isosorbide   mononitrate ER Tablet (IMDUR) 30 milliGRAM(s) Oral daily  lidocaine   4% Patch 2 Patch Transdermal daily  memantine 5 milliGRAM(s) Oral two times a day  metoprolol succinate ER 75 milliGRAM(s) Oral daily  sodium bicarbonate 650 milliGRAM(s) Oral two times a day  tamsulosin 0.4 milliGRAM(s) Oral at bedtime    MEDICATIONS  (PRN):  acetaminophen     Tablet .. 650 milliGRAM(s) Oral every 6 hours PRN Temp greater or equal to 38C (100.4F), Mild Pain (1 - 3)  aluminum hydroxide/magnesium hydroxide/simethicone Suspension 30 milliLiter(s) Oral every 4 hours PRN Dyspepsia  melatonin 3 milliGRAM(s) Oral at bedtime PRN Insomnia  ondansetron Injectable 4 milliGRAM(s) IV Push every 8 hours PRN Nausea and/or Vomiting  oxyCODONE    IR 5 milliGRAM(s) Oral every 6 hours PRN Severe Pain (7 - 10)    ITEMS UNCHECKED ARE NOT PRESENT    PRESENT SYMPTOMS: [ ]Unable to obtain due to poor mentation   Source if other than patient:  [ ]Family   [ ]Team     Pain: [ ] yes [x ] no  QOL impact -   Location -                    Aggravating factors -  Quality -  Radiation -  Timing-  Severity (0-10 scale):  Minimal acceptable level (0-10 scale):     Dyspnea:                           [x ]Mild [ ]Moderate [ ]Severe  Anxiety:                             [ ]Mild [ ]Moderate [ ]Severe  Fatigue:                             [ ]Mild [ ]Moderate [ ]Severe  Nausea:                             [ ]Mild [ ]Moderate [ ]Severe  Loss of appetite:              [ ]Mild [ ]Moderate [ ]Severe  Constipation:                    [ ]Mild [ ]Moderate [ ]Severe    PAINAD Score:    [email or web address] (Ctrl +  left click to view)  		  Other Symptoms:  [x ]All other review of systems negative     Palliative Performance Status Version 2:     50    %         [email or web address]/[email or web address]_[email or web address]  PHYSICAL EXAM:  Vital Signs Last 24 Hrs  T(C): 36.4 (02 Jan 2022 06:09), Max: 36.7 (01 Jan 2022 14:35)  T(F): 97.6 (02 Jan 2022 06:09), Max: 98.1 (01 Jan 2022 14:35)  HR: 66 (02 Jan 2022 06:09) (64 - 66)  BP: 157/75 (02 Jan 2022 06:09) (145/65 - 159/70)  BP(mean): --  RR: 18 (02 Jan 2022 06:09) (18 - 87)  SpO2: 96% (02 Jan 2022 06:09) (95% - 100%) I&O's Summary  GENERAL:  [x ]Alert  [ ]Oriented x   [ ]Lethargic  [ ]Cachexia  [ ]Unarousable  [x ]Verbal  [ ]Non-Verbal  Behavioral:   [ ] Anxiety  [ ] Delirium [ ] Agitation [ ] Other  HEENT:  [ ]Normal   [x ]Dry mouth   [ ]ET Tube/Trach  [ ]Oral lesions  PULMONARY:   [x ]Clear [ ]Tachypnea  [ ]Audible excessive secretions   [ ]Rhonchi        [ ]Right [ ]Left [ ]Bilateral  [ ]Crackles        [ ]Right [ ]Left [ ]Bilateral  [ ]Wheezing     [ ]Right [ ]Left [ ]Bilateral  [ ]Diminished BS [ ]Right [ ]Left [ ]Bilateral    CARDIOVASCULAR:    [x ]Regular [ ]Irregular [ ]Tachy  [ ]Al [ ]Murmur [ ]Other  GASTROINTESTINAL:  [x ]Soft  [ ]Distended   [ ]+BS  [ ]Non tender [ ]Tender  [ ]PEG [ ]OGT/ NGT   Last BM:   12/31  GENITOURINARY:  [x ]Normal [ ] Incontinent   [ ]Oliguria/Anuria   [ ]Soto  MUSCULOSKELETAL:   [ ]Normal   [x ]Weakness  [ ]Bed/Wheelchair bound [ ]Edema  NEUROLOGIC:   [ ]No focal deficits  [ x] Cognitive impairment  [ ] Dysphagia [ ]Dysarthria [ ] Paresis [ ]Other   SKIN:   [ ]Normal  [ ]Rash     [ ]Pressure ulcer(s)  [ ]y [x ]n  Present on admission      CRITICAL CARE:  [ ] Shock Present  [ ]Septic [ ]Cardiogenic [ ]Neurologic [ ]Hypovolemic  [ ]  Vasopressors [ ]  Inotropes   [ ] Respiratory failure present [ ] Mechanical Ventilation [ ] Non-invasive ventilatory support [ ] High-Flow  [ ] Acute  [ ] Chronic [ ] Hypoxic  [ ] Hypercarbic [ ] Other  [ ] Other organ failure     LABS:            RADIOLOGY & ADDITIONAL STUDIES:    PROTEIN CALORIE MALNUTRITION: [ ] mild [ ] moderate [ ] severe  [ ] underweight [ ] morbid obesity    https://www.andeal.org/vault/2440/web/files/ONC/Table_Clinical%20Characteristics%20to%20Document%20Malnutrition-White%20JV%20et%20al%912031.pdf    Height (cm): 172.7 (12-28-21 @ 13:25), 172.7 (08-26-21 @ 10:10)  Weight (kg): 74.8 (12-28-21 @ 13:25), 73.9 (08-26-21 @ 10:10)  BMI (kg/m2): 25.1 (12-28-21 @ 13:25), 24.8 (08-26-21 @ 10:10)    [ ] PPSV2 < or = 30% [ ] significant weight loss [ ] poor nutritional intake [ ] anasarca   Artificial Nutrition [ ]     REFERRALS:   [ ]Chaplaincy  [ ] Hospice  [ ]Child Life  [ ]Social Work  [ ]Case management [ ]Holistic Therapy [ ] Physical Therapy [ ] Dietary   Goals of Care Document:

## 2022-01-03 NOTE — PROGRESS NOTE ADULT - SUBJECTIVE AND OBJECTIVE BOX
GAP TEAM PALLIATIVE CARE UNIT PROGRESS NOTE:  89 year old male with HFrEF, bioprosthestic MV, on AC, PPM, DM on insulin, CKD, Alzheimer's dementia, BIBEMS with syncope x 2 within 24 hours.  Admitted to PCU under medicine to establish care goals and disposition planning.         INDICATION FOR PALLIATIVE CARE UNIT SERVICES: Chart reviewed. The patient is seen and examined at bedside. He required PRN Oxycodone IR 5mg PO X2 within a 24hr period 8am-8am.    INTERVAL HPI/OVERNIGHT EVENTS:    DNR on chart:   Allergies    No Known Allergies    Intolerances    MEDICATIONS  (STANDING):  atorvastatin 40 milliGRAM(s) Oral at bedtime  cholecalciferol 2000 Unit(s) Oral daily  dextrose 40% Gel 15 Gram(s) Oral once  dextrose 40% Gel 15 Gram(s) Oral once  dextrose 5%. 1000 milliLiter(s) (100 mL/Hr) IV Continuous <Continuous>  dextrose 5%. 1000 milliLiter(s) (50 mL/Hr) IV Continuous <Continuous>  dextrose 50% Injectable 12.5 Gram(s) IV Push once  dextrose 50% Injectable 25 Gram(s) IV Push once  dextrose 50% Injectable 25 Gram(s) IV Push once  dextrose 50% Injectable 12.5 Gram(s) IV Push once  dextrose 50% Injectable 25 Gram(s) IV Push once  donepezil 10 milliGRAM(s) Oral at bedtime  glucagon  Injectable 1 milliGRAM(s) IntraMuscular once  hydrALAZINE 10 milliGRAM(s) Oral two times a day  insulin glargine Injectable (LANTUS) 5 Unit(s) SubCutaneous at bedtime  insulin lispro (ADMELOG) corrective regimen sliding scale   SubCutaneous three times a day before meals  insulin lispro (ADMELOG) corrective regimen sliding scale   SubCutaneous at bedtime  isosorbide   mononitrate ER Tablet (IMDUR) 30 milliGRAM(s) Oral daily  lidocaine   4% Patch 2 Patch Transdermal daily  memantine 5 milliGRAM(s) Oral two times a day  metoprolol succinate ER 75 milliGRAM(s) Oral daily  sodium bicarbonate 650 milliGRAM(s) Oral two times a day  tamsulosin 0.4 milliGRAM(s) Oral at bedtime    MEDICATIONS  (PRN):  acetaminophen     Tablet .. 650 milliGRAM(s) Oral every 6 hours PRN Temp greater or equal to 38C (100.4F), Mild Pain (1 - 3)  aluminum hydroxide/magnesium hydroxide/simethicone Suspension 30 milliLiter(s) Oral every 4 hours PRN Dyspepsia  melatonin 3 milliGRAM(s) Oral at bedtime PRN Insomnia  ondansetron Injectable 4 milliGRAM(s) IV Push every 8 hours PRN Nausea and/or Vomiting  oxyCODONE    IR 5 milliGRAM(s) Oral every 6 hours PRN Severe Pain (7 - 10)    ITEMS UNCHECKED ARE NOT PRESENT    PRESENT SYMPTOMS: [ ]Unable to obtain due to poor mentation   Source if other than patient:  [ ]Family   [ ]Team     Pain: [ X] yes [ ] no  QOL impact - ADL  Location -   Back                 Aggravating factors - movement  Quality -  Radiation - does not radiate  Timing-  Severity (0-10 scale):  Minimal acceptable level (0-10 scale):     Dyspnea:                           [ ]Mild [ ]Moderate [ ]Severe  Anxiety:                             [ ]Mild [ ]Moderate [ ]Severe  Fatigue:                             [ ]Mild [ ]Moderate [ ]Severe  Nausea:                             [ ]Mild [ ]Moderate [ ]Severe  Loss of appetite:              [ ]Mild [ ]Moderate [ ]Severe  Constipation:                    [ ]Mild [ ]Moderate [ ]Severe    PAINAD Score:    [email or web address] (Ctrl +  left click to view)  		  Other Symptoms:  [X ]All other review of systems negative     Palliative Performance Status Version 2:  50-60%         http://npcrc.org/files/news/palliative_performance_scale_ppsv2.pdf  PHYSICAL EXAM:  Vital Signs Last 24 Hrs  T(C): 36.7 (03 Jan 2022 08:22), Max: 36.9 (02 Jan 2022 15:30)  T(F): 98 (03 Jan 2022 08:22), Max: 98.4 (02 Jan 2022 15:30)  HR: 65 (03 Jan 2022 08:22) (63 - 66)  BP: 153/69 (03 Jan 2022 08:22) (136/76 - 153/69)  BP(mean): --  RR: 18 (03 Jan 2022 08:22) (18 - 18)  SpO2: 97% (03 Jan 2022 08:22) (96% - 97%) I&O's Summary    02 Jan 2022 07:01  -  03 Jan 2022 07:00  --------------------------------------------------------  IN: 0 mL / OUT: 400 mL / NET: -400 mL    GENERAL:  [ X]Alert  [ ]Oriented x2-3   [ ]Lethargic  [ ]Cachexia  [ ]Unarousable  [ X]Verbal  [ ]Non-Verbal  Behavioral:   [ ] Anxiety  [ ] Delirium [ ] Agitation [ ] Other  HEENT:  [X ]Normal   [ ]Dry mouth   [ ]ET Tube/Trach  [ ]Oral lesions  PULMONARY:   [ ]Clear [ ]Tachypnea  [ ]Audible excessive secretions   [ ]Rhonchi        [ ]Right [ ]Left [ ]Bilateral  [ ]Crackles        [ ]Right [ ]Left [ ]Bilateral  [ ]Wheezing     [ ]Right [ ]Left [ ]Bilateral  [ X]Diminshed BS [ ]Right [ ]Left [X ]Bilateral    CARDIOVASCULAR:    [ X]Regular [ ]Irregular [ ]Tachy  [ ]Al [ ]Murmur [ ]Other  GASTROINTESTINAL:  [X ]Soft  [X ]Distended   [X ]+BS  [ ]Non tender [ ]Tender  [ ]PEG [ ]OGT/ NGT   Last BM:  12/31/31  GENITOURINARY:  [ ]Normal [ X] Incontinent   [ ]Oliguria/Anuria   [ ]Soto [X] Other- condom cath  MUSCULOSKELETAL:   [ ]Normal   [X ]Weakness  [ ]Bed/Wheelchair bound [ ]Edema  NEUROLOGIC:   [ ]No focal deficits  [X ] Cognitive impairment  [ ] Dysphagia [ ]Dysarthria [ ] Paresis [ ]Other   SKIN:  b/l LE wounds. Please see nursing assessment for further details for which I have reviewed   [ ]Normal  [ ]Rash     [ ]Pressure ulcer(s)  [ ]y [ ]n  Present on admission      CRITICAL CARE:  [ ] Shock Present  [ ]Septic [ ]Cardiogenic [ ]Neurologic [ ]Hypovolemic  [ ]  Vasopressors [ ]  Inotropes   [ ] Respiratory failure present [ ] Mechanical Ventilation [ ] Non-invasive ventilatory support [ ] High-Flow  [ ] Acute  [ ] Chronic [ ] Hypoxic  [ ] Hypercarbic [ ] Other  [ X] Other organ failure- brain, kidneys    LABS: None new    RADIOLOGY & ADDITIONAL STUDIES: None new    PROTEIN CALORIE MALNUTRITION: [ ] mild [ ] moderate [ ] severe  [ ] underweight [ ] morbid obesity    https://www.andeal.org/vault/2440/web/files/ONC/Table_Clinical%20Characteristics%20to%20Document%20Malnutrition-White%20JV%20et%20al%202012.pdf    Height (cm): 172.7 (12-28-21 @ 13:25), 172.7 (08-26-21 @ 10:10)  Weight (kg): 84.9 (01-02-22 @ 21:48), 73.9 (08-26-21 @ 10:10)  BMI (kg/m2): 28.5 (01-02-22 @ 21:48), 24.8 (12-28-21 @ 13:25), 24.8 (08-26-21 @ 10:10)    [ ] PPSV2 < or = 30% [ ] significant weight loss [ ] poor nutritional intake [ ] anasarca   Artificial Nutrition [ ]     REFERRALS:   [ ]Chaplaincy  [ ] Hospice  [ ]Child Life  [ X]Social Work  [ ]Case management [ ]Holistic Therapy [ ] Physical Therapy [ ] Dietary   Goals of Care Document:    GAP TEAM PALLIATIVE CARE UNIT PROGRESS NOTE:  89 year old male with HFrEF, bioprosthestic MV, on AC, PPM, DM on insulin, CKD, Alzheimer's dementia, BIBEMS with syncope x 2 within 24 hours.  Admitted to PCU under medicine to establish care goals and disposition planning.         INDICATION FOR PALLIATIVE CARE UNIT SERVICES: Chart reviewed. The patient is seen and examined at bedside. He required PRN Oxycodone IR 5mg PO X2 within a 24hr period 8am-8am.    INTERVAL HPI/OVERNIGHT EVENTS:    DNR on chart:   Allergies    No Known Allergies    Intolerances    MEDICATIONS  (STANDING):  atorvastatin 40 milliGRAM(s) Oral at bedtime  cholecalciferol 2000 Unit(s) Oral daily  dextrose 40% Gel 15 Gram(s) Oral once  dextrose 40% Gel 15 Gram(s) Oral once  dextrose 5%. 1000 milliLiter(s) (100 mL/Hr) IV Continuous <Continuous>  dextrose 5%. 1000 milliLiter(s) (50 mL/Hr) IV Continuous <Continuous>  dextrose 50% Injectable 12.5 Gram(s) IV Push once  dextrose 50% Injectable 25 Gram(s) IV Push once  dextrose 50% Injectable 25 Gram(s) IV Push once  dextrose 50% Injectable 12.5 Gram(s) IV Push once  dextrose 50% Injectable 25 Gram(s) IV Push once  donepezil 10 milliGRAM(s) Oral at bedtime  glucagon  Injectable 1 milliGRAM(s) IntraMuscular once  hydrALAZINE 10 milliGRAM(s) Oral two times a day  insulin glargine Injectable (LANTUS) 5 Unit(s) SubCutaneous at bedtime  insulin lispro (ADMELOG) corrective regimen sliding scale   SubCutaneous three times a day before meals  insulin lispro (ADMELOG) corrective regimen sliding scale   SubCutaneous at bedtime  isosorbide   mononitrate ER Tablet (IMDUR) 30 milliGRAM(s) Oral daily  lidocaine   4% Patch 2 Patch Transdermal daily  memantine 5 milliGRAM(s) Oral two times a day  metoprolol succinate ER 75 milliGRAM(s) Oral daily  sodium bicarbonate 650 milliGRAM(s) Oral two times a day  tamsulosin 0.4 milliGRAM(s) Oral at bedtime    MEDICATIONS  (PRN):  acetaminophen     Tablet .. 650 milliGRAM(s) Oral every 6 hours PRN Temp greater or equal to 38C (100.4F), Mild Pain (1 - 3)  aluminum hydroxide/magnesium hydroxide/simethicone Suspension 30 milliLiter(s) Oral every 4 hours PRN Dyspepsia  melatonin 3 milliGRAM(s) Oral at bedtime PRN Insomnia  ondansetron Injectable 4 milliGRAM(s) IV Push every 8 hours PRN Nausea and/or Vomiting  oxyCODONE    IR 5 milliGRAM(s) Oral every 6 hours PRN Severe Pain (7 - 10)    ITEMS UNCHECKED ARE NOT PRESENT    PRESENT SYMPTOMS: [ ]Unable to obtain due to poor mentation   Source if other than patient:  [ ]Family   [ ]Team     Pain: [ X] yes [ ] no  QOL impact - ADL  Location -   Back                 Aggravating factors - movement  Quality -  Radiation - does not radiate  Timing-  Severity (0-10 scale):  Minimal acceptable level (0-10 scale):     Dyspnea:                           [ ]Mild [ ]Moderate [ ]Severe  Anxiety:                             [ ]Mild [ ]Moderate [ ]Severe  Fatigue:                             [ ]Mild [ ]Moderate [ ]Severe  Nausea:                             [ ]Mild [ ]Moderate [ ]Severe  Loss of appetite:              [ ]Mild [ ]Moderate [ ]Severe  Constipation:                    [ ]Mild [ ]Moderate [ ]Severe    PAINAD Score:    [email or web address] (Ctrl +  left click to view)  		  Other Symptoms:  [X ]All other review of systems negative     Palliative Performance Status Version 2:  50-60%         http://npcrc.org/files/news/palliative_performance_scale_ppsv2.pdf  PHYSICAL EXAM:  Vital Signs Last 24 Hrs  T(C): 36.7 (03 Jan 2022 08:22), Max: 36.9 (02 Jan 2022 15:30)  T(F): 98 (03 Jan 2022 08:22), Max: 98.4 (02 Jan 2022 15:30)  HR: 65 (03 Jan 2022 08:22) (63 - 66)  BP: 153/69 (03 Jan 2022 08:22) (136/76 - 153/69)  BP(mean): --  RR: 18 (03 Jan 2022 08:22) (18 - 18)  SpO2: 97% (03 Jan 2022 08:22) (96% - 97%) I&O's Summary    02 Jan 2022 07:01  -  03 Jan 2022 07:00  --------------------------------------------------------  IN: 0 mL / OUT: 400 mL / NET: -400 mL    GENERAL:  [ X]Alert  [ ]Oriented x2-3   [ ]Lethargic  [ ]Cachexia  [ ]Unarousable  [ X]Verbal  [ ]Non-Verbal  Behavioral:   [ ] Anxiety  [ ] Delirium [ ] Agitation [ ] Other  HEENT:  [X ]Normal   [ ]Dry mouth   [ ]ET Tube/Trach  [ ]Oral lesions  PULMONARY:   [ ]Clear [ ]Tachypnea  [ ]Audible excessive secretions   [ ]Rhonchi        [ ]Right [ ]Left [ ]Bilateral  [ ]Crackles        [ ]Right [ ]Left [ ]Bilateral  [ ]Wheezing     [ ]Right [ ]Left [ ]Bilateral  [ X]Diminshed BS [ ]Right [ ]Left [X ]Bilateral    CARDIOVASCULAR:    [ X]Regular [ ]Irregular [ ]Tachy  [ ]Al [ ]Murmur [ ]Other  GASTROINTESTINAL:  [X ]Soft  [X ]Distended   [X ]+BS  [ ]Non tender [ ]Tender  [ ]PEG [ ]OGT/ NGT   Last BM:  12/31/21  GENITOURINARY:  [ ]Normal [ X] Incontinent   [ ]Oliguria/Anuria   [ ]Soto [X] Other- condom cath  MUSCULOSKELETAL:   [ ]Normal   [X ]Weakness  [ ]Bed/Wheelchair bound [ ]Edema  NEUROLOGIC:   [ ]No focal deficits  [X ] Cognitive impairment  [ ] Dysphagia [ ]Dysarthria [ ] Paresis [ ]Other   SKIN:  b/l LE wounds. Please see nursing assessment for further details for which I have reviewed   [ ]Normal  [ ]Rash     [ ]Pressure ulcer(s)  [ ]y [ ]n  Present on admission      CRITICAL CARE:  [ ] Shock Present  [ ]Septic [ ]Cardiogenic [ ]Neurologic [ ]Hypovolemic  [ ]  Vasopressors [ ]  Inotropes   [ ] Respiratory failure present [ ] Mechanical Ventilation [ ] Non-invasive ventilatory support [ ] High-Flow  [ ] Acute  [ ] Chronic [ ] Hypoxic  [ ] Hypercarbic [ ] Other  [ X] Other organ failure- brain, kidneys    LABS: None new    RADIOLOGY & ADDITIONAL STUDIES: None new    PROTEIN CALORIE MALNUTRITION: [ ] mild [ ] moderate [ ] severe  [ ] underweight [ ] morbid obesity    https://www.andeal.org/vault/2440/web/files/ONC/Table_Clinical%20Characteristics%20to%20Document%20Malnutrition-White%20JV%20et%20al%202012.pdf    Height (cm): 172.7 (12-28-21 @ 13:25), 172.7 (08-26-21 @ 10:10)  Weight (kg): 84.9 (01-02-22 @ 21:48), 73.9 (08-26-21 @ 10:10)  BMI (kg/m2): 28.5 (01-02-22 @ 21:48), 24.8 (12-28-21 @ 13:25), 24.8 (08-26-21 @ 10:10)    [ ] PPSV2 < or = 30% [ ] significant weight loss [ ] poor nutritional intake [ ] anasarca   Artificial Nutrition [ ]     REFERRALS:   [ ]Chaplaincy  [ ] Hospice  [ ]Child Life  [ X]Social Work  [ ]Case management [ ]Holistic Therapy [ ] Physical Therapy [ ] Dietary   Goals of Care Document:

## 2022-01-03 NOTE — PROGRESS NOTE ADULT - PROBLEM SELECTOR PLAN 9
Team spoke to the patient's son Reinaldo and daughter in law Rachelle (HCP).  Educated as to what to expect.  Questions answered.  Emotional support provided.   Plan is for patient to be d/c home with home care. Oxygen and rolling walker ordered  Will continue with care in the PCU

## 2022-01-03 NOTE — PROGRESS NOTE ADULT - SUBJECTIVE AND OBJECTIVE BOX
Date of Service   01-03-22 @ 11:49    Patient is a 89y old  Male who presents with a chief complaint of Syncope (02 Jan 2022 14:10)      INTERVAL HISTORY: pt c/o generalized body ache     REVIEW OF SYSTEMS:   CONSTITUTIONAL: No weakness  EYES/ENT: No visual changes; No throat pain  Neck: No pain or stiffness  Respiratory: No cough, wheezing, No shortness of breath  CARDIOVASCULAR: no chest pain or palpitations  GASTROINTESTINAL: No abdominal pain, no nausea, vomiting or hematemesis  GENITOURINARY: No dysuria, frequency or hematuria  NEUROLOGICAL: No stroke like symptoms  SKIN: No rashes    	  MEDICATIONS:  hydrALAZINE 10 milliGRAM(s) Oral two times a day  isosorbide   mononitrate ER Tablet (IMDUR) 30 milliGRAM(s) Oral daily  metoprolol succinate ER 75 milliGRAM(s) Oral daily  tamsulosin 0.4 milliGRAM(s) Oral at bedtime        PHYSICAL EXAM:  T(C): 36.7 (01-03-22 @ 08:22), Max: 36.9 (01-02-22 @ 15:30)  HR: 65 (01-03-22 @ 08:22) (63 - 66)  BP: 153/69 (01-03-22 @ 08:22) (136/76 - 153/69)  RR: 18 (01-03-22 @ 08:22) (18 - 18)  SpO2: 97% (01-03-22 @ 08:22) (96% - 97%)  Wt(kg): --  I&O's Summary    02 Jan 2022 07:01  -  03 Jan 2022 07:00  --------------------------------------------------------  IN: 0 mL / OUT: 400 mL / NET: -400 mL        Weight (kg): 84.9 (01-02 @ 21:48)    Appearance: In no distress	  HEENT:    PERRL, EOMI	  Cardiovascular:  S1 S2, No JVD  Respiratory: Lungs clear to auscultation	  Gastrointestinal:  Soft, Non-tender, + BS	  Vascularature:  No edema of LE  Psychiatric: Appropriate affect   Neuro: no acute focal deficits         Labs personally reviewed      ASSESSMENT/PLAN: 	    89 year old male with a pmhx of Endocarditis, Alzheimers dementia, CVA, CAD/CABG, HTN, HLD, Afib-on Coumadin s/p PPM, T2DM, insulin dependent, Systolic heart failure, CKD Stage IV, Cardiomyopathy, Spinal stenosis p/w syncope x2, here for evaluation of Endocarditis.      Problem/Plan - 1:  ·  Problem: Syncope.   ·  Plan:  -EP eval for PPM interrogation- no events .     - ? 2/2 volume depletion with orthostasis   - orthostatics initial set positive, repeat normal      Problem/Plan - 2:  ·  Problem: Atrial fibrillation.   ·  Patient was in A Fib, HR 70s, not pacing   - off AC per goals      Problem/Plan - 3:  ·  Problem: Chronic systolic heart failure.   - Son reports 6 pound weight gain with increasing SOB  - Reports home dose Torsemide 60mg qAM with additional 20-40mg qAM as 6 pound weight gain, despite that no change in SOB and weight  - Last TTE shows EF of 45%   -plan for home with home hospice      Problem/Plan - 4:  ·  Problem: CAD    ·  Plan: C/w Toprol currently.     Problem/Plan - 5:  ·  Problem: HTN   ·  Plan: BP currently well controlled   - C/w Toprol, Imdur, hydralazine     Problem/Plan - 6:  ·  Problem:  r/o endocarditis   - pt denies any chest pain or discomfort   - TTE shows no signs of endocarditis       Goal is conservative medical management           Cherise Gaitan [facility]   Ankit Kaur DO [facility]  Cardiovascular Medicine  [address] [place] [address], [address]  Office: [phone]  Cell: [phone]

## 2022-01-04 NOTE — DISCHARGE NOTE PROVIDER - NSDCCPCAREPLAN_GEN_ALL_CORE_FT
PRINCIPAL DISCHARGE DIAGNOSIS  Diagnosis: Syncope  Assessment and Plan of Treatment: You presented to the hospital with two  syncope episode within a 24 hour period. Your PPM was interrogation. No arrythmogenic etiology identified. We also examined you for orthostatic hypotension.  We checked your blood pressure in 3 different position (lying, sitting and standing) to see if there was a dropped in blood pressure. We also monitored for any symptoms of dizziness or lightheaded.  You tested negative for orthostatic hypotension.   Your anticoagulations were discontinued due to your increase risk for fall and bleeding. This was discussed with your family.        SECONDARY DISCHARGE DIAGNOSES  Diagnosis: T2DM (type 2 diabetes mellitus)  Assessment and Plan of Treatment: Make sure you get your HgA1c checked every three months.  If you take insulin, check your blood glucose before meals and at bedtime.  It's important not to skip any meals.  Keep a log of your blood glucose results and always take it with you to your doctor appointments.  Keep a list of your current medications including injectables and over the counter medications and bring this medication list with you to all your doctor appointments.  If you have not seen your ophthalmologist this year call for appointment.  Check your feet Patient will be free from symptomatic arrythmias. Patient safety and comfort will be maintain throughout this shift for redness, sores, or openings. Do not self treat. If no improvement in two days call your primary care physician for an appointment.  Low blood sugar (hypoglycemia) is a blood sugar below 70mg/dl. Check your blood sugar if you feel signs/symptoms of hypoglycemia. If your blood sugar is below 70 take 15 grams of carbohydrates (ex 4 oz of apple juice, 3-4 glucose tablets, or 4-6 oz of regular soda) wait 15 minutes and repeat blood sugar to make sure it comes up above 70.  If your blood sugar is above 70 and you are due for a meal, have a meal.  If you are not due for a meal have a snack.  This snack helps keeps your blood sugar at a safe range.  We decreased your Lantus to 5units at bedtime.   Please follow the insulin corrective scale  Please follow up closly with your PCP and endocrinologist    Diagnosis: Syncope  Assessment and Plan of Treatment:      PRINCIPAL DISCHARGE DIAGNOSIS  Diagnosis: Syncope  Assessment and Plan of Treatment: You presented to the hospital with two  syncope episode within a 24 hour period. Your PPM was interrogation. No arrythmogenic etiology identified. We also examined you for orthostatic hypotension.  We checked your blood pressure in 3 different position (lying, sitting and standing) to see if there was a dropped in blood pressure. We also monitored for any symptoms of dizziness or lightheaded.  You tested negative for orthostatic hypotension.   Your anticoagulations were discontinued due to your increase risk for fall and bleeding. This was discussed with your family.        SECONDARY DISCHARGE DIAGNOSES  Diagnosis: T2DM (type 2 diabetes mellitus)  Assessment and Plan of Treatment: Make sure you get your HgA1c checked every three months.  If you take insulin, check your blood glucose before meals and at bedtime.  It's important not to skip any meals.  Keep a log of your blood glucose results and always take it with you to your doctor appointments.  Keep a list of your current medications including injectables and over the counter medications and bring this medication list with you to all your doctor appointments.  If you have not seen your ophthalmologist this year call for appointment.  Check your feet Patient will be free from symptomatic arrythmias. Patient safety and comfort will be maintain throughout this shift for redness, sores, or openings. Do not self treat. If no improvement in two days call your primary care physician for an appointment.  Low blood sugar (hypoglycemia) is a blood sugar below 70mg/dl. Check your blood sugar if you feel signs/symptoms of hypoglycemia. If your blood sugar is below 70 take 15 grams of carbohydrates (ex 4 oz of apple juice, 3-4 glucose tablets, or 4-6 oz of regular soda) wait 15 minutes and repeat blood sugar to make sure it comes up above 70.  If your blood sugar is above 70 and you are due for a meal, have a meal.  If you are not due for a meal have a snack.  This snack helps keeps your blood sugar at a safe range.  We decreased your Lantus to 10units at bedtime.   Please follow the insulin corrective scale  Please follow up closly with your PCP and endocrinologist    Diagnosis: Syncope  Assessment and Plan of Treatment:

## 2022-01-04 NOTE — DISCHARGE NOTE PROVIDER - HOSPITAL COURSE
89 year old male with a pmhx of Endocarditis, Alzheimers dementia, CVA, CAD/CABG, HTN, HLD, Afib-on Coumadin s/p PPM, T2DM, insulin dependent, Systolic heart failure, CKD Stage IV, Cardiomyopathy, Spinal stenosis is BIBEMS with syncope x 2 within 24 hours. Patient denies any dizziness, lightheadedness, or mechanical fall. Patient denied any urinary incontinence, tongue biting, post-ictal state during/after the episodes. Patient Did not get hurt or hit his head. No recent fever, chills, cp, cough, abd pain, vomiting, diarrhea. Patient Has recently had Torsemide uptitrated, as he has had increasing weight gain.   Geriatric and Palliative Care consulted for goc and dispo planning. Admitted to PCU to establish care goals and disposition planning.   Completed PPM interrogation, dc anticoagulation and conservatively manage type II diabetes.   Seen by PT, able to use a walker, sats to 86% on ra while using walker so will go home with 02. PT recommended Home PT.   FS consistently above 200, Lantus at 5 u q PM restarted , will likely need to increase as outpatient.  Patient is safe and stable for discharge home with home care. Oxygen and rolling walker ordered.

## 2022-01-04 NOTE — DISCHARGE NOTE PROVIDER - NSDCFUSCHEDAPPT_GEN_ALL_CORE_FT
PELON DUNCAN ; 01/11/2022 ; NPP Geriatrics [address] [facility]  PELON DUNCAN ; 01/11/2022 ; NPP Med Nephro [address] PELON Pulido ; 01/11/2022 ; NPP Med Endocr [address] [facility]  PELON DUNCAN ; 01/18/2022 ; NPP Cardio Electro [address] PELON Pulido ; 01/18/2022 ; NPP Cardio [address] PELON Pulido ; 02/22/2022 ; NPP Neuro [address] [facility]  PELON DUNCAN ; 03/09/2022 ; NPP Cardio Electro [address]

## 2022-01-04 NOTE — PROGRESS NOTE ADULT - SUBJECTIVE AND OBJECTIVE BOX
GAP TEAM PALLIATIVE CARE UNIT PROGRESS NOTE:      [  ] Patient on hospice program.    INDICATION FOR PALLIATIVE CARE UNIT SERVICES: 89 year old male with HFrEF, bioprosthestic MV, on AC, PPM, DM on insulin, CKD, Alzheimer's dementia, BIBEMS with syncope x 2 within 24 hours.  Admitted to PCU under medicine to establish care goals and disposition planning.         INTERVAL HPI/OVERNIGHT EVENTS: Chart reviewed.  The patient is seen and examined at the bedside. He required PRN Tylenol 650mg PO X1,  PRN Oxycodone IR 5mg PO X1 and Oxycodone 7.5mg PO X1 within a 24hr period 8am-8am    DNR on chart:   Allergies    No Known Allergies    Intolerances    MEDICATIONS  (STANDING):  atorvastatin 40 milliGRAM(s) Oral at bedtime  cholecalciferol 2000 Unit(s) Oral daily  dextrose 40% Gel 15 Gram(s) Oral once  dextrose 40% Gel 15 Gram(s) Oral once  dextrose 5%. 1000 milliLiter(s) (100 mL/Hr) IV Continuous <Continuous>  dextrose 5%. 1000 milliLiter(s) (50 mL/Hr) IV Continuous <Continuous>  dextrose 50% Injectable 12.5 Gram(s) IV Push once  dextrose 50% Injectable 25 Gram(s) IV Push once  dextrose 50% Injectable 25 Gram(s) IV Push once  dextrose 50% Injectable 12.5 Gram(s) IV Push once  dextrose 50% Injectable 25 Gram(s) IV Push once  donepezil 10 milliGRAM(s) Oral at bedtime  glucagon  Injectable 1 milliGRAM(s) IntraMuscular once  hydrALAZINE 10 milliGRAM(s) Oral two times a day  insulin glargine Injectable (LANTUS) 5 Unit(s) SubCutaneous at bedtime  insulin lispro (ADMELOG) corrective regimen sliding scale   SubCutaneous three times a day before meals  insulin lispro (ADMELOG) corrective regimen sliding scale   SubCutaneous at bedtime  isosorbide   mononitrate ER Tablet (IMDUR) 30 milliGRAM(s) Oral daily  lidocaine   4% Patch 2 Patch Transdermal daily  memantine 5 milliGRAM(s) Oral two times a day  metoprolol succinate ER 75 milliGRAM(s) Oral daily  polyethylene glycol 3350 17 Gram(s) Oral daily  senna 2 Tablet(s) Oral at bedtime  sodium bicarbonate 650 milliGRAM(s) Oral two times a day  tamsulosin 0.4 milliGRAM(s) Oral at bedtime    MEDICATIONS  (PRN):  acetaminophen     Tablet .. 650 milliGRAM(s) Oral every 6 hours PRN Temp greater or equal to 38C (100.4F), Mild Pain (1 - 3)  aluminum hydroxide/magnesium hydroxide/simethicone Suspension 30 milliLiter(s) Oral every 4 hours PRN Dyspepsia  melatonin 3 milliGRAM(s) Oral at bedtime PRN Insomnia  ondansetron Injectable 4 milliGRAM(s) IV Push every 8 hours PRN Nausea and/or Vomiting  oxyCODONE    IR 5 milliGRAM(s) Oral every 6 hours PRN Moderate Pain (4 - 6)  oxyCODONE    IR 7.5 milliGRAM(s) Oral every 6 hours PRN Severe Pain (7 - 10)    ITEMS UNCHECKED ARE NOT PRESENT    PRESENT SYMPTOMS: [ ]Unable to obtain due to poor mentation   Source if other than patient:  [ ]Family   [ ]Team     Pain: [ X] yes [ ] no  QOL impact - ADLs  Location -  back, shoulder               Aggravating factors - movement  Quality -  Radiation -  Timing-  Severity (0-10 scale): a range of 4-9 within a 24hr period 8am-8am  Minimal acceptable level (0-10 scale):     Dyspnea:                           [ ]Mild [ ]Moderate [ ]Severe  Anxiety:                             [ ]Mild [ ]Moderate [ ]Severe  Fatigue:                             [ ]Mild [ ]Moderate [ ]Severe  Nausea:                             [ ]Mild [ ]Moderate [ ]Severe  Loss of appetite:              [ ]Mild [ ]Moderate [ ]Severe  Constipation:                    [ ]Mild [ ]Moderate [ ]Severe    PAINAD Score:    [email or web address] (Ctrl +  left click to view)  		  Other Symptoms:  [ X]All other review of systems negative     Palliative Performance Status Version 2:  50-60 %         http://npcrc.org/files/news/palliative_performance_scale_ppsv2.pdf  PHYSICAL EXAM:  Vital Signs Last 24 Hrs  T(C): 36.6 (04 Jan 2022 05:40), Max: 36.6 (04 Jan 2022 05:40)  T(F): 97.8 (04 Jan 2022 05:40), Max: 97.8 (04 Jan 2022 05:40)  HR: 68 (04 Jan 2022 05:40) (65 - 68)  BP: 161/67 (04 Jan 2022 05:40) (150/64 - 161/67)  BP(mean): --  RR: 18 (04 Jan 2022 05:40) (18 - 18)  SpO2: 96% (04 Jan 2022 09:10) (86% - 98%) I&O's Summary    03 Jan 2022 07:01  -  04 Jan 2022 07:00  --------------------------------------------------------  IN: 0 mL / OUT: 1400 mL / NET: -1400 mL    GENERAL:  [ X]Alert  [ ]Oriented x2-3   [ ]Lethargic  [ ]Cachexia  [ ]Unarousable  [ X]Verbal  [ ]Non-Verbal  Behavioral:   [ ] Anxiety  [ ] Delirium [ ] Agitation [ ] Other  HEENT:  [X ]Normal   [ ]Dry mouth   [ ]ET Tube/Trach  [ ]Oral lesions  PULMONARY:   [ ]Clear [ ]Tachypnea  [ ]Audible excessive secretions   [ ]Rhonchi        [ ]Right [ ]Left [ ]Bilateral  [ ]Crackles        [ ]Right [ ]Left [ ]Bilateral  [ ]Wheezing     [ ]Right [ ]Left [ ]Bilateral  [ X]Diminshed BS [ ]Right [ ]Left [X ]Bilateral    CARDIOVASCULAR:    [ X]Regular [ ]Irregular [ ]Tachy  [ ]Al [ ]Murmur [ ]Other  GASTROINTESTINAL:  [X ]Soft  [X ]Distended   [X ]+BS  [ ]Non tender [ ]Tender  [ ]PEG [ ]OGT/ NGT   Last BM:  12/31/31  GENITOURINARY:  [ ]Normal [ X] Incontinent   [ ]Oliguria/Anuria   [ ]Soto [X] Other- condom cath  MUSCULOSKELETAL:   [ ]Normal   [X ]Weakness  [ ]Bed/Wheelchair bound [ ]Edema  NEUROLOGIC:   [ ]No focal deficits  [X ] Cognitive impairment  [ ] Dysphagia [ ]Dysarthria [ ] Paresis [ ]Other   SKIN:  b/l LE wounds. Please see nursing assessment for further details for which I have reviewed   [ ]Normal  [ ]Rash     [ ]Pressure ulcer(s)  [ ]y [ ]n  Present on admission      CRITICAL CARE:  [ ] Shock Present  [ ]Septic [ ]Cardiogenic [ ]Neurologic [ ]Hypovolemic  [ ]  Vasopressors [ ]  Inotropes   [ ] Respiratory failure present [ ] Mechanical Ventilation [ ] Non-invasive ventilatory support [ ] High-Flow  [ ] Acute  [ ] Chronic [ ] Hypoxic  [ ] Hypercarbic [ ] Other  [ X] Other organ failure- brain, kidneys    LABS: None new    RADIOLOGY & ADDITIONAL STUDIES: None new      PROTEIN CALORIE MALNUTRITION: [ ] mild [ ] moderate [ ] severe  [ ] underweight [ ] morbid obesity    https://www.andeal.org/vault/2440/web/files/ONC/Table_Clinical%20Characteristics%20to%20Document%20Malnutrition-White%20JV%20et%20al%635539.pdf    Height (cm): 172.7 (12-28-21 @ 13:25), 172.7 (08-26-21 @ 10:10)  Weight (kg): 84.9 (01-02-22 @ 21:48), 73.9 (08-26-21 @ 10:10)  BMI (kg/m2): 28.5 (01-02-22 @ 21:48), 24.8 (12-28-21 @ 13:25), 24.8 (08-26-21 @ 10:10)    [ ] PPSV2 < or = 30% [ ] significant weight loss [ ] poor nutritional intake [ ] anasarca   Artificial Nutrition [ ]     REFERRALS:   [ ]Chaplaincy  [ ] Hospice  [ ]Child Life  [ X]Social Work  [ ]Case management [ ]Holistic Therapy [ ] Physical Therapy [ ] Dietary   Goals of Care Document:    GAP TEAM PALLIATIVE CARE UNIT PROGRESS NOTE:      [  ] Patient on hospice program.    INDICATION FOR PALLIATIVE CARE UNIT SERVICES: 89 year old male with HFrEF, bioprosthestic MV, on AC, PPM, DM on insulin, CKD, Alzheimer's dementia, BIBEMS with syncope x 2 within 24 hours.  Admitted to PCU under medicine to establish care goals and disposition planning.         INTERVAL HPI/OVERNIGHT EVENTS: Chart reviewed.  The patient is seen and examined at the bedside. He required PRN Tylenol 650mg PO X1,  PRN Oxycodone IR 5mg PO X1 and Oxycodone 7.5mg PO X1 within a 24hr period 8am-8am    DNR on chart:   Allergies    No Known Allergies    Intolerances    MEDICATIONS  (STANDING):  atorvastatin 40 milliGRAM(s) Oral at bedtime  cholecalciferol 2000 Unit(s) Oral daily  dextrose 40% Gel 15 Gram(s) Oral once  dextrose 40% Gel 15 Gram(s) Oral once  dextrose 5%. 1000 milliLiter(s) (100 mL/Hr) IV Continuous <Continuous>  dextrose 5%. 1000 milliLiter(s) (50 mL/Hr) IV Continuous <Continuous>  dextrose 50% Injectable 12.5 Gram(s) IV Push once  dextrose 50% Injectable 25 Gram(s) IV Push once  dextrose 50% Injectable 25 Gram(s) IV Push once  dextrose 50% Injectable 12.5 Gram(s) IV Push once  dextrose 50% Injectable 25 Gram(s) IV Push once  donepezil 10 milliGRAM(s) Oral at bedtime  glucagon  Injectable 1 milliGRAM(s) IntraMuscular once  hydrALAZINE 10 milliGRAM(s) Oral two times a day  insulin glargine Injectable (LANTUS) 5 Unit(s) SubCutaneous at bedtime  insulin lispro (ADMELOG) corrective regimen sliding scale   SubCutaneous three times a day before meals  insulin lispro (ADMELOG) corrective regimen sliding scale   SubCutaneous at bedtime  isosorbide   mononitrate ER Tablet (IMDUR) 30 milliGRAM(s) Oral daily  lidocaine   4% Patch 2 Patch Transdermal daily  memantine 5 milliGRAM(s) Oral two times a day  metoprolol succinate ER 75 milliGRAM(s) Oral daily  polyethylene glycol 3350 17 Gram(s) Oral daily  senna 2 Tablet(s) Oral at bedtime  sodium bicarbonate 650 milliGRAM(s) Oral two times a day  tamsulosin 0.4 milliGRAM(s) Oral at bedtime    MEDICATIONS  (PRN):  acetaminophen     Tablet .. 650 milliGRAM(s) Oral every 6 hours PRN Temp greater or equal to 38C (100.4F), Mild Pain (1 - 3)  aluminum hydroxide/magnesium hydroxide/simethicone Suspension 30 milliLiter(s) Oral every 4 hours PRN Dyspepsia  melatonin 3 milliGRAM(s) Oral at bedtime PRN Insomnia  ondansetron Injectable 4 milliGRAM(s) IV Push every 8 hours PRN Nausea and/or Vomiting  oxyCODONE    IR 5 milliGRAM(s) Oral every 6 hours PRN Moderate Pain (4 - 6)  oxyCODONE    IR 7.5 milliGRAM(s) Oral every 6 hours PRN Severe Pain (7 - 10)    ITEMS UNCHECKED ARE NOT PRESENT    PRESENT SYMPTOMS: [ ]Unable to obtain due to poor mentation   Source if other than patient:  [ ]Family   [ ]Team     Pain: [ X] yes [ ] no  QOL impact - ADLs  Location -  back, shoulder               Aggravating factors - movement  Quality -  Radiation -  Timing-  Severity (0-10 scale): a range of 4-9 within a 24hr period 8am-8am  Minimal acceptable level (0-10 scale):     Dyspnea:                           [ ]Mild [ ]Moderate [ ]Severe  Anxiety:                             [ ]Mild [ ]Moderate [ ]Severe  Fatigue:                             [ ]Mild [ ]Moderate [ ]Severe  Nausea:                             [ ]Mild [ ]Moderate [ ]Severe  Loss of appetite:              [ ]Mild [ ]Moderate [ ]Severe  Constipation:                    [ ]Mild [ ]Moderate [ ]Severe    PAINAD Score:    [email or web address] (Ctrl +  left click to view)  		  Other Symptoms:  [ X]All other review of systems negative     Palliative Performance Status Version 2:  50-60 %         http://npcrc.org/files/news/palliative_performance_scale_ppsv2.pdf  PHYSICAL EXAM:  Vital Signs Last 24 Hrs  T(C): 36.6 (04 Jan 2022 05:40), Max: 36.6 (04 Jan 2022 05:40)  T(F): 97.8 (04 Jan 2022 05:40), Max: 97.8 (04 Jan 2022 05:40)  HR: 68 (04 Jan 2022 05:40) (65 - 68)  BP: 161/67 (04 Jan 2022 05:40) (150/64 - 161/67)  BP(mean): --  RR: 18 (04 Jan 2022 05:40) (18 - 18)  SpO2: 96% (04 Jan 2022 09:10) (86% - 98%) I&O's Summary    03 Jan 2022 07:01  -  04 Jan 2022 07:00  --------------------------------------------------------  IN: 0 mL / OUT: 1400 mL / NET: -1400 mL    GENERAL:  [ X]Alert  [ ]Oriented x2-3   [ ]Lethargic  [ ]Cachexia  [ ]Unarousable  [ X]Verbal  [ ]Non-Verbal  Behavioral:   [ ] Anxiety  [ ] Delirium [ ] Agitation [ ] Other  HEENT:  [X ]Normal   [ ]Dry mouth   [ ]ET Tube/Trach  [ ]Oral lesions  PULMONARY:   [ ]Clear [ ]Tachypnea  [ ]Audible excessive secretions   [ ]Rhonchi        [ ]Right [ ]Left [ ]Bilateral  [ ]Crackles        [ ]Right [ ]Left [ ]Bilateral  [ ]Wheezing     [ ]Right [ ]Left [ ]Bilateral  [ X]Diminshed BS [ ]Right [ ]Left [X ]Bilateral    CARDIOVASCULAR:    [ X]Regular [ ]Irregular [ ]Tachy  [ ]Al [ ]Murmur [ ]Other  GASTROINTESTINAL:  [X ]Soft  [X ]Distended   [X ]+BS  [ ]Non tender [ ]Tender  [ ]PEG [ ]OGT/ NGT   Last BM:  12/31/31  GENITOURINARY:  [ ]Normal [ X] Incontinent   [ ]Oliguria/Anuria   [ ]Soto [X] Other- condom cath  MUSCULOSKELETAL:   [ ]Normal   [X ]Weakness  [ ]Bed/Wheelchair bound [ ]Edema  NEUROLOGIC:   [ ]No focal deficits  [X ] Cognitive impairment  [ ] Dysphagia [ ]Dysarthria [ ] Paresis [ ]Other   SKIN:  b/l LE wounds. Please see nursing assessment for further details for which I have reviewed   [ ]Normal  [ ]Rash     [ ]Pressure ulcer(s)  [ ]y [ ]n  Present on admission      CRITICAL CARE:  [ ] Shock Present  [ ]Septic [ ]Cardiogenic [ ]Neurologic [ ]Hypovolemic  [ ]  Vasopressors [ ]  Inotropes   [ ] Respiratory failure present [ ] Mechanical Ventilation [ ] Non-invasive ventilatory support [ ] High-Flow  [ ] Acute  [ ] Chronic [ ] Hypoxic  [ ] Hypercarbic [ ] Other  [ X] Other organ failure- brain, kidneys, heart    LABS: None new    RADIOLOGY & ADDITIONAL STUDIES: None new      PROTEIN CALORIE MALNUTRITION: [ ] mild [ ] moderate [ ] severe  [ ] underweight [ ] morbid obesity    https://www.andeal.org/vault/2440/web/files/ONC/Table_Clinical%20Characteristics%20to%20Document%20Malnutrition-White%20JV%20et%20al%340108.pdf    Height (cm): 172.7 (12-28-21 @ 13:25), 172.7 (08-26-21 @ 10:10)  Weight (kg): 84.9 (01-02-22 @ 21:48), 73.9 (08-26-21 @ 10:10)  BMI (kg/m2): 28.5 (01-02-22 @ 21:48), 24.8 (12-28-21 @ 13:25), 24.8 (08-26-21 @ 10:10)    [ ] PPSV2 < or = 30% [ ] significant weight loss [ ] poor nutritional intake [ ] anasarca   Artificial Nutrition [ ]     REFERRALS:   [ ]Chaplaincy  [ ] Hospice  [ ]Child Life  [ X]Social Work  [ ]Case management [ ]Holistic Therapy [ ] Physical Therapy [ ] Dietary   Goals of Care Document:

## 2022-01-04 NOTE — DISCHARGE NOTE PROVIDER - CARE PROVIDER_API CALL
Airam Miller)  Geriatric Medicine; [facility]; Internal Medicine  [address]  [place] [address]  Phone: [phone]  Fax: [phone]  Established Patient  Follow Up Time: 1 week

## 2022-01-04 NOTE — CHART NOTE - NSCHARTNOTEFT_GEN_A_CORE
Patient oxygen level on room air was 86%. Patient qualifies for home oxygen Patient oxygen level on room air was 86% at rest.  Saturation improved to 96% on 2 L O2 nasal canula. Patient qualifies for home oxygen

## 2022-01-04 NOTE — PROGRESS NOTE ADULT - PROBLEM SELECTOR PLAN 3
Alert, then down with rapid recovery within seconds - x 2 episodes in 24 hours  - PM interrogation - no arrythmogenic etiology identified  - torsemide being held  - hypoglycemia vs. dysautonomia  - FS being monitored  Orthostatic negative   CT head non-diagnostic  Cardiology onboard Alert, then down with rapid recovery within seconds - x 2 episodes in 24 hours  - PM interrogation - no arrythmogenic etiology identified  - torsemide being held  - hypoglycemia vs. dysautonomia vs dehydrated state  - FS being monitored  Orthostatic negative   CT head non-diagnostic  Cardiology onboard

## 2022-01-04 NOTE — DISCHARGE NOTE PROVIDER - NSDCMRMEDTOKEN_GEN_ALL_CORE_FT
atorvastatin 40 mg oral tablet: 1 tab(s) orally once a day  cholecalciferol oral tablet: 2000 unit(s) orally once a day  digoxin 125 mcg (0.125 mg) oral tablet: 1 tab(s) orally Monday, Wednesday, and Friday  donepezil 10 mg oral tablet: 1 tab(s) orally once a day (at bedtime)  hydrALAZINE 10 mg oral tablet: 1 tab(s) orally 2 times a day  insulin glargine: 5 unit(s) subcutaneous once a day (at bedtime)   isosorbide mononitrate 30 mg oral tablet, extended release: 1 tab(s) orally once a day (in the morning)  lidocaine 4% topical film: Apply topically to affected area once a day   Metoprolol Succinate ER 25 mg oral tablet, extended release: 3 tab(s) orally once a day  Namenda 5 mg oral tablet: 1 tab(s) orally 2 times a day  oxyCODONE 5 mg oral tablet: 1 tab(s) orally every 6 hours, As Needed for moderate pain 4-6  oxyCODONE 7.5 mg oral tablet: 1 tab(s) orally every 6 hours, As needed, Severe Pain (7 - 10)  polyethylene glycol 3350 oral powder for reconstitution: 17 gram(s) orally once a day  senna oral tablet: 2 tab(s) orally once a day (at bedtime)  sodium bicarbonate 325 mg oral tablet: 2 dose(s) orally 2 times a day  tamsulosin 0.4 mg oral capsule: 1 cap(s) orally once a day (at bedtime)  torsemide 20 mg oral tablet: 3 tab(s) orally once a day (in the morning)  torsemide 20 mg oral tablet: 1 tab(s) orally once a day (in the evening), As Needed depending on weight   Tylenol Extra Strength 500 mg oral tablet: 1 tab(s) orally every 8 hours, As Needed   atorvastatin 40 mg oral tablet: 1 tab(s) orally once a day  cholecalciferol oral tablet: 2000 unit(s) orally once a day  donepezil 10 mg oral tablet: 1 tab(s) orally once a day (at bedtime)  hydrALAZINE 10 mg oral tablet: 1 tab(s) orally 2 times a day  insulin glargine: 5 unit(s) subcutaneous once a day (at bedtime)   isosorbide mononitrate 30 mg oral tablet, extended release: 1 tab(s) orally once a day (in the morning)  lidocaine 4% topical film: Apply topically to affected area once a day   Metoprolol Succinate ER 25 mg oral tablet, extended release: 3 tab(s) orally once a day  Namenda 5 mg oral tablet: 1 tab(s) orally 2 times a day  oxyCODONE 5 mg oral tablet: 1 tab(s) orally every 6 hours, As Needed for moderate pain 4-6  oxyCODONE 7.5 mg oral tablet: 1 tab(s) orally every 6 hours, As needed, Severe Pain (7 - 10)  polyethylene glycol 3350 oral powder for reconstitution: 17 gram(s) orally once a day  senna oral tablet: 2 tab(s) orally once a day (at bedtime)  sodium bicarbonate 325 mg oral tablet: 2 dose(s) orally 2 times a day  tamsulosin 0.4 mg oral capsule: 1 cap(s) orally once a day (at bedtime)  Tylenol Extra Strength 500 mg oral tablet: 1 tab(s) orally every 8 hours, As Needed   atorvastatin 40 mg oral tablet: 1 tab(s) orally once a day  cholecalciferol oral tablet: 2000 unit(s) orally once a day  donepezil 10 mg oral tablet: 1 tab(s) orally once a day (at bedtime)  HumaLOG 100 units/mL subcutaneous solution: unit(s) subcutaneous 3 times a day   2 Unit(s) if Glucose 201 - 250  3 Unit(s) if Glucose 251 - 300  4 Unit(s) if Glucose 301 - 350  5 Unit(s) if Glucose 351 - 400  6 Unit(s) if Glucose Greater Than 400  HumaLOG 100 units/mL subcutaneous solution: unit(s) subcutaneous at bedtime  0 Unit(s) if Glucose 0 - 250  1 Unit(s) if Glucose 251 - 300  2 Unit(s) if Glucose 301 - 350  3 Unit(s) if Glucose 351 - 400  4 Unit(s) if Glucose Greater Than 400  hydrALAZINE 10 mg oral tablet: 1 tab(s) orally 2 times a day  insulin glargine: 10 unit(s) subcutaneous once a day (at bedtime)  isosorbide mononitrate 30 mg oral tablet, extended release: 1 tab(s) orally once a day (in the morning)  lidocaine 4% topical film: Apply topically to affected area once a day   Metoprolol Succinate ER 25 mg oral tablet, extended release: 3 tab(s) orally once a day  Namenda 5 mg oral tablet: 1 tab(s) orally 2 times a day  oxyCODONE 5 mg oral tablet: 1 tab(s) orally every 6 hours, As Needed for moderate pain 4-6  oxyCODONE 7.5 mg oral tablet: 1 tab(s) orally every 6 hours, As needed, Severe Pain (7 - 10)  polyethylene glycol 3350 oral powder for reconstitution: 17 gram(s) orally once a day  senna oral tablet: 2 tab(s) orally once a day (at bedtime)  sodium bicarbonate 325 mg oral tablet: 2 dose(s) orally 2 times a day  tamsulosin 0.4 mg oral capsule: 1 cap(s) orally once a day (at bedtime)  Tylenol Extra Strength 500 mg oral tablet: 1 tab(s) orally every 8 hours, As Needed   atorvastatin 40 mg oral tablet: 1 tab(s) orally once a day  cholecalciferol oral tablet: 2000 unit(s) orally once a day  donepezil 10 mg oral tablet: 1 tab(s) orally once a day (at bedtime)  HumaLOG 100 units/mL subcutaneous solution: unit(s) subcutaneous 3 times a day   2 Unit(s) if Glucose 201 - 250  3 Unit(s) if Glucose 251 - 300  4 Unit(s) if Glucose 301 - 350  5 Unit(s) if Glucose 351 - 400  6 Unit(s) if Glucose Greater Than 400  HumaLOG 100 units/mL subcutaneous solution: unit(s) subcutaneous at bedtime  0 Unit(s) if Glucose 0 - 250  1 Unit(s) if Glucose 251 - 300  2 Unit(s) if Glucose 301 - 350  3 Unit(s) if Glucose 351 - 400  4 Unit(s) if Glucose Greater Than 400  hydrALAZINE 10 mg oral tablet: 1 tab(s) orally 2 times a day  insulin glargine: 10 unit(s) subcutaneous once a day (at bedtime)  isosorbide mononitrate 30 mg oral tablet, extended release: 1 tab(s) orally once a day (in the morning)  lidocaine 4% topical film: Apply topically to affected area once a day   Metoprolol Succinate ER 25 mg oral tablet, extended release: 3 tab(s) orally once a day  Namenda 5 mg oral tablet: 1 tab(s) orally 2 times a day  oxyCODONE 5 mg oral tablet: 1 tab(s) orally every 6 hours, As Needed for moderate pain 4-6  oxyCODONE 7.5 mg oral tablet: 1 tab(s) orally every 6 hours, As needed, Severe Pain (7 - 10)  senna oral tablet: 2 tab(s) orally once a day (at bedtime)  sodium bicarbonate 325 mg oral tablet: 2 dose(s) orally 2 times a day  tamsulosin 0.4 mg oral capsule: 1 cap(s) orally once a day (at bedtime)  Tylenol Extra Strength 500 mg oral tablet: 1 tab(s) orally every 8 hours, As Needed

## 2022-01-05 PROBLEM — G30.9 ALZHEIMER'S DISEASE, UNSPECIFIED: Chronic | Status: ACTIVE | Noted: 2021-01-01

## 2022-01-05 PROBLEM — M48.00 SPINAL STENOSIS, SITE UNSPECIFIED: Chronic | Status: ACTIVE | Noted: 2021-01-01

## 2022-01-05 NOTE — PROGRESS NOTE ADULT - PROBLEM SELECTOR PROBLEM 8
HTN (hypertension)
Chronic kidney disease (CKD), stage IV (severe)
Palliative care encounter
HTN (hypertension)
Chronic kidney disease (CKD), stage IV (severe)
Chronic kidney disease (CKD), stage IV (severe)

## 2022-01-05 NOTE — PROGRESS NOTE ADULT - PROBLEM SELECTOR PLAN 10
Plan is for patient to be d/c home with home care. Oxygen and rolling walker ordered  Will continue with care in the PCU
Attending spoke with son Reinaldo this morning.  Educated as to what to expect.  Questions answered.  Emotional support provided.   Plan is for patient to be d/c home with home care. Oxygen and rolling walker ordered  Will continue with care in the PCU

## 2022-01-05 NOTE — PROGRESS NOTE ADULT - PROBLEM SELECTOR PLAN 8
Goals remain for conservative medical management - no hemodialysis.
VS unremarkable on metoprolol, hydralazine, torsemide, Imdur.  May need to adjust if thought to be contributing to syncopal sx.
VS unremarkable on metoprolol, hydralazine, Imdur.  May need to adjust if thought to be contributing to syncopal sx.
Goals remain for conservative medical management - no hemodialysis.
Goals remain for conservative medical management - no hemodialysis.
Case discussed in detail with PCP - complete limited medical work-up, plan for home discharge with hospice evaluation pending follow-up discussion with family.  Time spent in face to face discussion with family and PCP via telephone 25 minutes.

## 2022-01-05 NOTE — PROGRESS NOTE ADULT - PROBLEM SELECTOR PLAN 5
FS before meals and at bedtime  Lantus 5units at bedtime ordered   Corrective scale (Admelog)
Art held, RISS with coverage for now.  Will adjust and or consider stopping depending on FS results/GOC discussions
VS unremarkable on metoprolol, hydralazine, torsemide, Imdur.  May need to adjust if thought to be contributing to syncopal sx.
Art held, RISS with coverage for now.  Will adjust and or consider stopping depending on FS results/GOC discussions
PPSV 50-60%. Patient requires nursing assistance with some ADLs  Supportive care
PPSV 50-60%. Patient requires nursing assistance with some ADLs  Supportive care

## 2022-01-05 NOTE — PROGRESS NOTE ADULT - SUBJECTIVE AND OBJECTIVE BOX
GAP TEAM PALLIATIVE CARE UNIT PROGRESS NOTE:      [  ] Patient on hospice program.    INDICATION FOR PALLIATIVE CARE UNIT SERVICES: 89 year old male with HFrEF, bioprosthestic MV, on AC, PPM, DM on insulin, CKD, Alzheimer's dementia, BIBEMS with syncope x 2 within 24 hours.  Admitted to PCU under medicine to establish care goals and disposition planning.         INTERVAL HPI/OVERNIGHT EVENTS: Chart review    DNR on chart:   Allergies    No Known Allergies    Intolerances    MEDICATIONS  (STANDING):  atorvastatin 40 milliGRAM(s) Oral at bedtime  cholecalciferol 2000 Unit(s) Oral daily  dextrose 40% Gel 15 Gram(s) Oral once  dextrose 40% Gel 15 Gram(s) Oral once  dextrose 5%. 1000 milliLiter(s) (50 mL/Hr) IV Continuous <Continuous>  dextrose 5%. 1000 milliLiter(s) (100 mL/Hr) IV Continuous <Continuous>  dextrose 50% Injectable 12.5 Gram(s) IV Push once  dextrose 50% Injectable 25 Gram(s) IV Push once  dextrose 50% Injectable 25 Gram(s) IV Push once  dextrose 50% Injectable 12.5 Gram(s) IV Push once  dextrose 50% Injectable 25 Gram(s) IV Push once  donepezil 10 milliGRAM(s) Oral at bedtime  glucagon  Injectable 1 milliGRAM(s) IntraMuscular once  hydrALAZINE 10 milliGRAM(s) Oral two times a day  insulin glargine Injectable (LANTUS) 5 Unit(s) SubCutaneous at bedtime  insulin lispro (ADMELOG) corrective regimen sliding scale   SubCutaneous three times a day before meals  insulin lispro (ADMELOG) corrective regimen sliding scale   SubCutaneous at bedtime  isosorbide   mononitrate ER Tablet (IMDUR) 30 milliGRAM(s) Oral daily  lidocaine   4% Patch 2 Patch Transdermal daily  memantine 5 milliGRAM(s) Oral two times a day  metoprolol succinate ER 75 milliGRAM(s) Oral daily  polyethylene glycol 3350 17 Gram(s) Oral daily  senna 2 Tablet(s) Oral at bedtime  sodium bicarbonate 650 milliGRAM(s) Oral two times a day  tamsulosin 0.4 milliGRAM(s) Oral at bedtime    MEDICATIONS  (PRN):  acetaminophen     Tablet .. 650 milliGRAM(s) Oral every 6 hours PRN Temp greater or equal to 38C (100.4F), Mild Pain (1 - 3)  aluminum hydroxide/magnesium hydroxide/simethicone Suspension 30 milliLiter(s) Oral every 4 hours PRN Dyspepsia  melatonin 3 milliGRAM(s) Oral at bedtime PRN Insomnia  ondansetron Injectable 4 milliGRAM(s) IV Push every 8 hours PRN Nausea and/or Vomiting  oxyCODONE    IR 5 milliGRAM(s) Oral every 6 hours PRN Moderate Pain (4 - 6)  oxyCODONE    IR 7.5 milliGRAM(s) Oral every 6 hours PRN Severe Pain (7 - 10)    ITEMS UNCHECKED ARE NOT PRESENT    PRESENT SYMPTOMS: [ ]Unable to obtain due to poor mentation   Source if other than patient:  [ ]Family   [ ]Team     Pain: [ ] yes [ ] no  QOL impact -   Location -                    Aggravating factors -  Quality -  Radiation -  Timing-  Severity (0-10 scale):  Minimal acceptable level (0-10 scale):     Dyspnea:                           [ ]Mild [ ]Moderate [ ]Severe  Anxiety:                             [ ]Mild [ ]Moderate [ ]Severe  Fatigue:                             [ ]Mild [ ]Moderate [ ]Severe  Nausea:                             [ ]Mild [ ]Moderate [ ]Severe  Loss of appetite:              [ ]Mild [ ]Moderate [ ]Severe  Constipation:                    [ ]Mild [ ]Moderate [ ]Severe    PAINAD Score:    [email or web address] (Ctrl +  left click to view)  		  Other Symptoms:  [ ]All other review of systems negative     Palliative Performance Status Version 2:         %         [email or web address]  PHYSICAL EXAM:  Vital Signs Last 24 Hrs  T(C): 36.5 (04 Jan 2022 15:08), Max: 36.5 (04 Jan 2022 15:08)  T(F): 97.7 (04 Jan 2022 15:08), Max: 97.7 (04 Jan 2022 15:08)  HR: 68 (04 Jan 2022 15:36) (67 - 68)  BP: 152/67 (04 Jan 2022 15:08) (152/67 - 152/67)  BP(mean): --  RR: 18 (04 Jan 2022 15:08) (18 - 18)  SpO2: 95% (04 Jan 2022 15:36) (95% - 96%) I&O's Summary    04 Jan 2022 07:01  -  05 Jan 2022 07:00  --------------------------------------------------------  IN: 0 mL / OUT: 1100 mL / NET: -1100 mL    GENERAL:  [ ]Alert  [ ]Oriented x   [ ]Lethargic  [ ]Cachexia  [ ]Unarousable  [ ]Verbal  [ ]Non-Verbal  Behavioral:   [ ] Anxiety  [ ] Delirium [ ] Agitation [ ] Other  HEENT:  [ ]Normal   [ ]Dry mouth   [ ]ET Tube/Trach  [ ]Oral lesions  PULMONARY:   [ ]Clear [ ]Tachypnea  [ ]Audible excessive secretions   [ ]Rhonchi        [ ]Right [ ]Left [ ]Bilateral  [ ]Crackles        [ ]Right [ ]Left [ ]Bilateral  [ ]Wheezing     [ ]Right [ ]Left [ ]Bilateral  [ ]Diminshed BS [ ]Right [ ]Left [ ]Bilateral    CARDIOVASCULAR:    [ ]Regular [ ]Irregular [ ]Tachy  [ ]Al [ ]Murmur [ ]Other  GASTROINTESTINAL:  [ ]Soft  [ ]Distended   [ ]+BS  [ ]Non tender [ ]Tender  [ ]PEG [ ]OGT/ NGT   Last BM:     GENITOURINARY:  [ ]Normal [ ] Incontinent   [ ]Oliguria/Anuria   [ ]Soto  MUSCULOSKELETAL:   [ ]Normal   [ ]Weakness  [ ]Bed/Wheelchair bound [ ]Edema  NEUROLOGIC:   [ ]No focal deficits  [ ] Cognitive impairment  [ ] Dysphagia [ ]Dysarthria [ ] Paresis [ ]Other   SKIN:   [ ]Normal  [ ]Rash     [ ]Pressure ulcer(s)  [ ]y [ ]n  Present on admission      CRITICAL CARE:  [ ] Shock Present  [ ]Septic [ ]Cardiogenic [ ]Neurologic [ ]Hypovolemic  [ ]  Vasopressors [ ]  Inotropes   [ ] Respiratory failure present [ ] Mechanical Ventilation [ ] Non-invasive ventilatory support [ ] High-Flow  [ ] Acute  [ ] Chronic [ ] Hypoxic  [ ] Hypercarbic [ ] Other  [ ] Other organ failure     LABS:            RADIOLOGY & ADDITIONAL STUDIES:    PROTEIN CALORIE MALNUTRITION: [ ] mild [ ] moderate [ ] severe  [ ] underweight [ ] morbid obesity    https://www.andeal.org/vault/6969/web/files/ONC/Table_Clinical%20Characteristics%20to%20Document%20Malnutrition-White%20JV%20et%20al%939185.pdf    Height (cm): 172.7 (12-28-21 @ 13:25), 172.7 (08-26-21 @ 10:10)  Weight (kg): 84.9 (01-02-22 @ 21:48), 73.9 (08-26-21 @ 10:10)  BMI (kg/m2): 28.5 (01-02-22 @ 21:48), 24.8 (12-28-21 @ 13:25), 24.8 (08-26-21 @ 10:10)    [ ] PPSV2 < or = 30% [ ] significant weight loss [ ] poor nutritional intake [ ] anasarca   Artificial Nutrition [ ]     REFERRALS:   [ ]Chaplaincy  [ ] Hospice  [ ]Child Life  [ ]Social Work  [ ]Case management [ ]Holistic Therapy [ ] Physical Therapy [ ] Dietary   Goals of Care Document:    GAP TEAM PALLIATIVE CARE UNIT PROGRESS NOTE:      [  ] Patient on hospice program.    INDICATION FOR PALLIATIVE CARE UNIT SERVICES: 89 year old male with HFrEF, bioprosthestic MV, on AC, PPM, DM on insulin, CKD, Alzheimer's dementia, BIBEMS with syncope x 2 within 24 hours.  Admitted to PCU under medicine to establish care goals and disposition planning.         INTERVAL HPI/OVERNIGHT EVENTS: Chart review. The patient is seen and examined at the bedside. He required PRN Oxycodone IR 5mg PO X1 within a 24hr period 8am-8am.    DNR on chart:   Allergies    No Known Allergies    Intolerances    MEDICATIONS  (STANDING):  atorvastatin 40 milliGRAM(s) Oral at bedtime  cholecalciferol 2000 Unit(s) Oral daily  dextrose 40% Gel 15 Gram(s) Oral once  dextrose 40% Gel 15 Gram(s) Oral once  dextrose 5%. 1000 milliLiter(s) (50 mL/Hr) IV Continuous <Continuous>  dextrose 5%. 1000 milliLiter(s) (100 mL/Hr) IV Continuous <Continuous>  dextrose 50% Injectable 12.5 Gram(s) IV Push once  dextrose 50% Injectable 25 Gram(s) IV Push once  dextrose 50% Injectable 25 Gram(s) IV Push once  dextrose 50% Injectable 12.5 Gram(s) IV Push once  dextrose 50% Injectable 25 Gram(s) IV Push once  donepezil 10 milliGRAM(s) Oral at bedtime  glucagon  Injectable 1 milliGRAM(s) IntraMuscular once  hydrALAZINE 10 milliGRAM(s) Oral two times a day  insulin glargine Injectable (LANTUS) 5 Unit(s) SubCutaneous at bedtime  insulin lispro (ADMELOG) corrective regimen sliding scale   SubCutaneous three times a day before meals  insulin lispro (ADMELOG) corrective regimen sliding scale   SubCutaneous at bedtime  isosorbide   mononitrate ER Tablet (IMDUR) 30 milliGRAM(s) Oral daily  lidocaine   4% Patch 2 Patch Transdermal daily  memantine 5 milliGRAM(s) Oral two times a day  metoprolol succinate ER 75 milliGRAM(s) Oral daily  polyethylene glycol 3350 17 Gram(s) Oral daily  senna 2 Tablet(s) Oral at bedtime  sodium bicarbonate 650 milliGRAM(s) Oral two times a day  tamsulosin 0.4 milliGRAM(s) Oral at bedtime    MEDICATIONS  (PRN):  acetaminophen     Tablet .. 650 milliGRAM(s) Oral every 6 hours PRN Temp greater or equal to 38C (100.4F), Mild Pain (1 - 3)  aluminum hydroxide/magnesium hydroxide/simethicone Suspension 30 milliLiter(s) Oral every 4 hours PRN Dyspepsia  melatonin 3 milliGRAM(s) Oral at bedtime PRN Insomnia  ondansetron Injectable 4 milliGRAM(s) IV Push every 8 hours PRN Nausea and/or Vomiting  oxyCODONE    IR 5 milliGRAM(s) Oral every 6 hours PRN Moderate Pain (4 - 6)  oxyCODONE    IR 7.5 milliGRAM(s) Oral every 6 hours PRN Severe Pain (7 - 10)    ITEMS UNCHECKED ARE NOT PRESENT    PRESENT SYMPTOMS: [ ]Unable to obtain due to poor mentation   Source if other than patient:  [ ]Family   [ ]Team     Pain: [ ] yes [X] no on assessment this morning  QOL impact -   Location -                    Aggravating factors -  Quality -  Radiation -  Timing-  Severity (0-10 scale):  Minimal acceptable level (0-10 scale):     Dyspnea:                           [ ]Mild [ ]Moderate [ ]Severe  Anxiety:                             [ ]Mild [ ]Moderate [ ]Severe  Fatigue:                             [ ]Mild [ ]Moderate [ ]Severe  Nausea:                             [ ]Mild [ ]Moderate [ ]Severe  Loss of appetite:              [ ]Mild [ ]Moderate [ ]Severe  Constipation:                    [ ]Mild [ ]Moderate [ ]Severe    PAINAD Score:    [email or web address] (Ctrl +  left click to view)  		  Other Symptoms:  [ X]All other review of systems negative     Palliative Performance Status Version 2:     50-60  %         http://npcrc.org/files/news/palliative_performance_scale_[email or web address].pdf  PHYSICAL EXAM:  Vital Signs Last 24 Hrs  T(C): 36.5 (04 Jan 2022 15:08), Max: 36.5 (04 Jan 2022 15:08)  T(F): 97.7 (04 Jan 2022 15:08), Max: 97.7 (04 Jan 2022 15:08)  HR: 68 (04 Jan 2022 15:36) (67 - 68)  BP: 152/67 (04 Jan 2022 15:08) (152/67 - 152/67)  BP(mean): --  RR: 18 (04 Jan 2022 15:08) (18 - 18)  SpO2: 95% (04 Jan 2022 15:36) (95% - 96%) I&O's Summary    04 Jan 2022 07:01  -  05 Jan 2022 07:00  --------------------------------------------------------  IN: 0 mL / OUT: 1100 mL / NET: -1100 mL    GENERAL:  [ X]Alert  [ ]Oriented x2-3   [ ]Lethargic  [ ]Cachexia  [ ]Unarousable  [ X]Verbal  [ ]Non-Verbal  Behavioral:   [ ] Anxiety  [ ] Delirium [ ] Agitation [ ] Other  HEENT:  [X ]Normal   [ ]Dry mouth   [ ]ET Tube/Trach  [ ]Oral lesions  PULMONARY:   [ ]Clear [ ]Tachypnea  [ ]Audible excessive secretions   [ ]Rhonchi        [ ]Right [ ]Left [ ]Bilateral  [ ]Crackles        [ ]Right [ ]Left [ ]Bilateral  [ ]Wheezing     [ ]Right [ ]Left [ ]Bilateral  [ X]Diminshed BS [ ]Right [ ]Left [X ]Bilateral    CARDIOVASCULAR:    [ X]Regular [ ]Irregular [ ]Tachy  [ ]Al [ ]Murmur [ ]Other  GASTROINTESTINAL:  [X ]Soft  [X ]Distended   [X ]+BS  [ ]Non tender [ ]Tender  [ ]PEG [ ]OGT/ NGT   Last BM:  1/3/22  GENITOURINARY:  [ ]Normal [ X] Incontinent   [ ]Oliguria/Anuria   [ ]Soto [X] Other- condom cath  MUSCULOSKELETAL:   [ ]Normal   [X ]Weakness  [ ]Bed/Wheelchair bound [ ]Edema  NEUROLOGIC:   [ ]No focal deficits  [X ] Cognitive impairment  [ ] Dysphagia [ ]Dysarthria [ ] Paresis [ ]Other   SKIN:  b/l LE wounds. Please see nursing assessment for further details for which I have reviewed   [ ]Normal  [ ]Rash     [ ]Pressure ulcer(s)  [ ]y [ ]n  Present on admission      CRITICAL CARE:  [ ] Shock Present  [ ]Septic [ ]Cardiogenic [ ]Neurologic [ ]Hypovolemic  [ ]  Vasopressors [ ]  Inotropes   [ ] Respiratory failure present [ ] Mechanical Ventilation [ ] Non-invasive ventilatory support [ ] High-Flow  [ ] Acute  [ ] Chronic [ ] Hypoxic  [ ] Hypercarbic [ ] Other  [ X] Other organ failure- brain, kidneys, heart    LABS: None new    RADIOLOGY & ADDITIONAL STUDIES: None new      PROTEIN CALORIE MALNUTRITION: [ ] mild [ ] moderate [ ] severe  [ ] underweight [ ] morbid obesity    https://www.andeal.org/vault/2440/web/files/ONC/Table_Clinical%20Characteristics%20to%20Document%20Malnutrition-White%20JV%20et%20al%202012.pdf    Height (cm): 172.7 (12-28-21 @ 13:25), 172.7 (08-26-21 @ 10:10)  Weight (kg): 84.9 (01-02-22 @ 21:48), 73.9 (08-26-21 @ 10:10)  BMI (kg/m2): 28.5 (01-02-22 @ 21:48), 24.8 (12-28-21 @ 13:25), 24.8 (08-26-21 @ 10:10)    [ ] PPSV2 < or = 30% [ ] significant weight loss [ ] poor nutritional intake [ ] anasarca   Artificial Nutrition [ ]     REFERRALS:   [ ]Chaplaincy  [ ] Hospice  [ ]Child Life  [ ]Social Work  [ ]Case management [ ]Holistic Therapy [ ] Physical Therapy [ ] Dietary   Goals of Care Document:    GAP TEAM PALLIATIVE CARE UNIT PROGRESS NOTE:      [  ] Patient on hospice program.    INDICATION FOR PALLIATIVE CARE UNIT SERVICES: 89 year old male with HFrEF, bioprosthestic MV, on AC, PPM, DM on insulin, CKD, Alzheimer's dementia, BIBEMS with syncope x 2 within 24 hours.  Admitted to PCU under medicine to establish care goals and disposition planning.         INTERVAL HPI/OVERNIGHT EVENTS: Chart review. The patient is seen and examined at the bedside. He required PRN Oxycodone IR 5mg PO X1 within a 24hr period 8am-8am.    DNR on chart:   Allergies    No Known Allergies    Intolerances    MEDICATIONS  (STANDING):  atorvastatin 40 milliGRAM(s) Oral at bedtime  cholecalciferol 2000 Unit(s) Oral daily  dextrose 40% Gel 15 Gram(s) Oral once  dextrose 40% Gel 15 Gram(s) Oral once  dextrose 5%. 1000 milliLiter(s) (50 mL/Hr) IV Continuous <Continuous>  dextrose 5%. 1000 milliLiter(s) (100 mL/Hr) IV Continuous <Continuous>  dextrose 50% Injectable 12.5 Gram(s) IV Push once  dextrose 50% Injectable 25 Gram(s) IV Push once  dextrose 50% Injectable 25 Gram(s) IV Push once  dextrose 50% Injectable 12.5 Gram(s) IV Push once  dextrose 50% Injectable 25 Gram(s) IV Push once  donepezil 10 milliGRAM(s) Oral at bedtime  glucagon  Injectable 1 milliGRAM(s) IntraMuscular once  hydrALAZINE 10 milliGRAM(s) Oral two times a day  insulin glargine Injectable (LANTUS) 5 Unit(s) SubCutaneous at bedtime  insulin lispro (ADMELOG) corrective regimen sliding scale   SubCutaneous three times a day before meals  insulin lispro (ADMELOG) corrective regimen sliding scale   SubCutaneous at bedtime  isosorbide   mononitrate ER Tablet (IMDUR) 30 milliGRAM(s) Oral daily  lidocaine   4% Patch 2 Patch Transdermal daily  memantine 5 milliGRAM(s) Oral two times a day  metoprolol succinate ER 75 milliGRAM(s) Oral daily  polyethylene glycol 3350 17 Gram(s) Oral daily  senna 2 Tablet(s) Oral at bedtime  sodium bicarbonate 650 milliGRAM(s) Oral two times a day  tamsulosin 0.4 milliGRAM(s) Oral at bedtime    MEDICATIONS  (PRN):  acetaminophen     Tablet .. 650 milliGRAM(s) Oral every 6 hours PRN Temp greater or equal to 38C (100.4F), Mild Pain (1 - 3)  aluminum hydroxide/magnesium hydroxide/simethicone Suspension 30 milliLiter(s) Oral every 4 hours PRN Dyspepsia  melatonin 3 milliGRAM(s) Oral at bedtime PRN Insomnia  ondansetron Injectable 4 milliGRAM(s) IV Push every 8 hours PRN Nausea and/or Vomiting  oxyCODONE    IR 5 milliGRAM(s) Oral every 6 hours PRN Moderate Pain (4 - 6)  oxyCODONE    IR 7.5 milliGRAM(s) Oral every 6 hours PRN Severe Pain (7 - 10)    ITEMS UNCHECKED ARE NOT PRESENT    PRESENT SYMPTOMS: [ ]Unable to obtain due to poor mentation   Source if other than patient:  [ ]Family   [ ]Team     Pain: [ ] yes [X] no on assessment this morning  QOL impact -   Location -                    Aggravating factors -  Quality -  Radiation -  Timing-  Severity (0-10 scale):  Minimal acceptable level (0-10 scale):     Dyspnea:                           [ ]Mild [ ]Moderate [ ]Severe  Anxiety:                             [ ]Mild [ ]Moderate [ ]Severe  Fatigue:                             [ ]Mild [ ]Moderate [ ]Severe  Nausea:                             [ ]Mild [ ]Moderate [ ]Severe  Loss of appetite:              [ ]Mild [ ]Moderate [ ]Severe  Constipation:                    [ ]Mild [ ]Moderate [ ]Severe    PAINAD Score:    [email or web address] (Ctrl +  left click to view)  		  Other Symptoms:  [ X]All other review of systems negative     Palliative Performance Status Version 2:     50-60  %         http://npcrc.org/files/news/palliative_performance_scale_[email or web address].pdf  PHYSICAL EXAM:  Vital Signs Last 24 Hrs  T(C): 36.5 (04 Jan 2022 15:08), Max: 36.5 (04 Jan 2022 15:08)  T(F): 97.7 (04 Jan 2022 15:08), Max: 97.7 (04 Jan 2022 15:08)  HR: 68 (04 Jan 2022 15:36) (67 - 68)  BP: 152/67 (04 Jan 2022 15:08) (152/67 - 152/67)  BP(mean): --  RR: 18 (04 Jan 2022 15:08) (18 - 18)  SpO2: 95% (04 Jan 2022 15:36) (95% - 96%) I&O's Summary    04 Jan 2022 07:01  -  05 Jan 2022 07:00  --------------------------------------------------------  IN: 0 mL / OUT: 1100 mL / NET: -1100 mL    GENERAL:  [ X]Alert  [ ]Oriented x2-3   [ ]Lethargic  [ ]Cachexia  [ ]Unarousable  [ X]Verbal  [ ]Non-Verbal  Behavioral:   [ ] Anxiety  [ ] Delirium [ ] Agitation [ ] Other  HEENT:  [X ]Normal   [ ]Dry mouth   [ ]ET Tube/Trach  [ ]Oral lesions  PULMONARY:   [ ]Clear [ ]Tachypnea  [ ]Audible excessive secretions   [ ]Rhonchi        [ ]Right [ ]Left [ ]Bilateral  [ ]Crackles        [ ]Right [ ]Left [ ]Bilateral  [ ]Wheezing     [ ]Right [ ]Left [ ]Bilateral  [ X]Diminshed BS [ ]Right [ ]Left [X ]Bilateral    CARDIOVASCULAR:    [ X]Regular [ ]Irregular [ ]Tachy  [ ]Al [ ]Murmur [ ]Other  GASTROINTESTINAL:  [X ]Soft  [X ]Distended   [X ]+BS  [ ]Non tender [ ]Tender  [ ]PEG [ ]OGT/ NGT   Last BM:  1/4/22  GENITOURINARY:  [ ]Normal [ X] Incontinent   [ ]Oliguria/Anuria   [ ]Soto [X] Other- condom cath  MUSCULOSKELETAL:   [ ]Normal   [X ]Weakness  [ ]Bed/Wheelchair bound [ ]Edema  NEUROLOGIC:   [ ]No focal deficits  [X ] Cognitive impairment  [ ] Dysphagia [ ]Dysarthria [ ] Paresis [ ]Other   SKIN:  b/l LE wounds. Please see nursing assessment for further details for which I have reviewed   [ ]Normal  [ ]Rash     [ ]Pressure ulcer(s)  [ ]y [ ]n  Present on admission      CRITICAL CARE:  [ ] Shock Present  [ ]Septic [ ]Cardiogenic [ ]Neurologic [ ]Hypovolemic  [ ]  Vasopressors [ ]  Inotropes   [ ] Respiratory failure present [ ] Mechanical Ventilation [ ] Non-invasive ventilatory support [ ] High-Flow  [ ] Acute  [ ] Chronic [ ] Hypoxic  [ ] Hypercarbic [ ] Other  [ X] Other organ failure- brain, kidneys, heart    LABS: None new    RADIOLOGY & ADDITIONAL STUDIES: None new      PROTEIN CALORIE MALNUTRITION: [ ] mild [ ] moderate [ ] severe  [ ] underweight [ ] morbid obesity    https://www.andeal.org/vault/2440/web/files/ONC/Table_Clinical%20Characteristics%20to%20Document%20Malnutrition-White%20JV%20et%20al%202012.pdf    Height (cm): 172.7 (12-28-21 @ 13:25), 172.7 (08-26-21 @ 10:10)  Weight (kg): 84.9 (01-02-22 @ 21:48), 73.9 (08-26-21 @ 10:10)  BMI (kg/m2): 28.5 (01-02-22 @ 21:48), 24.8 (12-28-21 @ 13:25), 24.8 (08-26-21 @ 10:10)    [ ] PPSV2 < or = 30% [ ] significant weight loss [ ] poor nutritional intake [ ] anasarca   Artificial Nutrition [ ]     REFERRALS:   [ ]Chaplaincy  [ ] Hospice  [ ]Child Life  [ X]Social Work  [ ]Case management [ ]Holistic Therapy [ X] Physical Therapy [ ] Dietary   Goals of Care Document:

## 2022-01-05 NOTE — PROGRESS NOTE ADULT - PROBLEM SELECTOR PLAN 6
Rate controlled, on metoprolol/dig, PPM    Per discussion with Palliative Attending will d/c coumadin and aspirin
FS before meals and at bedtime  Lantus 5units at bedtime   Corrective scale (Admelog)
Rate controlled, on metoprolol/dig, PPM    not on ac per goals
Rate controlled, on metoprolol, PPM    not on ac per goals
Rate controlled, on metoprolol/dig, PPM    Per discussion with Palliative Attending will d/c coumadin and aspirin
Rate controlled, on metoprolol/dig, PPM    not on ac per goals
Goals remain for conservative medical management - no hemodialysis.
FS before meals and at bedtime  Increase Lantus to 10units at bedtime   Corrective scale (Admelog)

## 2022-01-05 NOTE — PROGRESS NOTE ADULT - PROBLEM SELECTOR PROBLEM 5
T2DM (type 2 diabetes mellitus)
HTN (hypertension)
Debility
T2DM (type 2 diabetes mellitus)
Debility

## 2022-01-05 NOTE — PROGRESS NOTE ADULT - SUBJECTIVE AND OBJECTIVE BOX
Date of Service   01-05-22 @ 10:49    Patient is a 89y old  Male who presents with a chief complaint of Syncope (05 Jan 2022 11:27)      INTERVAL HISTORY: pt feels ok denies any discomfort   REVIEW OF SYSTEMS:   CONSTITUTIONAL: No weakness  EYES/ENT: No visual changes; No throat pain  Neck: No pain or stiffness  Respiratory: No cough, wheezing, No shortness of breath  CARDIOVASCULAR: no chest pain or palpitations  GASTROINTESTINAL: No abdominal pain, no nausea, vomiting or hematemesis  GENITOURINARY: No dysuria, frequency or hematuria  NEUROLOGICAL: No stroke like symptoms  SKIN: No rashes    	  MEDICATIONS:  hydrALAZINE 10 milliGRAM(s) Oral two times a day  isosorbide   mononitrate ER Tablet (IMDUR) 30 milliGRAM(s) Oral daily  metoprolol succinate ER 75 milliGRAM(s) Oral daily  tamsulosin 0.4 milliGRAM(s) Oral at bedtime        PHYSICAL EXAM:  T(C): 36.5 (01-05-22 @ 15:32), Max: 36.5 (01-05-22 @ 15:32)  HR: 66 (01-05-22 @ 15:32) (66 - 66)  BP: 146/71 (01-05-22 @ 15:32) (146/71 - 150/67)  RR: 18 (01-05-22 @ 15:32) (18 - 18)  SpO2: 98% (01-05-22 @ 15:32) (98% - 98%)  Wt(kg): --  I&O's Summary    04 Jan 2022 07:01  -  05 Jan 2022 07:00  --------------------------------------------------------  IN: 0 mL / OUT: 1100 mL / NET: -1100 mL          Appearance: In no distress	  HEENT:    PERRL, EOMI	  Cardiovascular:  S1 S2, No JVD  Respiratory: Lungs clear to auscultation	  Gastrointestinal:  Soft, Non-tender, + BS	  Vascularature:  No edema of LE  Psychiatric: Appropriate affect   Neuro: no acute focal deficits                     Labs personally reviewed      ASSESSMENT/PLAN: 	    89 year old male with a pmhx of Endocarditis, Alzheimers dementia, CVA, CAD/CABG, HTN, HLD, Afib-on Coumadin s/p PPM, T2DM, insulin dependent, Systolic heart failure, CKD Stage IV, Cardiomyopathy, Spinal stenosis p/w syncope x2, here for evaluation of Endocarditis.      Problem/Plan - 1:  ·  Problem: Syncope.   ·  Plan:  -EP eval for PPM interrogation- no events .     - ? 2/2 volume depletion with orthostasis   - orthostatics initial set positive, repeat normal      Problem/Plan - 2:  ·  Problem: Atrial fibrillation.   ·  Patient was in A Fib, HR 70s, not pacing   - off AC per goals      Problem/Plan - 3:  ·  Problem: Chronic systolic heart failure.   - Son reports 6 pound weight gain with increasing SOB  - Reports home dose Torsemide 60mg qAM with additional 20-40mg qAM as 6 pound weight gain, despite that no change in SOB and weight  - Last TTE shows EF of 45%   -plan for home with home hospice      Problem/Plan - 4:  ·  Problem: CAD    ·  Plan: C/w Toprol currently.     Problem/Plan - 5:  ·  Problem: HTN   ·  Plan: BP currently well controlled   - C/w Toprol, Imdur, hydralazine     Problem/Plan - 6:  ·  Problem:  r/o endocarditis   - pt denies any chest pain or discomfort   - TTE shows no signs of endocarditis       Goal is conservative medical management, planning D/C home today         Cherise HARRIS-BC   Ankit Kaur DO [facility]  Cardiovascular Medicine  [address] [place] [address], [address]  Office: [phone]

## 2022-01-05 NOTE — DISCHARGE NOTE NURSING/CASE MANAGEMENT/SOCIAL WORK - PATIENT PORTAL LINK FT
You can access the FollowMyHealth Patient Portal offered by [facility] by registering at the following website: [email or web address]. By joining [email or web address] FollowMyHealth portal, you will also be able to view your health information using other applications (apps) compatible with our system.

## 2022-01-05 NOTE — PROGRESS NOTE ADULT - ATTENDING COMMENTS
Pt care and plan discussed and reviewed with NP. Plan as outlined above edited by me to reflect our discussion.
89 year old male with HFrEF, bioprosthestic MV, on AC, PPM, DM on insulin, CKD, Alzheimer's dementia, BIBEMS with syncope x 2 within 24 hours.  Admitted to PCU under medicine to establish care goals and disposition planning.   Completed PPM interrogation, dc anticoagulation and conservatively manage type II diabetes.   Seen by PT, able to use a walker, sats to 86% on ra while using walker so will go home with 02.   FS consistently above 200, Restart Lantus at 5 u q PM, will likely need to increase as outpatient.   Patient assessment and plan discussed on interdisciplinary team rounds today.
89 year old male with HFrEF, bioprosthestic MV, on AC, PPM, DM on insulin, CKD, Alzheimer's dementia, BIBEMS with syncope x 2 within 24 hours.  Admitted to PCU under medicine to establish care goals and disposition planning.  Met again with daughter-in-law and son - agree to complete PPM interrogation, dc anticoagulation and conservatively manage type II diabetes.  Unable to get hospice home evaluation.  Seen by PT, able to use a walker, sats to 89% on ra while sitting quietly, pending data while using walker, will attempt to pursue other avenues for care at home in am if possible.  Patient assessment and plan discussed on interdisciplinary team rounds today.   Informed Dr. Miller      Greater than 50% of time spent coordinating care and talking with family and other personnel.  Time spent 35 minutes.
89M with multiple medical problems admitted s/p 2 syncopal episodes. Workup not conclusive for etiology but could be related to volume depletion/orthostasis/hypoglycemia.
89 year old male with HFrEF, bioprosthestic MV, on AC, PPM, DM on insulin, CKD, Alzheimer's dementia, BIBEMS with syncope x 2 within 24 hours.  Admitted to PCU under medicine to establish care goals and disposition planning.   Completed PPM interrogation, dc anticoagulation and conservatively manage type II diabetes.   Patient ready for DC, delayed due to late 02 delivery, son requested am dc.  Discussed possible outcomes and potential changes that may be seen at home and that hospice could come to home to evaluation if needed.  Patient assessment and plan discussed on interdisciplinary team rounds today.
89 year old male with HFrEF, bioprosthestic MV, on AC, PPM, DM on insulin, CKD, Alzheimer's dementia, BIBEMS with syncope x 2 within 24 hours.  Admitted to PCU under medicine to establish care goals and disposition planning.   Completed PPM interrogation, dc anticoagulation and conservatively manage type II diabetes.   Patient ready for DC.  Dr. Miller sent summary of care.    Patient assessment and plan discussed on interdisciplinary team rounds today.

## 2022-01-05 NOTE — PROGRESS NOTE ADULT - PROBLEM SELECTOR PROBLEM 6
Atrial fibrillation
Chronic kidney disease (CKD), stage IV (severe)
T2DM (type 2 diabetes mellitus)
Atrial fibrillation
T2DM (type 2 diabetes mellitus)

## 2022-01-05 NOTE — PROGRESS NOTE ADULT - PROBLEM SELECTOR PLAN 4
PPSV 50-60%. Patient requires nursing assistance with some ADLs  Supportive care
Fast score 5, monitor for delirium, and constipation, urinary retention, pain, hunger, thirst, etc.  Promote sleep wake cycle and reorientation as indicated in hospital environment.
PPSV 50-60%. Patient requires nursing assistance with some ADLs  Supportive care
Rate controlled, on metoprolol/dig, PPM  Supratherapeutic INR yesterday - today 2.95, to dose with coumadin    At home schedule 5mg MWFS and 0.5mf TThSun.  Also on ASA.  To discuss with team re: fall risk vs benefit of continued AC, at this time, consideration of stopping is recommended.
PPSV 50-60%. Patient requires nursing assistance with some ADLs  Supportive care
Fast score 5, monitor for delirium, and constipation, urinary retention, pain, hunger, thirst, etc.  Promote sleep wake cycle and reorientation as indicated in hospital environment.

## 2022-01-05 NOTE — PROGRESS NOTE ADULT - PROBLEM SELECTOR PROBLEM 7
HTN (hypertension)
Atrial fibrillation
Advance care planning
HTN (hypertension)
Atrial fibrillation

## 2022-01-05 NOTE — PROGRESS NOTE ADULT - PROBLEM SELECTOR PLAN 2
Alert, then down with rapid recovery within seconds - x 2 episodes in 24 hours  - PM interrogation - no arrythmogenic etiology identified  - torsemide being held  - hypoglycemia vs. dysautonomia  - lantus being help, FS being monitored  Orthostatic negative   CT head non-diagnostic  Cardiology eval appreciated
Alert, then down with rapid recovery within seconds - x 2 episodes in 24 hours  - PM interrogation - no arrythmogenic etiology identified  - torsemide being held  - hypoglycemia vs. dysautonomia  - lantus being help, FS being monitored  Orthostatic negative   CT head non-diagnostic  Cardiology eval appreciated
Fast 5, monitor for delirium, and constipation, urinary retention, pain, hunger, thirst, etc.  Promote sleep wake cycle and reorientation as indicated in hospital environment.
Last recorded BM 12/31/21. Abdomen distended but soft. +BS  Continue with senna and Miralax
Alert, then down with rapid recovery within seconds - x 2 episodes in 24 hours  - PM interrogation - no arrythmogenic etiology identified  - hold/decrease torsemide  - ? hypoglycemia  - ? dysautonomia  Orthostatic negative   CT head non-diagnostic  Cardiology eval appreciated
Resolved. Last recorded BM on 1/4/22 Abdomen distended but soft. +BS  Continue with senna and Miralax
Alert, then down with rapid recovery within seconds - x 2 episodes in 24 hours  - PM interrogation - review stored history past 72 hours  - hold/decrease torsemide  - ? hypoglycemia  - ? dysautonomia  Orthostatic negative   CT head non-diagnostic  Cardiology consulted  EP PPM interrogation pending
Alert, then down with rapid recovery within seconds - x 2 episodes in 24 hours  - PM interrogation - no arrythmogenic etiology identified  - torsemide being held  - hypoglycemia vs. dysautonomia  - FS being monitored  Orthostatic negative   CT head non-diagnostic  Cardiology onboard

## 2022-01-05 NOTE — PROGRESS NOTE ADULT - PROVIDER SPECIALTY LIST ADULT
Cardiology
Palliative Care
Palliative Care
Cardiology
Palliative Care

## 2022-01-05 NOTE — PROGRESS NOTE ADULT - PROBLEM SELECTOR PROBLEM 9
Chronic kidney disease (CKD), stage IV (severe)
Palliative care encounter
Chronic kidney disease (CKD), stage IV (severe)
Palliative care encounter

## 2022-01-05 NOTE — PROGRESS NOTE ADULT - PROBLEM SELECTOR PLAN 7
Met with family face to face for discussion.  Pending limited medical work-up and opinion from cardiology re: medication management, at daughter-in-law request.  Patient with organ failure, despite medical management, and increased decline over short course (patient was driving several months ago).  Son expresses sadness over decline in cognition, function, and wish for his dad to be comfortable and at home, states his mother in agreement.  Daughter in law struggling with concerns over whether endocarditis is present, and would appreciate cardiology input as to status of his heart.  Ultimately, with needed support, family members leaning towards the previously discussed goal of care at home, consideration of hospice evaluation and optimizing quality over quantity.  Anticipate DC of AC, due to fall risk and possibly down titration or discontinuation of insulin and other non-essential medications.
VS unremarkable on metoprolol, hydralazine, torsemide, Imdur.  May need to adjust if thought to be contributing to syncopal sx.
Rate controlled, on metoprolol, PPM    not on ac per goals
VS unremarkable on metoprolol, hydralazine, torsemide, Imdur.  May need to adjust if thought to be contributing to syncopal sx.
VS unremarkable on metoprolol, hydralazine, torsemide, Imdur.  May need to adjust if thought to be contributing to syncopal sx.
Rate controlled, on metoprolol, PPM    not on ac per goals
VS unremarkable on metoprolol, hydralazine, torsemide, Imdur.  May need to adjust if thought to be contributing to syncopal sx.
VS unremarkable on metoprolol, hydralazine, torsemide, Imdur.  May need to adjust if thought to be contributing to syncopal sx.

## 2022-01-05 NOTE — DISCHARGE NOTE NURSING/CASE MANAGEMENT/SOCIAL WORK - NSDCPEFALRISK_GEN_ALL_CORE
For information on Fall & Injury Prevention, visit: [email or web address] OR  [email or web address] OR  https://www.cdc.gov/steadi/patient.html

## 2022-01-05 NOTE — PROGRESS NOTE ADULT - PROBLEM SELECTOR PLAN 1
Patient with chronic back pain. The patient required PRN Oxycodone 5mg PO X3 within a 24hr period   Tylenol 650mg q6hr PRN mild pain  Lidocaine patch  Tylenol 1gram IVPB X1 dose received
Patient with chronic back pain.   Continue with Tylenol 650mg q6hr PRN mild pain ( 1 required within a 24hr period 8am-8am)  Continue with Oxycodone IR 5mg PO q6hr PRN for moderate pain ( 1 required within a 24hr period 8am-8am)  Continue with Oxycodone IR 7.5mg PO q6hr PRN for severe pain ( 1 required within a 24hr period 8am-8am)  Continue with Lidocaine patch  Bowel regimen while on opioids
Patient with chronic back pain. The patient required PRN Oxycodone 5mg PO X2 within a 24hr period   Tylenol 650mg q6hr PRN mild pain  Lidocaine patch  Tylenol 1gram IVPB X1 dose received 12/30
Patient with chronic back pain. The patient required PRN Oxycodone 5mg PO X1 within a 24hr period and an additional two doses today.  Increase the frequency of the oxycodone from q8hrs to q6hr   Tylenol 650mg q6hr PRN mild pain  Lidocaine patch ordered  Tylenol 1gram IVPB X1 dose ordered
Alert, then down with rapid recovery within seconds - x 2 episodes in 24 hours  - PM interrogation - review stored history past 72 hours  - hold/decrease torsemide  - ? hypoglycemia  - ? dysautonomia  CT head non-diagnostic
Patient with chronic back pain. The patient required PRN Oxycodone 5mg PO X2 within a 24hr period   Tylenol 650mg q6hr PRN mild pain  Lidocaine patch  Tylenol 1gram IVPB X1 dose received 12/30
Patient with chronic back pain. The patient required PRN Oxycodone 5mg PO X2 within a 24hr period( 8am-8am)  Continue with Tylenol 650mg q6hr PRN mild pain  Continue with Lidocaine patch  Bowel regimen while on opioids
Patient with chronic back pain.   Continue with Tylenol 650mg q6hr PRN mild pain ( none required within a 24hr period 8am-8am)  Continue with Oxycodone IR 5mg PO q6hr PRN for moderate pain ( 1 required within a 24hr period 8am-8am)  Continue with Oxycodone IR 7.5mg PO q6hr PRN for severe pain ( 0 required within a 24hr period 8am-8am)  Continue with Lidocaine patch  Bowel regimen while on opioids

## 2022-01-05 NOTE — PROGRESS NOTE ADULT - PROBLEM SELECTOR PLAN 3
Alert, then down with rapid recovery within seconds - x 2 episodes in 24 hours  - PM interrogation - no arrythmogenic etiology identified  - torsemide being held  - hypoglycemia vs. dysautonomia vs dehydrated state  - FS being monitored  Orthostatic negative   CT head non-diagnostic  Cardiology onboard

## 2022-01-07 PROBLEM — F41.9 ANXIETY: Status: ACTIVE | Noted: 2022-01-01

## 2022-01-15 PROBLEM — R06.02 SHORTNESS OF BREATH: Status: ACTIVE | Noted: 2017-01-18

## 2022-01-15 PROBLEM — M54.9 BACK PAIN: Status: ACTIVE | Noted: 2017-05-25

## 2022-01-15 PROBLEM — E11.9 DIABETES: Status: ACTIVE | Noted: 2017-06-12

## 2022-01-15 PROBLEM — N18.4 CKD (CHRONIC KIDNEY DISEASE) STAGE 4, GFR 15-29 ML/MIN: Status: ACTIVE | Noted: 2021-01-01

## 2022-01-15 NOTE — REVIEW OF SYSTEMS
[As Noted in HPI] : as noted in HPI [Shortness Of Breath] : shortness of breath [Negative] : Endocrine

## 2022-01-15 NOTE — HISTORY OF PRESENT ILLNESS
[Home] : at home, [unfilled] , at the time of the visit. [Medical Office: ([facility])___] : at the medical office located in  [Spouse] : spouse [Family Member] : family member [Completely Dependent] : Completely dependent. [DNR] : DNR [DNI] : DNI [FreeTextEntry1] : 89 yo male with CHF, CKD with chronic neck pain due to OA, Insulin requirng DM and sob. Pt with COYNE with minimal exertion.  Pt with pacemaker, defibrillator in active acc to family. Pt was in PCU at [facility] and now home on hospice. Dtr in law is nurse, two sons and dtr with pt and wfie in home for support. Pt awake, in bed and denies pain or distress, but is somnolent.  Pt taking ativan and MSO4 for anxiety, sob and pain.  Pt not taking many doses, has only taken one dose. Pt slept after one dose of Morphine 2.5 mg.  If requinring mor emeds pt may need dialuadid bc of renal function.  Discussed with hospice nurse. But in light of effectiveness with continue morphine for now.  No twithcing of muscles and pt tolerting\par \par Pt sugars running 100-300. Lantus decreased to 10 Units q HS since eating less.  Pt eating better today and family asking about insulin dosing..Pt has sliding scale based on FS around meals 1-6 units, discussed with hospice nurse.  \par \par Explained to family if pt stops eating we will stop insulin. Pt ate breakfast well this am. Yesterday not well, only ate about 50% of two meals only.

## 2022-01-15 NOTE — ASSESSMENT
[FreeTextEntry1] : 89 yo male with CHF, CKD, Severe OA on hospice care. I have spoken with hospice nurse about dilaudid use in future especially if he starts requirng more opiates for comfort. Family together and supportive. Questions answered. Hospice following.  \par \par 1) CHF - on oxygen, comfortalbe. MSO4 helps with sob and calms pt better than ativan\par 2) CKD - may need to change to dilaudid if requring daily morpine, hospice nurse aware.\par 3) IDDM - lantus cut to 10 U , if pt stops eating witll stop lantus and may stop finger sticks.\par 4) HM - supportive family, pt in no distress, symptoms being addressed.

## 2022-01-18 ENCOUNTER — APPOINTMENT (OUTPATIENT)
Dept: CARDIOLOGY | Facility: CLINIC | Age: 87
End: 2022-01-18

## 2022-01-18 ENCOUNTER — APPOINTMENT (OUTPATIENT)
Dept: ELECTROPHYSIOLOGY | Facility: CLINIC | Age: 87
End: 2022-01-18

## 2022-02-22 ENCOUNTER — APPOINTMENT (OUTPATIENT)
Dept: NEUROLOGY | Facility: CLINIC | Age: 87
End: 2022-02-22

## 2022-03-02 ENCOUNTER — APPOINTMENT (OUTPATIENT)
Dept: NEUROLOGY | Facility: CLINIC | Age: 87
End: 2022-03-02

## 2022-03-09 ENCOUNTER — APPOINTMENT (OUTPATIENT)
Dept: ELECTROPHYSIOLOGY | Facility: CLINIC | Age: 87
End: 2022-03-09

## 2022-04-23 NOTE — PHYSICAL THERAPY INITIAL EVALUATION ADULT - ADDITIONAL COMMENTS
PTA pt fully independent, seeing chiropractor for chronic LBP/dysfunction, last session Tues 4/18 and pain R posterior flank between ribs 8-10 started Thursday.
show

## 2022-05-13 ENCOUNTER — APPOINTMENT (OUTPATIENT)
Dept: HEART FAILURE | Facility: CLINIC | Age: 87
End: 2022-05-13

## 2022-06-08 ENCOUNTER — APPOINTMENT (OUTPATIENT)
Dept: ELECTROPHYSIOLOGY | Facility: CLINIC | Age: 87
End: 2022-06-08

## 2022-11-03 NOTE — SOCIAL HISTORY
Preventive Care Visit  [facility] ERIC Casarez CARLOS Pineda MD  [facility]   [FreeTextEntry1] : son and dtr in law. was wife but she is unable to do care [FreeTextEntry2] : son and dtr in law are good [FreeTextEntry4] : good [No falls in past year] : Patient reported no falls in the past year [Fully functional (bathing, dressing, toileting, transferring, walking, feeding)] : Fully functional (bathing, dressing, toileting, transferring, walking, feeding) [Independent] : using transportation [Some assistance needed] : managing finances [de-identified] : No safety concerns identified. [de-identified] : No safety concerns identified.

## 2022-12-20 NOTE — PHYSICAL EXAM
[General Appearance - Alert] : alert [General Appearance - In No Acute Distress] : in no acute distress [General Appearance - Well Nourished] : well nourished [General Appearance - Well Developed] : well developed [General Appearance - Well-Appearing] : healthy appearing [] : normal voice and communication [Sclera] : the sclera and conjunctiva were normal [PERRL With Normal Accommodation] : pupils were equal in size, round, and reactive to light [Optic Disc Abnormality] : the optic disc were normal in size and color [Outer Ear] : the ears and nose were normal in appearance [Hearing Threshold Finger Rub Not [place]] : hearing was normal [Examination Of The Oral Cavity] : the lips and gums were normal [Nasal Cavity] : the nasal mucosa and septum were normal [Oropharynx] : The oropharynx was normal [Neck Appearance] : the appearance of the neck was normal [Thyroid Diffuse Enlargement] : the thyroid was not enlarged [Respiration, Rhythm And Depth] : normal respiratory rhythm and effort [Auscultation Breath Sounds / Voice Sounds] : lungs were clear to auscultation bilaterally [Chest Palpation] : palpation of the chest revealed no abnormalities [Heart Rate And Rhythm] : heart rate was normal and rhythm regular [Heart Sounds] : normal S1 and S2 [Heart Sounds Gallop] : no gallops [Murmurs] : no murmurs [Full Pulse] : the pedal pulses are present [Edema] : there was no peripheral edema [Abdomen Tenderness] : non-tender [Cervical Lymph Nodes Enlarged Anterior Bilaterally] : anterior cervical [Femoral Lymph Nodes Enlarged Bilaterally] : femoral [No CVA Tenderness] : no ~M costovertebral angle tenderness . [No Spinal Tenderness] : no spinal tenderness [Nail Clubbing] : no clubbing  or cyanosis of the fingernails [Musculoskeletal - Swelling] : no joint swelling seen [Motor Tone] : muscle strength and tone were normal [Cranial Nerves] : cranial nerves 2-12 were intact [Sensation] : the sensory exam was normal to light touch and pinprick [Oriented To Time, Place, And Person] : oriented to person, place, and time [Impaired Insight] : insight and judgment were intact [Affect] : the affect was normal [Mood] : the mood was normal [FreeTextEntry1] : R medial shin with non-oozing stasis ulcer, mildly tender

## 2022-12-28 NOTE — ED ADULT NURSE NOTE - OBJECTIVE STATEMENT
Schedule with podiatry please- callus care/ nail care/ DM foot exam  89y male with hx of CHF presents to the ER c/o right leg swelling. pt is alert and oriented x 4 and speaking coherently. pt states that he had a cut on his right leg that progressively became red and swollen. Pt states that he has pain to touch to the right leg, +2 edema noted to right leg with two open wound areas that appear wet, with out purulent drainage. pt in nad. denies cp, fevers, cough, sick contacts. pt states he is sob but that is his baseline. ecchymosis noted to 3 middle toes. denies injury. daughter at the bedside. md delacruz completed. will reassess.

## 2023-02-16 NOTE — DISCHARGE NOTE ADULT - EFFECT OF WARFARIN/COUMADIN. NEVER TAKE ASPIRIN WITHOUT SPEAKING WITH YOUR HEALTH CARE PROVIDER.
Use warm salt water gargles, cough drops, Chloraseptic spray. Follow up with PCP in days if no better. Meds as prescribed. Vaporizer at night. Increase fluids. If worse return or go to ER.
Statement Selected

## 2023-03-21 NOTE — ED ADULT NURSE NOTE - READING LANGUAGE PREFERRED
English High Dose Vitamin A Counseling: Side effects reviewed, pt to contact office should one occur.

## 2023-04-24 NOTE — DIETITIAN INITIAL EVALUATION ADULT. - NUTRITION DIAGNOSTIC TERMINOLOGY #1
Patient's FSGs are uncontrolled due to hyperglycemia on current medication regimen.  Last A1c reviewed-   Lab Results   Component Value Date    HGBA1C 8.2 (H) 02/28/2023     Most recent fingerstick glucose reviewed-   Recent Labs   Lab 04/23/23  1614 04/23/23  2057 04/24/23  0719 04/24/23  1138   POCTGLUCOSE 197* 216* 154* 167*     Current correctional scale  Low  Maintain anti-hyperglycemic dose as follows-   Antihyperglycemics (From admission, onward)    Start     Stop Route Frequency Ordered    04/21/23 0355  insulin aspart U-100 pen 0-5 Units         -- SubQ Before meals & nightly PRN 04/21/23 0255        Hold Oral hypoglycemics while patient is in the hospital.   Knowledge and Beliefs

## 2023-07-17 NOTE — HISTORY OF PRESENT ILLNESS
[FreeTextEntry1] : Klaus is an 89-year-old gentleman mild dementia, CAD, htn, hyperlipidemia,afib,  systolic heart failure who  has been feeling well with no chest pain, palpitations or shortness of breath. He sits outside but rarely leaves the house with COVID.  Bilobed Transposition Flap Text: The defect edges were debeveled with a #15 scalpel blade.  Given the location of the defect and the proximity to free margins a bilobed transposition flap was deemed most appropriate.  Using a sterile surgical marker, an appropriate bilobe flap drawn around the defect.    The area thus outlined was incised deep to adipose tissue with a #15 scalpel blade and carried over to the primary defect.  The skin margins were undermined to an appropriate distance in all directions utilizing iris scissors.

## 2023-10-03 NOTE — H&P ADULT. - PROBLEM SELECTOR PROBLEM 8
Detail Level: Detailed Quality 138: Melanoma: Coordination Of Care: A treatment plan was communicated to the physicians providing continuing care within one month of diagnosis outlining: diagnosis, tumor thickness and a plan for surgery or alternate care. Quality 397: Melanoma: Reporting: Pathology report includes the pT Category, thickness, ulceration and mitotic rate, peripheral and deep margin status and presence or absence of microsatellitosis for invasive tumors. Quality 111:Pneumonia Vaccination Status For Older Adults: Pneumococcal vaccine (PPSV23) administered on or after patient’s 60th birthday and before the end of the measurement period Quality 226: Preventive Care And Screening: Tobacco Use: Screening And Cessation Intervention: Patient screened for tobacco use and is an ex/non-smoker Quality 130: Documentation Of Current Medications In The Medical Record: Current Medications Documented Clear Quality 137: Melanoma: Continuity Of Care - Recall System: Patient information entered into a recall system that includes: target date for the next exam specified AND a process to follow up with patients regarding missed or unscheduled appointments Quality 431: Preventive Care And Screening: Unhealthy Alcohol Use - Screening: Patient not identified as an unhealthy alcohol user when screened for unhealthy alcohol use using a systematic screening method Dementia

## 2023-11-27 NOTE — ASU PATIENT PROFILE, ADULT - NS TRANSFER DENTURES
Pt went to PCP and had xrays taken thru [facility] which pt says indicates stool back up and she is having piercing pain.  States sxs started last Thurs.  Pls adv an appt.    Partial/Upper

## 2024-03-10 PROBLEM — N18.9 CHRONIC KIDNEY DISEASE-MINERAL AND BONE DISORDER: Status: ACTIVE | Noted: 2017-06-12

## 2024-03-13 NOTE — CONSULT NOTE ADULT - PROBLEM SELECTOR RECOMMENDATION 9
Left voice message need to reschedule appointment with Bindu Lau from 5/24/2024.  
Patient presented to the ED with syncopal episode X2. Patient has a PPM. As per family PPM was checked about two weeks ago  Syncope workup as per primary team

## 2024-03-19 NOTE — H&P ADULT. - PROBLEM SELECTOR PROBLEM 9
Prior to admission pt reports being independent of all ADL's & functional mobility without AD. Pt resides in apt with spouse, elevator access no steps. +tub. (+) glasses for reading. (-) driving. (-) working.
Nutrition, metabolism, and development symptoms

## 2024-09-19 NOTE — PHYSICAL EXAM
MEDICATIONS  (PRN):  diphenhydrAMINE 50 milliGRAM(s) Oral every 6 hours PRN agitation  haloperidol     Tablet 5 milliGRAM(s) Oral every 6 hours PRN agitation  LORazepam     Tablet 2 milliGRAM(s) Oral every 6 hours PRN agitation   [4 x 4] : 4 x 4  [0] : right 0 MEDICATIONS  (PRN):  diphenhydrAMINE 50 milliGRAM(s) Oral every 6 hours PRN agitation  haloperidol     Tablet 5 milliGRAM(s) Oral every 6 hours PRN agitation  LORazepam     Tablet 2 milliGRAM(s) Oral every 6 hours PRN agitation   [1+] : left 1+ [Varicose Veins Of Lower Extremities] : bilaterally [] : bilaterally [Ankle Swelling On The Left] : moderate [Skin Ulcer] : ulcer [Calm] : calm [Ankle Swelling (On Exam)] : not present [de-identified] : A&Ox3, NAD [de-identified] : 5/5 strength in all quadrants bilaterally [de-identified] : right lower leg venous stasis ulceration down to skin, subcutaneous tissue, and fat. Bilateral venous stasis dermatitis [de-identified] : light touch sensation intact bilaterally [de-identified] : Circ neurovascular function WNL post coban compression, pt expressed comfort. [FreeTextEntry1] : Right leg superior-Closed [de-identified] : Adaptic touch [de-identified] : Cleansed with Chlorhexidine then NS\par Kerlix  [de-identified] : Circ neurovascular function WNL post coban compression, pt expressed comfort.\par  [FreeTextEntry7] : Right leg - Inferior- two wounds with bridge of epithelium in measurement [FreeTextEntry8] : 0.7 [FreeTextEntry9] : 2.1 [de-identified] : 0.1 [de-identified] : COBAN [de-identified] : Serous/sanguinous [de-identified] : Adaptic touch, Silver alginate [de-identified] : cleansed with  NS\par Kerlix  [TWNoteComboBox4] : Small [de-identified] : Normal [de-identified] : None [de-identified] : None [de-identified] : None [de-identified] : No [de-identified] : Multilayer other compression wrap [de-identified] : Weekly [de-identified] : Primary Dressing [de-identified] : Small [de-identified] : Normal [de-identified] : None [de-identified] : None [de-identified] : 100% [de-identified] : No [de-identified] : Multilayer other compression wrap [de-identified] : Weekly [de-identified] : Primary Dressing

## 2025-05-02 NOTE — PATIENT PROFILE ADULT - NSPROPTRIGHTNOTIFY_GEN_A_NUR
Case: [id number] Date/Time: 05/02/25 1231    Procedures:       REMOVAL, CATHETER, CAPD - PERITONEAL CATHETER REMOVAL AND INSERTION OF HEMODIALYSIS CATHETER WITH FLUOROSCOPY      INSERTION, CATHETER    Location: [facility] OR 19 / SURGERY [facility]    Surgeons: Yuan Mosqueda M.D.            Relevant Problems   PULMONARY   (positive) Community acquired pneumonia of right lower lobe of lung      CARDIAC   (positive) Aortic atherosclerosis (HCC)   (positive) Essential hypertension   (positive) Secondary hypertension      GI   (positive) GERD (gastroesophageal reflux disease)   (positive) Paraesophageal hernia         (positive) AA amyloid nephropathy (HCC)   (positive) Anemia due to chronic kidney disease, on chronic dialysis (HCC)   (positive) ESRD on peritoneal dialysis (HCC)      ENDO   (positive) Hypothyroid   (positive) Type 2 diabetes mellitus, without long-term current use of insulin (HCC)     57yo M with hx of amyloidosis, ESRD on PD, sarcoidosis, 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 Normal left ventricular systolic function. The left ventricular ejection fraction is visually estimated to be 60-65%.   Mild mitral regurgitation. Mild aortic insufficiency.    Physical Exam    Airway   Mallampati: II  TM distance: >3 FB  Neck ROM: full       Cardiovascular - normal exam  Rhythm: regular  Rate: normal  (-) murmur     Dental - normal exam           Pulmonary - normal exam  Breath sounds clear to auscultation     Abdominal    Neurological - normal exam                   Anesthesia Plan    ASA 3   ASA physical status 3 criteria: ESRD undergoing regularly scheduled dialysis    Plan - general       Airway plan will be ETT          Induction: intravenous    Postoperative Plan: Postoperative administration of opioids is intended.    Pertinent diagnostic labs and testing reviewed    Informed Consent:    Anesthetic plan and risks discussed with patient.    Use of blood  products discussed with: patient whom consented to blood products.            declines